# Patient Record
Sex: FEMALE | Race: WHITE | NOT HISPANIC OR LATINO | Employment: OTHER | URBAN - METROPOLITAN AREA
[De-identification: names, ages, dates, MRNs, and addresses within clinical notes are randomized per-mention and may not be internally consistent; named-entity substitution may affect disease eponyms.]

---

## 2021-04-21 ENCOUNTER — OFFICE VISIT CONVERTED (OUTPATIENT)
Dept: INTERNAL MEDICINE | Facility: CLINIC | Age: 71
End: 2021-04-21
Attending: PHYSICIAN ASSISTANT

## 2021-04-21 ENCOUNTER — HOSPITAL ENCOUNTER (OUTPATIENT)
Dept: OTHER | Facility: HOSPITAL | Age: 71
Discharge: HOME | End: 2021-04-21
Attending: PHYSICIAN ASSISTANT

## 2021-04-21 ENCOUNTER — CONVERSION ENCOUNTER (OUTPATIENT)
Dept: INTERNAL MEDICINE | Facility: CLINIC | Age: 71
End: 2021-04-21

## 2021-04-21 LAB
ALBUMIN SERPL-MCNC: 4.3 G/DL (ref 3.5–5)
ALBUMIN/GLOB SERPL: 1.4 {RATIO} (ref 1.4–2.6)
ALP SERPL-CCNC: 85 U/L (ref 43–160)
ALT SERPL-CCNC: 18 U/L (ref 10–40)
ANION GAP SERPL CALC-SCNC: 14 MMOL/L (ref 8–19)
AST SERPL-CCNC: 18 U/L (ref 15–50)
BASOPHILS # BLD AUTO: 0.04 10*3/UL (ref 0–0.2)
BASOPHILS NFR BLD AUTO: 0.5 % (ref 0–3)
BILIRUB SERPL-MCNC: 0.25 MG/DL (ref 0.2–1.3)
BUN SERPL-MCNC: 19 MG/DL (ref 5–25)
BUN/CREAT SERPL: 22 {RATIO} (ref 6–20)
CALCIUM SERPL-MCNC: 9.6 MG/DL (ref 8.7–10.4)
CHLORIDE SERPL-SCNC: 104 MMOL/L (ref 99–111)
CHOLEST SERPL-MCNC: 236 MG/DL (ref 107–200)
CHOLEST/HDLC SERPL: 4.5 {RATIO} (ref 3–6)
CONV ABS IMM GRAN: 0.03 10*3/UL (ref 0–0.2)
CONV CO2: 26 MMOL/L (ref 22–32)
CONV IMMATURE GRAN: 0.3 % (ref 0–1.8)
CONV TOTAL PROTEIN: 7.4 G/DL (ref 6.3–8.2)
CREAT UR-MCNC: 0.86 MG/DL (ref 0.5–0.9)
DEPRECATED RDW RBC AUTO: 46.5 FL (ref 36.4–46.3)
EOSINOPHIL # BLD AUTO: 0.16 10*3/UL (ref 0–0.7)
EOSINOPHIL # BLD AUTO: 1.9 % (ref 0–7)
ERYTHROCYTE [DISTWIDTH] IN BLOOD BY AUTOMATED COUNT: 13.8 % (ref 11.7–14.4)
GFR SERPLBLD BASED ON 1.73 SQ M-ARVRAT: >60 ML/MIN/{1.73_M2}
GLOBULIN UR ELPH-MCNC: 3.1 G/DL (ref 2–3.5)
GLUCOSE SERPL-MCNC: 95 MG/DL (ref 65–99)
HCT VFR BLD AUTO: 41.4 % (ref 37–47)
HDLC SERPL-MCNC: 52 MG/DL (ref 40–60)
HGB BLD-MCNC: 13.3 G/DL (ref 12–16)
LDLC SERPL CALC-MCNC: 151 MG/DL (ref 70–100)
LYMPHOCYTES # BLD AUTO: 1.75 10*3/UL (ref 1–5)
LYMPHOCYTES NFR BLD AUTO: 20.3 % (ref 20–45)
MAGNESIUM SERPL-MCNC: 1.85 MG/DL (ref 1.6–2.3)
MCH RBC QN AUTO: 29.7 PG (ref 27–31)
MCHC RBC AUTO-ENTMCNC: 32.1 G/DL (ref 33–37)
MCV RBC AUTO: 92.4 FL (ref 81–99)
MONOCYTES # BLD AUTO: 0.76 10*3/UL (ref 0.2–1.2)
MONOCYTES NFR BLD AUTO: 8.8 % (ref 3–10)
NEUTROPHILS # BLD AUTO: 5.88 10*3/UL (ref 2–8)
NEUTROPHILS NFR BLD AUTO: 68.2 % (ref 30–85)
NRBC CBCN: 0 % (ref 0–0.7)
OSMOLALITY SERPL CALC.SUM OF ELEC: 292 MOSM/KG (ref 273–304)
PLATELET # BLD AUTO: 314 10*3/UL (ref 130–400)
PMV BLD AUTO: 11.7 FL (ref 9.4–12.3)
POTASSIUM SERPL-SCNC: 4.1 MMOL/L (ref 3.5–5.3)
RBC # BLD AUTO: 4.48 10*6/UL (ref 4.2–5.4)
SODIUM SERPL-SCNC: 140 MMOL/L (ref 135–147)
TRIGL SERPL-MCNC: 163 MG/DL (ref 40–150)
TSH SERPL-ACNC: 2.37 M[IU]/L (ref 0.27–4.2)
VLDLC SERPL-MCNC: 33 MG/DL (ref 5–37)
WBC # BLD AUTO: 8.62 10*3/UL (ref 4.8–10.8)

## 2021-04-22 LAB
EST. AVERAGE GLUCOSE BLD GHB EST-MCNC: 131 MG/DL
HBA1C MFR BLD: 6.2 % (ref 3.5–5.7)

## 2021-04-29 ENCOUNTER — OFFICE VISIT CONVERTED (OUTPATIENT)
Dept: INTERNAL MEDICINE | Facility: CLINIC | Age: 71
End: 2021-04-29
Attending: INTERNAL MEDICINE

## 2021-05-05 ENCOUNTER — OFFICE VISIT CONVERTED (OUTPATIENT)
Dept: PSYCHIATRY | Facility: CLINIC | Age: 71
End: 2021-05-05
Attending: STUDENT IN AN ORGANIZED HEALTH CARE EDUCATION/TRAINING PROGRAM

## 2021-05-11 NOTE — H&P
History and Physical      Patient Name: Nivia Cagle   Patient ID: [id number]   Sex: Female   YOB: 1950        Visit Date: 2021    Provider: Catalina Madsen PA-C   Location: [facility]   Location Address: [address] [place] [address], [address]  [place], [place]  [id number]   Location Phone: [phone]          Chief Complaint  · est care       History Of Present Illness  Nivia Cagle is a 70 year old /White female who presents for evaluation and treatment of:      est care  previous pcp: Luci BAILON in [facility] with [facility]. Seen at [facility] in [facility] twice  last labs: 2020  specialists: Sees Dr. Giraldo for ankles at [facility] . Neurology at [facility].   Needs new neurologist-  muscle jerking and psychiatrist referrals.   Last pap: several years ago.   Last mammo:   Last cscope:  Tobacco: former smoker of 20 years    Broke both ankles 2021 after a fall.   fell in her driveway and broke both ankles. Had surgery.   She went to rehab at [facility] and was discharged 4/3/21. She has home health through [facility].   Needs Gabapentin and hydrocodone refills   Wearing bilateral boots.  She sees Dr. Giraldo   Has been on Lovanox since surgery    Dm2- BG today 215  States bg has been running higher.    HTN- denies cp, palpitations, dizziness    HLD- taking chol med daily    Anxiety/Depression- would like referral to [facility].   Mood is not doing well at all. She lost her mom in the fall and due to covid was not able to see her or say goodbye after death. She was unable to have a  and then was creamated.   She moved up to [place] To be near her son and daughter in law. She states they are not wanting her to go back to [place] and plan to sell all her things and home for her. She lived there 40 yr so this makes her sad  Denies si/hi, states she is Voodoo.  Recently changed from Fluoxetine to  Trintillix.    EARL- has not been able CPAP recently due to use due to alleriges     Allergies- needs something for allergies  Nasal congestion since the season changed.    Muscle twitching- has random twitching in muscles, worse in the face  STates lip twitches causing her to bite it at times.  She states she is taking Quinine PRN severe muscle cramping.   States she was given this my neurology in [place] for debilitating cramping in hands/arms       Past Medical History  Disease Name Date Onset Notes   Allergic rhinitis --  --    Anemia --  --    Anxiety --  --    Cataracts, bilateral --  --    Depression --  --    Diverticulitis --  --    DM2 (diabetes mellitus, type 2) --  --    Head injury --  --    High blood pressure --  --    Hyperlipidemia --  --    Migraines --  --    Phlebitis --  --    Reflux --  --          Past Surgical History  Procedure Name Date Notes   Ankle surgery 2021 --    breast reduction 2015 --    carpal tunnel --  --    Cholecystectomy 2001 --    Colonoscopy --  --    Ulnar nerve transposition, right --  --          Medication List  Name Date Started Instructions   atomoxetine 80 mg oral capsule  take 1 capsule (80 mg) by oral route once daily   Calcium 600 600 mg calcium (1,500 mg) oral tablet  take 1 tablet by oral route daily   clonazepam 1 mg oral tablet  take 1 tablet (1 mg) by oral route 2 times per day   cyclobenzaprine 10 mg oral tablet  take 1 tablet (10 mg) by oral route 3 times per day as needed.   Daily-Jelani oral tablet  take 1 tablet by oral route daily   ezetimibe 10 mg oral tablet  take 1 tablet (10 mg) by oral route once daily   insulin lispro 100 unit/mL subcutaneous solution  inject by subcutaneous route per prescriber's instructions. Insulin dosing requires individualization.   Januvia 100 mg oral tablet  take 1 tablet (100 mg) by oral route once daily   losartan 25 mg oral tablet  take 1 tablet (25 mg) by oral route once daily   Lovenox 40 mg/0.4 mL subcutaneous syringe   "inject 1.5 mg/kg by subcutaneous route once daily   metformin 500 mg oral tablet  take 1 tablet (500 mg) by oral route 2 times per day with morning and evening meals   Miralax 17 gram/dose oral powder  take 17 gram mixed with 8 oz. water, juice, soda, coffee or tea by oral route once daily as needed.   mirtazapine 45 mg oral tablet  take 1 tablet (45 mg) by oral route once daily before bedtime   montelukast 10 mg oral tablet  take 1 tablet (10 mg) by oral route once daily in the evening   pramipexole 0.125 mg oral tablet  take 1 tablet (0.125 mg) by oral route 2-3 hours before bedtime   quinine sulfate 324 mg oral capsule  take 1 capsule by oral route one a day at bedtime   senna 8.6 mg oral tablet  take 1 tablet by oral route twice daily   Trintellix 20 mg oral tablet 04/21/2021 take 1 tablet (20 mg) by oral route once daily at the same time each day   Vitamin D2 1,250 mcg (50,000 unit) oral capsule  take 1 capsule by oral route once a week.         Allergy List  Allergen Name Date Reaction Notes   NO KNOWN DRUG ALLERGIES --  --  --          Family Medical History  Disease Name Relative/Age Notes   Heart Disease Father/   --    Heart Attack (MI) Father/   --    Diabetes Father/   --          Social History  Finding Status Start/Stop Quantity Notes   Alcohol Light --/-- --  special occasion only   Tobacco Former --/-- --  smoked for 20 years. quit in 1988         Vitals  Date Time BP Position Site L\R Cuff Size HR RR TEMP (F) WT  HT  BMI kg/m2 BSA m2 O2 Sat FR L/min FiO2 HC       04/21/2021 11:50 /70 Sitting    113 - R 15 98.3 174lbs 0oz 5'  4\" 29.87 1.89 96 %            Physical Examination  · Constitutional  o Appearance  o : no acute distress, well-nourished  · Head and Face  o Head  o :   § Inspection  § : atraumatic, normocephalic  · Eyes  o Eyes  o : extraocular movements intact, no scleral icterus, no conjunctival injection  · Ears, Nose, Mouth and Throat  o Ears  o :   § External Ears  § : " normal  o Nose  o :   § Intranasal Exam  § : nares patent  o Oral Cavity  o :   § Oral Mucosa  § : moist mucous membranes  · Neck  o Thyroid  o : gland size normal, nontender, no nodules or masses present on palpation, symmetric  · Respiratory  o Respiratory Effort  o : breathing comfortably, symmetric chest rise  o Auscultation of Lungs  o : clear to asculatation bilaterally, no wheezes, rales, or rhonchii  · Cardiovascular  o Heart  o :   § Auscultation of Heart  § : regular rate and rhythm, no murmurs, rubs, or gallops  o Peripheral Vascular System  o :   § Extremities  § : no edema  · Gastrointestinal  o Abdominal Examination  o :   § Abdomen  § : bowel sounds present, non-distended, non-tender  · Skin and Subcutaneous Tissue  o General Inspection  o : no lesions present, no areas of discoloration, skin turgor normal  · Neurologic  o Mental Status Examination  o :   § Orientation  § : grossly oriented to person, place and time  o Gait and Station  o :   § Gait Screening  § : normal gait  · Psychiatric  o General  o : crying     MSK: well healing scars noted on bilateral ankles.   Pt in bilateral boots               Assessment  · Screening for depression     V79.0/Z13.89  positive  · Depression     311/F32.9  Mood not well controlled. Given number for local counselors. Will increase trintillix from 10mg to 20mg. RTC 1 wk. To er if si/hi  · Diabetes mellitus type 2, uncontrolled, with complications       Type 2 diabetes mellitus with unspecified complications     250.92/E11.8  Type 2 diabetes mellitus with hyperglycemia     250.92/E11.65  Discussed dm not well controlled. Will get labs today and adjust meds based on results.  · Essential hypertension     401.9/I10  Cont current meds. LAbs ordered  · Hyperlipidemia     272.4/E78.5  Labs ordered  · Anxiety     300.02/F41.1  · Ankle fracture     824.8/S82.899A  Requesting notes from Dr. Giraldo. Discussed that she needs to request pain med refills from their  office.  · Muscle twitching     781.0/R25.3  Will refer to neuro for further eval. Reqesting previous neuro notes from [facility].  · EARL (obstructive sleep apnea)     327.23/G47.33  Discussed need to restart cpap use.      Plan  · Orders  o Hgb A1c [facility] ([id number]) - 250.92/E11.65, 401.9/I10, 272.4/E78.5 - 04/21/2021  o Physical, Primary Care Panel (CBC, CMP, Lipid, TSH) [facility] ([id number], 82175, 26674, 72649) - 250.92/E11.65, 401.9/I10, 272.4/E78.5, 300.02/F41.1 - 04/21/2021  o ACO-18: Positive screen for clinical depression using a standardized tool and a follow-up plan documented () - - 04/21/2021  o ACO-13: Fall Risk Screening with 2 or more falls in past year or any fall with injury in the past year (1100F) - - 04/21/2021  o ACO-39: Current medications updated and reviewed (1159F, ) - - 04/21/2021  o NEUROLOGY CONSULTATION. (NEURO) - 781.0/R25.3 - 04/21/2021  o Psychiatric Consultation (PSYCH) - 300.02/F41.1, 311/F32.9 - 04/21/2021  o Magnesium, serum (65245) - 781.0/R25.3 - 04/21/2021  · Medications  o cetirizine 10 mg oral tablet   SIG: take 1 tablet (10 mg) by oral route once daily   DISP: (30) Tablet with 1 refills  Prescribed on 04/21/2021     o sertraline 100 mg oral tablet   SIG: take 1 tablet by oral route daily for mood   DISP: (30) Tablet with 1 refills  Prescribed on 04/22/2021     o fluoxetine 60 mg oral tablet   SIG: take 1 tablet (60 mg) by oral route once daily in the morning   DISP: (0) Tablet with 0 refills  Discontinued on 04/21/2021     o Trintellix 20 mg oral tablet   SIG: take 1 tablet (20 mg) by oral route once daily at the same time each day   DISP: (30) Tablet with 1 refills  Discontinued on 04/22/2021     o Medications have been Reconciled  o Transition of Care or Provider Policy  · Instructions  o Depression Screen completed and scanned into the EMR under the designated folder within the patient's documents.  o Today's PHQ-9 result is _21__  o A list of local qualified behavioral  health care centers, psychologists, and psychiatrists were given to the patient at the time of the visit today.  o Advised that cheeses and other sources of dairy fats, animal fats, fast food, and the extras (candy, pastries, pies, doughnuts and cookies) all contain LDL raising nutrients. Advised to increase fruits, vegetables, whole grains, and to monitor portion sizes.   o Handouts were given to patient: local counselors  o Patient was educated/instructed on their diagnosis, treatment and medications prior to discharge from the clinic today.  o Electronically Identified Patient Education Materials Provided Electronically  · Disposition  o Call or Return if symptoms worsen or persist.  o Follow up in 1 week  o Follow up with Dr. Fraser            Electronically Signed by: Catalina Madsen PA-C -Author on April 22, 2021 04:01:04 PM

## 2021-05-13 ENCOUNTER — OFFICE VISIT CONVERTED (OUTPATIENT)
Dept: INTERNAL MEDICINE | Facility: CLINIC | Age: 71
End: 2021-05-13
Attending: PHYSICIAN ASSISTANT

## 2021-05-14 VITALS
WEIGHT: 174 LBS | TEMPERATURE: 98.5 F | DIASTOLIC BLOOD PRESSURE: 78 MMHG | SYSTOLIC BLOOD PRESSURE: 134 MMHG | HEIGHT: 64 IN | HEART RATE: 113 BPM | OXYGEN SATURATION: 96 % | BODY MASS INDEX: 29.71 KG/M2

## 2021-05-14 VITALS
WEIGHT: 174 LBS | OXYGEN SATURATION: 96 % | HEIGHT: 64 IN | HEART RATE: 113 BPM | SYSTOLIC BLOOD PRESSURE: 124 MMHG | DIASTOLIC BLOOD PRESSURE: 70 MMHG | BODY MASS INDEX: 29.71 KG/M2 | TEMPERATURE: 98.3 F | RESPIRATION RATE: 15 BRPM

## 2021-05-22 ENCOUNTER — TRANSCRIBE ORDERS (OUTPATIENT)
Dept: ADMINISTRATIVE | Facility: HOSPITAL | Age: 71
End: 2021-05-22

## 2021-05-22 DIAGNOSIS — Z12.31 OTHER SCREENING MAMMOGRAM: Primary | ICD-10-CM

## 2021-05-27 ENCOUNTER — CONVERSION ENCOUNTER (OUTPATIENT)
Dept: INTERNAL MEDICINE | Facility: CLINIC | Age: 71
End: 2021-05-27

## 2021-05-27 ENCOUNTER — OFFICE VISIT CONVERTED (OUTPATIENT)
Dept: INTERNAL MEDICINE | Facility: CLINIC | Age: 71
End: 2021-05-27
Attending: PHYSICIAN ASSISTANT

## 2021-05-27 LAB
AMPHET UR QL CFM: NEGATIVE
BARBITURATES UR QL: NEGATIVE
BENZODIAZ UR QL SCN: POSITIVE
CONV AMP/METHAMP UR: NEGATIVE
CONV COCAINE, UR: NEGATIVE
MDMA UR QL SCN: NEGATIVE
METHADONE UR QL SCN: NEGATIVE
OPIATES UR QL SCN: NEGATIVE
OXYCODONE UR QL SCN: NEGATIVE
PCP UR QL: NEGATIVE
THC SERPLBLD CFM-MCNC: NEGATIVE NG/ML
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
## 2021-06-06 NOTE — PROGRESS NOTES
Progress Note      Patient Name: Nivia Cagle   Patient ID: [id number]   Sex: Female   YOB: 1950    Primary Care Provider: Catalina Madsen PA-C   Referring Provider: Catalina Madsen PA-C    Visit Date: May 13, 2021    Provider: Catalina Madsen PA-C   Location: [facility]   Location Address: [address] [place] [address], [address]  [id number]   Location Phone: [phone]          Chief Complaint  · f/u depression, DM2      History Of Present Illness  Nivia Cagle is a 70 year old /White female who presents for evaluation and treatment of:      2 week f/up    She has all of medications with her today. She does not know what she is supposed to be taking and what medications are used for.    Depression- pt was seen by Dr. Oliveros psychiatrist who started Abilifty. He discontinued Wellbutrin, Sertraline, and Straterra.  Denies si/hi  She states mood is ''slightly better''. She has follow up scheduled with him.   under a lot of stress at home.       DM2- Using 2-4 units of insulin prn  She has been out of Januvia. Denies low bg  Bg runs around 110.    HLD-takes zetia.  She has been watching her diet.    Muscle spasms- has been taking quinine, cyclobenzaprine, gabapentin for muscle spasm and nerve pain x yrs    Allergies- discussed zyrtec and singulair can be used prn to eliminate 2 of the daily medications         Past Medical History  Disease Name Date Onset Notes   Allergic rhinitis --  --    Anemia --  --    Anxiety --  --    Cataracts, bilateral --  --    Depression --  --    Depression 04/21/2021 Mood not well controlled. Given number for local counselors. Will increase trintillix from 10mg to 20mg. RTC 1 wk. To er if si/hi   Diverticulitis --  --    DM2 (diabetes mellitus, type 2) --  --    Head injury --  --    High blood pressure --  --    Hyperlipidemia --  --    Migraines --  --    EARL (obstructive sleep apnea) 04/21/2021 --    Phlebitis --  --     Reflux --  --          Past Surgical History  Procedure Name Date Notes   Ankle surgery 2021 --    breast reduction 2015 --    carpal tunnel --  --    Cholecystectomy 2001 --    Colonoscopy --  --    Ulnar nerve transposition, right --  --          Medication List  Name Date Started Instructions   aripiprazole 2 mg oral tablet 05/05/2021 take 1 tablet (2 mg) by oral route once daily for 60 days   Calcium 600 600 mg calcium (1,500 mg) oral tablet  take 1 tablet by oral route daily   cetirizine 10 mg oral tablet 04/21/2021 take 1 tablet (10 mg) by oral route once daily   clonazepam 1 mg oral tablet  take 1 tablet (1 mg) by oral route 2 times per day   cyclobenzaprine 10 mg oral tablet 05/13/2021 take 1 tablet by oral route daily as needed muscle spasm   D3-50 Cholecalciferol 1,250 mcg (50,000 unit) oral capsule  take 1 capsule by oral route every 7 days   ezetimibe 10 mg oral tablet  take 1 tablet (10 mg) by oral route once daily   fluoxetine 60 mg oral tablet  take 1 tablet (60 mg) by oral route once daily in the morning   gabapentin 300 mg oral capsule  take 1 capsule by oral route 2 times a day   ibuprofen 200 mg oral capsule  take 1 - 2 capsules (200 - 400 mg) by oral route every 6 hours as needed   insulin lispro 100 unit/mL subcutaneous solution 05/03/2021 inject by subcutaneous route per prescribers sliding scale instructions. Insulin dosing requires individualization.   Januvia 100 mg oral tablet 05/12/2021 take 1 tablet (100 mg) by oral route once daily   losartan 25 mg oral tablet 05/13/2021 take 1 tablet (25 mg) by oral route once daily for 30 days   Lovenox 40 mg/0.4 mL subcutaneous syringe  inject 1.5 mg/kg by subcutaneous route once daily   metformin 500 mg oral tablet  take 1 tablet (500 mg) by oral route 2 times per day with morning and evening meals   mirtazapine 45 mg oral tablet  take 1 tablet (45 mg) by oral route once daily before bedtime   montelukast 10 mg oral tablet  take 1 tablet (10 mg) by  "oral route once daily in the evening   pramipexole 0.125 mg oral tablet  take 1 tablet (0.125 mg) by oral route 2-3 hours before bedtime   quinine sulfate 324 mg oral capsule  take 1 capsule by oral route one a day at bedtime   Tylenol 325 mg oral tablet  take 1 - 2 tablets (325 - 650 mg) by oral route every 4-6 hours as needed   Vitamin D2 1,250 mcg (50,000 unit) oral capsule  take 1 capsule by oral route once a week.         Allergy List  Allergen Name Date Reaction Notes   NO KNOWN DRUG ALLERGIES --  --  --        Allergies Reconciled  Family Medical History  Disease Name Relative/Age Notes   Heart Disease Father/   --    Heart Attack (MI) Father/   --    Diabetes Father/   --          Social History  Finding Status Start/Stop Quantity Notes   Alcohol Light --/-- --  special occasion only   Tobacco Former --/-- --  smoked for 20 years. quit in 1988         Review of Systems  · Constitutional  o Denies  o : fever, fatigue, weight loss, weight gain  · Cardiovascular  o Denies  o : lower extremity edema, claudication, chest pressure, palpitations  · Respiratory  o Denies  o : shortness of breath, wheezing, frequent cough, hemoptysis, dyspnea on exertion  · Gastrointestinal  o Denies  o : nausea, vomiting, diarrhea, constipation, abdominal pain      Vitals  Date Time BP Position Site L\R Cuff Size HR RR TEMP (F) WT  HT  BMI kg/m2 BSA m2 O2 Sat FR L/min FiO2 HC       04/21/2021 11:50 /70 Sitting    113 - R 15 98.3 174lbs 0oz 5'  4\" 29.87 1.89 96 %      04/29/2021 11:40 /78 Sitting    113 - R  98.5 174lbs 0oz 5'  4\" 29.87 1.89 96 %      05/13/2021 11:23 /82 Sitting    103 - R 15 98.4 178lbs 0oz 5'  4\" 30.55 1.91 98 %            Physical Examination  · Constitutional  o Appearance  o : no acute distress, well-nourished  · Head and Face  o Head  o :   § Inspection  § : atraumatic, normocephalic  · Eyes  o Eyes  o : extraocular movements intact, no scleral icterus, no conjunctival injection  · Ears, " Nose, Mouth and Throat  o Ears  o :   § External Ears  § : normal  o Nose  o :   § Intranasal Exam  § : nares patent  o Oral Cavity  o :   § Oral Mucosa  § : moist mucous membranes  o Throat  o :   § Oropharynx  § : no inflammation or lesions present, tonsils within normal limits  · Neck  o Thyroid  o : gland size normal, nontender, no nodules or masses present on palpation, symmetric  · Respiratory  o Respiratory Effort  o : breathing comfortably, symmetric chest rise  o Auscultation of Lungs  o : clear to asculatation bilaterally, no wheezes, rales, or rhonchii  · Cardiovascular  o Heart  o :   § Auscultation of Heart  § : regular rate and rhythm, no murmurs, rubs, or gallops  o Peripheral Vascular System  o :   § Extremities  § : no edema  · Gastrointestinal  o Abdominal Examination  o :   § Abdomen  § : bowel sounds present, non-distended, non-tender  · Lymphatic  o Neck  o : no lymphadenopathy present  · Skin and Subcutaneous Tissue  o General Inspection  o : no lesions present, no areas of discoloration, skin turgor normal  · Neurologic  o Mental Status Examination  o :   § Orientation  § : grossly oriented to person, place and time  o Gait and Station  o :   § Gait Screening  § : normal gait  · Psychiatric  o General  o : normal mood and affect              Assessment  · Allergic rhinitis due to allergen     477.9/J30.9  use zyrtec and singulair prn  · Depression     311/F32.9  Reviewed psych note. Went through all medications and wrote on bottles which to d/c per Psych instructions and which to cont. Keep follow up with their office for med mgmt. To er if si/hi  · Hyperlipidemia     272.4/E78.5  reviewed elevated chol on labs, low fat/chol diet. Increase exercise. Will cont zetia   · EARL (obstructive sleep apnea)     327.23/G47.33  · DM2 (diabetes mellitus, type 2)     250.00/E11.9  Reviewed a1c 6.2. Discussed pt does not need insulin because sugar is well controlled unless bg spikes. Cont Januvia and  Metformin.   · High blood pressure     401.9/I10  BP controlled, cont Losartan  · Muscle spasm     728.85/M62.838  Given number to schedule apt with neurology for full eval      Plan  · Orders  o ACO-39: Current medications updated and reviewed (1159F, ) - - 05/13/2021  · Medications  o cyclobenzaprine 10 mg oral tablet   SIG: take 1 tablet by oral route daily as needed muscle spasm   DISP: (30) Tablet with 0 refills  Prescribed on 05/13/2021     o losartan 25 mg oral tablet   SIG: take 1 tablet (25 mg) by oral route once daily for 30 days   DISP: (30) Tablet with 5 refills  Prescribed on 05/13/2021     o Daily-Jelani oral tablet   SIG: take 1 tablet by oral route daily   DISP: (0) Tablet with 0 refills  Discontinued on 05/13/2021     o ketoconazole 2 % topical cream   SIG: apply to the affected area(s) by topical route 2 times per day   DISP: (1) Tube with 0 refills  Discontinued on 05/13/2021     o Miralax 17 gram/dose oral powder   SIG: take 17 gram mixed with 8 oz. water, juice, soda, coffee or tea by oral route once daily as needed.   DISP: (1) Bottle with 0 refills  Discontinued on 05/13/2021     o senna 8.6 mg oral tablet   SIG: take 1 tablet by oral route twice daily   DISP: (0) Tablet with 0 refills  Discontinued on 05/13/2021     o Medications have been Reconciled  o Transition of Care or Provider Policy  · Instructions  o Advised that cheeses and other sources of dairy fats, animal fats, fast food, and the extras (candy, pastries, pies, doughnuts and cookies) all contain LDL raising nutrients. Advised to increase fruits, vegetables, whole grains, and to monitor portion sizes.   o Handouts were given to patient: updated med list  o Patient was educated/instructed on their diagnosis, treatment and medications prior to discharge from the clinic today.  o Electronically Identified Patient Education Materials Provided Electronically  · Disposition  o Call or Return if symptoms worsen or persist.  o Follow  up in 2 weeks            Electronically Signed by: Catalina Madsen PA-C -Author on May 13, 2021 03:39:52 PM  Electronically Co-signed by: Laine Fraser MD -Reviewer on May 13, 2021 04:05:20 PM

## 2021-06-06 NOTE — PROGRESS NOTES
Progress Note      Patient Name: Nivia Cagle   Patient ID: [id number]   Sex: Female   YOB: 1950    Primary Care Provider: Catalina Madsen PA-C   Referring Provider: Catalina Madsen PA-C    Visit Date: May 27, 2021    Provider: Catalina Madsen PA-C   Location: [facility]   Location Address: [address] [place] [address], [address]  [id number]   Location Phone: [phone]          Chief Complaint  · f/u       History Of Present Illness  Nivia Cagle is a 70 year old /White female who presents for evaluation and treatment of:      follow up    Depression- seeing psych. Feels better with abilify  Denies si/hi  Takes Xanax prn rx from their office  She has not been able to get counselor who takes her insurance    Trouble with hearing- would like referral to audiology    Dm- bg running around 130. Denies low bg.  Has diabetic neuropathy.   needs gabapentin refill. Will do UDS, [place]onson, Narcotic consent today.    Allergies- having nasal congestion  Goes from one nostril to the other.  Denies fever, cough, wheezing, resp distress       Past Medical History  Disease Name Date Onset Notes   Allergic rhinitis --  --    Anemia --  --    Anxiety --  --    Cataracts, bilateral --  --    Depression --  --    Depression 04/21/2021 Mood not well controlled. Given number for local counselors. Will increase trintillix from 10mg to 20mg. RTC 1 wk. To er if si/hi   Diverticulitis --  --    DM2 (diabetes mellitus, type 2) --  --    Head injury --  --    High blood pressure --  --    Hyperlipidemia --  --    Migraines --  --    EARL (obstructive sleep apnea) 04/21/2021 --    Phlebitis --  --    Reflux --  --          Past Surgical History  Procedure Name Date Notes   Ankle surgery 2021 --    breast reduction 2015 --    carpal tunnel --  --    Cholecystectomy 2001 --    Colonoscopy --  --    Ulnar nerve transposition, right --  --          Medication List  Name Date Started  Instructions   aripiprazole 2 mg oral tablet 05/05/2021 take 1 tablet (2 mg) by oral route once daily for 60 days   Calcium 600 600 mg calcium (1,500 mg) oral tablet  take 1 tablet by oral route daily   cetirizine 10 mg oral tablet 04/21/2021 take 1 tablet (10 mg) by oral route once daily   clonazepam 1 mg oral tablet  take 1 tablet (1 mg) by oral route 2 times per day   cyclobenzaprine 10 mg oral tablet 05/13/2021 take 1 tablet by oral route daily as needed muscle spasm   D3-50 Cholecalciferol 1,250 mcg (50,000 unit) oral capsule  take 1 capsule by oral route every 7 days   ezetimibe 10 mg oral tablet  take 1 tablet (10 mg) by oral route once daily   fluoxetine 60 mg oral tablet  take 1 tablet (60 mg) by oral route once daily in the morning   gabapentin 300 mg oral capsule 05/27/2021 take 1 capsule by oral route 2 times a day for 90 days   ibuprofen 200 mg oral capsule  take 1 - 2 capsules (200 - 400 mg) by oral route every 6 hours as needed   insulin lispro 100 unit/mL subcutaneous solution 05/03/2021 inject by subcutaneous route per prescribers sliding scale instructions. Insulin dosing requires individualization.   Januvia 100 mg oral tablet 05/12/2021 take 1 tablet (100 mg) by oral route once daily   losartan 25 mg oral tablet 05/13/2021 take 1 tablet (25 mg) by oral route once daily for 30 days   Lovenox 40 mg/0.4 mL subcutaneous syringe  inject 1.5 mg/kg by subcutaneous route once daily   metformin 500 mg oral tablet  take 1 tablet (500 mg) by oral route 2 times per day with morning and evening meals   mirtazapine 45 mg oral tablet  take 1 tablet (45 mg) by oral route once daily before bedtime   montelukast 10 mg oral tablet  take 1 tablet (10 mg) by oral route once daily in the evening   pramipexole 0.125 mg oral tablet  take 1 tablet (0.125 mg) by oral route 2-3 hours before bedtime   quinine sulfate 324 mg oral capsule  take 1 capsule by oral route one a day at bedtime   Tylenol 325 mg oral tablet  take 1  "- 2 tablets (325 - 650 mg) by oral route every 4-6 hours as needed   Vitamin D2 1,250 mcg (50,000 unit) oral capsule  take 1 capsule by oral route once a week.         Allergy List  Allergen Name Date Reaction Notes   NO KNOWN DRUG ALLERGIES --  --  --        Allergies Reconciled  Family Medical History  Disease Name Relative/Age Notes   Heart Disease Father/   --    Heart Attack (MI) Father/   --    Diabetes Father/   --          Social History  Finding Status Start/Stop Quantity Notes   Alcohol Light --/-- --  special occasion only   Tobacco Former --/-- --  smoked for 20 years. quit in 1988         Vitals  Date Time BP Position Site L\R Cuff Size HR RR TEMP (F) WT  HT  BMI kg/m2 BSA m2 O2 Sat FR L/min FiO2 HC       04/29/2021 11:40 /78 Sitting    113 - R  98.5 174lbs 0oz 5'  4\" 29.87 1.89 96 %      05/13/2021 11:23 /82 Sitting    103 - R 15 98.4 178lbs 0oz 5'  4\" 30.55 1.91 98 %      05/27/2021 10:59 /72 Sitting    88 - R 15 97.8 178lbs 0oz 5'  4\" 30.55 1.91 97 %            Physical Examination  · Constitutional  o Appearance  o : no acute distress, well-nourished  · Head and Face  o Head  o :   § Inspection  § : atraumatic, normocephalic  · Eyes  o Eyes  o : extraocular movements intact, no scleral icterus, no conjunctival injection  · Ears, Nose, Mouth and Throat  o Ears  o :   § External Ears  § : normal  o Nose  o :   § Intranasal Exam  § : nares patent  o Oral Cavity  o :   § Oral Mucosa  § : moist mucous membranes  · Neck  o Thyroid  o : gland size normal, nontender, no nodules or masses present on palpation, symmetric  · Respiratory  o Respiratory Effort  o : breathing comfortably, symmetric chest rise  o Auscultation of Lungs  o : clear to asculatation bilaterally, no wheezes, rales, or rhonchii  · Cardiovascular  o Heart  o :   § Auscultation of Heart  § : regular rate and rhythm, no murmurs, rubs, or gallops  o Peripheral Vascular System  o :   § Extremities  § : no edema  · Skin and " Subcutaneous Tissue  o General Inspection  o : no lesions present, no areas of discoloration, skin turgor normal  · Neurologic  o Mental Status Examination  o :   § Orientation  § : grossly oriented to person, place and time  o Gait and Station  o :   § Gait Screening  § : normal gait  · Psychiatric  o General  o : normal mood and affect          Results  · In-Office Procedures  o Lab procedure  § IOP - Urine Drug Screen In-House [facility] ([id number])   § Amphetamines Ur Ql: Negative   § Barbiturates Ur Ql: Negative   § Buprenorphine+Nor Ur Ql Scn: Negative   § Benzodiaz Ur Ql: Positive   § Cocaine Ur Ql: Negative   § Methadone Ur Ql: Negative   § Methamphet Ur Ql: Negative   § MDMA Ur Ql Scn: Negative   § Opiates Ur Ql: Negative   § Oxycodone Ur Ql: Negative   § PCP Ur Ql: Negative   § THC Ur Ql: Negative   § Temp in Range?: Within/Acceptable   § Control Seen?: Yes       Assessment  · EARL (obstructive sleep apnea)     327.23/G47.33  · DM2 (diabetes mellitus, type 2)     250.00/E11.9  Cont current medications  · High blood pressure     401.9/I10  bp controlled on current meds  · Anxiety     300.02/F41.1  Keep follow up with psych  · Allergic rhinitis     477.9/J30.9  start Flonase to help with nasal congestion  · Hearing loss     389.9/H91.90  referral placed for hearing eval  · Diabetic neuropathy       Type 2 diabetes mellitus with diabetic neuropathy, unspecified     250.60/E11.40  Reviewed [facility]. UDS wnl today- positive for benzo (takes Xanax from psych). Pt signed control contract. Gabapentin rx sent to pharmacy.    Problems Reconciled  Plan  · Orders  o ACO-39: Current medications updated and reviewed (1159F, ) - - 05/27/2021  o Audiology Consultation (AUDIO) - 389.9/H91.90 - 05/27/2021  · Medications  o fluticasone propionate 50 mcg/actuation nasal spray,suspension   SIG: spray 1 - 2 sprays (50 - 100 mcg) in each nostril by intranasal route once daily   DISP: (1) Puff with 1 refills  Prescribed on 05/27/2021      o Medications have been Reconciled  o Transition of Care or Provider Policy  · Instructions  o Patient was educated/instructed on their diagnosis, treatment and medications prior to discharge from the clinic today.  · Disposition  o Call or Return if symptoms worsen or persist.  o Follow up in 1 month            Electronically Signed by: Catalina Madsen PA-C -Author on May 28, 2021 08:57:57 AM  Electronically Co-signed by: Laine Fraser MD -Reviewer on May 28, 2021 09:15:49 AM

## 2021-06-07 ENCOUNTER — TELEPHONE (OUTPATIENT)
Dept: INTERNAL MEDICINE | Facility: CLINIC | Age: 71
End: 2021-06-07

## 2021-06-07 DIAGNOSIS — M79.671 RIGHT FOOT PAIN: Primary | ICD-10-CM

## 2021-06-07 NOTE — TELEPHONE ENCOUNTER
Caller: Nivia Cagle    Relationship: Self    Best call back number:[phone]    What orders are you requesting (i.e. lab or imaging): PHYSICAL THERAPY    In what timeframe would the patient need to come in: ASAP    Where will you receive your lab/imaging services:     Additional notes:   PATIENT STATES THAT SHE CAN BARELY WALK ON HER RIGHT FOOT. PATIENT WILL NOT BE BACK UNTIL LABOR DAY.

## 2021-06-07 NOTE — TELEPHONE ENCOUNTER
Physical therapy referral placed, Patient aware. No other issues or concerns noted currently per patient.

## 2021-06-21 ENCOUNTER — OFFICE VISIT (OUTPATIENT)
Dept: NEUROLOGY | Facility: CLINIC | Age: 71
End: 2021-06-21

## 2021-06-21 ENCOUNTER — TELEMEDICINE (OUTPATIENT)
Dept: PSYCHIATRY | Facility: CLINIC | Age: 71
End: 2021-06-21

## 2021-06-21 ENCOUNTER — LAB (OUTPATIENT)
Dept: LAB | Facility: HOSPITAL | Age: 71
End: 2021-06-21

## 2021-06-21 VITALS
WEIGHT: 176 LBS | HEIGHT: 64 IN | SYSTOLIC BLOOD PRESSURE: 129 MMHG | BODY MASS INDEX: 30.05 KG/M2 | HEART RATE: 78 BPM | DIASTOLIC BLOOD PRESSURE: 79 MMHG

## 2021-06-21 DIAGNOSIS — F43.10 POST TRAUMATIC STRESS DISORDER (PTSD): ICD-10-CM

## 2021-06-21 DIAGNOSIS — G47.33 OBSTRUCTIVE SLEEP APNEA: ICD-10-CM

## 2021-06-21 DIAGNOSIS — R70.0 ELEVATED SED RATE: Primary | ICD-10-CM

## 2021-06-21 DIAGNOSIS — R25.3 MUSCLE TWITCHING: ICD-10-CM

## 2021-06-21 DIAGNOSIS — G25.3 MYOCLONIC JERKING: ICD-10-CM

## 2021-06-21 DIAGNOSIS — G25.81 RESTLESS LEGS SYNDROME (RLS): ICD-10-CM

## 2021-06-21 DIAGNOSIS — R25.3 MUSCLE TWITCHING: Primary | ICD-10-CM

## 2021-06-21 DIAGNOSIS — F60.9 CLUSTER B PERSONALITY DISORDER IN ADULT (HCC): ICD-10-CM

## 2021-06-21 DIAGNOSIS — F41.1 GENERALIZED ANXIETY DISORDER: ICD-10-CM

## 2021-06-21 DIAGNOSIS — F33.2 SEVERE EPISODE OF RECURRENT MAJOR DEPRESSIVE DISORDER, WITHOUT PSYCHOTIC FEATURES (HCC): Primary | ICD-10-CM

## 2021-06-21 PROBLEM — E11.9 DM2 (DIABETES MELLITUS, TYPE 2) (HCC): Status: ACTIVE | Noted: 2021-06-21

## 2021-06-21 PROBLEM — J30.9 ALLERGIC RHINITIS: Status: ACTIVE | Noted: 2021-06-21

## 2021-06-21 PROBLEM — K57.90 DIVERTICULAR DISEASE: Status: ACTIVE | Noted: 2021-06-21

## 2021-06-21 PROBLEM — K21.9 GASTROESOPHAGEAL REFLUX DISEASE: Status: ACTIVE | Noted: 2021-04-02

## 2021-06-21 PROBLEM — H26.493 BILATERAL POSTERIOR CAPSULAR OPACIFICATION: Status: ACTIVE | Noted: 2021-06-17

## 2021-06-21 PROBLEM — G43.909 MIGRAINES: Status: ACTIVE | Noted: 2021-06-21

## 2021-06-21 PROBLEM — I80.9 PHLEBITIS: Status: ACTIVE | Noted: 2021-06-21

## 2021-06-21 PROBLEM — N80.9 ENDOMETRIOSIS: Status: ACTIVE | Noted: 2021-06-17

## 2021-06-21 PROBLEM — E78.00 HYPERCHOLESTEROLEMIA: Status: ACTIVE | Noted: 2021-06-21

## 2021-06-21 PROBLEM — I10 HYPERTENSIVE DISORDER: Status: ACTIVE | Noted: 2021-04-02

## 2021-06-21 PROBLEM — K57.92 DIVERTICULITIS: Status: ACTIVE | Noted: 2021-06-21

## 2021-06-21 PROBLEM — M54.50 LOW BACK PAIN: Status: ACTIVE | Noted: 2021-06-17

## 2021-06-21 PROBLEM — D64.9 ANEMIA: Status: ACTIVE | Noted: 2021-06-21

## 2021-06-21 PROBLEM — S09.90XA HEAD INJURY: Status: ACTIVE | Noted: 2021-06-21

## 2021-06-21 PROBLEM — Z86.19 HISTORY OF HEPATITIS A: Status: ACTIVE | Noted: 2021-06-17

## 2021-06-21 PROBLEM — R01.1 CARDIAC MURMUR: Status: ACTIVE | Noted: 2021-04-02

## 2021-06-21 PROBLEM — M41.9 SCOLIOSIS DEFORMITY OF SPINE: Status: ACTIVE | Noted: 2021-04-02

## 2021-06-21 PROBLEM — F41.9 ANXIETY: Status: ACTIVE | Noted: 2021-06-21

## 2021-06-21 LAB
DEPRECATED RDW RBC AUTO: 45.8 FL (ref 37–54)
ERYTHROCYTE [DISTWIDTH] IN BLOOD BY AUTOMATED COUNT: 14.3 % (ref 12.3–15.4)
ERYTHROCYTE [SEDIMENTATION RATE] IN BLOOD: 41 MM/HR (ref 0–30)
HCT VFR BLD AUTO: 36.4 % (ref 34–46.6)
HGB BLD-MCNC: 12.2 G/DL (ref 12–15.9)
MCH RBC QN AUTO: 30 PG (ref 26.6–33)
MCHC RBC AUTO-ENTMCNC: 33.5 G/DL (ref 31.5–35.7)
MCV RBC AUTO: 89.4 FL (ref 79–97)
PLATELET # BLD AUTO: 274 10*3/MM3 (ref 140–450)
PMV BLD AUTO: 11.4 FL (ref 6–12)
RBC # BLD AUTO: 4.07 10*6/MM3 (ref 3.77–5.28)
WBC # BLD AUTO: 8.16 10*3/MM3 (ref 3.4–10.8)

## 2021-06-21 PROCEDURE — G2212 PROLONG OUTPT/OFFICE VIS: HCPCS | Performed by: NURSE PRACTITIONER

## 2021-06-21 PROCEDURE — 85652 RBC SED RATE AUTOMATED: CPT

## 2021-06-21 PROCEDURE — 85027 COMPLETE CBC AUTOMATED: CPT

## 2021-06-21 PROCEDURE — 82085 ASSAY OF ALDOLASE: CPT

## 2021-06-21 PROCEDURE — 82607 VITAMIN B-12: CPT

## 2021-06-21 PROCEDURE — 83921 ORGANIC ACID SINGLE QUANT: CPT

## 2021-06-21 PROCEDURE — 99215 OFFICE O/P EST HI 40 MIN: CPT | Performed by: NURSE PRACTITIONER

## 2021-06-21 PROCEDURE — 82746 ASSAY OF FOLIC ACID SERUM: CPT

## 2021-06-21 PROCEDURE — 83735 ASSAY OF MAGNESIUM: CPT

## 2021-06-21 PROCEDURE — 82550 ASSAY OF CK (CPK): CPT

## 2021-06-21 PROCEDURE — 84165 PROTEIN E-PHORESIS SERUM: CPT

## 2021-06-21 PROCEDURE — 36415 COLL VENOUS BLD VENIPUNCTURE: CPT

## 2021-06-21 PROCEDURE — 84155 ASSAY OF PROTEIN SERUM: CPT

## 2021-06-21 PROCEDURE — 86140 C-REACTIVE PROTEIN: CPT

## 2021-06-21 PROCEDURE — 99214 OFFICE O/P EST MOD 30 MIN: CPT | Performed by: STUDENT IN AN ORGANIZED HEALTH CARE EDUCATION/TRAINING PROGRAM

## 2021-06-21 PROCEDURE — 80053 COMPREHEN METABOLIC PANEL: CPT

## 2021-06-21 RX ORDER — BLOOD-GLUCOSE METER
1 KIT MISCELLANEOUS 4 TIMES DAILY
COMMUNITY
Start: 2021-04-08

## 2021-06-21 RX ORDER — BUPROPION HYDROCHLORIDE 450 MG/1
450 TABLET, FILM COATED, EXTENDED RELEASE ORAL EVERY MORNING
Qty: 60 TABLET | Refills: 2 | Status: SHIPPED | OUTPATIENT
Start: 2021-06-21 | End: 2021-08-19

## 2021-06-21 RX ORDER — MIRTAZAPINE 45 MG/1
45 TABLET, FILM COATED ORAL
Qty: 60 TABLET | Refills: 2 | Status: SHIPPED | OUTPATIENT
Start: 2021-06-21 | End: 2021-10-26

## 2021-06-21 RX ORDER — FLUTICASONE PROPIONATE 50 MCG
SPRAY, SUSPENSION (ML) NASAL
COMMUNITY
Start: 2021-05-28 | End: 2021-07-16

## 2021-06-21 RX ORDER — CYCLOBENZAPRINE HCL 10 MG
TABLET ORAL
COMMUNITY
Start: 2021-04-03 | End: 2021-07-14 | Stop reason: SDUPTHER

## 2021-06-21 RX ORDER — BUPROPION HYDROCHLORIDE 450 MG/1
450 TABLET, FILM COATED, EXTENDED RELEASE ORAL EVERY MORNING
COMMUNITY
Start: 2021-06-07 | End: 2021-06-21 | Stop reason: ALTCHOICE

## 2021-06-21 RX ORDER — LOSARTAN POTASSIUM 25 MG/1
25 TABLET ORAL DAILY
COMMUNITY
Start: 2021-04-03 | End: 2021-07-14 | Stop reason: SDUPTHER

## 2021-06-21 RX ORDER — LANCETS
EACH MISCELLANEOUS 4 TIMES DAILY
COMMUNITY
Start: 2021-06-04

## 2021-06-21 RX ORDER — PEN NEEDLE, DIABETIC 29 G X1/2"
NEEDLE, DISPOSABLE MISCELLANEOUS
COMMUNITY
Start: 2021-04-08

## 2021-06-21 RX ORDER — PRAMIPEXOLE DIHYDROCHLORIDE 0.5 MG/1
0.5 TABLET ORAL
Qty: 30 TABLET | Refills: 3 | Status: SHIPPED | OUTPATIENT
Start: 2021-06-21 | End: 2021-11-23 | Stop reason: SDUPTHER

## 2021-06-21 RX ORDER — GABAPENTIN 300 MG/1
300 CAPSULE ORAL 2 TIMES DAILY
COMMUNITY
Start: 2021-05-30 | End: 2021-09-22 | Stop reason: SDUPTHER

## 2021-06-21 RX ORDER — CETIRIZINE HYDROCHLORIDE 10 MG/1
TABLET ORAL
COMMUNITY
Start: 2021-06-04 | End: 2021-12-06 | Stop reason: SDUPTHER

## 2021-06-21 RX ORDER — PRAMIPEXOLE DIHYDROCHLORIDE 0.12 MG/1
TABLET ORAL
COMMUNITY
Start: 2021-04-03 | End: 2021-06-21 | Stop reason: SDUPTHER

## 2021-06-21 RX ORDER — ERGOCALCIFEROL 1.25 MG/1
50000 CAPSULE ORAL WEEKLY
COMMUNITY
Start: 2021-04-26 | End: 2022-07-06

## 2021-06-21 RX ORDER — ARIPIPRAZOLE 2 MG/1
4 TABLET ORAL DAILY
Qty: 120 TABLET | Refills: 2 | Status: SHIPPED | OUTPATIENT
Start: 2021-06-21 | End: 2022-01-05

## 2021-06-21 RX ORDER — FLUOXETINE HYDROCHLORIDE 60 MG/1
60 TABLET, FILM COATED ORAL; ORAL DAILY
COMMUNITY
Start: 2021-04-03 | End: 2021-06-21 | Stop reason: SDUPTHER

## 2021-06-21 RX ORDER — FLUOXETINE HYDROCHLORIDE 60 MG/1
60 TABLET, FILM COATED ORAL; ORAL DAILY
Qty: 60 TABLET | Refills: 2 | Status: SHIPPED | OUTPATIENT
Start: 2021-06-21 | End: 2021-09-30

## 2021-06-21 RX ORDER — MULTIVITAMIN WITH FOLIC ACID 400 MCG
1 TABLET ORAL
COMMUNITY
Start: 2021-04-03

## 2021-06-21 RX ORDER — BLOOD SUGAR DIAGNOSTIC
STRIP MISCELLANEOUS 4 TIMES DAILY
COMMUNITY
Start: 2021-06-06

## 2021-06-21 RX ORDER — MIRTAZAPINE 45 MG/1
45 TABLET, FILM COATED ORAL
COMMUNITY
Start: 2021-05-17 | End: 2021-06-21 | Stop reason: SDUPTHER

## 2021-06-21 RX ORDER — ARIPIPRAZOLE 2 MG/1
TABLET ORAL
COMMUNITY
Start: 2021-05-05 | End: 2021-06-21 | Stop reason: SDUPTHER

## 2021-06-21 RX ORDER — SITAGLIPTIN 100 MG/1
TABLET, FILM COATED ORAL
COMMUNITY
Start: 2021-06-04 | End: 2021-07-16 | Stop reason: SDUPTHER

## 2021-06-21 RX ORDER — IBUPROFEN 200 MG
CAPSULE ORAL
COMMUNITY
Start: 2021-04-03 | End: 2021-08-19

## 2021-06-21 RX ORDER — ACETAMINOPHEN 325 MG/1
650 TABLET ORAL EVERY 8 HOURS PRN
COMMUNITY
Start: 2021-04-03 | End: 2022-07-11

## 2021-06-21 NOTE — PATIENT INSTRUCTIONS
1.  Please return to clinic at your next scheduled visit.  Contact the clinic ([phone]) at least 24 hours prior in the event you need to cancel.  2.  Do no harm to yourself or others.    3.  Avoid alcohol and drugs.    4.  Take all medications as prescribed.  Please contact the clinic with any concerns. If you are in need of medication refills, please call the clinic at [phone].    5. Should you want to get in touch with your provider, Dr. Vicky Barth, please utilize [facility] or contact the office ([phone]), and staff will be able to page Dr. Barth directly.  6.  In the event you have personal crisis, contact the following crisis numbers: Suicide Prevention Hotline 1-204.720.3818; [facility] Helpline 0-785-209-KOVK; [facility] Emergency Room [phone]; text HELLO to 796761; or 615.     SPECIFIC RECOMMENDATIONS:     1.      Medications discussed at this encounter:                   - increase abilify to 4 mg daily (2 pills), continue other meds.     2.      Psychotherapy recommendations: Continue     3.     Return to clinic: 8 weeks

## 2021-06-21 NOTE — PROGRESS NOTES
"Subjective   Nivia Cagle is a 70 y.o. female who presents today for follow up    Referring Provider:  No referring provider defined for this encounter.    Chief Complaint:  \"I'm doing better.\"    History of Present Illness:     Nivia Cagle is a 70 year old /White female referred by Catalina Madsen PA-C.     Review : Seen  to establish care. History of diabetes type 2, hypertension, hyperlipidemia, anxiety and depression, EARL. Lost her mom due to COVID and was not able to say goodbye and was unable to have a . She moved to [place] to be near her son and daughter-in-law. She may have to sell her home and all her possessions in [place]. On atomoxetine 80 mg a day, clonazepam 1 mg twice a day, mirtazapine 45 mg at night, pramipexole 0.125 mg at night, Trintellix 20 mg a day. Labs this month: Elevated LDL, A1c is 6.2, LFTs, renal profile, CBC, electrolytes, TSH all normal. No outpatient EKG, head imaging.     \"Emphasis on Oriana.\" Virtual visit via Zoom audio and video due to the COVID-19 pandemic. Patient is accepting of and agreeable to appointment. The appointment consisted of the patient and I only.     Interview: \"The medication has been very helpful.\" Not nearly as tangential. \"That's the first time any medication has worked.\" Having spasms of her feet; had them before starting abilify, however. Mood improved. Energy and concentration improved. Still has insomnia.    Anxiety: Worrying is much better, less irritable as well, with better focus and energy.    PTSD symptoms can be triggered by TV (seeing stalkers, abuse). Otherwise under control.    Previous notes:  Patient extremely tangential and talkative at her first visit. Reports recently she broke both of her ankles in February. Her mom passed away from COVID in August of last year and she was not allowed to see her. She is about to sell her house in [place] and live in an apartment in [place]. Longstanding history of " depression since 1989.           PHQ-9 Depression Screening  PHQ-9 Total Score:      Little interest or pleasure in doing things?     Feeling down, depressed, or hopeless?     Trouble falling or staying asleep, or sleeping too much?     Feeling tired or having little energy?     Poor appetite or overeating?     Feeling bad about yourself - or that you are a failure or have let yourself or your family down?     Trouble concentrating on things, such as reading the newspaper or watching television?     Moving or speaking so slowly that other people could have noticed? Or the opposite - being so fidgety or restless that you have been moving around a lot more than usual?     Thoughts that you would be better off dead, or of hurting yourself in some way?     PHQ-9 Total Score       LADI-7       Past Surgical History:  Past Surgical History:   Procedure Laterality Date   • ANKLE SURGERY  2021   • BILATERAL BREAST REDUCTION  2015   • CARPAL TUNNEL RELEASE     • CHOLECYSTECTOMY  2001   • COLONOSCOPY     • HYSTERECTOMY     • ULNAR NERVE TRANSPOSITION Right        Problem List:  Patient Active Problem List   Diagnosis   • Muscle twitching   • Myoclonic jerking   • Restless legs syndrome (RLS)   • Obstructive sleep apnea   • Allergic rhinitis   • Anemia   • Anxiety   • Benign essential hypertension   • Bilateral posterior capsular opacification   • Cardiac murmur   • DM2 (diabetes mellitus, type 2) (CMS/HCC)   • Diverticular disease   • Diverticulitis   • Endometriosis   • Gastroesophageal reflux disease   • Head injury   • History of hepatitis A   • Hypercholesterolemia   • Hypertensive disorder   • Low back pain   • Migraines   • Phlebitis   • Scoliosis deformity of spine   • Severe depression (CMS/HCC)       Allergy:   Allergies   Allergen Reactions   • Diclofenac Hives   • Adhesive Tape Rash     Rash at area of bandaid   • Niacin Rash   • Tape Rash     Rash at area of bandaid        Discontinued Medications:  Medications  "Discontinued During This Encounter   Medication Reason   • enoxaparin (LOVENOX) 40 MG/0.4ML solution syringe    • ARIPiprazole (ABILIFY) 2 MG tablet Reorder   • FLUoxetine (PROzac) 60 MG tablet Reorder   • mirtazapine (REMERON) 45 MG tablet Reorder       Current Medications:   Current Outpatient Medications   Medication Sig Dispense Refill   • Accu-Chek Madeline Plus test strip by Other route 4 (Four) Times a Day. use to test blood sugar     • Accu-Chek Softclix Lancets lancets by Other route 4 (Four) Times a Day. use to test blood sugar     • ARIPiprazole (ABILIFY) 2 MG tablet Take 2 tablets by mouth Daily for 180 days. 120 tablet 2   • BD Insulin Syringe U/F 30G X 1/2\" 0.3 ML misc USE TO INJECT INSULIN FOUR TIMES DAILY AS NEEDED     • Blood Glucose Monitoring Suppl (FreeStyle Lite) device 1 each by Other route 4 (Four) Times a Day.     • Calcium 600 600 MG tablet TAKE 1 TABLET BY MOUTH EVERY DAY     • cetirizine (zyrTEC) 10 MG tablet TAKE 1 TABLET BY ORAL ROUTE ONCE DAILY     • cyclobenzaprine (FLEXERIL) 10 MG tablet      • Daily-Jelani Multivitamin tablet tablet Take 1 tablet by mouth every night at bedtime.     • FLUoxetine (PROzac) 60 MG tablet Take 1 tablet by mouth Daily for 180 days. 60 tablet 2   • fluticasone (FLONASE) 50 MCG/ACT nasal spray      • gabapentin (NEURONTIN) 300 MG capsule Take 300 mg by mouth 2 (Two) Times a Day.     • insulin lispro (humaLOG) 100 UNIT/ML injection INJECT 30 UNITS UNDER THE SKIN PER PRESCRIBERS SLIDING SCALE INSTRUCTIONS. INSULIN DOSING REQUIRES INDIVIDUALIZATION     • Januvia 100 MG tablet      • losartan (COZAAR) 25 MG tablet Take 25 mg by mouth Daily.     • metFORMIN (GLUCOPHAGE) 500 MG tablet Take 500 mg by mouth 2 (Two) Times a Day With Meals.     • mirtazapine (REMERON) 45 MG tablet Take 1 tablet by mouth every night at bedtime for 180 days. 60 tablet 2   • Non-Aspirin Pain Reliever 325 MG tablet Take 650 mg by mouth Every 8 (Eight) Hours As Needed. for pain     • " pramipexole (MIRAPEX) 0.5 MG tablet Take 1 tablet by mouth every night at bedtime. 30 tablet 3   • vitamin D (ERGOCALCIFEROL) 1.25 MG (17643 UT) capsule capsule      • buPROPion XL (FORFIVO XL) 450 MG 24 hr tablet Take 1 tablet by mouth Every Morning for 180 days. 60 tablet 2     No current facility-administered medications for this visit.       Past Medical History:  Past Medical History:   Diagnosis Date   • ADHD (attention deficit hyperactivity disorder)    • Allergic rhinitis    • Anemia    • Anxiety    • Cataracts, bilateral    • Depression 0421/2021    MOODNOT WELL CONTROLLED.  GIVEN NUMBER FOR LOCAL COUNSELOR.  WILL INCREASE TRINTELIX FROM 10MG TO 20MG RTC WEEK ER ID S/HI   • Diabetes mellitus, type 2 (CMS/HCC)    • Diverticulitis    • GERD (gastroesophageal reflux disease)    • Head injury    • High blood pressure    • Hyperlipemia    • Migraine    • EARL (obstructive sleep apnea) 04/21/2021   • Phlebitis    • PTSD (post-traumatic stress disorder)          Social History     Socioeconomic History   • Marital status: Single     Spouse name: Not on file   • Number of children: Not on file   • Years of education: Not on file   • Highest education level: Not on file   Tobacco Use   • Smoking status: Former Smoker   • Smokeless tobacco: Never Used   • Tobacco comment: QUIT 1988   Vaping Use   • Vaping Use: Never used   Substance and Sexual Activity   • Alcohol use: Yes     Comment: SPECIAL OCCASION ONLY   • Drug use: Never   • Sexual activity: Defer         Family History   Problem Relation Age of Onset   • Heart disease Father    • Heart attack Father    • Diabetes Father    • ADD / ADHD Father    • No Known Problems Mother    • No Known Problems Sister    • No Known Problems Brother    • No Known Problems Paternal Uncle    • No Known Problems Cousin    • Brain cancer Sister    • Lung cancer Sister    • No Known Problems Brother    • No Known Problems Sister        Mental Status Exam:   Hygiene:    good  Cooperation:  Cooperative  Eye Contact:  Good  Psychomotor Behavior:  Appropriate  Affect:  Restricted  Mood: better  Hopelessness: Denies  Speech:  Normal  Thought Process:  Goal directed  Thought Content:  Normal  Suicidal:  None  Homicidal:  None  Hallucinations:  None  Delusion:  None  Memory:  Intact  Orientation:  Person, Place, Time and Situation  Reliability:  fair  Insight:  Fair  Judgement:  Fair  Impulse Control:  Fair  Physical/Medical Issues:  Yes pain     Review of Systems:  Review of Systems   Eyes: Negative for visual disturbance.   Respiratory: Negative for cough and shortness of breath.    Cardiovascular: Positive for leg swelling. Negative for chest pain and palpitations.   Gastrointestinal: Negative for nausea and vomiting.   Endocrine: Negative for cold intolerance and heat intolerance.   Genitourinary: Negative for difficulty urinating.   Musculoskeletal: Positive for joint swelling.   Allergic/Immunologic: Negative for immunocompromised state.   Neurological: Positive for dizziness. Negative for seizures.         Physical Exam:  Physical Exam    Vital Signs:   There were no vitals taken for this visit.     Lab Results:   Conversion Encounter on 05/27/2021   Component Date Value Ref Range Status   • THC, Screen 05/27/2021 Negative   Final   • Phencyclidine (PCP), Urine 05/27/2021 Negative   Final   • Oxycodone Screen, Urine 05/27/2021 Negative   Final   • Opiates 05/27/2021 Negative   Final   • MDMA URINE 05/27/2021 Negative   Final   • Amphet/Methamphet, Screen, Urine 05/27/2021 Negative   Final   • Methadone Screen, Urine 05/27/2021 Negative   Final   • Cocaine Screen, Urine 05/27/2021 Negative   Final   • Benzodiazepine Screen, Urine 05/27/2021 Positive   Final   • Barbiturates Screen, Urine 05/27/2021 Negative   Final   • Amphetamine, Urine 05/27/2021 Negative   Final   Hospital Outpatient Visit on 04/21/2021   Component Date Value Ref Range Status   • Hemoglobin A1C 04/21/2021 6.2*  3.5 - 5.7 % Final    Comment: **Interpretation**  <7% in Controlled Diabetic Patients.  Assay Range 3.4-18.2%  ADA 2010 Standards of Medical Care in Diabetes suggest using  a cut point of >= 6.5% for diagnosis of diabetes and an A1C  range of 5.7%-6.4% as a category of increased risk for future  diabetes.     • Glucose 04/21/2021 95  65 - 99 mg/dL Final   • BUN 04/21/2021 19  5 - 25 mg/dL Final   • Creatinine 04/21/2021 0.86  0.50 - 0.90 mg/dL Final   • BUN/Creatinine Ratio 04/21/2021 22* 6 - 20 [ratio] Final   • GFR 04/21/2021 >60  >60 mL/min/[1.73_m2] Final    Comment: Interpretative Data:  ------------------------------------  STAGE                  GFR  Stage 1                90 mL/min or greater  Stage 2                60-89 mL/min  Stage 3                30-59 mL/min  Stage 4                15-29 mL/min  Value <60 mL/min for 3 or more months is defined as CKD.     • Sodium 04/21/2021 140  135 - 147 mmol/L Final   • Potassium 04/21/2021 4.1  3.5 - 5.3 mmol/L Final   • Chloride 04/21/2021 104  99 - 111 mmol/L Final   • CO2 04/21/2021 26  22 - 32 mmol/L Final   • Anion Gap 04/21/2021 14  8 - 19 mmol/L Final   • OSMOLALITY CALC 04/21/2021 292  273 - 304 Final   • Total Protein 04/21/2021 7.4  6.3 - 8.2 g/dL Final    Comment: If Patient is receiving dextran as a blood volume expander  result may show a potential interference.     • Albumin 04/21/2021 4.3  3.5 - 5.0 g/dL Final   • Globulin 04/21/2021 3.1  2.0 - 3.5 g/dL Final   • A/G Ratio 04/21/2021 1.4  1.4 - 2.6 [ratio] Final   • Calcium 04/21/2021 9.6  8.7 - 10.4 mg/dL Final   • Alkaline Phosphatase 04/21/2021 85  43 - 160 U/L Final   • ALT (SGPT) 04/21/2021 18  10 - 40 U/L Final   • AST (SGOT) 04/21/2021 18  15 - 50 U/L Final   • Total Bilirubin 04/21/2021 0.25  0.20 - 1.30 mg/dL Final   • WBC 04/21/2021 8.62  4.80 - 10.80 10*3/uL Final   • RBC 04/21/2021 4.48  4.20 - 5.40 10*6/uL Final   • Hemoglobin 04/21/2021 13.3  12.0 - 16.0 g/dL Final   • Hematocrit  04/21/2021 41.4  37.0 - 47.0 % Final   • MCV 04/21/2021 92.4  81.0 - 99.0 fL Final   • MCH 04/21/2021 29.7  27.0 - 31.0 pg Final   • MCHC 04/21/2021 32.1* 33.0 - 37.0 Final   • RDW 04/21/2021 13.8  11.7 - 14.4 % Final   • Platelets 04/21/2021 314  130 - 400 10*3/uL Final   • MPV 04/21/2021 11.7  9.4 - 12.3 fL Final   • Neutrophil Rel % 04/21/2021 68.2  30.0 - 85.0 % Final   • Lymphocyte Rel % 04/21/2021 20.3  20.0 - 45.0 % Final   • Monocyte Rel % 04/21/2021 8.8  3.0 - 10.0 % Final   • Eosinophil Rel % 04/21/2021 1.9  0.0 - 7.0 % Final   • Basophil Rel % 04/21/2021 0.5  0.0 - 3.0 % Final   • Neutrophils Absolute 04/21/2021 5.88  2.00 - 8.00 10*3/uL Final   • Lymphocytes Absolute 04/21/2021 1.75  1.00 - 5.00 10*3/uL Final   • Monocytes Absolute 04/21/2021 0.76  0.20 - 1.20 10*3/uL Final   • Eosinophils Absolute 04/21/2021 0.16  0.00 - 0.70 10*3/uL Final   • Basophils Absolute 04/21/2021 0.04  0.00 - 0.20 10*3/uL Final   • Immature Granulocyte Rel % 04/21/2021 0.3  0.0 - 1.8 % Final   • Abs Imm Gran 04/21/2021 0.03  0.00 - 0.20 10*3/uL Final   • RDW-SD 04/21/2021 46.5* 36.4 - 46.3 fL Final   • NRBC 04/21/2021 0.00  0.00 - 0.70 % Final   • TSH 04/21/2021 2.370  0.270 - 4.200 m[iU]/L Final   • Triglycerides 04/21/2021 163* 40 - 150 mg/dL Final    Comment: <150 mg/dL-Normal  150-199 mg/dL-Borderline High  200-499 mg/dL-High  >500 mg/dL- Very High     • Total Cholesterol 04/21/2021 236* 107 - 200 mg/dL Final    Comment: <200 mg/dL-Desirable  200-239 mg/dL-Borderline High  >240 mg/dL-High  >500 mg/dL-Very High     • HDL Cholesterol 04/21/2021 52  40 - 60 mg/dL Final    Comment: <40 mg/dL-Low  >60 mg/dL- Desirable     • Chol/HDL Ratio 04/21/2021 4.5  3.0 - 6.0 NA Final   • LDL Cholesterol  04/21/2021 151* 70 - 100 mg/dL Final    Comment: Recommended  LDL Levels  <100 mg/dL-Optimal  100-129 mg/dL-Near Optimal/above optimal  130-159 mg/dL-Borderline High  160-189 mg/dL-High  >190 mg/dL-Very High     • VLDL Cholesterol  04/21/2021 33  5 - 37 mg/dL Final   • Magnesium 04/21/2021 1.85  1.60 - 2.30 mg/dL Final   • Mean Bld Glu Estim. 04/21/2021 131  mg/dL Final       EKG Results:  No orders to display       Imaging Results:  No Images in the past 120 days found..      Assessment/Plan   Diagnoses and all orders for this visit:    1. Severe episode of recurrent major depressive disorder, without psychotic features (CMS/[facility]) (Primary)  -     Ambulatory Referral to Behavioral Health    2. Generalized anxiety disorder  -     Ambulatory Referral to Behavioral Health    3. Cluster B personality disorder in adult (CMS/[facility])  -     Ambulatory Referral to Behavioral Health    4. Post traumatic stress disorder (PTSD)  -     Ambulatory Referral to Behavioral Health    Other orders  -     ARIPiprazole (ABILIFY) 2 MG tablet; Take 2 tablets by mouth Daily for 180 days.  Dispense: 120 tablet; Refill: 2  -     buPROPion XL (FORFIVO XL) 450 MG 24 hr tablet; Take 1 tablet by mouth Every Morning for 180 days.  Dispense: 60 tablet; Refill: 2  -     FLUoxetine (PROzac) 60 MG tablet; Take 1 tablet by mouth Daily for 180 days.  Dispense: 60 tablet; Refill: 2  -     mirtazapine (REMERON) 45 MG tablet; Take 1 tablet by mouth every night at bedtime for 180 days.  Dispense: 60 tablet; Refill: 2        Presentation most consistent with major depressive disorder, recurrent, moderate to severe, with anxious distress.  PTSD.  Rule out personality disorder, cluster B specifically.  Rule out hypomania as patient was very difficult to interrupt today.    Seriously consider whether patient has bipolar 2, given her rapid improvement on the equivalent dose of 4 mg of abilify daily. Still tangential, but far less so.    Referral for TMS.  Patient has a history of ECT twice in her life.  The last time she underwent ECT was in 2003 and it did not help.    Improved on abilify; increase dose to target depression and anxiety. Continue mirtazapine, bupropion (she never stopped  this), prozac. Effective dose of abilify will be 8 mg (prozac inhibits 2D6, doubling abilify's concentration).     Consider brexpiprazole.    Very important to obtain records from previous psychiatrist.  Care should be taken when putting patient on sedating medications as she has a falls risk.  Also has a history of EARL which will lead to difficulties treating her insomnia.    Therapy referral made to [facility].      See back in 8 weeks.  Patient is not vaccinated.    Visit Diagnoses:    ICD-10-CM ICD-9-CM   1. Severe episode of recurrent major depressive disorder, without psychotic features (CMS/[facility])  F33.2 296.33   2. Generalized anxiety disorder  F41.1 300.02   3. Cluster B personality disorder in adult (CMS/[facility])  F60.9 301.89   4. Post traumatic stress disorder (PTSD)  F43.10 309.81       PLAN:  1. Risk Assessment: Risk of self-harm acutely is moderate. Risk factors include chronic depressive disorder, possible personality disorder, recent psychosocial stressors (pandemic, moving). Protective factors include no present SI, no history of suicide attempts or self-harm in the past, no access to weapons, minimal AODA, healthcare seeking, future orientation, willingness to engage in care. Risk of self-harm chronically is also moderate, but could be further elevated in the event of treatment noncompliance and/or AODA.  2. Safety: No acute safety concerns.  3. Medications:   a. CONTINUE bupropion xl 450 mg daily .   b. CONTINUE Prozac 60 mg a day  c. CONTINUE mirtazapine 45 mg at night.   d. INCREASE Abilify 2 to 4 mg p.o. daily to target depression, anxiety, decreased energy. Risks, benefits, alternatives discussed with patient including increased energy, exacerbation of irritability, akathisia, GI upset, orthostatic hypotension, increased appetite. After discussion of these risks and benefits, the patient voiced understanding and agreed to proceed.  4. Therapy: Referral to [facility]. Pt understands it will be 2 mos and  telepsych.  5. Labs/Studies: TMS referral.  6. Follow Up: 8 weeks.      TREATMENT PLAN/GOALS: Continue supportive psychotherapy efforts and medications as indicated. Treatment and medication options discussed during today's visit. Patient acknowledged and verbally consented to continue with current treatment plan and was educated on the importance of compliance with treatment and follow-up appointments.    MEDICATION ISSUES:  SAPNA reviewed as expected.  Discussed medication options and treatment plan of prescribed medication as well as the risks, benefits, and side effects including potential falls, possible impaired driving and metabolic adversities among others. Patient is agreeable to call the office with any worsening of symptoms or onset of side effects. Patient is agreeable to call 911 or go to the nearest ER should he/she begin having SI/HI. No medication side effects or related complaints today.     MEDS ORDERED DURING VISIT:  New Medications Ordered This Visit   Medications   • ARIPiprazole (ABILIFY) 2 MG tablet     Sig: Take 2 tablets by mouth Daily for 180 days.     Dispense:  120 tablet     Refill:  2   • buPROPion XL (FORFIVO XL) 450 MG 24 hr tablet     Sig: Take 1 tablet by mouth Every Morning for 180 days.     Dispense:  60 tablet     Refill:  2   • FLUoxetine (PROzac) 60 MG tablet     Sig: Take 1 tablet by mouth Daily for 180 days.     Dispense:  60 tablet     Refill:  2   • mirtazapine (REMERON) 45 MG tablet     Sig: Take 1 tablet by mouth every night at bedtime for 180 days.     Dispense:  60 tablet     Refill:  2       Return in about 2 months (around 8/21/2021).         This document has been electronically signed by Vicky Barth MD  June 21, 2021 15:04 EDT      Part of this note may be an electronic transcription/translation of spoken language to printed text using the Dragon Dictation System.

## 2021-06-22 ENCOUNTER — TREATMENT (OUTPATIENT)
Dept: PHYSICAL THERAPY | Facility: CLINIC | Age: 71
End: 2021-06-22

## 2021-06-22 DIAGNOSIS — M25.571 ACUTE RIGHT ANKLE PAIN: Primary | ICD-10-CM

## 2021-06-22 LAB
ALBUMIN SERPL ELPH-MCNC: 3.6 G/DL (ref 2.9–4.4)
ALBUMIN SERPL-MCNC: 4.4 G/DL (ref 3.5–5.2)
ALBUMIN/GLOB SERPL: 1.1 {RATIO} (ref 0.7–1.7)
ALBUMIN/GLOB SERPL: 1.7 G/DL
ALDOLASE SERPL-CCNC: 8.3 U/L (ref 3.3–10.3)
ALP SERPL-CCNC: 88 U/L (ref 39–117)
ALPHA1 GLOB SERPL ELPH-MCNC: 0.3 G/DL (ref 0–0.4)
ALPHA2 GLOB SERPL ELPH-MCNC: 1 G/DL (ref 0.4–1)
ALT SERPL W P-5'-P-CCNC: 19 U/L (ref 1–33)
ANION GAP SERPL CALCULATED.3IONS-SCNC: 13.1 MMOL/L (ref 5–15)
AST SERPL-CCNC: 19 U/L (ref 1–32)
B-GLOBULIN SERPL ELPH-MCNC: 1.1 G/DL (ref 0.7–1.3)
BILIRUB SERPL-MCNC: 0.3 MG/DL (ref 0–1.2)
BUN SERPL-MCNC: 21 MG/DL (ref 8–23)
BUN/CREAT SERPL: 22.1 (ref 7–25)
CALCIUM SPEC-SCNC: 9.4 MG/DL (ref 8.6–10.5)
CHLORIDE SERPL-SCNC: 104 MMOL/L (ref 98–107)
CK SERPL-CCNC: 132 U/L (ref 20–180)
CO2 SERPL-SCNC: 23.9 MMOL/L (ref 22–29)
CREAT SERPL-MCNC: 0.95 MG/DL (ref 0.57–1)
CRP SERPL-MCNC: 0.86 MG/DL (ref 0–0.5)
FOLATE SERPL-MCNC: >20 NG/ML (ref 4.78–24.2)
GAMMA GLOB SERPL ELPH-MCNC: 1 G/DL (ref 0.4–1.8)
GFR SERPL CREATININE-BSD FRML MDRD: 58 ML/MIN/1.73
GLOBULIN SER CALC-MCNC: 3.4 G/DL (ref 2.2–3.9)
GLOBULIN UR ELPH-MCNC: 2.6 GM/DL
GLUCOSE SERPL-MCNC: 88 MG/DL (ref 65–99)
LABORATORY COMMENT REPORT: NORMAL
M PROTEIN SERPL ELPH-MCNC: NORMAL G/DL
MAGNESIUM SERPL-MCNC: 2.1 MG/DL (ref 1.6–2.4)
POTASSIUM SERPL-SCNC: 4.1 MMOL/L (ref 3.5–5.2)
PROT PATTERN SERPL ELPH-IMP: NORMAL
PROT SERPL-MCNC: 7 G/DL (ref 6–8.5)
PROT SERPL-MCNC: 7 G/DL (ref 6–8.5)
SODIUM SERPL-SCNC: 141 MMOL/L (ref 136–145)
VIT B12 BLD-MCNC: 655 PG/ML (ref 211–946)

## 2021-06-22 PROCEDURE — 97163 PT EVAL HIGH COMPLEX 45 MIN: CPT | Performed by: PHYSICAL THERAPIST

## 2021-06-22 NOTE — PROGRESS NOTES
Physical Therapy Initial Evaluation and Plan of Care    Patient: Nivia Cagle   : 1950  Diagnosis/ICD-10 Code:  No primary diagnosis found.  Referring practitioner: Catalina Madsen PA-C  Date of Initial Visit: 2021  Today's Date: 2021  Patient seen for 1 sessions               Subjective Evaluation    History of Present Illness  Mechanism of injury: Pt is a 70 year old female who reports to physical therapy secondary to R ankle pain from a fracture. She reports that in February, she had an accident at home where she tripped, rolled her bilateral ankles, and fell causing her to fracture both ankles. Her ankles were immobilized (R ankle was placed in a fixator and her L ankle was placed in a boot) for about 6 weeks. Pt reports that her L ankle has been doing well, but she still has trouble with her R ankle. Due to the fracture, patient was not able to ambulate for 3 weeks. She was hospitalized for 1 week, then was discharged to rehabilitation for 1 month. She then had home health. Pt has made progress over time and is ambulating with a walker. She wants to be able to ambulate with a cane.     Pain  Current pain ratin  At best pain ratin  At worst pain ratin    Diagnostic Tests  MRI studies: abnormal    Treatments  Previous treatment: physical therapy  Patient Goals  Patient goals for therapy: decreased pain, improved balance, increased strength, decreased edema and return to sport/leisure activities        Objective          Active Range of Motion     Additional Active Range of Motion Details      Active Range       Left Right    Ankle    Dorsiflexion  5 0    Plantarflexion  44 25   Inversion  33 15    Eversion  20 2      Strength/Myotome Testing     Left Hip   Planes of Motion   Flexion: 4-  Abduction: 4-  Adduction: 4-    Right Hip   Planes of Motion   Flexion: 4-  Abduction: 4-  Adduction: 4-    Left Knee   Flexion: 4-  Extension: 4-    Right Knee   Flexion: 4  Extension:  4    Left Ankle/Foot   Dorsiflexion: 4+  Plantar flexion: 4  Inversion: 4  Eversion: 4    Right Ankle/Foot   Dorsiflexion: 2+  Plantar flexion: 2+  Inversion: 2+  Eversion: 2+          Assessment & Plan     Assessment  Impairments: abnormal gait, impaired balance, lacks appropriate home exercise program and pain with function  Assessment details: Pt presents with limitations, that impede her ability to ambulate and perform functional tasks. She has significant limitations in her R ankle ROM and weakness. Pt reports that she will only be able to attend physical therapy for 2 weeks than she will be in another state for 2 months. Pt was encouraged to continue physical therapy while she will be away for 2 months if she is able to due to her insurance and MD referral as she will need more than 2 weeks of physical therapy to improve her ankle ROM, strength, gait, and functional mobility.. The skills of a therapist will be required to safely and effectively implement the following treatment plan to restore maximal level of function.     Goals  Plan Goals: ANKLE PROBLEMS    1. The patient has limited ROM for the R ankle.   LTG 1: 12 weeks:  In order to allow the patient greater ease with forward, lateral, and diagonal mobility the patient will demonstrate improved ROM of the R ankle as follows:  10 degrees of dorsiflexion, 50 degrees of plantarflexion, 30 degrees of inversion, and 20 degrees of eversion.  STATUS:  New   STG 1a: 6 weeks:  The patient will demonstrate improved ROM of the 5 ankle as follows:  5 degrees of dorsiflexion, 40 degrees of plantarflexion, 20 degrees of inversion, and 20 degrees of eversion.    STATUS:  New   TREATMENT: Manual therapy, therapeutic exercise, home exercise instruction, and modalities as needed to include:  moist heat, electrical stimulation, ultrasound, and ice.    2. The patient has limited strength of the R ankle.   LTG 2: 12 weeks:  In order to provide greater joint stability of the  R ankle the patient will demonstrate improved strength of the R ankle as follows:  4/5 dorsiflexion, 4/5 inversion, 4/5 eversion, and 4/5 plantar flexion.    STATUS:  New   STG 2a: 6 weeks:  The patient will demonstrate improved strength of the R ankle as follows:  4-/5 dorsiflexion, 4-/5 inversion, 4-/5 eversion, and 4-/5 plantar flexion.    STATUS:  New   TREATMENT: Therapeutic exercise, home exercise instruction, aquatic therapy.    3. The patient has gait dysfunction.   LTG 3: 12 weeks:  The patient will ambulate without assistive device, independently, for community distances with minimal limp to the R lower extremity in order to improve mobility and allow patient to perform activities such as grocery shopping with greater ease.    STATUS:  New   STG 3a: 6 weeks: The patient will ambulate with a cane 200 feet independently.    STATUS:  New   TREATMENT: Gait training, aquatic therapy, therapeutic exercise, and home exercise instruction.    4. The patient complains of R ankle pain.  LTG 4: 12 weeks:  The patient will report a pain rating of 2/10 or better in order to improve  tolerance to activities of daily living and improve sleep quality.  STATUS:  New  STG 4a: 6 weeks:  The patient will report a pain rating of 4/10 or better.  STATUS:  New  TREATMENT:  Therapeutic exercises, manual therapy, aquatic therapy, home exercise   instruction, and modalities as needed for pain to include:  electrical stimulation, moist heat, ice,   ultrasound, and diathermy.      Plan  Therapy options: will be seen for skilled physical therapy services  Planned therapy interventions: therapeutic activities, manual therapy, home exercise program, strengthening, neuromuscular re-education, soft tissue mobilization, gait training and stretching  Duration in visits: 20  Plan details: Ankle range of motion, strengthening, and manual therapy.    Evaluation:    50     mins  61830;  Manual Therapy:    0     mins  54554;  Therapeutic  Exercise:    0     mins  41173;     Neuromuscular Evon:    0    mins  05357;    Therapeutic Activity:     0     mins  43000;     Gait Trainin     mins  38683;     Ultrasound:     0     mins  17163;    Electrical Stimulation:    0     mins  97940 ( );  Dry Needling     0     mins self-pay    Timed Treatment:  50   mins   Total Treatment:     50   mins    PT SIGNATURE: Elsie Griffith, PT   DATE TREATMENT INITIATED: 2021    Initial Certification  Certification Period: 2021  I certify that the therapy services are furnished while this patient is under my care.  The services outlined above are required by this patient, and will be reviewed every 90 days.     PHYSICIAN: Catalina Madsen PA-C      DATE:     Please sign and return via fax to [phone]. Thank you, [facility].

## 2021-06-23 ENCOUNTER — TREATMENT (OUTPATIENT)
Dept: PHYSICAL THERAPY | Facility: CLINIC | Age: 71
End: 2021-06-23

## 2021-06-23 DIAGNOSIS — Z98.890 S/P ORIF (OPEN REDUCTION INTERNAL FIXATION) FRACTURE: ICD-10-CM

## 2021-06-23 DIAGNOSIS — Z87.81 S/P ORIF (OPEN REDUCTION INTERNAL FIXATION) FRACTURE: ICD-10-CM

## 2021-06-23 DIAGNOSIS — R26.89 ANTALGIC GAIT: ICD-10-CM

## 2021-06-23 DIAGNOSIS — M25.571 ACUTE RIGHT ANKLE PAIN: Primary | ICD-10-CM

## 2021-06-23 DIAGNOSIS — M25.572 LEFT ANKLE PAIN, UNSPECIFIED CHRONICITY: ICD-10-CM

## 2021-06-23 LAB
Lab: NORMAL
METHYLMALONATE SERPL-SCNC: 150 NMOL/L (ref 0–378)

## 2021-06-23 PROCEDURE — 97110 THERAPEUTIC EXERCISES: CPT | Performed by: PHYSICAL THERAPIST

## 2021-06-23 PROCEDURE — 97140 MANUAL THERAPY 1/> REGIONS: CPT | Performed by: PHYSICAL THERAPIST

## 2021-06-23 NOTE — PROGRESS NOTES
Physical Therapy Daily Progress Note    Patient: Nivia Cagle   : 1950  Diagnosis/ICD-10 Code:  Acute right ankle pain [M25.571]  Referring practitioner: Curtis Han MD  Date of Initial Visit: Type: THERAPY  Noted: 2021  Today's Date: 2021  Patient seen for 2 sessions           Subjective   The patient reported that her right ankle pain was a 1.5/10 at the start of PT today. Her right ankle bothered her more during exercise than her left ankle.    Objective   See Exercise, Manual, and Modality Logs for complete treatment.     Assessment/Plan  The patient demonstrated good tolerance to all interventions today. She had some difficulty with all balance exercises today. She would benefit from continued PT to improve balance, gait, and ankle function.         Manual Therapy:    5     mins  92874;  Therapeutic Exercise:    48     mins  57294;     Neuromuscular Evon:    0    mins  89225;    Therapeutic Activity:     0     mins  00336;     Gait Trainin     mins  48926;     Ultrasound:     0     mins  26835;    Electrical Stimulation:    0     mins  05769 ( );  Dry Needling     0     mins self-pay    Timed Treatment:   53   mins   Total Treatment:     53   mins    Taz James PT  Physical Therapist

## 2021-06-24 ENCOUNTER — OFFICE VISIT (OUTPATIENT)
Dept: OTOLARYNGOLOGY | Facility: CLINIC | Age: 71
End: 2021-06-24

## 2021-06-24 ENCOUNTER — PROCEDURE VISIT (OUTPATIENT)
Dept: OTOLARYNGOLOGY | Facility: CLINIC | Age: 71
End: 2021-06-24

## 2021-06-24 VITALS — TEMPERATURE: 97.8 F | WEIGHT: 185.8 LBS | BODY MASS INDEX: 31.72 KG/M2 | HEIGHT: 64 IN

## 2021-06-24 DIAGNOSIS — R09.81 NASAL CONGESTION: ICD-10-CM

## 2021-06-24 DIAGNOSIS — H90.3 SENSORINEURAL HEARING LOSS (SNHL) OF BOTH EARS: Primary | ICD-10-CM

## 2021-06-24 DIAGNOSIS — H90.3 SENSORY HEARING LOSS, BILATERAL: ICD-10-CM

## 2021-06-24 PROCEDURE — 99214 OFFICE O/P EST MOD 30 MIN: CPT | Performed by: NURSE PRACTITIONER

## 2021-06-24 PROCEDURE — 92567 TYMPANOMETRY: CPT | Performed by: AUDIOLOGIST

## 2021-06-24 PROCEDURE — 92557 COMPREHENSIVE HEARING TEST: CPT | Performed by: AUDIOLOGIST

## 2021-06-24 RX ORDER — FLUTICASONE PROPIONATE 50 MCG
2 SPRAY, SUSPENSION (ML) NASAL DAILY
Qty: 16 G | Refills: 6 | Status: SHIPPED | OUTPATIENT
Start: 2021-06-24 | End: 2022-07-18 | Stop reason: SDUPTHER

## 2021-06-24 NOTE — ASSESSMENT & PLAN NOTE
Will increase pramipexole.  Discussed that we do not write benzos in this practice for RLS.  Discussed that mirtazapine and aripiprazole can worsen movement disorders.

## 2021-06-24 NOTE — ASSESSMENT & PLAN NOTE
Discussed that myoclonic jerking can be a side effect of gabapentin.  Will order labs to look for other causes of myoclonic jerking and muscle twitching.

## 2021-06-24 NOTE — PROGRESS NOTES
"Patient Name: Nivia Cagle   Visit Date: 06/24/2021   Patient ID: [id number]  Provider: JEANNIE Merrill    Sex: female  Location: [facility]   YOB: 1950  Location Address: [address] [place] [address], [address] [place] [address],?[place]?[id number]    Primary Care Provider Catalina Madsen PA-C  Location Phone: [phone]    Referring Provider: Catalina Madsen PA-C        Chief Complaint  Ear Problem    Subjective   Nivia Cagle is a 70 y.o. female who presents to [facility] today as a consult from Catalina Madsen PA-C for evaluation of aggression for hearing.  She states that she is having a difficult time understanding her family.  She denies any issues with ear infections, otalgia or otorrhea.  She has never had any ear surgeries.  She does report frequent nasal congestion and having hard time tolerating her CPAP machine at night because she feels like she cannot breathe well out of her nose.  She states she was prescribed Flonase but has not been using this daily as she was unsure if this was safe.      Current Outpatient Medications on File Prior to Visit   Medication Sig   • Accu-Chek Madeline Plus test strip by Other route 4 (Four) Times a Day. use to test blood sugar   • Accu-Chek Softclix Lancets lancets by Other route 4 (Four) Times a Day. use to test blood sugar   • ARIPiprazole (ABILIFY) 2 MG tablet Take 2 tablets by mouth Daily for 180 days.   • BD Insulin Syringe U/F 30G X 1/2\" 0.3 ML misc USE TO INJECT INSULIN FOUR TIMES DAILY AS NEEDED   • Blood Glucose Monitoring Suppl (FreeStyle Lite) device 1 each by Other route 4 (Four) Times a Day.   • buPROPion XL (FORFIVO XL) 450 MG 24 hr tablet Take 1 tablet by mouth Every Morning for 180 days.   • Calcium 600 600 MG tablet TAKE 1 TABLET BY MOUTH EVERY DAY   • cetirizine (zyrTEC) 10 MG tablet TAKE 1 TABLET BY ORAL ROUTE ONCE DAILY   • cyclobenzaprine (FLEXERIL) 10 MG tablet    • Daily-Jelani " "Multivitamin tablet tablet Take 1 tablet by mouth every night at bedtime.   • FLUoxetine (PROzac) 60 MG tablet Take 1 tablet by mouth Daily for 180 days.   • fluticasone (FLONASE) 50 MCG/ACT nasal spray    • gabapentin (NEURONTIN) 300 MG capsule Take 300 mg by mouth 2 (Two) Times a Day.   • insulin lispro (humaLOG) 100 UNIT/ML injection INJECT 30 UNITS UNDER THE SKIN PER PRESCRIBERS SLIDING SCALE INSTRUCTIONS. INSULIN DOSING REQUIRES INDIVIDUALIZATION   • Januvia 100 MG tablet    • losartan (COZAAR) 25 MG tablet Take 25 mg by mouth Daily.   • metFORMIN (GLUCOPHAGE) 500 MG tablet Take 500 mg by mouth 2 (Two) Times a Day With Meals.   • mirtazapine (REMERON) 45 MG tablet Take 1 tablet by mouth every night at bedtime for 180 days.   • Non-Aspirin Pain Reliever 325 MG tablet Take 650 mg by mouth Every 8 (Eight) Hours As Needed. for pain   • pramipexole (MIRAPEX) 0.5 MG tablet Take 1 tablet by mouth every night at bedtime.   • vitamin D (ERGOCALCIFEROL) 1.25 MG (88447 UT) capsule capsule      No current facility-administered medications on file prior to visit.        Social History     Tobacco Use   • Smoking status: Former Smoker   • Smokeless tobacco: Never Used   • Tobacco comment: QUIT 1988   Vaping Use   • Vaping Use: Never used   Substance Use Topics   • Alcohol use: Yes     Comment: SPECIAL OCCASION ONLY   • Drug use: Never       Objective     Vital Signs:   Temp 97.8 °F (36.6 °C) (Temporal)   Ht 162.6 cm (64\")   Wt 84.3 kg (185 lb 12.8 oz)   BMI 31.89 kg/m²       Physical Exam  Constitutional:       General: She is not in acute distress.     Appearance: Normal appearance. She is not ill-appearing.   HENT:      Head: Normocephalic and atraumatic.      Jaw: There is normal jaw occlusion. No tenderness or pain on movement.      Salivary Glands: Right salivary gland is not diffusely enlarged or tender. Left salivary gland is not diffusely enlarged or tender.      Right Ear: Tympanic membrane, ear canal and " external ear normal.      Left Ear: Tympanic membrane, ear canal and external ear normal.      Nose: Congestion and rhinorrhea present. No septal deviation.      Right Turbinates: Enlarged and swollen.      Left Turbinates: Enlarged and swollen.      Right Sinus: No maxillary sinus tenderness or frontal sinus tenderness.      Left Sinus: No maxillary sinus tenderness or frontal sinus tenderness.      Mouth/Throat:      Lips: Pink. No lesions.      Mouth: Mucous membranes are moist. No oral lesions.      Dentition: Normal dentition.      Tongue: No lesions.      Palate: No mass and lesions.      Pharynx: Oropharynx is clear. Uvula midline.      Tonsils: No tonsillar exudate.   Eyes:      Extraocular Movements: Extraocular movements intact.      Conjunctiva/sclera: Conjunctivae normal.      Pupils: Pupils are equal, round, and reactive to light.   Neck:      Thyroid: No thyroid mass, thyromegaly or thyroid tenderness.      Trachea: Trachea normal.   Pulmonary:      Effort: Pulmonary effort is normal. No respiratory distress.   Musculoskeletal:         General: Normal range of motion.      Cervical back: Normal range of motion and neck supple. No tenderness.   Lymphadenopathy:      Cervical: No cervical adenopathy.   Skin:     General: Skin is warm and dry.   Neurological:      General: No focal deficit present.      Mental Status: She is alert and oriented to person, place, and time.      Cranial Nerves: No cranial nerve deficit.      Motor: No weakness.      Gait: Gait normal.   Psychiatric:         Mood and Affect: Mood normal.         Behavior: Behavior normal.         Thought Content: Thought content normal.         Judgment: Judgment normal.               Result Review :              Assessment and Plan    Diagnoses and all orders for this visit:    1. Sensorineural hearing loss (SNHL) of both ears (Primary)  -     Audiometry With Tympanometry; Future    2. Nasal congestion  -     fluticasone (Flonase) 50 MCG/ACT  nasal spray; 2 sprays into the nostril(s) as directed by provider Daily. Administer 2 sprays in each nostril for each dose.  Dispense: 16 g; Refill: 6    Other orders  -     Cancel: Audiometry With Tympanometry; Future    Audiogram and tympanogram performed today in clinic.  Audiogram shows normal hearing with a mild sensorineural hearing loss from 4000 to 8000 Hz on the right and at 3000, 4008 1000 Hz on the left.  Speech reception thresholds at 20 dB on the right and 10 dB on the left.  Word discrimination scores were 96% on the right at 55 dB and 100% on the left at 50 dB.  Tympanograms were normal bilaterally.  I have gone over the results of the audiogram with the patient and discussed her mild sensorineural hearing loss.  I would like to see her back in 1 year to repeat her audiogram.  Going to have her start using her Flonase daily.       Follow Up   Return in about 1 year (around 6/24/2022), or with audio.  Patient was given instructions and counseling regarding her condition or for health maintenance advice. Please see specific information pulled into the AVS if appropriate.      JEANNIE Merrill

## 2021-06-24 NOTE — PROGRESS NOTES
"Chief Complaint  Neurologic Problem (Muscle twitching/muscle cramping )    Subjective          Nivia Cagle presents to [facility]  States she has been dealing with generalized stiffness, muscle twitching and RLS for several years.  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  States that she will have drawing and cramping of her hands with use.  Will experience pain and restlessness to legs at bedtime.  PCP has started on pramipexole that she feels has helped some.  She is requesting refills on clonazepam that she was receiving from neurology in [place].  Reports that her extremities will just jerk for no reason.  Denies loss of consciousness or seizure like events. States she deals with severe depression and anxiety.  Seeing Dr. Barth.  He has put her on Abilify which she feels is helping more than other treatments she's had in the past. Reports history of severe EARL.  States she's getting back on her CPAP machine.         Objective   Vital Signs:   /79 (BP Location: Right arm, Patient Position: Sitting, Cuff Size: Adult)   Pulse 78   Ht 162.6 cm (64\")   Wt 79.8 kg (176 lb)   BMI 30.21 kg/m²     Physical Exam  HENT:      Head: Normocephalic.   Pulmonary:      Effort: Pulmonary effort is normal.   Neurological:      Mental Status: She is alert and oriented to person, place, and time.      Cranial Nerves: Cranial nerves are intact.      Sensory: Sensation is intact.      Motor: Motor function is intact.      Coordination: Coordination is intact.      Deep Tendon Reflexes: Reflexes are normal and symmetric.        Neurologic Exam     Mental Status   Oriented to person, place, and time.     Motor Exam   Muscle bulk: normal  Overall muscle tone: normal    Strength   Right neck flexion: 4/5  Left neck flexion: 4/5  Right neck extension: 4/5  Left neck extension: 4/5  Right deltoid: 4/5  Left deltoid: 4/5  Right biceps: " 4/5  Left biceps: 4/5  Right triceps: 4/5  Left triceps: 4/5  Right wrist flexion: 4/5  Left wrist flexion: 4/5  Right wrist extension: 4/5  Left wrist extension: 4/5  Right interossei: 4/5  Left interossei: 4/5  Right abdominals: 4/5  Left abdominals: 4/5  Right iliopsoas: 4/5  Left iliopsoas: 4/5  Right quadriceps: 4/5  Left quadriceps: 4/5  Right hamstrin/5  Left hamstrin/5  Right glutei: 4/5  Left glutei: 4/5  Right anterior tibial: 4/5  Left anterior tibial: 4/5  Right posterior tibial: 4/5  Left posterior tibial: 4/5  Right peroneal: 4/5  Left peroneal: 4/5  Right gastroc: 4/5  Left gastroc: 4/5    Sensory Exam   Light touch normal.   Pinprick normal.     Gait, Coordination, and Reflexes     Gait  Gait: non-neurologic    Reflexes   Reflexes 2+ except as noted.        Result Review :     Data reviewed: Neurology notes [facility], PCP notes,           Assessment and Plan    Diagnoses and all orders for this visit:    1. Muscle twitching (Primary)  -     CBC (No Diff); Future  -     Comprehensive Metabolic Panel; Future  -     Magnesium; Future  -     Methylmalonic Acid, Serum; Future  -     Protein Elec + Interp, Serum; Future  -     Vitamin B12 & Folate; Future  -     CK; Future  -     Aldolase; Future  -     C-reactive Protein; Future  -     Sedimentation Rate; Future    2. Myoclonic jerking  Assessment & Plan:  Discussed that myoclonic jerking can be a side effect of gabapentin.  Will order labs to look for other causes of myoclonic jerking and muscle twitching.     Orders:  -     CBC (No Diff); Future  -     Comprehensive Metabolic Panel; Future  -     Magnesium; Future  -     Methylmalonic Acid, Serum; Future  -     Protein Elec + Interp, Serum; Future  -     Vitamin B12 & Folate; Future  -     CK; Future  -     Aldolase; Future  -     C-reactive Protein; Future  -     Sedimentation Rate; Future    3. Restless legs syndrome (RLS)  Assessment & Plan:  Will increase pramipexole.  Discussed that  we do not write benzos in this practice for RLS.  Discussed that mirtazapine and aripiprazole can worsen movement disorders.     Orders:  -     CBC (No Diff); Future  -     Comprehensive Metabolic Panel; Future  -     Magnesium; Future  -     Methylmalonic Acid, Serum; Future  -     Protein Elec + Interp, Serum; Future  -     Vitamin B12 & Folate; Future  -     CK; Future  -     Aldolase; Future  -     C-reactive Protein; Future  -     Sedimentation Rate; Future    4. Obstructive sleep apnea  Assessment & Plan:  Recommend compliance with CPAP    Orders:  -     CBC (No Diff); Future  -     Comprehensive Metabolic Panel; Future  -     Magnesium; Future  -     Methylmalonic Acid, Serum; Future  -     Protein Elec + Interp, Serum; Future  -     Vitamin B12 & Folate; Future  -     CK; Future  -     Aldolase; Future  -     C-reactive Protein; Future  -     Sedimentation Rate; Future    Other orders  -     pramipexole (MIRAPEX) 0.5 MG tablet; Take 1 tablet by mouth every night at bedtime.  Dispense: 30 tablet; Refill: 3    I spent 75 minutes caring for Nivia on this date of service. This time includes time spent by me in the following activities:preparing for the visit, reviewing tests, obtaining and/or reviewing a separately obtained history, performing a medically appropriate examination and/or evaluation , counseling and educating the patient/family/caregiver, ordering medications, tests, or procedures, documenting information in the medical record and independently interpreting results and communicating that information with the patient/family/caregiver  Follow Up   Return in about 2 months (around 8/21/2021) for muscle cramping/RLS.  Patient was given instructions and counseling regarding her condition or for health maintenance advice. Please see specific information pulled into the AVS if appropriate.

## 2021-06-25 ENCOUNTER — TELEPHONE (OUTPATIENT)
Dept: NEUROLOGY | Facility: CLINIC | Age: 71
End: 2021-06-25

## 2021-06-25 NOTE — TELEPHONE ENCOUNTER
Patient called and I let her know that we had sent her referral to rheumatology to schedule.  Pt states that she was unaware she was being referred and has not yet received the results of her lab work.  She said that she may have missed a call.  Patient can be reached at [phone].

## 2021-06-25 NOTE — TELEPHONE ENCOUNTER
I LVM with patient yesterday it was documented on the lab result. Her SED rate was elevated and that is the reason for the referral. I left her another voicemail.

## 2021-06-28 ENCOUNTER — OFFICE VISIT (OUTPATIENT)
Dept: CARDIOLOGY | Facility: CLINIC | Age: 71
End: 2021-06-28

## 2021-06-28 ENCOUNTER — TREATMENT (OUTPATIENT)
Dept: PHYSICAL THERAPY | Facility: CLINIC | Age: 71
End: 2021-06-28

## 2021-06-28 VITALS
SYSTOLIC BLOOD PRESSURE: 138 MMHG | WEIGHT: 178 LBS | DIASTOLIC BLOOD PRESSURE: 68 MMHG | HEART RATE: 75 BPM | BODY MASS INDEX: 30.39 KG/M2 | HEIGHT: 64 IN

## 2021-06-28 DIAGNOSIS — R26.89 ANTALGIC GAIT: ICD-10-CM

## 2021-06-28 DIAGNOSIS — M25.571 ACUTE RIGHT ANKLE PAIN: Primary | ICD-10-CM

## 2021-06-28 DIAGNOSIS — Z87.81 S/P ORIF (OPEN REDUCTION INTERNAL FIXATION) FRACTURE: ICD-10-CM

## 2021-06-28 DIAGNOSIS — M25.572 LEFT ANKLE PAIN, UNSPECIFIED CHRONICITY: ICD-10-CM

## 2021-06-28 DIAGNOSIS — I10 ESSENTIAL HYPERTENSION: ICD-10-CM

## 2021-06-28 DIAGNOSIS — E78.2 MIXED HYPERLIPIDEMIA: Primary | ICD-10-CM

## 2021-06-28 DIAGNOSIS — Z98.890 S/P ORIF (OPEN REDUCTION INTERNAL FIXATION) FRACTURE: ICD-10-CM

## 2021-06-28 DIAGNOSIS — Z82.49 FAMILY HISTORY OF EARLY CAD: ICD-10-CM

## 2021-06-28 PROCEDURE — 97110 THERAPEUTIC EXERCISES: CPT | Performed by: PHYSICAL THERAPIST

## 2021-06-28 PROCEDURE — 93000 ELECTROCARDIOGRAM COMPLETE: CPT | Performed by: INTERNAL MEDICINE

## 2021-06-28 PROCEDURE — 99203 OFFICE O/P NEW LOW 30 MIN: CPT | Performed by: INTERNAL MEDICINE

## 2021-06-28 NOTE — PROGRESS NOTES
Chief Complaint  Establish Care and Hypertension    Subjective            Nivia Cagle presents to [facility] CARDIOLOGY  History of Present Illness    Nina is a 70-year-old white female.  She has no significant cardiac history.  She has risk factors including hypertension, dyslipidemia and diabetes.  She has reported statin intolerance she has had previous intolerable side effects to Lipitor and Crestor.  She is establishing care having recently relocated here.  She has not been having cardiac symptoms.  She denies chest pain or excessive shortness of breath.  She has been having a lot of problems with her feet since having a bad fall in her garage and had bilateral ankle fractures.  She has had some atypical chest wall pain which is sharp and positional and noncardiac by history.    PMH  Past Medical History:   Diagnosis Date   • ADHD (attention deficit hyperactivity disorder)    • Allergic rhinitis    • Anemia    • Anxiety    • Cataracts, bilateral    • Depression 0421/2021    MOODNOT WELL CONTROLLED.  GIVEN NUMBER FOR LOCAL COUNSELOR.  WILL INCREASE TRINTELIX FROM 10MG TO 20MG RTC WEEK ER ID S/HI   • Diabetes mellitus, type 2 (CMS/HCC)    • Diverticulitis    • GERD (gastroesophageal reflux disease)    • Head injury    • High blood pressure    • Hyperlipemia    • Migraine    • EARL (obstructive sleep apnea) 04/21/2021   • Phlebitis    • PTSD (post-traumatic stress disorder)          SURGICALHX  Past Surgical History:   Procedure Laterality Date   • ANKLE SURGERY  2021   • BILATERAL BREAST REDUCTION  2015   • CARPAL TUNNEL RELEASE     • CHOLECYSTECTOMY  2001   • COLONOSCOPY     • HYSTERECTOMY     • ULNAR NERVE TRANSPOSITION Right    • WRIST SURGERY          SOC  Social History     Socioeconomic History   • Marital status: Single     Spouse name: Not on file   • Number of children: Not on file   • Years of education: Not on file   • Highest education level: Not on file   Tobacco Use   •  "Smoking status: Former Smoker   • Smokeless tobacco: Never Used   • Tobacco comment: QUIT 1988   Vaping Use   • Vaping Use: Never used   Substance and Sexual Activity   • Alcohol use: Yes     Comment: SPECIAL OCCASION ONLY   • Drug use: Never   • Sexual activity: Defer         FAMHX  Family History   Problem Relation Age of Onset   • Heart disease Father    • Heart attack Father    • Diabetes Father    • ADD / ADHD Father    • Hyperlipidemia Father    • Kidney cancer Mother    • Hyperlipidemia Mother    • Hyperlipidemia Sister    • Hyperlipidemia Brother    • Diabetes Brother    • No Known Problems Paternal Uncle    • No Known Problems Cousin    • Brain cancer Sister    • Lung cancer Sister    • Hyperlipidemia Sister    • Hyperlipidemia Brother           ALLERGY  Allergies   Allergen Reactions   • Diclofenac Hives   • Adhesive Tape Rash     Rash at area of bandaid   • Niacin Rash   • Tape Rash     Rash at area of bandaid        MEDSCURRENT    Current Outpatient Medications:   •  Accu-Chek Madeline Plus test strip, by Other route 4 (Four) Times a Day. use to test blood sugar, Disp: , Rfl:   •  Accu-Chek Softclix Lancets lancets, by Other route 4 (Four) Times a Day. use to test blood sugar, Disp: , Rfl:   •  ARIPiprazole (ABILIFY) 2 MG tablet, Take 2 tablets by mouth Daily for 180 days., Disp: 120 tablet, Rfl: 2  •  BD Insulin Syringe U/F 30G X 1/2\" 0.3 ML misc, USE TO INJECT INSULIN FOUR TIMES DAILY AS NEEDED, Disp: , Rfl:   •  Blood Glucose Monitoring Suppl (FreeStyle Lite) device, 1 each by Other route 4 (Four) Times a Day., Disp: , Rfl:   •  buPROPion XL (FORFIVO XL) 450 MG 24 hr tablet, Take 1 tablet by mouth Every Morning for 180 days., Disp: 60 tablet, Rfl: 2  •  Calcium 600 600 MG tablet, TAKE 1 TABLET BY MOUTH EVERY DAY, Disp: , Rfl:   •  cetirizine (zyrTEC) 10 MG tablet, TAKE 1 TABLET BY ORAL ROUTE ONCE DAILY, Disp: , Rfl:   •  cyclobenzaprine (FLEXERIL) 10 MG tablet, , Disp: , Rfl:   •  Daily-Jelani Multivitamin " "tablet tablet, Take 1 tablet by mouth every night at bedtime., Disp: , Rfl:   •  FLUoxetine (PROzac) 60 MG tablet, Take 1 tablet by mouth Daily for 180 days., Disp: 60 tablet, Rfl: 2  •  fluticasone (FLONASE) 50 MCG/ACT nasal spray, , Disp: , Rfl:   •  gabapentin (NEURONTIN) 300 MG capsule, Take 300 mg by mouth 2 (Two) Times a Day., Disp: , Rfl:   •  insulin lispro (humaLOG) 100 UNIT/ML injection, INJECT 30 UNITS UNDER THE SKIN PER PRESCRIBERS SLIDING SCALE INSTRUCTIONS. INSULIN DOSING REQUIRES INDIVIDUALIZATION, Disp: , Rfl:   •  Januvia 100 MG tablet, , Disp: , Rfl:   •  losartan (COZAAR) 25 MG tablet, Take 25 mg by mouth Daily., Disp: , Rfl:   •  metFORMIN (GLUCOPHAGE) 500 MG tablet, Take 500 mg by mouth 2 (Two) Times a Day With Meals., Disp: , Rfl:   •  mirtazapine (REMERON) 45 MG tablet, Take 1 tablet by mouth every night at bedtime for 180 days., Disp: 60 tablet, Rfl: 2  •  Non-Aspirin Pain Reliever 325 MG tablet, Take 650 mg by mouth Every 8 (Eight) Hours As Needed. for pain, Disp: , Rfl:   •  pramipexole (MIRAPEX) 0.5 MG tablet, Take 1 tablet by mouth every night at bedtime., Disp: 30 tablet, Rfl: 3  •  fluticasone (Flonase) 50 MCG/ACT nasal spray, 2 sprays into the nostril(s) as directed by provider Daily. Administer 2 sprays in each nostril for each dose., Disp: 16 g, Rfl: 6  •  vitamin D (ERGOCALCIFEROL) 1.25 MG (46832 UT) capsule capsule, 50,000 Units 1 (One) Time Per Week., Disp: , Rfl:       Review of Systems   Constitutional: Negative.   HENT: Negative.    Cardiovascular: Negative.    Respiratory: Negative.    Endocrine: Negative.    Skin: Negative.    Musculoskeletal: Positive for arthritis and joint pain.   Gastrointestinal: Negative.    Genitourinary: Negative.    Neurological: Negative.         Objective     /68   Pulse 75   Ht 162.6 cm (64\")   Wt 80.7 kg (178 lb)   BMI 30.55 kg/m²       General Appearance:   · well developed  · well nourished  HENT:   · oropharynx moist  · lips not " cyanotic  Neck:  · thyroid not enlarged  · supple  Respiratory:  · no respiratory distress  · normal breath sounds  · no rales  Cardiovascular:  · no jugular venous distention  · regular rhythm  · apical impulse normal  · S1 normal, S2 normal  · no S3, no S4   · no murmur  · no rub, no thrill  · carotid pulses normal; no bruit  · pedal pulses normal  · lower extremity edema: none    Musculoskeletal:  · no clubbing of fingers.   · normocephalic, head atraumatic  Skin:   · warm, dry  Psychiatric:  · judgement and insight appropriate  · normal mood and affect      Result Review :                    ECG 12 Lead    Date/Time: 6/28/2021 12:36 PM  Performed by: NAM Ruiz MD  Authorized by: NAM Ruiz MD   Previous ECG: no previous ECG available  Rhythm: sinus rhythm  Conduction: conduction normal  ST Segments: ST segments normal  T Waves: T waves normal  QRS axis: normal  Other: no other findings    Clinical impression: normal ECG                   Assessment and Plan        ASSESSMENT:  Encounter Diagnoses   Name Primary?   • Mixed hyperlipidemia Yes   • Essential hypertension    • Family history of early CAD          PLAN:    1.  Check lipid panel to reevaluate her numbers and assist in calculating 10-year cardiovascular risk.  Consider Repatha or Praluent therapy given her statin intolerance if she has high long-term risk.  Blood pressure currently controlled, continue ARB.  2.  No cardiac specific diagnostics are needed at this time but I would like for the patient to follow-up annually for risk factor management.  3.  She is agreeable with this plan.          Patient was given instructions and counseling regarding her condition or for health maintenance advice. Please see specific information pulled into the AVS if appropriate.             NAM Ruiz MD  6/28/2021    12:33 EDT

## 2021-06-28 NOTE — PROGRESS NOTES
"Physical Therapy Daily Treatment Note      Patient: Nivia Cagle   : 1950  Referring practitioner: Catalina Madsen PA-C  Date of Initial Visit: Type: THERAPY  Noted: 2021  Today's Date: 2021  Patient seen for 3 sessions           Subjective Evaluation    History of Present Illness  Mechanism of injury: Pt came today with a boot on her R foot because of pain while standing, as it gives her better support. Pt reports she will be having another corrective surgery on her R ankle/foot. \"This morning the swelling was down better than ever.\"         Objective          Ambulation   Weight-Bearing Status   Assistive device used: two-wheeled walker    Observational Gait   Gait: antalgic   Increased left stance time. Decreased walking speed and right stance time.     Additional Observational Gait Details  Pt is unbalanced more than usual today due to tiredness.       See Exercise, Manual, and Modality Logs for complete treatment.     Assessment & Plan     Assessment  Assessment details: Pt tolerated session well overall, even though she complained of tiredness since prior to session.     Plan  Plan details: Continue with POC.        Visit Diagnoses:    ICD-10-CM ICD-9-CM   1. Acute right ankle pain  M25.571 719.47     338.19   2. S/P ORIF (open reduction internal fixation) fracture  Z98.890 V45.89    Z87.81 V15.51   3. Antalgic gait  R26.89 781.2   4. Left ankle pain, unspecified chronicity  M25.572 719.47       Progress per Plan of Care           Timed:  Manual Therapy:    0     mins  46684;  Therapeutic Exercise:    45     mins  91040;     Neuromuscular Evon:    0    mins  33264;    Therapeutic Activity:     0     mins  88581;     Gait Trainin     mins  62878;     Ultrasound:     0     mins  71001;    Electrical Stimulation:    0     mins  40427 ( );    Untimed:  Electrical Stimulation:    0     mins  56466 ( );  Mechanical Traction:    0     mins  90077;     Timed Treatment:   " 45   mins   Total Treatment:     45   mins  Elsie Griffith, PT  Physical Therapist

## 2021-06-30 ENCOUNTER — TREATMENT (OUTPATIENT)
Dept: PHYSICAL THERAPY | Facility: CLINIC | Age: 71
End: 2021-06-30

## 2021-06-30 DIAGNOSIS — Z87.81 S/P ORIF (OPEN REDUCTION INTERNAL FIXATION) FRACTURE: ICD-10-CM

## 2021-06-30 DIAGNOSIS — Z98.890 S/P ORIF (OPEN REDUCTION INTERNAL FIXATION) FRACTURE: ICD-10-CM

## 2021-06-30 DIAGNOSIS — M25.572 LEFT ANKLE PAIN, UNSPECIFIED CHRONICITY: ICD-10-CM

## 2021-06-30 DIAGNOSIS — R26.89 ANTALGIC GAIT: ICD-10-CM

## 2021-06-30 DIAGNOSIS — M25.571 ACUTE RIGHT ANKLE PAIN: Primary | ICD-10-CM

## 2021-06-30 PROCEDURE — 97110 THERAPEUTIC EXERCISES: CPT | Performed by: PHYSICAL THERAPIST

## 2021-06-30 NOTE — TELEPHONE ENCOUNTER
Caller: Nivia Cagle    Relationship: Self    Best call back number: [phone] (PATIENT STATED A VOICEMAIL COULD BE LEFT)    What is the best time to reach you: ANY    Who are you requesting to speak with (clinical staff, provider,  specific staff member): PROVIDER    What was the call regarding: PATIENT CALLED TO CHECK AND SEE WHEN SHE MIGHT GET A CALL TO SCHEDULE HER REFERRAL APPOINTMENT. SHE STATED THAT SHE HAS SEVERAL PHYSICAL THERAPY APPOINTMENTS AND A SURGERY SCHEDULED AND WANTS TO MAKE SURE SHE CAN FIT THE APPOINTMENT INTO HER SCHEDULE. SHE IS REQUESTING TO BE PROVIDED WITH THE NUMBER FOR THE OFFICE THE REFERRAL REQUEST WAS SENT TO SO SHE MAY CONTACT THEM HERSELF    Do you require a callback: YES

## 2021-06-30 NOTE — PROGRESS NOTES
Physical Therapy Daily Progress Note    Patient: Nivia Cagle   : 1950  Diagnosis/ICD-10 Code:  Acute right ankle pain [M25.571]  Referring practitioner: Catalina Madsen PA-C  Date of Initial Visit: Type: THERAPY  Noted: 2021  Today's Date: 2021  Patient seen for 4 sessions           Subjective   The patient reported that her right ankle is still really hurting. She has surgery scheduled on 21 to remove a wire from her right ankle.     Objective   See Exercise, Manual, and Modality Logs for complete treatment.       Assessment/Plan  The patient demonstrated good tolerance to all interventions today, but with slight increased pain in her right ankle. She will require additional re-assessment and additional PT following her upcoming procedure.          Manual Therapy:    0     mins  58660;  Therapeutic Exercise:    53     mins  42613;     Neuromuscular Evon:    0    mins  97227;    Therapeutic Activity:     0     mins  85741;     Gait Trainin     mins  09853;     Ultrasound:     0     mins  35163;    Electrical Stimulation:    0     mins  90761 ( );  Dry Needling     0     mins self-pay    Timed Treatment:   53   mins   Total Treatment:     53   mins    Taz James PT  Physical Therapist

## 2021-07-02 ENCOUNTER — LAB (OUTPATIENT)
Dept: LAB | Facility: HOSPITAL | Age: 71
End: 2021-07-02

## 2021-07-02 DIAGNOSIS — E78.2 MIXED HYPERLIPIDEMIA: ICD-10-CM

## 2021-07-02 LAB
CHOLEST SERPL-MCNC: 205 MG/DL (ref 0–200)
HDLC SERPL-MCNC: 52 MG/DL (ref 40–60)
LDLC SERPL CALC-MCNC: 132 MG/DL (ref 0–100)
LDLC/HDLC SERPL: 2.48 {RATIO}
TRIGL SERPL-MCNC: 120 MG/DL (ref 0–150)
VLDLC SERPL-MCNC: 21 MG/DL (ref 5–40)

## 2021-07-02 PROCEDURE — 36415 COLL VENOUS BLD VENIPUNCTURE: CPT

## 2021-07-02 PROCEDURE — 80061 LIPID PANEL: CPT

## 2021-07-06 ENCOUNTER — TREATMENT (OUTPATIENT)
Dept: PHYSICAL THERAPY | Facility: CLINIC | Age: 71
End: 2021-07-06

## 2021-07-06 PROCEDURE — 97140 MANUAL THERAPY 1/> REGIONS: CPT | Performed by: PHYSICAL THERAPIST

## 2021-07-06 PROCEDURE — 97110 THERAPEUTIC EXERCISES: CPT | Performed by: PHYSICAL THERAPIST

## 2021-07-06 NOTE — PROGRESS NOTES
Physical Therapy Daily Progress Note      Patient: Nivia Cagle   : 1950  Diagnosis/ICD-10 Code:  No primary diagnosis found.  Referring practitioner: No ref. provider found  Date of Initial Visit: Type: THERAPY  Noted: 2021  Today's Date: 2021  Patient seen for 5 sessions           Subjective Evaluation    Quality of life: good       Patient reports she was doing pretty well but then last night, she saw a snake in her garage and jumped.  She landed hard on her right heel and had immediately swelling and pain again.  She also reports that tomorrow she is having surgery again on the right ankle to remove some hardware. Reports she uses the walker and bilateral splints for support, blance,  and to decrease pain while WB'g.  States she reallly wants to return to her prior level of function.     Objective          Active Range of Motion     Additional Active Range of Motion Details  Ankle    Dorsiflexion                 5          0                        Plantarflexion              45        20          Inversion                     35        15                      Eversion                      20        20      Joint Play     Additional Joint Play Details  Joint play  Of mortise L ankle has good spring with AP and PA.    R  Ankle is more hypomobile, however, still has spring.  More accessory motion is available, however, edema is a limiting factor.     Ambulation   Weight-Bearing Status     Additional Weight-Bearing Status Details  Unable to shift weight over fully with elongation on weight bearing side to the right.     Full WS to the left.       See Exercise, Manual, and Modality Logs for complete treatment.     Added more proprioceptive exercises B Le's.     Assessment/Plan  Patient is lacking in proprioception and is uneasy shifting weight to R LE fully.  Weight shifting and proprioception need improvement.  L ankle ROM is much improved, R ankle ROM is limited by pain and swelling.      PLAN: Progress per Plan of Care             Manual Therapy:    15     mins  08638;  Therapeutic Exercise:    40     mins  46409;     Neuromuscular Evon:        mins  62118;    Therapeutic Activity:          mins  19098;     Gait Training:           mins  19165;     Ultrasound:          mins  48495;    Electrical Stimulation:         mins  21242 ( );    Timed Treatment:   55   mins   Total Treatment:     55   mins    Kamla Clemente, PT  Physical Therapist

## 2021-07-09 ENCOUNTER — TELEPHONE (OUTPATIENT)
Dept: PHYSICAL THERAPY | Facility: CLINIC | Age: 71
End: 2021-07-09

## 2021-07-12 RX ORDER — EVOLOCUMAB 140 MG/ML
140 INJECTION, SOLUTION SUBCUTANEOUS
Qty: 45 EACH | Refills: 3 | Status: SHIPPED | OUTPATIENT
Start: 2021-07-12 | End: 2021-12-20

## 2021-07-14 RX ORDER — CYCLOBENZAPRINE HCL 10 MG
10 TABLET ORAL DAILY PRN
Qty: 30 TABLET | Refills: 2 | Status: SHIPPED | OUTPATIENT
Start: 2021-07-14 | End: 2023-03-30 | Stop reason: SDUPTHER

## 2021-07-14 RX ORDER — LOSARTAN POTASSIUM 25 MG/1
25 TABLET ORAL DAILY
Qty: 90 TABLET | Refills: 1 | Status: SHIPPED | OUTPATIENT
Start: 2021-07-14 | End: 2021-12-06 | Stop reason: SDUPTHER

## 2021-07-14 NOTE — TELEPHONE ENCOUNTER
Caller: Nivia Cagle    Relationship: Self    Best call back number: [phone]     Medication needed:   Requested Prescriptions     Pending Prescriptions Disp Refills   • metFORMIN (GLUCOPHAGE) 500 MG tablet       Sig: Take 1 tablet by mouth 2 (Two) Times a Day With Meals.   • losartan (COZAAR) 25 MG tablet       Sig: Take 1 tablet by mouth Daily.   • cyclobenzaprine (FLEXERIL) 10 MG tablet         When do you need the refill by: ASAP    What additional details did the patient provide when requesting the medication: PATIENT IS OUT OF THESE MEDICATIONS     Does the patient have less than a 3 day supply:  [x] Yes  [] No    What is the patient's preferred pharmacy: [id number]  [place] - [address] AT [facility] - [phone]  - [phone] FX

## 2021-07-15 ENCOUNTER — TELEPHONE (OUTPATIENT)
Dept: CARDIOLOGY | Facility: CLINIC | Age: 71
End: 2021-07-15

## 2021-07-15 VITALS
BODY MASS INDEX: 30.39 KG/M2 | SYSTOLIC BLOOD PRESSURE: 128 MMHG | DIASTOLIC BLOOD PRESSURE: 72 MMHG | HEART RATE: 88 BPM | RESPIRATION RATE: 15 BRPM | WEIGHT: 178 LBS | OXYGEN SATURATION: 97 % | HEIGHT: 64 IN | TEMPERATURE: 97.8 F

## 2021-07-15 VITALS
SYSTOLIC BLOOD PRESSURE: 132 MMHG | HEART RATE: 103 BPM | WEIGHT: 178 LBS | OXYGEN SATURATION: 98 % | BODY MASS INDEX: 30.39 KG/M2 | HEIGHT: 64 IN | DIASTOLIC BLOOD PRESSURE: 82 MMHG | TEMPERATURE: 98.4 F | RESPIRATION RATE: 15 BRPM

## 2021-07-15 NOTE — TELEPHONE ENCOUNTER
----- Message from NAM Ruiz MD sent at 7/12/2021  9:03 PM EDT -----  Cholesterol is too high  Her long term cardiovascular stimated risk is high  I am ordering Repatha (not a statin) to her pharmacy  May need a P.A. but not sure  Please let patient know

## 2021-07-16 ENCOUNTER — REFERRAL TRIAGE (OUTPATIENT)
Dept: CASE MANAGEMENT | Facility: OTHER | Age: 71
End: 2021-07-16

## 2021-07-16 ENCOUNTER — OFFICE VISIT (OUTPATIENT)
Dept: INTERNAL MEDICINE | Facility: CLINIC | Age: 71
End: 2021-07-16

## 2021-07-16 VITALS
SYSTOLIC BLOOD PRESSURE: 138 MMHG | DIASTOLIC BLOOD PRESSURE: 90 MMHG | HEART RATE: 78 BPM | RESPIRATION RATE: 15 BRPM | WEIGHT: 173 LBS | TEMPERATURE: 97.7 F | BODY MASS INDEX: 29.53 KG/M2 | HEIGHT: 64 IN | OXYGEN SATURATION: 98 %

## 2021-07-16 DIAGNOSIS — E11.69 TYPE 2 DIABETES MELLITUS WITH OTHER SPECIFIED COMPLICATION, WITH LONG-TERM CURRENT USE OF INSULIN (HCC): ICD-10-CM

## 2021-07-16 DIAGNOSIS — F41.1 GENERALIZED ANXIETY DISORDER: ICD-10-CM

## 2021-07-16 DIAGNOSIS — Z79.4 TYPE 2 DIABETES MELLITUS WITH OTHER SPECIFIED COMPLICATION, WITH LONG-TERM CURRENT USE OF INSULIN (HCC): ICD-10-CM

## 2021-07-16 DIAGNOSIS — I10 ESSENTIAL HYPERTENSION: Primary | ICD-10-CM

## 2021-07-16 DIAGNOSIS — E78.2 MIXED HYPERLIPIDEMIA: ICD-10-CM

## 2021-07-16 DIAGNOSIS — F33.1 MAJOR DEPRESSIVE DISORDER, RECURRENT EPISODE, MODERATE DEGREE (HCC): ICD-10-CM

## 2021-07-16 PROBLEM — I80.9 PHLEBITIS: Status: RESOLVED | Noted: 2021-06-21 | Resolved: 2021-07-16

## 2021-07-16 PROBLEM — S09.90XA HEAD INJURY: Status: RESOLVED | Noted: 2021-06-21 | Resolved: 2021-07-16

## 2021-07-16 PROBLEM — G25.3 MYOCLONIC JERKING: Status: RESOLVED | Noted: 2021-06-21 | Resolved: 2021-07-16

## 2021-07-16 PROBLEM — Z86.19 HISTORY OF HEPATITIS A: Status: RESOLVED | Noted: 2021-06-17 | Resolved: 2021-07-16

## 2021-07-16 PROBLEM — E78.00 HYPERCHOLESTEROLEMIA: Status: RESOLVED | Noted: 2021-06-21 | Resolved: 2021-07-16

## 2021-07-16 PROBLEM — K57.90 DIVERTICULAR DISEASE: Status: RESOLVED | Noted: 2021-06-21 | Resolved: 2021-07-16

## 2021-07-16 PROCEDURE — 99214 OFFICE O/P EST MOD 30 MIN: CPT | Performed by: PHYSICIAN ASSISTANT

## 2021-07-16 RX ORDER — SITAGLIPTIN 100 MG/1
100 TABLET, FILM COATED ORAL DAILY
Qty: 90 TABLET | Refills: 1 | Status: SHIPPED | OUTPATIENT
Start: 2021-07-16 | End: 2021-11-29

## 2021-07-16 RX ORDER — HYDROCODONE BITARTRATE AND ACETAMINOPHEN 5; 325 MG/1; MG/1
TABLET ORAL
COMMUNITY
Start: 2021-07-07 | End: 2021-08-19

## 2021-07-16 NOTE — PROGRESS NOTES
Chief Complaint  Follow-up    Subjective          Nivia Cagle presents to [facility]  Follow up on numerous issues    R ankle: had surgery with Dr. Han ([facility]) last wk  Follow up next wk with their office  Is able to walk on ankle now more  She has had some swelling    DM: BG running 135.   Denies low bg, dizziness, shakiness.  If sugar is in 80s then she eats a snack.  She needs Januvia refilled    Depression: seeing psychaitrist.   She has not been able to get therapist due to medicare.   Denies si/hi.   She still feels overwhelmed with numerous life changes- loss of mother, move from [place], broken ankles      Past Medical History:   Diagnosis Date   • ADHD (attention deficit hyperactivity disorder)    • Allergic rhinitis    • Anemia    • Anxiety    • Cataracts, bilateral    • Depression 0421/2021    MOODNOT WELL CONTROLLED.  GIVEN NUMBER FOR LOCAL COUNSELOR.  WILL INCREASE TRINTELIX FROM 10MG TO 20MG RTC WEEK ER ID S/HI   • Diabetes mellitus, type 2 (CMS/HCC)    • Diverticulitis    • GERD (gastroesophageal reflux disease)    • Head injury    • High blood pressure    • Hyperlipemia    • Migraine    • EARL (obstructive sleep apnea) 04/21/2021   • Phlebitis    • PTSD (post-traumatic stress disorder)         Past Surgical History:   Procedure Laterality Date   • ANKLE SURGERY  2021   • BILATERAL BREAST REDUCTION  2015   • CARPAL TUNNEL RELEASE     • CHOLECYSTECTOMY  2001   • COLONOSCOPY     • HYSTERECTOMY     • ULNAR NERVE TRANSPOSITION Right    • WRIST SURGERY          Current Outpatient Medications on File Prior to Visit   Medication Sig Dispense Refill   • HYDROcodone-acetaminophen (NORCO) 5-325 MG per tablet TAKE ONE TABLET BY MOUTH EVERY 4 HOURS IF NEEDED FOR SEVERE PAIN FOR UP TO 5 DAYS     • Accu-Chek Madeline Plus test strip by Other route 4 (Four) Times a Day. use to test blood sugar     • Accu-Chek Softclix Lancets lancets by Other route 4 (Four) Times a  "Day. use to test blood sugar     • ARIPiprazole (ABILIFY) 2 MG tablet Take 2 tablets by mouth Daily for 180 days. 120 tablet 2   • BD Insulin Syringe U/F 30G X 1/2\" 0.3 ML misc USE TO INJECT INSULIN FOUR TIMES DAILY AS NEEDED     • Blood Glucose Monitoring Suppl (FreeStyle Lite) device 1 each by Other route 4 (Four) Times a Day.     • buPROPion XL (FORFIVO XL) 450 MG 24 hr tablet Take 1 tablet by mouth Every Morning for 180 days. 60 tablet 2   • Calcium 600 600 MG tablet TAKE 1 TABLET BY MOUTH EVERY DAY     • cetirizine (zyrTEC) 10 MG tablet TAKE 1 TABLET BY ORAL ROUTE ONCE DAILY     • cyclobenzaprine (FLEXERIL) 10 MG tablet Take 1 tablet by mouth Daily As Needed for Muscle Spasms. 30 tablet 2   • Daily-Jelani Multivitamin tablet tablet Take 1 tablet by mouth every night at bedtime.     • Evolocumab (Repatha) solution prefilled syringe injection Inject 1 mL under the skin into the appropriate area as directed Every 14 (Fourteen) Days. 45 each 3   • FLUoxetine (PROzac) 60 MG tablet Take 1 tablet by mouth Daily for 180 days. 60 tablet 2   • fluticasone (Flonase) 50 MCG/ACT nasal spray 2 sprays into the nostril(s) as directed by provider Daily. Administer 2 sprays in each nostril for each dose. 16 g 6   • gabapentin (NEURONTIN) 300 MG capsule Take 300 mg by mouth 2 (Two) Times a Day.     • insulin lispro (humaLOG) 100 UNIT/ML injection INJECT 30 UNITS UNDER THE SKIN PER PRESCRIBERS SLIDING SCALE INSTRUCTIONS. INSULIN DOSING REQUIRES INDIVIDUALIZATION     • losartan (COZAAR) 25 MG tablet Take 1 tablet by mouth Daily. 90 tablet 1   • metFORMIN (GLUCOPHAGE) 500 MG tablet Take 1 tablet by mouth 2 (Two) Times a Day With Meals. 180 tablet 1   • mirtazapine (REMERON) 45 MG tablet Take 1 tablet by mouth every night at bedtime for 180 days. 60 tablet 2   • Non-Aspirin Pain Reliever 325 MG tablet Take 650 mg by mouth Every 8 (Eight) Hours As Needed. for pain     • pramipexole (MIRAPEX) 0.5 MG tablet Take 1 tablet by mouth every " "night at bedtime. 30 tablet 3   • vitamin D (ERGOCALCIFEROL) 1.25 MG (00449 UT) capsule capsule 50,000 Units 1 (One) Time Per Week.       No current facility-administered medications on file prior to visit.        Allergies   Allergen Reactions   • Diclofenac Hives   • Adhesive Tape Rash     Rash at area of bandaid   • Niacin Rash   • Tape Rash     Rash at area of bandaid       Social History     Tobacco Use   Smoking Status Former Smoker   • Packs/day: 0.25   • Years: 22.00   • Pack years: 5.50   Smokeless Tobacco Never Used   Tobacco Comment    QUIT 1988          Objective   Vital Signs:   /90   Pulse 78   Temp 97.7 °F (36.5 °C)   Resp 15   Ht 162.6 cm (64\")   Wt 78.5 kg (173 lb)   SpO2 98%   BMI 29.70 kg/m²     Physical Exam  Vitals reviewed.   Constitutional:       Appearance: Normal appearance.   HENT:      Head: Normocephalic and atraumatic.      Nose: Nose normal.      Mouth/Throat:      Mouth: Mucous membranes are moist.   Eyes:      Extraocular Movements: Extraocular movements intact.      Conjunctiva/sclera: Conjunctivae normal.      Pupils: Pupils are equal, round, and reactive to light.   Cardiovascular:      Rate and Rhythm: Normal rate and regular rhythm.   Pulmonary:      Effort: Pulmonary effort is normal.      Breath sounds: Normal breath sounds.   Abdominal:      General: Abdomen is flat. Bowel sounds are normal.      Palpations: Abdomen is soft.   Musculoskeletal:         General: Normal range of motion.   Skin:     Comments: Well healing sutures on R ankle   Neurological:      General: No focal deficit present.      Mental Status: She is alert and oriented to person, place, and time.      Gait: Gait abnormal.   Psychiatric:         Mood and Affect: Mood normal.        Result Review :                 Assessment and Plan    Diagnoses and all orders for this visit:    1. Essential hypertension (Primary)  Assessment & Plan:  Discussed blood pressure elevation at today's visit. Risks of " blood pressure elevation include death, heart attack, stroke, kidney disease, blindness. Low salt diet, increase exercise. Discussed importance of medication compliance and not missing doses. Continue current medications at this time. We will monitor at follow up and adjust medications if still elevated. To er if chest pain, palpitations, vision loss, unilateral weakness, altered mental status. Pt understands and agrees with plan.      Orders:  -     Hemoglobin A1c; Future  -     Comprehensive Metabolic Panel; Future  -     CBC & Differential; Future  -     TSH; Future  -     Lipid Panel; Future    2. Mixed hyperlipidemia  -     Hemoglobin A1c; Future  -     Comprehensive Metabolic Panel; Future  -     CBC & Differential; Future  -     TSH; Future  -     Lipid Panel; Future    3. Generalized anxiety disorder  -     Ambulatory Referral to Social Care Services (Amb Case Mgmt)  -     Hemoglobin A1c; Future  -     Comprehensive Metabolic Panel; Future  -     CBC & Differential; Future  -     TSH; Future  -     Lipid Panel; Future    4. Major depressive disorder, recurrent episode, moderate degree (CMS/[facility])  -     Ambulatory Referral to Social Care Services (Amb Case Mgmt)  -     Hemoglobin A1c; Future  -     Comprehensive Metabolic Panel; Future  -     CBC & Differential; Future  -     TSH; Future  -     Lipid Panel; Future    5. Type 2 diabetes mellitus with other specified complication, with long-term current use of insulin (CMS/[facility])  Assessment & Plan:  DM doing better on current meds per pt. Labs placed for future date to eval.    Orders:  -     Hemoglobin A1c; Future  -     Comprehensive Metabolic Panel; Future  -     CBC & Differential; Future  -     TSH; Future  -     Lipid Panel; Future    Other orders  -     Januvia 100 MG tablet; Take 1 tablet by mouth Daily.  Dispense: 90 tablet; Refill: 1      Follow Up   Return in about 6 weeks (around 8/27/2021).  Patient was given instructions and counseling regarding her  condition or for health maintenance advice. Please see specific information pulled into the AVS if appropriate.

## 2021-07-19 ENCOUNTER — PATIENT OUTREACH (OUTPATIENT)
Dept: CASE MANAGEMENT | Facility: OTHER | Age: 71
End: 2021-07-19

## 2021-07-19 NOTE — ASSESSMENT & PLAN NOTE
Discussed blood pressure elevation at today's visit. Risks of blood pressure elevation include death, heart attack, stroke, kidney disease, blindness. Low salt diet, increase exercise. Discussed importance of medication compliance and not missing doses. Continue current medications at this time. We will monitor at follow up and adjust medications if still elevated. To er if chest pain, palpitations, vision loss, unilateral weakness, altered mental status. Pt understands and agrees with plan.

## 2021-07-19 NOTE — OUTREACH NOTE
ASSESSMENT    Staff Spoke With: MSW spoke with patient on this day to discuss resources needed for mental health and housing.     Reason for the Referral: Housing Instability and Counseling    Patient's Reported Cognitive Status: Alert and Oriented    Does the patient have Advanced Care Planning documents?: Yes, POA    Patient's Reported Physical Status: Needs Assistance    Patient utilizes these assistive devices and/or in home care services: Gavin    Patient's  Status: Did not serve in the .    Patient's Current Living Arrangements/Home Environment: Patient is currently staying with son and daughter n law. Patient is going back to [place] to clean out her home and pack to permanently move back to [place] closer to family in about 2 months.    Patient's Spiritual Affiliation/Impact on Care: Patient is Hoahaoism.    Patient's Behavioral Health/Substance Abuse History: No substance abuse concerns noted at this time.    [facility] updated and reviewed with the patient during this program:     Financial Resource Strain: Low Risk    • Difficulty of Paying Living Expenses: Not very hard         Food Insecurity: No Food Insecurity   • Worried About Running Out of Food in the Last Year: Never true   • Ran Out of Food in the Last Year: Never true         Transportation Needs: No Transportation Needs   • Lack of Transportation (Medical): No   • Lack of Transportation (Non-Medical): No         Housing Stability: Low Risk    • Unable to Pay for Housing in the Last Year: No   • Number of Places Lived in the Last Year: 2   • Unstable Housing in the Last Year: No         Stress: Stress Concern Present   • Feeling of Stress : Rather much       Patient Outreach    MSW spoke with patient to discuss resources needed. Patient states that she is having a hard time coping with moving to [place] permanently and leaving her home in [place]. Patient spoke about history of mental health and prior counseling. Patient interested in starting  counseling again and prefers telehealth that will accept Medicare. Patient states that she is also looking into housing currently in the [place] and is interested in an apartment with a pool. MSW to assist with housing resources and also set up counseling for mental health needs.     Care Coordination    MSW spoke with [facility] as they are accepting Medicare patients currently. [facility] is mailing patient an intake application to fill out and submit.    DISCUSSION AND PLAN    MSW to follow up with patient to ensure she has established counseling services and also to provide housing resources.    Verbal consent obtained to contact/refer to the following individuals and/or agencies: THI Delgado   , Ambulatory Case Management    7/19/2021 09:50 EDT

## 2021-07-19 NOTE — ASSESSMENT & PLAN NOTE
Mood is stable, cont follow up with psych for med mgmt. Referred to , Kely Broderick, who saw the patient today in office and will help set up counseling services for patient.

## 2021-07-22 ENCOUNTER — TELEPHONE (OUTPATIENT)
Dept: CARDIOLOGY | Facility: CLINIC | Age: 71
End: 2021-07-22

## 2021-07-22 NOTE — TELEPHONE ENCOUNTER
My recent note documents the patient's prior intolerance to Crestor and Lipitor due to myalgias, therefore it meets their criteria for Repatha use    Certainly appeal    Also please state that I strongly request they send a letter to the patient why (in their judgment) she is not a candidate for Repatha therapy to get her cholesterol under control despite the recommendation of her cardiologist and her known intolerance to high potency statins, and her very high cardiovascular risk.    Santhosh Ruiz MD

## 2021-07-26 ENCOUNTER — PATIENT OUTREACH (OUTPATIENT)
Dept: CASE MANAGEMENT | Facility: OTHER | Age: 71
End: 2021-07-26

## 2021-07-26 NOTE — OUTREACH NOTE
Patient Outreach    MSW spoke with patient regarding mental health counseling. MSW had spoke with [facility] prior week to mail patient intake packet. Patient never received intake packet. MSW assisted with printing intake packet, filling out, and mailing to patient for signature for telehealth counseling services. Patient continued to need assistance with housing.      Care Coordination    MSW spoke with [facility] to obtain instructions for completing forms online for counseling.       THI Swartz   , Ambulatory Case Management    7/26/2021 11:17 EDT

## 2021-07-29 ENCOUNTER — TREATMENT (OUTPATIENT)
Dept: PHYSICAL THERAPY | Facility: CLINIC | Age: 71
End: 2021-07-29

## 2021-07-29 ENCOUNTER — TRANSCRIBE ORDERS (OUTPATIENT)
Dept: PHYSICAL THERAPY | Facility: CLINIC | Age: 71
End: 2021-07-29

## 2021-07-29 DIAGNOSIS — M25.571 ACUTE RIGHT ANKLE PAIN: Primary | ICD-10-CM

## 2021-07-29 DIAGNOSIS — Z98.890 S/P ORIF (OPEN REDUCTION INTERNAL FIXATION) FRACTURE: ICD-10-CM

## 2021-07-29 DIAGNOSIS — R26.89 ANTALGIC GAIT: ICD-10-CM

## 2021-07-29 DIAGNOSIS — M25.572 LEFT ANKLE PAIN, UNSPECIFIED CHRONICITY: ICD-10-CM

## 2021-07-29 DIAGNOSIS — Z87.81 S/P ORIF (OPEN REDUCTION INTERNAL FIXATION) FRACTURE: ICD-10-CM

## 2021-07-29 PROCEDURE — 97140 MANUAL THERAPY 1/> REGIONS: CPT | Performed by: PHYSICAL THERAPIST

## 2021-07-29 PROCEDURE — 97110 THERAPEUTIC EXERCISES: CPT | Performed by: PHYSICAL THERAPIST

## 2021-07-29 NOTE — PROGRESS NOTES
Re-Assessment / Re-Certification      Patient: Nivia Cagle   : 1950  Diagnosis/ICD-10 Code:  Acute right ankle pain [M25.571]  Referring practitioner: Curtis Han MD  Date of Initial Visit: Type: THERAPY  Noted: 2021  Today's Date: 2021  Patient seen for 6 sessions      Subjective:   Nivia Cagle reports: 5/10 right ankle pain  Subjective Questionnaire: LEFS:   Clinical Progress: improved  Home Program Compliance: Yes  Treatment has included: therapeutic exercise, neuromuscular re-education, manual therapy, therapeutic activity, gait training, electrical stimulation, moist heat and cryotherapy    Subjective Evaluation    History of Present Illness  Mechanism of injury: Had wire taken out 2 weeks ago.  Feels less painful when stepping down.  Now walking with a cane   AM : LOB with first few steps because WB'g painful  Feels off balance  Day: increased edema and pain  PM: Activity dependent.  More sore at evening with active day    Began driving last week. Using right foot to use pedal.  Ankle hurt and leg tired from not using LE.      Reports 4-5/10 R ankle pain and 2-3 left ankle pain.  Pt wants to be able to walk independently and be painfree and return to her prior level of function.     Subjective comment: Pt returns to PT to continue rehab for B ankles following fractures and surgery.  Pt just had a wire removed from R elena and is now ready for rehab again. Quality of life: good         Objective          Active Range of Motion     Additional Active Range of Motion Details   Ankle  DF L 8, R -12  PF L 45, R 33  INV  EV        Assessment & Plan     Assessment  Impairments: abnormal gait, abnormal muscle firing, abnormal muscle tone, abnormal or restricted ROM, activity intolerance, impaired balance, impaired physical strength, lacks appropriate home exercise program, pain with function and weight-bearing intolerance  Assessment details: Pt presents with limitations, noted above,  that impede ROM and ability to ambulate comfortably which limits ADLs. The skills of a therapist will be required to safely and effectively implement the following treatment plan to restore maximal level of function.  Functional Limitations: carrying objects, lifting, walking, uncomfortable because of pain and standing  Goals  Plan Goals: Plan Goals: ANKLE PROBLEMS    1. The patient has limited ROM for the R ankle.              LTG 1: 12 weeks:  In order to allow the patient greater ease with forward, lateral, and diagonal mobility the patient will demonstrate improved ROM of the R ankle as follows:  10 degrees of dorsiflexion, 50 degrees of plantarflexion, 30 degrees of inversion, and 20 degrees of eversion.  STATUS:  New              STG 1a: 6 weeks:  The patient will demonstrate improved ROM of the 5 ankle as follows:  5 degrees of dorsiflexion, 40 degrees of plantarflexion, 20 degrees of inversion, and 20 degrees of eversion.                          STATUS:  New              TREATMENT: Manual therapy, therapeutic exercise, home exercise instruction, and modalities as needed to include:  moist heat, electrical stimulation, ultrasound, and ice.    2. The patient has limited strength of the R ankle.              LTG 2: 12 weeks:  In order to provide greater joint stability of the R ankle the patient will demonstrate improved strength of the R ankle as follows:  4/5 dorsiflexion, 4/5 inversion, 4/5 eversion, and 4/5 plantar flexion.                          STATUS:  New              STG 2a: 6 weeks:  The patient will demonstrate improved strength of the R ankle as follows:  4-/5 dorsiflexion, 4-/5 inversion, 4-/5 eversion, and 4-/5 plantar flexion.                          STATUS:  New              TREATMENT: Therapeutic exercise, home exercise instruction, aquatic therapy.    3. The patient has gait dysfunction.              LTG 3: 12 weeks:  The patient will ambulate without assistive device, independently, for  community distances with minimal limp to the R lower extremity in order to improve mobility and allow patient to perform activities such as grocery shopping with greater ease.                          STATUS:  New              STG 3a: 6 weeks: The patient will ambulate with a cane 200 feet independently.                          STATUS:  New              TREATMENT: Gait training, aquatic therapy, therapeutic exercise, and home exercise instruction.    4. The patient complains of R ankle pain.  LTG 4: 12 weeks:  The patient will report a pain rating of 2/10 or better in order to improve  tolerance to activities of daily living and improve sleep quality.  STATUS:  New  STG 4a: 6 weeks:  The patient will report a pain rating of 4/10 or better.  STATUS:  New  TREATMENT:  Therapeutic exercises, manual therapy, aquatic therapy, home exercise   instruction, and modalities as needed for pain to include:  electrical stimulation, moist heat, ice,   ultrasound, and diathermy.     Plan  Therapy options: will be seen for skilled physical therapy services  Planned modality interventions: TENS  Planned therapy interventions: manual therapy, motor coordination training, neuromuscular re-education, postural training, soft tissue mobilization, spinal/joint mobilization, strengthening, stretching, therapeutic activities, ADL retraining, balance/weight-bearing training, body mechanics training, flexibility, functional ROM exercises, gait training, home exercise program, IADL retraining and joint mobilization  Frequency: 3x week  Duration in weeks: 12  Plan details: 3x/wk  x 12 wks      Progress toward previous goals: Not Met    New Goals  Short-term goals (STG):   Long-term goals (LTG):       Recommendations: Continue as planned  Timeframe: 3 months  Prognosis to achieve goals: good    PT Signature: Kamla Clemente, PT      Based upon review of the patient's progress and continued therapy plan, it is my medical opinion that Nivia  Tsering should continue physical therapy treatment at [facility]  [address] [place] [address] [address] [place] [address] [phone].    Signature: __________________________________  Curtis Han MD    Manual Therapy:  15       mins  02633;  Therapeutic Exercise:    15     mins  72898;     Neuromuscular Evon:        mins  08797;    Therapeutic Activity:          mins  78617;     Gait Training:           mins  95542;     Ultrasound:          mins  09473;    Electrical Stimulation:         mins  60515 ( );      Timed Treatment:   30   mins   Total Treatment:     45   mins

## 2021-08-05 ENCOUNTER — TELEPHONE (OUTPATIENT)
Dept: PHYSICAL THERAPY | Facility: CLINIC | Age: 71
End: 2021-08-05

## 2021-08-10 ENCOUNTER — TREATMENT (OUTPATIENT)
Dept: PHYSICAL THERAPY | Facility: CLINIC | Age: 71
End: 2021-08-10

## 2021-08-10 DIAGNOSIS — M25.571 ACUTE RIGHT ANKLE PAIN: Primary | ICD-10-CM

## 2021-08-10 DIAGNOSIS — Z98.890 S/P ORIF (OPEN REDUCTION INTERNAL FIXATION) FRACTURE: ICD-10-CM

## 2021-08-10 DIAGNOSIS — R26.89 ANTALGIC GAIT: ICD-10-CM

## 2021-08-10 DIAGNOSIS — Z87.81 S/P ORIF (OPEN REDUCTION INTERNAL FIXATION) FRACTURE: ICD-10-CM

## 2021-08-10 PROCEDURE — 97112 NEUROMUSCULAR REEDUCATION: CPT | Performed by: PHYSICAL THERAPIST

## 2021-08-10 PROCEDURE — 97116 GAIT TRAINING THERAPY: CPT | Performed by: PHYSICAL THERAPIST

## 2021-08-10 NOTE — PROGRESS NOTES
Physical Therapy Daily Progress Note      Patient: Nivia Cagle   : 1950  Referring practitioner: Curtis Han MD  Date of Initial Visit: Type: THERAPY  Noted: 2021  Today's Date: 8/10/2021  Patient seen for 7 sessions      RTD 9/3/21       Nivia Cagle reports: pain in R ankle, FEAR OF FALLING      Subjective Evaluation    History of Present Illness    Subjective comment: States having edema and pain in R ankle. States feels unstable when walking, has fear of falling.  Relates negociating curbs is scarey.  Pain  Current pain ratin           Objective           General Comments     Ankle/Foot Comments   Pt presents with edema 2+, R ankle. discussed support hose or nylons to assist with edema control.     AROM R ankle: pf aprox 25, df aprox 6    GAIT: step-to gait pattern, cane placed too narrowly and L leg has trouble stepping forward.  Balance fair+.     See Exercise, Manual, and Modality Logs for complete treatment.       Assessment & Plan     Assessment  Assessment details: Pt improved gait pattern, sequencing, and had improved balance with new gait pattern: use cane on L, wider placement of cane, smaller steps, step-through with L as indicated.     Time was spent discussing safety, fall prevention, use of assistive devices: walker, quad cane, straight cane, mobility problem solving.     Goals  Plan Goals: Plan Goals: ANKLE PROBLEMS    1. The patient has limited ROM for the R ankle.              LTG 1: 12 weeks:  In order to allow the patient greater ease with forward, lateral, and diagonal mobility the patient will demonstrate improved ROM of the R ankle as follows:  10 degrees of dorsiflexion, 50 degrees of plantarflexion, 30 degrees of inversion, and 20 degrees of eversion.  STATUS:  PROGRESSING              STG 1a: 6 weeks:  The patient will demonstrate improved ROM of the 5 ankle as follows:  5 degrees of dorsiflexion, 40 degrees of plantarflexion, 20 degrees of inversion, and 20  degrees of eversion.                          STATUS:  PROGRESSING              TREATMENT: Manual therapy, therapeutic exercise, home exercise instruction, and modalities as needed to include:  moist heat, electrical stimulation, ultrasound, and ice.    2. The patient has limited strength of the R ankle.              LTG 2: 12 weeks:  In order to provide greater joint stability of the R ankle the patient will demonstrate improved strength of the R ankle as follows:  4/5 dorsiflexion, 4/5 inversion, 4/5 eversion, and 4/5 plantar flexion.                          STATUS:  PROGRESSING              STG 2a: 6 weeks:  The patient will demonstrate improved strength of the R ankle as follows:  4-/5 dorsiflexion, 4-/5 inversion, 4-/5 eversion, and 4-/5 plantar flexion.                          STATUS: PROGRESSING              TREATMENT: Therapeutic exercise, home exercise instruction, aquatic therapy.    3. The patient has gait dysfunction.              LTG 3: 12 weeks:  The patient will ambulate without assistive device, independently, for community distances with minimal limp to the R lower extremity in order to improve mobility and allow patient to perform activities such as grocery shopping with greater ease.                          STATUS:  New              STG 3a: 6 weeks: The patient will ambulate with a cane 200 feet independently.                          STATUS: ABLE TO WALK 200 FT, BUT IS UNSTEADY, PROGRESSING              TREATMENT: Gait training, aquatic therapy, therapeutic exercise, and home exercise instruction.    4. The patient complains of R ankle pain.  LTG 4: 12 weeks:  The patient will report a pain rating of 2/10 or better in order to improve  tolerance to activities of daily living and improve sleep quality.  STATUS:  New  STG 4a: 6 weeks:  The patient will report a pain rating of 4/10 or better.  STATUS:  PROGRESSING  TREATMENT:  Therapeutic exercises, manual therapy, aquatic therapy, home exercise    instruction, and modalities as needed for pain to include:  electrical stimulation, moist heat, ice,   ultrasound, and diathermy.    Plan  Plan details: Continue therapy as planned.   Progress balance, gait, fall prevention training, continued instruction with assistive device. Progress HEP .        Visit Diagnoses:    ICD-10-CM ICD-9-CM   1. Acute right ankle pain  M25.571 719.47     338.19   2. S/P ORIF (open reduction internal fixation) fracture  Z98.890 V45.89    Z87.81 V15.51   3. Antalgic gait  R26.89 781.2       Progress per Plan of Care, Progress strengthening /stabilization /functional activity and Other: safety and fall prevention training.            Timed:  Manual Therapy:         mins  16424;  Therapeutic Exercise:         mins  93544;     Neuromuscular Evon:    15    mins  54826;    Therapeutic Activity:          mins  17443;     Gait Trainin    mins  11568;     Ultrasound:          mins  59916;    Paraffin:        mins  28075;  Electrical Stimulation:         mins  55683 ( );    Untimed:  Electrical Stimulation:         mins  64496 ( );  Mechanical Traction:         mins  26115;     Timed Treatment:   40   mins   Total Treatment:     55   mins    Nisha Lainez PT, [facility]  Physical Therapist        This document has been electronically signed by Nisha Lainez PT on August 10, 2021 17:40 EDT

## 2021-08-12 ENCOUNTER — TREATMENT (OUTPATIENT)
Dept: PHYSICAL THERAPY | Facility: CLINIC | Age: 71
End: 2021-08-12

## 2021-08-12 DIAGNOSIS — R26.89 ANTALGIC GAIT: ICD-10-CM

## 2021-08-12 DIAGNOSIS — Z87.81 S/P ORIF (OPEN REDUCTION INTERNAL FIXATION) FRACTURE: Primary | ICD-10-CM

## 2021-08-12 DIAGNOSIS — Z98.890 S/P ORIF (OPEN REDUCTION INTERNAL FIXATION) FRACTURE: Primary | ICD-10-CM

## 2021-08-12 DIAGNOSIS — M25.571 ACUTE RIGHT ANKLE PAIN: ICD-10-CM

## 2021-08-12 PROCEDURE — 97112 NEUROMUSCULAR REEDUCATION: CPT | Performed by: PHYSICAL THERAPIST

## 2021-08-12 PROCEDURE — 97116 GAIT TRAINING THERAPY: CPT | Performed by: PHYSICAL THERAPIST

## 2021-08-12 NOTE — PROGRESS NOTES
Physical Therapy Daily Progress Note      Patient: Nivia Cagle   : 1950  Referring practitioner: Curtis Han MD  Date of Initial Visit: Type: THERAPY  Noted: 2021  Today's Date: 2021  Patient seen for 8 sessions           Nivia Cagle reports: feels much better walking, more stable.         Subjective Evaluation    History of Present Illness    Subjective comment: States feels more confident walking, less instability, likes her wider base cane better.  Relates R ankle swollen today, was driving a lot yesterday.  No c/o after last session. States has incorporated new exercises into HEP. Pain  Current pain ratin  Location: R ankle           Objective          Ambulation     Comments   IMPROVED gait. Pt using cane on L, wider support, improved step length, step-through pattern, improved posture.      See Exercise, Manual, and Modality Logs for complete treatment.       Assessment & Plan     Assessment  Assessment details: Nice improvement, improved gait, safety, stability.     Improved use of cane, balance improved.     Pt able to asc/descend aerobic step with single UE support and cane with min cueing, safely.  Improved confidence.    Pt motivated, great effort.    VC for asc/descend step, sequencing, weight shifting, posture, safety, hand placement on rail, use of cane, planning.  Pt was able to incorporate cues into practice and was able to perform asc/desc safely without VC.         Goals  Plan Goals: Goals: Plan Goals: ANKLE PROBLEMS    1. The patient has limited ROM for the R ankle.              LTG 1: 12 weeks:  In order to allow the patient greater ease with forward, lateral, and diagonal mobility the patient will demonstrate improved ROM of the R ankle as follows:  10 degrees of dorsiflexion, 50 degrees of plantarflexion, 30 degrees of inversion, and 20 degrees of eversion.  STATUS:  PROGRESSING              STG 1a: 6 weeks:  The patient will demonstrate improved ROM of the 5  ankle as follows:  5 degrees of dorsiflexion, 40 degrees of plantarflexion, 20 degrees of inversion, and 20 degrees of eversion.                          STATUS:  PROGRESSING              TREATMENT: Manual therapy, therapeutic exercise, home exercise instruction, and modalities as needed to include:  moist heat, electrical stimulation, ultrasound, and ice.    2. The patient has limited strength of the R ankle.              LTG 2: 12 weeks:  In order to provide greater joint stability of the R ankle the patient will demonstrate improved strength of the R ankle as follows:  4/5 dorsiflexion, 4/5 inversion, 4/5 eversion, and 4/5 plantar flexion.                          STATUS:  PROGRESSING              STG 2a: 6 weeks:  The patient will demonstrate improved strength of the R ankle as follows:  4-/5 dorsiflexion, 4-/5 inversion, 4-/5 eversion, and 4-/5 plantar flexion.                          STATUS: PROGRESSING              TREATMENT: Therapeutic exercise, home exercise instruction, aquatic therapy.    3. The patient has gait dysfunction.              LTG 3: 12 weeks:  The patient will ambulate without assistive device, independently, for community distances with minimal limp to the R lower extremity in order to improve mobility and allow patient to perform activities such as grocery shopping with greater ease.                          STATUS:  New              STG 3a: 6 weeks: The patient will ambulate with a cane 200 feet independently.                          STATUS: ABLE TO WALK 200 FT, BUT IS UNSTEADY, PROGRESSING              TREATMENT: Gait training, aquatic therapy, therapeutic exercise, and home exercise instruction.    4. The patient complains of R ankle pain.  LTG 4: 12 weeks:  The patient will report a pain rating of 2/10 or better in order to improve  tolerance to activities of daily living and improve sleep quality.  STATUS:  New  STG 4a: 6 weeks:  The patient will report a pain rating of 4/10 or  better.  STATUS:  PROGRESSING  TREATMENT:  Therapeutic exercises, manual therapy, aquatic therapy, home exercise   instruction, and modalities as needed for pain to include:  electrical stimulation, moist heat, ice, ultrasound, and diathermy.    Plan  Plan details: Continue therapy to progress safe gait, balance, proprioception, B LE strengthening, LE stabilization, progress HEP.         Visit Diagnoses:    ICD-10-CM ICD-9-CM   1. S/P ORIF (open reduction internal fixation) fracture  Z98.890 V45.89    Z87.81 V15.51   2. Acute right ankle pain  M25.571 719.47     338.19   3. Antalgic gait  R26.89 781.2       Progress per Plan of Care and Progress strengthening /stabilization /functional activity           Timed:  Manual Therapy:         mins  38282;  Therapeutic Exercise:         mins  03756;     Neuromuscular Evon:  15      mins  38373;    Therapeutic Activity:          mins  68494;     Gait Trainin     mins  71443;     Ultrasound:          mins  85297;    Paraffin:        mins  94015;  Electrical Stimulation:         mins  15505 ( );    Untimed:  Electrical Stimulation:         mins  96305 ( );  Mechanical Traction:         mins  65130;     Timed Treatment:   40  mins   Total Treatment:     45   mins    Nisha Lainez, [facility]  Physical Therapist        This document has been electronically signed by Nisha Lainez PT on 2021 12:10 EDT

## 2021-08-16 ENCOUNTER — TELEPHONE (OUTPATIENT)
Dept: PHYSICAL THERAPY | Facility: CLINIC | Age: 71
End: 2021-08-16

## 2021-08-19 ENCOUNTER — TELEMEDICINE (OUTPATIENT)
Dept: PSYCHIATRY | Facility: CLINIC | Age: 71
End: 2021-08-19

## 2021-08-19 ENCOUNTER — TREATMENT (OUTPATIENT)
Dept: PHYSICAL THERAPY | Facility: CLINIC | Age: 71
End: 2021-08-19

## 2021-08-19 DIAGNOSIS — M25.571 ACUTE RIGHT ANKLE PAIN: ICD-10-CM

## 2021-08-19 DIAGNOSIS — R26.89 ANTALGIC GAIT: ICD-10-CM

## 2021-08-19 DIAGNOSIS — Z98.890 S/P ORIF (OPEN REDUCTION INTERNAL FIXATION) FRACTURE: Primary | ICD-10-CM

## 2021-08-19 DIAGNOSIS — F60.9 CLUSTER B PERSONALITY DISORDER IN ADULT (HCC): ICD-10-CM

## 2021-08-19 DIAGNOSIS — Z87.81 S/P ORIF (OPEN REDUCTION INTERNAL FIXATION) FRACTURE: Primary | ICD-10-CM

## 2021-08-19 DIAGNOSIS — M25.572 LEFT ANKLE PAIN, UNSPECIFIED CHRONICITY: ICD-10-CM

## 2021-08-19 DIAGNOSIS — F43.10 POST TRAUMATIC STRESS DISORDER (PTSD): ICD-10-CM

## 2021-08-19 DIAGNOSIS — F41.1 GENERALIZED ANXIETY DISORDER: ICD-10-CM

## 2021-08-19 DIAGNOSIS — F33.2 SEVERE EPISODE OF RECURRENT MAJOR DEPRESSIVE DISORDER, WITHOUT PSYCHOTIC FEATURES (HCC): Primary | ICD-10-CM

## 2021-08-19 PROBLEM — Z92.29 HISTORY OF VACCINATION: Status: ACTIVE | Noted: 2021-08-19

## 2021-08-19 PROBLEM — J40 BRONCHITIS: Status: ACTIVE | Noted: 2021-08-19

## 2021-08-19 PROCEDURE — 99214 OFFICE O/P EST MOD 30 MIN: CPT | Performed by: STUDENT IN AN ORGANIZED HEALTH CARE EDUCATION/TRAINING PROGRAM

## 2021-08-19 PROCEDURE — 97110 THERAPEUTIC EXERCISES: CPT | Performed by: PHYSICAL THERAPIST

## 2021-08-19 PROCEDURE — 97140 MANUAL THERAPY 1/> REGIONS: CPT | Performed by: PHYSICAL THERAPIST

## 2021-08-19 RX ORDER — KETOTIFEN FUMARATE 0.35 MG/ML
1 SOLUTION/ DROPS OPHTHALMIC
COMMUNITY
Start: 2021-07-01 | End: 2021-08-19

## 2021-08-19 RX ORDER — KETOCONAZOLE 20 MG/G
CREAM TOPICAL
COMMUNITY
Start: 2021-07-01 | End: 2021-12-07

## 2021-08-19 RX ORDER — EZETIMIBE 10 MG/1
10 TABLET ORAL
COMMUNITY
Start: 2021-07-01 | End: 2021-08-19

## 2021-08-19 RX ORDER — KETOTIFEN FUMARATE 0.35 MG/ML
1 SOLUTION/ DROPS OPHTHALMIC
COMMUNITY
Start: 2021-07-01 | End: 2022-04-13 | Stop reason: ALTCHOICE

## 2021-08-19 RX ORDER — EZETIMIBE 10 MG/1
10 TABLET ORAL
COMMUNITY
Start: 2021-08-12 | End: 2021-11-17 | Stop reason: SDUPTHER

## 2021-08-19 RX ORDER — IBUPROFEN 200 MG
200 TABLET ORAL
COMMUNITY
Start: 2021-07-01 | End: 2022-07-18

## 2021-08-19 RX ORDER — CLONAZEPAM 2 MG/1
2 TABLET ORAL
COMMUNITY
Start: 2021-07-01 | End: 2021-11-29 | Stop reason: ALTCHOICE

## 2021-08-19 RX ORDER — BUPROPION HYDROCHLORIDE 300 MG/1
300 TABLET ORAL EVERY MORNING
Qty: 60 TABLET | Refills: 1 | Status: SHIPPED | OUTPATIENT
Start: 2021-08-19 | End: 2022-01-05

## 2021-08-19 RX ORDER — ERGOCALCIFEROL 1.25 MG/1
50000 CAPSULE ORAL
COMMUNITY
Start: 2021-02-12 | End: 2021-08-19

## 2021-08-19 RX ORDER — BISACODYL 10 MG
SUPPOSITORY, RECTAL RECTAL
COMMUNITY
Start: 2021-07-01 | End: 2022-02-16

## 2021-08-19 NOTE — PROGRESS NOTES
"Subjective   Nivia Cagle is a 70 y.o. female who presents today for follow up    Referring Provider:  No referring provider defined for this encounter.    Chief Complaint:  \"I'm doing better.\"    History of Present Illness:     Nivia Cagle is a 70 year old /White female referred by Catalina Madsen PA-C.     Review : Seen  to establish care. History of diabetes type 2, hypertension, hyperlipidemia, anxiety and depression, EARL. Lost her mom due to COVID and was not able to say goodbye and was unable to have a . She moved to [place] to be near her son and daughter-in-law. She may have to sell her home and all her possessions in [place]. On atomoxetine 80 mg a day, clonazepam 1 mg twice a day, mirtazapine 45 mg at night, pramipexole 0.125 mg at night, Trintellix 20 mg a day. Labs this month: Elevated LDL, A1c is 6.2, LFTs, renal profile, CBC, electrolytes, TSH all normal. No outpatient EKG, head imaging.     \"Emphasis on Oriana.\"     : Virtual visit via Zoom audio and video due to the COVID-19 pandemic.  Patient is accepting of and agreeable to visit.  The visit consisted of the patient and I.  Interview:  1. Records review: Seen by primary care .  Still reports feeling overwhelmed with life changes.  Elevated lipids.  2. \"I've been alright.\" Some moments where she would tear up, but then reminds herself how grateful she is to be alive.  Stable, if not better.  3. Brief visit as the patient had trouble getting on Zoom.  4. Biggest complaint is restless legs syndrome.  5. No SI HI AVH.      : Virtual visit via Zoom audio and video due to the COVID-19 pandemic. Patient is accepting of and agreeable to appointment. The appointment consisted of the patient and I only.   Interview: \"The medication has been very helpful.\" Not nearly as tangential. \"That's the first time any medication has worked.\" Having spasms of her feet; had them before starting abilify, however. Mood " improved. Energy and concentration improved. Still has insomnia.  Anxiety: Worrying is much better, less irritable as well, with better focus and energy. PTSD symptoms can be triggered by TV (seeing stalkers, abuse). Otherwise under control.        Previous notes:  Patient extremely tangential and talkative at her first visit. Reports recently she broke both of her ankles in February. Her mom passed away from COVID in August of last year and she was not allowed to see her. She is about to sell her house in [place] and live in an apartment in [place]. Longstanding history of depression since 1989.           PHQ-9 Depression Screening  PHQ-9 Total Score:      Little interest or pleasure in doing things?     Feeling down, depressed, or hopeless?     Trouble falling or staying asleep, or sleeping too much?     Feeling tired or having little energy?     Poor appetite or overeating?     Feeling bad about yourself - or that you are a failure or have let yourself or your family down?     Trouble concentrating on things, such as reading the newspaper or watching television?     Moving or speaking so slowly that other people could have noticed? Or the opposite - being so fidgety or restless that you have been moving around a lot more than usual?     Thoughts that you would be better off dead, or of hurting yourself in some way?     PHQ-9 Total Score       LADI-7       Past Surgical History:  Past Surgical History:   Procedure Laterality Date   • ANKLE SURGERY  2021   • BILATERAL BREAST REDUCTION  2015   • CARPAL TUNNEL RELEASE     • CHOLECYSTECTOMY  2001   • COLONOSCOPY     • HYSTERECTOMY     • ULNAR NERVE TRANSPOSITION Right    • WRIST SURGERY         Problem List:  Patient Active Problem List   Diagnosis   • Muscle twitching   • Restless legs syndrome (RLS)   • Obstructive sleep apnea   • Allergic rhinitis   • Anemia   • Anxiety   • Bilateral posterior capsular opacification   • Cardiac murmur   • DM2 (diabetes mellitus,  "type 2) (CMS/HCC)   • Diverticulitis   • Endometriosis   • Gastroesophageal reflux disease   • Essential hypertension   • Low back pain   • Migraines   • Scoliosis deformity of spine   • Severe depression (CMS/HCC)   • Mixed hyperlipidemia   • Major depressive disorder, recurrent episode, moderate degree (CMS/HCC)   • Generalized anxiety disorder   • Bronchitis   • History of vaccination       Allergy:   Allergies   Allergen Reactions   • Diclofenac Hives   • Freederm Adhesive Remover [New Skin] Rash   • Adhesive Tape Rash and Other (See Comments)     Rash at area of bandaid  Rash at area of bandaid  Rash at area of bandaid  Rash at area of bandaid   • Niacin Rash   • Tape Rash     Rash at area of bandaid        Discontinued Medications:  Medications Discontinued During This Encounter   Medication Reason   • Calcium 600 600 MG tablet    • ezetimibe (ZETIA) 10 MG tablet    • HYDROcodone-acetaminophen (NORCO) 5-325 MG per tablet    • ketotifen (ZADITOR) 0.025 % ophthalmic solution    • BL NASAL SALINE MIST NA    • ergocalciferol (ERGOCALCIFEROL) 1.25 MG (05368 UT) capsule *Re-Entry   • buPROPion XL (FORFIVO XL) 450 MG 24 hr tablet        Current Medications:   Current Outpatient Medications   Medication Sig Dispense Refill   • Accu-Chek Madeline Plus test strip by Other route 4 (Four) Times a Day. use to test blood sugar     • Accu-Chek Softclix Lancets lancets by Other route 4 (Four) Times a Day. use to test blood sugar     • ARIPiprazole (ABILIFY) 2 MG tablet Take 2 tablets by mouth Daily for 180 days. 120 tablet 2   • BD Insulin Syringe U/F 30G X 1/2\" 0.3 ML misc USE TO INJECT INSULIN FOUR TIMES DAILY AS NEEDED     • bisacodyl (DULCOLAX) 10 MG suppository      • BL NASAL SALINE MIST NA 2 drops.     • Blood Glucose Monitoring Suppl (FreeStyle Lite) device 1 each by Other route 4 (Four) Times a Day.     • Calcium Carbonate 1500 (600 Ca) MG tablet Take 600 mg by mouth Daily.     • cetirizine (zyrTEC) 10 MG tablet TAKE 1 " TABLET BY ORAL ROUTE ONCE DAILY     • clonazePAM (KlonoPIN) 2 MG tablet 2 mg.     • cyclobenzaprine (FLEXERIL) 10 MG tablet Take 1 tablet by mouth Daily As Needed for Muscle Spasms. 30 tablet 2   • Daily-Jelani Multivitamin tablet tablet Take 1 tablet by mouth every night at bedtime.     • Evolocumab (Repatha) solution prefilled syringe injection Inject 1 mL under the skin into the appropriate area as directed Every 14 (Fourteen) Days. 45 each 3   • ezetimibe (ZETIA) 10 MG tablet Take 10 mg by mouth every night at bedtime.     • FLUoxetine (PROzac) 60 MG tablet Take 1 tablet by mouth Daily for 180 days. 60 tablet 2   • fluticasone (Flonase) 50 MCG/ACT nasal spray 2 sprays into the nostril(s) as directed by provider Daily. Administer 2 sprays in each nostril for each dose. 16 g 6   • gabapentin (NEURONTIN) 300 MG capsule Take 300 mg by mouth 2 (Two) Times a Day.     • ibuprofen (ADVIL,MOTRIN) 200 MG tablet 200 mg.     • insulin lispro (humaLOG) 100 UNIT/ML injection INJECT 30 UNITS UNDER THE SKIN PER PRESCRIBERS SLIDING SCALE INSTRUCTIONS. INSULIN DOSING REQUIRES INDIVIDUALIZATION     • Januvia 100 MG tablet Take 1 tablet by mouth Daily. 90 tablet 1   • ketoconazole (NIZORAL) 2 % cream      • ketotifen (Allergy Eye Drops) 0.025 % ophthalmic solution 1 drop.     • losartan (COZAAR) 25 MG tablet Take 1 tablet by mouth Daily. 90 tablet 1   • metFORMIN (GLUCOPHAGE) 500 MG tablet Take 1 tablet by mouth 2 (Two) Times a Day With Meals. 180 tablet 1   • mirtazapine (REMERON) 45 MG tablet Take 1 tablet by mouth every night at bedtime for 180 days. 60 tablet 2   • Non-Aspirin Pain Reliever 325 MG tablet Take 650 mg by mouth Every 8 (Eight) Hours As Needed. for pain     • pramipexole (MIRAPEX) 0.5 MG tablet Take 1 tablet by mouth every night at bedtime. 30 tablet 3   • vitamin D (ERGOCALCIFEROL) 1.25 MG (77282 UT) capsule capsule 50,000 Units 1 (One) Time Per Week.     • buPROPion XL (Wellbutrin XL) 300 MG 24 hr tablet Take 1  tablet by mouth Every Morning for 120 days. 60 tablet 1     No current facility-administered medications for this visit.       Past Medical History:  Past Medical History:   Diagnosis Date   • ADHD (attention deficit hyperactivity disorder)    • Allergic rhinitis    • Anemia    • Anxiety    • Cataracts, bilateral    • Depression 0421/2021    MOODNOT WELL CONTROLLED.  GIVEN NUMBER FOR LOCAL COUNSELOR.  WILL INCREASE TRINTELIX FROM 10MG TO 20MG RTC WEEK ER ID S/HI   • Diabetes mellitus, type 2 (CMS/HCC)    • Diverticulitis    • GERD (gastroesophageal reflux disease)    • Head injury    • High blood pressure    • Hyperlipemia    • Migraine    • EARL (obstructive sleep apnea) 04/21/2021   • Phlebitis    • PTSD (post-traumatic stress disorder)          Social History     Socioeconomic History   • Marital status: Single     Spouse name: Not on file   • Number of children: Not on file   • Years of education: Not on file   • Highest education level: Not on file   Tobacco Use   • Smoking status: Former Smoker     Packs/day: 0.25     Years: 22.00     Pack years: 5.50   • Smokeless tobacco: Never Used   • Tobacco comment: QUIT 1988   Vaping Use   • Vaping Use: Never used   Substance and Sexual Activity   • Alcohol use: Yes     Comment: SPECIAL OCCASION ONLY   • Drug use: Never   • Sexual activity: Defer         Family History   Problem Relation Age of Onset   • Heart disease Father    • Heart attack Father    • Diabetes Father    • ADD / ADHD Father    • Hyperlipidemia Father    • Kidney cancer Mother    • Hyperlipidemia Mother    • Hyperlipidemia Sister    • Hyperlipidemia Brother    • Diabetes Brother    • No Known Problems Paternal Uncle    • No Known Problems Cousin    • Brain cancer Sister    • Lung cancer Sister    • Hyperlipidemia Sister    • Hyperlipidemia Brother        Mental Status Exam:   Hygiene:   good  Cooperation:  Cooperative  Eye Contact:  Good  Psychomotor Behavior:  Appropriate  Affect:  Slightly restricted,  improved, mood congruent  Mood: better  Hopelessness: Denies  Speech:  Normal  Thought Process:  Goal directed  Thought Content:  Normal  Suicidal:  None  Homicidal:  None  Hallucinations:  None  Delusion:  None  Memory:  Intact  Orientation:  Person, Place, Time and Situation  Reliability:  fair  Insight:  Fair  Judgement:  Fair  Impulse Control:  Fair  Physical/Medical Issues:  Yes pain     Review of Systems:  Review of Systems   Constitutional: Positive for diaphoresis and fatigue.   HENT: Negative for drooling.    Eyes: Negative for visual disturbance.   Respiratory: Negative for cough and shortness of breath.    Cardiovascular: Positive for leg swelling. Negative for chest pain and palpitations.   Gastrointestinal: Negative for nausea and vomiting.   Endocrine: Positive for heat intolerance. Negative for cold intolerance.   Genitourinary: Positive for difficulty urinating.   Musculoskeletal: Negative for joint swelling.   Allergic/Immunologic: Negative for immunocompromised state.   Neurological: Positive for light-headedness. Negative for dizziness, seizures, speech difficulty and numbness.         Physical Exam:  Physical Exam    Vital Signs:   There were no vitals taken for this visit.     Lab Results:   Lab on 07/02/2021   Component Date Value Ref Range Status   • Total Cholesterol 07/02/2021 205* 0 - 200 mg/dL Final   • Triglycerides 07/02/2021 120  0 - 150 mg/dL Final   • HDL Cholesterol 07/02/2021 52  40 - 60 mg/dL Final   • LDL Cholesterol  07/02/2021 132* 0 - 100 mg/dL Final   • VLDL Cholesterol 07/02/2021 21  5 - 40 mg/dL Final   • LDL/HDL Ratio 07/02/2021 2.48   Final   Lab on 06/21/2021   Component Date Value Ref Range Status   • WBC 06/21/2021 8.16  3.40 - 10.80 10*3/mm3 Final   • RBC 06/21/2021 4.07  3.77 - 5.28 10*6/mm3 Final   • Hemoglobin 06/21/2021 12.2  12.0 - 15.9 g/dL Final   • Hematocrit 06/21/2021 36.4  34.0 - 46.6 % Final   • MCV 06/21/2021 89.4  79.0 - 97.0 fL Final   • MCH 06/21/2021  30.0  26.6 - 33.0 pg Final   • MCHC 06/21/2021 33.5  31.5 - 35.7 g/dL Final   • RDW 06/21/2021 14.3  12.3 - 15.4 % Final   • RDW-SD 06/21/2021 45.8  37.0 - 54.0 fl Final   • MPV 06/21/2021 11.4  6.0 - 12.0 fL Final   • Platelets 06/21/2021 274  140 - 450 10*3/mm3 Final   • Glucose 06/21/2021 88  65 - 99 mg/dL Final   • BUN 06/21/2021 21  8 - 23 mg/dL Final   • Creatinine 06/21/2021 0.95  0.57 - 1.00 mg/dL Final   • Sodium 06/21/2021 141  136 - 145 mmol/L Final   • Potassium 06/21/2021 4.1  3.5 - 5.2 mmol/L Final   • Chloride 06/21/2021 104  98 - 107 mmol/L Final   • CO2 06/21/2021 23.9  22.0 - 29.0 mmol/L Final   • Calcium 06/21/2021 9.4  8.6 - 10.5 mg/dL Final   • Total Protein 06/21/2021 7.0  6.0 - 8.5 g/dL Final   • Albumin 06/21/2021 4.40  3.50 - 5.20 g/dL Final   • ALT (SGPT) 06/21/2021 19  1 - 33 U/L Final   • AST (SGOT) 06/21/2021 19  1 - 32 U/L Final   • Alkaline Phosphatase 06/21/2021 88  39 - 117 U/L Final   • Total Bilirubin 06/21/2021 0.3  0.0 - 1.2 mg/dL Final   • eGFR Non African Amer 06/21/2021 58* >60 mL/min/1.73 Final   • Globulin 06/21/2021 2.6  gm/dL Final   • A/G Ratio 06/21/2021 1.7  g/dL Final   • BUN/Creatinine Ratio 06/21/2021 22.1  7.0 - 25.0 Final   • Anion Gap 06/21/2021 13.1  5.0 - 15.0 mmol/L Final   • Magnesium 06/21/2021 2.1  1.6 - 2.4 mg/dL Final   • Methylmalonic Acid 06/21/2021 150  0 - 378 nmol/L Final   • Disclaimer: 06/21/2021 Comment   Final    This test was developed and its performance characteristics  determined by [facility]. It has not been cleared or approved  by the Food and Drug Administration.   • Total Protein 06/21/2021 7.0  6.0 - 8.5 g/dL Final   • Albumin 06/21/2021 3.6  2.9 - 4.4 g/dL Final   • Alpha-1-Globulin 06/21/2021 0.3  0.0 - 0.4 g/dL Final   • Alpha-2-Globulin 06/21/2021 1.0  0.4 - 1.0 g/dL Final   • Beta Globulin 06/21/2021 1.1  0.7 - 1.3 g/dL Final   • Gamma Globulin 06/21/2021 1.0  0.4 - 1.8 g/dL Final   • M-[place] 06/21/2021 Not Observed  Not Observed g/dL  Final   • Globulin 06/21/2021 3.4  2.2 - 3.9 g/dL Final   • A/G Ratio 06/21/2021 1.1  0.7 - 1.7 Final   • Please note 06/21/2021 Comment   Final    Protein electrophoresis scan will follow via computer, mail, or   delivery.   • SPE Interpretation 06/21/2021 Comment   Final    The SPE pattern appears unremarkable. Evidence of monoclonal  protein is not apparent.   • Folate 06/21/2021 >20.00  4.78 - 24.20 ng/mL Final   • Vitamin B-12 06/21/2021 655  211 - 946 pg/mL Final   • Creatine Kinase 06/21/2021 132  20 - 180 U/L Final   • Aldolase 06/21/2021 8.3  3.3 - 10.3 U/L Final   • C-Reactive Protein 06/21/2021 0.86* 0.00 - 0.50 mg/dL Final   • Sed Rate 06/21/2021 41* 0 - 30 mm/hr Final   Conversion Encounter on 05/27/2021   Component Date Value Ref Range Status   • THC, Screen 05/27/2021 Negative   Final   • Phencyclidine (PCP), Urine 05/27/2021 Negative   Final   • Oxycodone Screen, Urine 05/27/2021 Negative   Final   • Opiates 05/27/2021 Negative   Final   • MDMA URINE 05/27/2021 Negative   Final   • Amphet/Methamphet, Screen, Urine 05/27/2021 Negative   Final   • Methadone Screen, Urine 05/27/2021 Negative   Final   • Cocaine Screen, Urine 05/27/2021 Negative   Final   • Benzodiazepine Screen, Urine 05/27/2021 Positive   Final   • Barbiturates Screen, Urine 05/27/2021 Negative   Final   • Amphetamine, Urine 05/27/2021 Negative   Final   Hospital Outpatient Visit on 04/21/2021   Component Date Value Ref Range Status   • Hemoglobin A1C 04/21/2021 6.2* 3.5 - 5.7 % Final    Comment: **Interpretation**  <7% in Controlled Diabetic Patients.  Assay Range 3.4-18.2%  ADA 2010 Standards of Medical Care in Diabetes suggest using  a cut point of >= 6.5% for diagnosis of diabetes and an A1C  range of 5.7%-6.4% as a category of increased risk for future  diabetes.     • Glucose 04/21/2021 95  65 - 99 mg/dL Final   • BUN 04/21/2021 19  5 - 25 mg/dL Final   • Creatinine 04/21/2021 0.86  0.50 - 0.90 mg/dL Final   •  BUN/Creatinine Ratio 04/21/2021 22* 6 - 20 [ratio] Final   • GFR 04/21/2021 >60  >60 mL/min/[1.73_m2] Final    Comment: Interpretative Data:  ------------------------------------  STAGE                  GFR  Stage 1                90 mL/min or greater  Stage 2                60-89 mL/min  Stage 3                30-59 mL/min  Stage 4                15-29 mL/min  Value <60 mL/min for 3 or more months is defined as CKD.     • Sodium 04/21/2021 140  135 - 147 mmol/L Final   • Potassium 04/21/2021 4.1  3.5 - 5.3 mmol/L Final   • Chloride 04/21/2021 104  99 - 111 mmol/L Final   • CO2 04/21/2021 26  22 - 32 mmol/L Final   • Anion Gap 04/21/2021 14  8 - 19 mmol/L Final   • OSMOLALITY CALC 04/21/2021 292  273 - 304 Final   • Total Protein 04/21/2021 7.4  6.3 - 8.2 g/dL Final    Comment: If Patient is receiving dextran as a blood volume expander  result may show a potential interference.     • Albumin 04/21/2021 4.3  3.5 - 5.0 g/dL Final   • Globulin 04/21/2021 3.1  2.0 - 3.5 g/dL Final   • A/G Ratio 04/21/2021 1.4  1.4 - 2.6 [ratio] Final   • Calcium 04/21/2021 9.6  8.7 - 10.4 mg/dL Final   • Alkaline Phosphatase 04/21/2021 85  43 - 160 U/L Final   • ALT (SGPT) 04/21/2021 18  10 - 40 U/L Final   • AST (SGOT) 04/21/2021 18  15 - 50 U/L Final   • Total Bilirubin 04/21/2021 0.25  0.20 - 1.30 mg/dL Final   • WBC 04/21/2021 8.62  4.80 - 10.80 10*3/uL Final   • RBC 04/21/2021 4.48  4.20 - 5.40 10*6/uL Final   • Hemoglobin 04/21/2021 13.3  12.0 - 16.0 g/dL Final   • Hematocrit 04/21/2021 41.4  37.0 - 47.0 % Final   • MCV 04/21/2021 92.4  81.0 - 99.0 fL Final   • MCH 04/21/2021 29.7  27.0 - 31.0 pg Final   • MCHC 04/21/2021 32.1* 33.0 - 37.0 Final   • RDW 04/21/2021 13.8  11.7 - 14.4 % Final   • Platelets 04/21/2021 314  130 - 400 10*3/uL Final   • MPV 04/21/2021 11.7  9.4 - 12.3 fL Final   • Neutrophil Rel % 04/21/2021 68.2  30.0 - 85.0 % Final   • Lymphocyte Rel % 04/21/2021 20.3  20.0 - 45.0 % Final   • Monocyte Rel % 04/21/2021  8.8  3.0 - 10.0 % Final   • Eosinophil Rel % 04/21/2021 1.9  0.0 - 7.0 % Final   • Basophil Rel % 04/21/2021 0.5  0.0 - 3.0 % Final   • Neutrophils Absolute 04/21/2021 5.88  2.00 - 8.00 10*3/uL Final   • Lymphocytes Absolute 04/21/2021 1.75  1.00 - 5.00 10*3/uL Final   • Monocytes Absolute 04/21/2021 0.76  0.20 - 1.20 10*3/uL Final   • Eosinophils Absolute 04/21/2021 0.16  0.00 - 0.70 10*3/uL Final   • Basophils Absolute 04/21/2021 0.04  0.00 - 0.20 10*3/uL Final   • Immature Granulocyte Rel % 04/21/2021 0.3  0.0 - 1.8 % Final   • Abs Imm Gran 04/21/2021 0.03  0.00 - 0.20 10*3/uL Final   • RDW-SD 04/21/2021 46.5* 36.4 - 46.3 fL Final   • NRBC 04/21/2021 0.00  0.0 - 0.7 % Final   • TSH 04/21/2021 2.370  0.270 - 4.200 m[iU]/L Final   • Triglycerides 04/21/2021 163* 40 - 150 mg/dL Final    Comment: <150 mg/dL-Normal  150-199 mg/dL-Borderline High  200-499 mg/dL-High  >500 mg/dL- Very High     • Total Cholesterol 04/21/2021 236* 107 - 200 mg/dL Final    Comment: <200 mg/dL-Desirable  200-239 mg/dL-Borderline High  >240 mg/dL-High  >500 mg/dL-Very High     • HDL Cholesterol 04/21/2021 52  40 - 60 mg/dL Final    Comment: <40 mg/dL-Low  >60 mg/dL- Desirable     • Chol/HDL Ratio 04/21/2021 4.5  3.0 - 6.0 NA Final   • LDL Cholesterol  04/21/2021 151* 70 - 100 mg/dL Final    Comment: Recommended  LDL Levels  <100 mg/dL-Optimal  100-129 mg/dL-Near Optimal/above optimal  130-159 mg/dL-Borderline High  160-189 mg/dL-High  >190 mg/dL-Very High     • VLDL Cholesterol 04/21/2021 33  5 - 37 mg/dL Final   • Magnesium 04/21/2021 1.85  1.60 - 2.30 mg/dL Final   • Mean Bld Glu Estim. 04/21/2021 131  mg/dL Final       EKG Results:  No orders to display       Imaging Results:  No Images in the past 120 days found..      Assessment/Plan   Diagnoses and all orders for this visit:    1. Severe episode of recurrent major depressive disorder, without psychotic features (CMS/HCC) (Primary)  -     buPROPion XL (Wellbutrin XL) 300 MG 24 hr  tablet; Take 1 tablet by mouth Every Morning for 120 days.  Dispense: 60 tablet; Refill: 1    2. Generalized anxiety disorder  -     buPROPion XL (Wellbutrin XL) 300 MG 24 hr tablet; Take 1 tablet by mouth Every Morning for 120 days.  Dispense: 60 tablet; Refill: 1    3. Cluster B personality disorder in adult (CMS/[facility])    4. Post traumatic stress disorder (PTSD)        Presentation most consistent with major depressive disorder, recurrent, moderate to severe, with anxious distress.  PTSD.  Rule out personality disorder, cluster B specifically.  Rule out hypomania as patient was very difficult to interrupt today.    8/19: Stable, well. Patient is on too high a dose of bupropion given she is also on prozac. Reduce to 300 mg daily. See back in 6 wks.    6/21: Seriously consider whether patient has bipolar 2, given her rapid improvement on the equivalent dose of 4 mg of abilify daily. Still tangential, but far less so. Referral for TMS.  Patient has a history of ECT twice in her life.  The last time she underwent ECT was in 2003 and it did not help. Improved on abilify; increase dose to target depression and anxiety. Continue mirtazapine, bupropion (she never stopped this), prozac. Effective dose of abilify will be 8 mg (prozac inhibits 2D6, doubling abilify's concentration).     IMPORTANT:  Consider brexpiprazole.    Very important to obtain records from previous psychiatrist.  Care should be taken when putting patient on sedating medications as she has a falls risk.  Also has a history of EARL which will lead to difficulties treating her insomnia.    Therapy referral made to [facility].      See back in 8 weeks.  Patient is not vaccinated.    Visit Diagnoses:    ICD-10-CM ICD-9-CM   1. Severe episode of recurrent major depressive disorder, without psychotic features (CMS/[facility])  F33.2 296.33   2. Generalized anxiety disorder  F41.1 300.02   3. Cluster B personality disorder in adult (CMS/[facility])  F60.9 301.89   4. Post  traumatic stress disorder (PTSD)  F43.10 309.81       PLAN:  6. Risk Assessment: Risk of self-harm acutely is moderate. Risk factors include chronic depressive disorder, possible personality disorder, recent psychosocial stressors (pandemic, moving). Protective factors include no present SI, no history of suicide attempts or self-harm in the past, no access to weapons, minimal AODA, healthcare seeking, future orientation, willingness to engage in care. Risk of self-harm chronically is also moderate, but could be further elevated in the event of treatment noncompliance and/or AODA.  7. Safety: No acute safety concerns.  8. Medications:   a. REDUCE bupropion xl 450 to 300 mg daily.   b. CONTINUE Prozac 60 mg a day  c. CONTINUE mirtazapine 45 mg at night.   d. CONTINUE Abilify 4 mg p.o. daily to target depression, anxiety, decreased energy. Risks, benefits, alternatives discussed with patient including increased energy, exacerbation of irritability, akathisia, GI upset, orthostatic hypotension, increased appetite. After discussion of these risks and benefits, the patient voiced understanding and agreed to proceed.  9. Therapy: Referral to [facility]. Pt understands it will be 2 mos and telepsych.  10. Labs/Studies: TMS referral.  11. Follow Up: 6 weeks.      TREATMENT PLAN/GOALS: Continue supportive psychotherapy efforts and medications as indicated. Treatment and medication options discussed during today's visit. Patient acknowledged and verbally consented to continue with current treatment plan and was educated on the importance of compliance with treatment and follow-up appointments.    MEDICATION ISSUES:  SAPNA reviewed as expected.  Discussed medication options and treatment plan of prescribed medication as well as the risks, benefits, and side effects including potential falls, possible impaired driving and metabolic adversities among others. Patient is agreeable to call the office with any worsening of symptoms or  onset of side effects. Patient is agreeable to call 911 or go to the nearest ER should he/she begin having SI/HI. No medication side effects or related complaints today.     MEDS ORDERED DURING VISIT:  New Medications Ordered This Visit   Medications   • buPROPion XL (Wellbutrin XL) 300 MG 24 hr tablet     Sig: Take 1 tablet by mouth Every Morning for 120 days.     Dispense:  60 tablet     Refill:  1       Return in about 6 weeks (around 9/30/2021).         This document has been electronically signed by Vicky Barth MD  August 19, 2021 15:31 EDT      Part of this note may be an electronic transcription/translation of spoken language to printed text using the Dragon Dictation System.

## 2021-08-19 NOTE — PROGRESS NOTES
Physical Therapy Daily Progress Note      Patient: Nivia Cagle   : 1950  Diagnosis/ICD-10 Code:  S/P ORIF (open reduction internal fixation) fracture [Z98.890, Z87.81]  Referring practitioner: Curtis Han MD  Date of Initial Visit: Type: THERAPY  Noted: 2021  Today's Date: 2021  Patient seen for 9 sessions           Subjective Reports feeling a lot better.  States she had to drive for several hours over the weekend and had increased swelling in her R ankle into the toes.    Reports she elevated and completed ankle pumps, iced and rested and swelling went down a little.     Objective   See Exercise, Manual, and Modality Logs for complete treatment.   Retrograde massage to decrease edema in R ankle and foot.        Assessment/Plan  Pt tolerating treatment very well.  Pt reported she is very happy with PT and sees it is helping.   PLAN: Progress per Plan of Care             Manual Therapy:    15     mins  25290;  Therapeutic Exercise:    15     mins  65540;     Neuromuscular Evon:        mins  09452;    Therapeutic Activity:          mins  84756;     Gait Training:           mins  18998;     Ultrasound:          mins  75612;    Electrical Stimulation:         mins  45995 ( );    Timed Treatment:   30   mins   Total Treatment:     30   mins    Kamla Clemente, PT  Physical Therapist

## 2021-08-19 NOTE — PATIENT INSTRUCTIONS
1.  Please return to clinic at your next scheduled visit.  Contact the clinic ([phone]) at least 24 hours prior in the event you need to cancel.  2.  Do no harm to yourself or others.    3.  Avoid alcohol and drugs.    4.  Take all medications as prescribed.  Please contact the clinic with any concerns. If you are in need of medication refills, please call the clinic at [phone].    5. Should you want to get in touch with your provider, Dr. Vicky Barth, please utilize [employer] or contact the office ([phone]), and staff will be able to page Dr. Barth directly.  6.  In the event you have personal crisis, contact the following crisis numbers: Suicide Prevention Hotline 1-415.193.1714; [facility] Helpline 2-168-149-HULS; [facility] Emergency Room [phone]; text HELLO to 026025; or 426.     SPECIFIC RECOMMENDATIONS:     1.      Medications discussed at this encounter:                   -Reduce bupropion.     2.      Psychotherapy recommendations: Continue     3.     Return to clinic: 6 weeks

## 2021-08-20 ENCOUNTER — TELEPHONE (OUTPATIENT)
Dept: INTERNAL MEDICINE | Facility: CLINIC | Age: 71
End: 2021-08-20

## 2021-08-20 ENCOUNTER — CLINICAL SUPPORT (OUTPATIENT)
Dept: FAMILY MEDICINE CLINIC | Facility: CLINIC | Age: 71
End: 2021-08-20

## 2021-08-20 ENCOUNTER — TELEMEDICINE (OUTPATIENT)
Dept: FAMILY MEDICINE CLINIC | Facility: TELEHEALTH | Age: 71
End: 2021-08-20

## 2021-08-20 DIAGNOSIS — Z20.822 EXPOSURE TO 2019 NOVEL CORONAVIRUS: Primary | ICD-10-CM

## 2021-08-20 DIAGNOSIS — R50.9 FEVER, UNSPECIFIED FEVER CAUSE: Primary | ICD-10-CM

## 2021-08-20 PROCEDURE — 99213 OFFICE O/P EST LOW 20 MIN: CPT | Performed by: NURSE PRACTITIONER

## 2021-08-20 PROCEDURE — U0005 INFEC AGEN DETEC AMPLI PROBE: HCPCS | Performed by: FAMILY MEDICINE

## 2021-08-20 PROCEDURE — U0003 INFECTIOUS AGENT DETECTION BY NUCLEIC ACID (DNA OR RNA); SEVERE ACUTE RESPIRATORY SYNDROME CORONAVIRUS 2 (SARS-COV-2) (CORONAVIRUS DISEASE [COVID-19]), AMPLIFIED PROBE TECHNIQUE, MAKING USE OF HIGH THROUGHPUT TECHNOLOGIES AS DESCRIBED BY CMS-2020-01-R: HCPCS | Performed by: FAMILY MEDICINE

## 2021-08-20 NOTE — TELEPHONE ENCOUNTER
Caller: Nivia Cagle    Relationship: Self    Best call back number: [phone]      What was the call regarding: PATIENT STATES RUNNING LOW GRADE FEVER. NO OTHER SYMPTOMS. NO APPOINTMENT AVAILABLE FOR TODAY. NOT SURE IF SHE NEEDS TO GET COVID TESTED. PLEASE ADVISE     Do you require a callback:YES

## 2021-08-20 NOTE — PATIENT INSTRUCTIONS
Symptoms may worsen over the next several days. Drink plenty of fluids and rest. Stay quarantined until test results are back. If results are positive, you will need to remain quarantined as instructed below.   I have included a COVID-19 test. Go to your nearest [facility] for testing. Call and tell them you have already been seen virtually and only need testing   We will notify you of your COVID-19 results by phone. You will receive a call from an unknown or blocked number. You can also get your results on your [employer] bud.  For any shortness of breath or chest pain to the emergency department. If your symptoms do not improve in 5-7 days or improve then worsen follow up  You will need to remain quarantined for 10 days from onset of symptoms and only to discontinue if symptoms have improved and no fever for 24 hours without the use of fever reducing medications such as Tylenol (acetaminophen), Advil (ibuprfen), or Aleve (naproxen).      3 Key Steps to Take While Waiting for Your COVID-19 Test Result  To help stop the spread of COVID-19, take these 3 key steps NOW while waiting for your test results:  1. Stay home and monitor your health.  Stay home and monitor your health to help protect your friends, family, and others from possibly getting COVID-19 from you.  Stay home and away from others:  · If possible, stay away from others, especially people who are at higher risk for getting very sick from COVID-19, such as older adults and people with other medical conditions.  · If you have been in contact with someone with COVID-19, stay home and away from others for 14 days after your last contact with that person.  · If you have a fever, cough or other symptoms of COVID-19, stay home and away from others (except to get medical care).  Monitor your health:  · Watch for fever, cough, shortness of breath, or other symptoms of COVID-19. Remember, symptoms may appear 2-14 days after exposure to COVID-19 and  can include:  ? Fever or chills  ? Cough  ? Shortness of breath or difficulty breathing  ? Tiredness  ? Muscle or body aches  ? Headache  ? New loss of taste or smell  ? Sore throat  ? Congestion or runny nose  ? Nausea or vomiting  ? Diarrhea  2. Think about the people you have recently been around.  If you are diagnosed with COVID-19, a public health worker may call you to check on your health, discuss who you have been around, and ask where you spent time while you may have been able to spread COVID-19 to others. While you wait for your COVID-19 test result, think about everyone you have been around recently. This will be important information to give health workers if your test is positive.   Complete the information on the back of this page to help you remember everyone you have been around.  3. Answer the phone call from the health department.  If a public health worker calls you, answer the call to help slow the spread of COVID-19 in your community.  · Discussions with health department staff are confidential. This means that your personal and medical information will be kept private and only shared with those who may need to know, like your health care provider.  · Your name will not be shared with those you came in contact with. The health department will only notify people you were in close contact with (within 6 feet for more than 15 minutes) that they might have been exposed to COVID-19.  Think about the people you have recently been around  If you test positive and are diagnosed with COVID-19, someone from the health department may call to check-in on your health, discuss who you have been around, and ask where you spent time while you may have been able to spread COVID-19 to others. This form can help you think about people you have recently been around so you will be ready if a public health worker calls you.  Things to think about. Have you:  · Gone to work or school?  · Gotten together with others  (eaten out at a restaurant, gone out for drinks, exercised with others or gone to a gym, had friends or family over to your house, volunteered, gone to a party, pool, or park)?  · Gone to a store in person (e.g., grocery store, mall)?  · Gone to in-person appointments (e.g., salon, vogel, doctor's or dentist's office)?  · Ridden in a car with others (e.g., Uber or Lyft) or took public transportation?  · Been inside a Yazidi, Anglican, Latter day or other places of Pentecostalism?  Who lives with you?  · ______________________________________________________________________  · ______________________________________________________________________  · ______________________________________________________________________  · ______________________________________________________________________  Who have you been around (within 6 feet for more than 15 minutes) in the last 10 days? (You may have more people to list than the space provided. If so, write on the front of this sheet or a separate piece of paper.)  Name ______________________________________________  · Phone number ____________________________________  · Date you last saw them _____________________________  · Where you last saw them ________________________________________________  Name ______________________________________________  · Phone number ____________________________________  · Date you last saw them _____________________________  · Where you last saw them ________________________________________________  Name ______________________________________________  · Phone number ____________________________________  · Date you last saw them _____________________________  · Where you last saw them ________________________________________________  Name ______________________________________________  · Phone number ____________________________________  · Date you last saw them _____________________________  · Where you last saw them  ________________________________________________  Name ______________________________________________  · Phone number ____________________________________  · Date you last saw them _____________________________  · Where you last saw them ________________________________________________  What have you done in the last 10 days with other people?  Activity _____________________________________________  · Location _________________________________________  · Date ____________________________________________  Activity _____________________________________________  · Location _________________________________________  · Date ____________________________________________  Activity _____________________________________________  · Location _________________________________________  · Date ____________________________________________  Activity _____________________________________________  · Location _________________________________________  · Date ____________________________________________  Activity _____________________________________________  · Location _________________________________________  · Date ____________________________________________  cdc.gov/coronavirus  07/27/2020  This information is not intended to replace advice given to you by your health care provider. Make sure you discuss any questions you have with your health care provider.  Document Revised: 01/19/2021 Document Reviewed: 12/03/2020  Elsevier Patient Education © 2021 [employer]  10 Things You Can Do to Manage Your COVID-19 Symptoms at Home  If you have possible or confirmed COVID-19:  1. Stay home from work and school. And stay away from other public places. If you must go out, avoid using any kind of public transportation, ridesharing, or taxis.  2. Monitor your symptoms carefully. If your symptoms get worse, call your healthcare provider immediately.  3. Get rest and stay hydrated.  4. If you have a medical appointment, call the healthcare  provider ahead of time and tell them that you have or may have COVID-19.  5. For medical emergencies, call 911 and notify the dispatch personnel that you have or may have COVID-19.  6. Cover your cough and sneezes with a tissue or use the inside of your elbow.  7. Wash your hands often with soap and water for at least 20 seconds or clean your hands with an alcohol-based hand  that contains at least 60% alcohol.  8. As much as possible, stay in a specific room and away from other people in your home. Also, you should use a separate bathroom, if available. If you need to be around other people in or outside of the home, wear a mask.  9. Avoid sharing personal items with other people in your household, like dishes, towels, and bedding.  10. Clean all surfaces that are touched often, like counters, tabletops, and doorknobs. Use household cleaning sprays or wipes according to the label instructions.  cdc.gov/coronavirus  07/01/2020  This information is not intended to replace advice given to you by your health care provider. Make sure you discuss any questions you have with your health care provider.  Document Revised: 04/15/2021 Document Reviewed: 04/15/2021  [employer] Patient Education © 2021 [employer]  How to Quarantine at Home  Information for Patients and Families    These instructions are for people with confirmed or suspected COVID-19 who do not need to be hospitalized and those with confirmed COVID-19 who were hospitalized and discharged to care for themselves at home.    If you were tested through the Health Department  The Health Department will monitor your wellbeing.  If it is determined that you do not need to be hospitalized and can be isolated at home, you will be monitored by staff from your local or state health department.     If you were tested through a Commercial Lab  You will need to monitor yourself and report changes in your symptoms to your doctor.  See the section below called  Monitor Your Symptoms.    Follow these steps until a healthcare provider or local or state health department says you can return to your normal activities.    Stay home except to get medical care  • Restrict activities outside your home, except for getting medical care.   • Do not go to work, school, or public areas.   • Avoid using public transportation, ride-sharing, or taxis.    Separate yourself from other people and animals in your home  People  As much as possible, you should stay in a specific room and away from other people in your home. Also, you should use a separate bathroom, if available.    Animals  You should restrict contact with pets and other animals while you are sick with COVID-19, just like you would around other people. When possible, have another member of your household care for your animals while you are sick. If you are sick with COVID-19, avoid contact with your pet, including petting, snuggling, being kissed or licked, and sharing food. If you must care for your pet or be around animals while you are sick, wash your hands before and after you interact with pets and wear a facemask. See COVID-19 and Animals for more information.    Call ahead before visiting your doctor  If you have a medical appointment, call the healthcare provider and tell them that you have or may have COVID-19. This information will help the healthcare provider’s office take steps to keep other people from getting infected or exposed.    Wear a facemask  You should wear a facemask when you are around other people (e.g., sharing a room or vehicle) or pets and before you enter a healthcare provider’s office.     If you are not able to wear a facemask (for example, because it causes trouble breathing), then people who live with you should not stay in the same room with you, or they should wear a facemask if they enter your room.    Cover your coughs and sneezes  • Cover your mouth and nose with a tissue when you cough or  sneeze.   • Throw used tissues in a lined trash can.   • Immediately wash your hands with soap and water for at least 20 seconds or, if soap and water are not available, clean your hands with an alcohol-based hand  that contains at least 60% alcohol.    Clean your hands often  • Wash your hands often with soap and water for at least 20 seconds, especially after blowing your nose, coughing, or sneezing; going to the bathroom; and before eating or preparing food.     • If soap and water are not readily available, use an alcohol-based hand  with at least 60% alcohol, covering all surfaces of your hands and rubbing them together until they feel dry.    • Soap and water are the best option if hands are visibly dirty. Avoid touching your eyes, nose, and mouth with unwashed hands.    Avoid sharing personal household items  • You should not share dishes, drinking glasses, cups, eating utensils, towels, or bedding with other people or pets in your home.   • After using these items, they should be washed thoroughly with soap and water.    Clean all “high-touch” surfaces everyday  • High touch surfaces include counters, tabletops, doorknobs, bathroom fixtures, toilets, phones, keyboards, tablets, and bedside tables.   • Also, clean any surfaces that may have blood, stool, or body fluids on them.   • Use a household cleaning spray or wipe, according to the label instructions. Labels contain instructions for safe and effective use of the cleaning product, including precautions you should take when applying the product, such as wearing gloves and making sure you have good ventilation during use of the product.    Monitor your symptoms  • Seek prompt medical attention if your illness is worsening (e.g., difficulty breathing).   • Before seeking care, call your healthcare provider and tell them that you have, or are being evaluated for, COVID-19.   • Put on a facemask before you enter the facility.     • These  steps will help the healthcare provider’s office to keep other people in the office or waiting room from getting infected or exposed.   • Persons who are placed under active monitoring or facilitated self-monitoring should follow instructions provided by their local health department or occupational health professionals, as appropriate.  • If you have a medical emergency and need to call 911, notify the dispatch personnel that you have, or are being evaluated for COVID-19. If possible, put on a facemask before emergency medical services arrive.    Discontinuing home isolation  Patients with confirmed COVID-19 should remain under home isolation precautions until the risk of secondary transmission to others is thought to be low. The decision to discontinue home isolation precautions should be made on a case-by-case basis, in consultation with healthcare providers and state and local health departments.    The below content are for household members, intimate partners, and caregivers of a patient with symptomatic laboratory-confirmed COVID-19 or a patient under investigation:    Household members, intimate partners, and caregivers may have close contact with a person with symptomatic, laboratory-confirmed COVID-19 or a person under investigation.     Close contacts should monitor their health; they should call their healthcare provider right away if they develop symptoms suggestive of COVID-19 (e.g., fever, cough, shortness of breath)     Close contacts should also follow these recommendations:  • Make sure that you understand and can help the patient follow their healthcare provider’s instructions for medication(s) and care. You should help the patient with basic needs in the home and provide support for getting groceries, prescriptions, and other personal needs.  • Monitor the patient’s symptoms. If the patient is getting sicker, call his or her healthcare provider and tell them that the patient has  laboratory-confirmed COVID-19. This will help the healthcare provider’s office take steps to keep other people in the office or waiting room from getting infected. Ask the healthcare provider to call the local or state health department for additional guidance. If the patient has a medical emergency and you need to call 911, notify the dispatch personnel that the patient has, or is being evaluated for COVID-19.  • Household members should stay in another room or be  from the patient as much as possible. Household members should use a separate bedroom and bathroom, if available.  • Prohibit visitors who do not have an essential need to be in the home.  • Household members should care for any pets in the home. Do not handle pets or other animals while sick.  For more information, see COVID-19 and Animals.  • Make sure that shared spaces in the home have good air flow, such as by an air conditioner or an opened window, weather permitting.  • Perform hand hygiene frequently. Wash your hands often with soap and water for at least 20 seconds or use an alcohol-based hand  that contains 60 to 95% alcohol, covering all surfaces of your hands and rubbing them together until they feel dry. Soap and water should be used preferentially if hands are visibly dirty.  • Avoid touching your eyes, nose, and mouth with unwashed hands.  • The patient should wear a facemask when you are around other people. If the patient is not able to wear a facemask (for example, because it causes trouble breathing), you, as the caregiver, should wear a mask when you are in the same room as the patient.  • Wear a disposable facemask and gloves when you touch or have contact with the patient’s blood, stool, or body fluids, such as saliva, sputum, nasal mucus, vomit, or urine.   o Throw out disposable facemasks and gloves after using them. Do not reuse.  o When removing personal protective equipment, first remove and dispose of gloves.  Then, immediately clean your hands with soap and water or alcohol-based hand . Next, remove and dispose of facemask, and immediately clean your hands again with soap and water or alcohol-based hand .  • Avoid sharing household items with the patient. You should not share dishes, drinking glasses, cups, eating utensils, towels, bedding, or other items. After the patient uses these items, you should wash them thoroughly (see below “Wash laundry thoroughly”).  • Clean all “high-touch” surfaces, such as counters, tabletops, doorknobs, bathroom fixtures, toilets, phones, keyboards, tablets, and bedside tables, every day. Also, clean any surfaces that may have blood, stool, or body fluids on them.   o Use a household cleaning spray or wipe, according to the label instructions. Labels contain instructions for safe and effective use of the cleaning product including precautions you should take when applying the product, such as wearing gloves and making sure you have good ventilation during use of the product.  • Wash laundry thoroughly.   o Immediately remove and wash clothes or bedding that have blood, stool, or body fluids on them.  o Wear disposable gloves while handling soiled items and keep soiled items away from your body. Clean your hands (with soap and water or an alcohol-based hand ) immediately after removing your gloves.  o Read and follow directions on labels of laundry or clothing items and detergent. In general, using a normal laundry detergent according to washing machine instructions and dry thoroughly using the warmest temperatures recommended on the clothing label.  • Place all used disposable gloves, facemasks, and other contaminated items in a lined container before disposing of them with other household waste. Clean your hands (with soap and water or an alcohol-based hand ) immediately after handling these items. Soap and water should be used preferentially if hands  are visibly dirty.  • Discuss any additional questions with your state or local health department or healthcare provider.    Adapted from information provided by the Centers for Disease Control and Prevention.  For more information, visit https://www.cdc.gov/coronavirus/2019-ncov/hcp/guidance-prevent-spread.html

## 2021-08-20 NOTE — PROGRESS NOTES
CHIEF COMPLAINT  Chief Complaint   Patient presents with   • Fever         HPI  Nivia Cagle is a 70 y.o. female  presents with complaint of fever of 100.5 and fatigue. She has been wanting to take naps.   She has had her COVID-19 vaccines.   She has been going to physical therapy and has to cross through the urgent care to get to this, so she feels like she may have been exposed.     Review of Systems   Constitutional: Positive for chills, fatigue and fever (100.5). Negative for diaphoresis.   HENT: Negative for congestion, postnasal drip, rhinorrhea, sinus pressure, sinus pain, sneezing and sore throat.    Gastrointestinal: Negative for diarrhea, nausea and vomiting.   Musculoskeletal: Negative for arthralgias and myalgias (no different aches or pains. ).   Neurological: Negative for headaches.   Hematological: Negative for adenopathy.       Past Medical History:   Diagnosis Date   • ADHD (attention deficit hyperactivity disorder)    • Allergic rhinitis    • Anemia    • Anxiety    • Cataracts, bilateral    • Depression 0421/2021    MOODNOT WELL CONTROLLED.  GIVEN NUMBER FOR LOCAL COUNSELOR.  WILL INCREASE TRINTELIX FROM 10MG TO 20MG RTC WEEK ER ID S/HI   • Diabetes mellitus, type 2 (CMS/HCC)    • Diverticulitis    • GERD (gastroesophageal reflux disease)    • Head injury    • High blood pressure    • Hyperlipemia    • Migraine    • EARL (obstructive sleep apnea) 04/21/2021   • Phlebitis    • PTSD (post-traumatic stress disorder)        Family History   Problem Relation Age of Onset   • Heart disease Father    • Heart attack Father    • Diabetes Father    • ADD / ADHD Father    • Hyperlipidemia Father    • Kidney cancer Mother    • Hyperlipidemia Mother    • Hyperlipidemia Sister    • Hyperlipidemia Brother    • Diabetes Brother    • No Known Problems Paternal Uncle    • No Known Problems Cousin    • Brain cancer Sister    • Lung cancer Sister    • Hyperlipidemia Sister    • Hyperlipidemia Brother        Social  History     Socioeconomic History   • Marital status: Single     Spouse name: Not on file   • Number of children: Not on file   • Years of education: Not on file   • Highest education level: Not on file   Tobacco Use   • Smoking status: Former Smoker     Packs/day: 0.25     Years: 22.00     Pack years: 5.50   • Smokeless tobacco: Never Used   • Tobacco comment: QUIT 1988   Vaping Use   • Vaping Use: Never used   Substance and Sexual Activity   • Alcohol use: Yes     Comment: SPECIAL OCCASION ONLY   • Drug use: Never   • Sexual activity: Defer         There were no vitals taken for this visit.    PHYSICAL EXAM  Physical Exam   Constitutional: She is oriented to person, place, and time. She appears well-developed and well-nourished. She does not have a sickly appearance. She does not appear ill. No distress.   HENT:   Head: Normocephalic and atraumatic.   Eyes: EOM are normal.   Neck: Neck normal appearance.  Pulmonary/Chest: Effort normal.  No respiratory distress.  Neurological: She is alert and oriented to person, place, and time.   Skin: Skin is dry.   Psychiatric: She has a normal mood and affect.         Diagnoses and all orders for this visit:    1. Fever, unspecified fever cause (Primary)  -     COVID-19,[facility] ROUTINE, NP/OP SWAB IN TRANSPORT MEDIA OR ESWAB 72 HR TAT - Swab, Nasopharynx; Future  -     QUESTIONNAIRE SERIES    COVID-19 (MODERNA) vaccine 5/11/2021 & 6/8/2021    FOLLOW-UP  As discussed during visit with PCP/[facility] if no improvement or Urgent Care/Emergency Department if worsening of symptoms    Patient verbalizes understanding of medication dosage, comfort measures, instructions for treatment and follow-up.    JEANNIE Huang  08/20/2021  15:05 EDT    This visit was performed via Telehealth.  This patient has been instructed to follow-up with their primary care provider if their symptoms worsen or the treatment provided does not resolve their illness.

## 2021-08-23 ENCOUNTER — TREATMENT (OUTPATIENT)
Dept: PHYSICAL THERAPY | Facility: CLINIC | Age: 71
End: 2021-08-23

## 2021-08-23 DIAGNOSIS — R26.89 ANTALGIC GAIT: ICD-10-CM

## 2021-08-23 DIAGNOSIS — M25.572 LEFT ANKLE PAIN, UNSPECIFIED CHRONICITY: ICD-10-CM

## 2021-08-23 DIAGNOSIS — Z87.81 S/P ORIF (OPEN REDUCTION INTERNAL FIXATION) FRACTURE: Primary | ICD-10-CM

## 2021-08-23 DIAGNOSIS — M25.571 ACUTE RIGHT ANKLE PAIN: ICD-10-CM

## 2021-08-23 DIAGNOSIS — Z98.890 S/P ORIF (OPEN REDUCTION INTERNAL FIXATION) FRACTURE: Primary | ICD-10-CM

## 2021-08-23 LAB — SARS-COV-2 RNA RESP QL NAA+PROBE: NOT DETECTED

## 2021-08-23 PROCEDURE — 97140 MANUAL THERAPY 1/> REGIONS: CPT | Performed by: PHYSICAL THERAPIST

## 2021-08-23 PROCEDURE — 97110 THERAPEUTIC EXERCISES: CPT | Performed by: PHYSICAL THERAPIST

## 2021-08-23 NOTE — PROGRESS NOTES
Physical Therapy Daily Progress Note        Patient: Nivia Cagle   : 1950  Diagnosis/ICD-10 Code:  S/P ORIF (open reduction internal fixation) fracture [Z98.890, Z87.81]  Referring practitioner: Curtis Han MD  Date of Initial Visit: Type: THERAPY  Noted: 2021  Today's Date: 2021  Patient seen for 10 sessions             Subjective   Nivia Cagle reports: her ankle continues to swell through the day but she is very please with her progress so far.  Less pain and increased mobility    Objective   R Ankle DF 3 degrees, PF 15 degrees  See Exercise, Manual, and Modality Logs for complete treatment.       Assessment/Plan  Patient tolerated treatment well today.  Improve Ankle ROM and reduced swelling following soft tissue mobilizations  Progress per Plan of Care and Progress strengthening /stabilization /functional activity           Timed:  Manual Therapy:    16     mins  91236;  Therapeutic Exercise:    14     mins  75790;     Neuromuscular Evon:        mins  39204;    Therapeutic Activity:          mins  59734;     Gait Training:           mins  99321;     Ultrasound:          mins  54314;    Electrical Stimulation:         mins  19676;  Iontophoresis          mins  11974    Untimed:  Electrical Stimulation:         mins  73085 ( );  Mechanical Traction:         mins  95143;   Fluidotherapy          mins  94537    Timed Treatment:   30   mins   Total Treatment:     30   mins        Carl Clemente PT  Physical Therapist

## 2021-08-24 ENCOUNTER — HOSPITAL ENCOUNTER (OUTPATIENT)
Dept: MAMMOGRAPHY | Facility: HOSPITAL | Age: 71
Discharge: HOME | End: 2021-08-24
Admitting: PHYSICIAN ASSISTANT

## 2021-08-24 PROCEDURE — 77063 BREAST TOMOSYNTHESIS BI: CPT

## 2021-08-24 PROCEDURE — 77067 SCR MAMMO BI INCL CAD: CPT

## 2021-08-25 ENCOUNTER — TELEPHONE (OUTPATIENT)
Dept: CARDIOLOGY | Facility: CLINIC | Age: 71
End: 2021-08-25

## 2021-08-26 ENCOUNTER — TREATMENT (OUTPATIENT)
Dept: PHYSICAL THERAPY | Facility: CLINIC | Age: 71
End: 2021-08-26

## 2021-08-26 DIAGNOSIS — Z98.890 S/P ORIF (OPEN REDUCTION INTERNAL FIXATION) FRACTURE: Primary | ICD-10-CM

## 2021-08-26 DIAGNOSIS — M25.571 ACUTE RIGHT ANKLE PAIN: ICD-10-CM

## 2021-08-26 DIAGNOSIS — Z87.81 S/P ORIF (OPEN REDUCTION INTERNAL FIXATION) FRACTURE: Primary | ICD-10-CM

## 2021-08-26 DIAGNOSIS — R26.89 ANTALGIC GAIT: ICD-10-CM

## 2021-08-26 DIAGNOSIS — M25.572 LEFT ANKLE PAIN, UNSPECIFIED CHRONICITY: ICD-10-CM

## 2021-08-26 PROCEDURE — 97110 THERAPEUTIC EXERCISES: CPT | Performed by: PHYSICAL THERAPIST

## 2021-08-26 PROCEDURE — 97530 THERAPEUTIC ACTIVITIES: CPT | Performed by: PHYSICAL THERAPIST

## 2021-08-26 PROCEDURE — 97140 MANUAL THERAPY 1/> REGIONS: CPT | Performed by: PHYSICAL THERAPIST

## 2021-08-26 NOTE — PROGRESS NOTES
Physical Therapy Daily Progress Note      Patient: Nivia Cagle   : 1950  Diagnosis/ICD-10 Code:  S/P ORIF (open reduction internal fixation) fracture [Z98.890, Z87.81]  Referring practitioner: Curtis Han MD  Date of Initial Visit: Type: THERAPY  Noted: 2021  Today's Date: 2021  Patient seen for 10 sessions           Subjective   Reports she has 2-3/10 pain in the right ankle with weight bearing.  Feels tightness with DF,. States she's very happy with PT and that it has helped her very much.   Objective   See Exercise, Manual, and Modality Logs for complete treatment.   53cm girth R ankle, 51 cm girth L  43 Hallux ext on R  DF 90 DF, 53 PF  glute medius strength 4-/5 R, 4/5 L  Added proprioceptive training, balance training and gait training.     Assessment/Plan  Pt making very good progress in ROM and gait.  Pt needs to increase overall LE strength B.  She is very challenged by quad strengthening ex's, and demonstrates weak gluteus medius during ambulation due to compensatory patterns.     PLAN: Progress per Plan of Care             Manual Therapy:    15     mins  57447;  Therapeutic Exercise:    15     mins  10421;     Neuromuscular Evon:        mins  01883;    Therapeutic Activity:     15     mins  83724;     Gait Training:           mins  94936;     Ultrasound:          mins  05923;    Electrical Stimulation:         mins  39368 ( );    Timed Treatment:   45   mins   Total Treatment:     45   mins    Kamla Clemente, PT  Physical Therapist

## 2021-08-27 ENCOUNTER — PATIENT OUTREACH (OUTPATIENT)
Dept: CASE MANAGEMENT | Facility: OTHER | Age: 71
End: 2021-08-27

## 2021-08-27 ENCOUNTER — TELEMEDICINE (OUTPATIENT)
Dept: INTERNAL MEDICINE | Facility: CLINIC | Age: 71
End: 2021-08-27

## 2021-08-27 DIAGNOSIS — E11.69 TYPE 2 DIABETES MELLITUS WITH OTHER SPECIFIED COMPLICATION, WITH LONG-TERM CURRENT USE OF INSULIN (HCC): Primary | ICD-10-CM

## 2021-08-27 DIAGNOSIS — Z79.4 TYPE 2 DIABETES MELLITUS WITH OTHER SPECIFIED COMPLICATION, WITH LONG-TERM CURRENT USE OF INSULIN (HCC): Primary | ICD-10-CM

## 2021-08-27 DIAGNOSIS — G47.33 OBSTRUCTIVE SLEEP APNEA: ICD-10-CM

## 2021-08-27 DIAGNOSIS — F32.2 SEVERE DEPRESSION (HCC): ICD-10-CM

## 2021-08-27 DIAGNOSIS — G25.0 ESSENTIAL TREMOR: ICD-10-CM

## 2021-08-27 DIAGNOSIS — I10 ESSENTIAL HYPERTENSION: ICD-10-CM

## 2021-08-27 DIAGNOSIS — G25.81 RESTLESS LEGS SYNDROME (RLS): ICD-10-CM

## 2021-08-27 PROCEDURE — 99213 OFFICE O/P EST LOW 20 MIN: CPT | Performed by: PHYSICIAN ASSISTANT

## 2021-08-27 NOTE — OUTREACH NOTE
Patient Outreach    MSW spoke with patient via phone to discuss update on housing and mental health needs. Patient states that she is still living with her son and daughter in law at this time. Patient states that she is flying to [place] on September 22nd to start packing up her home for the move back to [place]. Patient spoke about her overall health and physical therapy progress. Patient states that she also mailed her forms to [facility] to begin counseling telehealth. Patient given contact information and housing resources on this day for the below apartment complexes. Patient has no other needs at this time and will be discharged from [facility], but MSW available if needed in the future.     Care Coordination    MSW spoke with Marie at [facility] and placed patient on waitlist for lower level apartment. MSW also spoke with Lili at [facility] to gather information to housing. Lili states that a 1 bedroom is $705/month and 2 bedroom $840/month. Patient placed on waitlist for [facility] as well.     THI Swartz   , Ambulatory Case Management    8/27/2021 10:54 EDT

## 2021-08-27 NOTE — PROGRESS NOTES
Chief Complaint  Follow-up, Diabetes, Hyperlipidemia, and Restless Legs Syndrome    Subjective          Nivia Cagle presents to [facility]  Patient agrees to participate in a telemedicine evaluation performed by Catalina Madsen PA-C. Patient  authorizes the electronic transmission of medical information and/or videoconference session. Patient understands that he/she can still pursue face-to-face consultation if desired. Patient understands that as with any technology, telemedicine does have its limitations. There is no guarantee, therefore, that this telemedicine session will eliminate the need for patient to see a provider in person if the provider feels a physical exams or vital signs are neccessary to care for the patient. Patient understands and would like to proceed with telemedicine visit at this time.    Visit via [employer]        DM: bg running 120-140  Avg 149. Denies low bg, dizziness.    Depression: denies si/hi  Mood has been doing well on current meds. Seeing psychiatry.    Restless leg: medicine makes her sleepy but as soon as she falls asleep it feels like her legs are ''being tazed''  Pt is not wearing her cpap due to inability to breathe from nose.  Pt has noticed a tremor in hands recently  Denies tremor in neck/head.  Denies syncope, loc, cp, palpitations, dizziness, head injury    Pt seeing ortho next week for bilateral broken ankles  PT R ankle  Pain and swelling in bilateral ankles  Pt has improved with walking up stairs        Past Medical History:   Diagnosis Date   • ADHD (attention deficit hyperactivity disorder)    • Allergic rhinitis    • Anemia    • Anxiety    • Cataracts, bilateral    • Depression 0421/2021    MOODNOT WELL CONTROLLED.  GIVEN NUMBER FOR LOCAL COUNSELOR.  WILL INCREASE TRINTELIX FROM 10MG TO 20MG RTC WEEK ER ID S/HI   • Diabetes mellitus, type 2 (CMS/HCC)    • Diverticulitis    • GERD (gastroesophageal reflux disease)   "  • Head injury    • High blood pressure    • Hyperlipemia    • Migraine    • EARL (obstructive sleep apnea) 04/21/2021   • Phlebitis    • PTSD (post-traumatic stress disorder)         Past Surgical History:   Procedure Laterality Date   • ANKLE SURGERY  2021   • BILATERAL BREAST REDUCTION  2015   • CARPAL TUNNEL RELEASE     • CHOLECYSTECTOMY  2001   • COLONOSCOPY     • HYSTERECTOMY     • ULNAR NERVE TRANSPOSITION Right    • WRIST SURGERY          Current Outpatient Medications on File Prior to Visit   Medication Sig Dispense Refill   • Accu-Chek Madeline Plus test strip by Other route 4 (Four) Times a Day. use to test blood sugar     • Accu-Chek Softclix Lancets lancets by Other route 4 (Four) Times a Day. use to test blood sugar     • ARIPiprazole (ABILIFY) 2 MG tablet Take 2 tablets by mouth Daily for 180 days. 120 tablet 2   • BD Insulin Syringe U/F 30G X 1/2\" 0.3 ML misc USE TO INJECT INSULIN FOUR TIMES DAILY AS NEEDED     • bisacodyl (DULCOLAX) 10 MG suppository      • BL NASAL SALINE MIST NA 2 drops.     • Blood Glucose Monitoring Suppl (FreeStyle Lite) device 1 each by Other route 4 (Four) Times a Day.     • buPROPion XL (Wellbutrin XL) 300 MG 24 hr tablet Take 1 tablet by mouth Every Morning for 120 days. 60 tablet 1   • Calcium Carbonate 1500 (600 Ca) MG tablet Take 600 mg by mouth Daily.     • cetirizine (zyrTEC) 10 MG tablet TAKE 1 TABLET BY ORAL ROUTE ONCE DAILY     • clonazePAM (KlonoPIN) 2 MG tablet 2 mg.     • cyclobenzaprine (FLEXERIL) 10 MG tablet Take 1 tablet by mouth Daily As Needed for Muscle Spasms. 30 tablet 2   • Daily-Jelani Multivitamin tablet tablet Take 1 tablet by mouth every night at bedtime.     • Evolocumab (Repatha) solution prefilled syringe injection Inject 1 mL under the skin into the appropriate area as directed Every 14 (Fourteen) Days. 45 each 3   • ezetimibe (ZETIA) 10 MG tablet Take 10 mg by mouth every night at bedtime.     • FLUoxetine (PROzac) 60 MG tablet Take 1 tablet by " mouth Daily for 180 days. 60 tablet 2   • fluticasone (Flonase) 50 MCG/ACT nasal spray 2 sprays into the nostril(s) as directed by provider Daily. Administer 2 sprays in each nostril for each dose. 16 g 6   • gabapentin (NEURONTIN) 300 MG capsule Take 300 mg by mouth 2 (Two) Times a Day.     • ibuprofen (ADVIL,MOTRIN) 200 MG tablet 200 mg.     • insulin lispro (humaLOG) 100 UNIT/ML injection INJECT 30 UNITS UNDER THE SKIN PER PRESCRIBERS SLIDING SCALE INSTRUCTIONS. INSULIN DOSING REQUIRES INDIVIDUALIZATION     • Januvia 100 MG tablet Take 1 tablet by mouth Daily. 90 tablet 1   • ketoconazole (NIZORAL) 2 % cream      • ketotifen (Allergy Eye Drops) 0.025 % ophthalmic solution 1 drop.     • losartan (COZAAR) 25 MG tablet Take 1 tablet by mouth Daily. 90 tablet 1   • metFORMIN (GLUCOPHAGE) 500 MG tablet Take 1 tablet by mouth 2 (Two) Times a Day With Meals. 180 tablet 1   • mirtazapine (REMERON) 45 MG tablet Take 1 tablet by mouth every night at bedtime for 180 days. 60 tablet 2   • Non-Aspirin Pain Reliever 325 MG tablet Take 650 mg by mouth Every 8 (Eight) Hours As Needed. for pain     • pramipexole (MIRAPEX) 0.5 MG tablet Take 1 tablet by mouth every night at bedtime. 30 tablet 3   • vitamin D (ERGOCALCIFEROL) 1.25 MG (87599 UT) capsule capsule 50,000 Units 1 (One) Time Per Week.       No current facility-administered medications on file prior to visit.        Allergies   Allergen Reactions   • Diclofenac Hives   • Freederm Adhesive Remover [New Skin] Rash   • Niacin Rash       Social History     Tobacco Use   Smoking Status Former Smoker   • Packs/day: 0.25   • Years: 22.00   • Pack years: 5.50   Smokeless Tobacco Never Used   Tobacco Comment    QUIT 1988          Objective   Vital Signs:   There were no vitals taken for this visit.    Physical Exam  Constitutional:       Appearance: Normal appearance.   HENT:      Head: Normocephalic.   Pulmonary:      Effort: Pulmonary effort is normal.   Neurological:       Mental Status: She is alert and oriented to person, place, and time.   Psychiatric:         Mood and Affect: Mood normal.        Result Review :                 Assessment and Plan    Diagnoses and all orders for this visit:    1. Type 2 diabetes mellitus with other specified complication, with long-term current use of insulin (CMS/formerly [facility]) (Primary)  Assessment & Plan:  Pt due for labs, she will stop by office next week to have drawn, will adjust meds at that time if needed based on results.       2. Essential tremor  Comments:  unable to fully eval due to telehealth. Pt has upcoming apt with neuro, encouraged to discuss tremor at that visit or let me know if sx worsen    3. Obstructive sleep apnea  Comments:  discussed importance of wearing cpap every night    4. Essential hypertension  Assessment & Plan:  Hypertension unable to be evaluated due to telehealth      5. Severe depression (CMS/formerly [facility])  Assessment & Plan:  Mood improved per pt, keep follow up with psych for med mgmt and counseling      6. Restless legs syndrome (RLS)  Assessment & Plan:  Encouraged to keep upcoming follow up with neuro who is managing medications. Discussed untx EARL can affect RSL, encouraged to get fit for proper mask and start wearing nightly.        Follow Up   No follow-ups on file.  Patient was given instructions and counseling regarding her condition or for health maintenance advice. Please see specific information pulled into the AVS if appropriate.

## 2021-08-29 PROBLEM — Z92.29 HISTORY OF VACCINATION: Status: RESOLVED | Noted: 2021-08-19 | Resolved: 2021-08-29

## 2021-08-29 NOTE — ASSESSMENT & PLAN NOTE
Encouraged to keep upcoming follow up with neuro who is managing medications. Discussed untx EARL can affect RSL, encouraged to get fit for proper mask and start wearing nightly.

## 2021-08-29 NOTE — ASSESSMENT & PLAN NOTE
Pt due for labs, she will stop by office next week to have drawn, will adjust meds at that time if needed based on results.

## 2021-09-01 ENCOUNTER — TELEPHONE (OUTPATIENT)
Dept: PHYSICAL THERAPY | Facility: CLINIC | Age: 71
End: 2021-09-01

## 2021-09-03 ENCOUNTER — TREATMENT (OUTPATIENT)
Dept: PHYSICAL THERAPY | Facility: CLINIC | Age: 71
End: 2021-09-03

## 2021-09-03 DIAGNOSIS — Z87.81 S/P ORIF (OPEN REDUCTION INTERNAL FIXATION) FRACTURE: Primary | ICD-10-CM

## 2021-09-03 DIAGNOSIS — R26.89 ANTALGIC GAIT: ICD-10-CM

## 2021-09-03 DIAGNOSIS — M25.571 ACUTE RIGHT ANKLE PAIN: ICD-10-CM

## 2021-09-03 DIAGNOSIS — Z98.890 S/P ORIF (OPEN REDUCTION INTERNAL FIXATION) FRACTURE: Primary | ICD-10-CM

## 2021-09-03 DIAGNOSIS — M25.572 LEFT ANKLE PAIN, UNSPECIFIED CHRONICITY: ICD-10-CM

## 2021-09-03 PROCEDURE — 97110 THERAPEUTIC EXERCISES: CPT | Performed by: PHYSICAL THERAPIST

## 2021-09-03 PROCEDURE — 97140 MANUAL THERAPY 1/> REGIONS: CPT | Performed by: PHYSICAL THERAPIST

## 2021-09-03 NOTE — PROGRESS NOTES
Physical Therapy Daily Progress Note        Patient: Nivia Cagle   : 1950  Diagnosis/ICD-10 Code:  S/P ORIF (open reduction internal fixation) fracture [Z98.890, Z87.81]  Referring practitioner: No ref. provider found  Date of Initial Visit: Type: THERAPY  Noted: 2021  Today's Date: 9/3/2021  Patient seen for 12 sessions             Subjective   Nivia Cagle reports: her ankle is feeling better, having less pain. States she is having less pain with walking and notices less swelling over the ankles secondary to doing her elevated ankle pumps and compression stocking    Objective   Moderate adhesions noted over anterior and latera incisions of the right ankle  See Exercise, Manual, and Modality Logs for complete treatment.       Assessment/Plan  Patient is progressing slowly, Ankle ROM, strength, and balance is improving with in clinic rehab and HEP  Progress per Plan of Care and Progress strengthening /stabilization /functional activity           Timed:  Manual Therapy:    20     mins  59636;  Therapeutic Exercise:    10     mins  13090;     Neuromuscular Evon:        mins  70100;    Therapeutic Activity:          mins  87213;     Gait Training:           mins  65857;     Ultrasound:          mins  55851;    Electrical Stimulation:         mins  46918;  Iontophoresis          mins  59764    Untimed:  Electrical Stimulation:         mins  38715 ( );  Mechanical Traction:         mins  51442;   Fluidotherapy          mins  54965    Timed Treatment:   30   mins   Total Treatment:     30   mins        Carl Clemente PT  Physical Therapist

## 2021-09-09 ENCOUNTER — TREATMENT (OUTPATIENT)
Dept: PHYSICAL THERAPY | Facility: CLINIC | Age: 71
End: 2021-09-09

## 2021-09-09 ENCOUNTER — TELEPHONE (OUTPATIENT)
Dept: PHYSICAL THERAPY | Facility: CLINIC | Age: 71
End: 2021-09-09

## 2021-09-09 NOTE — PROGRESS NOTES
Physical Therapy Daily Progress Note      Patient: Nivia Cagle   : 1950  Diagnosis/ICD-10 Code:  No primary diagnosis found.  Referring practitioner: Curtis Han MD  Date of Initial Visit: Type: THERAPY  Noted: 2021  Today's Date: 2021  Patient seen for 13 sessions           Subjective Pt called and cxl'd due to not feeling well today.     Objective   See Exercise, Manual, and Modality Logs for complete treatment.       Assessment/Plan      PLAN: Progress per Plan of Care             Manual Therapy:         mins  67575;  Therapeutic Exercise:         mins  39642;     Neuromuscular Evon:        mins  63306;    Therapeutic Activity:          mins  34230;     Gait Training:           mins  80582;     Ultrasound:          mins  96999;    Electrical Stimulation:         mins  20829 ( );    Timed Treatment:      mins   Total Treatment:        mins    Kamla Clemente PT  Physical Therapist

## 2021-09-13 ENCOUNTER — TREATMENT (OUTPATIENT)
Dept: PHYSICAL THERAPY | Facility: CLINIC | Age: 71
End: 2021-09-13

## 2021-09-13 PROCEDURE — 97110 THERAPEUTIC EXERCISES: CPT | Performed by: PHYSICAL THERAPIST

## 2021-09-13 PROCEDURE — 97140 MANUAL THERAPY 1/> REGIONS: CPT | Performed by: PHYSICAL THERAPIST

## 2021-09-13 NOTE — PROGRESS NOTES
Re-Assessment / Re-Certification        Patient: Nivia Cagle   : 1950  Diagnosis/ICD-10 Code:  No primary diagnosis found.  Referring practitioner: Curtis Han MD  Date of Initial Visit: Type: THERAPY  Noted: 2021  Today's Date: 2021  Patient seen for 13 sessions      Subjective:   Nivia Cagle reports: her ankle mobility is slowly improving and is having less pain with walking and other ADLs.  Subjective Questionnaire: LEFS: 41/68, 40% limitation  Clinical Progress: improved  Home Program Compliance: Yes  Treatment has included: therapeutic exercise, neuromuscular re-education, manual therapy, therapeutic activity and gait training    Subjective Evaluation    Quality of life: good    Pain  Current pain ratin  At best pain ratin  At worst pain ratin  Location: right anterior and lateral ankle  Quality: dull ache, pressure and tight  Relieving factors: change in position and medications  Aggravating factors: prolonged positioning, stairs, standing and ambulation  Progression: improved         Objective          Active Range of Motion     Additional Active Range of Motion Details      Active Range       Left Right    Ankle    Dorsiflexion  7 3    Plantarflexion  44 35   Inversion  33 20    Eversion  20 5      Strength/Myotome Testing     Left Hip   Planes of Motion   Flexion: 4-  Abduction: 4-  Adduction: 4-    Right Hip   Planes of Motion   Flexion: 4-  Abduction: 4-  Adduction: 4-    Left Knee   Flexion: 4-  Extension: 4-    Right Knee   Flexion: 4  Extension: 4    Left Ankle/Foot   Dorsiflexion: 4+  Plantar flexion: 4+  Inversion: 4+  Eversion: 4+    Right Ankle/Foot   Dorsiflexion: 3+  Plantar flexion: 3+  Inversion: 3+  Eversion: 3+      Assessment & Plan     Assessment  Assessment details: 71 y/o Female with history of ankle fractures with ORIF.  Pt's gait, ankle ROM, and activity tolerance has slowly improved since her first visit.  Patient continues to have noted  stiffness and swelling in her right>left ankle with prolonged standing and walking but is progressing and able to improve swelling with self care.        Goals  Plan Goals: Plan Goals: ANKLE PROBLEMS    1. The patient has limited ROM for the R ankle.              LTG 1: 12 weeks:  In order to allow the patient greater ease with forward, lateral, and diagonal mobility the patient will demonstrate improved ROM of the R ankle as follows:  10 degrees of dorsiflexion, 50 degrees of plantarflexion, 30 degrees of inversion, and 20 degrees of eversion.  STATUS:  PROGRESSING              STG 1a: 6 weeks:  The patient will demonstrate improved ROM of the 5 ankle as follows:  5 degrees of dorsiflexion, 40 degrees of plantarflexion, 20 degrees of inversion, and 20 degrees of eversion.                          STATUS:  Not met, progressing slowly, extend goal x 4 weeks     2. The patient has limited strength of the R ankle.              LTG 2: 12 weeks:  In order to provide greater joint stability of the R ankle the patient will demonstrate improved strength of the R ankle as follows:  4/5 dorsiflexion, 4/5 inversion, 4/5 eversion, and 4/5 plantar flexion.                          STATUS:  progressing              STG 2a: 6 weeks:  The patient will demonstrate improved strength of the R ankle as follows:  4-/5 dorsiflexion, 4-/5 inversion, 4-/5 eversion, and 4-/5 plantar flexion.                          STATUS: met    3. The patient has gait dysfunction.              LTG 3: 12 weeks:  The patient will ambulate without assistive device, independently, for community distances with minimal limp to the R lower extremity in order to improve mobility and allow patient to perform activities such as grocery shopping with greater ease.                          STATUS:  Partially met, now is able to ambulate safely with use of a quad cane              STG 3a: 6 weeks: The patient will ambulate with a cane 200 feet independently.                           STATUS: met     4. The patient complains of R ankle pain.  LTG 4: 12 weeks:  The patient will report a pain rating of 2/10 or better in order to improve  tolerance to activities of daily living and improve sleep quality.  STATUS:  Progressing slowly  STG 4a: 6 weeks:  The patient will report a pain rating of 4/10 or better.  STATUS:  met      Plan  Plan details: Continue therapy as planned.   Progress balance, gait, fall prevention training, continued instruction with assistive device. Progress HEP .      Progress toward previous goals: Partially Met    New Goals  Short-term goals (STG): no new goals  Long-term goals (LTG): no new goals      Recommendations: Continue as planned  Timeframe: 6 weeks  Prognosis to achieve goals: good    PT SIGNATURE: Carl Clemente, PT   DATE TREATMENT INITIATED: 9/13/2021  Certification Period: 12/12/2021        Based upon review of the patient's progress and continued therapy plan, it is my medical opinion that Nivia Cagle should continue physical therapy treatment at [facility]  [address] [place] [address] [address] [place] [place] [address]  [phone].    Signature: __________________________________  Curtis Han MD    Timed:  Manual Therapy:    16     mins  68698;  Therapeutic Exercise:    14     mins  16151;     Neuromuscular Evon:        mins  95522;    Therapeutic Activity:          mins  39957;     Gait Training:           mins  25461;     Ultrasound:          mins  26809;    Electrical Stimulation:         mins  57840 ( );    Untimed:  Electrical Stimulation:         mins  85166 ( );  Mechanical Traction:         mins  46007;     Timed Treatment:   30   mins   Total Treatment:     30   mins

## 2021-09-16 ENCOUNTER — TELEPHONE (OUTPATIENT)
Dept: PHYSICAL THERAPY | Facility: CLINIC | Age: 71
End: 2021-09-16

## 2021-09-16 NOTE — TELEPHONE ENCOUNTER
PATIENT HAS SCHEDULE CONFLICT - NOT ABLE TO RESCHEDULE AT THIS TIME.  GOING TO [place] AND WON'T BE BACK UNTIL MID November.  SHE WILL GET A NEW ORDER IF NEEDED THEN.

## 2021-09-21 ENCOUNTER — CLINICAL SUPPORT (OUTPATIENT)
Dept: INTERNAL MEDICINE | Facility: CLINIC | Age: 71
End: 2021-09-21

## 2021-09-21 DIAGNOSIS — I10 ESSENTIAL HYPERTENSION: ICD-10-CM

## 2021-09-21 DIAGNOSIS — E78.2 MIXED HYPERLIPIDEMIA: ICD-10-CM

## 2021-09-21 DIAGNOSIS — Z79.4 TYPE 2 DIABETES MELLITUS WITH OTHER SPECIFIED COMPLICATION, WITH LONG-TERM CURRENT USE OF INSULIN (HCC): ICD-10-CM

## 2021-09-21 DIAGNOSIS — E11.40 TYPE 2 DIABETES MELLITUS WITH DIABETIC NEUROPATHY, WITH LONG-TERM CURRENT USE OF INSULIN (HCC): Primary | ICD-10-CM

## 2021-09-21 DIAGNOSIS — F33.1 MAJOR DEPRESSIVE DISORDER, RECURRENT EPISODE, MODERATE DEGREE (HCC): ICD-10-CM

## 2021-09-21 DIAGNOSIS — E11.69 TYPE 2 DIABETES MELLITUS WITH OTHER SPECIFIED COMPLICATION, WITH LONG-TERM CURRENT USE OF INSULIN (HCC): ICD-10-CM

## 2021-09-21 DIAGNOSIS — Z79.4 TYPE 2 DIABETES MELLITUS WITH DIABETIC NEUROPATHY, WITH LONG-TERM CURRENT USE OF INSULIN (HCC): Primary | ICD-10-CM

## 2021-09-21 DIAGNOSIS — F41.1 GENERALIZED ANXIETY DISORDER: ICD-10-CM

## 2021-09-21 LAB
ALBUMIN SERPL-MCNC: 4.4 G/DL (ref 3.5–5.2)
ALBUMIN/GLOB SERPL: 1.5 G/DL
ALP SERPL-CCNC: 88 U/L (ref 39–117)
ALT SERPL W P-5'-P-CCNC: 13 U/L (ref 1–33)
ANION GAP SERPL CALCULATED.3IONS-SCNC: 8.6 MMOL/L (ref 5–15)
AST SERPL-CCNC: 19 U/L (ref 1–32)
BASOPHILS # BLD AUTO: 0.03 10*3/MM3 (ref 0–0.2)
BASOPHILS NFR BLD AUTO: 0.4 % (ref 0–1.5)
BILIRUB SERPL-MCNC: 0.5 MG/DL (ref 0–1.2)
BUN SERPL-MCNC: 15 MG/DL (ref 8–23)
BUN/CREAT SERPL: 19.7 (ref 7–25)
CALCIUM SPEC-SCNC: 9.4 MG/DL (ref 8.6–10.5)
CHLORIDE SERPL-SCNC: 105 MMOL/L (ref 98–107)
CHOLEST SERPL-MCNC: 219 MG/DL (ref 0–200)
CO2 SERPL-SCNC: 28.4 MMOL/L (ref 22–29)
CREAT SERPL-MCNC: 0.76 MG/DL (ref 0.57–1)
DEPRECATED RDW RBC AUTO: 41.3 FL (ref 37–54)
EOSINOPHIL # BLD AUTO: 0.22 10*3/MM3 (ref 0–0.4)
EOSINOPHIL NFR BLD AUTO: 3.2 % (ref 0.3–6.2)
ERYTHROCYTE [DISTWIDTH] IN BLOOD BY AUTOMATED COUNT: 12.8 % (ref 12.3–15.4)
GFR SERPL CREATININE-BSD FRML MDRD: 75 ML/MIN/1.73
GLOBULIN UR ELPH-MCNC: 3 GM/DL
GLUCOSE SERPL-MCNC: 100 MG/DL (ref 65–99)
HBA1C MFR BLD: 6.5 % (ref 4.8–5.6)
HCT VFR BLD AUTO: 37.1 % (ref 34–46.6)
HDLC SERPL-MCNC: 49 MG/DL (ref 40–60)
HGB BLD-MCNC: 12.4 G/DL (ref 12–15.9)
IMM GRANULOCYTES # BLD AUTO: 0.01 10*3/MM3 (ref 0–0.05)
IMM GRANULOCYTES NFR BLD AUTO: 0.1 % (ref 0–0.5)
LDLC SERPL CALC-MCNC: 147 MG/DL (ref 0–100)
LDLC/HDLC SERPL: 2.95 {RATIO}
LYMPHOCYTES # BLD AUTO: 1.84 10*3/MM3 (ref 0.7–3.1)
LYMPHOCYTES NFR BLD AUTO: 26.9 % (ref 19.6–45.3)
MCH RBC QN AUTO: 29.7 PG (ref 26.6–33)
MCHC RBC AUTO-ENTMCNC: 33.4 G/DL (ref 31.5–35.7)
MCV RBC AUTO: 89 FL (ref 79–97)
MONOCYTES # BLD AUTO: 0.6 10*3/MM3 (ref 0.1–0.9)
MONOCYTES NFR BLD AUTO: 8.8 % (ref 5–12)
NEUTROPHILS NFR BLD AUTO: 4.15 10*3/MM3 (ref 1.7–7)
NEUTROPHILS NFR BLD AUTO: 60.6 % (ref 42.7–76)
NRBC BLD AUTO-RTO: 0 /100 WBC (ref 0–0.2)
PLATELET # BLD AUTO: 300 10*3/MM3 (ref 140–450)
PMV BLD AUTO: 11.1 FL (ref 6–12)
POTASSIUM SERPL-SCNC: 4 MMOL/L (ref 3.5–5.2)
PROT SERPL-MCNC: 7.4 G/DL (ref 6–8.5)
RBC # BLD AUTO: 4.17 10*6/MM3 (ref 3.77–5.28)
SODIUM SERPL-SCNC: 142 MMOL/L (ref 136–145)
TRIGL SERPL-MCNC: 128 MG/DL (ref 0–150)
TSH SERPL DL<=0.05 MIU/L-ACNC: 1.45 UIU/ML (ref 0.27–4.2)
VLDLC SERPL-MCNC: 23 MG/DL (ref 5–40)
WBC # BLD AUTO: 6.85 10*3/MM3 (ref 3.4–10.8)

## 2021-09-21 PROCEDURE — 85025 COMPLETE CBC W/AUTO DIFF WBC: CPT | Performed by: PHYSICIAN ASSISTANT

## 2021-09-21 PROCEDURE — 80061 LIPID PANEL: CPT | Performed by: PHYSICIAN ASSISTANT

## 2021-09-21 PROCEDURE — 83036 HEMOGLOBIN GLYCOSYLATED A1C: CPT | Performed by: PHYSICIAN ASSISTANT

## 2021-09-21 PROCEDURE — 84443 ASSAY THYROID STIM HORMONE: CPT | Performed by: PHYSICIAN ASSISTANT

## 2021-09-21 PROCEDURE — 36415 COLL VENOUS BLD VENIPUNCTURE: CPT | Performed by: PHYSICIAN ASSISTANT

## 2021-09-21 PROCEDURE — 80053 COMPREHEN METABOLIC PANEL: CPT | Performed by: PHYSICIAN ASSISTANT

## 2021-09-21 NOTE — TELEPHONE ENCOUNTER
Caller: Nivia Cagle    Relationship: Self      Medication requested (name and dosage):  gabapentin (NEURONTIN) 300 MG capsule    Pharmacy where request should be sent: [facility] [phone]    Additional details provided by patient: N/A    Best call back number: [phone]     Does the patient have less than a 3 day supply:  [x] Yes  [] No    David Uriostegui Rep   09/21/21 11:05 EDT

## 2021-09-22 NOTE — TELEPHONE ENCOUNTER
Refill request for  controlled substance.      Date of request: 9/22/2021   Medication requested: Gabapentin  Last OV: 8/27/21  Last UDS: 5/27/21  Contract signed: yes    Date:5/27/21  Next office visit: 11/29/21    Faviola Burgess

## 2021-09-23 RX ORDER — GABAPENTIN 300 MG/1
300 CAPSULE ORAL 2 TIMES DAILY
Qty: 180 CAPSULE | Refills: 0 | Status: SHIPPED | OUTPATIENT
Start: 2021-09-23 | End: 2022-03-03

## 2021-09-30 ENCOUNTER — TELEMEDICINE (OUTPATIENT)
Dept: PSYCHIATRY | Facility: CLINIC | Age: 71
End: 2021-09-30

## 2021-09-30 DIAGNOSIS — F41.1 GENERALIZED ANXIETY DISORDER: ICD-10-CM

## 2021-09-30 DIAGNOSIS — F43.10 POST TRAUMATIC STRESS DISORDER (PTSD): ICD-10-CM

## 2021-09-30 DIAGNOSIS — F33.2 SEVERE EPISODE OF RECURRENT MAJOR DEPRESSIVE DISORDER, WITHOUT PSYCHOTIC FEATURES (HCC): Primary | ICD-10-CM

## 2021-09-30 PROBLEM — I10 ESSENTIAL HYPERTENSION: Status: ACTIVE | Noted: 2021-04-02

## 2021-09-30 PROBLEM — G25.81 RESTLESS LEGS SYNDROME (RLS): Status: ACTIVE | Noted: 2021-06-21

## 2021-09-30 PROBLEM — G47.33 OBSTRUCTIVE SLEEP APNEA: Status: ACTIVE | Noted: 2021-04-02

## 2021-09-30 PROBLEM — J40 BRONCHITIS: Status: ACTIVE | Noted: 2021-09-10

## 2021-09-30 PROBLEM — F33.1 RECURRENT MAJOR DEPRESSIVE EPISODES, MODERATE: Status: ACTIVE | Noted: 2021-07-16

## 2021-09-30 PROBLEM — Z92.29 HISTORY OF VACCINATION: Status: ACTIVE | Noted: 2021-09-10

## 2021-09-30 PROCEDURE — 99214 OFFICE O/P EST MOD 30 MIN: CPT | Performed by: STUDENT IN AN ORGANIZED HEALTH CARE EDUCATION/TRAINING PROGRAM

## 2021-09-30 RX ORDER — FLUOXETINE HYDROCHLORIDE 20 MG/1
60 CAPSULE ORAL DAILY
COMMUNITY
Start: 2021-09-03 | End: 2021-09-30

## 2021-09-30 NOTE — PATIENT INSTRUCTIONS
1.  Please return to clinic at your next scheduled visit.  Contact the clinic ([phone]) at least 24 hours prior in the event you need to cancel.  2.  Do no harm to yourself or others.    3.  Avoid alcohol and drugs.    4.  Take all medications as prescribed.  Please contact the clinic with any concerns. If you are in need of medication refills, please call the clinic at [phone].    5. Should you want to get in touch with your provider, Dr. Vicky Barth, please utilize [employer] or contact the office ([phone]), and staff will be able to page Dr. Barth directly.  6.  In the event you have personal crisis, contact the following crisis numbers: Suicide Prevention Hotline 1-210.217.8406; [facility] Helpline 9-835-681-HFUN; [facility] Emergency Room [phone]; text HELLO to 175265; or 813.     SPECIFIC RECOMMENDATIONS:     1.      Medications discussed at this encounter:                   -Stop Prozac and start melatonin     2.      Psychotherapy recommendations:      3.     Return to clinic: 2 weeks

## 2021-09-30 NOTE — PROGRESS NOTES
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 10*3/uL Final   • Basophils Absolute 04/21/2021 0.04  0.00 - 0.20 10*3/uL Final   • Immature Granulocyte Rel % 04/21/2021 0.3  0.0 - 1.8 % Final   • Abs Imm Gran 04/21/2021 0.03  0.00 - 0.20 10*3/uL Final   • RDW-SD 04/21/2021 46.5* 36.4 - 46.3 fL Final   • NRBC 04/21/2021 0.00  0.0 - 0.7 % Final   • TSH 04/21/2021 2.370  0.270 - 4.200 m[iU]/L Final   • Triglycerides 04/21/2021 163* 40 - 150 mg/dL Final    Comment: <150 mg/dL-Normal  150-199 mg/dL-Borderline High  200-499 mg/dL-High  >500 mg/dL- Very High     • Total Cholesterol 04/21/2021 236* 107 - 200 mg/dL Final    Comment: <200 mg/dL-Desirable  200-239 mg/dL-Borderline High  >240 mg/dL-High  >500 mg/dL-Very High     • HDL Cholesterol 04/21/2021 52  40 - 60 mg/dL Final    Comment: <40 mg/dL-Low  >60 mg/dL- Desirable     • Chol/HDL Ratio 04/21/2021 4.5  3.0 - 6.0 NA Final   • LDL Cholesterol  04/21/2021 151* 70 - 100 mg/dL Final    Comment: Recommended  LDL Levels  <100 mg/dL-Optimal  100-129 mg/dL-Near Optimal/above optimal  130-159 mg/dL-Borderline High  160-189 mg/dL-High  >190 mg/dL-Very High     • VLDL Cholesterol 04/21/2021 33  5 - 37 mg/dL Final   • Magnesium 04/21/2021 1.85  1.60 - 2.30 mg/dL Final   • Mean Bld Glu Estim. 04/21/2021 131  mg/dL Final        EKG Results:  No orders to display       Imaging Results:  No Images in the past 120 days found..      Assessment/Plan   Diagnoses and all orders for this visit:    1. Severe episode of recurrent major depressive disorder, without psychotic features (HCC) (Primary)    2. Generalized anxiety disorder    3. Post traumatic stress disorder (PTSD)        Presentation most consistent with major depressive disorder, recurrent, moderate to severe, with anxious distress.  PTSD.  Rule out personality disorder, cluster B specifically.  Rule out hypomania as patient was very difficult to interrupt today.    9/30: Start melatonin for RLS. Stop Prozac due to multiple drug interactions.  See back in 2 weeks to start her on duloxetine 20 mg a day after full washout of fluoxetine.  2 weeks.    8/19: Stable, well. Patient is on too high a dose of bupropion given she is also on prozac. Reduce to 300 mg daily. See back in 6 wks.    6/21: Seriously consider whether patient has bipolar 2, given her rapid improvement on the equivalent dose of 4 mg of abilify daily. Still tangential, but far less so. Referral for TMS.  Patient has a history of ECT twice in her life.  The last time she underwent ECT was in 2003 and it did not help. Improved on abilify; increase dose to target depression and anxiety. Continue mirtazapine, bupropion (she never stopped this), prozac. Effective dose of abilify will be 8 mg (prozac inhibits 2D6, doubling abilify's concentration).     IMPORTANT:  Consider brexpiprazole.    Very important to obtain records from previous psychiatrist.  Care should be taken when putting patient on sedating medications as she has a falls risk.  Also has a history of EARL which will lead to difficulties treating her insomnia.    Therapy referral made to [facility].      See back in 8 weeks.  Patient is not vaccinated.    Visit Diagnoses:    ICD-10-CM ICD-9-CM   1. Severe episode of recurrent major depressive disorder, without  psychotic features (HCC)  F33.2 296.33   2. Generalized anxiety disorder  F41.1 300.02   3. Post traumatic stress disorder (PTSD)  F43.10 309.81       PLAN:  18. Risk Assessment: Risk of self-harm acutely is moderate. Risk factors include chronic depressive disorder, possible personality disorder, recent psychosocial stressors (pandemic, moving). Protective factors include no present SI, no history of suicide attempts or self-harm in the past, no access to weapons, minimal AODA, healthcare seeking, future orientation, willingness to engage in care. Risk of self-harm chronically is also moderate, but could be further elevated in the event of treatment noncompliance and/or AODA.  19. Safety: No acute safety concerns.  20. Medications:   a. START melatonin 3 mg p.o. nightly.  Risks, benefits, side effects discussed with patient including sedation, dizziness/falls risk, GI upset.  After discussion of these risks and benefits, the patient voiced understanding and agreed to proceed.  b. CONTINUE bupropion xl 300 mg daily. Risks, benefits, alternatives discussed with patient including nausea, GI upset, increased energy, exacerbation of irritability, insomnia, lowering of seizure threshold.  After discussion of these risks and benefits, the patient voiced understanding and agreed to proceed.  c. DISCONTINUE Prozac 60 mg a day. Risks, benefits, alternatives discussed with patient including GI upset, nausea vomiting diarrhea, theoretical decrease of seizure threshold predisposing the patient to a slightly higher seizure risk, headaches, sexual dysfunction, serotonin syndrome, bleeding risk, increased suicidality in patients 24 years and younger.  After discussion of these risks and benefits, the patient voiced understanding and agreed to proceed.  d. CONTINUE mirtazapine 45 mg at night. Risks, benefits, alternatives discussed with patient including GI upset, sedation, dizziness with falls risk, increased appetite.  After  discussion of these risks and benefits, the patient voiced understanding and agreed to proceed.  e. CONTINUE Abilify 4 mg p.o. daily to target depression, anxiety, decreased energy. Risks, benefits, alternatives discussed with patient including increased energy, exacerbation of irritability, akathisia, GI upset, orthostatic hypotension, increased appetite. After discussion of these risks and benefits, the patient voiced understanding and agreed to proceed.  21. Therapy: Referral to [facility]. Pt understands it will be 2 mos and telepsych.  22. Labs/Studies: TMS referral.  23. Follow Up: 2 weeks.      TREATMENT PLAN/GOALS: Continue supportive psychotherapy efforts and medications as indicated. Treatment and medication options discussed during today's visit. Patient acknowledged and verbally consented to continue with current treatment plan and was educated on the importance of compliance with treatment and follow-up appointments.    MEDICATION ISSUES:  SAPNA reviewed as expected.  Discussed medication options and treatment plan of prescribed medication as well as the risks, benefits, and side effects including potential falls, possible impaired driving and metabolic adversities among others. Patient is agreeable to call the office with any worsening of symptoms or onset of side effects. Patient is agreeable to call 911 or go to the nearest ER should he/she begin having SI/HI. No medication side effects or related complaints today.     MEDS ORDERED DURING VISIT:  No orders of the defined types were placed in this encounter.      Return in about 2 weeks (around 10/14/2021).         This document has been electronically signed by Vicky Barth MD  September 30, 2021 12:06 EDT      Part of this note may be an electronic transcription/translation of spoken language to printed text using the Dragon Dictation System.

## 2021-10-04 ENCOUNTER — APPOINTMENT (OUTPATIENT)
Dept: MAMMOGRAPHY | Facility: HOSPITAL | Age: 71
End: 2021-10-04

## 2021-10-22 ENCOUNTER — TELEPHONE (OUTPATIENT)
Dept: INTERNAL MEDICINE | Facility: CLINIC | Age: 71
End: 2021-10-22

## 2021-10-22 DIAGNOSIS — H53.9 VISION CHANGES: Primary | ICD-10-CM

## 2021-10-22 NOTE — TELEPHONE ENCOUNTER
Caller: [email or web address]    Relationship: Self    Best call back number: [phone]    What is the medical concern/diagnosis: INJURED EYE    What specialty or service is being requested: RETINA SPECIALIST    What is the provider, practice or medical service name:     What is the office location:     What is the office phone number:     Any additional details: PATIENT INJURED HER EYE AND WILL BE HAVING SURGERY ON IT, HER DOCTOR TOLD HER SHE WILL NEED TO SEE A RETINA SPECIALIST AND NEEDS A REFERRAL WITH MEDICARE COVERAGE.      PATIENT STATES THAT SHE CAN DO A TELE VISIT IF THE DOCTOR NEEDS TO SEE HER OR TALK TO HER.

## 2021-10-26 ENCOUNTER — TELEMEDICINE (OUTPATIENT)
Dept: PSYCHIATRY | Facility: CLINIC | Age: 71
End: 2021-10-26

## 2021-10-26 DIAGNOSIS — F33.2 SEVERE EPISODE OF RECURRENT MAJOR DEPRESSIVE DISORDER, WITHOUT PSYCHOTIC FEATURES (HCC): Primary | ICD-10-CM

## 2021-10-26 DIAGNOSIS — F60.9 CLUSTER B PERSONALITY DISORDER IN ADULT (HCC): ICD-10-CM

## 2021-10-26 DIAGNOSIS — F43.10 POST TRAUMATIC STRESS DISORDER (PTSD): ICD-10-CM

## 2021-10-26 DIAGNOSIS — F41.1 GENERALIZED ANXIETY DISORDER: ICD-10-CM

## 2021-10-26 DIAGNOSIS — F51.05 INSOMNIA DUE TO MENTAL CONDITION: ICD-10-CM

## 2021-10-26 PROCEDURE — 99214 OFFICE O/P EST MOD 30 MIN: CPT | Performed by: STUDENT IN AN ORGANIZED HEALTH CARE EDUCATION/TRAINING PROGRAM

## 2021-10-26 RX ORDER — TRAZODONE HYDROCHLORIDE 50 MG/1
50 TABLET ORAL NIGHTLY
Qty: 30 TABLET | Refills: 2 | Status: SHIPPED | OUTPATIENT
Start: 2021-10-26 | End: 2022-03-02

## 2021-10-26 RX ORDER — PREDNISOLONE ACETATE 10 MG/ML
SUSPENSION/ DROPS OPHTHALMIC
COMMUNITY
Start: 2021-10-09 | End: 2023-01-20

## 2021-10-26 RX ORDER — ATROPINE SULFATE 10 MG/ML
SOLUTION/ DROPS OPHTHALMIC
COMMUNITY
Start: 2021-10-09 | End: 2022-01-05 | Stop reason: ALTCHOICE

## 2021-10-26 RX ORDER — DORZOLAMIDE HYDROCHLORIDE AND TIMOLOL MALEATE 20; 5 MG/ML; MG/ML
SOLUTION/ DROPS OPHTHALMIC
COMMUNITY
Start: 2021-10-09 | End: 2022-01-05 | Stop reason: ALTCHOICE

## 2021-10-26 RX ORDER — QUININE SULFATE 324 MG/1
324 CAPSULE ORAL
COMMUNITY
Start: 2021-10-21 | End: 2022-11-21 | Stop reason: SDUPTHER

## 2021-10-26 RX ORDER — CYCLOPENTOLATE HYDROCHLORIDE 10 MG/ML
SOLUTION/ DROPS OPHTHALMIC
COMMUNITY
Start: 2021-10-11 | End: 2022-01-05 | Stop reason: ALTCHOICE

## 2021-10-26 NOTE — PROGRESS NOTES
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 psychotic features (HCC)  F33.2 296.33   2. Generalized anxiety disorder  F41.1 300.02   3. Post traumatic stress disorder (PTSD)  F43.10 309.81   4. Cluster B personality disorder in adult ([facility])  F60.9 301.89       PLAN:  30. Risk Assessment: Risk of self-harm acutely is moderate. Risk factors include chronic depressive disorder, possible personality disorder, recent psychosocial stressors (pandemic, moving). Protective factors include no present SI, no history of suicide attempts or self-harm in the past, no access to weapons, minimal AODA, healthcare seeking, future orientation, willingness to engage in care. Risk of self-harm chronically is also moderate, but could be further elevated in the event of treatment noncompliance and/or AODA.  31. Safety: No acute safety concerns.  32. Medications:   a. CONTINUE melatonin 3 mg p.o. nightly.  Risks, benefits, side effects discussed with patient including sedation, dizziness/falls risk, GI upset.  After discussion of these risks and benefits, the patient voiced understanding and agreed to proceed.  b. START trazodone 50 mg PO QHS. Risks, benefits, side effects discussed with patient including GI upset, sedation, dizziness/falls risk, grogginess the following day, prolongation of the QTc  interval.  After discussion of these risks and benefits, the patient voiced understanding and agreed to proceed.    c. CONTINUE bupropion xl 300 mg daily. Risks, benefits, alternatives discussed with patient including nausea, GI upset, increased energy, exacerbation of irritability, insomnia, lowering of seizure threshold.  After discussion of these risks and benefits, the patient voiced understanding and agreed to proceed.  d. CONTINUE Prozac 20 mg a day. Risks, benefits, alternatives discussed with patient including GI upset, nausea vomiting diarrhea, theoretical decrease of seizure threshold predisposing the patient to a slightly higher seizure risk, headaches, sexual dysfunction, serotonin syndrome, bleeding risk, increased suicidality in patients 24 years and younger.  After discussion of these risks and benefits, the patient voiced understanding and agreed to proceed.  e. CONTINUE Abilify 4 mg p.o. daily to target depression, anxiety, decreased energy. Risks, benefits, alternatives discussed with patient including increased energy, exacerbation of irritability, akathisia, GI upset, orthostatic hypotension, increased appetite. After discussion of these risks and benefits, the patient voiced understanding and agreed to proceed.  i. S/P:  1. Mirtazapine 45 mg daily RLS  33. Therapy: Referral to [facility]. Pt understands it will be 2 mos and telepsych.  34. Labs/Studies: TMS referral.  35. Follow Up: 6 weeks.      TREATMENT PLAN/GOALS: Continue supportive psychotherapy efforts and medications as indicated. Treatment and medication options discussed during today's visit. Patient acknowledged and verbally consented to continue with current treatment plan and was educated on the importance of compliance with treatment and follow-up appointments.    MEDICATION ISSUES:  SAPNA reviewed as expected.  Discussed medication options and treatment plan of prescribed medication as well as the risks, benefits, and side effects  including potential falls, possible impaired driving and metabolic adversities among others. Patient is agreeable to call the office with any worsening of symptoms or onset of side effects. Patient is agreeable to call 911 or go to the nearest ER should he/she begin having SI/HI. No medication side effects or related complaints today.     MEDS ORDERED DURING VISIT:  No orders of the defined types were placed in this encounter.      No follow-ups on file.         This document has been electronically signed by Vicky Barth MD  October 26, 2021 07:21 EDT      Part of this note may be an electronic transcription/translation of spoken language to printed text using the Dragon Dictation System.

## 2021-10-26 NOTE — PATIENT INSTRUCTIONS
1.  Please return to clinic at your next scheduled visit.  Contact the clinic ([phone]) at least 24 hours prior in the event you need to cancel.  2.  Do no harm to yourself or others.    3.  Avoid alcohol and drugs.    4.  Take all medications as prescribed.  Please contact the clinic with any concerns. If you are in need of medication refills, please call the clinic at [phone].    5. Should you want to get in touch with your provider, Dr. Vicky Barth, please utilize [employer] or contact the office ([phone]), and staff will be able to page Dr. Barth directly.  6.  In the event you have personal crisis, contact the following crisis numbers: Suicide Prevention Hotline 1-630.663.2190; [facility] Helpline 7-003-689-ZZKO; [facility] [phone]; text HELLO to [phone]; or 043.     SPECIFIC RECOMMENDATIONS:     1.      Medications discussed at this encounter:                   - start trazodone for insomnia     2.      Psychotherapy recommendations:      3.     Return to clinic: 6 weeks

## 2021-11-17 RX ORDER — MONTELUKAST SODIUM 10 MG/1
10 TABLET ORAL NIGHTLY
Qty: 90 TABLET | Refills: 1 | Status: SHIPPED | OUTPATIENT
Start: 2021-11-17 | End: 2022-05-04

## 2021-11-17 NOTE — TELEPHONE ENCOUNTER
Caller: StephanieevNivia    Relationship: Self    Best call back number:[phone]    Requested Prescriptions:   Requested Prescriptions     Pending Prescriptions Disp Refills   • ezetimibe (ZETIA) 10 MG tablet       Sig: Take 1 tablet by mouth every night at bedtime.        Pharmacy where request should be sent: [id number] [address] AT [facility] [phone] [facility] [phone] FX     Does the patient have less than a 3 day supply:  [] Yes  [x] No

## 2021-11-18 RX ORDER — EZETIMIBE 10 MG/1
10 TABLET ORAL
Qty: 90 TABLET | Refills: 1 | Status: SHIPPED | OUTPATIENT
Start: 2021-11-18 | End: 2022-05-04

## 2021-11-23 ENCOUNTER — OFFICE VISIT (OUTPATIENT)
Dept: NEUROLOGY | Facility: CLINIC | Age: 71
End: 2021-11-23

## 2021-11-23 VITALS
HEIGHT: 63 IN | WEIGHT: 172.7 LBS | BODY MASS INDEX: 30.6 KG/M2 | SYSTOLIC BLOOD PRESSURE: 126 MMHG | HEART RATE: 82 BPM | DIASTOLIC BLOOD PRESSURE: 58 MMHG

## 2021-11-23 DIAGNOSIS — G25.81 RESTLESS LEGS SYNDROME (RLS): ICD-10-CM

## 2021-11-23 DIAGNOSIS — R25.3 MUSCLE TWITCHING: Primary | ICD-10-CM

## 2021-11-23 DIAGNOSIS — R25.1 TREMOR: ICD-10-CM

## 2021-11-23 DIAGNOSIS — G47.33 OSA (OBSTRUCTIVE SLEEP APNEA): ICD-10-CM

## 2021-11-23 PROCEDURE — 99214 OFFICE O/P EST MOD 30 MIN: CPT | Performed by: NURSE PRACTITIONER

## 2021-11-23 RX ORDER — PRAMIPEXOLE DIHYDROCHLORIDE 0.5 MG/1
0.5 TABLET ORAL
Qty: 30 TABLET | Refills: 3 | Status: SHIPPED | OUTPATIENT
Start: 2021-11-23 | End: 2022-01-18

## 2021-11-23 RX ORDER — FLUOXETINE HYDROCHLORIDE 20 MG/1
20 CAPSULE ORAL DAILY
COMMUNITY
End: 2022-03-17 | Stop reason: SDUPTHER

## 2021-11-23 NOTE — PROGRESS NOTES
"Chief Complaint  Restless Legs Syndrome    Subjective          Nivia Cagle presents to [facility]  Feels as if muscle jerking is improved after Dr. Barth discontinued mirtazapine. Is doing well with Abilify.  States pramipexole greatly improved RLS. Denies side effects.  Reports a mild tremor to bilateral hands that worsens with intention.  States that her CPAP machine was recalled and she is in need of a prescription for a new machine.       Objective   Vital Signs:   /58   Pulse 82   Ht 160 cm (63\")   Wt 78.3 kg (172 lb 11.2 oz)   BMI 30.59 kg/m²     Physical Exam  HENT:      Head: Normocephalic.   Pulmonary:      Effort: Pulmonary effort is normal.   Neurological:      Mental Status: She is alert and oriented to person, place, and time.      Cranial Nerves: Cranial nerves are intact.      Sensory: Sensation is intact.      Motor: Motor function is intact.      Coordination: Coordination is intact.      Deep Tendon Reflexes: Reflexes are normal and symmetric.      Comments: Mild intention tremor bilaterally        Neurologic Exam     Mental Status   Oriented to person, place, and time.        Result Review :               Assessment and Plan    Diagnoses and all orders for this visit:    1. Muscle twitching (Primary)  Assessment & Plan:  Has improved after coming off of mirtazapine.       2. Restless legs syndrome (RLS)  Assessment & Plan:  Continue pramipexole.       3. Tremor  Assessment & Plan:  Likely medication induced vs ET.  No sign of parkinsonism.        4. EARL (obstructive sleep apnea)  -     Ambulatory Referral to Sleep Medicine    Other orders  -     pramipexole (MIRAPEX) 0.5 MG tablet; Take 1 tablet by mouth every night at bedtime.  Dispense: 30 tablet; Refill: 3      Follow Up   Return in about 4 months (around 3/23/2022) for RLS.  Patient was given instructions and counseling regarding her condition or for health maintenance advice. Please " see specific information pulled into the AVS if appropriate.

## 2021-11-29 ENCOUNTER — OFFICE VISIT (OUTPATIENT)
Dept: INTERNAL MEDICINE | Facility: CLINIC | Age: 71
End: 2021-11-29

## 2021-11-29 VITALS
SYSTOLIC BLOOD PRESSURE: 148 MMHG | OXYGEN SATURATION: 98 % | HEIGHT: 63 IN | TEMPERATURE: 97.6 F | RESPIRATION RATE: 15 BRPM | WEIGHT: 170 LBS | HEART RATE: 92 BPM | BODY MASS INDEX: 30.12 KG/M2 | DIASTOLIC BLOOD PRESSURE: 90 MMHG

## 2021-11-29 DIAGNOSIS — E11.65 TYPE 2 DIABETES MELLITUS WITH HYPERGLYCEMIA, WITHOUT LONG-TERM CURRENT USE OF INSULIN (HCC): Primary | ICD-10-CM

## 2021-11-29 DIAGNOSIS — E78.2 MIXED HYPERLIPIDEMIA: ICD-10-CM

## 2021-11-29 DIAGNOSIS — I10 ESSENTIAL HYPERTENSION: ICD-10-CM

## 2021-11-29 DIAGNOSIS — M79.644 THUMB PAIN, RIGHT: ICD-10-CM

## 2021-11-29 DIAGNOSIS — S05.91XA RIGHT EYE INJURY, INITIAL ENCOUNTER: ICD-10-CM

## 2021-11-29 DIAGNOSIS — F41.1 GENERALIZED ANXIETY DISORDER: ICD-10-CM

## 2021-11-29 DIAGNOSIS — F33.1 MAJOR DEPRESSIVE DISORDER, RECURRENT EPISODE, MODERATE DEGREE (HCC): ICD-10-CM

## 2021-11-29 PROBLEM — J40 BRONCHITIS: Status: RESOLVED | Noted: 2021-08-19 | Resolved: 2021-11-29

## 2021-11-29 PROBLEM — Z92.29 HISTORY OF VACCINATION: Status: RESOLVED | Noted: 2021-09-10 | Resolved: 2021-11-29

## 2021-11-29 PROBLEM — G47.33 OBSTRUCTIVE SLEEP APNEA: Status: RESOLVED | Noted: 2021-06-21 | Resolved: 2021-11-29

## 2021-11-29 PROBLEM — J40 BRONCHITIS: Status: RESOLVED | Noted: 2021-09-10 | Resolved: 2021-11-29

## 2021-11-29 PROBLEM — F41.9 ANXIETY: Status: RESOLVED | Noted: 2021-06-21 | Resolved: 2021-11-29

## 2021-11-29 PROBLEM — E11.9 DM2 (DIABETES MELLITUS, TYPE 2): Status: RESOLVED | Noted: 2021-06-21 | Resolved: 2021-11-29

## 2021-11-29 PROBLEM — G25.81 RESTLESS LEGS SYNDROME (RLS): Status: RESOLVED | Noted: 2021-06-21 | Resolved: 2021-11-29

## 2021-11-29 PROCEDURE — 99214 OFFICE O/P EST MOD 30 MIN: CPT | Performed by: PHYSICIAN ASSISTANT

## 2021-12-06 ENCOUNTER — TELEPHONE (OUTPATIENT)
Dept: INTERNAL MEDICINE | Facility: CLINIC | Age: 71
End: 2021-12-06

## 2021-12-06 RX ORDER — LOSARTAN POTASSIUM 25 MG/1
25 TABLET ORAL DAILY
Qty: 90 TABLET | Refills: 1 | Status: SHIPPED | OUTPATIENT
Start: 2021-12-06 | End: 2022-06-03

## 2021-12-06 RX ORDER — CETIRIZINE HYDROCHLORIDE 10 MG/1
10 TABLET ORAL DAILY
Qty: 90 TABLET | Refills: 1 | Status: SHIPPED | OUTPATIENT
Start: 2021-12-06 | End: 2022-09-26

## 2021-12-06 NOTE — TELEPHONE ENCOUNTER
----- Message from Nivia Cagle sent at 12/6/2021  1:57 PM EST -----  Regarding: Prescription refill   I need a refill of citrizine 10mg and losortan.

## 2021-12-07 ENCOUNTER — TELEMEDICINE (OUTPATIENT)
Dept: PSYCHIATRY | Facility: CLINIC | Age: 71
End: 2021-12-07

## 2021-12-07 DIAGNOSIS — F60.9 CLUSTER B PERSONALITY DISORDER IN ADULT (HCC): ICD-10-CM

## 2021-12-07 DIAGNOSIS — F43.10 POST TRAUMATIC STRESS DISORDER (PTSD): ICD-10-CM

## 2021-12-07 DIAGNOSIS — F51.05 INSOMNIA DUE TO MENTAL CONDITION: ICD-10-CM

## 2021-12-07 DIAGNOSIS — F33.2 SEVERE EPISODE OF RECURRENT MAJOR DEPRESSIVE DISORDER, WITHOUT PSYCHOTIC FEATURES (HCC): Primary | ICD-10-CM

## 2021-12-07 DIAGNOSIS — F41.1 GENERALIZED ANXIETY DISORDER: ICD-10-CM

## 2021-12-07 PROBLEM — Z98.890 OTHER SPECIFIED POSTPROCEDURAL STATES: Status: ACTIVE | Noted: 2021-12-03

## 2021-12-07 PROBLEM — H33.40: Status: ACTIVE | Noted: 2021-11-29

## 2021-12-07 PROBLEM — S05.91XA: Status: ACTIVE | Noted: 2021-11-16

## 2021-12-07 PROBLEM — H33.001 UNSPECIFIED RETINAL DETACHMENT WITH RETINAL BREAK, RIGHT EYE: Status: ACTIVE | Noted: 2021-11-16

## 2021-12-07 PROBLEM — T85.22XA DISLOCATED INTRAOCULAR LENS: Status: ACTIVE | Noted: 2021-11-16

## 2021-12-07 PROBLEM — Z96.1 BILATERAL PSEUDOPHAKIA: Status: ACTIVE | Noted: 2021-11-16

## 2021-12-07 PROCEDURE — 90833 PSYTX W PT W E/M 30 MIN: CPT | Performed by: STUDENT IN AN ORGANIZED HEALTH CARE EDUCATION/TRAINING PROGRAM

## 2021-12-07 PROCEDURE — 99214 OFFICE O/P EST MOD 30 MIN: CPT | Performed by: STUDENT IN AN ORGANIZED HEALTH CARE EDUCATION/TRAINING PROGRAM

## 2021-12-07 RX ORDER — PILOCARPINE HYDROCHLORIDE 10 MG/ML
1 SOLUTION/ DROPS OPHTHALMIC 3 TIMES DAILY
COMMUNITY
Start: 2021-12-03 | End: 2022-01-05

## 2021-12-07 RX ORDER — OFLOXACIN 3 MG/ML
1 SOLUTION/ DROPS OPHTHALMIC 4 TIMES DAILY
COMMUNITY
End: 2022-01-18

## 2021-12-07 RX ORDER — MOXIFLOXACIN 5 MG/ML
SOLUTION/ DROPS OPHTHALMIC
COMMUNITY
Start: 2021-10-29 | End: 2022-01-05 | Stop reason: HOSPADM

## 2021-12-07 RX ORDER — TIMOLOL 5.12 MG/ML
1 SOLUTION/ DROPS OPHTHALMIC
COMMUNITY
End: 2022-01-18

## 2021-12-07 NOTE — PROGRESS NOTES
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 out personality disorder, cluster B specifically.  Rule out hypomania as patient was very difficult to interrupt today.    12/7: Doing very well, though some insomnia. Start melatonin 10 mg daily. No other changes. Watch jaw movements. Wants to taper off meds at some point; now would not be a good time. 17 minutes of supportive psychotherapy with goal to strengthen defenses, promote problems solving, restore adaptive functioning and provide symptom relief. The therapeutic alliance was strengthened to encourage the patient to express their thoughts and feelings. Esteem building was enhanced through praise, reassurance, normalizing and encouragement. Coping skills were enhanced to build distress tolerance skills and emotional regulation. Patient given education on medication side effects, diagnosis/illness and relapse symptoms. Plan to continue supportive psychotherapy in next appointment to provide symptom relief.  6 wks    10/26: Pt stopped mirtazapine and restarted prozac 20 mg daily.     9/30: Start melatonin for RLS. Stop Prozac due to multiple drug interactions.  See back in 2 weeks to start her on duloxetine 20 mg a day after full washout of fluoxetine.  2 weeks.    8/19: Stable, well. Patient is on too high a dose of  bupropion given she is also on prozac. Reduce to 300 mg daily. See back in 6 wks.    6/21: Seriously consider whether patient has bipolar 2, given her rapid improvement on the equivalent dose of 4 mg of abilify daily. Still tangential, but far less so. Referral for TMS.  Patient has a history of ECT twice in her life.  The last time she underwent ECT was in 2003 and it did not help. Improved on abilify; increase dose to target depression and anxiety. Continue mirtazapine, bupropion (she never stopped this), prozac. Effective dose of abilify will be 8 mg (prozac inhibits 2D6, doubling abilify's concentration).     IMPORTANT:  Consider brexpiprazole.    Very important to obtain records from previous psychiatrist.  Care should be taken when putting patient on sedating medications as she has a falls risk.  Also has a history of EARL which will lead to difficulties treating her insomnia.    Therapy referral made to [facility].      See back in 8 weeks.  Patient is not vaccinated.    Visit Diagnoses:    ICD-10-CM ICD-9-CM   1. Severe episode of recurrent major depressive disorder, without psychotic features (HCC)  F33.2 296.33   2. Generalized anxiety disorder  F41.1 300.02   3. Post traumatic stress disorder (PTSD)  F43.10 309.81   4. Cluster B personality disorder in adult (HCC)  F60.9 301.89   5. Insomnia due to mental condition  F51.05 300.9     327.02       PLAN:  40. Risk Assessment: Risk of self-harm acutely is moderate. Risk factors include chronic depressive disorder, possible personality disorder, recent psychosocial stressors (pandemic, moving). Protective factors include no present SI, no history of suicide attempts or self-harm in the past, no access to weapons, minimal AODA, healthcare seeking, future orientation, willingness to engage in care. Risk of self-harm chronically is also moderate, but could be further elevated in the event of treatment noncompliance and/or AODA.  41. Safety: No acute safety  concerns.  42. Medications:   a. INCREASE melatonin 3 to 10 mg p.o. nightly.  Risks, benefits, side effects discussed with patient including sedation, dizziness/falls risk, GI upset.  After discussion of these risks and benefits, the patient voiced understanding and agreed to proceed.  b. CONTINUE trazodone 50 mg PO QHS. Risks, benefits, side effects discussed with patient including GI upset, sedation, dizziness/falls risk, grogginess the following day, prolongation of the QTc interval.  After discussion of these risks and benefits, the patient voiced understanding and agreed to proceed.    c. CONTINUE bupropion xl 300 mg daily. Risks, benefits, alternatives discussed with patient including nausea, GI upset, increased energy, exacerbation of irritability, insomnia, lowering of seizure threshold.  After discussion of these risks and benefits, the patient voiced understanding and agreed to proceed.  d. CONTINUE Prozac 20 mg a day. Risks, benefits, alternatives discussed with patient including GI upset, nausea vomiting diarrhea, theoretical decrease of seizure threshold predisposing the patient to a slightly higher seizure risk, headaches, sexual dysfunction, serotonin syndrome, bleeding risk, increased suicidality in patients 24 years and younger.  After discussion of these risks and benefits, the patient voiced understanding and agreed to proceed.  e. CONTINUE Abilify 4 mg p.o. daily to target depression, anxiety, decreased energy. Risks, benefits, alternatives discussed with patient including increased energy, exacerbation of irritability, akathisia, GI upset, orthostatic hypotension, increased appetite. After discussion of these risks and benefits, the patient voiced understanding and agreed to proceed.  i. S/P:  1. Mirtazapine 45 mg daily (RLS)  43. Therapy: referred to [facility] today 12/7.  44. Labs/Studies: s/p TMS referral.  45. Follow Up: 6 weeks.      TREATMENT PLAN/GOALS: Continue supportive psychotherapy  efforts and medications as indicated. Treatment and medication options discussed during today's visit. Patient acknowledged and verbally consented to continue with current treatment plan and was educated on the importance of compliance with treatment and follow-up appointments.    MEDICATION ISSUES:  [facility]AP[facility] reviewed as expected.  Discussed medication options and treatment plan of prescribed medication as well as the risks, benefits, and side effects including potential falls, possible impaired driving and metabolic adversities among others. Patient is agreeable to call the office with any worsening of symptoms or onset of side effects. Patient is agreeable to call 911 or go to the nearest ER should he/she begin having SI/HI. No medication side effects or related complaints today.     MEDS ORDERED DURING VISIT:  No orders of the defined types were placed in this encounter.      Return in about 6 weeks (around 1/18/2022).         This document has been electronically signed by Vicky Barth MD  December 7, 2021 11:20 EST      Part of this note may be an electronic transcription/translation of spoken language to printed text using the Dragon Dictation System.

## 2021-12-07 NOTE — PATIENT INSTRUCTIONS
1.  Please return to clinic at your next scheduled visit.  Contact the clinic ([phone]) at least 24 hours prior in the event you need to cancel.  2.  Do no harm to yourself or others.    3.  Avoid alcohol and drugs.    4.  Take all medications as prescribed.  Please contact the clinic with any concerns. If you are in need of medication refills, please call the clinic at [phone].    5. Should you want to get in touch with your provider, Dr. Vicky Barth, please utilize [employer] or contact the office ([phone]), and staff will be able to page Dr. Barth directly.  6.  In the event you have personal crisis, contact the following crisis numbers: Suicide Prevention Hotline 1-431.284.2573; [facility] Helpline 9-245-971-KFWI; [facility] [phone]; text HELLO to 665812; or 157.     SPECIFIC RECOMMENDATIONS:     1.      Medications discussed at this encounter:                   - start melatonin 10 mg nightly     2.      Psychotherapy recommendations:      3.     Return to clinic: 6 weeks

## 2021-12-13 ENCOUNTER — TELEPHONE (OUTPATIENT)
Dept: CARDIOLOGY | Facility: CLINIC | Age: 71
End: 2021-12-13

## 2021-12-13 NOTE — TELEPHONE ENCOUNTER
Pt called to let us know that her copay for Repatha is to expensive. She wants to know if there is something else that can be prescribed?      She is intolerant to statins, and wants to know is she can take a heavier dose of Zetia?

## 2021-12-13 NOTE — TELEPHONE ENCOUNTER
We will need to see if Praluent would be cheaper for her, otherwise no there is no other alternative in that class

## 2021-12-20 NOTE — TELEPHONE ENCOUNTER
Pt is willing to try praluent.    Joseph didn't specify what dose he wanted her to take so I didn't know which one to pend for you.

## 2021-12-22 ENCOUNTER — TELEMEDICINE (OUTPATIENT)
Dept: PSYCHIATRY | Facility: CLINIC | Age: 71
End: 2021-12-22

## 2021-12-22 DIAGNOSIS — F33.2 SEVERE EPISODE OF RECURRENT MAJOR DEPRESSIVE DISORDER, WITHOUT PSYCHOTIC FEATURES (HCC): Primary | ICD-10-CM

## 2021-12-22 PROCEDURE — 90791 PSYCH DIAGNOSTIC EVALUATION: CPT | Performed by: COUNSELOR
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
## 2022-01-04 ENCOUNTER — TELEMEDICINE (OUTPATIENT)
Dept: INTERNAL MEDICINE | Facility: CLINIC | Age: 72
End: 2022-01-04

## 2022-01-04 DIAGNOSIS — J06.9 ACUTE URI: ICD-10-CM

## 2022-01-04 DIAGNOSIS — Z20.822 CLOSE EXPOSURE TO COVID-19 VIRUS: Primary | ICD-10-CM

## 2022-01-04 PROCEDURE — 99213 OFFICE O/P EST LOW 20 MIN: CPT | Performed by: PHYSICIAN ASSISTANT

## 2022-01-04 RX ORDER — GUAIFENESIN 600 MG/1
1200 TABLET, EXTENDED RELEASE ORAL 2 TIMES DAILY
Qty: 40 TABLET | Refills: 0 | Status: SHIPPED | OUTPATIENT
Start: 2022-01-04 | End: 2022-01-14

## 2022-01-04 NOTE — PROGRESS NOTES
Chief Complaint  Nasal Congestion (Started Dec 30.) and Exposure To Known Illness (Grandson was tested pos for covid on 01/02/2022)     Patient agrees to participate in a telemedicine evaluation performed by Rae iYn PA-C. Patient authorizes the electronic transmission of medical information and/or videoconference session. Patient understands that he/she can still pursue face-to-face consultation if desired. Patient understands that as with any technology, telemedicine does have its limitations. There is no guarantee, therefore, that this telemedicine session will eliminate the need for patient to see a provider in person if the provider feels a physical exam or vital signs are necessary to care for the patient. Patient understands and would like to proceed with telemedicine visit via [employer] at this time.      Subjective          Nivia Cagle presents to [facility]  Nasal congestion, cough, initially had sore throat 5 days ago.  Symptoms worsened over the weekend.  Patient states that she is living with her son and his family currently and her grandson and son were diagnosed with covid. She is having ear fullness and sinus pressure.  She has been vaccinated and recently had her booster. She has been taking dayquil/nyquil and drinking fluids. No fever, trouble breathing, shortness of breath or chest pain.        Objective   Vital Signs:   There were no vitals taken for this visit.    Physical Exam     Gen: well-nourished, no acute distress    HENT: atraumatic, normocephalic    Eyes: extraocular movements intact, no scleral icterus    Lung: breathing comfortably, no cough    Skin: no visible rash, no lesions    Neuro: grossly oriented to person, place, and time. no facial droop     Psych: normal mood and affect      Result Review :          Procedures      Assessment and Plan    Diagnoses and all orders for this visit:    1. Close exposure to COVID-19 virus  (Primary)  Assessment & Plan:  Would assume that patient likely has COVID due to close exposures and current symptoms. Would expect this to continue to improve on its own within the next few days.  Continue conservative treatment with rest, fluid, Mucinex.  If sinus symptoms persist or worsen could consider sending an antibiotic for sinus infection.  If patient develops fever, difficulty breathing, or other worsening symptoms patient needs to come to clinic for further evaluation or go to the ER.        2. Acute URI    Other orders  -     guaiFENesin (Mucinex) 600 MG 12 hr tablet; Take 2 tablets by mouth 2 (Two) Times a Day for 10 days.  Dispense: 40 tablet; Refill: 0            Follow Up   Return if symptoms worsen or fail to improve.  Patient was given instructions and counseling regarding her condition or for health maintenance advice. Please see specific information pulled into the AVS if appropriate.

## 2022-01-04 NOTE — ASSESSMENT & PLAN NOTE
Would assume that patient likely has COVID due to close exposures and current symptoms. Would expect this to continue to improve on its own within the next few days.  Continue conservative treatment with rest, fluid, Mucinex.  If sinus symptoms persist or worsen could consider sending an antibiotic for sinus infection.  If patient develops fever, difficulty breathing, or other worsening symptoms patient needs to come to clinic for further evaluation or go to the ER.

## 2022-01-05 ENCOUNTER — TELEMEDICINE (OUTPATIENT)
Dept: FAMILY MEDICINE CLINIC | Facility: TELEHEALTH | Age: 72
End: 2022-01-05

## 2022-01-05 ENCOUNTER — TELEPHONE (OUTPATIENT)
Dept: INTERNAL MEDICINE | Facility: CLINIC | Age: 72
End: 2022-01-05

## 2022-01-05 VITALS — TEMPERATURE: 101 F

## 2022-01-05 DIAGNOSIS — F41.1 GENERALIZED ANXIETY DISORDER: ICD-10-CM

## 2022-01-05 DIAGNOSIS — F33.2 SEVERE EPISODE OF RECURRENT MAJOR DEPRESSIVE DISORDER, WITHOUT PSYCHOTIC FEATURES: ICD-10-CM

## 2022-01-05 DIAGNOSIS — J32.9 SINUSITIS, UNSPECIFIED CHRONICITY, UNSPECIFIED LOCATION: Primary | ICD-10-CM

## 2022-01-05 PROCEDURE — 99213 OFFICE O/P EST LOW 20 MIN: CPT | Performed by: NURSE PRACTITIONER

## 2022-01-05 RX ORDER — FLUOXETINE HYDROCHLORIDE 60 MG/1
60 TABLET, FILM COATED ORAL; ORAL
COMMUNITY
Start: 2021-07-01 | End: 2022-01-18

## 2022-01-05 RX ORDER — ARIPIPRAZOLE 2 MG/1
TABLET ORAL
Qty: 120 TABLET | Refills: 0 | Status: SHIPPED | OUTPATIENT
Start: 2022-01-05 | End: 2022-03-17 | Stop reason: SDUPTHER

## 2022-01-05 RX ORDER — BUPROPION HYDROCHLORIDE 300 MG/1
TABLET ORAL
Qty: 60 TABLET | Refills: 0 | Status: SHIPPED | OUTPATIENT
Start: 2022-01-05 | End: 2022-03-17 | Stop reason: SDUPTHER

## 2022-01-05 RX ORDER — METHYLPREDNISOLONE 4 MG/1
TABLET ORAL
Qty: 1 EACH | Refills: 0 | Status: SHIPPED | OUTPATIENT
Start: 2022-01-05 | End: 2022-01-18

## 2022-01-05 RX ORDER — BENZONATATE 200 MG/1
200 CAPSULE ORAL 3 TIMES DAILY PRN
Qty: 30 CAPSULE | Refills: 0 | Status: SHIPPED | OUTPATIENT
Start: 2022-01-05 | End: 2022-01-15

## 2022-01-05 RX ORDER — AMOXICILLIN AND CLAVULANATE POTASSIUM 875; 125 MG/1; MG/1
1 TABLET, FILM COATED ORAL 2 TIMES DAILY
Qty: 14 TABLET | Refills: 0 | Status: SHIPPED | OUTPATIENT
Start: 2022-01-05 | End: 2022-01-12

## 2022-01-05 RX ORDER — TIMOLOL MALEATE 5 MG/ML
SOLUTION/ DROPS OPHTHALMIC
COMMUNITY
Start: 2021-12-10 | End: 2022-01-18

## 2022-01-05 RX ORDER — ARIPIPRAZOLE 2 MG/1
TABLET ORAL
COMMUNITY
Start: 2021-12-30 | End: 2022-01-18

## 2022-01-05 NOTE — TELEPHONE ENCOUNTER
"Patient filled Abilify #120 with 2 refills on 12/30/2021.  Per pharmacy \"patient is requesting future refills to prevent missed doses.   Patient has F/U appt 01/18/2022  "

## 2022-01-05 NOTE — TELEPHONE ENCOUNTER
Called patient spoke with her and she stated it was her left eye that was infected. I informed her since she had surgery she needs to contact her eye doctor because it can be from where she had the surgery. Patient was a little upset and didn't understand why. I told her to either contact her eye doctor or make an appointment to be evaluated in office. She didn't give me an answer on what she was going to do she just said ok.

## 2022-01-05 NOTE — TELEPHONE ENCOUNTER
Red rule verified and correct.    Had a telehealth appt yesterday with Rae BAILON.    Feels worse today.  C/o achy muscles, pink eye to L eye, green nasal and cough production.  No wheezing. Does not feel like she is running a temp, but does not have a thermometer.    Pt believes she has pink eye:     Which eye is affected: L    Is the sclera red: yes    Does it look like blood in the eye: no    Is there drainage: yes, color  yellow    Did the pt wake up with the eye matted shut: yes     Is there fever: no    Is there a burning sensation of the affected eye: no    If no, is there a FB sensation: no    Is there visual disturbance: no (blurry from d/c.)        Her youngest grandson tested positive for covid 1/1/21.    Her older son has the same s/s and he was seen yesterday with his provider and was given steroids and an abx. He was covid neg.    She has not picked up mucinex yet.

## 2022-01-05 NOTE — TELEPHONE ENCOUNTER
See below- but addendum: due to recent eye injury/trauma, she needs to contact EYE DOCTOR regarding eye symptoms. We cannot rx medicine for eye here since she recently had surgery and is already using different medication drops.

## 2022-01-05 NOTE — TELEPHONE ENCOUNTER
If pt having breathing difficulty needs to go to ER. If she is concerned about breathing issues but not having sob, schedule apt to be eval in person. I can call in a drop for her eye, but it is likely viral. Warm compress, change all bedding/sheets/towels.  Really needs in person eval to know what is needed.  Catalina Madsen PA-C

## 2022-01-06 NOTE — PATIENT INSTRUCTIONS
Order has been placed for SARS-CoV-2 (Coronavirus) test to be done at your nearest [facility]. You don't need to call the Urgent Care, just let them know when you arrive that you have been assessed by a Virtual Care Provider and that you have an order for testing. We will call with results when they are available. The results will be released to your Pax8 bud. A Pax8 message will also be sent after the first attempted call to notify you that your test results are available. Continue to treat your symptoms just as you would for any viral illness. Take Tylenol and/or Ibuprofen for pain and/or fever, you may find it better to alternate these every 4 hours, so that you are only taking each every 8 hours. Stay hydrated, rest and you may treat cough with over-the-counter cough syrup such as Robitussin. You will need to Self Quarantine (instructions are being sent to Pax[employer]) until the following criteria has been met:  --it has been 10 days from onset of symptoms  --minimum of 24 hours fever free without fever reducing medication  --AND improvement of symptoms  Continue to wear a mask for 14 days or until symptoms resolve. If symptoms worsen, go to Urgent Care or Emergency Department for care.    Sinusitis, Adult  Sinusitis is soreness and swelling (inflammation) of your sinuses. Sinuses are hollow spaces in the bones around your face. They are located:  · Around your eyes.  · In the middle of your forehead.  · Behind your nose.  · In your cheekbones.  Your sinuses and nasal passages are lined with a fluid called mucus. Mucus drains out of your sinuses. Swelling can trap mucus in your sinuses. This lets germs (bacteria, virus, or fungus) grow, which leads to infection. Most of the time, this condition is caused by a virus.  What are the causes?  This condition is caused by:  · Allergies.  · Asthma.  · Germs.  · Things that block your nose or sinuses.  · Growths in the nose (nasal polyps).  · Chemicals or  irritants in the air.  · Fungus (rare).  What increases the risk?  You are more likely to develop this condition if:  · You have a weak body defense system (immune system).  · You do a lot of swimming or diving.  · You use nasal sprays too much.  · You smoke.  What are the signs or symptoms?  The main symptoms of this condition are pain and a feeling of pressure around the sinuses. Other symptoms include:  · Stuffy nose (congestion).  · Runny nose (drainage).  · Swelling and warmth in the sinuses.  · Headache.  · Toothache.  · A cough that may get worse at night.  · Mucus that collects in the throat or the back of the nose (postnasal drip).  · Being unable to smell and taste.  · Being very tired (fatigue).  · A fever.  · Sore throat.  · Bad breath.  How is this diagnosed?  This condition is diagnosed based on:  · Your symptoms.  · Your medical history.  · A physical exam.  · Tests to find out if your condition is short-term (acute) or long-term (chronic). Your doctor may:  ? Check your nose for growths (polyps).  ? Check your sinuses using a tool that has a light (endoscope).  ? Check for allergies or germs.  ? Do imaging tests, such as an MRI or CT scan.  How is this treated?  Treatment for this condition depends on the cause and whether it is short-term or long-term.  · If caused by a virus, your symptoms should go away on their own within 10 days. You may be given medicines to relieve symptoms. They include:  ? Medicines that shrink swollen tissue in the nose.  ? Medicines that treat allergies (antihistamines).  ? A spray that treats swelling of the nostrils.   ? Rinses that help get rid of thick mucus in your nose (nasal saline washes).  · If caused by bacteria, your doctor may wait to see if you will get better without treatment. You may be given antibiotic medicine if you have:  ? A very bad infection.  ? A weak body defense system.  · If caused by growths in the nose, you may need to have surgery.  Follow  these instructions at home:  Medicines  · Take, use, or apply over-the-counter and prescription medicines only as told by your doctor. These may include nasal sprays.  · If you were prescribed an antibiotic medicine, take it as told by your doctor. Do not stop taking the antibiotic even if you start to feel better.  Hydrate and humidify    · Drink enough water to keep your pee (urine) pale yellow.  · Use a cool mist humidifier to keep the humidity level in your home above 50%.  · Breathe in steam for 10-15 minutes, 3-4 times a day, or as told by your doctor. You can do this in the bathroom while a hot shower is running.  · Try not to spend time in cool or dry air.    Rest  · Rest as much as you can.  · Sleep with your head raised (elevated).  · Make sure you get enough sleep each night.  General instructions    · Put a warm, moist washcloth on your face 3-4 times a day, or as often as told by your doctor. This will help with discomfort.  · Wash your hands often with soap and water. If there is no soap and water, use hand .  · Do not smoke. Avoid being around people who are smoking (secondhand smoke).  · Keep all follow-up visits as told by your doctor. This is important.    Contact a doctor if:  · You have a fever.  · Your symptoms get worse.  · Your symptoms do not get better within 10 days.  Get help right away if:  · You have a very bad headache.  · You cannot stop throwing up (vomiting).  · You have very bad pain or swelling around your face or eyes.  · You have trouble seeing.  · You feel confused.  · Your neck is stiff.  · You have trouble breathing.  Summary  · Sinusitis is swelling of your sinuses. Sinuses are hollow spaces in the bones around your face.  · This condition is caused by tissues in your nose that become inflamed or swollen. This traps germs. These can lead to infection.  · If you were prescribed an antibiotic medicine, take it as told by your doctor. Do not stop taking it even if you  start to feel better.  · Keep all follow-up visits as told by your doctor. This is important.  This information is not intended to replace advice given to you by your health care provider. Make sure you discuss any questions you have with your health care provider.  Document Revised: 05/20/2019 Document Reviewed: 05/20/2019  [employer] Patient Education © 2021 [employer]

## 2022-01-06 NOTE — PROGRESS NOTES
Mode of Visit: Video  Location of patient: home  You have chosen to receive care through a telehealth visit.  The patient has signed the video visit consent form.  The visit included audio and video interaction. No technical issues occurred during this visit.     Chief Complaint  URI (grandson + for Covid on Sunday)    Subjective          Nivia Cagle presents to [facility]  Sore throat started about a week ago with congestion starting in the nose about 4-5 days ago but seems to be moving into her chest with increasing cough. She reports fever up to 101 today. She is coughing yellow to green sputum. Her grandson was found to have Covid a few days ago and she is concerned that she may be positive. Her son was tested today and was negative.    URI   The current episode started in the past 7 days. The problem has been gradually worsening. The maximum temperature recorded prior to her arrival was 101 - 101.9 F. Associated symptoms include congestion, coughing, ear pain, headaches, a plugged ear sensation, sinus pain and a sore throat. Pertinent negatives include no chest pain or wheezing.           Review of Systems   HENT: Positive for congestion, ear pain, sinus pain and sore throat.    Respiratory: Positive for cough. Negative for wheezing.    Cardiovascular: Negative for chest pain.   Neurological: Positive for headaches.               Objective   Vital Signs:   Temp (!) 101 °F (38.3 °C)     Physical Exam   Constitutional: She appears well-developed and well-nourished. She appears ill. No distress.   HENT:   Nose: Congestion present. Right sinus exhibits maxillary sinus tenderness. Right sinus exhibits no frontal sinus tenderness. Left sinus exhibits maxillary sinus tenderness. Left sinus exhibits no frontal sinus tenderness.   Mouth/Throat: Mucous membranes are erythematous. No oropharyngeal exudate. No tonsillar exudate. no white patches  Pulmonary/Chest: Effort normal.  No  respiratory distress (frequent cough).  Neurological: She is alert.   Psychiatric: She has a normal mood and affect.     Result Review :                 Assessment and Plan    Diagnoses and all orders for this visit:    1. Sinusitis, unspecified chronicity, unspecified location (Primary)  -     amoxicillin-clavulanate (AUGMENTIN) 875-125 MG per tablet; Take 1 tablet by mouth 2 (Two) Times a Day for 7 days.  Dispense: 14 tablet; Refill: 0  -     methylPREDNISolone (MEDROL) 4 MG dose pack; Take as directed on package instructions.  Dispense: 1 each; Refill: 0  -     benzonatate (TESSALON) 200 MG capsule; Take 1 capsule by mouth 3 (Three) Times a Day As Needed for Cough for up to 10 days.  Dispense: 30 capsule; Refill: 0  -     COVID-19 PCR, [facility], NP SWAB IN [facility] VIRAL TRANSPORT MEDIA/ORAL SWISH 24-30 HR TAT - Swab, Nasopharynx; Future              I spent 20 minutes caring for Nivia on this date of service. This time includes time spent by me in the following activities:preparing for the visit, obtaining and/or reviewing a separately obtained history, performing a medically appropriate examination and/or evaluation , counseling and educating the patient/family/caregiver, ordering medications, tests, or procedures, and documenting information in the medical record  Follow Up   Return if symptoms worsen or fail to improve.  Patient was given instructions and counseling regarding her condition or for health maintenance advice. Please see specific information pulled into the AVS if appropriate.

## 2022-01-10 PROCEDURE — U0005 INFEC AGEN DETEC AMPLI PROBE: HCPCS | Performed by: NURSE PRACTITIONER

## 2022-01-10 PROCEDURE — U0004 COV-19 TEST NON-CDC HGH THRU: HCPCS | Performed by: NURSE PRACTITIONER

## 2022-01-12 ENCOUNTER — OFFICE VISIT (OUTPATIENT)
Dept: SLEEP MEDICINE | Facility: HOSPITAL | Age: 72
End: 2022-01-12

## 2022-01-12 VITALS
TEMPERATURE: 98 F | WEIGHT: 170 LBS | OXYGEN SATURATION: 96 % | DIASTOLIC BLOOD PRESSURE: 105 MMHG | BODY MASS INDEX: 30.12 KG/M2 | SYSTOLIC BLOOD PRESSURE: 186 MMHG | HEART RATE: 77 BPM | HEIGHT: 63 IN

## 2022-01-12 DIAGNOSIS — F51.04 PSYCHOPHYSIOLOGICAL INSOMNIA: Primary | ICD-10-CM

## 2022-01-12 PROCEDURE — G0463 HOSPITAL OUTPT CLINIC VISIT: HCPCS | Performed by: PSYCHIATRY & NEUROLOGY

## 2022-01-18 ENCOUNTER — TELEMEDICINE (OUTPATIENT)
Dept: PSYCHIATRY | Facility: CLINIC | Age: 72
End: 2022-01-18

## 2022-01-18 DIAGNOSIS — F41.1 GENERALIZED ANXIETY DISORDER: ICD-10-CM

## 2022-01-18 DIAGNOSIS — F33.2 SEVERE EPISODE OF RECURRENT MAJOR DEPRESSIVE DISORDER, WITHOUT PSYCHOTIC FEATURES: Primary | ICD-10-CM

## 2022-01-18 DIAGNOSIS — F60.9 CLUSTER B PERSONALITY DISORDER IN ADULT: ICD-10-CM

## 2022-01-18 DIAGNOSIS — F43.10 POST TRAUMATIC STRESS DISORDER (PTSD): ICD-10-CM

## 2022-01-18 DIAGNOSIS — F51.05 INSOMNIA DUE TO MENTAL CONDITION: ICD-10-CM

## 2022-01-18 PROCEDURE — 99214 OFFICE O/P EST MOD 30 MIN: CPT | Performed by: STUDENT IN AN ORGANIZED HEALTH CARE EDUCATION/TRAINING PROGRAM

## 2022-01-18 PROCEDURE — 90833 PSYTX W PT W E/M 30 MIN: CPT | Performed by: STUDENT IN AN ORGANIZED HEALTH CARE EDUCATION/TRAINING PROGRAM

## 2022-01-18 NOTE — PROGRESS NOTES
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 abilify will be 8 mg (prozac inhibits 2D6, doubling abilify's concentration).     IMPORTANT:  Consider brexpiprazole.    Very important to obtain records from previous psychiatrist.  Care should be taken when putting patient on sedating medications as she has a falls risk.  Also has a history of EARL which will lead to difficulties treating her insomnia.    Therapy referral made to [facility].      See back in 8 weeks.  Patient is not vaccinated.    Visit Diagnoses:    ICD-10-CM ICD-9-CM   1. Severe episode of recurrent major depressive disorder, without psychotic features (HCC)  F33.2 296.33   2. Generalized anxiety disorder  F41.1 300.02   3. Post traumatic stress disorder (PTSD)  F43.10 309.81   4. Cluster B personality disorder in adult ([facility])  F60.9 301.89   5. Insomnia due to mental condition  F51.05 300.9     327.02       PLAN:  51. Risk Assessment: Risk of self-harm acutely is moderate. Risk factors include chronic depressive disorder, possible personality disorder, recent psychosocial stressors (pandemic, moving). Protective factors include no present SI, no history of suicide attempts or self-harm in the past, no access to weapons, minimal AODA, healthcare seeking, future orientation, willingness to engage in care. Risk of self-harm chronically is also  moderate, but could be further elevated in the event of treatment noncompliance and/or AODA.  52. Safety: No acute safety concerns.  53. Medications:   a. CONTINUE melatonin 10 mg p.o. nightly.  Risks, benefits, side effects discussed with patient including sedation, dizziness/falls risk, GI upset.  After discussion of these risks and benefits, the patient voiced understanding and agreed to proceed.  b. CONTINUE trazodone 50 mg PO QHS. Risks, benefits, side effects discussed with patient including GI upset, sedation, dizziness/falls risk, grogginess the following day, prolongation of the QTc interval.  After discussion of these risks and benefits, the patient voiced understanding and agreed to proceed.    c. CONTINUE bupropion xl 300 mg daily. Risks, benefits, alternatives discussed with patient including nausea, GI upset, increased energy, exacerbation of irritability, insomnia, lowering of seizure threshold.  After discussion of these risks and benefits, the patient voiced understanding and agreed to proceed.  d. CONTINUE Prozac 20 mg a day. Risks, benefits, alternatives discussed with patient including GI upset, nausea vomiting diarrhea, theoretical decrease of seizure threshold predisposing the patient to a slightly higher seizure risk, headaches, sexual dysfunction, serotonin syndrome, bleeding risk, increased suicidality in patients 24 years and younger.  After discussion of these risks and benefits, the patient voiced understanding and agreed to proceed.  e. CONTINUE Abilify 4 mg p.o. daily to target depression, anxiety, decreased energy. Risks, benefits, alternatives discussed with patient including increased energy, exacerbation of irritability, akathisia, GI upset, orthostatic hypotension, increased appetite. After discussion of these risks and benefits, the patient voiced understanding and agreed to proceed.  i. S/P:  1. Mirtazapine 45 mg daily (RLS)  54. Therapy: referred to [facility] today  12/7.  55. Labs/Studies: s/p TMS referral.  56. Follow Up: 4 weeks.      TREATMENT PLAN/GOALS: Continue supportive psychotherapy efforts and medications as indicated. Treatment and medication options discussed during today's visit. Patient acknowledged and verbally consented to continue with current treatment plan and was educated on the importance of compliance with treatment and follow-up appointments.    MEDICATION ISSUES:  SAPNA reviewed as expected.  Discussed medication options and treatment plan of prescribed medication as well as the risks, benefits, and side effects including potential falls, possible impaired driving and metabolic adversities among others. Patient is agreeable to call the office with any worsening of symptoms or onset of side effects. Patient is agreeable to call 911 or go to the nearest ER should he/she begin having SI/HI. No medication side effects or related complaints today.     MEDS ORDERED DURING VISIT:  No orders of the defined types were placed in this encounter.      Return in about 4 weeks (around 2/15/2022).         This document has been electronically signed by Vicky Barth MD  January 18, 2022 10:54 EST      Part of this note may be an electronic transcription/translation of spoken language to printed text using the Dragon Dictation System.

## 2022-02-07 DIAGNOSIS — G47.33 OSA (OBSTRUCTIVE SLEEP APNEA): Primary | ICD-10-CM

## 2022-02-07 NOTE — PROGRESS NOTES
"  [facility] sleep center    Nivia Cagle  1950  71 y.o.  female      PCP:Catalina Madsen PA-C    Type of service: Initial New Patient Office Visit  Date of service: 01/12/2022   The patient was referred because of obstructive sleep apnea.  She reports that her CPAP machine has been recalled.    History of present illness;  Nivia Cagle is a new patient for me.She reports that her CPAP machine has been recalled and she was sent by her neurologist regarding this .    Patient reports that she has been having difficulty going to sleep and staying asleep for \"a long time\"  going on for several years.  She says that she had 2 sleep studies done, for  restless and fragmented sleep.  The first  was done in [place].  The second  was done in [place].  She is not aware of the details of the results of her sleep studies  but was placed on CPAP and following CPAP treatment, she had better sleep continuity.  She has used her CPAP machine until  the time that she heard about the recall around September 2021. Review of her sleep studies done in [place] ([facility]) on 6/25/2019 showed the following findings: Sleep efficiency was 64%.  Latency to sleep onset was 35.5 minutes and REM onset 235.5 minutes.Sleep stages consisted of N1  11.5%, N2 73% N3 0%  and  stage REM  12%.  Respiratory events consisted 3 obstructive apneas and 30 hypo-apneas.  Apnea-hypopnea index 6/h total sleep time, 11/h in REM sleep.  Oximetry data showed average oxygen saturation of 96%.  Highest SPO2 98% lowest SPO2 90%.  Periodic limb movement index 21.6/h.  The patient weighed 180 pounds at the time of her sleep study.    The patient also reports that she has restless leg syndrome and has been placed on medications for this.  The pramipexole she was apparently prescribed first did not work. She has also been tried on Clonazepam and more recently Gabapentin.  She also reports that she has muscle jerking which " wakes her up at night.  She believes that her muscle jerking at night is probably medication related.The patient denies any history of anemia.    The patient has a history of difficulty initiating at night and for the past 3 months has been placed on trazodone 50 mg.      Patient gives the following sleep history.  Sleep schedule:  Bedtime: 9-10 PM   Wake time: 7 AM   Normally takes about 2-3 hoursminutes to fall asleep  Average hours of sleep Varies according to the patient  Number of naps per day 1  The patient reports that she is tired when she wakes up    Symptoms  In addition to snoring and nonrestorative sleep   Have you ever awakened gasping for breath, coughing, choking: No   Change in weight,  No   Morning headaches  Yes   Awaken with a sore throat or dry mouth  Yes   Leg jerking at night:  Yes   Crawly feeling/urge sensation to move in the legs: Yes   Teeth grinding:Yes   Have you ever awakened at night with a sour taste or burning sensation in your chest:  No   Do you have muscle weakness with laughing or anger or sleep paralysis:  No   Have you ever felt paralyzed while going to sleep or waking up:  Yes   Sleepwalking, nightmares, No   Nocturia (urination at night): 2-5 times per night  Memory Problem:No     Past medical history: (Relevant to sleep medicine)  1. ADHD  2. Anxiety and depression  3. Hypertension  4. Restless legs  5. Diabetes  6. Acid reflux  7. Arthritis    • Medications are reviewed by me and documented in the encounter  • Allergies reviewed and documented in encounter    Social history:  Do you drive a commercial vehicle:  No .  Shift work:  No   Tobacco use:  Yes , Previously smoked  Alcohol use:  Only on social occasions   Caffeinated drinks: 1-2 cups of coffee or tea  The patient is a retired Illustrator.    FAMILY HISTORY (Your mother, father, brothers and sisters)  1. Sleep apnea  2. Insomnia    REVIEW OF SYSTEMS.  Full review of systems available on the intake form which is  "scanned in the media tab.  The relevant positive are noted below  1. [place] Sleepiness Scale :Total score: 4   2. Snoring  3. Nasal congestion and post nasal drip  4. Mouth sores  5. Swollen ankles  6. Muscle cramps      Physical exam:  Vitals:    01/12/22 1100   BP: (!) 186/105   Pulse: 77   Temp: 98 °F (36.7 °C)   SpO2: 96%   Weight: 77.1 kg (170 lb)   Height: 160 cm (63\")    Body mass index is 30.11 kg/m².    Physical Exam  Vitals reviewed.   Constitutional:       Appearance: Normal appearance.   HENT:      Head: Normocephalic and atraumatic.      Nose: Nose normal.      Mouth/Throat:      Mouth: Mucous membranes are moist.      Comments: Mallampati class II.  Small airway.  Neck:      Vascular: No carotid bruit.   Cardiovascular:      Rate and Rhythm: Normal rate and regular rhythm.      Heart sounds: Normal heart sounds.   Pulmonary:      Effort: Pulmonary effort is normal.      Breath sounds: Normal breath sounds.   Musculoskeletal:      Cervical back: Neck supple. No tenderness.   Neurological:      Mental Status: She is alert and oriented to person, place, and time.   Psychiatric:         Mood and Affect: Mood normal.         Behavior: Behavior normal.         Thought Content: Thought content normal.          Office notes from care team reviewed. NeurologyOffice note dated  ,reviewed  Results of sleep studies reviewed from [facility] (see HPI)  Labs reviewed.  TSH Results:  TSH    TSH 4/21/21 9/21/21   TSH 2.370 1.450            Most Recent A1C    HGBA1C Most Recent 9/21/21   Hemoglobin A1C 6.50 (A)   (A) Abnormal value           Compliance information was  Reviewed from  7/18/2021 to 10/15/2021 showing 15.6% days  with device usage.  Average usage for  days used: 2 hours 26 minutes.  Percent of days with usage equal to or greater than 4 hours 4.4% average AHI is 9.7.  She is on 5 cm water pressure.    Assessment and plan:  Obstructive sleep apnea currently untreated  Patient is not " using her CPAP machine due to equipment recall  Restless leg syndrome by history  Chronic insomnia disorder    I have also discussed with the patient the following  •  Equipment recall discussed  • After we are able to review the results of her sleep studies, we will be able to order supplies from a local [employer] company.  • She will also probably benefit from a mask fitting as her current mask is not comfortable.  •   Patient's questions were answered.      Georgiana Mcdaniel MD  2/7/2022     Addendum: Date 4/26/2022    The patient has benefited from CPAP use in the past with better sleep continuity.  By history, she is apparently compliant.  Since the equipment recall however, she has discontinued usage as she is scared to use the machine.    The patient will benefit if she goes back on CPAP treatment .  She will need a new CPAP machine for this.    [place]iana Mcdaniel M.D.

## 2022-02-08 ENCOUNTER — TELEMEDICINE (OUTPATIENT)
Dept: PSYCHIATRY | Facility: CLINIC | Age: 72
End: 2022-02-08

## 2022-02-08 DIAGNOSIS — F33.2 SEVERE EPISODE OF RECURRENT MAJOR DEPRESSIVE DISORDER, WITHOUT PSYCHOTIC FEATURES: Primary | ICD-10-CM

## 2022-02-08 DIAGNOSIS — F41.1 GENERALIZED ANXIETY DISORDER: ICD-10-CM

## 2022-02-08 PROCEDURE — 90832 PSYTX W PT 30 MINUTES: CPT | Performed by: COUNSELOR

## 2022-02-08 NOTE — PROGRESS NOTES
Date: February 8, 2022  Time In: 1238  Time Out: 1321  This provider is located at the [facility]through [facility]), [address], [place] [address] using a secure [employer] Video Visit through [employer]. Patient is being seen remotely via telehealth at home address in [place] and stated they are in a secure environment for this session. The patient's condition being diagnosed/treated is appropriate for telemedicine. The provider identified herself as well as her credentials. The patient, and/or patients guardian, consent to be seen remotely, and when consent is given they understand that the consent allows for patient identifiable information to be sent to a third party as needed. They may refuse to be seen remotely at any time. The electronic data is encrypted and password protected, and the patient and/or guardian has been advised of the potential risks to privacy not withstanding such measures.     You have chosen to receive care through a telehealth visit.  Do you consent to use a video/audio connection for your medical care today? Yes    PROGRESS NOTE  Data:  Nivia Cagle is a 71 y.o. female who presents today for follow up    Chief Complaint: anxiety    History of Present Illness: Patient discussed multiple changes and transitions within her life. Patient reports that most if not all of these changes are outside of her control and discussed the need to practice radical acceptance. Patient discussed that it has been more difficult to focus on positive aspects of her life but believes if she does not address this perspective her mood will worsen. Patient reports discomfort and vision problems. Patient reports limited support in [place] as well as the loss of loved ones in [place] recently. Patient reports that she is trying to be proactive and counter thinking patterns but expressed this has been more of a barrier than what she is used to.       Clinical  Maneuvering/Intervention:    (Scales based on 0 - 10 with 10 being the worst)  Depression: 7 Anxiety: 7     LADI-7  Feeling nervous, anxious or on edge: (P) Several days  Not being able to stop or control worrying: (P) Several days  Worrying too much about different things: (P) Several days  Trouble Relaxing: (P) Several days  Being so restless that it is hard to sit still: (P) Several days  Feeling afraid as if something awful might happen: (P) Several days  Becoming easily annoyed or irritable: (P) Several days  LADI 7 Total Score: (P) 7  If you checked any problems, how difficult have these problems made it for you to do your work, take care of things at home, or get along with other people: (P) Somewhat difficult   PHQ-9 Total Score: (P) 10     Assisted patient in processing above session content; acknowledged and normalized patient’s thoughts, feelings, and concerns.  Rationalized patient thought process regarding how anxiety and mood can worsen when it comes to uncertainty and/or even dread. Encouraged Patient to identify any positives associated with upcoming changes in her life to be reviewed during next week's session.     Allowed patient to freely discuss issues without interruption or judgment. Provided safe, confidential environment to facilitate the development of positive therapeutic relationship and encourage open, honest communication. Assisted patient in identifying risk factors which would indicate the need for higher level of care including thoughts to harm self or others and/or self-harming behavior and encouraged patient to contact this office, call 911, or present to the nearest emergency room should any of these events occur. Discussed crisis intervention services and means to access. Patient adamantly and convincingly denies current suicidal or homicidal ideation or perceptual disturbance.    Assessment:   Assessment   Patient appears to maintain relative stability as compared to their  baseline.  However, patient continues to struggle with depression and anxiety which continues to cause impairment in important areas of functioning.  A result, they can be reasonably expected to continue to benefit from treatment and would likely be at increased risk for decompensation otherwise.    Mental Status Exam:   Hygiene:   good  Cooperation:  Cooperative  Eye Contact:  Good  Psychomotor Behavior:  Appropriate  Affect:  Appropriate  Mood: normal  Speech:  Normal  Thought Process:  Linear  Thought Content:  Mood congruent  Suicidal:  None  Homicidal:  None  Hallucinations:  None  Delusion:  None  Memory:  Intact  Orientation:  Person, Place, Time and Situation  Reliability:  fair  Insight:  Fair  Judgement:  Fair  Impulse Control:  Fair  Physical/Medical Issues:  No        Patient's Support Network Includes:  son    Functional Status: Mild impairment     Progress toward goal: Not at goal    Prognosis: Fair with Ongoing Treatment          Plan:    Patient will continue in individual outpatient therapy with focus on improved functioning and coping skills, maintaining stability, and avoiding decompensation and the need for higher level of care.    Patient will adhere to medication regimen as prescribed and report any side effects. Patient will contact this office, call 911 or present to the nearest emergency room should suicidal or homicidal ideations occur. Provide Cognitive Behavioral Therapy and Solution Focused Therapy to improve functioning, maintain stability, and avoid decompensation and the need for higher level of care.     Return in about 1 week, or earlier if symptoms worsen or fail to improve.           VISIT DIAGNOSIS:     ICD-10-CM ICD-9-CM   1. Severe episode of recurrent major depressive disorder, without psychotic features ([facility])  F33.2 296.33   2. Generalized anxiety disorder  F41.1 300.02          [facility] No Show Policy:  We understand unexpected circumstances arise;  however, anytime you miss your appointment we are unable to provide you appropriate care.  In addition, each appointment missed could have been used to provide care for others.  We ask that you call at least 24 hours in advance to cancel or reschedule an appointment.  We would like to take this opportunity to remind you of our policy stating patients who miss THREE or more appointments without cancelling or rescheduling 24 hours in advance of the appointment may be subject to cancellation of any further visits with our clinic and recommendation to seek in-person services/visits.    Please call [phone] to reschedule your appointment. If there are reasons that make it difficult for you to keep the appointments, please call and let us know how we can help.  Please understand that medication prescribing will not continue without seeing your provider.      [facility]'s No Show Policy reviewed with patient at today's visit. Patient verbalized understanding of this policy. Discussed with patient that in the event that there are three or more no show visits, it will be recommended that they pursue in-person services/visits as noncompliance with telehealth visits indicates that patient is not an appropriate candidate for telemedicine and would likely be more appropriate for in-person services/visits. Patient verbalizes understanding and is agreeable to this.        This document has been electronically signed by Annita Polo LCSW  February 8, 2022 14:06 EST      Part of this note may be an electronic transcription/translation of spoken language to printed text using the Dragon Dictation System.

## 2022-02-14 ENCOUNTER — TRANSCRIBE ORDERS (OUTPATIENT)
Dept: ADMINISTRATIVE | Facility: HOSPITAL | Age: 72
End: 2022-02-14

## 2022-02-14 DIAGNOSIS — Z78.0 POST-MENOPAUSAL: Primary | ICD-10-CM

## 2022-02-15 ENCOUNTER — LAB (OUTPATIENT)
Dept: LAB | Facility: HOSPITAL | Age: 72
End: 2022-02-15

## 2022-02-15 ENCOUNTER — TELEMEDICINE (OUTPATIENT)
Dept: PSYCHIATRY | Facility: CLINIC | Age: 72
End: 2022-02-15

## 2022-02-15 ENCOUNTER — TRANSCRIBE ORDERS (OUTPATIENT)
Dept: LAB | Facility: HOSPITAL | Age: 72
End: 2022-02-15

## 2022-02-15 ENCOUNTER — HOSPITAL ENCOUNTER (OUTPATIENT)
Dept: BONE DENSITY | Facility: HOSPITAL | Age: 72
Discharge: HOME | End: 2022-02-15

## 2022-02-15 DIAGNOSIS — F33.2 SEVERE EPISODE OF RECURRENT MAJOR DEPRESSIVE DISORDER, WITHOUT PSYCHOTIC FEATURES: Primary | ICD-10-CM

## 2022-02-15 DIAGNOSIS — R70.0 ELEVATED SEDIMENTATION RATE: ICD-10-CM

## 2022-02-15 DIAGNOSIS — Z86.39 HISTORY OF VITAMIN D DEFICIENCY: ICD-10-CM

## 2022-02-15 DIAGNOSIS — F41.1 GENERALIZED ANXIETY DISORDER: ICD-10-CM

## 2022-02-15 DIAGNOSIS — M15.9 GENERALIZED OSTEOARTHROSIS, INVOLVING MULTIPLE SITES: ICD-10-CM

## 2022-02-15 DIAGNOSIS — Z13.820 OSTEOPOROSIS SCREENING: Primary | ICD-10-CM

## 2022-02-15 DIAGNOSIS — I10 ESSENTIAL HYPERTENSION: ICD-10-CM

## 2022-02-15 DIAGNOSIS — Z78.0 POST-MENOPAUSAL: ICD-10-CM

## 2022-02-15 DIAGNOSIS — E78.2 MIXED HYPERLIPIDEMIA: ICD-10-CM

## 2022-02-15 DIAGNOSIS — E11.65 TYPE 2 DIABETES MELLITUS WITH HYPERGLYCEMIA, WITHOUT LONG-TERM CURRENT USE OF INSULIN: ICD-10-CM

## 2022-02-15 DIAGNOSIS — Z13.820 OSTEOPOROSIS SCREENING: ICD-10-CM

## 2022-02-15 LAB
ALBUMIN SERPL-MCNC: 4.5 G/DL (ref 3.5–5.2)
ALBUMIN/GLOB SERPL: 1.7 G/DL
ALP SERPL-CCNC: 78 U/L (ref 39–117)
ALT SERPL W P-5'-P-CCNC: 14 U/L (ref 1–33)
ANION GAP SERPL CALCULATED.3IONS-SCNC: 10.3 MMOL/L (ref 5–15)
AST SERPL-CCNC: 10 U/L (ref 1–32)
BASOPHILS # BLD AUTO: 0.04 10*3/MM3 (ref 0–0.2)
BASOPHILS NFR BLD AUTO: 0.4 % (ref 0–1.5)
BILIRUB SERPL-MCNC: 0.3 MG/DL (ref 0–1.2)
BUN SERPL-MCNC: 21 MG/DL (ref 8–23)
BUN/CREAT SERPL: 25.3 (ref 7–25)
CALCIUM SPEC-SCNC: 9.7 MG/DL (ref 8.6–10.5)
CHLORIDE SERPL-SCNC: 103 MMOL/L (ref 98–107)
CHOLEST SERPL-MCNC: 195 MG/DL (ref 0–200)
CO2 SERPL-SCNC: 26.7 MMOL/L (ref 22–29)
CREAT SERPL-MCNC: 0.83 MG/DL (ref 0.57–1)
DEPRECATED RDW RBC AUTO: 41.8 FL (ref 37–54)
EOSINOPHIL # BLD AUTO: 0.08 10*3/MM3 (ref 0–0.4)
EOSINOPHIL NFR BLD AUTO: 0.8 % (ref 0.3–6.2)
ERYTHROCYTE [DISTWIDTH] IN BLOOD BY AUTOMATED COUNT: 12.2 % (ref 12.3–15.4)
ERYTHROCYTE [SEDIMENTATION RATE] IN BLOOD: 40 MM/HR (ref 0–30)
GFR SERPL CREATININE-BSD FRML MDRD: 68 ML/MIN/1.73
GLOBULIN UR ELPH-MCNC: 2.6 GM/DL
GLUCOSE SERPL-MCNC: 79 MG/DL (ref 65–99)
HBA1C MFR BLD: 7.1 % (ref 4.8–5.6)
HCT VFR BLD AUTO: 38.5 % (ref 34–46.6)
HDLC SERPL-MCNC: 52 MG/DL (ref 40–60)
HGB BLD-MCNC: 12.7 G/DL (ref 12–15.9)
IMM GRANULOCYTES # BLD AUTO: 0.02 10*3/MM3 (ref 0–0.05)
IMM GRANULOCYTES NFR BLD AUTO: 0.2 % (ref 0–0.5)
LDLC SERPL CALC-MCNC: 124 MG/DL (ref 0–100)
LDLC/HDLC SERPL: 2.35 {RATIO}
LYMPHOCYTES # BLD AUTO: 1.68 10*3/MM3 (ref 0.7–3.1)
LYMPHOCYTES NFR BLD AUTO: 16.4 % (ref 19.6–45.3)
MCH RBC QN AUTO: 30.8 PG (ref 26.6–33)
MCHC RBC AUTO-ENTMCNC: 33 G/DL (ref 31.5–35.7)
MCV RBC AUTO: 93.2 FL (ref 79–97)
MONOCYTES # BLD AUTO: 0.99 10*3/MM3 (ref 0.1–0.9)
MONOCYTES NFR BLD AUTO: 9.6 % (ref 5–12)
NEUTROPHILS NFR BLD AUTO: 7.45 10*3/MM3 (ref 1.7–7)
NEUTROPHILS NFR BLD AUTO: 72.6 % (ref 42.7–76)
NRBC BLD AUTO-RTO: 0 /100 WBC (ref 0–0.2)
PLATELET # BLD AUTO: 257 10*3/MM3 (ref 140–450)
PMV BLD AUTO: 12.1 FL (ref 6–12)
POTASSIUM SERPL-SCNC: 4.4 MMOL/L (ref 3.5–5.2)
PROT SERPL-MCNC: 7.1 G/DL (ref 6–8.5)
RBC # BLD AUTO: 4.13 10*6/MM3 (ref 3.77–5.28)
SODIUM SERPL-SCNC: 140 MMOL/L (ref 136–145)
TRIGL SERPL-MCNC: 105 MG/DL (ref 0–150)
TSH SERPL DL<=0.05 MIU/L-ACNC: 2.57 UIU/ML (ref 0.27–4.2)
VLDLC SERPL-MCNC: 19 MG/DL (ref 5–40)
WBC NRBC COR # BLD: 10.26 10*3/MM3 (ref 3.4–10.8)

## 2022-02-15 PROCEDURE — 77080 DXA BONE DENSITY AXIAL: CPT

## 2022-02-15 PROCEDURE — 36415 COLL VENOUS BLD VENIPUNCTURE: CPT

## 2022-02-15 PROCEDURE — 83036 HEMOGLOBIN GLYCOSYLATED A1C: CPT

## 2022-02-15 PROCEDURE — 85652 RBC SED RATE AUTOMATED: CPT

## 2022-02-15 PROCEDURE — 82306 VITAMIN D 25 HYDROXY: CPT

## 2022-02-15 PROCEDURE — 85025 COMPLETE CBC W/AUTO DIFF WBC: CPT

## 2022-02-15 PROCEDURE — 80061 LIPID PANEL: CPT

## 2022-02-15 PROCEDURE — 90837 PSYTX W PT 60 MINUTES: CPT | Performed by: COUNSELOR

## 2022-02-15 PROCEDURE — 80053 COMPREHEN METABOLIC PANEL: CPT

## 2022-02-15 PROCEDURE — 84443 ASSAY THYROID STIM HORMONE: CPT
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
## 2022-02-16 ENCOUNTER — TELEPHONE (OUTPATIENT)
Dept: PSYCHIATRY | Facility: CLINIC | Age: 72
End: 2022-02-16

## 2022-02-16 ENCOUNTER — TELEMEDICINE (OUTPATIENT)
Dept: PSYCHIATRY | Facility: CLINIC | Age: 72
End: 2022-02-16

## 2022-02-16 DIAGNOSIS — F51.05 INSOMNIA DUE TO MENTAL CONDITION: ICD-10-CM

## 2022-02-16 DIAGNOSIS — F41.1 GENERALIZED ANXIETY DISORDER: ICD-10-CM

## 2022-02-16 DIAGNOSIS — F33.40 RECURRENT MAJOR DEPRESSIVE DISORDER IN REMISSION: Primary | ICD-10-CM

## 2022-02-16 DIAGNOSIS — F43.10 POST TRAUMATIC STRESS DISORDER (PTSD): ICD-10-CM

## 2022-02-16 PROBLEM — Z98.890 OTHER SPECIFIED POSTPROCEDURAL STATES: Status: ACTIVE | Noted: 2021-12-03

## 2022-02-16 PROBLEM — T85.22XA DISLOCATED INTRAOCULAR LENS: Status: ACTIVE | Noted: 2021-11-16

## 2022-02-16 PROBLEM — M19.90 OSTEOARTHRITIS: Status: ACTIVE | Noted: 2022-02-14

## 2022-02-16 LAB — 25(OH)D3 SERPL-MCNC: 35.1 NG/ML

## 2022-02-16 PROCEDURE — 90833 PSYTX W PT W E/M 30 MIN: CPT | Performed by: STUDENT IN AN ORGANIZED HEALTH CARE EDUCATION/TRAINING PROGRAM

## 2022-02-16 PROCEDURE — 99214 OFFICE O/P EST MOD 30 MIN: CPT | Performed by: STUDENT IN AN ORGANIZED HEALTH CARE EDUCATION/TRAINING PROGRAM

## 2022-02-16 RX ORDER — PRAMIPEXOLE DIHYDROCHLORIDE 0.5 MG/1
1 TABLET ORAL
COMMUNITY
Start: 2022-01-21 | End: 2022-03-23 | Stop reason: SDUPTHER

## 2022-02-16 RX ORDER — UREA 10 %
21 LOTION (ML) TOPICAL NIGHTLY
COMMUNITY
End: 2022-09-27

## 2022-02-16 RX ORDER — TIMOLOL MALEATE 5 MG/ML
SOLUTION/ DROPS OPHTHALMIC
COMMUNITY
Start: 2022-02-02 | End: 2022-11-21

## 2022-02-16 NOTE — PATIENT INSTRUCTIONS
1.  Please return to clinic at your next scheduled visit.  Contact the clinic ([phone]) at least 24 hours prior in the event you need to cancel.  2.  Do no harm to yourself or others.    3.  Avoid alcohol and drugs.    4.  Take all medications as prescribed.  Please contact the clinic with any concerns. If you are in need of medication refills, please call the clinic at [phone].    5. Should you want to get in touch with your provider, Dr. Vicky Barth, please utilize [employer] or contact the office ([phone]), and staff will be able to page Dr. Barth directly.  6.  In the event you have personal crisis, contact the following crisis numbers: Suicide Prevention Hotline 1-210.824.7677; [facility] Helpline 9-636-163-VAWP; [facility] Emergency Room [phone]; text HELLO to 776188; or 422.     SPECIFIC RECOMMENDATIONS:     1.      Medications discussed at this encounter:                   -      2.      Psychotherapy recommendations:      3.     Return to clinic: 4 weeks

## 2022-02-22 ENCOUNTER — TELEMEDICINE (OUTPATIENT)
Dept: PSYCHIATRY | Facility: CLINIC | Age: 72
End: 2022-02-22

## 2022-02-22 DIAGNOSIS — F33.40 RECURRENT MAJOR DEPRESSIVE DISORDER IN REMISSION: ICD-10-CM

## 2022-02-22 DIAGNOSIS — F41.1 GENERALIZED ANXIETY DISORDER: Primary | ICD-10-CM

## 2022-02-22 PROCEDURE — 90832 PSYTX W PT 30 MINUTES: CPT | Performed by: COUNSELOR

## 2022-02-22 NOTE — PROGRESS NOTES
Date: February 28, 2022  Time In: 1227  Time Out: 1300  This provider is located at the [facility]), [address], [place] [address] using a secure [employer] Video Visit through [employer]. Patient is being seen remotely via telehealth at home address in [place] and stated they are in a secure environment for this session. The patient's condition being diagnosed/treated is appropriate for telemedicine. The provider identified herself as well as her credentials. The patient, and/or patients guardian, consent to be seen remotely, and when consent is given they understand that the consent allows for patient identifiable information to be sent to a third party as needed. They may refuse to be seen remotely at any time. The electronic data is encrypted and password protected, and the patient and/or guardian has been advised of the potential risks to privacy not withstanding such measures.     You have chosen to receive care through a telehealth visit.  Do you consent to use a video/audio connection for your medical care today? Yes    PROGRESS NOTE  Data:  Nivia Cagle is a 71 y.o. female who presents today for follow up    Chief Complaint: depression    History of Present Illness: Patient reports updates since previous session. Patient reports relationship strain among her and her daughter in law; Patient expressed feeling awkward within the home and is looking forward to moving into her own place. Patient expressed plans driving with her son present from her duplex soon in efforts to become more independent. Patient reports slight improved vision and how she has made adaptation to current limitations. Patient expressed her dreams and mother's tendencies. Patient reports never having closure with her mother.      Clinical Maneuvering/Intervention:    (Scales based on 0 - 10 with 10 being the worst)  Depression: 4 Anxiety: 4     LADI-7  Feeling nervous,  anxious or on edge: (P) Not at all  Not being able to stop or control worrying: (P) Not at all  Worrying too much about different things: (P) Not at all  Trouble Relaxing: (P) Not at all  Being so restless that it is hard to sit still: (P) Not at all  Feeling afraid as if something awful might happen: (P) Several days  Becoming easily annoyed or irritable: (P) Not at all  LADI 7 Total Score: (P) 1  If you checked any problems, how difficult have these problems made it for you to do your work, take care of things at home, or get along with other people: (P) Not difficult at all   PHQ-9 Total Score: (P) 7     Assisted patient in processing above session content; acknowledged and normalized patient’s thoughts, feelings, and concerns.  Rationalized patient thought process regarding goal to become more independent. Praised Patient for identifying positive concepts of moving. Discussed closure with mother and asked if Patient would like to write her mother a goodbye letter to be discussed for next session.     Allowed patient to freely discuss issues without interruption or judgment. Provided safe, confidential environment to facilitate the development of positive therapeutic relationship and encourage open, honest communication. Assisted patient in identifying risk factors which would indicate the need for higher level of care including thoughts to harm self or others and/or self-harming behavior and encouraged patient to contact this office, call 911, or present to the nearest emergency room should any of these events occur. Discussed crisis intervention services and means to access. Patient adamantly and convincingly denies current suicidal or homicidal ideation or perceptual disturbance.    Assessment:   Assessment   Patient appears to maintain relative stability as compared to their baseline.  However, patient continues to struggle with depression which continues to cause impairment in important areas of functioning.   A result, they can be reasonably expected to continue to benefit from treatment and would likely be at increased risk for decompensation otherwise.    Mental Status Exam:   Hygiene:   good  Cooperation:  Cooperative  Eye Contact:  Good  Psychomotor Behavior:  Appropriate  Affect:  Appropriate  Mood: normal  Speech:  Normal  Thought Process:  Linear  Thought Content:  Mood congruent  Suicidal:  None  Homicidal:  None  Hallucinations:  None  Delusion:  None  Memory:  Intact  Orientation:  Person, Place, Time and Situation  Reliability:  fair  Insight:  Fair  Judgement:  Fair  Impulse Control:  Fair  Physical/Medical Issues:  No        Patient's Support Network Includes:  son    Functional Status: Mild impairment     Progress toward goal: Not at goal    Prognosis: Fair with Ongoing Treatment          Plan:    Patient will continue in individual outpatient therapy with focus on improved functioning and coping skills, maintaining stability, and avoiding decompensation and the need for higher level of care.    Patient will adhere to medication regimen as prescribed and report any side effects. Patient will contact this office, call 911 or present to the nearest emergency room should suicidal or homicidal ideations occur. Provide Cognitive Behavioral Therapy and Solution Focused Therapy to improve functioning, maintain stability, and avoid decompensation and the need for higher level of care.     Return in about 1 week, or earlier if symptoms worsen or fail to improve.           VISIT DIAGNOSIS:     ICD-10-CM ICD-9-CM   1. Generalized anxiety disorder  F41.1 300.02   2. Recurrent major depressive disorder in remission (HCC)  F33.40 296.35          [facility] No Show Policy:  We understand unexpected circumstances arise; however, anytime you miss your appointment we are unable to provide you appropriate care.  In addition, each appointment missed could have been used to provide care for others.  We  ask that you call at least 24 hours in advance to cancel or reschedule an appointment.  We would like to take this opportunity to remind you of our policy stating patients who miss THREE or more appointments without cancelling or rescheduling 24 hours in advance of the appointment may be subject to cancellation of any further visits with our clinic and recommendation to seek in-person services/visits.    Please call [phone] to reschedule your appointment. If there are reasons that make it difficult for you to keep the appointments, please call and let us know how we can help.  Please understand that medication prescribing will not continue without seeing your provider.      [facility]'s No Show Policy reviewed with patient at today's visit. Patient verbalized understanding of this policy. Discussed with patient that in the event that there are three or more no show visits, it will be recommended that they pursue in-person services/visits as noncompliance with telehealth visits indicates that patient is not an appropriate candidate for telemedicine and would likely be more appropriate for in-person services/visits. Patient verbalizes understanding and is agreeable to this.        This document has been electronically signed by Annita Polo LCSW  February 28, 2022 08:24 EST      Part of this note may be an electronic transcription/translation of spoken language to printed text using the Dragon Dictation System.

## 2022-03-01 ENCOUNTER — TELEMEDICINE (OUTPATIENT)
Dept: PSYCHIATRY | Facility: CLINIC | Age: 72
End: 2022-03-01

## 2022-03-01 DIAGNOSIS — F51.05 INSOMNIA DUE TO MENTAL CONDITION: ICD-10-CM

## 2022-03-01 DIAGNOSIS — Z79.4 TYPE 2 DIABETES MELLITUS WITH DIABETIC NEUROPATHY, WITH LONG-TERM CURRENT USE OF INSULIN: ICD-10-CM

## 2022-03-01 DIAGNOSIS — E11.40 TYPE 2 DIABETES MELLITUS WITH DIABETIC NEUROPATHY, WITH LONG-TERM CURRENT USE OF INSULIN: ICD-10-CM

## 2022-03-01 DIAGNOSIS — F33.2 SEVERE EPISODE OF RECURRENT MAJOR DEPRESSIVE DISORDER, WITHOUT PSYCHOTIC FEATURES: ICD-10-CM

## 2022-03-01 DIAGNOSIS — F41.1 GENERALIZED ANXIETY DISORDER: Primary | ICD-10-CM

## 2022-03-01 PROCEDURE — 90837 PSYTX W PT 60 MINUTES: CPT | Performed by: COUNSELOR

## 2022-03-01 NOTE — TELEPHONE ENCOUNTER
Patient called the pharmacy to request a refill. Pharmacy sent a refill request. LOV was 01/04/22 via telemedicine with Rae, LRF was 09/23/21, UDS completed on 05/27/21 and james none on file that I can see. Patient is due for a UDS.
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
## 2022-03-02 RX ORDER — TRAZODONE HYDROCHLORIDE 50 MG/1
50 TABLET ORAL NIGHTLY
Qty: 30 TABLET | Refills: 2 | Status: SHIPPED | OUTPATIENT
Start: 2022-03-02 | End: 2022-06-14 | Stop reason: SDUPTHER

## 2022-03-03 ENCOUNTER — CLINICAL SUPPORT (OUTPATIENT)
Dept: INTERNAL MEDICINE | Facility: CLINIC | Age: 72
End: 2022-03-03

## 2022-03-03 DIAGNOSIS — Z79.899 ENCOUNTER FOR MEDICATION MANAGEMENT: Primary | ICD-10-CM

## 2022-03-03 PROCEDURE — 80305 DRUG TEST PRSMV DIR OPT OBS: CPT | Performed by: PHYSICIAN ASSISTANT

## 2022-03-03 RX ORDER — GABAPENTIN 300 MG/1
CAPSULE ORAL
Qty: 180 CAPSULE | Refills: 0 | Status: SHIPPED | OUTPATIENT
Start: 2022-03-03 | End: 2022-04-13 | Stop reason: ALTCHOICE

## 2022-03-03 NOTE — TELEPHONE ENCOUNTER
HUB PLEASE ADVISE.....patient needs to stop by the office to leave a UDS before we can send her medication in.    Called patient. No answer; left VM for patient to come by the office to leave a UDS so we can refill her medication.

## 2022-03-17 ENCOUNTER — TELEMEDICINE (OUTPATIENT)
Dept: PSYCHIATRY | Facility: CLINIC | Age: 72
End: 2022-03-17

## 2022-03-17 DIAGNOSIS — F51.05 INSOMNIA DUE TO MENTAL CONDITION: ICD-10-CM

## 2022-03-17 DIAGNOSIS — F43.10 POST TRAUMATIC STRESS DISORDER (PTSD): ICD-10-CM

## 2022-03-17 DIAGNOSIS — F41.1 GENERALIZED ANXIETY DISORDER: ICD-10-CM

## 2022-03-17 DIAGNOSIS — F33.40 RECURRENT MAJOR DEPRESSIVE DISORDER IN REMISSION: Primary | ICD-10-CM

## 2022-03-17 DIAGNOSIS — F33.2 SEVERE EPISODE OF RECURRENT MAJOR DEPRESSIVE DISORDER, WITHOUT PSYCHOTIC FEATURES: ICD-10-CM

## 2022-03-17 PROCEDURE — 90833 PSYTX W PT W E/M 30 MIN: CPT | Performed by: STUDENT IN AN ORGANIZED HEALTH CARE EDUCATION/TRAINING PROGRAM

## 2022-03-17 PROCEDURE — 99214 OFFICE O/P EST MOD 30 MIN: CPT | Performed by: STUDENT IN AN ORGANIZED HEALTH CARE EDUCATION/TRAINING PROGRAM

## 2022-03-17 RX ORDER — FLUOXETINE HYDROCHLORIDE 20 MG/1
20 CAPSULE ORAL DAILY
Qty: 90 CAPSULE | Refills: 0 | Status: SHIPPED | OUTPATIENT
Start: 2022-03-17 | End: 2022-05-03 | Stop reason: SDUPTHER

## 2022-03-17 RX ORDER — BUPROPION HYDROCHLORIDE 300 MG/1
300 TABLET ORAL EVERY MORNING
Qty: 90 TABLET | Refills: 0 | Status: SHIPPED | OUTPATIENT
Start: 2022-03-17 | End: 2022-06-14 | Stop reason: SDUPTHER

## 2022-03-17 RX ORDER — ARIPIPRAZOLE 2 MG/1
4 TABLET ORAL DAILY
Qty: 180 TABLET | Refills: 0 | Status: SHIPPED | OUTPATIENT
Start: 2022-03-17 | End: 2022-09-08 | Stop reason: SDUPTHER

## 2022-03-17 RX ORDER — SITAGLIPTIN 100 MG/1
100 TABLET, FILM COATED ORAL DAILY
COMMUNITY
Start: 2022-03-03 | End: 2022-06-03

## 2022-03-17 NOTE — PATIENT INSTRUCTIONS
1.  Please return to clinic at your next scheduled visit.  Contact the clinic ([phone]) at least 24 hours prior in the event you need to cancel.  2.  Do no harm to yourself or others.    3.  Avoid alcohol and drugs.    4.  Take all medications as prescribed.  Please contact the clinic with any concerns. If you are in need of medication refills, please call the clinic at [phone].    5. Should you want to get in touch with your provider, Dr. Vicky Barth, please utilize [employer] or contact the office ([phone]), and staff will be able to page Dr. Barth directly.  6.  In the event you have personal crisis, contact the following crisis numbers: Suicide Prevention Hotline 1-296.377.1689; [facility] Helpline 3-057-796-RHBV; [facility] Emergency Room [phone]; text HELLO to 252406; or 269.     SPECIFIC RECOMMENDATIONS:     1.      Medications discussed at this encounter:                   -      2.      Psychotherapy recommendations:      3.     Return to clinic: 4 weeks

## 2022-03-17 NOTE — PROGRESS NOTES
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 29.0 mmol/L Final   • Calcium 09/21/2021 9.4  8.6 - 10.5 mg/dL Final   • Total Protein 09/21/2021 7.4  6.0 - 8.5 g/dL Final   • Albumin 09/21/2021 4.40  3.50 - 5.20 g/dL Final   • ALT (SGPT) 09/21/2021 13  1 - 33 U/L Final   • AST (SGOT) 09/21/2021 19  1 - 32 U/L Final   • Alkaline Phosphatase 09/21/2021 88  39 - 117 U/L Final   • Total Bilirubin 09/21/2021 0.5  0.0 - 1.2 mg/dL Final   • eGFR Non  Amer 09/21/2021 75  >60 mL/min/1.73 Final   • Globulin 09/21/2021 3.0  gm/dL Final   • A/G Ratio 09/21/2021 1.5  g/dL Final   • BUN/Creatinine Ratio 09/21/2021 19.7  7.0 - 25.0 Final   • Anion Gap 09/21/2021 8.6  5.0 - 15.0 mmol/L Final   • TSH 09/21/2021 1.450  0.270 - 4.200 uIU/mL Final   • Total Cholesterol 09/21/2021 219 (A) 0 - 200 mg/dL Final   • Triglycerides 09/21/2021 128  0 - 150 mg/dL Final   • HDL Cholesterol 09/21/2021 49  40 - 60 mg/dL Final   • LDL Cholesterol  09/21/2021 147 (A) 0 - 100 mg/dL Final   • VLDL Cholesterol 09/21/2021 23  5 - 40 mg/dL Final   • LDL/HDL Ratio 09/21/2021 2.95   Final   • WBC 09/21/2021 6.85  3.40 - 10.80 10*3/mm3 Final   • RBC 09/21/2021 4.17  3.77 - 5.28 10*6/mm3 Final   • Hemoglobin 09/21/2021 12.4  12.0 - 15.9 g/dL Final   • Hematocrit 09/21/2021 37.1  34.0 - 46.6 % Final   • MCV  09/21/2021 89.0  79.0 - 97.0 fL Final   • MCH 09/21/2021 29.7  26.6 - 33.0 pg Final   • MCHC 09/21/2021 33.4  31.5 - 35.7 g/dL Final   • RDW 09/21/2021 12.8  12.3 - 15.4 % Final   • RDW-SD 09/21/2021 41.3  37.0 - 54.0 fl Final   • MPV 09/21/2021 11.1  6.0 - 12.0 fL Final   • Platelets 09/21/2021 300  140 - 450 10*3/mm3 Final   • Neutrophil % 09/21/2021 60.6  42.7 - 76.0 % Final   • Lymphocyte % 09/21/2021 26.9  19.6 - 45.3 % Final   • Monocyte % 09/21/2021 8.8  5.0 - 12.0 % Final   • Eosinophil % 09/21/2021 3.2  0.3 - 6.2 % Final   • Basophil % 09/21/2021 0.4  0.0 - 1.5 % Final   • Immature Grans % 09/21/2021 0.1  0.0 - 0.5 % Final   • Neutrophils, Absolute 09/21/2021 4.15  1.70 - 7.00 10*3/mm3 Final   • Lymphocytes, Absolute 09/21/2021 1.84  0.70 - 3.10 10*3/mm3 Final   • Monocytes, Absolute 09/21/2021 0.60  0.10 - 0.90 10*3/mm3 Final   • Eosinophils, Absolute 09/21/2021 0.22  0.00 - 0.40 10*3/mm3 Final   • Basophils, Absolute 09/21/2021 0.03  0.00 - 0.20 10*3/mm3 Final   • Immature Grans, Absolute 09/21/2021 0.01  0.00 - 0.05 10*3/mm3 Final   • nRBC 09/21/2021 0.0  0.0 - 0.2 /100 WBC Final       EKG Results:  No orders to display       Imaging Results:  No Images in the past 120 days found..      Assessment/Plan   Diagnoses and all orders for this visit:    1. Recurrent major depressive disorder in remission (HCC) (Primary)    2. Generalized anxiety disorder    3. Post traumatic stress disorder (PTSD)    4. Insomnia due to mental condition        Presentation most consistent with major depressive disorder, recurrent, moderate to severe, with anxious distress.  PTSD.  Rule out personality disorder, cluster B specifically.  Rule out hypomania as patient was very difficult to interrupt today.    3/17: Stable, some problems with concentration related to getting unpacked. Increase trazodone to target insomnia. 18 minutes of supportive psychotherapy with goal to strengthen defenses, promote problems solving, restore  adaptive functioning and provide symptom relief. The therapeutic alliance was strengthened to encourage the patient to express their thoughts and feelings. Esteem building was enhanced through praise, reassurance, normalizing and encouragement. Coping skills were enhanced to build distress tolerance skills and emotional regulation. Patient given education on medication side effects, diagnosis/illness and relapse symptoms. Plan to continue supportive psychotherapy in next appointment to provide symptom relief.  6 wks    2/16: Stable. No SE. 17 minutes of supportive psychotherapy with goal to strengthen defenses, promote problems solving, restore adaptive functioning and provide symptom relief. The therapeutic alliance was strengthened to encourage the patient to express their thoughts and feelings. Esteem building was enhanced through praise, reassurance, normalizing and encouragement. Coping skills were enhanced to build distress tolerance skills and emotional regulation. Patient given education on medication side effects, diagnosis/illness and relapse symptoms. Plan to continue supportive psychotherapy in next appointment to provide symptom relief.  4 wks    1/18: Doing well. Tolerating meds without side effects. 25 minutes of supportive psychotherapy with goal to strengthen defenses, promote problems solving, restore adaptive functioning and provide symptom relief. The therapeutic alliance was strengthened to encourage the patient to express their thoughts and feelings. Esteem building was enhanced through praise, reassurance, normalizing and encouragement. Coping skills were enhanced to build distress tolerance skills and emotional regulation. Patient given education on medication side effects, diagnosis/illness and relapse symptoms. Plan to continue supportive psychotherapy in next appointment to provide symptom relief.  4 wks    12/7: Doing very well, though some insomnia. Start melatonin 10 mg daily. No other  changes. Watch jaw movements. Wants to taper off meds at some point; now would not be a good time. 17 minutes of supportive psychotherapy with goal to strengthen defenses, promote problems solving, restore adaptive functioning and provide symptom relief. The therapeutic alliance was strengthened to encourage the patient to express their thoughts and feelings. Esteem building was enhanced through praise, reassurance, normalizing and encouragement. Coping skills were enhanced to build distress tolerance skills and emotional regulation. Patient given education on medication side effects, diagnosis/illness and relapse symptoms. Plan to continue supportive psychotherapy in next appointment to provide symptom relief.  6 wks    10/26: Pt stopped mirtazapine and restarted prozac 20 mg daily.     IMPORTANT:  Consider brexpiprazole.    Very important to obtain records from previous psychiatrist.  Care should be taken when putting patient on sedating medications as she has a falls risk.  Also has a history of EARL which will lead to difficulties treating her insomnia.    Therapy referral made to [facility].      See back in 8 weeks.  Patient is not vaccinated.    Visit Diagnoses:    ICD-10-CM ICD-9-CM   1. Recurrent major depressive disorder in remission (HCC)  F33.40 296.35   2. Generalized anxiety disorder  F41.1 300.02   3. Post traumatic stress disorder (PTSD)  F43.10 309.81   4. Insomnia due to mental condition  F51.05 300.9     327.02       PLAN:  55. Risk Assessment: Risk of self-harm acutely is moderate. Risk factors include chronic depressive disorder, possible personality disorder, recent psychosocial stressors (pandemic, moving). Protective factors include no present SI, no history of suicide attempts or self-harm in the past, no access to weapons, minimal AODA, healthcare seeking, future orientation, willingness to engage in care. Risk of self-harm chronically is also moderate, but could be further elevated in the event  of treatment noncompliance and/or AODA.  56. Safety: No acute safety concerns.  57. Medications:   a. INCREASE melatonin 6 to 9 mg p.o. nightly.  Risks, benefits, side effects discussed with patient including sedation, dizziness/falls risk, GI upset.  After discussion of these risks and benefits, the patient voiced understanding and agreed to proceed.  b. CONTINUE trazodone 50 mg PO QHS. Risks, benefits, side effects discussed with patient including GI upset, sedation, dizziness/falls risk, grogginess the following day, prolongation of the QTc interval.  After discussion of these risks and benefits, the patient voiced understanding and agreed to proceed.    c. CONTINUE bupropion xl 300 mg daily. Risks, benefits, alternatives discussed with patient including nausea, GI upset, increased energy, exacerbation of irritability, insomnia, lowering of seizure threshold.  After discussion of these risks and benefits, the patient voiced understanding and agreed to proceed.  d. CONTINUE Prozac 20 mg a day. Risks, benefits, alternatives discussed with patient including GI upset, nausea vomiting diarrhea, theoretical decrease of seizure threshold predisposing the patient to a slightly higher seizure risk, headaches, sexual dysfunction, serotonin syndrome, bleeding risk, increased suicidality in patients 24 years and younger.  After discussion of these risks and benefits, the patient voiced understanding and agreed to proceed.  e. CONTINUE Abilify 4 mg p.o. daily to target depression, anxiety, decreased energy. Risks, benefits, alternatives discussed with patient including increased energy, exacerbation of irritability, akathisia, GI upset, orthostatic hypotension, increased appetite. After discussion of these risks and benefits, the patient voiced understanding and agreed to proceed.  i. S/P:  1. Mirtazapine 45 mg daily (RLS)  58. Therapy: referred to [facility] today 12/7.  59. Labs/Studies: s/p TMS referral.  60. Follow Up: 6  weeks. (prefers 4 wks)      TREATMENT PLAN/GOALS: Continue supportive psychotherapy efforts and medications as indicated. Treatment and medication options discussed during today's visit. Patient acknowledged and verbally consented to continue with current treatment plan and was educated on the importance of compliance with treatment and follow-up appointments.    MEDICATION ISSUES:  SAPNA reviewed as expected.  Discussed medication options and treatment plan of prescribed medication as well as the risks, benefits, and side effects including potential falls, possible impaired driving and metabolic adversities among others. Patient is agreeable to call the office with any worsening of symptoms or onset of side effects. Patient is agreeable to call 911 or go to the nearest ER should he/she begin having SI/HI. No medication side effects or related complaints today.     MEDS ORDERED DURING VISIT:  No orders of the defined types were placed in this encounter.      Return in about 6 weeks (around 4/28/2022).         This document has been electronically signed by Vicky Barth MD  March 17, 2022 11:32 EDT      Part of this note may be an electronic transcription/translation of spoken language to printed text using the Dragon Dictation System.

## 2022-03-23 ENCOUNTER — OFFICE VISIT (OUTPATIENT)
Dept: NEUROLOGY | Facility: CLINIC | Age: 72
End: 2022-03-23

## 2022-03-23 VITALS
BODY MASS INDEX: 29.2 KG/M2 | HEIGHT: 63 IN | WEIGHT: 164.8 LBS | HEART RATE: 78 BPM | DIASTOLIC BLOOD PRESSURE: 61 MMHG | SYSTOLIC BLOOD PRESSURE: 126 MMHG

## 2022-03-23 DIAGNOSIS — G25.81 RESTLESS LEGS SYNDROME (RLS): Primary | ICD-10-CM

## 2022-03-23 DIAGNOSIS — R25.1 TREMOR: ICD-10-CM

## 2022-03-23 DIAGNOSIS — R25.3 MUSCLE TWITCHING: ICD-10-CM

## 2022-03-23 PROCEDURE — 99214 OFFICE O/P EST MOD 30 MIN: CPT | Performed by: NURSE PRACTITIONER

## 2022-03-23 RX ORDER — PRAMIPEXOLE DIHYDROCHLORIDE 0.5 MG/1
0.5 TABLET ORAL
Qty: 30 TABLET | Refills: 3 | Status: SHIPPED | OUTPATIENT
Start: 2022-03-23 | End: 2022-07-06

## 2022-03-23 NOTE — PROGRESS NOTES
"Chief Complaint  Neurologic Problem (Muscle twitching/RLS/tremor)    Subjective          Nivia Cagle presents to [facility]  Continues to see Dr. Barth.  Having some increased anxiety and depersssion. Is doing well with Abilify and trazodone.  States pramipexole greatly improved RLS. Denies side effects.  Reports a mild tremor to bilateral hands that worsens with intention.  Is following with sleep medicine. Continuing to experience some muscle cramping but worsens when she is dehydrated.       Objective   Vital Signs:   /61   Pulse 78   Ht 160 cm (63\")   Wt 74.8 kg (164 lb 12.8 oz)   BMI 29.19 kg/m²     Physical Exam  HENT:      Head: Normocephalic.   Pulmonary:      Effort: Pulmonary effort is normal.   Neurological:      Mental Status: She is alert and oriented to person, place, and time.      Cranial Nerves: Cranial nerves are intact.      Sensory: Sensation is intact.      Motor: Motor function is intact.      Coordination: Coordination is intact.      Deep Tendon Reflexes: Reflexes are normal and symmetric.      Comments: Mild intention tremor bilaterally        Neurologic Exam     Mental Status   Oriented to person, place, and time.        Result Review :               Assessment and Plan    Diagnoses and all orders for this visit:    1. Restless legs syndrome (RLS) (Primary)  Assessment & Plan:  Continue pramipexole.       2. Muscle twitching    3. Tremor  Assessment & Plan:  Likely medication induced vs ET.  No sign of parkinsonism.        Other orders  -     pramipexole (MIRAPEX) 0.5 MG tablet; Take 1 tablet by mouth every night at bedtime.  Dispense: 30 tablet; Refill: 3      Follow Up   Return in about 4 months (around 7/23/2022) for RLS.  Patient was given instructions and counseling regarding her condition or for health maintenance advice. Please see specific information pulled into the AVS if appropriate.       "

## 2022-04-13 ENCOUNTER — OFFICE VISIT (OUTPATIENT)
Dept: INTERNAL MEDICINE | Facility: CLINIC | Age: 72
End: 2022-04-13

## 2022-04-13 VITALS
OXYGEN SATURATION: 97 % | SYSTOLIC BLOOD PRESSURE: 118 MMHG | DIASTOLIC BLOOD PRESSURE: 64 MMHG | RESPIRATION RATE: 15 BRPM | BODY MASS INDEX: 28.88 KG/M2 | WEIGHT: 163 LBS | HEART RATE: 79 BPM | TEMPERATURE: 97.4 F | HEIGHT: 63 IN

## 2022-04-13 DIAGNOSIS — Z23 NEED FOR PNEUMOCOCCAL VACCINE: ICD-10-CM

## 2022-04-13 DIAGNOSIS — I10 ESSENTIAL HYPERTENSION: ICD-10-CM

## 2022-04-13 DIAGNOSIS — F33.1 MAJOR DEPRESSIVE DISORDER, RECURRENT EPISODE, MODERATE DEGREE: ICD-10-CM

## 2022-04-13 DIAGNOSIS — Z12.11 SCREEN FOR COLON CANCER: ICD-10-CM

## 2022-04-13 DIAGNOSIS — E78.2 MIXED HYPERLIPIDEMIA: ICD-10-CM

## 2022-04-13 DIAGNOSIS — E11.65 TYPE 2 DIABETES MELLITUS WITH HYPERGLYCEMIA, WITHOUT LONG-TERM CURRENT USE OF INSULIN: Primary | ICD-10-CM

## 2022-04-13 DIAGNOSIS — S61.210A LACERATION OF RIGHT INDEX FINGER WITHOUT FOREIGN BODY WITHOUT DAMAGE TO NAIL, INITIAL ENCOUNTER: ICD-10-CM

## 2022-04-13 PROCEDURE — G0009 ADMIN PNEUMOCOCCAL VACCINE: HCPCS | Performed by: PHYSICIAN ASSISTANT

## 2022-04-13 PROCEDURE — 90732 PPSV23 VACC 2 YRS+ SUBQ/IM: CPT | Performed by: PHYSICIAN ASSISTANT

## 2022-04-13 PROCEDURE — 99214 OFFICE O/P EST MOD 30 MIN: CPT | Performed by: PHYSICIAN ASSISTANT

## 2022-04-13 RX ORDER — ALENDRONATE SODIUM 70 MG/1
1 TABLET ORAL
COMMUNITY
Start: 2022-03-30

## 2022-04-13 NOTE — PATIENT INSTRUCTIONS
"High Cholesterol    High cholesterol is a condition in which the blood has high levels of a white, waxy substance similar to fat (cholesterol). The liver makes all the cholesterol that the body needs. The human body needs small amounts of cholesterol to help build cells. A person gets extra or excess cholesterol from the food that he or she eats.  The blood carries cholesterol from the liver to the rest of the body. If you have high cholesterol, deposits (plaques) may build up on the walls of your arteries. Arteries are the blood vessels that carry blood away from your heart. These plaques make the arteries narrow and stiff.  Cholesterol plaques increase your risk for heart attack and stroke. Work with your health care provider to keep your cholesterol levels in a healthy range.  What increases the risk?  The following factors may make you more likely to develop this condition:  Eating foods that are high in animal fat (saturated fat) or cholesterol.  Being overweight.  Not getting enough exercise.  A family history of high cholesterol (familial hypercholesterolemia).  Use of tobacco products.  Having diabetes.  What are the signs or symptoms?  There are no symptoms of this condition.  How is this diagnosed?  This condition may be diagnosed based on the results of a blood test.  If you are older than 20 years of age, your health care provider may check your cholesterol levels every 4-6 years.  You may be checked more often if you have high cholesterol or other risk factors for heart disease.  The blood test for cholesterol measures:  \"Bad\" cholesterol, or LDL cholesterol. This is the main type of cholesterol that causes heart disease. The desired level is less than 100 mg/dL.  \"Good\" cholesterol, or HDL cholesterol. HDL helps protect against heart disease by cleaning the arteries and carrying the LDL to the liver for processing. The desired level for HDL is 60 mg/dL or higher.  Triglycerides. These are fats that " your body can store or burn for energy. The desired level is less than 150 mg/dL.  Total cholesterol. This measures the total amount of cholesterol in your blood and includes LDL, HDL, and triglycerides. The desired level is less than 200 mg/dL.  How is this treated?  This condition may be treated with:  Diet changes. You may be asked to eat foods that have more fiber and less saturated fats or added sugar.  Lifestyle changes. These may include regular exercise, maintaining a healthy weight, and quitting use of tobacco products.  Medicines. These are given when diet and lifestyle changes have not worked. You may be prescribed a statin medicine to help lower your cholesterol levels.  Follow these instructions at home:  Eating and drinking    Eat a healthy, balanced diet. This diet includes:  Daily servings of a variety of fresh, frozen, or canned fruits and vegetables.  Daily servings of whole grain foods that are rich in fiber.  Foods that are low in saturated fats and trans fats. These include poultry and fish without skin, lean cuts of meat, and low-fat dairy products.  A variety of fish, especially oily fish that contain omega-3 fatty acids. Aim to eat fish at least 2 times a week.  Avoid foods and drinks that have added sugar.  Use healthy cooking methods, such as roasting, grilling, broiling, baking, poaching, steaming, and stir-frying. Do not bob your food except for stir-frying.    Lifestyle    Get regular exercise. Aim to exercise for a total of 150 minutes a week. Increase your activity level by doing activities such as gardening, walking, and taking the stairs.  Do not use any products that contain nicotine or tobacco, such as cigarettes, e-cigarettes, and chewing tobacco. If you need help quitting, ask your health care provider.    General instructions  Take over-the-counter and prescription medicines only as told by your health care provider.  Keep all follow-up visits as told by your health care  "provider. This is important.  Where to find more information  American Heart Association: www.heart.org  National Heart, Lung, [employer] [place]: www.nhlbi.nih.gov  Contact a health care provider if:  You have trouble achieving or maintaining a healthy diet or weight.  You are starting an exercise program.  You are unable to stop smoking.  Get help right away if:  You have chest pain.  You have trouble breathing.  You have any symptoms of a stroke. \"BE FAST\" is an easy way to remember the main warning signs of a stroke:  B - Balance. Signs are dizziness, sudden trouble walking, or loss of balance.  E - Eyes. Signs are trouble seeing or a sudden change in vision.  F - Face. Signs are sudden weakness or numbness of the face, or the face or eyelid drooping on one side.  A - Arms. Signs are weakness or numbness in an arm. This happens suddenly and usually on one side of the body.  S - Speech. Signs are sudden trouble speaking, slurred speech, or trouble understanding what people say.  T - Time. Time to call emergency services. Write down what time symptoms started.  You have other signs of a stroke, such as:  A sudden, severe headache with no known cause.  Nausea or vomiting.  Seizure.  These symptoms may represent a serious problem that is an emergency. Do not wait to see if the symptoms will go away. Get medical help right away. Call your local emergency services (911 in the [place] Do not drive yourself to the hospital.  Summary  Cholesterol plaques increase your risk for heart attack and stroke. Work with your health care provider to keep your cholesterol levels in a healthy range.  Eat a healthy, balanced diet, get regular exercise, and maintain a healthy weight.  Do not use any products that contain nicotine or tobacco, such as cigarettes, e-cigarettes, and chewing tobacco.  Get help right away if you have any symptoms of a stroke.  This information is not intended to replace advice given to you by your health " care provider. Make sure you discuss any questions you have with your health care provider.  Document Revised: 11/16/2020 Document Reviewed: 11/16/2020  Elsevier Patient Education © 2021 [employer]

## 2022-04-13 NOTE — ASSESSMENT & PLAN NOTE
Discussed laceration and need to update tetanus. Out of vaccine in office so given order to get at pharmacy. Keep area clean and dry. Warm compress. No need for oral abx at this time. Pt will let us know if sx not resolved with conservative tx.

## 2022-04-13 NOTE — PROGRESS NOTES
Chief Complaint  Diabetes and Hypertension    Subjective          Nivia Cagle presents to [facility]  Neuropathy: Pt states she d/c gabapentin due to recommendation from rheumatologist due to balance issues.  She states the top half of feet neuropathy has flared since d/c but it is manageable.     DM: bg running <150 at home.  Denies low bg.     HLD: unable to tolerate statins  Unable to afford Repatha.  Takes zetia daily    Colonoscopy: unsure but knows she is due   Denies blood in stool , abd pain    Depression: mood has been doing well recently  Seeing stacey for med mgmt    Finger laceration:  Pt states she was cleaning with a brillo pad 1 wk ago and a piece got stuck in her finger.   Area was red and swollen at first. Pt has been cleaning at home  Now it is hard over the area which was open.   Last tetanus shot > 10 yr ago    Pt is unsure if she has ever had pneumonia shots but does not think she has had them      Past Medical History:   Diagnosis Date   • ADHD (attention deficit hyperactivity disorder)    • Allergic rhinitis    • Anemia    • Anxiety    • Cataracts, bilateral    • Chronic pain disorder    • Depression 0421/2021    MOODNOT WELL CONTROLLED.  GIVEN NUMBER FOR LOCAL COUNSELOR.  WILL INCREASE TRINTELIX FROM 10MG TO 20MG RTC WEEK ER ID S/HI   • Diabetes mellitus, type 2 (HCC)    • Diverticulitis    • GERD (gastroesophageal reflux disease)    • Head injury    • High blood pressure    • Hyperlipemia    • Migraine    • EARL (obstructive sleep apnea) 04/21/2021   • Panic disorder    • Phlebitis    • PTSD (post-traumatic stress disorder)         Past Surgical History:   Procedure Laterality Date   • ANKLE SURGERY  2021   • BILATERAL BREAST REDUCTION  2015   • CARPAL TUNNEL RELEASE     • CHOLECYSTECTOMY  2001   • COLONOSCOPY     • HYSTERECTOMY     • ULNAR NERVE TRANSPOSITION Right    • WRIST SURGERY          Current Outpatient Medications on File Prior  "to Visit   Medication Sig Dispense Refill   • Accu-Chek Madeline Plus test strip by Other route 4 (Four) Times a Day. use to test blood sugar     • Accu-Chek Softclix Lancets lancets by Other route 4 (Four) Times a Day. use to test blood sugar     • alendronate (FOSAMAX) 70 MG tablet Take 1 tablet by mouth Every 7 (Seven) Days.     • ARIPiprazole (ABILIFY) 2 MG tablet Take 2 tablets by mouth Daily. 180 tablet 0   • BD Insulin Syringe U/F 30G X 1/2\" 0.3 ML misc USE TO INJECT INSULIN FOUR TIMES DAILY AS NEEDED     • BL NASAL SALINE MIST NA 2 drops.     • Blood Glucose Monitoring Suppl (FreeStyle Lite) device 1 each by Other route 4 (Four) Times a Day.     • buPROPion XL (WELLBUTRIN XL) 300 MG 24 hr tablet Take 1 tablet by mouth Every Morning. 90 tablet 0   • Calcium Carbonate 1500 (600 Ca) MG tablet Take 600 mg by mouth Daily.     • cetirizine (zyrTEC) 10 MG tablet Take 1 tablet by mouth Daily. 90 tablet 1   • cyclobenzaprine (FLEXERIL) 10 MG tablet Take 1 tablet by mouth Daily As Needed for Muscle Spasms. 30 tablet 2   • Daily-Jelani Multivitamin tablet tablet Take 1 tablet by mouth every night at bedtime.     • ezetimibe (ZETIA) 10 MG tablet Take 1 tablet by mouth every night at bedtime. 90 tablet 1   • FLUoxetine (PROzac) 20 MG capsule Take 1 capsule by mouth Daily. 90 capsule 0   • fluticasone (Flonase) 50 MCG/ACT nasal spray 2 sprays into the nostril(s) as directed by provider Daily. Administer 2 sprays in each nostril for each dose. 16 g 6   • ibuprofen (ADVIL,MOTRIN) 200 MG tablet 200 mg.     • Januvia 100 MG tablet Take 100 mg by mouth Daily.     • losartan (COZAAR) 25 MG tablet Take 1 tablet by mouth Daily. 90 tablet 1   • melatonin 1 MG tablet Take 12 mg by mouth Every Night.     • montelukast (Singulair) 10 MG tablet Take 1 tablet by mouth Every Night. 90 tablet 1   • Non-Aspirin Pain Reliever 325 MG tablet Take 650 mg by mouth Every 8 (Eight) Hours As Needed. for pain     • pramipexole (MIRAPEX) 0.5 MG tablet " "Take 1 tablet by mouth every night at bedtime. 30 tablet 3   • prednisoLONE acetate (PRED FORTE) 1 % ophthalmic suspension SHAKE LIQUID AND INSTILL 1 DROP IN RIGHT EYE FOUR TIMES DAILY     • quiNINE (QUALAQUIN) 324 MG capsule Take 324 mg by mouth every night at bedtime.     • timolol (TIMOPTIC) 0.5 % ophthalmic solution INSTILL 1 DROP IN RIGHT EYE TWICE DAILY     • traZODone (DESYREL) 50 MG tablet TAKE 1 TABLET BY MOUTH EVERY NIGHT 30 tablet 2   • vitamin D (ERGOCALCIFEROL) 1.25 MG (09242 UT) capsule capsule 50,000 Units 1 (One) Time Per Week.     • [DISCONTINUED] gabapentin (NEURONTIN) 300 MG capsule TAKE 1 CAPSULE BY MOUTH TWICE DAILY 180 capsule 0   • [DISCONTINUED] ketotifen (ZADITOR) 0.025 % ophthalmic solution 1 drop.     • [DISCONTINUED] metFORMIN (GLUCOPHAGE) 500 MG tablet TAKE 2 TABLETS BY MOUTH TWICE DAILY WITH MEALS 360 tablet 0     No current facility-administered medications on file prior to visit.        Allergies   Allergen Reactions   • Benzethonium Chloride Rash   • Diclofenac Hives   • Freederm Adhesive Remover [New Skin] Rash   • Adhesive Tape Rash and Other (See Comments)       Rash at area of bandaid   • Niacin Rash       Social History     Tobacco Use   Smoking Status Former Smoker   • Packs/day: 0.25   • Years: 22.00   • Pack years: 5.50   Smokeless Tobacco Never Used   Tobacco Comment    QUIT 1988          Objective   Vital Signs:   /64   Pulse 79   Temp 97.4 °F (36.3 °C)   Resp 15   Ht 160 cm (63\")   Wt 73.9 kg (163 lb)   SpO2 97%   BMI 28.87 kg/m²     Physical Exam  Vitals reviewed.   Constitutional:       Appearance: Normal appearance.   HENT:      Head: Normocephalic and atraumatic.      Nose: Nose normal.      Mouth/Throat:      Mouth: Mucous membranes are moist.   Eyes:      Extraocular Movements: Extraocular movements intact.      Conjunctiva/sclera: Conjunctivae normal.      Pupils: Pupils are equal, round, and reactive to light.   Cardiovascular:      Rate and Rhythm: " Normal rate and regular rhythm.   Pulmonary:      Effort: Pulmonary effort is normal.      Breath sounds: Normal breath sounds.   Abdominal:      General: Abdomen is flat. Bowel sounds are normal.      Palpations: Abdomen is soft.   Musculoskeletal:         General: Normal range of motion.   Skin:     Findings: Abrasion (Medial R pointer finger. No underlying erythema. Calus noted over lesion) present.      Comments: Normal pulse, rom and cap refill of finger   Neurological:      General: No focal deficit present.      Mental Status: She is alert and oriented to person, place, and time.   Psychiatric:         Mood and Affect: Mood normal.        Result Review :   The following data was reviewed by: Catalina Madsen PA-C on 04/13/2022:  Common labs    Common Labsle 7/2/21 9/21/21 9/21/21 9/21/21 9/21/21 2/15/22 2/15/22 2/15/22 2/15/22     1301 1301 1302 1302 1528 1528 1528 1528   Glucose   100 (A)    79     BUN   15    21     Creatinine   0.76    0.83     eGFR Non  Am   75    68     Sodium   142    140     Potassium   4.0    4.4     Chloride   105    103     Calcium   9.4    9.7     Albumin   4.40    4.50     Total Bilirubin   0.5    0.3     Alkaline Phosphatase   88    78     AST (SGOT)   19    10     ALT (SGPT)   13    14     WBC    6.85  10.26      Hemoglobin    12.4  12.7      Hematocrit    37.1  38.5      Platelets    300  257      Total Cholesterol 205 (A) 219 (A)      195    Triglycerides 120 128      105    HDL Cholesterol 52 49      52    LDL Cholesterol  132 (A) 147 (A)      124 (A)    Hemoglobin A1C     6.50 (A)    7.10 (A)   (A) Abnormal value                      Assessment and Plan    Diagnoses and all orders for this visit:    1. Type 2 diabetes mellitus with hyperglycemia, without long-term current use of insulin (HCC) (Primary)  Assessment & Plan:  Diabetes is unchanged.   Continue current treatment regimen.  Diabetes will be reassessed in 3 months.    Orders:  -     Comprehensive Metabolic  Panel; Future  -     CBC & Differential; Future  -     TSH; Future  -     Lipid Panel; Future  -     Hemoglobin A1c; Future    2. Essential hypertension  Assessment & Plan:  Hypertension is improving with treatment.  Continue current treatment regimen.  Blood pressure will be reassessed at the next regular appointment.    Orders:  -     Comprehensive Metabolic Panel; Future  -     CBC & Differential; Future    3. Mixed hyperlipidemia  Comments:  Reviewed chol, discussed LDL goal <70 with dm but unable to afford repatha and unable to tolerate statins. will cont Zetia at this time.  Orders:  -     Lipid Panel; Future    4. Major depressive disorder, recurrent episode, moderate degree (HCC)  Assessment & Plan:  Patient's depression is recurrent and is moderate without psychosis. Their depression is currently active and the condition is unchanged. This will be reassessed at the next regular appointment. F/U as described:patient will continue current medication therapy.      5. Screen for colon cancer  -     Ambulatory Referral For Screening Colonoscopy    6. Need for pneumococcal vaccine  -     Pneumococcal polysaccharide vaccine 23-valent >= 1yo subcutaneous/IM (PPSV23)    7. Laceration of right index finger without foreign body without damage to nail, initial encounter  Assessment & Plan:  Discussed laceration and need to update tetanus. Out of vaccine in office so given order to get at pharmacy. Keep area clean and dry. Warm compress. No need for oral abx at this time. Pt will let us know if sx not resolved with conservative tx.    Orders:  -     Cancel: Td Vaccine Greater Than or Equal To 8yo Preservative Free IM  -     Td Vaccine Greater Than or Equal To 8yo Preservative Free IM    Other orders  -     metFORMIN (GLUCOPHAGE) 500 MG tablet; Take 2 tablets by mouth 2 (Two) Times a Day With Meals.  Dispense: 360 tablet; Refill: 1      Follow Up   Return in about 3 months (around 7/13/2022).  Patient was given  instructions and counseling regarding her condition or for health maintenance advice. Please see specific information pulled into the AVS if appropriate.

## 2022-04-14 ENCOUNTER — TELEPHONE (OUTPATIENT)
Dept: INTERNAL MEDICINE | Facility: CLINIC | Age: 72
End: 2022-04-14

## 2022-05-03 ENCOUNTER — TELEMEDICINE (OUTPATIENT)
Dept: PSYCHIATRY | Facility: CLINIC | Age: 72
End: 2022-05-03

## 2022-05-03 DIAGNOSIS — F41.1 GENERALIZED ANXIETY DISORDER: ICD-10-CM

## 2022-05-03 DIAGNOSIS — F43.10 POST TRAUMATIC STRESS DISORDER (PTSD): ICD-10-CM

## 2022-05-03 DIAGNOSIS — F51.05 INSOMNIA DUE TO MENTAL CONDITION: ICD-10-CM

## 2022-05-03 DIAGNOSIS — F33.1 MAJOR DEPRESSIVE DISORDER, RECURRENT EPISODE, MODERATE: Primary | ICD-10-CM

## 2022-05-03 DIAGNOSIS — F33.40 RECURRENT MAJOR DEPRESSIVE DISORDER IN REMISSION: ICD-10-CM

## 2022-05-03 PROBLEM — Z98.890 OTHER SPECIFIED POSTPROCEDURAL STATES: Status: ACTIVE | Noted: 2021-12-03

## 2022-05-03 PROBLEM — K57.92 DVTRCLI OF INTEST, PART UNSP, W/O PERF OR ABSCESS W/O BLEED: Status: ACTIVE | Noted: 2021-01-01

## 2022-05-03 PROBLEM — H35.351 CYSTOID MACULAR DEGENERATION OF RIGHT EYE: Status: ACTIVE | Noted: 2022-02-23

## 2022-05-03 PROBLEM — F32.9 MAJOR DEPRESSIVE DISORDER, SINGLE EPISODE, UNSPECIFIED: Status: ACTIVE | Noted: 2021-01-01

## 2022-05-03 PROBLEM — Z91.81 HISTORY OF FALLING: Status: ACTIVE | Noted: 2021-01-01

## 2022-05-03 PROBLEM — H35.371 EPIRETINAL MEMBRANE (ERM) OF RIGHT EYE: Status: ACTIVE | Noted: 2021-11-30

## 2022-05-03 PROBLEM — H33.40: Status: ACTIVE | Noted: 2021-11-29

## 2022-05-03 PROBLEM — E56.9 VITAMIN DEFICIENCY, UNSPECIFIED: Status: ACTIVE | Noted: 2021-01-01
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
## 2022-05-03 PROBLEM — I10 ESSENTIAL (PRIMARY) HYPERTENSION: Status: ACTIVE | Noted: 2021-01-01

## 2022-05-03 PROBLEM — M62.81 GENERALIZED MUSCLE WEAKNESS: Status: ACTIVE | Noted: 2021-01-01

## 2022-05-03 PROBLEM — Z79.4 LONG TERM CURRENT USE OF INSULIN: Status: ACTIVE | Noted: 2021-01-01

## 2022-05-03 PROBLEM — F90.9 ATTENTION-DEFICIT HYPERACTIVITY DISORDER, UNSPECIFIED TYPE: Status: ACTIVE | Noted: 2021-01-01

## 2022-05-03 PROBLEM — S82.842D: Status: ACTIVE | Noted: 2021-02-07

## 2022-05-03 PROBLEM — S82.851D: Status: ACTIVE | Noted: 2021-02-07

## 2022-05-03 PROCEDURE — 99214 OFFICE O/P EST MOD 30 MIN: CPT | Performed by: STUDENT IN AN ORGANIZED HEALTH CARE EDUCATION/TRAINING PROGRAM

## 2022-05-03 PROCEDURE — 90833 PSYTX W PT W E/M 30 MIN: CPT | Performed by: STUDENT IN AN ORGANIZED HEALTH CARE EDUCATION/TRAINING PROGRAM

## 2022-05-03 RX ORDER — FLUOXETINE HYDROCHLORIDE 20 MG/1
40 CAPSULE ORAL DAILY
Qty: 60 CAPSULE | Refills: 2 | Status: SHIPPED | OUTPATIENT
Start: 2022-05-03 | End: 2022-09-20

## 2022-05-03 NOTE — PROGRESS NOTES
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 (A) 12.3 - 15.4 % Final   • RDW-SD 02/15/2022 41.8  37.0 - 54.0 fl Final   • MPV 02/15/2022 12.1 (A) 6.0 - 12.0 fL Final   • Platelets 02/15/2022 257  140 - 450 10*3/mm3 Final   • Neutrophil % 02/15/2022 72.6  42.7 - 76.0 % Final   • Lymphocyte % 02/15/2022 16.4 (A) 19.6 - 45.3 % Final   • Monocyte % 02/15/2022 9.6  5.0 - 12.0 % Final   • Eosinophil % 02/15/2022 0.8  0.3 - 6.2 % Final   • Basophil % 02/15/2022 0.4  0.0 - 1.5 % Final   • Immature Grans % 02/15/2022 0.2  0.0 - 0.5 % Final   • Neutrophils, Absolute 02/15/2022 7.45 (A) 1.70 - 7.00 10*3/mm3 Final   • Lymphocytes, Absolute 02/15/2022 1.68  0.70 - 3.10 10*3/mm3 Final   • Monocytes, Absolute 02/15/2022 0.99 (A) 0.10 - 0.90 10*3/mm3 Final   • Eosinophils, Absolute 02/15/2022 0.08  0.00 - 0.40 10*3/mm3 Final   • Basophils, Absolute 02/15/2022 0.04  0.00 - 0.20 10*3/mm3 Final   • Immature Grans, Absolute 02/15/2022 0.02  0.00 - 0.05 10*3/mm3 Final   • nRBC 02/15/2022 0.0  0.0 - 0.2 /100 WBC Final   Admission on 01/10/2022, Discharged on 01/10/2022   Component Date Value Ref Range Status   • COVID19 01/10/2022 Not Detected  Not Detected - Ref. Range Final       EKG Results:  No orders to display       Imaging Results:  No  Images in the past 120 days found..      Assessment/Plan   Diagnoses and all orders for this visit:    1. Major depressive disorder, recurrent episode, moderate (HCC) (Primary)  -     FLUoxetine (PROzac) 20 MG capsule; Take 2 capsules by mouth Daily.  Dispense: 60 capsule; Refill: 2    2. Recurrent major depressive disorder in remission (HCC)    3. Generalized anxiety disorder  -     FLUoxetine (PROzac) 20 MG capsule; Take 2 capsules by mouth Daily.  Dispense: 60 capsule; Refill: 2    4. Post traumatic stress disorder (PTSD)  -     FLUoxetine (PROzac) 20 MG capsule; Take 2 capsules by mouth Daily.  Dispense: 60 capsule; Refill: 2    5. Insomnia due to mental condition  -     FLUoxetine (PROzac) 20 MG capsule; Take 2 capsules by mouth Daily.  Dispense: 60 capsule; Refill: 2        Presentation most consistent with major depressive disorder, recurrent, moderate to severe, with anxious distress.  PTSD.  Rule out personality disorder, cluster B specifically.  Rule out hypomania as patient was very difficult to interrupt today.    5/3: Increase prozac and melatonin. 17 minutes of supportive psychotherapy with goal to strengthen defenses, promote problems solving, restore adaptive functioning and provide symptom relief. The therapeutic alliance was strengthened to encourage the patient to express their thoughts and feelings. Esteem building was enhanced through praise, reassurance, normalizing and encouragement. Coping skills were enhanced to build distress tolerance skills and emotional regulation. Patient given education on medication side effects, diagnosis/illness and relapse symptoms. Plan to continue supportive psychotherapy in next appointment to provide symptom relief.  Diagnoses: as above  Symptoms: as above  Functional status: fair  Mental Status Exam: as above    Treatment plan: Medication management and supportive psychotherapy  Prognosis: good  Progress: backtracked recently, now depressed  6 wks    3/17:  Stable, some problems with concentration related to getting unpacked. Increase trazodone to target insomnia. 18 minutes of supportive psychotherapy with goal to strengthen defenses, promote problems solving, restore adaptive functioning and provide symptom relief. The therapeutic alliance was strengthened to encourage the patient to express their thoughts and feelings. Esteem building was enhanced through praise, reassurance, normalizing and encouragement. Coping skills were enhanced to build distress tolerance skills and emotional regulation. Patient given education on medication side effects, diagnosis/illness and relapse symptoms. Plan to continue supportive psychotherapy in next appointment to provide symptom relief.  6 wks    2/16: Stable. No SE. 17 minutes of supportive psychotherapy with goal to strengthen defenses, promote problems solving, restore adaptive functioning and provide symptom relief. The therapeutic alliance was strengthened to encourage the patient to express their thoughts and feelings. Esteem building was enhanced through praise, reassurance, normalizing and encouragement. Coping skills were enhanced to build distress tolerance skills and emotional regulation. Patient given education on medication side effects, diagnosis/illness and relapse symptoms. Plan to continue supportive psychotherapy in next appointment to provide symptom relief.  4 wks    1/18: Doing well. Tolerating meds without side effects. 25 minutes of supportive psychotherapy with goal to strengthen defenses, promote problems solving, restore adaptive functioning and provide symptom relief. The therapeutic alliance was strengthened to encourage the patient to express their thoughts and feelings. Esteem building was enhanced through praise, reassurance, normalizing and encouragement. Coping skills were enhanced to build distress tolerance skills and emotional regulation. Patient given education on medication side effects,  diagnosis/illness and relapse symptoms. Plan to continue supportive psychotherapy in next appointment to provide symptom relief.  4 wks    12/7: Doing very well, though some insomnia. Start melatonin 10 mg daily. No other changes. Watch jaw movements. Wants to taper off meds at some point; now would not be a good time. 17 minutes of supportive psychotherapy with goal to strengthen defenses, promote problems solving, restore adaptive functioning and provide symptom relief. The therapeutic alliance was strengthened to encourage the patient to express their thoughts and feelings. Esteem building was enhanced through praise, reassurance, normalizing and encouragement. Coping skills were enhanced to build distress tolerance skills and emotional regulation. Patient given education on medication side effects, diagnosis/illness and relapse symptoms. Plan to continue supportive psychotherapy in next appointment to provide symptom relief.  6 wks    10/26: Pt stopped mirtazapine and restarted prozac 20 mg daily.     IMPORTANT:  Consider brexpiprazole.    Very important to obtain records from previous psychiatrist.  Care should be taken when putting patient on sedating medications as she has a falls risk.  Also has a history of EARL which will lead to difficulties treating her insomnia.    Therapy referral made to [facility].      See back in 8 weeks.  Patient is not vaccinated.    Visit Diagnoses:    ICD-10-CM ICD-9-CM   1. Major depressive disorder, recurrent episode, moderate (HCC)  F33.1 296.32   2. Recurrent major depressive disorder in remission (HCC)  F33.40 296.35   3. Generalized anxiety disorder  F41.1 300.02   4. Post traumatic stress disorder (PTSD)  F43.10 309.81   5. Insomnia due to mental condition  F51.05 300.9     327.02       PLAN:  66. Risk Assessment: Risk of self-harm acutely is moderate. Risk factors include chronic depressive disorder, possible personality disorder, recent psychosocial stressors (pandemic,  moving). Protective factors include no present SI, no history of suicide attempts or self-harm in the past, no access to weapons, minimal AODA, healthcare seeking, future orientation, willingness to engage in care. Risk of self-harm chronically is also moderate, but could be further elevated in the event of treatment noncompliance and/or AODA.  67. Safety: No acute safety concerns.  68. Medications:   a. INCREASE melatonin 16 to 20-24 mg p.o. nightly.  Risks, benefits, side effects discussed with patient including sedation, dizziness/falls risk, GI upset.  After discussion of these risks and benefits, the patient voiced understanding and agreed to proceed.  b. CONTINUE trazodone 50 mg PO QHS. Risks, benefits, side effects discussed with patient including GI upset, sedation, dizziness/falls risk, grogginess the following day, prolongation of the QTc interval.  After discussion of these risks and benefits, the patient voiced understanding and agreed to proceed.    c. CONTINUE bupropion xl 300 mg daily. Risks, benefits, alternatives discussed with patient including nausea, GI upset, increased energy, exacerbation of irritability, insomnia, lowering of seizure threshold.  After discussion of these risks and benefits, the patient voiced understanding and agreed to proceed.  d. INCREASE Prozac 20 to 40 mg a day. Risks, benefits, alternatives discussed with patient including GI upset, nausea vomiting diarrhea, theoretical decrease of seizure threshold predisposing the patient to a slightly higher seizure risk, headaches, sexual dysfunction, serotonin syndrome, bleeding risk, increased suicidality in patients 24 years and younger.  After discussion of these risks and benefits, the patient voiced understanding and agreed to proceed.  e. CONTINUE Abilify 4 mg p.o. daily to target depression, anxiety, decreased energy. Risks, benefits, alternatives discussed with patient including increased energy, exacerbation of  irritability, akathisia, GI upset, orthostatic hypotension, increased appetite. After discussion of these risks and benefits, the patient voiced understanding and agreed to proceed.  i. S/P:  1. Mirtazapine 45 mg daily (RLS)  69. Therapy: [facility].  70. Labs/Studies: s/p TMS referral.  71. Follow Up: 6 weeks. (prefers 4 wks)      TREATMENT PLAN/GOALS: Continue supportive psychotherapy efforts and medications as indicated. Treatment and medication options discussed during today's visit. Patient acknowledged and verbally consented to continue with current treatment plan and was educated on the importance of compliance with treatment and follow-up appointments.    MEDICATION ISSUES:  SAPNA reviewed as expected.  Discussed medication options and treatment plan of prescribed medication as well as the risks, benefits, and side effects including potential falls, possible impaired driving and metabolic adversities among others. Patient is agreeable to call the office with any worsening of symptoms or onset of side effects. Patient is agreeable to call 911 or go to the nearest ER should he/she begin having SI/HI. No medication side effects or related complaints today.     MEDS ORDERED DURING VISIT:  New Medications Ordered This Visit   Medications   • FLUoxetine (PROzac) 20 MG capsule     Sig: Take 2 capsules by mouth Daily.     Dispense:  60 capsule     Refill:  2       Return in about 6 weeks (around 6/14/2022).         This document has been electronically signed by Vicky Barth MD  May 3, 2022 11:20 EDT      Part of this note may be an electronic transcription/translation of spoken language to printed text using the Dragon Dictation System.

## 2022-05-04 ENCOUNTER — TELEMEDICINE (OUTPATIENT)
Dept: PSYCHIATRY | Facility: CLINIC | Age: 72
End: 2022-05-04

## 2022-05-04 DIAGNOSIS — F33.2 SEVERE EPISODE OF RECURRENT MAJOR DEPRESSIVE DISORDER, WITHOUT PSYCHOTIC FEATURES: Primary | ICD-10-CM

## 2022-05-04 PROCEDURE — 90832 PSYTX W PT 30 MINUTES: CPT | Performed by: COUNSELOR

## 2022-05-04 RX ORDER — EZETIMIBE 10 MG/1
10 TABLET ORAL
Qty: 90 TABLET | Refills: 1 | Status: SHIPPED | OUTPATIENT
Start: 2022-05-04 | End: 2022-09-26

## 2022-05-04 RX ORDER — MONTELUKAST SODIUM 10 MG/1
10 TABLET ORAL NIGHTLY
Qty: 90 TABLET | Refills: 1 | Status: SHIPPED | OUTPATIENT
Start: 2022-05-04 | End: 2023-03-09 | Stop reason: SDUPTHER

## 2022-05-04 NOTE — PROGRESS NOTES
Date: May 4, 2022  Time In: 1345  Time Out: 1419  This provider is located at the [facility]), [address], [place] [address] using a secure itzathart Video Visit through [employer]. Patient is being seen remotely via telehealth at home address in [place] and stated they are in a secure environment for this session. The patient's condition being diagnosed/treated is appropriate for telemedicine. The provider identified herself as well as her credentials. The patient, and/or patients guardian, consent to be seen remotely, and when consent is given they understand that the consent allows for patient identifiable information to be sent to a third party as needed. They may refuse to be seen remotely at any time. The electronic data is encrypted and password protected, and the patient and/or guardian has been advised of the potential risks to privacy not withstanding such measures.     You have chosen to receive care through a telehealth visit.  Do you consent to use a video/audio connection for your medical care today? Yes    PROGRESS NOTE  Data:  Nivia Cagle is a 71 y.o. female who presents today for follow up    Chief Complaint: depression     History of Present Illness: Pt reports increased depression since previous session. Pt discussed crying spells, irregular eating habits, difficulty sleeping, difficulty completing ADLs, and declined energy with increased exhaustion. Pt discussed feelings of guilt and shame explaining that she is very blessed and has no reason to feel this depressed. Pt uncertain as to what she has done in efforts to feel this depressed. Pt worries that she is a lazy individual and is unsure if she is able to pull herself out of this cycle.       Clinical Maneuvering/Intervention:    (Scales based on 0 - 10 with 10 being the worst)  Depression: 10 Anxiety: 10     Assisted patient in processing above session content; acknowledged  and normalized patient’s thoughts, feelings, and concerns.  Rationalized patient thought process regarding voiced depression symptoms. Discussed triggers associated with patient's mood.  Also discussed coping skills for patient to implement such as reminding self of chemical imbalance and discussed importance of positive personal narrative. Son provided reassurance that Pt is not lazy. Discussed importance of small daily goals.     Allowed patient to freely discuss issues without interruption or judgment. Provided safe, confidential environment to facilitate the development of positive therapeutic relationship and encourage open, honest communication. Assisted patient in identifying risk factors which would indicate the need for higher level of care including thoughts to harm self or others and/or self-harming behavior and encouraged patient to contact this office, call 911, or present to the nearest emergency room should any of these events occur. Discussed crisis intervention services and means to access. Patient adamantly and convincingly denies current suicidal or homicidal ideation or perceptual disturbance.    Assessment:   Assessment   Patient appears to maintain relative stability as compared to their baseline.  However, patient continues to struggle with depression which continues to cause impairment in important areas of functioning.  A result, they can be reasonably expected to continue to benefit from treatment and would likely be at increased risk for decompensation otherwise.    Mental Status Exam:   Hygiene:   good  Cooperation:  Cooperative  Eye Contact:  Good  Psychomotor Behavior:  Appropriate  Affect:  Full range  Mood: sad, depressed and anxious  Speech:  Normal  Thought Process:  Circum  Thought Content:  Mood congruent  Suicidal:  None  Homicidal:  None  Hallucinations:  None  Delusion:  None  Memory:  Intact  Orientation:  Person, Place, Time and Situation  Reliability:  fair  Insight:   Fair  Judgement:  Fair  Impulse Control:  Fair  Physical/Medical Issues:  No        Patient's Support Network Includes:  son    Functional Status: Mild impairment     Progress toward goal: Not at goal    Prognosis: Fair with Ongoing Treatment          Plan:    Patient will continue in individual outpatient therapy with focus on improved functioning and coping skills, maintaining stability, and avoiding decompensation and the need for higher level of care.    Patient will adhere to medication regimen as prescribed and report any side effects. Patient will contact this office, call 911 or present to the nearest emergency room should suicidal or homicidal ideations occur. Provide Cognitive Behavioral Therapy and Solution Focused Therapy to improve functioning, maintain stability, and avoid decompensation and the need for higher level of care.     Return in about 2 weeks, or earlier if symptoms worsen or fail to improve.           VISIT DIAGNOSIS:     ICD-10-CM ICD-9-CM   1. Severe episode of recurrent major depressive disorder, without psychotic features ([facility])  F33.2 296.33          [facility] No Show Policy:  We understand unexpected circumstances arise; however, anytime you miss your appointment we are unable to provide you appropriate care.  In addition, each appointment missed could have been used to provide care for others.  We ask that you call at least 24 hours in advance to cancel or reschedule an appointment.  We would like to take this opportunity to remind you of our policy stating patients who miss THREE or more appointments without cancelling or rescheduling 24 hours in advance of the appointment may be subject to cancellation of any further visits with our clinic and recommendation to seek in-person services/visits.    Please call [phone] to reschedule your appointment. If there are reasons that make it difficult for you to keep the appointments, please call and let us know  how we can help.  Please understand that medication prescribing will not continue without seeing your provider.      [facility]'s No Show Policy reviewed with patient at today's visit. Patient verbalized understanding of this policy. Discussed with patient that in the event that there are three or more no show visits, it will be recommended that they pursue in-person services/visits as noncompliance with telehealth visits indicates that patient is not an appropriate candidate for telemedicine and would likely be more appropriate for in-person services/visits. Patient verbalizes understanding and is agreeable to this.        This document has been electronically signed by Annita Polo LCSW  May 4, 2022 14:23 EDT      Part of this note may be an electronic transcription/translation of spoken language to printed text using the Dragon Dictation System.

## 2022-05-18 ENCOUNTER — TELEMEDICINE (OUTPATIENT)
Dept: PSYCHIATRY | Facility: CLINIC | Age: 72
End: 2022-05-18

## 2022-05-18 DIAGNOSIS — F41.1 GENERALIZED ANXIETY DISORDER: ICD-10-CM

## 2022-05-18 DIAGNOSIS — F33.2 SEVERE EPISODE OF RECURRENT MAJOR DEPRESSIVE DISORDER, WITHOUT PSYCHOTIC FEATURES: Primary | ICD-10-CM

## 2022-05-18 PROCEDURE — 90832 PSYTX W PT 30 MINUTES: CPT | Performed by: COUNSELOR

## 2022-05-19 NOTE — PROGRESS NOTES
Date: May 19, 2022  Time In: 1310  Time Out: 1352  This provider is located at the [facility]), [address], [place] [address] using a secure Pure[employer]ge[employer] Video Visit through [employer]. Patient is being seen remotely via telehealth at home address in [place] and stated they are in a secure environment for this session. The patient's condition being diagnosed/treated is appropriate for telemedicine. The provider identified herself as well as her credentials. The patient, and/or patients guardian, consent to be seen remotely, and when consent is given they understand that the consent allows for patient identifiable information to be sent to a third party as needed. They may refuse to be seen remotely at any time. The electronic data is encrypted and password protected, and the patient and/or guardian has been advised of the potential risks to privacy not withstanding such measures.     You have chosen to receive care through a telehealth visit.  Do you consent to use a video/audio connection for your medical care today? Yes    PROGRESS NOTE  Data:  Nivia Cagle is a 71 y.o. female who presents today for follow up    Chief Complaint: Depression     History of Present Illness: Pt reports feeling better since previous session. Pt discussed the benefits of her some staying with her and helping with keeping her accountable to daily routines and structure. Pt reports current routine; making her bed each day, getting dressed and ready for the day, and setting goals. Pt showed therapist progress made with moving into new place. 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       Clinical Maneuvering/Intervention:    (Scales based on 0 - 10 with 10 being the worst)  Depression: 7 Anxiety: 7       Assisted patient in  processing above session content; acknowledged and normalized patient’s thoughts, feelings, and concerns.  Rationalized patient thought process regarding improved mood. Discussed triggers associated with patient's depression and anxiety.  Also discussed coping skills for patient to implement such as accountability and maintaining.     Allowed patient to freely discuss issues without interruption or judgment. Provided safe, confidential environment to facilitate the development of positive therapeutic relationship and encourage open, honest communication. Assisted patient in identifying risk factors which would indicate the need for higher level of care including thoughts to harm self or others and/or self-harming behavior and encouraged patient to contact this office, call 911, or present to the nearest emergency room should any of these events occur. Discussed crisis intervention services and means to access. Patient adamantly and convincingly denies current suicidal or homicidal ideation or perceptual disturbance.    Assessment:   Assessment   Patient appears to maintain relative stability as compared to their baseline.  However, patient continues to struggle with depression and anxiety  which continues to cause impairment in important areas of functioning.  A result, they can be reasonably expected to continue to benefit from treatment and would likely be at increased risk for decompensation otherwise.    Mental Status Exam:   Hygiene:   good  Cooperation:  Cooperative  Eye Contact:  Good  Psychomotor Behavior:  Appropriate  Affect:  Appropriate  Mood: normal  Speech:  Normal  Thought Process:  Linear  Thought Content:  Normal  Suicidal:  None  Homicidal:  None  Hallucinations:  None  Delusion:  None  Memory:  Intact  Orientation:  Person, Place, Time and Situation  Reliability:  fair  Insight:  Fair  Judgement:  Fair  Impulse Control:  Fair  Physical/Medical Issues:  No        Patient's Support Network Includes:   son    Functional Status: Mild impairment     Progress toward goal: Not at goal    Prognosis: Fair with Ongoing Treatment          Plan:    Patient will continue in individual outpatient therapy with focus on improved functioning and coping skills, maintaining stability, and avoiding decompensation and the need for higher level of care.    Patient will adhere to medication regimen as prescribed and report any side effects. Patient will contact this office, call 911 or present to the nearest emergency room should suicidal or homicidal ideations occur. Provide Cognitive Behavioral Therapy and Solution Focused Therapy to improve functioning, maintain stability, and avoid decompensation and the need for higher level of care.     Return in about 3 weeks, or earlier if symptoms worsen or fail to improve.           VISIT DIAGNOSIS:     ICD-10-CM ICD-9-CM   1. Severe episode of recurrent major depressive disorder, without psychotic features (HCC)  F33.2 296.33   2. Generalized anxiety disorder  F41.1 300.02          [facility] No Show Policy:  We understand unexpected circumstances arise; however, anytime you miss your appointment we are unable to provide you appropriate care.  In addition, each appointment missed could have been used to provide care for others.  We ask that you call at least 24 hours in advance to cancel or reschedule an appointment.  We would like to take this opportunity to remind you of our policy stating patients who miss THREE or more appointments without cancelling or rescheduling 24 hours in advance of the appointment may be subject to cancellation of any further visits with our clinic and recommendation to seek in-person services/visits.    Please call [phone] to reschedule your appointment. If there are reasons that make it difficult for you to keep the appointments, please call and let us know how we can help.  Please understand that medication prescribing will not  continue without seeing your provider.      [facility]'s No Show Policy reviewed with patient at today's visit. Patient verbalized understanding of this policy. Discussed with patient that in the event that there are three or more no show visits, it will be recommended that they pursue in-person services/visits as noncompliance with telehealth visits indicates that patient is not an appropriate candidate for telemedicine and would likely be more appropriate for in-person services/visits. Patient verbalizes understanding and is agreeable to this.        This document has been electronically signed by Annita Polo LCSW  May 19, 2022 09:23 EDT      Part of this note may be an electronic transcription/translation of spoken language to printed text using the Dragon Dictation System.

## 2022-05-23 ENCOUNTER — PREP FOR SURGERY (OUTPATIENT)
Dept: OTHER | Facility: HOSPITAL | Age: 72
End: 2022-05-23

## 2022-05-23 ENCOUNTER — CLINICAL SUPPORT (OUTPATIENT)
Dept: GASTROENTEROLOGY | Facility: CLINIC | Age: 72
End: 2022-05-23

## 2022-05-23 DIAGNOSIS — Z12.11 SCREENING FOR COLON CANCER: Primary | ICD-10-CM

## 2022-05-23 RX ORDER — SODIUM, POTASSIUM,MAG SULFATES 17.5-3.13G
1 SOLUTION, RECONSTITUTED, ORAL ORAL EVERY 12 HOURS
Qty: 354 ML | Refills: 0 | Status: ON HOLD | OUTPATIENT
Start: 2022-05-23 | End: 2022-09-28

## 2022-05-23 NOTE — PROGRESS NOTES
Nivia Cagle  REASON FOR CALL colon cancer screening  SENT IN PREP suprep  Past Medical History:   Diagnosis Date   • ADHD (attention deficit hyperactivity disorder)    • Allergic rhinitis    • Anemia    • Anxiety    • Cataracts, bilateral    • Chronic pain disorder    • Depression 0421/2021    MOODNOT WELL CONTROLLED.  GIVEN NUMBER FOR LOCAL COUNSELOR.  WILL INCREASE TRINTELIX FROM 10MG TO 20MG RTC WEEK ER ID S/HI   • Diabetes mellitus, type 2 (HCC)    • Diverticulitis    • GERD (gastroesophageal reflux disease)    • Head injury    • High blood pressure    • Hyperlipemia    • Migraine    • EARL (obstructive sleep apnea) 04/21/2021   • Panic disorder    • Phlebitis    • PTSD (post-traumatic stress disorder)      Allergies   Allergen Reactions   • Benzethonium Chloride Rash   • Diclofenac Hives   • Freederm Adhesive Remover [New Skin] Rash   • Adhesive Tape Rash and Other (See Comments)       Rash at area of bandaid   • Niacin Rash     Past Surgical History:   Procedure Laterality Date   • ANKLE SURGERY  2021   • BILATERAL BREAST REDUCTION  2015   • CARPAL TUNNEL RELEASE     • CHOLECYSTECTOMY  2001   • COLONOSCOPY      [facility]   • HYSTERECTOMY     • ULNAR NERVE TRANSPOSITION Right    • WRIST SURGERY       Social History     Socioeconomic History   • Marital status: Single   Tobacco Use   • Smoking status: Former Smoker     Packs/day: 0.25     Years: 22.00     Pack years: 5.50   • Smokeless tobacco: Never Used   • Tobacco comment: QUIT 1988   Vaping Use   • Vaping Use: Never used   Substance and Sexual Activity   • Alcohol use: Yes     Comment: SPECIAL OCCASION ONLY   • Drug use: Never   • Sexual activity: Not Currently     Family History   Problem Relation Age of Onset   • Heart disease Father    • Heart attack Father    • Diabetes Father    • ADD / ADHD Father    • Hyperlipidemia Father    • Kidney cancer Mother    • Hyperlipidemia Mother    • Hyperlipidemia Sister    • Hyperlipidemia Brother    •  "Diabetes Brother    • No Known Problems Paternal Uncle    • No Known Problems Cousin    • Brain cancer Sister    • Lung cancer Sister    • Hyperlipidemia Sister    • Hyperlipidemia Brother        Current Outpatient Medications:   •  Accu-Chek Madeline Plus test strip, by Other route 4 (Four) Times a Day. use to test blood sugar, Disp: , Rfl:   •  Accu-Chek Softclix Lancets lancets, by Other route 4 (Four) Times a Day. use to test blood sugar, Disp: , Rfl:   •  alendronate (FOSAMAX) 70 MG tablet, Take 1 tablet by mouth Every 7 (Seven) Days., Disp: , Rfl:   •  ARIPiprazole (ABILIFY) 2 MG tablet, Take 2 tablets by mouth Daily., Disp: 180 tablet, Rfl: 0  •  BD Insulin Syringe U/F 30G X 1/2\" 0.3 ML misc, USE TO INJECT INSULIN FOUR TIMES DAILY AS NEEDED, Disp: , Rfl:   •  BL NASAL SALINE MIST NA, 2 drops., Disp: , Rfl:   •  Blood Glucose Monitoring Suppl (FreeStyle Lite) device, 1 each by Other route 4 (Four) Times a Day., Disp: , Rfl:   •  buPROPion XL (WELLBUTRIN XL) 300 MG 24 hr tablet, Take 1 tablet by mouth Every Morning., Disp: 90 tablet, Rfl: 0  •  Calcium Carbonate 1500 (600 Ca) MG tablet, Take 600 mg by mouth Daily., Disp: , Rfl:   •  cetirizine (zyrTEC) 10 MG tablet, Take 1 tablet by mouth Daily., Disp: 90 tablet, Rfl: 1  •  cyclobenzaprine (FLEXERIL) 10 MG tablet, Take 1 tablet by mouth Daily As Needed for Muscle Spasms., Disp: 30 tablet, Rfl: 2  •  Daily-Jelani Multivitamin tablet tablet, Take 1 tablet by mouth every night at bedtime., Disp: , Rfl:   •  ezetimibe (ZETIA) 10 MG tablet, TAKE 1 TABLET BY MOUTH EVERY NIGHT AT BEDTIME, Disp: 90 tablet, Rfl: 1  •  FLUoxetine (PROzac) 20 MG capsule, Take 2 capsules by mouth Daily., Disp: 60 capsule, Rfl: 2  •  fluticasone (Flonase) 50 MCG/ACT nasal spray, 2 sprays into the nostril(s) as directed by provider Daily. Administer 2 sprays in each nostril for each dose., Disp: 16 g, Rfl: 6  •  ibuprofen (ADVIL,MOTRIN) 200 MG tablet, 200 mg., Disp: , Rfl:   •  Januvia 100 MG " tablet, Take 100 mg by mouth Daily., Disp: , Rfl:   •  losartan (COZAAR) 25 MG tablet, Take 1 tablet by mouth Daily., Disp: 90 tablet, Rfl: 1  •  melatonin 1 MG tablet, Take 12 mg by mouth Every Night., Disp: , Rfl:   •  metFORMIN (GLUCOPHAGE) 500 MG tablet, Take 2 tablets by mouth 2 (Two) Times a Day With Meals., Disp: 360 tablet, Rfl: 1  •  montelukast (SINGULAIR) 10 MG tablet, TAKE 1 TABLET BY MOUTH EVERY NIGHT, Disp: 90 tablet, Rfl: 1  •  Non-Aspirin Pain Reliever 325 MG tablet, Take 650 mg by mouth Every 8 (Eight) Hours As Needed. for pain, Disp: , Rfl:   •  pramipexole (MIRAPEX) 0.5 MG tablet, Take 1 tablet by mouth every night at bedtime., Disp: 30 tablet, Rfl: 3  •  prednisoLONE acetate (PRED FORTE) 1 % ophthalmic suspension, SHAKE LIQUID AND INSTILL 1 DROP IN RIGHT EYE FOUR TIMES DAILY, Disp: , Rfl:   •  quiNINE (QUALAQUIN) 324 MG capsule, Take 324 mg by mouth every night at bedtime., Disp: , Rfl:   •  timolol (TIMOPTIC) 0.5 % ophthalmic solution, INSTILL 1 DROP IN RIGHT EYE TWICE DAILY, Disp: , Rfl:   •  traZODone (DESYREL) 50 MG tablet, TAKE 1 TABLET BY MOUTH EVERY NIGHT, Disp: 30 tablet, Rfl: 2  •  vitamin D (ERGOCALCIFEROL) 1.25 MG (00984 UT) capsule capsule, 50,000 Units 1 (One) Time Per Week., Disp: , Rfl:

## 2022-05-24 PROBLEM — Z12.11 SCREENING FOR COLON CANCER: Status: ACTIVE | Noted: 2022-05-24

## 2022-05-26 ENCOUNTER — TELEPHONE (OUTPATIENT)
Dept: INTERNAL MEDICINE | Facility: CLINIC | Age: 72
End: 2022-05-26

## 2022-05-26 PROCEDURE — U0005 INFEC AGEN DETEC AMPLI PROBE: HCPCS | Performed by: FAMILY MEDICINE

## 2022-05-26 PROCEDURE — U0004 COV-19 TEST NON-CDC HGH THRU: HCPCS | Performed by: FAMILY MEDICINE

## 2022-05-26 NOTE — TELEPHONE ENCOUNTER
Red rule verified and correct.    Pt calling about parking placard.    Advised it was awaiting a signature, per ZULEIKA Adan.    Advised pt it would be ready after 3 pm today.    (normally 3-5 business days)

## 2022-06-01 ENCOUNTER — TELEMEDICINE (OUTPATIENT)
Dept: PSYCHIATRY | Facility: CLINIC | Age: 72
End: 2022-06-01

## 2022-06-01 DIAGNOSIS — F33.2 SEVERE EPISODE OF RECURRENT MAJOR DEPRESSIVE DISORDER, WITHOUT PSYCHOTIC FEATURES: Primary | ICD-10-CM

## 2022-06-01 PROCEDURE — 90832 PSYTX W PT 30 MINUTES: CPT | Performed by: COUNSELOR

## 2022-06-01 NOTE — PROGRESS NOTES
Date: June 1, 2022  Time In: 1417  Time Out: 1447  This provider is located at the [facility]), [address], [place] [address] using a secure [employer] Video Visit through [employer]. Patient is being seen remotely via telehealth at home address in [place] and stated they are in a secure environment for this session. The patient's condition being diagnosed/treated is appropriate for telemedicine. The provider identified herself as well as her credentials. The patient, and/or patients guardian, consent to be seen remotely, and when consent is given they understand that the consent allows for patient identifiable information to be sent to a third party as needed. They may refuse to be seen remotely at any time. The electronic data is encrypted and password protected, and the patient and/or guardian has been advised of the potential risks to privacy not withstanding such measures. Connection issues; completed via telephone after multiple attempts to connect.      You have chosen to receive care through a telehealth visit.  Do you consent to use a video/audio connection for your medical care today? Yes    PROGRESS NOTE  Data:  Nivia Cagle is a 71 y.o. female who presents today for follow up    Chief Complaint: Depression     History of Present Illness: Pt reports that she has been doing okay for the most part; son has returned home and is missed due to his insight and help with routine. Pt reports that son also informed her of a possible move; son and family may be moving to [place]; Pt reports that her daughter-in-law doesn't want Pt to know this information at this time; has a history of brushing pt off with the silent treatment. Pt reports that she has bene on routine and working on improving her mood daily; has even schedule with Islam involvement. Pt reports that she hopes to fulfill idol time but is uncertain as to what at this time.        Clinical Maneuvering/Intervention:    (Scales based on 0 - 10 with 10 being the worst)  Depression: 5 Anxiety: 5     Assisted patient in processing above session content; acknowledged and normalized patient’s thoughts, feelings, and concerns.  Rationalized patient thought process regarding current stressors and possibility of son moving.  Discussed triggers associated with patient's mood.  Also discussed coping skills for patient to implement such as fulfilling idol time with activity.    Allowed patient to freely discuss issues without interruption or judgment. Provided safe, confidential environment to facilitate the development of positive therapeutic relationship and encourage open, honest communication. Assisted patient in identifying risk factors which would indicate the need for higher level of care including thoughts to harm self or others and/or self-harming behavior and encouraged patient to contact this office, call 911, or present to the nearest emergency room should any of these events occur. Discussed crisis intervention services and means to access. Patient adamantly and convincingly denies current suicidal or homicidal ideation or perceptual disturbance.    Assessment:   Assessment   Patient appears to maintain relative stability as compared to their baseline.  However, patient continues to struggle with depression which continues to cause impairment in important areas of functioning.  A result, they can be reasonably expected to continue to benefit from treatment and would likely be at increased risk for decompensation otherwise.    Mental Status Exam:   Hygiene:   good  Cooperation:  Cooperative  Eye Contact:  Good  Psychomotor Behavior:  Appropriate  Affect:  Appropriate  Mood: normal  Speech:  Normal  Thought Process:  Linear  Thought Content:  Normal  Suicidal:  None  Homicidal:  None  Hallucinations:  None  Delusion:  None  Memory:  Intact  Orientation:  Person, Place, Time and  Situation  Reliability:  fair  Insight:  Fair  Judgement:  Fair  Impulse Control:  Fair  Physical/Medical Issues:  No        Patient's Support Network Includes:  son    Functional Status: Mild impairment     Progress toward goal: Not at goal    Prognosis: Fair with Ongoing Treatment          Plan:    Patient will continue in individual outpatient therapy with focus on improved functioning and coping skills, maintaining stability, and avoiding decompensation and the need for higher level of care.    Patient will adhere to medication regimen as prescribed and report any side effects. Patient will contact this office, call 911 or present to the nearest emergency room should suicidal or homicidal ideations occur. Provide Cognitive Behavioral Therapy and Solution Focused Therapy to improve functioning, maintain stability, and avoid decompensation and the need for higher level of care.     Return in about 3 weeks, or earlier if symptoms worsen or fail to improve.           VISIT DIAGNOSIS:     ICD-10-CM ICD-9-CM   1. Severe episode of recurrent major depressive disorder, without psychotic features ([facility])  F33.2 296.33          [facility] No Show Policy:  We understand unexpected circumstances arise; however, anytime you miss your appointment we are unable to provide you appropriate care.  In addition, each appointment missed could have been used to provide care for others.  We ask that you call at least 24 hours in advance to cancel or reschedule an appointment.  We would like to take this opportunity to remind you of our policy stating patients who miss THREE or more appointments without cancelling or rescheduling 24 hours in advance of the appointment may be subject to cancellation of any further visits with our clinic and recommendation to seek in-person services/visits.    Please call [phone] to reschedule your appointment. If there are reasons that make it difficult for you to keep the  appointments, please call and let us know how we can help.  Please understand that medication prescribing will not continue without seeing your provider.      [facility]'s No Show Policy reviewed with patient at today's visit. Patient verbalized understanding of this policy. Discussed with patient that in the event that there are three or more no show visits, it will be recommended that they pursue in-person services/visits as noncompliance with telehealth visits indicates that patient is not an appropriate candidate for telemedicine and would likely be more appropriate for in-person services/visits. Patient verbalizes understanding and is agreeable to this.        This document has been electronically signed by Annita Polo LCSW  June 1, 2022 14:56 EDT      Part of this note may be an electronic transcription/translation of spoken language to printed text using the Dragon Dictation System.

## 2022-06-03 RX ORDER — SITAGLIPTIN 100 MG/1
TABLET, FILM COATED ORAL
Qty: 90 TABLET | Refills: 1 | Status: SHIPPED | OUTPATIENT
Start: 2022-06-03 | End: 2022-12-27

## 2022-06-03 RX ORDER — LOSARTAN POTASSIUM 25 MG/1
25 TABLET ORAL DAILY
Qty: 90 TABLET | Refills: 1 | Status: SHIPPED | OUTPATIENT
Start: 2022-06-03 | End: 2022-09-01

## 2022-06-13 ENCOUNTER — OFFICE VISIT (OUTPATIENT)
Dept: INTERNAL MEDICINE | Facility: CLINIC | Age: 72
End: 2022-06-13

## 2022-06-13 VITALS
HEIGHT: 63 IN | TEMPERATURE: 97.4 F | SYSTOLIC BLOOD PRESSURE: 136 MMHG | OXYGEN SATURATION: 96 % | WEIGHT: 160 LBS | HEART RATE: 76 BPM | BODY MASS INDEX: 28.35 KG/M2 | RESPIRATION RATE: 14 BRPM | DIASTOLIC BLOOD PRESSURE: 80 MMHG

## 2022-06-13 DIAGNOSIS — N30.01 ACUTE CYSTITIS WITH HEMATURIA: ICD-10-CM

## 2022-06-13 DIAGNOSIS — R31.9 HEMATURIA, UNSPECIFIED TYPE: Primary | ICD-10-CM

## 2022-06-13 LAB
BILIRUB UR QL STRIP: ABNORMAL
CLARITY UR: CLEAR
COLOR UR: YELLOW
GLUCOSE UR STRIP-MCNC: NEGATIVE MG/DL
HGB UR QL STRIP.AUTO: ABNORMAL
KETONES UR QL STRIP: ABNORMAL
LEUKOCYTE ESTERASE UR QL STRIP.AUTO: ABNORMAL
NITRITE UR QL STRIP: NEGATIVE
PH UR STRIP.AUTO: 5.5 [PH] (ref 5–8)
PROT UR QL STRIP: ABNORMAL
SP GR UR STRIP: >=1.03 (ref 1–1.03)
UROBILINOGEN UR QL STRIP: ABNORMAL

## 2022-06-13 PROCEDURE — 87086 URINE CULTURE/COLONY COUNT: CPT | Performed by: PHYSICIAN ASSISTANT

## 2022-06-13 PROCEDURE — 87186 SC STD MICRODIL/AGAR DIL: CPT | Performed by: PHYSICIAN ASSISTANT

## 2022-06-13 PROCEDURE — 87077 CULTURE AEROBIC IDENTIFY: CPT | Performed by: PHYSICIAN ASSISTANT

## 2022-06-13 PROCEDURE — 99213 OFFICE O/P EST LOW 20 MIN: CPT | Performed by: PHYSICIAN ASSISTANT

## 2022-06-13 PROCEDURE — 81003 URINALYSIS AUTO W/O SCOPE: CPT | Performed by: PHYSICIAN ASSISTANT

## 2022-06-13 RX ORDER — NITROFURANTOIN 25; 75 MG/1; MG/1
100 CAPSULE ORAL 2 TIMES DAILY
Qty: 14 CAPSULE | Refills: 0 | Status: SHIPPED | OUTPATIENT
Start: 2022-06-13 | End: 2022-06-14

## 2022-06-13 NOTE — PROGRESS NOTES
Chief Complaint  Blood in urine    Subjective          Nivia Cagle presents to [facility]  Blood in urine  She noticed large brown/red blood clots in the urine this am. She states it looked like she was having a menses but she does not have uterus.  She started having burning with urinating the past 4 days  She is urinating more than usual and has urgency  Sx seem worse in the am  Denies fever, nausea, vomiting.   Last bm yesterday, she had slight constipation. Denies blood in stool.  She has hx of kidney stones. But she is not having back pain or kidney stone side/abd pain.   She is not having any pain.  She was recently on Amoxicillin from  for sinusitis.       Past Medical History:   Diagnosis Date   • ADHD (attention deficit hyperactivity disorder)    • Allergic rhinitis    • Anemia    • Anxiety    • Cataracts, bilateral    • Chronic pain disorder    • Depression 0421/2021    MOODNOT WELL CONTROLLED.  GIVEN NUMBER FOR LOCAL COUNSELOR.  WILL INCREASE TRINTELIX FROM 10MG TO 20MG RTC WEEK ER ID S/HI   • Diabetes mellitus, type 2 (HCC)    • Diverticulitis    • GERD (gastroesophageal reflux disease)    • Head injury    • High blood pressure    • Hyperlipemia    • Migraine    • EARL (obstructive sleep apnea) 04/21/2021   • Panic disorder    • Phlebitis    • PTSD (post-traumatic stress disorder)         Past Surgical History:   Procedure Laterality Date   • ANKLE SURGERY  2021   • BILATERAL BREAST REDUCTION  2015   • CARPAL TUNNEL RELEASE     • CHOLECYSTECTOMY  2001   • COLONOSCOPY      [facility]   • HYSTERECTOMY     • ULNAR NERVE TRANSPOSITION Right    • WRIST SURGERY          Current Outpatient Medications on File Prior to Visit   Medication Sig Dispense Refill   • Accu-Chek Madeline Plus test strip by Other route 4 (Four) Times a Day. use to test blood sugar     • Accu-Chek Softclix Lancets lancets by Other route 4 (Four) Times a Day. use to test blood sugar   "   • albuterol sulfate  (90 Base) MCG/ACT inhaler Inhale 2 puffs Every 4 (Four) Hours As Needed for Wheezing (Coughing). 8 g 0   • alendronate (FOSAMAX) 70 MG tablet Take 1 tablet by mouth Every 7 (Seven) Days.     • ARIPiprazole (ABILIFY) 2 MG tablet Take 2 tablets by mouth Daily. 180 tablet 0   • BD Insulin Syringe U/F 30G X 1/2\" 0.3 ML misc USE TO INJECT INSULIN FOUR TIMES DAILY AS NEEDED     • BL NASAL SALINE MIST NA 2 drops.     • Blood Glucose Monitoring Suppl (FreeStyle Lite) device 1 each by Other route 4 (Four) Times a Day.     • Calcium Carbonate 1500 (600 Ca) MG tablet Take 600 mg by mouth Daily.     • cetirizine (zyrTEC) 10 MG tablet Take 1 tablet by mouth Daily. 90 tablet 1   • cyclobenzaprine (FLEXERIL) 10 MG tablet Take 1 tablet by mouth Daily As Needed for Muscle Spasms. 30 tablet 2   • Daily-Jelani Multivitamin tablet tablet Take 1 tablet by mouth every night at bedtime.     • ezetimibe (ZETIA) 10 MG tablet TAKE 1 TABLET BY MOUTH EVERY NIGHT AT BEDTIME 90 tablet 1   • FLUoxetine (PROzac) 20 MG capsule Take 2 capsules by mouth Daily. 60 capsule 2   • fluticasone (Flonase) 50 MCG/ACT nasal spray 2 sprays into the nostril(s) as directed by provider Daily. Administer 2 sprays in each nostril for each dose. 16 g 6   • ibuprofen (ADVIL,MOTRIN) 200 MG tablet 200 mg.     • Januvia 100 MG tablet TAKE 1 TABLET BY MOUTH DAILY 90 tablet 1   • losartan (COZAAR) 25 MG tablet TAKE 1 TABLET BY MOUTH DAILY 90 tablet 1   • melatonin 1 MG tablet Take 18 mg by mouth Every Night.     • metFORMIN (GLUCOPHAGE) 500 MG tablet Take 2 tablets by mouth 2 (Two) Times a Day With Meals. 360 tablet 1   • montelukast (SINGULAIR) 10 MG tablet TAKE 1 TABLET BY MOUTH EVERY NIGHT 90 tablet 1   • Non-Aspirin Pain Reliever 325 MG tablet Take 650 mg by mouth Every 8 (Eight) Hours As Needed. for pain     • pramipexole (MIRAPEX) 0.5 MG tablet Take 1 tablet by mouth every night at bedtime. 30 tablet 3   • prednisoLONE acetate (PRED " "FORTE) 1 % ophthalmic suspension SHAKE LIQUID AND INSTILL 1 DROP IN RIGHT EYE FOUR TIMES DAILY     • quiNINE (QUALAQUIN) 324 MG capsule Take 324 mg by mouth every night at bedtime.     • sodium-potassium-magnesium sulfates (Suprep Bowel Prep Kit) 17.5-3.13-1.6 GM/177ML solution oral solution Take 1 bottle by mouth Every 12 (Twelve) Hours. 354 mL 0   • timolol (TIMOPTIC) 0.5 % ophthalmic solution INSTILL 1 DROP IN RIGHT EYE TWICE DAILY     • vitamin D (ERGOCALCIFEROL) 1.25 MG (18497 UT) capsule capsule 50,000 Units 1 (One) Time Per Week.       No current facility-administered medications on file prior to visit.        Allergies   Allergen Reactions   • Diclofenac Hives   • New Skin [Benzethonium Chloride] Rash   • Adhesive Tape Rash and Other (See Comments)       Rash at area of bandaid   • Niacin Rash       Social History     Tobacco Use   Smoking Status Former Smoker   Smokeless Tobacco Never Used   Tobacco Comment    QUIT 1988          Objective   Vital Signs:   /80   Pulse 76   Temp 97.4 °F (36.3 °C)   Resp 14   Ht 160 cm (63\")   Wt 72.6 kg (160 lb)   SpO2 96%   BMI 28.34 kg/m²     Physical Exam  Vitals reviewed.   Constitutional:       Appearance: Normal appearance.   HENT:      Head: Normocephalic and atraumatic.      Nose: Nose normal.      Mouth/Throat:      Mouth: Mucous membranes are moist.   Eyes:      Extraocular Movements: Extraocular movements intact.      Conjunctiva/sclera: Conjunctivae normal.      Pupils: Pupils are equal, round, and reactive to light.   Cardiovascular:      Rate and Rhythm: Normal rate and regular rhythm.   Pulmonary:      Effort: Pulmonary effort is normal.      Breath sounds: Normal breath sounds.   Abdominal:      General: Abdomen is flat. Bowel sounds are normal. There is no distension.      Palpations: Abdomen is soft. There is no mass.      Tenderness: There is no abdominal tenderness. There is no right CVA tenderness or left CVA tenderness.   Musculoskeletal:    "      General: Normal range of motion.   Neurological:      General: No focal deficit present.      Mental Status: She is alert and oriented to person, place, and time.   Psychiatric:         Mood and Affect: Mood normal.        Result Review :                 Assessment and Plan    Diagnoses and all orders for this visit:    1. Hematuria, unspecified type (Primary)  -     Urinalysis With Culture If Indicated - Urine, Clean Catch; Future  -     Urinalysis With Culture If Indicated - Urine, Clean Catch  -     Cancel: Urinalysis, Microscopic Only - Urine, Clean Catch  -     Urine Culture - Urine, Urine, Clean Catch    2. Acute cystitis with hematuria  Assessment & Plan:  Discussed urinary tract infection. Increase water intake. Antibiotic sent today as well as urine culture. We will adjust medications if needed based on culture results. Monitor for worsening symptoms, fever, profuse vomiting, abdominal or back pain. Patient understands and agrees with plan.         Other orders  -     Discontinue: nitrofurantoin, macrocrystal-monohydrate, (Macrobid) 100 MG capsule; Take 1 capsule by mouth 2 (Two) Times a Day for 7 days.  Dispense: 14 capsule; Refill: 0      Follow Up   No follow-ups on file.  Patient was given instructions and counseling regarding her condition or for health maintenance advice. Please see specific information pulled into the AVS if appropriate.

## 2022-06-14 ENCOUNTER — TELEMEDICINE (OUTPATIENT)
Dept: PSYCHIATRY | Facility: CLINIC | Age: 72
End: 2022-06-14

## 2022-06-14 ENCOUNTER — TELEPHONE (OUTPATIENT)
Dept: INTERNAL MEDICINE | Facility: CLINIC | Age: 72
End: 2022-06-14

## 2022-06-14 DIAGNOSIS — F51.05 INSOMNIA DUE TO MENTAL CONDITION: ICD-10-CM

## 2022-06-14 DIAGNOSIS — F33.1 MAJOR DEPRESSIVE DISORDER, RECURRENT EPISODE, MODERATE: Primary | ICD-10-CM

## 2022-06-14 DIAGNOSIS — F43.10 POST TRAUMATIC STRESS DISORDER (PTSD): ICD-10-CM

## 2022-06-14 DIAGNOSIS — F33.2 SEVERE EPISODE OF RECURRENT MAJOR DEPRESSIVE DISORDER, WITHOUT PSYCHOTIC FEATURES: ICD-10-CM

## 2022-06-14 DIAGNOSIS — F41.1 GENERALIZED ANXIETY DISORDER: ICD-10-CM

## 2022-06-14 PROCEDURE — 90833 PSYTX W PT W E/M 30 MIN: CPT | Performed by: STUDENT IN AN ORGANIZED HEALTH CARE EDUCATION/TRAINING PROGRAM

## 2022-06-14 PROCEDURE — 99214 OFFICE O/P EST MOD 30 MIN: CPT | Performed by: STUDENT IN AN ORGANIZED HEALTH CARE EDUCATION/TRAINING PROGRAM

## 2022-06-14 RX ORDER — TRAZODONE HYDROCHLORIDE 50 MG/1
50 TABLET ORAL NIGHTLY
Qty: 30 TABLET | Refills: 2 | Status: SHIPPED | OUTPATIENT
Start: 2022-06-14 | End: 2022-09-08 | Stop reason: SDUPTHER

## 2022-06-14 RX ORDER — BUPROPION HYDROCHLORIDE 300 MG/1
300 TABLET ORAL EVERY MORNING
Qty: 90 TABLET | Refills: 0 | Status: SHIPPED | OUTPATIENT
Start: 2022-06-14 | End: 2022-09-08 | Stop reason: SDUPTHER

## 2022-06-14 RX ORDER — MULTIPLE VITAMINS W/ MINERALS TAB 9MG-400MCG
1 TAB ORAL DAILY
COMMUNITY
End: 2022-07-27

## 2022-06-14 NOTE — TELEPHONE ENCOUNTER
Caller: Nivia Cagle    Relationship: Self    Best call back number: [phone]    What is the best time to reach you: ANY TIME    Who are you requesting to speak with (clinical staff, provider,  specific staff member): CLINICAL    What was the call regarding: PATIENT WAS SEEN YESTERDAY AND UTI WAS NEGATIVE SHE WAS PRESCRIBED AN ANTIBIOTIC AND NEEDS TO KNOW IF SHE NEEDS TO CONTINUE TAKING IT ALSO THERE IS NOT BLOOD TODAY BUT URINE IS STILL DARK, SHE IS CONCERNED ABOUT WHAT IS GOING ON PLEASE CONTACT AND ADVISE     Do you require a callback: YES

## 2022-06-14 NOTE — PROGRESS NOTES
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 03/03/2022 Passed  Passed Final   • Methamphetamine, Ur 03/03/2022 Negative  Negative Final   • METHAMPHETAMINE INTERNAL CONTROL 03/03/2022 Passed  Passed Final   • Methadone Screen, Urine 03/03/2022 Negative  Negative Final   • METHADONE INTERNAL CONTROL 03/03/2022 Passed  Passed Final   • Opiate Screen 03/03/2022 Negative  Negative Final   • OPIATES INTERNAL CONTROL 03/03/2022 Passed  Passed Final   • Oxycodone Screen, Urine 03/03/2022 Negative  Negative Final   • OXYCODONE INTERNAL CONTROL 03/03/2022 Passed  Passed Final   • Phencyclidine (PCP), Urine 03/03/2022 Negative  Negative Final   • PHENCYCLIDINE INTERNAL CONTROL 03/03/2022 Passed  Passed Final   • THC, Screen, Urine 03/03/2022 Negative  Negative Final   • THC INTERNAL CONTROL 03/03/2022 Passed  Passed Final   • Lot Number 03/03/2022 [id number]   Final   • Expiration Date 03/03/2022 03/31/23   Final   Lab on 02/15/2022   Component Date Value Ref Range Status   • 25 Hydroxy, Vitamin D 02/15/2022 35.1  ng/ml Final   • Sed Rate 02/15/2022 40 (A) 0 - 30 mm/hr Final   • Glucose 02/15/2022 79  65 - 99 mg/dL Final   • BUN 02/15/2022 21  8 - 23 mg/dL Final   • Creatinine 02/15/2022 0.83  0.57 - 1.00 mg/dL Final   • Sodium 02/15/2022 140  136 - 145 mmol/L Final   • Potassium 02/15/2022 4.4  3.5 -  5.2 mmol/L Final   • Chloride 02/15/2022 103  98 - 107 mmol/L Final   • CO2 02/15/2022 26.7  22.0 - 29.0 mmol/L Final   • Calcium 02/15/2022 9.7  8.6 - 10.5 mg/dL Final   • Total Protein 02/15/2022 7.1  6.0 - 8.5 g/dL Final   • Albumin 02/15/2022 4.50  3.50 - 5.20 g/dL Final   • ALT (SGPT) 02/15/2022 14  1 - 33 U/L Final   • AST (SGOT) 02/15/2022 10  1 - 32 U/L Final   • Alkaline Phosphatase 02/15/2022 78  39 - 117 U/L Final   • Total Bilirubin 02/15/2022 0.3  0.0 - 1.2 mg/dL Final   • eGFR Non  Amer 02/15/2022 68  >60 mL/min/1.73 Final   • Globulin 02/15/2022 2.6  gm/dL Final   • A/G Ratio 02/15/2022 1.7  g/dL Final   • BUN/Creatinine Ratio 02/15/2022 25.3 (A) 7.0 - 25.0 Final   • Anion Gap 02/15/2022 10.3  5.0 - 15.0 mmol/L Final   • TSH 02/15/2022 2.570  0.270 - 4.200 uIU/mL Final   • Total Cholesterol 02/15/2022 195  0 - 200 mg/dL Final   • Triglycerides 02/15/2022 105  0 - 150 mg/dL Final   • HDL Cholesterol 02/15/2022 52  40 - 60 mg/dL Final   • LDL Cholesterol  02/15/2022 124 (A) 0 - 100 mg/dL Final   • VLDL Cholesterol 02/15/2022 19  5 - 40 mg/dL Final   • LDL/HDL Ratio 02/15/2022 2.35   Final   • Hemoglobin A1C 02/15/2022 7.10 (A) 4.80 - 5.60 % Final   • WBC 02/15/2022 10.26  3.40 - 10.80 10*3/mm3 Final   • RBC 02/15/2022 4.13  3.77 - 5.28 10*6/mm3 Final   • Hemoglobin 02/15/2022 12.7  12.0 - 15.9 g/dL Final   • Hematocrit 02/15/2022 38.5  34.0 - 46.6 % Final   • MCV 02/15/2022 93.2  79.0 - 97.0 fL Final   • MCH 02/15/2022 30.8  26.6 - 33.0 pg Final   • MCHC 02/15/2022 33.0  31.5 - 35.7 g/dL Final   • RDW 02/15/2022 12.2 (A) 12.3 - 15.4 % Final   • RDW-SD 02/15/2022 41.8  37.0 - 54.0 fl Final   • MPV 02/15/2022 12.1 (A) 6.0 - 12.0 fL Final   • Platelets 02/15/2022 257  140 - 450 10*3/mm3 Final   • Neutrophil % 02/15/2022 72.6  42.7 - 76.0 % Final   • Lymphocyte % 02/15/2022 16.4 (A) 19.6 - 45.3 % Final   • Monocyte % 02/15/2022 9.6  5.0 - 12.0 % Final   • Eosinophil % 02/15/2022 0.8  0.3 - 6.2 %  Final   • Basophil % 02/15/2022 0.4  0.0 - 1.5 % Final   • Immature Grans % 02/15/2022 0.2  0.0 - 0.5 % Final   • Neutrophils, Absolute 02/15/2022 7.45 (A) 1.70 - 7.00 10*3/mm3 Final   • Lymphocytes, Absolute 02/15/2022 1.68  0.70 - 3.10 10*3/mm3 Final   • Monocytes, Absolute 02/15/2022 0.99 (A) 0.10 - 0.90 10*3/mm3 Final   • Eosinophils, Absolute 02/15/2022 0.08  0.00 - 0.40 10*3/mm3 Final   • Basophils, Absolute 02/15/2022 0.04  0.00 - 0.20 10*3/mm3 Final   • Immature Grans, Absolute 02/15/2022 0.02  0.00 - 0.05 10*3/mm3 Final   • nRBC 02/15/2022 0.0  0.0 - 0.2 /100 WBC Final   Admission on 01/10/2022, Discharged on 01/10/2022   Component Date Value Ref Range Status   • COVID19 01/10/2022 Not Detected  Not Detected - Ref. Range Final       EKG Results:  No orders to display       Imaging Results:  No Images in the past 120 days found..      Assessment & Plan   Diagnoses and all orders for this visit:    1. Major depressive disorder, recurrent episode, moderate (HCC) (Primary)    2. Generalized anxiety disorder  -     buPROPion XL (WELLBUTRIN XL) 300 MG 24 hr tablet; Take 1 tablet by mouth Every Morning.  Dispense: 90 tablet; Refill: 0    3. Post traumatic stress disorder (PTSD)    4. Insomnia due to mental condition  -     traZODone (DESYREL) 50 MG tablet; Take 1 tablet by mouth Every Night.  Dispense: 30 tablet; Refill: 2    5. Severe episode of recurrent major depressive disorder, without psychotic features (HCC)  -     buPROPion XL (WELLBUTRIN XL) 300 MG 24 hr tablet; Take 1 tablet by mouth Every Morning.  Dispense: 90 tablet; Refill: 0        INITIAL presentation most consistent with major depressive disorder, recurrent, moderate to severe, with anxious distress.  PTSD.  Rule out personality disorder, cluster B specifically.  Rule out hypomania as patient was very difficult to interrupt today.    6/14: Better, no changes. 17 minutes of supportive psychotherapy with goal to strengthen defenses, promote problems  solving, restore adaptive functioning and provide symptom relief. The therapeutic alliance was strengthened to encourage the patient to express their thoughts and feelings. Esteem building was enhanced through praise, reassurance, normalizing and encouragement. Coping skills were enhanced to build distress tolerance skills and emotional regulation. Allowed patient to freely discuss issues without interruption or judgement with unconditional positive regard, active listening skills, and empathy. Provided a safe, confidential environment to facilitate the development of a positive therapeutic relationship and encourage open, honest communication. Assisted patient in identifying risk factors which would indicate the need for higher level of care including thoughts to harm self or others and/or self-harming behavior and encouraged patient to contact this office, call 911, or present to the nearest emergency room should any of these events occur. Assisted patient in processing session content; acknowledged and normalized patient’s thoughts, feelings, and concerns by utilizing a person-centered approach in efforts to build appropriate rapport and a positive therapeutic relationship with open and honest communication. Patient given education on medication side effects, diagnosis/illness and relapse symptoms. Plan to continue supportive psychotherapy in next appointment to provide symptom relief.  Diagnoses: as above  Symptoms: as above  Functional status: good  Mental Status Exam: as above    Treatment plan: Medication management and supportive psychotherapy  Prognosis: good  Progress: less depressed  6 wks      5/3: Increase prozac and melatonin. 17 minutes of supportive psychotherapy with goal to strengthen defenses, promote problems solving, restore adaptive functioning and provide symptom relief. The therapeutic alliance was strengthened to encourage the patient to express their thoughts and feelings. Esteem building  was enhanced through praise, reassurance, normalizing and encouragement. Coping skills were enhanced to build distress tolerance skills and emotional regulation. Patient given education on medication side effects, diagnosis/illness and relapse symptoms. Plan to continue supportive psychotherapy in next appointment to provide symptom relief.  Diagnoses: as above  Symptoms: as above  Functional status: fair  Mental Status Exam: as above    Treatment plan: Medication management and supportive psychotherapy  Prognosis: good  Progress: backtracked recently, now depressed  6 wks    3/17: Stable, some problems with concentration related to getting unpacked. Increase trazodone to target insomnia. 18 minutes of supportive psychotherapy with goal to strengthen defenses, promote problems solving, restore adaptive functioning and provide symptom relief. The therapeutic alliance was strengthened to encourage the patient to express their thoughts and feelings. Esteem building was enhanced through praise, reassurance, normalizing and encouragement. Coping skills were enhanced to build distress tolerance skills and emotional regulation. Patient given education on medication side effects, diagnosis/illness and relapse symptoms. Plan to continue supportive psychotherapy in next appointment to provide symptom relief.  6 wks    2/16: Stable. No SE. 17 minutes of supportive psychotherapy with goal to strengthen defenses, promote problems solving, restore adaptive functioning and provide symptom relief. The therapeutic alliance was strengthened to encourage the patient to express their thoughts and feelings. Esteem building was enhanced through praise, reassurance, normalizing and encouragement. Coping skills were enhanced to build distress tolerance skills and emotional regulation. Patient given education on medication side effects, diagnosis/illness and relapse symptoms. Plan to continue supportive psychotherapy in next appointment to  provide symptom relief.  4 wks    1/18: Doing well. Tolerating meds without side effects. 25 minutes of supportive psychotherapy with goal to strengthen defenses, promote problems solving, restore adaptive functioning and provide symptom relief. The therapeutic alliance was strengthened to encourage the patient to express their thoughts and feelings. Esteem building was enhanced through praise, reassurance, normalizing and encouragement. Coping skills were enhanced to build distress tolerance skills and emotional regulation. Patient given education on medication side effects, diagnosis/illness and relapse symptoms. Plan to continue supportive psychotherapy in next appointment to provide symptom relief.  4 wks    12/7: Doing very well, though some insomnia. Start melatonin 10 mg daily. No other changes. Watch jaw movements. Wants to taper off meds at some point; now would not be a good time. 17 minutes of supportive psychotherapy with goal to strengthen defenses, promote problems solving, restore adaptive functioning and provide symptom relief. The therapeutic alliance was strengthened to encourage the patient to express their thoughts and feelings. Esteem building was enhanced through praise, reassurance, normalizing and encouragement. Coping skills were enhanced to build distress tolerance skills and emotional regulation. Patient given education on medication side effects, diagnosis/illness and relapse symptoms. Plan to continue supportive psychotherapy in next appointment to provide symptom relief.  6 wks    10/26: Pt stopped mirtazapine and restarted prozac 20 mg daily.     IMPORTANT:  Consider brexpiprazole.    Very important to obtain records from previous psychiatrist.  Care should be taken when putting patient on sedating medications as she has a falls risk.  Also has a history of EARL which will lead to difficulties treating her insomnia.    Therapy referral made to [facility].      See back in 8 weeks.  Patient  is not vaccinated.    Visit Diagnoses:    ICD-10-CM ICD-9-CM   1. Major depressive disorder, recurrent episode, moderate (HCC)  F33.1 296.32   2. Generalized anxiety disorder  F41.1 300.02   3. Post traumatic stress disorder (PTSD)  F43.10 309.81   4. Insomnia due to mental condition  F51.05 300.9     327.02   5. Severe episode of recurrent major depressive disorder, without psychotic features (HCC)  F33.2 296.33       PLAN:  81. Risk Assessment: Risk of self-harm acutely is moderate. Risk factors include chronic depressive disorder, possible personality disorder, recent psychosocial stressors (pandemic, moving). Protective factors include no present SI, no history of suicide attempts or self-harm in the past, no access to weapons, minimal AODA, healthcare seeking, future orientation, willingness to engage in care. Risk of self-harm chronically is also moderate, but could be further elevated in the event of treatment noncompliance and/or AODA.  82. Safety: No acute safety concerns.  83. Medications:   a. CONTINUE melatonin 18 mg p.o. nightly.  Risks, benefits, side effects discussed with patient including sedation, dizziness/falls risk, GI upset.  After discussion of these risks and benefits, the patient voiced understanding and agreed to proceed.  b. CONTINUE trazodone 50 mg PO QHS. Risks, benefits, side effects discussed with patient including GI upset, sedation, dizziness/falls risk, grogginess the following day, prolongation of the QTc interval.  After discussion of these risks and benefits, the patient voiced understanding and agreed to proceed.    c. CONTINUE bupropion xl 300 mg daily. Risks, benefits, alternatives discussed with patient including nausea, GI upset, increased energy, exacerbation of irritability, insomnia, lowering of seizure threshold.  After discussion of these risks and benefits, the patient voiced understanding and agreed to proceed.  d. CONTINUE Prozac 40 mg a day. Risks, benefits,  alternatives discussed with patient including GI upset, nausea vomiting diarrhea, theoretical decrease of seizure threshold predisposing the patient to a slightly higher seizure risk, headaches, sexual dysfunction, serotonin syndrome, bleeding risk, increased suicidality in patients 24 years and younger.  After discussion of these risks and benefits, the patient voiced understanding and agreed to proceed.  e. CONTINUE Abilify 4 mg p.o. daily to target depression, anxiety, decreased energy. Risks, benefits, alternatives discussed with patient including increased energy, exacerbation of irritability, akathisia, GI upset, orthostatic hypotension, increased appetite. After discussion of these risks and benefits, the patient voiced understanding and agreed to proceed.  i. S/P:  1. Mirtazapine 45 mg daily (RLS)  84. Therapy: [facility] to [facility].  85. Labs/Studies: s/p TMS referral.  86. Follow Up: 6 weeks. (prefers 4 wks)      TREATMENT PLAN/GOALS: Continue supportive psychotherapy efforts and medications as indicated. Treatment and medication options discussed during today's visit. Patient acknowledged and verbally consented to continue with current treatment plan and was educated on the importance of compliance with treatment and follow-up appointments.    MEDICATION ISSUES:  SAPNA reviewed as expected.  Discussed medication options and treatment plan of prescribed medication as well as the risks, benefits, and side effects including potential falls, possible impaired driving and metabolic adversities among others. Patient is agreeable to call the office with any worsening of symptoms or onset of side effects. Patient is agreeable to call 911 or go to the nearest ER should he/she begin having SI/HI. No medication side effects or related complaints today.     MEDS ORDERED DURING VISIT:  New Medications Ordered This Visit   Medications   • buPROPion XL (WELLBUTRIN XL) 300 MG 24 hr tablet     Sig: Take 1 tablet by  mouth Every Morning.     Dispense:  90 tablet     Refill:  0   • traZODone (DESYREL) 50 MG tablet     Sig: Take 1 tablet by mouth Every Night.     Dispense:  30 tablet     Refill:  2       Return in about 6 weeks (around 7/26/2022).         This document has been electronically signed by Vicky Barth MD  June 14, 2022 12:05 EDT      Part of this note may be an electronic transcription/translation of spoken language to printed text using the Dragon Dictation System.

## 2022-06-14 NOTE — PATIENT INSTRUCTIONS
1.  Please return to clinic at your next scheduled visit.  Contact the clinic ([phone]) at least 24 hours prior in the event you need to cancel.  2.  Do no harm to yourself or others.    3.  Avoid alcohol and drugs.    4.  Take all medications as prescribed.  Please contact the clinic with any concerns. If you are in need of medication refills, please call the clinic at [phone].    5. Should you want to get in touch with your provider, Dr. Vicky Barth, please utilize netTALK or contact the office ([phone]), and staff will be able to page Dr. Barth directly.  6.  In the event you have personal crisis, contact the following crisis numbers: Suicide Prevention Hotline 1-221.421.8050; [facility] Helpline 0-006-718-KZNN; [facility] Emergency Room [phone]; text HELLO to 874974; or 896.     SPECIFIC RECOMMENDATIONS:     1.      Medications discussed at this encounter:                   - no changes     2.      Psychotherapy recommendations:      3.     Return to clinic: 6 weeks

## 2022-06-15 PROBLEM — Z20.822 CLOSE EXPOSURE TO COVID-19 VIRUS: Status: RESOLVED | Noted: 2022-01-04 | Resolved: 2022-06-15
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
## 2022-06-15 PROBLEM — N30.01 ACUTE CYSTITIS WITH HEMATURIA: Status: ACTIVE | Noted: 2022-06-15

## 2022-06-15 PROBLEM — N30.01 ACUTE CYSTITIS WITH HEMATURIA: Status: RESOLVED | Noted: 2022-06-15 | Resolved: 2022-06-15

## 2022-06-15 PROBLEM — J06.9 ACUTE URI: Status: RESOLVED | Noted: 2022-01-04 | Resolved: 2022-06-15

## 2022-06-15 NOTE — ASSESSMENT & PLAN NOTE
Discussed urinary tract infection. Increase water intake. Antibiotic sent today as well as urine culture. We will adjust medications if needed based on culture results. Monitor for worsening symptoms, fever, profuse vomiting, abdominal or back pain. Patient understands and agrees with plan.

## 2022-06-16 LAB — BACTERIA SPEC AEROBE CULT: ABNORMAL

## 2022-06-21 ENCOUNTER — TELEPHONE (OUTPATIENT)
Dept: INTERNAL MEDICINE | Facility: CLINIC | Age: 72
End: 2022-06-21

## 2022-06-21 ENCOUNTER — CLINICAL SUPPORT (OUTPATIENT)
Dept: INTERNAL MEDICINE | Facility: CLINIC | Age: 72
End: 2022-06-21

## 2022-06-21 DIAGNOSIS — R31.9 HEMATURIA, UNSPECIFIED TYPE: ICD-10-CM

## 2022-06-21 DIAGNOSIS — R31.9 HEMATURIA, UNSPECIFIED TYPE: Primary | ICD-10-CM

## 2022-06-21 LAB
BILIRUB BLD-MCNC: ABNORMAL MG/DL
BILIRUB UR QL STRIP: ABNORMAL
CLARITY UR: CLEAR
CLARITY, POC: CLEAR
COLOR UR: YELLOW
COLOR UR: YELLOW
EXPIRATION DATE: ABNORMAL
GLUCOSE UR STRIP-MCNC: NEGATIVE MG/DL
GLUCOSE UR STRIP-MCNC: NEGATIVE MG/DL
HGB UR QL STRIP.AUTO: NEGATIVE
KETONES UR QL STRIP: ABNORMAL
KETONES UR QL: ABNORMAL
LEUKOCYTE EST, POC: ABNORMAL
LEUKOCYTE ESTERASE UR QL STRIP.AUTO: ABNORMAL
Lab: ABNORMAL
NITRITE UR QL STRIP: NEGATIVE
NITRITE UR-MCNC: NEGATIVE MG/ML
PH UR STRIP.AUTO: 5 [PH] (ref 5–8)
PH UR: 5 [PH] (ref 5–8)
PROT UR QL STRIP: ABNORMAL
PROT UR STRIP-MCNC: ABNORMAL MG/DL
RBC # UR STRIP: NEGATIVE /UL
SP GR UR STRIP: 1.03 (ref 1–1.03)
SP GR UR: 1.03 (ref 1–1.03)
UROBILINOGEN UR QL STRIP: ABNORMAL
UROBILINOGEN UR QL: ABNORMAL

## 2022-06-21 PROCEDURE — 87086 URINE CULTURE/COLONY COUNT: CPT | Performed by: PHYSICIAN ASSISTANT

## 2022-06-21 PROCEDURE — 81003 URINALYSIS AUTO W/O SCOPE: CPT | Performed by: PHYSICIAN ASSISTANT

## 2022-06-21 NOTE — TELEPHONE ENCOUNTER
Called patient And informed of the walk in UA orders.   Patient verbalized Understanding,    Patient did state her concern with her family history of bladder cancer and kidney cancer, patient mothers only symptom was blood in urine.     Routing as LORENZO

## 2022-06-21 NOTE — TELEPHONE ENCOUNTER
Caller: Nivia Cagle    Relationship to patient: Self    Best call back number: [phone]    Patient is needing: PATIENT CALLED STATING SHE HAS FINISHED HER ANTIBIOTICS FOR HER UTI BUT SHE WENT TO THE BATHROOM THIS MORNING SHE NOTICED THERE IS BLOOD IN HER URINE. THE PATIENT STATED SHE IS ALSO SORE ON HER RIGHT KIDNEY AREA. THE PATIENT WOULD LIKE TO KNOW IF HER PCP CAN CALL HER IN A URINE SAMPLE SO SHE CAN COME IN THE LAB AND GET THAT DONE. THE PATIENT WOULD LIKE A CALL BACK PLEASE ADVISE THANK YOU.

## 2022-06-22 LAB — BACTERIA SPEC AEROBE CULT: NO GROWTH

## 2022-06-29 ENCOUNTER — TELEMEDICINE (OUTPATIENT)
Dept: PSYCHIATRY | Facility: CLINIC | Age: 72
End: 2022-06-29

## 2022-06-29 DIAGNOSIS — F33.1 MAJOR DEPRESSIVE DISORDER, RECURRENT EPISODE, MODERATE: Primary | ICD-10-CM

## 2022-06-29 PROCEDURE — 90834 PSYTX W PT 45 MINUTES: CPT | Performed by: COUNSELOR

## 2022-07-01 ENCOUNTER — LAB (OUTPATIENT)
Dept: LAB | Facility: HOSPITAL | Age: 72
End: 2022-07-01

## 2022-07-01 ENCOUNTER — TRANSCRIBE ORDERS (OUTPATIENT)
Dept: LAB | Facility: HOSPITAL | Age: 72
End: 2022-07-01

## 2022-07-01 DIAGNOSIS — M85.89 OSTEOPENIA OF MULTIPLE SITES: ICD-10-CM

## 2022-07-01 DIAGNOSIS — E11.65 TYPE 2 DIABETES MELLITUS WITH HYPERGLYCEMIA, WITHOUT LONG-TERM CURRENT USE OF INSULIN: ICD-10-CM

## 2022-07-01 DIAGNOSIS — E78.2 MIXED HYPERLIPIDEMIA: ICD-10-CM

## 2022-07-01 DIAGNOSIS — E55.9 VITAMIN D DEFICIENCY: Primary | ICD-10-CM

## 2022-07-01 DIAGNOSIS — Z79.899 ENCOUNTER FOR LONG-TERM (CURRENT) USE OF OTHER MEDICATIONS: ICD-10-CM

## 2022-07-01 DIAGNOSIS — I10 ESSENTIAL HYPERTENSION: ICD-10-CM

## 2022-07-01 DIAGNOSIS — E55.9 VITAMIN D DEFICIENCY: ICD-10-CM

## 2022-07-01 LAB
25(OH)D3 SERPL-MCNC: 46.2 NG/ML (ref 30–100)
ALBUMIN SERPL-MCNC: 4.3 G/DL (ref 3.5–5.2)
ALBUMIN/GLOB SERPL: 1.4 G/DL
ALP SERPL-CCNC: 68 U/L (ref 39–117)
ALT SERPL W P-5'-P-CCNC: 14 U/L (ref 1–33)
ANION GAP SERPL CALCULATED.3IONS-SCNC: 11 MMOL/L (ref 5–15)
AST SERPL-CCNC: 15 U/L (ref 1–32)
BASOPHILS # BLD AUTO: 0.04 10*3/MM3 (ref 0–0.2)
BASOPHILS NFR BLD AUTO: 0.5 % (ref 0–1.5)
BILIRUB SERPL-MCNC: 0.6 MG/DL (ref 0–1.2)
BUN SERPL-MCNC: 15 MG/DL (ref 8–23)
BUN/CREAT SERPL: 17 (ref 7–25)
CALCIUM SPEC-SCNC: 10.2 MG/DL (ref 8.6–10.5)
CHLORIDE SERPL-SCNC: 99 MMOL/L (ref 98–107)
CHOLEST SERPL-MCNC: 197 MG/DL (ref 0–200)
CO2 SERPL-SCNC: 27 MMOL/L (ref 22–29)
CREAT SERPL-MCNC: 0.88 MG/DL (ref 0.57–1)
DEPRECATED RDW RBC AUTO: 40.9 FL (ref 37–54)
EGFRCR SERPLBLD CKD-EPI 2021: 71.2 ML/MIN/1.73
EOSINOPHIL # BLD AUTO: 0.13 10*3/MM3 (ref 0–0.4)
EOSINOPHIL NFR BLD AUTO: 1.8 % (ref 0.3–6.2)
ERYTHROCYTE [DISTWIDTH] IN BLOOD BY AUTOMATED COUNT: 12.2 % (ref 12.3–15.4)
GLOBULIN UR ELPH-MCNC: 3 GM/DL
GLUCOSE SERPL-MCNC: 96 MG/DL (ref 65–99)
HBA1C MFR BLD: 5.8 % (ref 4.8–5.6)
HCT VFR BLD AUTO: 38.9 % (ref 34–46.6)
HDLC SERPL-MCNC: 53 MG/DL (ref 40–60)
HGB BLD-MCNC: 12.9 G/DL (ref 12–15.9)
IMM GRANULOCYTES # BLD AUTO: 0.02 10*3/MM3 (ref 0–0.05)
IMM GRANULOCYTES NFR BLD AUTO: 0.3 % (ref 0–0.5)
LDLC SERPL CALC-MCNC: 125 MG/DL (ref 0–100)
LDLC/HDLC SERPL: 2.33 {RATIO}
LYMPHOCYTES # BLD AUTO: 1.48 10*3/MM3 (ref 0.7–3.1)
LYMPHOCYTES NFR BLD AUTO: 20.3 % (ref 19.6–45.3)
MCH RBC QN AUTO: 30.7 PG (ref 26.6–33)
MCHC RBC AUTO-ENTMCNC: 33.2 G/DL (ref 31.5–35.7)
MCV RBC AUTO: 92.6 FL (ref 79–97)
MONOCYTES # BLD AUTO: 0.72 10*3/MM3 (ref 0.1–0.9)
MONOCYTES NFR BLD AUTO: 9.9 % (ref 5–12)
NEUTROPHILS NFR BLD AUTO: 4.91 10*3/MM3 (ref 1.7–7)
NEUTROPHILS NFR BLD AUTO: 67.2 % (ref 42.7–76)
NRBC BLD AUTO-RTO: 0 /100 WBC (ref 0–0.2)
PLATELET # BLD AUTO: 231 10*3/MM3 (ref 140–450)
PMV BLD AUTO: 11.8 FL (ref 6–12)
POTASSIUM SERPL-SCNC: 4.5 MMOL/L (ref 3.5–5.2)
PROT SERPL-MCNC: 7.3 G/DL (ref 6–8.5)
RBC # BLD AUTO: 4.2 10*6/MM3 (ref 3.77–5.28)
SODIUM SERPL-SCNC: 137 MMOL/L (ref 136–145)
TRIGL SERPL-MCNC: 103 MG/DL (ref 0–150)
TSH SERPL DL<=0.05 MIU/L-ACNC: 2.53 UIU/ML (ref 0.27–4.2)
VLDLC SERPL-MCNC: 19 MG/DL (ref 5–40)
WBC NRBC COR # BLD: 7.3 10*3/MM3 (ref 3.4–10.8)

## 2022-07-01 PROCEDURE — 80061 LIPID PANEL: CPT

## 2022-07-01 PROCEDURE — 82306 VITAMIN D 25 HYDROXY: CPT

## 2022-07-01 PROCEDURE — 80053 COMPREHEN METABOLIC PANEL: CPT

## 2022-07-01 PROCEDURE — 84443 ASSAY THYROID STIM HORMONE: CPT

## 2022-07-01 PROCEDURE — 85025 COMPLETE CBC W/AUTO DIFF WBC: CPT

## 2022-07-01 PROCEDURE — 36415 COLL VENOUS BLD VENIPUNCTURE: CPT

## 2022-07-01 PROCEDURE — 83036 HEMOGLOBIN GLYCOSYLATED A1C: CPT

## 2022-07-06 RX ORDER — ERGOCALCIFEROL 1.25 MG/1
CAPSULE ORAL
Qty: 12 CAPSULE | OUTPATIENT
Start: 2022-07-06

## 2022-07-06 RX ORDER — PRAMIPEXOLE DIHYDROCHLORIDE 0.5 MG/1
0.5 TABLET ORAL
Qty: 30 TABLET | Refills: 0 | Status: SHIPPED | OUTPATIENT
Start: 2022-07-06 | End: 2022-07-25 | Stop reason: SDUPTHER

## 2022-07-06 RX ORDER — PRAMIPEXOLE DIHYDROCHLORIDE 0.5 MG/1
0.5 TABLET ORAL
Qty: 90 TABLET | OUTPATIENT
Start: 2022-07-06

## 2022-07-06 RX ORDER — ERGOCALCIFEROL 1.25 MG/1
CAPSULE ORAL
Qty: 12 CAPSULE | Refills: 0 | Status: SHIPPED | OUTPATIENT
Start: 2022-07-06 | End: 2022-07-27

## 2022-07-06 NOTE — TELEPHONE ENCOUNTER
90 day supply denied. We will need to see her first to assess effectiveness of medication and evaluate if it needs to be adjusted at her f/u on 07/25/2022. Hub can notify pt of this if she calls.

## 2022-07-11 ENCOUNTER — OFFICE VISIT (OUTPATIENT)
Dept: CARDIOLOGY | Facility: CLINIC | Age: 72
End: 2022-07-11

## 2022-07-11 VITALS
HEART RATE: 67 BPM | SYSTOLIC BLOOD PRESSURE: 123 MMHG | WEIGHT: 164.2 LBS | BODY MASS INDEX: 29.09 KG/M2 | DIASTOLIC BLOOD PRESSURE: 56 MMHG

## 2022-07-11 DIAGNOSIS — I10 ESSENTIAL HYPERTENSION: ICD-10-CM

## 2022-07-11 DIAGNOSIS — E78.2 MIXED HYPERLIPIDEMIA: Primary | ICD-10-CM

## 2022-07-11 DIAGNOSIS — R42 DIZZINESS: ICD-10-CM

## 2022-07-11 DIAGNOSIS — Z82.49 FAMILY HISTORY OF EARLY CAD: ICD-10-CM

## 2022-07-11 PROCEDURE — 99214 OFFICE O/P EST MOD 30 MIN: CPT | Performed by: INTERNAL MEDICINE

## 2022-07-11 RX ORDER — ASPIRIN 81 MG/1
81 TABLET ORAL DAILY
Qty: 90 TABLET | Refills: 99 | Status: SHIPPED | OUTPATIENT
Start: 2022-07-11

## 2022-07-11 RX ORDER — ALIROCUMAB 75 MG/ML
75 INJECTION, SOLUTION SUBCUTANEOUS
Qty: 6 PEN | Refills: 3 | Status: SHIPPED | OUTPATIENT
Start: 2022-07-11

## 2022-07-11 NOTE — PROGRESS NOTES
Chief Complaint  Hyperlipidemia and Hypertension    Subjective            Nivia Cagle presents to [facility] CARDIOLOGY  History of Present Illness    Nivia is here for follow-up evaluation management of hypertension, mixed hyperlipidemia and family history of CAD.  Since last visit she is doing relatively well.  She is trying to watch her diet to control her diabetes.  I ordered Repatha last year because she has had intolerable side effects to high potency statin agents.  We did get it approved but her co-pay was too much to afford.  We then ordered Praluent as a second option and it was approved but the patient never got it from the [employer] and she moved around that time so there may have been confusion there.  She has had some intermittent episodes of dizziness.  PMH  Past Medical History:   Diagnosis Date   • ADHD (attention deficit hyperactivity disorder)    • Allergic rhinitis    • Anemia    • Anxiety    • Cataracts, bilateral    • Chronic pain disorder    • Depression 0421/2021    MOODNOT WELL CONTROLLED.  GIVEN NUMBER FOR LOCAL COUNSELOR.  WILL INCREASE TRINTELIX FROM 10MG TO 20MG RTC WEEK ER ID S/HI   • Diabetes mellitus, type 2 (HCC)    • Diverticulitis    • GERD (gastroesophageal reflux disease)    • Head injury    • High blood pressure    • Hyperlipemia    • Migraine    • EARL (obstructive sleep apnea) 04/21/2021   • Panic disorder    • Phlebitis    • PTSD (post-traumatic stress disorder)          SURGICALHX  Past Surgical History:   Procedure Laterality Date   • ANKLE SURGERY  2021   • BILATERAL BREAST REDUCTION  2015   • CARPAL TUNNEL RELEASE     • CHOLECYSTECTOMY  2001   • COLONOSCOPY      [facility]   • HYSTERECTOMY     • ULNAR NERVE TRANSPOSITION Right    • WRIST SURGERY          SOC  Social History     Socioeconomic History   • Marital status: Single   Tobacco Use   • Smoking status: Former Smoker   • Smokeless tobacco: Never Used   • Tobacco comment: QUIT 1988    Vaping Use   • Vaping Use: Never used   Substance and Sexual Activity   • Alcohol use: Yes     Comment: rarely   • Drug use: Never   • Sexual activity: Defer         FAMHX  Family History   Problem Relation Age of Onset   • Heart disease Father    • Heart attack Father    • Diabetes Father    • ADD / ADHD Father    • Hyperlipidemia Father    • Kidney cancer Mother    • Hyperlipidemia Mother    • Hyperlipidemia Sister    • Hyperlipidemia Brother    • Diabetes Brother    • No Known Problems Paternal Uncle    • No Known Problems Cousin    • Brain cancer Sister    • Lung cancer Sister    • Hyperlipidemia Sister    • Hyperlipidemia Brother           ALLERGY  Allergies   Allergen Reactions   • Diclofenac Hives   • New Skin [Benzethonium Chloride] Rash   • Adhesive Tape Rash and Other (See Comments)       Rash at area of bandaid   • Niacin Rash        MEDSCURRENT    Current Outpatient Medications:   •  albuterol sulfate  (90 Base) MCG/ACT inhaler, Inhale 2 puffs Every 4 (Four) Hours As Needed for Wheezing (Coughing)., Disp: 8 g, Rfl: 0  •  alendronate (FOSAMAX) 70 MG tablet, Take 1 tablet by mouth Every 7 (Seven) Days., Disp: , Rfl:   •  ARIPiprazole (ABILIFY) 2 MG tablet, Take 2 tablets by mouth Daily., Disp: 180 tablet, Rfl: 0  •  buPROPion XL (WELLBUTRIN XL) 300 MG 24 hr tablet, Take 1 tablet by mouth Every Morning., Disp: 90 tablet, Rfl: 0  •  Calcium Carbonate 1500 (600 Ca) MG tablet, Take 600 mg by mouth Daily., Disp: , Rfl:   •  cetirizine (zyrTEC) 10 MG tablet, Take 1 tablet by mouth Daily., Disp: 90 tablet, Rfl: 1  •  cyclobenzaprine (FLEXERIL) 10 MG tablet, Take 1 tablet by mouth Daily As Needed for Muscle Spasms., Disp: 30 tablet, Rfl: 2  •  Daily-Jelani Multivitamin tablet tablet, Take 1 tablet by mouth every night at bedtime., Disp: , Rfl:   •  ezetimibe (ZETIA) 10 MG tablet, TAKE 1 TABLET BY MOUTH EVERY NIGHT AT BEDTIME, Disp: 90 tablet, Rfl: 1  •  FLUoxetine (PROzac) 20 MG capsule, Take 2 capsules by  mouth Daily., Disp: 60 capsule, Rfl: 2  •  fluticasone (Flonase) 50 MCG/ACT nasal spray, 2 sprays into the nostril(s) as directed by provider Daily. Administer 2 sprays in each nostril for each dose., Disp: 16 g, Rfl: 6  •  ibuprofen (ADVIL,MOTRIN) 200 MG tablet, 200 mg., Disp: , Rfl:   •  Januvia 100 MG tablet, TAKE 1 TABLET BY MOUTH DAILY, Disp: 90 tablet, Rfl: 1  •  losartan (COZAAR) 25 MG tablet, TAKE 1 TABLET BY MOUTH DAILY, Disp: 90 tablet, Rfl: 1  •  melatonin 1 MG tablet, Take 18 mg by mouth Every Night., Disp: , Rfl:   •  metFORMIN (GLUCOPHAGE) 500 MG tablet, Take 2 tablets by mouth 2 (Two) Times a Day With Meals. (Patient taking differently: Take 1,000 mg by mouth 2 (Two) Times a Day With Meals. 1 tablet in the am and 2 tablets pm), Disp: 360 tablet, Rfl: 1  •  montelukast (SINGULAIR) 10 MG tablet, TAKE 1 TABLET BY MOUTH EVERY NIGHT, Disp: 90 tablet, Rfl: 1  •  multivitamin with minerals tablet tablet, Take 1 tablet by mouth Daily., Disp: , Rfl:   •  pramipexole (MIRAPEX) 0.5 MG tablet, TAKE 1 TABLET BY MOUTH EVERY NIGHT AT BEDTIME, Disp: 30 tablet, Rfl: 0  •  prednisoLONE acetate (PRED FORTE) 1 % ophthalmic suspension, SHAKE LIQUID AND INSTILL 1 DROP IN RIGHT EYE FOUR TIMES DAILY, Disp: , Rfl:   •  quiNINE (QUALAQUIN) 324 MG capsule, Take 324 mg by mouth every night at bedtime., Disp: , Rfl:   •  timolol (TIMOPTIC) 0.5 % ophthalmic solution, INSTILL 1 DROP IN RIGHT EYE TWICE DAILY, Disp: , Rfl:   •  traZODone (DESYREL) 50 MG tablet, Take 1 tablet by mouth Every Night., Disp: 30 tablet, Rfl: 2  •  vitamin D (ERGOCALCIFEROL) 1.25 MG (73528 UT) capsule capsule, TAKE 1 CAPSULE BY MOUTH WEEKLY, TAKE WITH CALCIUM, Disp: 12 capsule, Rfl: 0  •  Accu-Chek Madeline Plus test strip, by Other route 4 (Four) Times a Day. use to test blood sugar, Disp: , Rfl:   •  Accu-Chek Softclix Lancets lancets, by Other route 4 (Four) Times a Day. use to test blood sugar, Disp: , Rfl:   •  Alirocumab (Praluent) 75 MG/ML solution  "auto-injector, Inject 1 mL under the skin into the appropriate area as directed Every 14 (Fourteen) Days., Disp: 6 pen, Rfl: 3  •  aspirin (aspirin) 81 MG EC tablet, Take 1 tablet by mouth Daily., Disp: 90 tablet, Rfl: 99  •  BD Insulin Syringe U/F 30G X 1/2\" 0.3 ML misc, USE TO INJECT INSULIN FOUR TIMES DAILY AS NEEDED, Disp: , Rfl:   •  BL NASAL SALINE MIST NA, 2 drops., Disp: , Rfl:   •  Blood Glucose Monitoring Suppl (FreeStyle Lite) device, 1 each by Other route 4 (Four) Times a Day., Disp: , Rfl:   •  sodium-potassium-magnesium sulfates (Suprep Bowel Prep Kit) 17.5-3.13-1.6 GM/177ML solution oral solution, Take 1 bottle by mouth Every 12 (Twelve) Hours., Disp: 354 mL, Rfl: 0      Review of Systems   Cardiovascular: Negative for chest pain and dyspnea on exertion.   Respiratory: Negative for shortness of breath.         Objective     /56   Pulse 67   Wt 74.5 kg (164 lb 3.2 oz)   BMI 29.09 kg/m²       General Appearance:   · well developed  · well nourished  HENT:   · oropharynx moist  · lips not cyanotic  Neck:  · thyroid not enlarged  · supple  Respiratory:  · no respiratory distress  · normal breath sounds  · no rales  Cardiovascular:  · no jugular venous distention  · regular rhythm  · apical impulse normal  · S1 normal, S2 normal  · no S3, no S4   · no murmur  · no rub, no thrill  · carotid pulses normal; no bruit  · pedal pulses normal  · lower extremity edema: none    Musculoskeletal:  · no clubbing of fingers.   · normocephalic, head atraumatic  Skin:   · warm, dry  Psychiatric:  · judgement and insight appropriate  · normal mood and affect      Result Review :     The following data was reviewed by: Santhosh Ruiz MD on 07/11/2022:    CMP    CMP 9/21/21 2/15/22 7/1/22   Glucose 100 (A) 79 96   BUN 15 21 15   Creatinine 0.76 0.83 0.88   eGFR Non African Am 75 68    Sodium 142 140 137   Potassium 4.0 4.4 4.5   Chloride 105 103 99   Calcium 9.4 9.7 10.2   Albumin 4.40 4.50 4.30 "   Total Bilirubin 0.5 0.3 0.6   Alkaline Phosphatase 88 78 68   AST (SGOT) 19 10 15   ALT (SGPT) 13 14 14   (A) Abnormal value            CBC    CBC 9/21/21 2/15/22 7/1/22   WBC 6.85 10.26 7.30   RBC 4.17 4.13 4.20   Hemoglobin 12.4 12.7 12.9   Hematocrit 37.1 38.5 38.9   MCV 89.0 93.2 92.6   MCH 29.7 30.8 30.7   MCHC 33.4 33.0 33.2   RDW 12.8 12.2 (A) 12.2 (A)   Platelets 300 257 231   (A) Abnormal value            Lipid Panel    Lipid Panel 9/21/21 2/15/22 7/1/22   Total Cholesterol 219 (A) 195 197   Triglycerides 128 105 103   HDL Cholesterol 49 52 53   VLDL Cholesterol 23 19 19   LDL Cholesterol  147 (A) 124 (A) 125 (A)   LDL/HDL Ratio 2.95 2.35 2.33   (A) Abnormal value            TSH    TSH 9/21/21 2/15/22 7/1/22   TSH 1.450 2.570 2.530                  Procedures               Assessment and Plan        ASSESSMENT:  Encounter Diagnoses   Name Primary?   • Mixed hyperlipidemia Yes   • Dizziness    • Family history of early CAD    • Essential hypertension          PLAN:    1.  Mixed hyperlipidemia, with high cardiovascular risk.  I reordered Praluent to the pharmacy.  If she is unable to afford the co-pay we can petition the drug companies for patient assistance.  2.  Dizziness, occasional, check carotid ultrasound  3.  Family history of early CAD, preventative measures being taken  4.  Hypertension, continue current medical therapy, controlled          Patient was given instructions and counseling regarding her condition or for health maintenance advice. Please see specific information pulled into the AVS if appropriate.             NAM Ruiz MD  7/11/2022    11:46 EDT

## 2022-07-18 ENCOUNTER — OFFICE VISIT (OUTPATIENT)
Dept: OTOLARYNGOLOGY | Facility: CLINIC | Age: 72
End: 2022-07-18

## 2022-07-18 ENCOUNTER — PROCEDURE VISIT (OUTPATIENT)
Dept: OTOLARYNGOLOGY | Facility: CLINIC | Age: 72
End: 2022-07-18

## 2022-07-18 VITALS — HEIGHT: 63 IN | WEIGHT: 159.6 LBS | TEMPERATURE: 97 F | BODY MASS INDEX: 28.28 KG/M2

## 2022-07-18 DIAGNOSIS — H90.3 SENSORINEURAL HEARING LOSS (SNHL) OF BOTH EARS: Primary | ICD-10-CM

## 2022-07-18 DIAGNOSIS — H90.3 SENSORY HEARING LOSS, BILATERAL: ICD-10-CM

## 2022-07-18 DIAGNOSIS — R09.82 POST-NASAL DRAINAGE: ICD-10-CM

## 2022-07-18 DIAGNOSIS — R42 DIZZINESS AND GIDDINESS: ICD-10-CM

## 2022-07-18 DIAGNOSIS — R09.81 NASAL CONGESTION: ICD-10-CM

## 2022-07-18 PROCEDURE — 92557 COMPREHENSIVE HEARING TEST: CPT | Performed by: AUDIOLOGIST

## 2022-07-18 PROCEDURE — 99214 OFFICE O/P EST MOD 30 MIN: CPT | Performed by: NURSE PRACTITIONER

## 2022-07-18 PROCEDURE — 92567 TYMPANOMETRY: CPT | Performed by: AUDIOLOGIST

## 2022-07-18 RX ORDER — AZELASTINE 1 MG/ML
2 SPRAY, METERED NASAL 2 TIMES DAILY
Qty: 90 ML | Refills: 6 | Status: SHIPPED | OUTPATIENT
Start: 2022-07-18

## 2022-07-18 RX ORDER — FLUTICASONE PROPIONATE 50 MCG
2 SPRAY, SUSPENSION (ML) NASAL DAILY
Qty: 16 G | Refills: 6 | Status: SHIPPED | OUTPATIENT
Start: 2022-07-18

## 2022-07-18 NOTE — PROGRESS NOTES
"Patient Name: Nivia Cagle   Visit Date: 07/18/2022   Patient ID: [id number]  Provider: JEANNIE Merrill    Sex: female  [facility] [facility]   YOB: 1950  Location Address: [address] [place] [address], [address] [place] [address],?[phone]    Primary Care Provider Catalina Madsen PA-C  Location Phone: [phone]    Referring Provider: No ref. provider found        Chief Complaint  1 YEAR AUDIO FOLLOW UP    Subjective          Nivia Cagle is a 71 y.o. female who presents to [facility] for a follow-up visit of bilateral sensorineural hearing loss and nasal congestion.  She states that she feels like her hearing is essentially the same as it was 1 year ago.  She reports that she is been doing her daily regimen of Flonase, Zyrtec and Mucinex.  She still states that she feels like she has a hard time breathing often in the middle of the night.  She states she is wanting to get a different CPAP delivery device.  She feels like she has a lot of postnasal drainage.      Current Outpatient Medications on File Prior to Visit   Medication Sig   • Accu-Chek Madeline Plus test strip by Other route 4 (Four) Times a Day. use to test blood sugar   • Accu-Chek Softclix Lancets lancets by Other route 4 (Four) Times a Day. use to test blood sugar   • albuterol sulfate  (90 Base) MCG/ACT inhaler Inhale 2 puffs Every 4 (Four) Hours As Needed for Wheezing (Coughing).   • alendronate (FOSAMAX) 70 MG tablet Take 1 tablet by mouth Every 7 (Seven) Days.   • ARIPiprazole (ABILIFY) 2 MG tablet Take 2 tablets by mouth Daily.   • aspirin (aspirin) 81 MG EC tablet Take 1 tablet by mouth Daily.   • BD Insulin Syringe U/F 30G X 1/2\" 0.3 ML misc USE TO INJECT INSULIN FOUR TIMES DAILY AS NEEDED   • BL NASAL SALINE MIST NA 2 drops.   • Blood Glucose Monitoring Suppl (FreeStyle Lite) device 1 each by Other route 4 (Four) Times a Day.   • buPROPion XL (WELLBUTRIN XL) 300 MG 24 " hr tablet Take 1 tablet by mouth Every Morning.   • Calcium Carbonate 1500 (600 Ca) MG tablet Take 600 mg by mouth Daily.   • cetirizine (zyrTEC) 10 MG tablet Take 1 tablet by mouth Daily.   • cyclobenzaprine (FLEXERIL) 10 MG tablet Take 1 tablet by mouth Daily As Needed for Muscle Spasms.   • Daily-Jelani Multivitamin tablet tablet Take 1 tablet by mouth every night at bedtime.   • ezetimibe (ZETIA) 10 MG tablet TAKE 1 TABLET BY MOUTH EVERY NIGHT AT BEDTIME   • FLUoxetine (PROzac) 20 MG capsule Take 2 capsules by mouth Daily.   • Januvia 100 MG tablet TAKE 1 TABLET BY MOUTH DAILY   • losartan (COZAAR) 25 MG tablet TAKE 1 TABLET BY MOUTH DAILY   • melatonin 1 MG tablet Take 21 mg by mouth Every Night.   • metFORMIN (GLUCOPHAGE) 500 MG tablet Take 2 tablets by mouth 2 (Two) Times a Day With Meals. (Patient taking differently: Take 1,000 mg by mouth 2 (Two) Times a Day With Meals. 1 tablet in the am and 2 tablets pm)   • montelukast (SINGULAIR) 10 MG tablet TAKE 1 TABLET BY MOUTH EVERY NIGHT   • multivitamin with minerals tablet tablet Take 1 tablet by mouth Daily.   • pramipexole (MIRAPEX) 0.5 MG tablet TAKE 1 TABLET BY MOUTH EVERY NIGHT AT BEDTIME   • prednisoLONE acetate (PRED FORTE) 1 % ophthalmic suspension SHAKE LIQUID AND INSTILL 1 DROP IN RIGHT EYE FOUR TIMES DAILY   • quiNINE (QUALAQUIN) 324 MG capsule Take 324 mg by mouth every night at bedtime.   • sodium-potassium-magnesium sulfates (Suprep Bowel Prep Kit) 17.5-3.13-1.6 GM/177ML solution oral solution Take 1 bottle by mouth Every 12 (Twelve) Hours.   • timolol (TIMOPTIC) 0.5 % ophthalmic solution INSTILL 1 DROP IN RIGHT EYE TWICE DAILY   • traZODone (DESYREL) 50 MG tablet Take 1 tablet by mouth Every Night.   • vitamin D (ERGOCALCIFEROL) 1.25 MG (80836 UT) capsule capsule TAKE 1 CAPSULE BY MOUTH WEEKLY, TAKE WITH CALCIUM   • [DISCONTINUED] fluticasone (Flonase) 50 MCG/ACT nasal spray 2 sprays into the nostril(s) as directed by provider Daily. Administer 2  "sprays in each nostril for each dose.   • Alirocumab (Praluent) 75 MG/ML solution auto-injector Inject 1 mL under the skin into the appropriate area as directed Every 14 (Fourteen) Days.   • ibuprofen (ADVIL,MOTRIN) 200 MG tablet 200 mg.     No current facility-administered medications on file prior to visit.        Social History     Tobacco Use   • Smoking status: Former Smoker     Quit date:      Years since quittin.5   • Smokeless tobacco: Never Used   • Tobacco comment: QUIT    Vaping Use   • Vaping Use: Never used   Substance Use Topics   • Alcohol use: Yes     Comment: rarely   • Drug use: Never        Objective     Vital Signs:   Temp 97 °F (36.1 °C) (Temporal)   Ht 160 cm (63\")   Wt 72.4 kg (159 lb 9.6 oz)   BMI 28.27 kg/m²       Physical Exam  Constitutional:       General: She is not in acute distress.     Appearance: Normal appearance. She is not ill-appearing.   HENT:      Head: Normocephalic and atraumatic.      Jaw: There is normal jaw occlusion. No tenderness or pain on movement.      Salivary Glands: Right salivary gland is not diffusely enlarged or tender. Left salivary gland is not diffusely enlarged or tender.      Right Ear: Tympanic membrane, ear canal and external ear normal.      Left Ear: Tympanic membrane, ear canal and external ear normal.      Nose: Nose normal. No septal deviation.      Right Sinus: No maxillary sinus tenderness or frontal sinus tenderness.      Left Sinus: No maxillary sinus tenderness or frontal sinus tenderness.      Mouth/Throat:      Lips: Pink. No lesions.      Mouth: Mucous membranes are moist. No oral lesions.      Dentition: Normal dentition.      Tongue: No lesions.      Palate: No mass and lesions.      Pharynx: Oropharynx is clear. Uvula midline.      Tonsils: No tonsillar exudate.   Eyes:      Extraocular Movements: Extraocular movements intact.      Conjunctiva/sclera: Conjunctivae normal.      Pupils: Pupils are equal, round, and reactive " to light.   Neck:      Thyroid: No thyroid mass, thyromegaly or thyroid tenderness.      Trachea: Trachea normal.   Pulmonary:      Effort: Pulmonary effort is normal. No respiratory distress.   Musculoskeletal:         General: Normal range of motion.      Cervical back: Normal range of motion and neck supple. No tenderness.   Lymphadenopathy:      Cervical: No cervical adenopathy.   Skin:     General: Skin is warm and dry.   Neurological:      General: No focal deficit present.      Mental Status: She is alert and oriented to person, place, and time.      Cranial Nerves: No cranial nerve deficit.      Motor: No weakness.      Gait: Gait normal.   Psychiatric:         Mood and Affect: Mood normal.         Behavior: Behavior normal.         Thought Content: Thought content normal.         Judgment: Judgment normal.                Result Review :{Labs  Result Review  Imaging  Med Tab  Media :23}               Assessment and Plan    Diagnoses and all orders for this visit:    1. Sensorineural hearing loss (SNHL) of both ears (Primary)    2. Nasal congestion  -     fluticasone (Flonase) 50 MCG/ACT nasal spray; 2 sprays into the nostril(s) as directed by provider Daily. Administer 2 sprays in each nostril for each dose.  Dispense: 16 g; Refill: 6    Other orders  -     azelastine (ASTELIN) 0.1 % nasal spray; 2 sprays into the nostril(s) as directed by provider 2 (Two) Times a Day. Use in each nostril as directed  Dispense: 90 mL; Refill: 6    Audiogram and tympanogram testing was performed in clinic today and shows stable hearing with the right ear with normal hearing with a mild sensorineural hearing loss from 4000 Hz through 8000 Hz.  Left ear has normal hearing with a mild sensorineural hearing loss at 3000 Hz, 4000 Hz and 8000 Hz.  Speech reception thresholds of 15 dB bilaterally.  Word discrimination scores 100% bilaterally at 60 dB and tympanograms were normal bilaterally.  I am going to have her start on  azelastine nasal spray and have encouraged her to follow-up with her sleep medicine doctor to get a new delivery device for her CPAP machine.  I will plan to see her back on an as-needed basis.       Follow Up   No follow-ups on file.  Patient was given instructions and counseling regarding her condition or for health maintenance advice. Please see specific information pulled into the AVS if appropriate.     JEANNIE Merrill

## 2022-07-19 NOTE — PROGRESS NOTES
"Date: June 29, 2022  Time In: 1400  Time Out: 1449  This provider is located at the [facility] (through [facility]), [address], [place] [address] using a secure [employer] of [employer] through [employer]. Patient is being seen remotely via telehealth at home address in [place] and stated they are in a secure environment for this session. The patient's condition being diagnosed/treated is appropriate for telemedicine. The provider identified herself as well as her credentials. The patient, and/or patients guardian, consent to be seen remotely, and when consent is given they understand that the consent allows for patient identifiable information to be sent to a third party as needed. They may refuse to be seen remotely at any time. The electronic data is encrypted and password protected, and the patient and/or guardian has been advised of the potential risks to privacy not withstanding such measures.     You have chosen to receive care through a telehealth visit.  Do you consent to use a video/audio connection for your medical care today? Yes    PROGRESS NOTE  Data:  Nivia Cagle is a 71 y.o. female who presents today for follow up    Chief Complaint: Depression     History of Present Illness: Pt reports a history of kidney stones and recently passing one. Pt reports an odd dream that she can understand why or what caused it. Pt reports that she has been meeting people at [employer] and is on some [employer] organizations. Pt reports that she has been keeping up with the house chores and plans to finish an sewing project. Patient reports that she is trying to take the high road regarding her daughter-in-law's behaviors explaining the belief that she thinks Pt is a \"phoney\".       Clinical Maneuvering/Intervention:    (Scales based on 0 - 10 with 10 being the worst)  Depression: 5 Anxiety: 5     Assisted patient in processing above session content; acknowledged and normalized " patient’s thoughts, feelings, and concerns.  Rationalized patient thought process regarding taking high road regarding daughter in law.  Discussed triggers associated with patient's mood.  Also discussed coping skills for patient to implement such as maintaining chores, relationships, and projects.    Allowed patient to freely discuss issues without interruption or judgment. Provided safe, confidential environment to facilitate the development of positive therapeutic relationship and encourage open, honest communication. Assisted patient in identifying risk factors which would indicate the need for higher level of care including thoughts to harm self or others and/or self-harming behavior and encouraged patient to contact this office, call 911, or present to the nearest emergency room should any of these events occur. Discussed crisis intervention services and means to access. Patient adamantly and convincingly denies current suicidal or homicidal ideation or perceptual disturbance.    Assessment:   Assessment   Patient appears to maintain relative stability as compared to their baseline.  However, patient continues to struggle with depression which continues to cause impairment in important areas of functioning.  A result, they can be reasonably expected to continue to benefit from treatment and would likely be at increased risk for decompensation otherwise.    Mental Status Exam:   Hygiene:   good  Cooperation:  Cooperative  Eye Contact:  Good  Psychomotor Behavior:  Appropriate  Affect:  Full range  Mood: normal  Speech:  Normal  Thought Process:  Linear  Thought Content:  Normal  Suicidal:  None  Homicidal:  None  Hallucinations:  None  Delusion:  None  Memory:  Intact  Orientation:  Person, Place, Time and Situation  Reliability:  fair  Insight:  Fair  Judgement:  Fair  Impulse Control:  Fair  Physical/Medical Issues:  No        Patient's Support Network Includes:  son    Functional Status: Mild impairment      Progress toward goal: Not at goal    Prognosis: Fair with Ongoing Treatment          Plan:    Patient will continue in individual outpatient therapy with focus on improved functioning and coping skills, maintaining stability, and avoiding decompensation and the need for higher level of care.    Patient will adhere to medication regimen as prescribed and report any side effects. Patient will contact this office, call 911 or present to the nearest emergency room should suicidal or homicidal ideations occur. Provide Cognitive Behavioral Therapy and Solution Focused Therapy to improve functioning, maintain stability, and avoid decompensation and the need for higher level of care.     Return in about 2 weeks, or earlier if symptoms worsen or fail to improve.           VISIT DIAGNOSIS:     ICD-10-CM ICD-9-CM   1. Major depressive disorder, recurrent episode, moderate ([facility], [facility])  F33.1 296.32          [facility] No Show Policy:  We understand unexpected circumstances arise; however, anytime you miss your appointment we are unable to provide you appropriate care.  In addition, each appointment missed could have been used to provide care for others.  We ask that you call at least 24 hours in advance to cancel or reschedule an appointment.  We would like to take this opportunity to remind you of our policy stating patients who miss THREE or more appointments without cancelling or rescheduling 24 hours in advance of the appointment may be subject to cancellation of any further visits with our clinic and recommendation to seek in-person services/visits.    Please call [phone] to reschedule your appointment. If there are reasons that make it difficult for you to keep the appointments, please call and let us know how we can help.  Please understand that medication prescribing will not continue without seeing your provider.      [facility]'s No Show Policy reviewed with patient  at today's visit. Patient verbalized understanding of this policy. Discussed with patient that in the event that there are three or more no show visits, it will be recommended that they pursue in-person services/visits as noncompliance with telehealth visits indicates that patient is not an appropriate candidate for telemedicine and would likely be more appropriate for in-person services/visits. Patient verbalizes understanding and is agreeable to this.        This document has been electronically signed by Annita Polo LCSW  July 19, 2022 12:05 EDT      Part of this note may be an electronic transcription/translation of spoken language to printed text using the Dragon Dictation System.

## 2022-07-20 ENCOUNTER — OFFICE VISIT (OUTPATIENT)
Dept: INTERNAL MEDICINE | Facility: CLINIC | Age: 72
End: 2022-07-20

## 2022-07-20 VITALS
TEMPERATURE: 97.9 F | DIASTOLIC BLOOD PRESSURE: 70 MMHG | HEIGHT: 63 IN | OXYGEN SATURATION: 97 % | WEIGHT: 158 LBS | HEART RATE: 91 BPM | RESPIRATION RATE: 15 BRPM | BODY MASS INDEX: 28 KG/M2 | SYSTOLIC BLOOD PRESSURE: 114 MMHG

## 2022-07-20 DIAGNOSIS — E78.2 MIXED HYPERLIPIDEMIA: ICD-10-CM

## 2022-07-20 DIAGNOSIS — I10 ESSENTIAL HYPERTENSION: ICD-10-CM

## 2022-07-20 DIAGNOSIS — E11.65 TYPE 2 DIABETES MELLITUS WITH HYPERGLYCEMIA, WITHOUT LONG-TERM CURRENT USE OF INSULIN: Primary | ICD-10-CM

## 2022-07-20 DIAGNOSIS — F33.1 MAJOR DEPRESSIVE DISORDER, RECURRENT EPISODE, MODERATE DEGREE: ICD-10-CM

## 2022-07-20 DIAGNOSIS — B35.1 ONYCHOMYCOSIS: ICD-10-CM

## 2022-07-20 PROBLEM — Z98.890 OTHER SPECIFIED POSTPROCEDURAL STATES: Status: RESOLVED | Noted: 2021-12-03 | Resolved: 2022-07-20

## 2022-07-20 PROBLEM — S82.842D: Status: RESOLVED | Noted: 2021-02-07 | Resolved: 2022-07-20

## 2022-07-20 PROBLEM — S82.851D: Status: RESOLVED | Noted: 2021-02-07 | Resolved: 2022-07-20

## 2022-07-20 PROBLEM — T85.22XA DISLOCATED INTRAOCULAR LENS: Status: RESOLVED | Noted: 2021-11-16 | Resolved: 2022-07-20

## 2022-07-20 PROBLEM — H33.40: Status: RESOLVED | Noted: 2021-11-29 | Resolved: 2022-07-20

## 2022-07-20 PROBLEM — F32.9 MAJOR DEPRESSIVE DISORDER, SINGLE EPISODE, UNSPECIFIED: Status: RESOLVED | Noted: 2021-01-01 | Resolved: 2022-07-20

## 2022-07-20 PROCEDURE — 99214 OFFICE O/P EST MOD 30 MIN: CPT | Performed by: PHYSICIAN ASSISTANT

## 2022-07-20 NOTE — ASSESSMENT & PLAN NOTE
Lipid abnormalities are elevated  Pharmacotherapy as ordered. Keep follow up with cardiology to get injectable.  Lipids will be reassessed at follow up .

## 2022-07-20 NOTE — PROGRESS NOTES
Chief Complaint  Diabetes, Hypertension, and Hyperlipidemia    Subjective          Nivia Cagle presents to [facility]  DM: Bg running 110 in am  BG low- 87 if she skips meals  She is watching her diet  Denies chest pain, palptiations, ha, dizziness    HLD: waiting for insurance approval of cholesterol injection   Limits chicken and red meat. Eats fish    Depression and anxiety: mood is controlled on current medicines  Feels down once every 1.5 yrs.     R and L great toe pain:   R nail thick        Past Medical History:   Diagnosis Date   • ADHD (attention deficit hyperactivity disorder)    • Allergic rhinitis    • Anemia    • Anxiety    • Cataracts, bilateral    • Chronic pain disorder    • Depression 0421/2021    MOODNOT WELL CONTROLLED.  GIVEN NUMBER FOR LOCAL COUNSELOR.  WILL INCREASE TRINTELIX FROM 10MG TO 20MG RTC WEEK ER ID S/HI   • Diabetes mellitus, type 2 (HCC)    • Diverticulitis    • GERD (gastroesophageal reflux disease)    • Head injury    • High blood pressure    • Hyperlipemia    • Migraine    • EARL (obstructive sleep apnea) 04/21/2021   • Panic disorder    • Phlebitis    • PTSD (post-traumatic stress disorder)         Past Surgical History:   Procedure Laterality Date   • ANKLE SURGERY  2021   • BILATERAL BREAST REDUCTION  2015   • CARPAL TUNNEL RELEASE     • CHOLECYSTECTOMY  2001   • COLONOSCOPY      [facility]   • HYSTERECTOMY     • ULNAR NERVE TRANSPOSITION Right    • WRIST SURGERY          Current Outpatient Medications on File Prior to Visit   Medication Sig Dispense Refill   • Accu-Chek Madeline Plus test strip by Other route 4 (Four) Times a Day. use to test blood sugar     • Accu-Chek Softclix Lancets lancets by Other route 4 (Four) Times a Day. use to test blood sugar     • albuterol sulfate  (90 Base) MCG/ACT inhaler Inhale 2 puffs Every 4 (Four) Hours As Needed for Wheezing (Coughing). 8 g 0   • alendronate (FOSAMAX) 70 MG  "tablet Take 1 tablet by mouth Every 7 (Seven) Days.     • ARIPiprazole (ABILIFY) 2 MG tablet Take 2 tablets by mouth Daily. 180 tablet 0   • aspirin (aspirin) 81 MG EC tablet Take 1 tablet by mouth Daily. 90 tablet 99   • azelastine (ASTELIN) 0.1 % nasal spray 2 sprays into the nostril(s) as directed by provider 2 (Two) Times a Day. Use in each nostril as directed 90 mL 6   • BD Insulin Syringe U/F 30G X 1/2\" 0.3 ML misc USE TO INJECT INSULIN FOUR TIMES DAILY AS NEEDED     • BL NASAL SALINE MIST NA 2 drops.     • Blood Glucose Monitoring Suppl (FreeStyle Lite) device 1 each by Other route 4 (Four) Times a Day.     • buPROPion XL (WELLBUTRIN XL) 300 MG 24 hr tablet Take 1 tablet by mouth Every Morning. 90 tablet 0   • Calcium Carbonate 1500 (600 Ca) MG tablet Take 600 mg by mouth Daily.     • cetirizine (zyrTEC) 10 MG tablet Take 1 tablet by mouth Daily. 90 tablet 1   • cyclobenzaprine (FLEXERIL) 10 MG tablet Take 1 tablet by mouth Daily As Needed for Muscle Spasms. 30 tablet 2   • Daily-Jelani Multivitamin tablet tablet Take 1 tablet by mouth every night at bedtime.     • ezetimibe (ZETIA) 10 MG tablet TAKE 1 TABLET BY MOUTH EVERY NIGHT AT BEDTIME 90 tablet 1   • FLUoxetine (PROzac) 20 MG capsule Take 2 capsules by mouth Daily. 60 capsule 2   • fluticasone (Flonase) 50 MCG/ACT nasal spray 2 sprays into the nostril(s) as directed by provider Daily. Administer 2 sprays in each nostril for each dose. 16 g 6   • Januvia 100 MG tablet TAKE 1 TABLET BY MOUTH DAILY 90 tablet 1   • losartan (COZAAR) 25 MG tablet TAKE 1 TABLET BY MOUTH DAILY 90 tablet 1   • melatonin 1 MG tablet Take 21 mg by mouth Every Night.     • metFORMIN (GLUCOPHAGE) 500 MG tablet Take 2 tablets by mouth 2 (Two) Times a Day With Meals. (Patient taking differently: Take 1,000 mg by mouth 2 (Two) Times a Day With Meals. 1 tablet in the am and 2 tablets pm) 360 tablet 1   • montelukast (SINGULAIR) 10 MG tablet TAKE 1 TABLET BY MOUTH EVERY NIGHT 90 tablet 1 " "  • multivitamin with minerals tablet tablet Take 1 tablet by mouth Daily.     • pramipexole (MIRAPEX) 0.5 MG tablet TAKE 1 TABLET BY MOUTH EVERY NIGHT AT BEDTIME 30 tablet 0   • prednisoLONE acetate (PRED FORTE) 1 % ophthalmic suspension SHAKE LIQUID AND INSTILL 1 DROP IN RIGHT EYE FOUR TIMES DAILY     • quiNINE (QUALAQUIN) 324 MG capsule Take 324 mg by mouth every night at bedtime.     • sodium-potassium-magnesium sulfates (Suprep Bowel Prep Kit) 17.5-3.13-1.6 GM/177ML solution oral solution Take 1 bottle by mouth Every 12 (Twelve) Hours. 354 mL 0   • timolol (TIMOPTIC) 0.5 % ophthalmic solution INSTILL 1 DROP IN RIGHT EYE TWICE DAILY     • traZODone (DESYREL) 50 MG tablet Take 1 tablet by mouth Every Night. 30 tablet 2   • vitamin D (ERGOCALCIFEROL) 1.25 MG (35799 UT) capsule capsule TAKE 1 CAPSULE BY MOUTH WEEKLY, TAKE WITH CALCIUM 12 capsule 0   • Alirocumab (Praluent) 75 MG/ML solution auto-injector Inject 1 mL under the skin into the appropriate area as directed Every 14 (Fourteen) Days. 6 pen 3     No current facility-administered medications on file prior to visit.        Allergies   Allergen Reactions   • Diclofenac Hives   • New Skin [Benzethonium Chloride] Rash   • Adhesive Tape Rash and Other (See Comments)       Rash at area of bandaid   • Niacin Rash       Social History     Tobacco Use   Smoking Status Former Smoker   • Quit date:    • Years since quittin.5   Smokeless Tobacco Never Used   Tobacco Comment    QUIT           Objective   Vital Signs:   /70   Pulse 91   Temp 97.9 °F (36.6 °C)   Resp 15   Ht 160 cm (63\")   Wt 71.7 kg (158 lb)   SpO2 97%   BMI 27.99 kg/m²     Physical Exam  Vitals reviewed.   Constitutional:       Appearance: Normal appearance.   HENT:      Head: Normocephalic and atraumatic.      Nose: Nose normal.      Mouth/Throat:      Mouth: Mucous membranes are moist.   Eyes:      Extraocular Movements: Extraocular movements intact.      Conjunctiva/sclera: " Conjunctivae normal.      Pupils: Pupils are equal, round, and reactive to light.   Cardiovascular:      Rate and Rhythm: Normal rate and regular rhythm.   Pulmonary:      Effort: Pulmonary effort is normal.      Breath sounds: Normal breath sounds.   Abdominal:      General: Abdomen is flat. Bowel sounds are normal.      Palpations: Abdomen is soft.   Musculoskeletal:         General: Normal range of motion.   Feet:      Right foot:      Skin integrity: Skin integrity normal.      Toenail Condition: Right toenails are abnormally thick. Fungal disease present.     Left foot:      Skin integrity: Skin integrity normal.   Neurological:      General: No focal deficit present.      Mental Status: She is alert and oriented to person, place, and time.   Psychiatric:         Mood and Affect: Mood normal.        Result Review :   The following data was reviewed by: Catalina Madsen PA-C on 07/20/2022:  Common labs    Common Labsle 9/21/21 9/21/21 9/21/21 9/21/21 2/15/22 2/15/22 2/15/22 2/15/22 7/1/22 7/1/22 7/1/22 7/1/22    1301 1301 1302 1302 1528 1528 1528 1528 1121 1121 1121 1121   Glucose  100 (A)    79     96    BUN  15    21     15    Creatinine  0.76    0.83     0.88    eGFR Non African Am  75    68         Sodium  142    140     137    Potassium  4.0    4.4     4.5    Chloride  105    103     99    Calcium  9.4    9.7     10.2    Albumin  4.40    4.50     4.30    Total Bilirubin  0.5    0.3     0.6    Alkaline Phosphatase  88    78     68    AST (SGOT)  19    10     15    ALT (SGPT)  13    14     14    WBC   6.85  10.26    7.30      Hemoglobin   12.4  12.7    12.9      Hematocrit   37.1  38.5    38.9      Platelets   300  257    231      Total Cholesterol 219 (A)      195     197   Triglycerides 128      105     103   HDL Cholesterol 49      52     53   LDL Cholesterol  147 (A)      124 (A)     125 (A)   Hemoglobin A1C    6.50 (A)    7.10 (A)  5.80 (A)     (A) Abnormal value            CMP    CMP 9/21/21 2/15/22  7/1/22   Glucose 100 (A) 79 96   BUN 15 21 15   Creatinine 0.76 0.83 0.88   eGFR Non African Am 75 68    Sodium 142 140 137   Potassium 4.0 4.4 4.5   Chloride 105 103 99   Calcium 9.4 9.7 10.2   Albumin 4.40 4.50 4.30   Total Bilirubin 0.5 0.3 0.6   Alkaline Phosphatase 88 78 68   AST (SGOT) 19 10 15   ALT (SGPT) 13 14 14   (A) Abnormal value                      Assessment and Plan {CC Problem List  Visit Diagnosis   ROS  Review (Popup)  Health Maintenance  Quality  BestPractice  Medications  SmartSets  SnapShot Encounters  Media :23}   Diagnoses and all orders for this visit:    1. Type 2 diabetes mellitus with hyperglycemia, without long-term current use of insulin (HCC) (Primary)  -     Ambulatory Referral to Podiatry  -     Comprehensive Metabolic Panel; Future  -     CBC & Differential; Future  -     TSH; Future  -     Lipid Panel; Future  -     Hemoglobin A1c; Future    2. Essential hypertension  Assessment & Plan:  Hypertension is improving with treatment.  Continue current treatment regimen.  Blood pressure will be reassessed at the next regular appointment.    Orders:  -     Comprehensive Metabolic Panel; Future  -     CBC & Differential; Future  -     TSH; Future  -     Lipid Panel; Future  -     Hemoglobin A1c; Future    3. Mixed hyperlipidemia  Assessment & Plan:  Lipid abnormalities are elevated  Pharmacotherapy as ordered. Keep follow up with cardiology to get injectable.  Lipids will be reassessed at follow up .    Orders:  -     Lipid Panel; Future    4. Onychomycosis  Comments:  Will refer to podiatry due to dm and fungal infection     5. Major depressive disorder, recurrent episode, moderate degree (HCC)  Assessment & Plan:  Mood stable, cont follow up with psych and current meds        Follow Up   Return in about 4 months (around 11/20/2022).  Patient was given instructions and counseling regarding her condition or for health maintenance advice. Please see specific information pulled  into the AVS if appropriate.

## 2022-07-25 ENCOUNTER — OFFICE VISIT (OUTPATIENT)
Dept: NEUROLOGY | Facility: CLINIC | Age: 72
End: 2022-07-25

## 2022-07-25 ENCOUNTER — TELEPHONE (OUTPATIENT)
Dept: CARDIOLOGY | Facility: CLINIC | Age: 72
End: 2022-07-25

## 2022-07-25 VITALS
WEIGHT: 161 LBS | HEIGHT: 63 IN | DIASTOLIC BLOOD PRESSURE: 61 MMHG | HEART RATE: 73 BPM | SYSTOLIC BLOOD PRESSURE: 123 MMHG | BODY MASS INDEX: 28.53 KG/M2

## 2022-07-25 DIAGNOSIS — G25.81 RESTLESS LEGS SYNDROME (RLS): Primary | ICD-10-CM

## 2022-07-25 DIAGNOSIS — G47.33 OBSTRUCTIVE SLEEP APNEA: ICD-10-CM

## 2022-07-25 PROCEDURE — 99214 OFFICE O/P EST MOD 30 MIN: CPT | Performed by: NURSE PRACTITIONER

## 2022-07-25 RX ORDER — PRAMIPEXOLE DIHYDROCHLORIDE 0.5 MG/1
0.5 TABLET ORAL
Qty: 90 TABLET | Refills: 1 | Status: SHIPPED | OUTPATIENT
Start: 2022-07-25 | End: 2023-01-20

## 2022-07-25 NOTE — PROGRESS NOTES
"Chief Complaint  Neurologic Problem    Subjective          Nivia Cagle presents to [facility]  States she has arthritis in her ankles from fractures 2 years ago, endorses stiffness to the ankles.  This caused her to fall recently on a sidewalk.  States that RLS is very well controlled on pramipexole. States muscle twitching has improved. States that mood is very good with current mediation regimen from psychiatry.        Objective   Vital Signs:   /61   Pulse 73   Ht 160 cm (63\")   Wt 73 kg (161 lb)   BMI 28.52 kg/m²     Physical Exam  HENT:      Head: Normocephalic.   Pulmonary:      Effort: Pulmonary effort is normal.   Neurological:      Mental Status: She is alert and oriented to person, place, and time.      Cranial Nerves: Cranial nerves are intact.      Sensory: Sensation is intact.      Motor: Motor function is intact.      Coordination: Coordination is intact.      Deep Tendon Reflexes: Reflexes are normal and symmetric.      Comments: Mild intention tremor bilaterally        Neurologic Exam     Mental Status   Oriented to person, place, and time.        Result Review :               Assessment and Plan    Diagnoses and all orders for this visit:    1. Restless legs syndrome (RLS) (Primary)  Assessment & Plan:  Continue pramipexole.      2. Obstructive sleep apnea  Assessment & Plan:  Discussed importance of compliance with CPAP.  Discussed implications of unmanaged EARL including increased risk of stroke, heart attack, and dementia.      Other orders  -     pramipexole (MIRAPEX) 0.5 MG tablet; Take 1 tablet by mouth every night at bedtime for 90 days.  Dispense: 90 tablet; Refill: 1      Follow Up   Return in about 6 months (around 1/25/2023) for RLS.  Patient was given instructions and counseling regarding her condition or for health maintenance advice. Please see specific information pulled into the AVS if appropriate.       "

## 2022-07-27 ENCOUNTER — TELEMEDICINE (OUTPATIENT)
Dept: PSYCHIATRY | Facility: CLINIC | Age: 72
End: 2022-07-27

## 2022-07-27 DIAGNOSIS — F41.1 GENERALIZED ANXIETY DISORDER: ICD-10-CM

## 2022-07-27 DIAGNOSIS — F33.1 MAJOR DEPRESSIVE DISORDER, RECURRENT EPISODE, MODERATE: Primary | ICD-10-CM

## 2022-07-27 DIAGNOSIS — F43.10 POST TRAUMATIC STRESS DISORDER (PTSD): ICD-10-CM

## 2022-07-27 DIAGNOSIS — F51.05 INSOMNIA DUE TO MENTAL CONDITION: ICD-10-CM

## 2022-07-27 PROCEDURE — 90833 PSYTX W PT W E/M 30 MIN: CPT | Performed by: STUDENT IN AN ORGANIZED HEALTH CARE EDUCATION/TRAINING PROGRAM

## 2022-07-27 PROCEDURE — 99214 OFFICE O/P EST MOD 30 MIN: CPT | Performed by: STUDENT IN AN ORGANIZED HEALTH CARE EDUCATION/TRAINING PROGRAM

## 2022-07-27 NOTE — PATIENT INSTRUCTIONS
1.  Please return to clinic at your next scheduled visit.  Contact the clinic ([phone]) at least 24 hours prior in the event you need to cancel.  2.  Do no harm to yourself or others.    3.  Avoid alcohol and drugs.    4.  Take all medications as prescribed.  Please contact the clinic with any concerns. If you are in need of medication refills, please call the clinic at [phone].    5. Should you want to get in touch with your provider, Dr. Vicky Barth, please utilize [employer] or contact the office ([phone]), and staff will be able to page Dr. Barth directly.  6.  In the event you have personal crisis, contact the following crisis numbers: Suicide Prevention Hotline 1-317.386.7675; [facility] Helpline 7-059-218-VJUJ; [facility] Emergency Room [phone]; text HELLO to 355592; or 367.     SPECIFIC RECOMMENDATIONS:     1.      Medications discussed at this encounter:                   - no changes     2.      Psychotherapy recommendations:      3.     Return to clinic: 6 weeks

## 2022-07-27 NOTE — TREATMENT PLAN
Multi-Disciplinary Problems (from Behavioral Health Treatment Plan)    Active Problems     Problem: Anxiety  Start Date: 07/27/22    Problem Details: The patient self-scales this problem as a 3 with 10 being the worst.        Goal Priority Start Date Expected End Date End Date    Patient will develop and implement behavioral and cognitive strategies to reduce anxiety and irrational fears. -- 07/27/22 -- --    Goal Details: Progress toward goal:  Not appropriate to rate progress toward goal since this is the initial treatment plan.        Goal Intervention Frequency Start Date End Date    Help patient explore past emotional issues in relation to present anxiety. Q Month 07/27/22 --    Intervention Details: Duration of treatment until until remission of symptoms.        Goal Intervention Frequency Start Date End Date    Help patient develop an awareness of their cognitive and physical responses to anxiety. Q Month 07/27/22 --    Intervention Details: Duration of treatment until until remission of symptoms.              Problem: Depression  Start Date: 07/27/22    Problem Details: The patient self-scales this problem as a 1 with 10 being the worst.        Goal Priority Start Date Expected End Date End Date    Patient will demonstrate the ability to initiate new constructive life skills outside of sessions on a consistent basis. -- 07/27/22 -- --    Goal Details: Progress toward goal:  Not appropriate to rate progress toward goal since this is the initial treatment plan.        Goal Intervention Frequency Start Date End Date    Assist patient in setting attainable activities of daily living goals. PRN 07/27/22 --    Goal Intervention Frequency Start Date End Date    Provide education about depression Q Month 07/27/22 --    Intervention Details: Duration of treatment until until remission of symptoms.        Goal Intervention Frequency Start Date End Date    Assist patient in developing healthy coping strategies. Q Month  07/27/22 --    Intervention Details: Duration of treatment until until remission of symptoms.                    Reviewed By     Vicky Barth MD 07/27/22 4780                 I have discussed and reviewed this treatment plan with the patient.

## 2022-07-27 NOTE — PROGRESS NOTES
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 Final   • A/G Ratio 07/01/2022 1.4  g/dL Final   • BUN/Creatinine Ratio 07/01/2022 17.0  7.0 - 25.0 Final   • Anion Gap 07/01/2022 11.0  5.0 - 15.0 mmol/L Final   • eGFR 07/01/2022 71.2  >60.0 mL/min/1.73 Final    National Kidney Foundation and American Society of Nephrology (ASN) Task Force recommended calculation based on the Chronic Kidney Disease Epidemiology Collaboration (CKD-EPI) equation refit without adjustment for race.   • TSH 07/01/2022 2.530  0.270 - 4.200 uIU/mL Final   • Total Cholesterol 07/01/2022 197  0 - 200 mg/dL Final   • Triglycerides 07/01/2022 103  0 - 150 mg/dL Final   • HDL Cholesterol 07/01/2022 53  40 - 60 mg/dL Final   • LDL Cholesterol  07/01/2022 125 (A) 0 - 100 mg/dL Final   • VLDL Cholesterol 07/01/2022 19  5 - 40 mg/dL Final   • LDL/HDL Ratio 07/01/2022 2.33   Final   • Hemoglobin A1C 07/01/2022 5.80 (A) 4.80 - 5.60 % Final   • WBC 07/01/2022 7.30  3.40 - 10.80 10*3/mm3 Final   • RBC 07/01/2022 4.20  3.77 - 5.28 10*6/mm3 Final   • Hemoglobin 07/01/2022 12.9  12.0 - 15.9 g/dL Final   • Hematocrit 07/01/2022 38.9  34.0 - 46.6 % Final   • MCV 07/01/2022 92.6  79.0 - 97.0 fL Final   • MCH 07/01/2022 30.7  26.6 - 33.0 pg Final   • MCHC 07/01/2022 33.2  31.5 - 35.7 g/dL Final   • RDW 07/01/2022  12.2 (A) 12.3 - 15.4 % Final   • RDW-SD 07/01/2022 40.9  37.0 - 54.0 fl Final   • MPV 07/01/2022 11.8  6.0 - 12.0 fL Final   • Platelets 07/01/2022 231  140 - 450 10*3/mm3 Final   • Neutrophil % 07/01/2022 67.2  42.7 - 76.0 % Final   • Lymphocyte % 07/01/2022 20.3  19.6 - 45.3 % Final   • Monocyte % 07/01/2022 9.9  5.0 - 12.0 % Final   • Eosinophil % 07/01/2022 1.8  0.3 - 6.2 % Final   • Basophil % 07/01/2022 0.5  0.0 - 1.5 % Final   • Immature Grans % 07/01/2022 0.3  0.0 - 0.5 % Final   • Neutrophils, Absolute 07/01/2022 4.91  1.70 - 7.00 10*3/mm3 Final   • Lymphocytes, Absolute 07/01/2022 1.48  0.70 - 3.10 10*3/mm3 Final   • Monocytes, Absolute 07/01/2022 0.72  0.10 - 0.90 10*3/mm3 Final   • Eosinophils, Absolute 07/01/2022 0.13  0.00 - 0.40 10*3/mm3 Final   • Basophils, Absolute 07/01/2022 0.04  0.00 - 0.20 10*3/mm3 Final   • Immature Grans, Absolute 07/01/2022 0.02  0.00 - 0.05 10*3/mm3 Final   • nRBC 07/01/2022 0.0  0.0 - 0.2 /100 WBC Final   Clinical Support on 06/21/2022   Component Date Value Ref Range Status   • Color, UA 06/21/2022 Yellow  Yellow, Straw Final   • Appearance, UA 06/21/2022 Clear  Clear Final   • pH, UA 06/21/2022 5.0  5.0 - 8.0 Final   • Specific Gravity, UA 06/21/2022 1.030  1.005 - 1.030 Final   • Glucose, UA 06/21/2022 Negative  Negative Final   • Ketones, UA 06/21/2022 Trace (A) Negative Final   • Bilirubin, UA 06/21/2022 Small (1+) (A) Negative Final   • Blood, UA 06/21/2022 Negative  Negative Final   • Protein, UA 06/21/2022 Trace (A) Negative Final   • Leuk Esterase, UA 06/21/2022 Trace (A) Negative Final   • Nitrite, UA 06/21/2022 Negative  Negative Final   • Urobilinogen, UA 06/21/2022 1.0 E.U./dL  0.2 - 1.0 E.U./dL Final   • Color 06/21/2022 Yellow  Yellow, Straw, Dark Yellow, Annita Final   • Clarity, UA 06/21/2022 Clear  Clear Final   • Specific Gravity  06/21/2022 1.030  1.005 - 1.030 Final   • pH, Urine 06/21/2022 5.0  5.0 - 8.0 Final   • Leukocytes 06/21/2022 Trace (A)  Negative Final   • Nitrite, UA 06/21/2022 Negative  Negative Final   • Protein, POC 06/21/2022 Trace (A) Negative mg/dL Final   • Glucose, UA 06/21/2022 Negative  Negative, 1000 mg/dL (3+) mg/dL Final   • Ketones, UA 06/21/2022 15 mg/dL (A) Negative Final   • Urobilinogen, UA 06/21/2022 1 E.U./dL  (A) Normal Final   • Bilirubin 06/21/2022 Small (1+) (A) Negative Final   • Blood, UA 06/21/2022 Negative  Negative Final   • Lot Number 06/21/2022 [id number]   Final   • Expiration Date 06/21/2022 6/30/23   Final   • Urine Culture 06/21/2022 No growth   Final   Office Visit on 06/13/2022   Component Date Value Ref Range Status   • Color, UA 06/13/2022 Yellow  Yellow, Straw Final   • Appearance, UA 06/13/2022 Clear  Clear Final   • pH, UA 06/13/2022 5.5  5.0 - 8.0 Final   • Specific Gravity, UA 06/13/2022 >=1.030  1.005 - 1.030 Final   • Glucose, UA 06/13/2022 Negative  Negative Final   • Ketones, UA 06/13/2022 Trace (A) Negative Final   • Bilirubin, UA 06/13/2022 Small (1+) (A) Negative Final   • Blood, UA 06/13/2022 Moderate (2+) (A) Negative Final   • Protein, UA 06/13/2022 100 mg/dL (2+) (A) Negative Final   • Leuk Esterase, UA 06/13/2022 Trace (A) Negative Final   • Nitrite, UA 06/13/2022 Negative  Negative Final   • Urobilinogen, UA 06/13/2022 0.2 E.U./dL  0.2 - 1.0 E.U./dL Final   • Urine Culture 06/13/2022 <25,000 CFU/mL Escherichia coli ESBL (A)  Corrected    Consider infectious disease consult.  Susceptibility results may not correlate to clinical outcomes.   Admission on 05/26/2022, Discharged on 05/26/2022   Component Date Value Ref Range Status   • COVID19 05/26/2022 Not Detected  Not Detected - Ref. Range Final   Clinical Support on 03/03/2022   Component Date Value Ref Range Status   • Amphetamine Screen, Urine 03/03/2022 Negative  Negative Final   • AMP INTERNAL CONTROL 03/03/2022 Passed  Passed Final   • Barbiturates Screen, Urine 03/03/2022 Negative  Negative Final   • BARBITURATE INTERNAL CONTROL 03/03/2022  Passed  Passed Final   • Buprenorphine, Screen, Urine 03/03/2022 Negative  Negative Final   • BUPRENORPHINE INTERNAL CONTROL 03/03/2022 Passed  Passed Final   • Benzodiazepine Screen, Urine 03/03/2022 Negative  Negative Final   • BENZODIAZEPINE INTERNAL CONTROL 03/03/2022 Passed  Passed Final   • Cocaine Screen, Urine 03/03/2022 Negative  Negative Final   • COCAINE INTERNAL CONTROL 03/03/2022 Passed  Passed Final   • MDMA (ECSTASY) 03/03/2022 Negative  Negative Final   • MDMA (ECSTASY) INTERNAL CONTROL 03/03/2022 Passed  Passed Final   • Methamphetamine, Ur 03/03/2022 Negative  Negative Final   • METHAMPHETAMINE INTERNAL CONTROL 03/03/2022 Passed  Passed Final   • Methadone Screen, Urine 03/03/2022 Negative  Negative Final   • METHADONE INTERNAL CONTROL 03/03/2022 Passed  Passed Final   • Opiate Screen 03/03/2022 Negative  Negative Final   • OPIATES INTERNAL CONTROL 03/03/2022 Passed  Passed Final   • Oxycodone Screen, Urine 03/03/2022 Negative  Negative Final   • OXYCODONE INTERNAL CONTROL 03/03/2022 Passed  Passed Final   • Phencyclidine (PCP), Urine 03/03/2022 Negative  Negative Final   • PHENCYCLIDINE INTERNAL CONTROL 03/03/2022 Passed  Passed Final   • THC, Screen, Urine 03/03/2022 Negative  Negative Final   • THC INTERNAL CONTROL 03/03/2022 Passed  Passed Final   • Lot Number 03/03/2022 [id number]   Final   • Expiration Date 03/03/2022 03/31/23   Final   Lab on 02/15/2022   Component Date Value Ref Range Status   • 25 Hydroxy, Vitamin D 02/15/2022 35.1  ng/ml Final   • Sed Rate 02/15/2022 40 (A) 0 - 30 mm/hr Final   • Glucose 02/15/2022 79  65 - 99 mg/dL Final   • BUN 02/15/2022 21  8 - 23 mg/dL Final   • Creatinine 02/15/2022 0.83  0.57 - 1.00 mg/dL Final   • Sodium 02/15/2022 140  136 - 145 mmol/L Final   • Potassium 02/15/2022 4.4  3.5 - 5.2 mmol/L Final   • Chloride 02/15/2022 103  98 - 107 mmol/L Final   • CO2 02/15/2022 26.7  22.0 - 29.0 mmol/L Final   • Calcium 02/15/2022 9.7  8.6 - 10.5 mg/dL Final   •  Total Protein 02/15/2022 7.1  6.0 - 8.5 g/dL Final   • Albumin 02/15/2022 4.50  3.50 - 5.20 g/dL Final   • ALT (SGPT) 02/15/2022 14  1 - 33 U/L Final   • AST (SGOT) 02/15/2022 10  1 - 32 U/L Final   • Alkaline Phosphatase 02/15/2022 78  39 - 117 U/L Final   • Total Bilirubin 02/15/2022 0.3  0.0 - 1.2 mg/dL Final   • eGFR Non  Amer 02/15/2022 68  >60 mL/min/1.73 Final   • Globulin 02/15/2022 2.6  gm/dL Final   • A/G Ratio 02/15/2022 1.7  g/dL Final   • BUN/Creatinine Ratio 02/15/2022 25.3 (A) 7.0 - 25.0 Final   • Anion Gap 02/15/2022 10.3  5.0 - 15.0 mmol/L Final   • TSH 02/15/2022 2.570  0.270 - 4.200 uIU/mL Final   • Total Cholesterol 02/15/2022 195  0 - 200 mg/dL Final   • Triglycerides 02/15/2022 105  0 - 150 mg/dL Final   • HDL Cholesterol 02/15/2022 52  40 - 60 mg/dL Final   • LDL Cholesterol  02/15/2022 124 (A) 0 - 100 mg/dL Final   • VLDL Cholesterol 02/15/2022 19  5 - 40 mg/dL Final   • LDL/HDL Ratio 02/15/2022 2.35   Final   • Hemoglobin A1C 02/15/2022 7.10 (A) 4.80 - 5.60 % Final   • WBC 02/15/2022 10.26  3.40 - 10.80 10*3/mm3 Final   • RBC 02/15/2022 4.13  3.77 - 5.28 10*6/mm3 Final   • Hemoglobin 02/15/2022 12.7  12.0 - 15.9 g/dL Final   • Hematocrit 02/15/2022 38.5  34.0 - 46.6 % Final   • MCV 02/15/2022 93.2  79.0 - 97.0 fL Final   • MCH 02/15/2022 30.8  26.6 - 33.0 pg Final   • MCHC 02/15/2022 33.0  31.5 - 35.7 g/dL Final   • RDW 02/15/2022 12.2 (A) 12.3 - 15.4 % Final   • RDW-SD 02/15/2022 41.8  37.0 - 54.0 fl Final   • MPV 02/15/2022 12.1 (A) 6.0 - 12.0 fL Final   • Platelets 02/15/2022 257  140 - 450 10*3/mm3 Final   • Neutrophil % 02/15/2022 72.6  42.7 - 76.0 % Final   • Lymphocyte % 02/15/2022 16.4 (A) 19.6 - 45.3 % Final   • Monocyte % 02/15/2022 9.6  5.0 - 12.0 % Final   • Eosinophil % 02/15/2022 0.8  0.3 - 6.2 % Final   • Basophil % 02/15/2022 0.4  0.0 - 1.5 % Final   • Immature Grans % 02/15/2022 0.2  0.0 - 0.5 % Final   • Neutrophils, Absolute 02/15/2022 7.45 (A) 1.70 - 7.00 10*3/mm3  Final   • Lymphocytes, Absolute 02/15/2022 1.68  0.70 - 3.10 10*3/mm3 Final   • Monocytes, Absolute 02/15/2022 0.99 (A) 0.10 - 0.90 10*3/mm3 Final   • Eosinophils, Absolute 02/15/2022 0.08  0.00 - 0.40 10*3/mm3 Final   • Basophils, Absolute 02/15/2022 0.04  0.00 - 0.20 10*3/mm3 Final   • Immature Grans, Absolute 02/15/2022 0.02  0.00 - 0.05 10*3/mm3 Final   • nRBC 02/15/2022 0.0  0.0 - 0.2 /100 WBC Final       EKG Results:  No orders to display       Imaging Results:  No Images in the past 120 days found..      Assessment & Plan   Diagnoses and all orders for this visit:    1. Major depressive disorder, recurrent episode, moderate (HCC) (Primary)    2. Generalized anxiety disorder    3. Post traumatic stress disorder (PTSD)    4. Insomnia due to mental condition        INITIAL presentation most consistent with major depressive disorder, recurrent, moderate to severe, with anxious distress.  PTSD.  Rule out personality disorder, cluster B specifically.  Rule out hypomania as patient was very difficult to interrupt today.    7/27: Well, stable. 17 minutes of supportive psychotherapy with goal to strengthen defenses, promote problems solving, restore adaptive functioning and provide symptom relief. The therapeutic alliance was strengthened to encourage the patient to express their thoughts and feelings. Esteem building was enhanced through praise, reassurance, normalizing and encouragement. Coping skills were enhanced to build distress tolerance skills and emotional regulation. Allowed patient to freely discuss issues without interruption or judgement with unconditional positive regard, active listening skills, and empathy. Provided a safe, confidential environment to facilitate the development of a positive therapeutic relationship and encourage open, honest communication. Assisted patient in identifying risk factors which would indicate the need for higher level of care including thoughts to harm self or others  and/or self-harming behavior and encouraged patient to contact this office, call 911, or present to the nearest emergency room should any of these events occur. Assisted patient in processing session content; acknowledged and normalized patient’s thoughts, feelings, and concerns by utilizing a person-centered approach in efforts to build appropriate rapport and a positive therapeutic relationship with open and honest communication. Patient given education on medication side effects, diagnosis/illness and relapse symptoms. Plan to continue supportive psychotherapy in next appointment to provide symptom relief.  Diagnoses: as above  Symptoms: as above  Functional status: good  Mental Status Exam: as above    Treatment plan: Medication management and supportive psychotherapy  Prognosis: good  Progress: stable, well  6 wks      6/14: Better, no changes. 17 minutes of supportive psychotherapy with goal to strengthen defenses, promote problems solving, restore adaptive functioning and provide symptom relief. The therapeutic alliance was strengthened to encourage the patient to express their thoughts and feelings. Esteem building was enhanced through praise, reassurance, normalizing and encouragement. Coping skills were enhanced to build distress tolerance skills and emotional regulation. Allowed patient to freely discuss issues without interruption or judgement with unconditional positive regard, active listening skills, and empathy. Provided a safe, confidential environment to facilitate the development of a positive therapeutic relationship and encourage open, honest communication. Assisted patient in identifying risk factors which would indicate the need for higher level of care including thoughts to harm self or others and/or self-harming behavior and encouraged patient to contact this office, call 911, or present to the nearest emergency room should any of these events occur. Assisted patient in processing session  content; acknowledged and normalized patient’s thoughts, feelings, and concerns by utilizing a person-centered approach in efforts to build appropriate rapport and a positive therapeutic relationship with open and honest communication. Patient given education on medication side effects, diagnosis/illness and relapse symptoms. Plan to continue supportive psychotherapy in next appointment to provide symptom relief.  Diagnoses: as above  Symptoms: as above  Functional status: good  Mental Status Exam: as above    Treatment plan: Medication management and supportive psychotherapy  Prognosis: good  Progress: less depressed  6 wks      5/3: Increase prozac and melatonin. 17 minutes of supportive psychotherapy with goal to strengthen defenses, promote problems solving, restore adaptive functioning and provide symptom relief. The therapeutic alliance was strengthened to encourage the patient to express their thoughts and feelings. Esteem building was enhanced through praise, reassurance, normalizing and encouragement. Coping skills were enhanced to build distress tolerance skills and emotional regulation. Patient given education on medication side effects, diagnosis/illness and relapse symptoms. Plan to continue supportive psychotherapy in next appointment to provide symptom relief.  Diagnoses: as above  Symptoms: as above  Functional status: fair  Mental Status Exam: as above    Treatment plan: Medication management and supportive psychotherapy  Prognosis: good  Progress: backtracked recently, now depressed  6 wks    3/17: Stable, some problems with concentration related to getting unpacked. Increase trazodone to target insomnia. 18 minutes of supportive psychotherapy with goal to strengthen defenses, promote problems solving, restore adaptive functioning and provide symptom relief. The therapeutic alliance was strengthened to encourage the patient to express their thoughts and feelings. Esteem building was enhanced  through praise, reassurance, normalizing and encouragement. Coping skills were enhanced to build distress tolerance skills and emotional regulation. Patient given education on medication side effects, diagnosis/illness and relapse symptoms. Plan to continue supportive psychotherapy in next appointment to provide symptom relief.  6 wks    2/16: Stable. No SE. 17 minutes of supportive psychotherapy with goal to strengthen defenses, promote problems solving, restore adaptive functioning and provide symptom relief. The therapeutic alliance was strengthened to encourage the patient to express their thoughts and feelings. Esteem building was enhanced through praise, reassurance, normalizing and encouragement. Coping skills were enhanced to build distress tolerance skills and emotional regulation. Patient given education on medication side effects, diagnosis/illness and relapse symptoms. Plan to continue supportive psychotherapy in next appointment to provide symptom relief.  4 wks    1/18: Doing well. Tolerating meds without side effects. 25 minutes of supportive psychotherapy with goal to strengthen defenses, promote problems solving, restore adaptive functioning and provide symptom relief. The therapeutic alliance was strengthened to encourage the patient to express their thoughts and feelings. Esteem building was enhanced through praise, reassurance, normalizing and encouragement. Coping skills were enhanced to build distress tolerance skills and emotional regulation. Patient given education on medication side effects, diagnosis/illness and relapse symptoms. Plan to continue supportive psychotherapy in next appointment to provide symptom relief.  4 wks    12/7: Doing very well, though some insomnia. Start melatonin 10 mg daily. No other changes. Watch jaw movements. Wants to taper off meds at some point; now would not be a good time. 17 minutes of supportive psychotherapy with goal to strengthen defenses, promote  problems solving, restore adaptive functioning and provide symptom relief. The therapeutic alliance was strengthened to encourage the patient to express their thoughts and feelings. Esteem building was enhanced through praise, reassurance, normalizing and encouragement. Coping skills were enhanced to build distress tolerance skills and emotional regulation. Patient given education on medication side effects, diagnosis/illness and relapse symptoms. Plan to continue supportive psychotherapy in next appointment to provide symptom relief.  6 wks    10/26: Pt stopped mirtazapine and restarted prozac 20 mg daily.     IMPORTANT:  Consider brexpiprazole.    Very important to obtain records from previous psychiatrist.  Care should be taken when putting patient on sedating medications as she has a falls risk.  Also has a history of EARL which will lead to difficulties treating her insomnia.    Therapy referral made to [facility].      See back in 8 weeks.  Patient is not vaccinated.    Visit Diagnoses:    ICD-10-CM ICD-9-CM   1. Major depressive disorder, recurrent episode, moderate (HCC)  F33.1 296.32   2. Generalized anxiety disorder  F41.1 300.02   3. Post traumatic stress disorder (PTSD)  F43.10 309.81   4. Insomnia due to mental condition  F51.05 300.9     327.02       PLAN:  93. Risk Assessment: Risk of self-harm acutely is moderate. Risk factors include chronic depressive disorder, possible personality disorder, recent psychosocial stressors (pandemic, moving). Protective factors include no present SI, no history of suicide attempts or self-harm in the past, no access to weapons, minimal AODA, healthcare seeking, future orientation, willingness to engage in care. Risk of self-harm chronically is also moderate, but could be further elevated in the event of treatment noncompliance and/or AODA.  94. Safety: No acute safety concerns.  95. Medications:   a. CONTINUE melatonin 18 mg p.o. nightly.  Risks, benefits, side effects  discussed with patient including sedation, dizziness/falls risk, GI upset.  After discussion of these risks and benefits, the patient voiced understanding and agreed to proceed.  b. CONTINUE trazodone 50 mg PO QHS. Risks, benefits, side effects discussed with patient including GI upset, sedation, dizziness/falls risk, grogginess the following day, prolongation of the QTc interval.  After discussion of these risks and benefits, the patient voiced understanding and agreed to proceed.    c. CONTINUE bupropion xl 300 mg daily. Risks, benefits, alternatives discussed with patient including nausea, GI upset, increased energy, exacerbation of irritability, insomnia, lowering of seizure threshold.  After discussion of these risks and benefits, the patient voiced understanding and agreed to proceed.  d. CONTINUE Prozac 40 mg a day. Risks, benefits, alternatives discussed with patient including GI upset, nausea vomiting diarrhea, theoretical decrease of seizure threshold predisposing the patient to a slightly higher seizure risk, headaches, sexual dysfunction, serotonin syndrome, bleeding risk, increased suicidality in patients 24 years and younger.  After discussion of these risks and benefits, the patient voiced understanding and agreed to proceed.  e. CONTINUE Abilify 4 mg p.o. daily to target depression, anxiety, decreased energy. Risks, benefits, alternatives discussed with patient including increased energy, exacerbation of irritability, akathisia, GI upset, orthostatic hypotension, increased appetite. After discussion of these risks and benefits, the patient voiced understanding and agreed to proceed.  i. S/P:  1. Mirtazapine 45 mg daily (RLS)  96. Therapy: referred to [facility].  97. Labs/Studies: s/p TMS referral.  98. Follow Up: 6 weeks. (prefers 4 wks)      TREATMENT PLAN/GOALS: Continue supportive psychotherapy efforts and medications as indicated. Treatment and medication options discussed during today's  visit. Patient acknowledged and verbally consented to continue with current treatment plan and was educated on the importance of compliance with treatment and follow-up appointments.    MEDICATION ISSUES:  SAPNA reviewed as expected.  Discussed medication options and treatment plan of prescribed medication as well as the risks, benefits, and side effects including potential falls, possible impaired driving and metabolic adversities among others. Patient is agreeable to call the office with any worsening of symptoms or onset of side effects. Patient is agreeable to call 911 or go to the nearest ER should he/she begin having SI/HI. No medication side effects or related complaints today.     MEDS ORDERED DURING VISIT:  No orders of the defined types were placed in this encounter.      Return in about 6 weeks (around 9/7/2022).         This document has been electronically signed by Vicky Barth MD  July 27, 2022 11:20 EDT      Part of this note may be an electronic transcription/translation of spoken language to printed text using the Dragon Dictation System.

## 2022-07-28 ENCOUNTER — HOSPITAL ENCOUNTER (OUTPATIENT)
Dept: CARDIOLOGY | Facility: HOSPITAL | Age: 72
Discharge: HOME | End: 2022-07-28
Admitting: INTERNAL MEDICINE

## 2022-07-28 DIAGNOSIS — R42 DIZZINESS: ICD-10-CM

## 2022-07-28 LAB
BH CV XLRA MEAS LEFT CAROTID BULB EDV: 12 CM/SEC
BH CV XLRA MEAS LEFT CAROTID BULB PSV: 72 CM/SEC
BH CV XLRA MEAS LEFT DIST CCA EDV: 20 CM/SEC
BH CV XLRA MEAS LEFT DIST CCA PSV: 70 CM/SEC
BH CV XLRA MEAS LEFT DIST ICA EDV: 21 CM/SEC
BH CV XLRA MEAS LEFT DIST ICA PSV: 62 CM/SEC
BH CV XLRA MEAS LEFT MID ICA EDV: 19 CM/SEC
BH CV XLRA MEAS LEFT MID ICA PSV: 64 CM/SEC
BH CV XLRA MEAS LEFT PROX CCA EDV: 22 CM/SEC
BH CV XLRA MEAS LEFT PROX CCA PSV: 80 CM/SEC
BH CV XLRA MEAS LEFT PROX ECA EDV: 1 CM/SEC
BH CV XLRA MEAS LEFT PROX ECA PSV: 81 CM/SEC
BH CV XLRA MEAS LEFT PROX ICA EDV: 18 CM/SEC
BH CV XLRA MEAS LEFT PROX ICA PSV: 63 CM/SEC
BH CV XLRA MEAS LEFT VERTEBRAL A EDV: 6 CM/SEC
BH CV XLRA MEAS LEFT VERTEBRAL A PSV: 28 CM/SEC
BH CV XLRA MEAS RIGHT CAROTID BULB EDV: 11 CM/SEC
BH CV XLRA MEAS RIGHT CAROTID BULB PSV: 45 CM/SEC
BH CV XLRA MEAS RIGHT DIST CCA EDV: 11 CM/SEC
BH CV XLRA MEAS RIGHT DIST CCA PSV: 59 CM/SEC
BH CV XLRA MEAS RIGHT DIST ICA EDV: 31 CM/SEC
BH CV XLRA MEAS RIGHT DIST ICA PSV: 70 CM/SEC
BH CV XLRA MEAS RIGHT MID ICA EDV: 18 CM/SEC
BH CV XLRA MEAS RIGHT MID ICA PSV: 52 CM/SEC
BH CV XLRA MEAS RIGHT PROX CCA EDV: 23 CM/SEC
BH CV XLRA MEAS RIGHT PROX CCA PSV: 110 CM/SEC
BH CV XLRA MEAS RIGHT PROX ECA EDV: 0 CM/SEC
BH CV XLRA MEAS RIGHT PROX ECA PSV: 89 CM/SEC
BH CV XLRA MEAS RIGHT PROX ICA EDV: 11 CM/SEC
BH CV XLRA MEAS RIGHT PROX ICA PSV: 53 CM/SEC
BH CV XLRA MEAS RIGHT VERTEBRAL A EDV: 15 CM/SEC
BH CV XLRA MEAS RIGHT VERTEBRAL A PSV: 52 CM/SEC
LEFT ARM BP: NORMAL MMHG
MAXIMAL PREDICTED HEART RATE: 151 BPM
RIGHT ARM BP: NORMAL MMHG
STRESS TARGET HR: 128 BPM

## 2022-07-28 PROCEDURE — 93880 EXTRACRANIAL BILAT STUDY: CPT | Performed by: SURGERY

## 2022-07-28 PROCEDURE — 93880 EXTRACRANIAL BILAT STUDY: CPT

## 2022-07-28 NOTE — ASSESSMENT & PLAN NOTE
Discussed importance of compliance with CPAP.  Discussed implications of unmanaged EARL including increased risk of stroke, heart attack, and dementia.

## 2022-08-03 ENCOUNTER — TELEMEDICINE (OUTPATIENT)
Dept: PSYCHIATRY | Facility: CLINIC | Age: 72
End: 2022-08-03

## 2022-08-03 DIAGNOSIS — F41.1 GENERALIZED ANXIETY DISORDER: ICD-10-CM

## 2022-08-03 DIAGNOSIS — F33.1 MAJOR DEPRESSIVE DISORDER, RECURRENT EPISODE, MODERATE: Primary | ICD-10-CM

## 2022-08-03 PROCEDURE — 90834 PSYTX W PT 45 MINUTES: CPT | Performed by: COUNSELOR

## 2022-08-03 NOTE — PROGRESS NOTES
Date: August 3, 2022  Time In: 1317  Time Out: 1408  This provider is located at the [facility]), [address], [place] [address] using a secure [employer] Video Visit through [employer]. Patient is being seen remotely via telehealth at home address in [place] and stated they are in a secure environment for this session. The patient's condition being diagnosed/treated is appropriate for telemedicine. The provider identified herself as well as her credentials. The patient, and/or patients guardian, consent to be seen remotely, and when consent is given they understand that the consent allows for patient identifiable information to be sent to a third party as needed. They may refuse to be seen remotely at any time. The electronic data is encrypted and password protected, and the patient and/or guardian has been advised of the potential risks to privacy not withstanding such measures.     You have chosen to receive care through a telehealth visit.  Do you consent to use a video/audio connection for your medical care today? Yes    PROGRESS NOTE  Data:  Nivia Cagle is a 71 y.o. female who presents today for follow up    Chief Complaint: Depression     History of Present Illness: Pt reports plans to complete household chores today such as sweeping and mopping the kitchen. Pt reports that she has stepped outside of her comfort zone and is planning to attend a art show, a dinner theater, and join an order within her [employer]. PT reports relationships with son and daughter in law and how at one time was provided insight as to resentments they had that was formed due to son's father's dishonesty. Pt reports goal of addressing sleep and improving sleep. Pt reports worrying of declining and falling into a depressive cycle.     Clinical Maneuvering/Intervention:    (Scales based on 0 - 10 with 10 being the worst)  Depression: 5 Anxiety: 5     Assisted patient in  processing above session content; acknowledged and normalized patient’s thoughts, feelings, and concerns.  Rationalized patient thought process regarding fear of returning to a depressive state.  Discussed triggers associated with patient's mood.  Also discussed coping skills for patient to implement such as acknowledging accomplishments and action steps. Praised Pt for making sustainable goals for self.     Allowed patient to freely discuss issues without interruption or judgment. Provided safe, confidential environment to facilitate the development of positive therapeutic relationship and encourage open, honest communication. Assisted patient in identifying risk factors which would indicate the need for higher level of care including thoughts to harm self or others and/or self-harming behavior and encouraged patient to contact this office, call 911, or present to the nearest emergency room should any of these events occur. Discussed crisis intervention services and means to access. Patient adamantly and convincingly denies current suicidal or homicidal ideation or perceptual disturbance.    Assessment:   Assessment   Patient appears to maintain relative stability as compared to their baseline.  However, patient continues to struggle with depression which continues to cause impairment in important areas of functioning.  A result, they can be reasonably expected to continue to benefit from treatment and would likely be at increased risk for decompensation otherwise.    Mental Status Exam:   Hygiene:   good  Cooperation:  Cooperative  Eye Contact:  Good  Psychomotor Behavior:  Appropriate  Affect:  Appropriate  Mood: normal  Speech:  Normal  Thought Process:  Linear  Thought Content:  Normal  Suicidal:  None  Homicidal:  None  Hallucinations:  None  Delusion:  None  Memory:  Intact  Orientation:  Person, Place, Time and Situation  Reliability:  fair  Insight:  Fair  Judgement:  Fair  Impulse Control:   Fair  Physical/Medical Issues:  No        Patient's Support Network Includes:  son    Functional Status: Mild impairment     Progress toward goal: Not at goal    Prognosis: Fair with Ongoing Treatment          Plan:    Patient will continue in individual outpatient therapy with focus on improved functioning and coping skills, maintaining stability, and avoiding decompensation and the need for higher level of care.    Patient will adhere to medication regimen as prescribed and report any side effects. Patient will contact this office, call 911 or present to the nearest emergency room should suicidal or homicidal ideations occur. Provide Cognitive Behavioral Therapy and Solution Focused Therapy to improve functioning, maintain stability, and avoid decompensation and the need for higher level of care.     Return in about 3 weeks, or earlier if symptoms worsen or fail to improve.           VISIT DIAGNOSIS:     ICD-10-CM ICD-9-CM   1. Major depressive disorder, recurrent episode, moderate (HCC)  F33.1 296.32   2. Generalized anxiety disorder  F41.1 300.02          [facility] No Show Policy:  We understand unexpected circumstances arise; however, anytime you miss your appointment we are unable to provide you appropriate care.  In addition, each appointment missed could have been used to provide care for others.  We ask that you call at least 24 hours in advance to cancel or reschedule an appointment.  We would like to take this opportunity to remind you of our policy stating patients who miss THREE or more appointments without cancelling or rescheduling 24 hours in advance of the appointment may be subject to cancellation of any further visits with our clinic and recommendation to seek in-person services/visits.    Please call [phone] to reschedule your appointment. If there are reasons that make it difficult for you to keep the appointments, please call and let us know how we can  help.  Please understand that medication prescribing will not continue without seeing your provider.      [facility]'s No Show Policy reviewed with patient at today's visit. Patient verbalized understanding of this policy. Discussed with patient that in the event that there are three or more no show visits, it will be recommended that they pursue in-person services/visits as noncompliance with telehealth visits indicates that patient is not an appropriate candidate for telemedicine and would likely be more appropriate for in-person services/visits. Patient verbalizes understanding and is agreeable to this.        This document has been electronically signed by Annita Polo LCSW  August 3, 2022 14:40 EDT      Part of this note may be an electronic transcription/translation of spoken language to printed text using the Dragon Dictation System.

## 2022-08-12 ENCOUNTER — TRANSCRIBE ORDERS (OUTPATIENT)
Dept: ADMINISTRATIVE | Facility: HOSPITAL | Age: 72
End: 2022-08-12

## 2022-08-12 DIAGNOSIS — Z12.31 VISIT FOR SCREENING MAMMOGRAM: Primary | ICD-10-CM

## 2022-08-31 ENCOUNTER — TELEMEDICINE (OUTPATIENT)
Dept: PSYCHIATRY | Facility: CLINIC | Age: 72
End: 2022-08-31

## 2022-08-31 DIAGNOSIS — F33.1 MAJOR DEPRESSIVE DISORDER, RECURRENT EPISODE, MODERATE: Primary | ICD-10-CM

## 2022-08-31 PROCEDURE — 90832 PSYTX W PT 30 MINUTES: CPT | Performed by: COUNSELOR

## 2022-09-01 RX ORDER — LOSARTAN POTASSIUM 25 MG/1
25 TABLET ORAL DAILY
Qty: 90 TABLET | Refills: 1 | Status: SHIPPED | OUTPATIENT
Start: 2022-09-01

## 2022-09-08 ENCOUNTER — TELEMEDICINE (OUTPATIENT)
Dept: PSYCHIATRY | Facility: CLINIC | Age: 72
End: 2022-09-08

## 2022-09-08 DIAGNOSIS — F33.2 SEVERE EPISODE OF RECURRENT MAJOR DEPRESSIVE DISORDER, WITHOUT PSYCHOTIC FEATURES: ICD-10-CM

## 2022-09-08 DIAGNOSIS — F41.1 GENERALIZED ANXIETY DISORDER: Primary | ICD-10-CM

## 2022-09-08 DIAGNOSIS — F43.10 POST TRAUMATIC STRESS DISORDER (PTSD): ICD-10-CM

## 2022-09-08 DIAGNOSIS — F33.40 RECURRENT MAJOR DEPRESSIVE DISORDER IN REMISSION: ICD-10-CM

## 2022-09-08 DIAGNOSIS — F51.05 INSOMNIA DUE TO MENTAL CONDITION: ICD-10-CM

## 2022-09-08 PROCEDURE — 90833 PSYTX W PT W E/M 30 MIN: CPT | Performed by: STUDENT IN AN ORGANIZED HEALTH CARE EDUCATION/TRAINING PROGRAM

## 2022-09-08 PROCEDURE — 99214 OFFICE O/P EST MOD 30 MIN: CPT | Performed by: STUDENT IN AN ORGANIZED HEALTH CARE EDUCATION/TRAINING PROGRAM

## 2022-09-08 RX ORDER — BUPROPION HYDROCHLORIDE 300 MG/1
300 TABLET ORAL EVERY MORNING
Qty: 90 TABLET | Refills: 1 | Status: SHIPPED | OUTPATIENT
Start: 2022-09-08

## 2022-09-08 RX ORDER — ARIPIPRAZOLE 2 MG/1
4 TABLET ORAL DAILY
Qty: 180 TABLET | Refills: 1 | Status: SHIPPED | OUTPATIENT
Start: 2022-09-08 | End: 2023-03-02 | Stop reason: SDUPTHER

## 2022-09-08 RX ORDER — TRAZODONE HYDROCHLORIDE 50 MG/1
50 TABLET ORAL NIGHTLY
Qty: 90 TABLET | Refills: 1 | Status: SHIPPED | OUTPATIENT
Start: 2022-09-08 | End: 2022-10-25 | Stop reason: SDUPTHER

## 2022-09-08 NOTE — PATIENT INSTRUCTIONS
1.  Please return to clinic at your next scheduled visit.  Contact the clinic ([phone]) at least 24 hours prior in the event you need to cancel.  2.  Do no harm to yourself or others.    3.  Avoid alcohol and drugs.    4.  Take all medications as prescribed.  Please contact the clinic with any concerns. If you are in need of medication refills, please call the clinic at [phone].    5. Should you want to get in touch with your provider, Dr. Vicky Barth, please utilize [employer] or contact the office ([phone]), and staff will be able to page Dr. Barth directly.  6.  In the event you have personal crisis, contact the following crisis numbers: Suicide Prevention Hotline 1-354.165.6691; [facility] Helpline 1-001-351-DEKR; [facility] Emergency Room [phone]; text HELLO to 534048; or 203.     SPECIFIC RECOMMENDATIONS:     1.      Medications discussed at this encounter:                   - Light box: Obtain a light box, 10,000 lux, put the box about 1 foot away from you, facing you at an angle (not directly), use it daily, right after waking up, for 1 hour daily. Don't stare directly at it. Read, eat, watch tv, etc. Call your insurance company to see if they can reimburse you for the cost. Available at [employer].         2.      Psychotherapy recommendations:      3.     Return to clinic: 6 weeks

## 2022-09-08 NOTE — PROGRESS NOTES
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 adenopathy.         Physical Exam:  Physical Exam    Vital Signs:   There were no vitals taken for this visit.     Lab Results:   Hospital Outpatient Visit on 07/28/2022   Component Date Value Ref Range Status   • Target HR (85%) 07/28/2022 128  bpm Final   • Max. Pred. HR (100%) 07/28/2022 151  bpm Final   • XLRA ROSEMARY LEFT CAROTID BULB PSV 07/28/2022 72.00  cm/sec Final   • Prox CCA PSV 07/28/2022 110  cm/sec Final   • Prox CCA EDV 07/28/2022 23  cm/sec Final   • Dist CCA PSV 07/28/2022 59  cm/sec Final   • Dist CCA EDV 07/28/2022 11  cm/sec Final   • Prox ECA PSV 07/28/2022 89  cm/sec Final   • Prox ECA EDV 07/28/2022 0  cm/sec Final   • Prox ICA PSV 07/28/2022 53  cm/sec Final   • Prox ICA EDV 07/28/2022 11  cm/sec Final   • Mid ICA PSV 07/28/2022 52  cm/sec Final   • Mid ICA EDV 07/28/2022 18  cm/sec Final   • Dist ICA PSV 07/28/2022 70  cm/sec Final   • Dist ICA EDV 07/28/2022 31  cm/sec Final   • Vertebral A PSV 07/28/2022 52  cm/sec Final   • Vertebral A EDV 07/28/2022 15  cm/sec Final   • Prox CCA PSV 07/28/2022 80  cm/sec Final   • Prox CCA EDV 07/28/2022 22  cm/sec Final   • Dist CCA PSV 07/28/2022 70  cm/sec Final   • Dist CCA EDV 07/28/2022 20  cm/sec Final   • Prox ECA PSV 07/28/2022 81  cm/sec Final   • Prox ECA  EDV 07/28/2022 1  cm/sec Final   • Prox ICA PSV 07/28/2022 63  cm/sec Final   • Prox ICA EDV 07/28/2022 18  cm/sec Final   • Mid ICA PSV 07/28/2022 64  cm/sec Final   • Mid ICA EDV 07/28/2022 19  cm/sec Final   • Dist ICA PSV 07/28/2022 62  cm/sec Final   • Dist ICA EDV 07/28/2022 21  cm/sec Final   • Vertebral A PSV 07/28/2022 28  cm/sec Final   • Vertebral A EDV 07/28/2022 6  cm/sec Final   • Left arm BP 07/28/2022 125/59  mmHg Final   • Right arm BP 07/28/2022 117/68  mmHg Final   • XLRA ROSEMARY LEFT CAROTID BULB EDV 07/28/2022 12.00  cm/sec Final   • XLRA ROSEMARY RIGHT CAROTID BULB PSV 07/28/2022 45.00  cm/sec Final   • XLRA ROSEMARY RIGHT CAROTID BULB EDV 07/28/2022 11.00  cm/sec Final   Lab on 07/01/2022   Component Date Value Ref Range Status   • 25 Hydroxy, Vitamin D 07/01/2022 46.2  30.0 - 100.0 ng/ml Final   • Glucose 07/01/2022 96  65 - 99 mg/dL Final   • BUN 07/01/2022 15  8 - 23 mg/dL Final   • Creatinine 07/01/2022 0.88  0.57 - 1.00 mg/dL Final   • Sodium 07/01/2022 137  136 - 145 mmol/L Final   • Potassium 07/01/2022 4.5  3.5 - 5.2 mmol/L Final   • Chloride 07/01/2022 99  98 - 107 mmol/L Final   • CO2 07/01/2022 27.0  22.0 - 29.0 mmol/L Final   • Calcium 07/01/2022 10.2  8.6 - 10.5 mg/dL Final   • Total Protein 07/01/2022 7.3  6.0 - 8.5 g/dL Final   • Albumin 07/01/2022 4.30  3.50 - 5.20 g/dL Final   • ALT (SGPT) 07/01/2022 14  1 - 33 U/L Final   • AST (SGOT) 07/01/2022 15  1 - 32 U/L Final   • Alkaline Phosphatase 07/01/2022 68  39 - 117 U/L Final   • Total Bilirubin 07/01/2022 0.6  0.0 - 1.2 mg/dL Final   • Globulin 07/01/2022 3.0  gm/dL Final   • A/G Ratio 07/01/2022 1.4  g/dL Final   • BUN/Creatinine Ratio 07/01/2022 17.0  7.0 - 25.0 Final   • Anion Gap 07/01/2022 11.0  5.0 - 15.0 mmol/L Final   • eGFR 07/01/2022 71.2  >60.0 mL/min/1.73 Final    National Kidney Foundation and American Society of Nephrology (ASN) Task Force recommended calculation based on the Chronic Kidney Disease Epidemiology  Collaboration (CKD-EPI) equation refit without adjustment for race.   • TSH 07/01/2022 2.530  0.270 - 4.200 uIU/mL Final   • Total Cholesterol 07/01/2022 197  0 - 200 mg/dL Final   • Triglycerides 07/01/2022 103  0 - 150 mg/dL Final   • HDL Cholesterol 07/01/2022 53  40 - 60 mg/dL Final   • LDL Cholesterol  07/01/2022 125 (A) 0 - 100 mg/dL Final   • VLDL Cholesterol 07/01/2022 19  5 - 40 mg/dL Final   • LDL/HDL Ratio 07/01/2022 2.33   Final   • Hemoglobin A1C 07/01/2022 5.80 (A) 4.80 - 5.60 % Final   • WBC 07/01/2022 7.30  3.40 - 10.80 10*3/mm3 Final   • RBC 07/01/2022 4.20  3.77 - 5.28 10*6/mm3 Final   • Hemoglobin 07/01/2022 12.9  12.0 - 15.9 g/dL Final   • Hematocrit 07/01/2022 38.9  34.0 - 46.6 % Final   • MCV 07/01/2022 92.6  79.0 - 97.0 fL Final   • MCH 07/01/2022 30.7  26.6 - 33.0 pg Final   • MCHC 07/01/2022 33.2  31.5 - 35.7 g/dL Final   • RDW 07/01/2022 12.2 (A) 12.3 - 15.4 % Final   • RDW-SD 07/01/2022 40.9  37.0 - 54.0 fl Final   • MPV 07/01/2022 11.8  6.0 - 12.0 fL Final   • Platelets 07/01/2022 231  140 - 450 10*3/mm3 Final   • Neutrophil % 07/01/2022 67.2  42.7 - 76.0 % Final   • Lymphocyte % 07/01/2022 20.3  19.6 - 45.3 % Final   • Monocyte % 07/01/2022 9.9  5.0 - 12.0 % Final   • Eosinophil % 07/01/2022 1.8  0.3 - 6.2 % Final   • Basophil % 07/01/2022 0.5  0.0 - 1.5 % Final   • Immature Grans % 07/01/2022 0.3  0.0 - 0.5 % Final   • Neutrophils, Absolute 07/01/2022 4.91  1.70 - 7.00 10*3/mm3 Final   • Lymphocytes, Absolute 07/01/2022 1.48  0.70 - 3.10 10*3/mm3 Final   • Monocytes, Absolute 07/01/2022 0.72  0.10 - 0.90 10*3/mm3 Final   • Eosinophils, Absolute 07/01/2022 0.13  0.00 - 0.40 10*3/mm3 Final   • Basophils, Absolute 07/01/2022 0.04  0.00 - 0.20 10*3/mm3 Final   • Immature Grans, Absolute 07/01/2022 0.02  0.00 - 0.05 10*3/mm3 Final   • nRBC 07/01/2022 0.0  0.0 - 0.2 /100 WBC Final   Clinical Support on 06/21/2022   Component Date Value Ref Range Status   • Color, UA 06/21/2022 Yellow  Yellow,  Straw Final   • Appearance, UA 06/21/2022 Clear  Clear Final   • pH, UA 06/21/2022 5.0  5.0 - 8.0 Final   • Specific Gravity, UA 06/21/2022 1.030  1.005 - 1.030 Final   • Glucose, UA 06/21/2022 Negative  Negative Final   • Ketones, UA 06/21/2022 Trace (A) Negative Final   • Bilirubin, UA 06/21/2022 Small (1+) (A) Negative Final   • Blood, UA 06/21/2022 Negative  Negative Final   • Protein, UA 06/21/2022 Trace (A) Negative Final   • Leuk Esterase, UA 06/21/2022 Trace (A) Negative Final   • Nitrite, UA 06/21/2022 Negative  Negative Final   • Urobilinogen, UA 06/21/2022 1.0 E.U./dL  0.2 - 1.0 E.U./dL Final   • Color 06/21/2022 Yellow  Yellow, Straw, Dark Yellow, Annita Final   • Clarity, UA 06/21/2022 Clear  Clear Final   • Specific Gravity  06/21/2022 1.030  1.005 - 1.030 Final   • pH, Urine 06/21/2022 5.0  5.0 - 8.0 Final   • Leukocytes 06/21/2022 Trace (A) Negative Final   • Nitrite, UA 06/21/2022 Negative  Negative Final   • Protein, POC 06/21/2022 Trace (A) Negative mg/dL Final   • Glucose, UA 06/21/2022 Negative  Negative, 1000 mg/dL (3+) mg/dL Final   • Ketones, UA 06/21/2022 15 mg/dL (A) Negative Final   • Urobilinogen, UA 06/21/2022 1 E.U./dL  (A) Normal Final   • Bilirubin 06/21/2022 Small (1+) (A) Negative Final   • Blood, UA 06/21/2022 Negative  Negative Final   • Lot Number 06/21/2022 [id number]   Final   • Expiration Date 06/21/2022 6/30/23   Final   • Urine Culture 06/21/2022 No growth   Final   Office Visit on 06/13/2022   Component Date Value Ref Range Status   • Color, UA 06/13/2022 Yellow  Yellow, Straw Final   • Appearance, UA 06/13/2022 Clear  Clear Final   • pH, UA 06/13/2022 5.5  5.0 - 8.0 Final   • Specific Gravity, UA 06/13/2022 >=1.030  1.005 - 1.030 Final   • Glucose, UA 06/13/2022 Negative  Negative Final   • Ketones, UA 06/13/2022 Trace (A) Negative Final   • Bilirubin, UA 06/13/2022 Small (1+) (A) Negative Final   • Blood, UA 06/13/2022 Moderate (2+) (A) Negative Final   • Protein, UA  06/13/2022 100 mg/dL (2+) (A) Negative Final   • Leuk Esterase, UA 06/13/2022 Trace (A) Negative Final   • Nitrite, UA 06/13/2022 Negative  Negative Final   • Urobilinogen, UA 06/13/2022 0.2 E.U./dL  0.2 - 1.0 E.U./dL Final   • Urine Culture 06/13/2022 <25,000 CFU/mL Escherichia coli ESBL (A)  Corrected    Consider infectious disease consult.  Susceptibility results may not correlate to clinical outcomes.   Admission on 05/26/2022, Discharged on 05/26/2022   Component Date Value Ref Range Status   • COVID19 05/26/2022 Not Detected  Not Detected - Ref. Range Final       EKG Results:  No orders to display       Imaging Results:  No Images in the past 120 days found..      Assessment & Plan   Diagnoses and all orders for this visit:    1. Generalized anxiety disorder (Primary)  -     ARIPiprazole (ABILIFY) 2 MG tablet; Take 2 tablets by mouth Daily.  Dispense: 180 tablet; Refill: 1  -     buPROPion XL (WELLBUTRIN XL) 300 MG 24 hr tablet; Take 1 tablet by mouth Every Morning.  Dispense: 90 tablet; Refill: 1    2. Post traumatic stress disorder (PTSD)  -     ARIPiprazole (ABILIFY) 2 MG tablet; Take 2 tablets by mouth Daily.  Dispense: 180 tablet; Refill: 1    3. Insomnia due to mental condition  -     traZODone (DESYREL) 50 MG tablet; Take 1 tablet by mouth Every Night.  Dispense: 90 tablet; Refill: 1    4. Recurrent major depressive disorder in remission (HCC)  -     ARIPiprazole (ABILIFY) 2 MG tablet; Take 2 tablets by mouth Daily.  Dispense: 180 tablet; Refill: 1    5. Severe episode of recurrent major depressive disorder, without psychotic features (HCC)  -     buPROPion XL (WELLBUTRIN XL) 300 MG 24 hr tablet; Take 1 tablet by mouth Every Morning.  Dispense: 90 tablet; Refill: 1        INITIAL presentation most consistent with major depressive disorder, recurrent, moderate to severe, with anxious distress.  PTSD.  Rule out personality disorder, cluster B specifically.  Rule out hypomania as patient was very difficult  to interrupt today.    9/8: Start light box. Close follow up in case this is worsening depression. 16 minutes of supportive psychotherapy with goal to strengthen defenses, promote problems solving, restore adaptive functioning and provide symptom relief. The therapeutic alliance was strengthened to encourage the patient to express their thoughts and feelings. Esteem building was enhanced through praise, reassurance, normalizing and encouragement. Coping skills were enhanced to build distress tolerance skills and emotional regulation. Allowed patient to freely discuss issues without interruption or judgement with unconditional positive regard, active listening skills, and empathy. Provided a safe, confidential environment to facilitate the development of a positive therapeutic relationship and encourage open, honest communication. Assisted patient in identifying risk factors which would indicate the need for higher level of care including thoughts to harm self or others and/or self-harming behavior and encouraged patient to contact this office, call 911, or present to the nearest emergency room should any of these events occur. Assisted patient in processing session content; acknowledged and normalized patient’s thoughts, feelings, and concerns by utilizing a person-centered approach in efforts to build appropriate rapport and a positive therapeutic relationship with open and honest communication. Patient given education on medication side effects, diagnosis/illness and relapse symptoms. Plan to continue supportive psychotherapy in next appointment to provide symptom relief.  Diagnoses: as above  Symptoms: as above  Functional status: good  Mental Status Exam: as above    Treatment plan: Medication management and supportive psychotherapy  Prognosis: good  Progress: stable  6 wks      7/27: Well, stable. 17 minutes of supportive psychotherapyTreatment plan: Medication management and supportive  psychotherapy  Prognosis: good  Progress: stable, well  6 wks      6/14: Better, no changes. 17 minutes of supportive psychotherapy Treatment plan: Medication management and supportive psychotherapy  Prognosis: good  Progress: less depressed  6 wks      5/3: Increase prozac and melatonin. 17 minutes of supportive psychotherapyTreatment plan: Medication management and supportive psychotherapy  Prognosis: good  Progress: backtracked recently, now depressed  6 wks    3/17: Stable, some problems with concentration related to getting unpacked. Increase trazodone to target insomnia. 18 minutes of supportive psychotherapy   6 wks    2/16: Stable. No SE. 17 minutes of supportive psychotherapy4 wks    1/18: Doing well. Tolerating meds without side effects. 25 minutes of supportive psychotherapy   4 wks    12/7: Doing very well, though some insomnia. Start melatonin 10 mg daily. No other changes. Watch jaw movements. Wants to taper off meds at some point; now would not be a good time. 17 minutes of supportive psychotherapy   6 wks    10/26: Pt stopped mirtazapine and restarted prozac 20 mg daily.   Very important to obtain records from previous psychiatrist.  Care should be taken when putting patient on sedating medications as she has a falls risk.  Also has a history of EARL which will lead to difficulties treating her insomnia. Therapy referral made to [facility].    See back in 8 weeks.  Patient is not vaccinated.    Visit Diagnoses:    ICD-10-CM ICD-9-CM   1. Generalized anxiety disorder  F41.1 300.02   2. Post traumatic stress disorder (PTSD)  F43.10 309.81   3. Insomnia due to mental condition  F51.05 300.9     327.02   4. Recurrent major depressive disorder in remission (HCC)  F33.40 296.35   5. Severe episode of recurrent major depressive disorder, without psychotic features (HCC)  F33.2 296.33       PLAN:  105. Risk Assessment: Risk of self-harm acutely is moderate. Risk factors include chronic depressive disorder,  possible personality disorder, recent psychosocial stressors (pandemic, moving). Protective factors include no present SI, no history of suicide attempts or self-harm in the past, no access to weapons, minimal AODA, healthcare seeking, future orientation, willingness to engage in care. Risk of self-harm chronically is also moderate, but could be further elevated in the event of treatment noncompliance and/or AODA.  106. Safety: No acute safety concerns.  107. Medications:   a. CONTINUE melatonin 18 mg p.o. nightly.  Risks, benefits, side effects discussed with patient including sedation, dizziness/falls risk, GI upset.  After discussion of these risks and benefits, the patient voiced understanding and agreed to proceed.  b. CONTINUE trazodone 50 mg PO QHS. Risks, benefits, side effects discussed with patient including GI upset, sedation, dizziness/falls risk, grogginess the following day, prolongation of the QTc interval.  After discussion of these risks and benefits, the patient voiced understanding and agreed to proceed.    c. CONTINUE bupropion xl 300 mg daily. Risks, benefits, alternatives discussed with patient including nausea, GI upset, increased energy, exacerbation of irritability, insomnia, lowering of seizure threshold.  After discussion of these risks and benefits, the patient voiced understanding and agreed to proceed.  d. CONTINUE Prozac 40 mg a day. Risks, benefits, alternatives discussed with patient including GI upset, nausea vomiting diarrhea, theoretical decrease of seizure threshold predisposing the patient to a slightly higher seizure risk, headaches, sexual dysfunction, serotonin syndrome, bleeding risk, increased suicidality in patients 24 years and younger.  After discussion of these risks and benefits, the patient voiced understanding and agreed to proceed.  e. CONTINUE Abilify 4 mg p.o. daily to target depression, anxiety, decreased energy. Risks, benefits, alternatives discussed with  patient including increased energy, exacerbation of irritability, akathisia, GI upset, orthostatic hypotension, increased appetite. After discussion of these risks and benefits, the patient voiced understanding and agreed to proceed.  i. S/P:  1. Mirtazapine 45 mg daily (RLS)  108. Therapy: referred to [facility].  109. Labs/Studies: s/p TMS referral.  110. Follow Up: 6 weeks. (prefers 4 wks)      TREATMENT PLAN/GOALS: Continue supportive psychotherapy efforts and medications as indicated. Treatment and medication options discussed during today's visit. Patient acknowledged and verbally consented to continue with current treatment plan and was educated on the importance of compliance with treatment and follow-up appointments.    MEDICATION ISSUES:  SAPNA reviewed as expected.  Discussed medication options and treatment plan of prescribed medication as well as the risks, benefits, and side effects including potential falls, possible impaired driving and metabolic adversities among others. Patient is agreeable to call the office with any worsening of symptoms or onset of side effects. Patient is agreeable to call 911 or go to the nearest ER should he/she begin having SI/HI. No medication side effects or related complaints today.     MEDS ORDERED DURING VISIT:  New Medications Ordered This Visit   Medications   • ARIPiprazole (ABILIFY) 2 MG tablet     Sig: Take 2 tablets by mouth Daily.     Dispense:  180 tablet     Refill:  1   • buPROPion XL (WELLBUTRIN XL) 300 MG 24 hr tablet     Sig: Take 1 tablet by mouth Every Morning.     Dispense:  90 tablet     Refill:  1   • traZODone (DESYREL) 50 MG tablet     Sig: Take 1 tablet by mouth Every Night.     Dispense:  90 tablet     Refill:  1       Return in about 6 weeks (around 10/20/2022).         This document has been electronically signed by Vicky Barth MD  September 8, 2022 11:45 EDT      Part of this note may be an electronic transcription/translation of spoken  language to printed text using the Dragon Dictation System.

## 2022-09-14 ENCOUNTER — TELEPHONE (OUTPATIENT)
Dept: GASTROENTEROLOGY | Facility: CLINIC | Age: 72
End: 2022-09-14

## 2022-09-19 ENCOUNTER — OFFICE VISIT (OUTPATIENT)
Dept: PODIATRY | Facility: CLINIC | Age: 72
End: 2022-09-19

## 2022-09-19 VITALS
HEIGHT: 63 IN | TEMPERATURE: 97.3 F | WEIGHT: 161 LBS | HEART RATE: 87 BPM | SYSTOLIC BLOOD PRESSURE: 137 MMHG | DIASTOLIC BLOOD PRESSURE: 60 MMHG | BODY MASS INDEX: 28.53 KG/M2 | OXYGEN SATURATION: 96 %

## 2022-09-19 DIAGNOSIS — E11.42 TYPE 2 DIABETES MELLITUS WITH DIABETIC POLYNEUROPATHY, WITH LONG-TERM CURRENT USE OF INSULIN: ICD-10-CM

## 2022-09-19 DIAGNOSIS — B35.1 ONYCHOMYCOSIS: ICD-10-CM

## 2022-09-19 DIAGNOSIS — Z79.4 TYPE 2 DIABETES MELLITUS WITH DIABETIC POLYNEUROPATHY, WITH LONG-TERM CURRENT USE OF INSULIN: ICD-10-CM

## 2022-09-19 DIAGNOSIS — M79.671 FOOT PAIN, RIGHT: ICD-10-CM

## 2022-09-19 DIAGNOSIS — G62.9 NEUROPATHY: Primary | ICD-10-CM

## 2022-09-19 PROCEDURE — 99203 OFFICE O/P NEW LOW 30 MIN: CPT | Performed by: PODIATRIST

## 2022-09-19 PROCEDURE — G8404 LOW EXTEMITY NEUR EXAM DOCUM: HCPCS | Performed by: PODIATRIST

## 2022-09-19 RX ORDER — TERBINAFINE HYDROCHLORIDE 250 MG/1
250 TABLET ORAL DAILY
Qty: 90 TABLET | Refills: 0 | Status: SHIPPED | OUTPATIENT
Start: 2022-09-19 | End: 2022-12-18
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
## 2022-09-20 DIAGNOSIS — F43.10 POST TRAUMATIC STRESS DISORDER (PTSD): ICD-10-CM

## 2022-09-20 DIAGNOSIS — F41.1 GENERALIZED ANXIETY DISORDER: ICD-10-CM

## 2022-09-20 DIAGNOSIS — F51.05 INSOMNIA DUE TO MENTAL CONDITION: ICD-10-CM

## 2022-09-20 DIAGNOSIS — F33.1 MAJOR DEPRESSIVE DISORDER, RECURRENT EPISODE, MODERATE: ICD-10-CM

## 2022-09-20 RX ORDER — FLUOXETINE HYDROCHLORIDE 20 MG/1
CAPSULE ORAL
Qty: 90 CAPSULE | Refills: 1 | Status: SHIPPED | OUTPATIENT
Start: 2022-09-20 | End: 2022-10-25 | Stop reason: SDUPTHER

## 2022-09-20 NOTE — TELEPHONE ENCOUNTER
[facility] Protocol Passed 09/20/2022 08:06 AM   Protocol Details  No active pregnancy on record    No positive pregnancy test in past 12 months    Blood pressure on record in past 15 months    PHQ2 or PHQ9 on record in past 12 months    Recent or future visit with authorizing provider     NEXT APPT WITH PROVIDER  Appointment with Vicky Barth MD (10/25/2022)    PROVIDER PLEASE ADVISE

## 2022-09-22 ENCOUNTER — HOSPITAL ENCOUNTER (OUTPATIENT)
Dept: MAMMOGRAPHY | Facility: HOSPITAL | Age: 72
Discharge: HOME | End: 2022-09-22
Admitting: PHYSICIAN ASSISTANT

## 2022-09-22 DIAGNOSIS — Z12.31 VISIT FOR SCREENING MAMMOGRAM: ICD-10-CM

## 2022-09-22 PROCEDURE — 77063 BREAST TOMOSYNTHESIS BI: CPT

## 2022-09-22 PROCEDURE — 77067 SCR MAMMO BI INCL CAD: CPT

## 2022-09-23 ENCOUNTER — TELEPHONE (OUTPATIENT)
Dept: GASTROENTEROLOGY | Facility: CLINIC | Age: 72
End: 2022-09-23

## 2022-09-23 NOTE — TELEPHONE ENCOUNTER
Left voicemail for patient to return call about refill requests.      HUB PLEASE ADVISE....    Did patient request refills for Vitamin D and Zetia?

## 2022-09-23 NOTE — TELEPHONE ENCOUNTER
Patient left a voice message yesterday at 12:27 wanting to ask some questions about her procedure on 9/28. She would like a call back as soon as possible.

## 2022-09-26 RX ORDER — LORATADINE 10 MG/1
10 TABLET ORAL DAILY
Qty: 90 TABLET | Refills: 1 | Status: SHIPPED | OUTPATIENT
Start: 2022-09-26 | End: 2023-03-09

## 2022-09-26 RX ORDER — ERGOCALCIFEROL 1.25 MG/1
CAPSULE ORAL
Qty: 12 CAPSULE | Refills: 0 | Status: SHIPPED | OUTPATIENT
Start: 2022-09-26

## 2022-09-26 RX ORDER — EZETIMIBE 10 MG/1
10 TABLET ORAL
Qty: 90 TABLET | Refills: 1 | Status: SHIPPED | OUTPATIENT
Start: 2022-09-26

## 2022-09-26 NOTE — TELEPHONE ENCOUNTER
Ok to approve VitD? Patient states she is still taking [id number] weekly.      Also, patient request allergy medicine other than Zyrtec please, to be sent to pharmacy.

## 2022-09-27 ENCOUNTER — TELEPHONE (OUTPATIENT)
Dept: GASTROENTEROLOGY | Facility: CLINIC | Age: 72
End: 2022-09-27

## 2022-09-27 RX ORDER — PHENOL 1.4 %
20 AEROSOL, SPRAY (ML) MUCOUS MEMBRANE NIGHTLY
COMMUNITY

## 2022-09-27 NOTE — TELEPHONE ENCOUNTER
Patient has called and left a vm requesting a return call. Attempted to return patients call. Left a vm requesting a return call.

## 2022-09-28 ENCOUNTER — ANESTHESIA EVENT (OUTPATIENT)
Dept: GASTROENTEROLOGY | Facility: HOSPITAL | Age: 72
End: 2022-09-28

## 2022-09-28 ENCOUNTER — ANESTHESIA (OUTPATIENT)
Dept: GASTROENTEROLOGY | Facility: HOSPITAL | Age: 72
End: 2022-09-28

## 2022-09-28 ENCOUNTER — HOSPITAL ENCOUNTER (OUTPATIENT)
Facility: HOSPITAL | Age: 72
Setting detail: HOSPITAL OUTPATIENT SURGERY
Discharge: HOME | End: 2022-09-28
Attending: INTERNAL MEDICINE | Admitting: INTERNAL MEDICINE

## 2022-09-28 VITALS
WEIGHT: 159.17 LBS | BODY MASS INDEX: 28.2 KG/M2 | OXYGEN SATURATION: 99 % | TEMPERATURE: 97.5 F | SYSTOLIC BLOOD PRESSURE: 111 MMHG | DIASTOLIC BLOOD PRESSURE: 65 MMHG | RESPIRATION RATE: 12 BRPM | HEART RATE: 80 BPM

## 2022-09-28 DIAGNOSIS — Z12.11 SCREENING FOR COLON CANCER: ICD-10-CM

## 2022-09-28 LAB — GLUCOSE BLDC GLUCOMTR-MCNC: 117 MG/DL (ref 70–99)

## 2022-09-28 PROCEDURE — 88305 TISSUE EXAM BY PATHOLOGIST: CPT | Performed by: INTERNAL MEDICINE

## 2022-09-28 PROCEDURE — 45380 COLONOSCOPY AND BIOPSY: CPT | Performed by: INTERNAL MEDICINE

## 2022-09-28 PROCEDURE — 25010000002 PROPOFOL 10 MG/ML EMULSION: Performed by: NURSE ANESTHETIST, CERTIFIED REGISTERED

## 2022-09-28 PROCEDURE — 82962 GLUCOSE BLOOD TEST: CPT

## 2022-09-28 RX ORDER — PROPOFOL 10 MG/ML
VIAL (ML) INTRAVENOUS AS NEEDED
Status: DISCONTINUED | OUTPATIENT
Start: 2022-09-28 | End: 2022-09-28 | Stop reason: SURG

## 2022-09-28 RX ORDER — LIDOCAINE HYDROCHLORIDE 20 MG/ML
INJECTION, SOLUTION EPIDURAL; INFILTRATION; INTRACAUDAL; PERINEURAL AS NEEDED
Status: DISCONTINUED | OUTPATIENT
Start: 2022-09-28 | End: 2022-09-28 | Stop reason: SURG

## 2022-09-28 RX ORDER — SODIUM CHLORIDE, SODIUM LACTATE, POTASSIUM CHLORIDE, CALCIUM CHLORIDE 600; 310; 30; 20 MG/100ML; MG/100ML; MG/100ML; MG/100ML
30 INJECTION, SOLUTION INTRAVENOUS CONTINUOUS
Status: DISCONTINUED | OUTPATIENT
Start: 2022-09-28 | End: 2022-09-28 | Stop reason: HOSPADM

## 2022-09-28 RX ADMIN — SODIUM CHLORIDE, POTASSIUM CHLORIDE, SODIUM LACTATE AND CALCIUM CHLORIDE: 600; 310; 30; 20 INJECTION, SOLUTION INTRAVENOUS at 11:28

## 2022-09-28 RX ADMIN — PROPOFOL 150 MCG/KG/MIN: 10 INJECTION, EMULSION INTRAVENOUS at 11:33

## 2022-09-28 RX ADMIN — PROPOFOL 80 MG: 10 INJECTION, EMULSION INTRAVENOUS at 11:33

## 2022-09-28 RX ADMIN — LIDOCAINE HYDROCHLORIDE 100 MG: 20 INJECTION, SOLUTION EPIDURAL; INFILTRATION; INTRACAUDAL; PERINEURAL at 11:33

## 2022-09-28 NOTE — ANESTHESIA PREPROCEDURE EVALUATION
Anesthesia Evaluation     Patient summary reviewed and Nursing notes reviewed   no history of anesthetic complications:  NPO Solid Status: > 8 hours  NPO Liquid Status: > 2 hours           Airway   Mallampati: II  TM distance: >3 FB  Neck ROM: full  No difficulty expected  Dental      Pulmonary - normal exam    breath sounds clear to auscultation  (+) sleep apnea,   Cardiovascular - normal exam  Exercise tolerance: good (4-7 METS)    Rhythm: regular  Rate: normal    (+) hypertension well controlled, valvular problems/murmurs murmur, hyperlipidemia,       Neuro/Psych  (+) headaches, tremors,    GI/Hepatic/Renal/Endo    (+)  GERD well controlled,  diabetes mellitus type 2,     Musculoskeletal     Abdominal    Substance History - negative use     OB/GYN negative ob/gyn ROS         Other   arthritis,      ROS/Med Hx Other: PAT Nursing Notes unavailable.                   Anesthesia Plan    ASA 3     general     (Total IV Anesthesia    Patient understands anesthesia not responsible for dental damage.  )  intravenous induction     Anesthetic plan, risks, benefits, and alternatives have been provided, discussed and informed consent has been obtained with: patient.    Plan discussed with CRNA.        CODE STATUS:

## 2022-09-28 NOTE — ANESTHESIA POSTPROCEDURE EVALUATION
Patient: Nivia Cagle    Procedure Summary     Date: 09/28/22 Room / Location: [facility] ENDOSCOPY 1 / [facility] ENDOSCOPY    Anesthesia Start: 1128 Anesthesia Stop: 1153    Procedure: COLONOSCOPY with biopsy (N/A ) Diagnosis:       Screening for colon cancer      (Screening for colon cancer [Z12.11])    Surgeons: Ghislaine Kilgore MD Provider: Eduardo Kruse MD    Anesthesia Type: general ASA Status: 3          Anesthesia Type: general    Vitals  Vitals Value Taken Time   /65 09/28/22 1208   Temp 36.4 °C (97.5 °F) 09/28/22 1208   Pulse 73 09/28/22 1211   Resp 12 09/28/22 1208   SpO2 100 % 09/28/22 1211   Vitals shown include unvalidated device data.        Post Anesthesia Care and Evaluation    Patient location during evaluation: bedside  Patient participation: complete - patient participated  Level of consciousness: awake and alert  Pain management: adequate    Airway patency: patent  Anesthetic complications: No anesthetic complications  PONV Status: none  Cardiovascular status: acceptable  Respiratory status: acceptable  Hydration status: acceptable    Comments: An Anesthesiologist personally participated in the most demanding procedures (including induction and emergence if applicable) in the anesthesia plan, monitored the course of anesthesia administration at frequent intervals and remained physically present and available for immediate diagnosis and treatment of emergencies.

## 2022-09-29 LAB
CYTO UR: NORMAL
LAB AP CASE REPORT: NORMAL
LAB AP CLINICAL INFORMATION: NORMAL
PATH REPORT.FINAL DX SPEC: NORMAL
PATH REPORT.GROSS SPEC: NORMAL

## 2022-10-24 ENCOUNTER — TELEMEDICINE (OUTPATIENT)
Dept: PSYCHIATRY | Facility: CLINIC | Age: 72
End: 2022-10-24

## 2022-10-24 DIAGNOSIS — F33.1 MAJOR DEPRESSIVE DISORDER, RECURRENT EPISODE, MODERATE: Primary | ICD-10-CM

## 2022-10-24 PROCEDURE — 90834 PSYTX W PT 45 MINUTES: CPT | Performed by: COUNSELOR

## 2022-10-24 NOTE — PROGRESS NOTES
Date: October 24, 2022  Time In: 1020  Time Out: 1104  This provider is located at the [facility]), [address], [place] [address] using a secure [employer] Video Visit through [employer]. Patient is being seen remotely via telehealth at home address in [place] and stated they are in a secure environment for this session. The patient's condition being diagnosed/treated is appropriate for telemedicine. The provider identified herself as well as her credentials. The patient, and/or patients guardian, consent to be seen remotely, and when consent is given they understand that the consent allows for patient identifiable information to be sent to a third party as needed. They may refuse to be seen remotely at any time. The electronic data is encrypted and password protected, and the patient and/or guardian has been advised of the potential risks to privacy not withstanding such measures.     You have chosen to receive care through a telehealth visit.  Do you consent to use a video/audio connection for your medical care today? Yes    PROGRESS NOTE  Data:  Nivia Cagle is a 72 y.o. female who presents today for follow up    Chief Complaint: depression    History of Present Illness: Pt reports that she has became more involved with her [employer] attending social gatherings hosted by the [employer] as well as meetings, and sponsorships. Pt reports that she has been actively identifying poor thinking patterns and redirecting these thoughts when identified. Pt reports that court is ongoing which continues to cause strain among her siblings. Pt reports that she is also having to readdress her list regarding their mother's estate. Pt includes that her sister refused cancer treatment and has became very isolated and distant to the family. Pt reports that she has been cleaning and has noticed certain areas in the home that need more attention in efforts to prevent herself  from becoming depressed and overwhelmed.       Clinical Maneuvering/Intervention:    (Scales based on 0 - 10 with 10 being the worst)  Depression: 4 Anxiety: 4     Assisted patient in processing above session content; acknowledged and normalized patient’s thoughts, feelings, and concerns.  Rationalized patient thought process regarding correcting thought patterns and family stressors.  Discussed triggers associated with patient's depression.  Also discussed coping skills for patient to implement such as setting a weekly routine in efforts to shift attention to specific areas in her home.    Allowed patient to freely discuss issues without interruption or judgment. Provided safe, confidential environment to facilitate the development of positive therapeutic relationship and encourage open, honest communication. Assisted patient in identifying risk factors which would indicate the need for higher level of care including thoughts to harm self or others and/or self-harming behavior and encouraged patient to contact this office, call 911, or present to the nearest emergency room should any of these events occur. Discussed crisis intervention services and means to access. Patient adamantly and convincingly denies current suicidal or homicidal ideation or perceptual disturbance.    Assessment:   Assessment   Patient appears to maintain relative stability as compared to their baseline.  However, patient continues to struggle with depression which continues to cause impairment in important areas of functioning.  A result, they can be reasonably expected to continue to benefit from treatment and would likely be at increased risk for decompensation otherwise.    Mental Status Exam:   Hygiene:   good  Cooperation:  Cooperative  Eye Contact:  Good  Psychomotor Behavior:  Appropriate  Affect:  Appropriate  Mood: normal  Speech:  Normal  Thought Process:  Linear  Thought Content:  Normal  Suicidal:  None  Homicidal:   None  Hallucinations:  None  Delusion:  None  Memory:  Intact  Orientation:  Person, Place, Time and Situation  Reliability:  fair  Insight:  Fair  Judgement:  Fair  Impulse Control:  Fair  Physical/Medical Issues:  No        Patient's Support Network Includes:  son    Functional Status: Mild impairment     Progress toward goal: Not at goal    Prognosis: Fair with Ongoing Treatment          Plan:    Patient will continue in individual outpatient therapy with focus on improved functioning and coping skills, maintaining stability, and avoiding decompensation and the need for higher level of care.    Patient will adhere to medication regimen as prescribed and report any side effects. Patient will contact this office, call 911 or present to the nearest emergency room should suicidal or homicidal ideations occur. Provide Cognitive Behavioral Therapy and Solution Focused Therapy to improve functioning, maintain stability, and avoid decompensation and the need for higher level of care.     Return in about 6 weeks, or earlier if symptoms worsen or fail to improve.           VISIT DIAGNOSIS:     ICD-10-CM ICD-9-CM   1. Major depressive disorder, recurrent episode, moderate (HCC)  F33.1 296.32        Diagnoses and all orders for this visit:    1. Major depressive disorder, recurrent episode, moderate (HCC) (Primary)           [facility] No Show Policy:  We understand unexpected circumstances arise; however, anytime you miss your appointment we are unable to provide you appropriate care.  In addition, each appointment missed could have been used to provide care for others.  We ask that you call at least 24 hours in advance to cancel or reschedule an appointment.  We would like to take this opportunity to remind you of our policy stating patients who miss THREE or more appointments without cancelling or rescheduling 24 hours in advance of the appointment may be subject to cancellation of any further  visits with our clinic and recommendation to seek in-person services/visits.    Please call [phone] to reschedule your appointment. If there are reasons that make it difficult for you to keep the appointments, please call and let us know how we can help.  Please understand that medication prescribing will not continue without seeing your provider.      [facility]'s No Show Policy reviewed with patient at today's visit. Patient verbalized understanding of this policy. Discussed with patient that in the event that there are three or more no show visits, it will be recommended that they pursue in-person services/visits as noncompliance with telehealth visits indicates that patient is not an appropriate candidate for telemedicine and would likely be more appropriate for in-person services/visits. Patient verbalizes understanding and is agreeable to this.        This document has been electronically signed by Annita Polo LCSW  October 24, 2022 11:11 EDT      Part of this note may be an electronic transcription/translation of spoken language to printed text using the Dragon Dictation System.

## 2022-10-25 ENCOUNTER — TELEMEDICINE (OUTPATIENT)
Dept: PSYCHIATRY | Facility: CLINIC | Age: 72
End: 2022-10-25

## 2022-10-25 DIAGNOSIS — F41.1 GENERALIZED ANXIETY DISORDER: ICD-10-CM

## 2022-10-25 DIAGNOSIS — F51.05 INSOMNIA DUE TO MENTAL CONDITION: ICD-10-CM

## 2022-10-25 DIAGNOSIS — F33.1 MAJOR DEPRESSIVE DISORDER, RECURRENT EPISODE, MODERATE: Primary | ICD-10-CM

## 2022-10-25 DIAGNOSIS — F43.10 POST TRAUMATIC STRESS DISORDER (PTSD): ICD-10-CM

## 2022-10-25 PROCEDURE — 90833 PSYTX W PT W E/M 30 MIN: CPT | Performed by: STUDENT IN AN ORGANIZED HEALTH CARE EDUCATION/TRAINING PROGRAM

## 2022-10-25 PROCEDURE — 99214 OFFICE O/P EST MOD 30 MIN: CPT | Performed by: STUDENT IN AN ORGANIZED HEALTH CARE EDUCATION/TRAINING PROGRAM

## 2022-10-25 RX ORDER — FLUOXETINE HYDROCHLORIDE 40 MG/1
40 CAPSULE ORAL DAILY
Qty: 90 CAPSULE | Refills: 1 | Status: SHIPPED | OUTPATIENT
Start: 2022-10-25 | End: 2023-01-20

## 2022-10-25 RX ORDER — TRAZODONE HYDROCHLORIDE 50 MG/1
100 TABLET ORAL NIGHTLY
Qty: 60 TABLET | Refills: 2 | Status: SHIPPED | OUTPATIENT
Start: 2022-10-25 | End: 2022-12-09 | Stop reason: SDUPTHER

## 2022-10-25 NOTE — PATIENT INSTRUCTIONS
1.  Please return to clinic at your next scheduled visit.  Contact the clinic ([phone]) at least 24 hours prior in the event you need to cancel.  2.  Do no harm to yourself or others.    3.  Avoid alcohol and drugs.    4.  Take all medications as prescribed.  Please contact the clinic with any concerns. If you are in need of medication refills, please call the clinic at [phone].    5. Should you want to get in touch with your provider, Dr. Vicky Barth, please utilize [facility] or contact the office ([phone]), and staff will be able to page Dr. Barth directly.  6.  In the event you have personal crisis, contact the following crisis numbers: Suicide Prevention Hotline 1-686.840.5619; [facility] Helpline 3-494-108-IIRU; [facility] Emergency Room [phone]; text HELLO to 076608; or 916.     SPECIFIC RECOMMENDATIONS:     1.      Medications discussed at this encounter:                   - increase prozac and trazodone     2.      Psychotherapy recommendations:      3.     Return to clinic: 6 weeks

## 2022-10-25 NOTE — PROGRESS NOTES
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  >60.0 mL/min/1.73 Final    [employer] and American Society of Nephrology (ASN) Task Force recommended calculation based on the Chronic Kidney Disease Epidemiology Collaboration (CKD-EPI) equation refit without adjustment for race.   • TSH 07/01/2022 2.530  0.270 - 4.200 uIU/mL Final   • Total Cholesterol 07/01/2022 197  0 - 200 mg/dL Final   • Triglycerides 07/01/2022 103  0 - 150 mg/dL Final   • HDL Cholesterol 07/01/2022 53  40 - 60 mg/dL Final   • LDL Cholesterol  07/01/2022 125 (H)  0 - 100 mg/dL Final   • VLDL Cholesterol 07/01/2022 19  5 - 40 mg/dL Final   • LDL/HDL Ratio 07/01/2022 2.33   Final   • Hemoglobin A1C 07/01/2022 5.80 (H)  4.80 - 5.60 % Final   • WBC 07/01/2022 7.30  3.40 - 10.80 10*3/mm3 Final   • RBC 07/01/2022 4.20  3.77 - 5.28 10*6/mm3 Final   • Hemoglobin 07/01/2022 12.9  12.0 - 15.9 g/dL Final   • Hematocrit 07/01/2022 38.9  34.0 - 46.6 % Final   • MCV 07/01/2022 92.6  79.0 - 97.0 fL Final   • MCH 07/01/2022 30.7  26.6 - 33.0 pg Final   • MCHC 07/01/2022 33.2  31.5 - 35.7 g/dL Final   • RDW 07/01/2022 12.2 (L)  12.3 - 15.4 % Final   • RDW-SD 07/01/2022 40.9  37.0 - 54.0 fl Final   • MPV 07/01/2022 11.8  6.0 - 12.0 fL Final   • Platelets 07/01/2022 231  140 - 450 10*3/mm3 Final   • Neutrophil % 07/01/2022 67.2   42.7 - 76.0 % Final   • Lymphocyte % 07/01/2022 20.3  19.6 - 45.3 % Final   • Monocyte % 07/01/2022 9.9  5.0 - 12.0 % Final   • Eosinophil % 07/01/2022 1.8  0.3 - 6.2 % Final   • Basophil % 07/01/2022 0.5  0.0 - 1.5 % Final   • Immature Grans % 07/01/2022 0.3  0.0 - 0.5 % Final   • Neutrophils, Absolute 07/01/2022 4.91  1.70 - 7.00 10*3/mm3 Final   • Lymphocytes, Absolute 07/01/2022 1.48  0.70 - 3.10 10*3/mm3 Final   • Monocytes, Absolute 07/01/2022 0.72  0.10 - 0.90 10*3/mm3 Final   • Eosinophils, Absolute 07/01/2022 0.13  0.00 - 0.40 10*3/mm3 Final   • Basophils, Absolute 07/01/2022 0.04  0.00 - 0.20 10*3/mm3 Final   • Immature Grans, Absolute 07/01/2022 0.02  0.00 - 0.05 10*3/mm3 Final   • nRBC 07/01/2022 0.0  0.0 - 0.2 /100 WBC Final   Clinical Support on 06/21/2022   Component Date Value Ref Range Status   • Color, UA 06/21/2022 Yellow  Yellow, Straw Final   • Appearance, UA 06/21/2022 Clear  Clear Final   • pH, UA 06/21/2022 5.0  5.0 - 8.0 Final   • Specific Gravity, UA 06/21/2022 1.030  1.005 - 1.030 Final   • Glucose, UA 06/21/2022 Negative  Negative Final   • Ketones, UA 06/21/2022 Trace (A)  Negative Final   • Bilirubin, UA 06/21/2022 Small (1+) (A)  Negative Final   • Blood, UA 06/21/2022 Negative  Negative Final   • Protein, UA 06/21/2022 Trace (A)  Negative Final   • Leuk Esterase, UA 06/21/2022 Trace (A)  Negative Final   • Nitrite, UA 06/21/2022 Negative  Negative Final   • Urobilinogen, UA 06/21/2022 1.0 E.U./dL  0.2 - 1.0 E.U./dL Final   • Color 06/21/2022 Yellow  Yellow, Straw, Dark Yellow, Annita Final   • Clarity, UA 06/21/2022 Clear  Clear Final   • Specific Gravity  06/21/2022 1.030  1.005 - 1.030 Final   • pH, Urine 06/21/2022 5.0  5.0 - 8.0 Final   • Leukocytes 06/21/2022 Trace (A)  Negative Final   • Nitrite, UA 06/21/2022 Negative  Negative Final   • Protein, POC 06/21/2022 Trace (A)  Negative mg/dL Final   • Glucose, UA 06/21/2022 Negative  Negative, 1000 mg/dL (3+) mg/dL Final   • Ketones,  UA 06/21/2022 15 mg/dL (A)  Negative Final   • Urobilinogen, UA 06/21/2022 1 E.U./dL (A)  Normal Final   • Bilirubin 06/21/2022 Small (1+) (A)  Negative Final   • Blood, UA 06/21/2022 Negative  Negative Final   • Lot Number 06/21/2022 [id number]   Final   • Expiration Date 06/21/2022 6/30/23   Final   • Urine Culture 06/21/2022 No growth   Final   Office Visit on 06/13/2022   Component Date Value Ref Range Status   • Color, UA 06/13/2022 Yellow  Yellow, Straw Final   • Appearance, UA 06/13/2022 Clear  Clear Final   • pH, UA 06/13/2022 5.5  5.0 - 8.0 Final   • Specific Gravity, UA 06/13/2022 >=1.030  1.005 - 1.030 Final   • Glucose, UA 06/13/2022 Negative  Negative Final   • Ketones, UA 06/13/2022 Trace (A)  Negative Final   • Bilirubin, UA 06/13/2022 Small (1+) (A)  Negative Final   • Blood, UA 06/13/2022 Moderate (2+) (A)  Negative Final   • Protein, UA 06/13/2022 100 mg/dL (2+) (A)  Negative Final   • Leuk Esterase, UA 06/13/2022 Trace (A)  Negative Final   • Nitrite, UA 06/13/2022 Negative  Negative Final   • Urobilinogen, UA 06/13/2022 0.2 E.U./dL  0.2 - 1.0 E.U./dL Final   • Urine Culture 06/13/2022 <25,000 CFU/mL Escherichia coli ESBL (A)   Corrected    Consider infectious disease consult.  Susceptibility results may not correlate to clinical outcomes.   Admission on 05/26/2022, Discharged on 05/26/2022   Component Date Value Ref Range Status   • COVID19 05/26/2022 Not Detected  Not Detected - Ref. Range Final       EKG Results:  No orders to display       Imaging Results:  No Images in the past 120 days found..      Assessment & Plan   Diagnoses and all orders for this visit:    1. Major depressive disorder, recurrent episode, moderate (HCC) (Primary)  -     FLUoxetine (PROzac) 40 MG capsule; Take 1 capsule by mouth Daily.  Dispense: 90 capsule; Refill: 1    2. Generalized anxiety disorder  -     FLUoxetine (PROzac) 40 MG capsule; Take 1 capsule by mouth Daily.  Dispense: 90 capsule; Refill: 1    3. Post  traumatic stress disorder (PTSD)  -     FLUoxetine (PROzac) 40 MG capsule; Take 1 capsule by mouth Daily.  Dispense: 90 capsule; Refill: 1    4. Insomnia due to mental condition  -     FLUoxetine (PROzac) 40 MG capsule; Take 1 capsule by mouth Daily.  Dispense: 90 capsule; Refill: 1  -     traZODone (DESYREL) 50 MG tablet; Take 2 tablets by mouth Every Night.  Dispense: 60 tablet; Refill: 2        INITIAL presentation most consistent with major depressive disorder, recurrent, moderate to severe, with anxious distress.  PTSD.  Rule out personality disorder, cluster B specifically.  Rule out hypomania as patient was very difficult to interrupt today.    10/25: Doing well. Increase prozac back to baseline dose (inadvertently reduced, she has been stable on a higher dose, so we should go back to that). Some initial insomnia, increase trazodone to 75 mg nightly. She is enjoying the Fall. 21 minutes of supportive psychotherapy with goal to strengthen defenses, promote problems solving, restore adaptive functioning and provide symptom relief. The therapeutic alliance was strengthened to encourage the patient to express their thoughts and feelings. Esteem building was enhanced through praise, reassurance, normalizing and encouragement. Coping skills were enhanced to build distress tolerance skills and emotional regulation. Allowed patient to freely discuss issues without interruption or judgement with unconditional positive regard, active listening skills, and empathy. Provided a safe, confidential environment to facilitate the development of a positive therapeutic relationship and encourage open, honest communication. Assisted patient in identifying risk factors which would indicate the need for higher level of care including thoughts to harm self or others and/or self-harming behavior and encouraged patient to contact this office, call 911, or present to the nearest emergency room should any of these events occur.  Assisted patient in processing session content; acknowledged and normalized patient’s thoughts, feelings, and concerns by utilizing a person-centered approach in efforts to build appropriate rapport and a positive therapeutic relationship with open and honest communication. Patient given education on medication side effects, diagnosis/illness and relapse symptoms. Plan to continue supportive psychotherapy in next appointment to provide symptom relief.  Diagnoses: as above  Symptoms: as above  Functional status: good  Mental Status Exam: as above    Treatment plan: Medication management and supportive psychotherapy  Prognosis: good  Progress: stable, but having initial insomnia  6 wks      9/8: Start light box. Close follow up in case this is worsening depression. 16 minutes of supportive psychotherapy  Treatment plan: Medication management and supportive psychotherapy  Prognosis: good  Progress: stable  6 wks      7/27: Well, stable. 17 minutes of supportive psychotherapyTreatment plan: Medication management and supportive psychotherapy  Prognosis: good  Progress: stable, well  6 wks      6/14: Better, no changes. 17 minutes of supportive psychotherapy Treatment plan: Medication management and supportive psychotherapy  Prognosis: good  Progress: less depressed  6 wks      5/3: Increase prozac and melatonin. 17 minutes of supportive psychotherapyTreatment plan: Medication management and supportive psychotherapy  Prognosis: good  Progress: backtracked recently, now depressed  6 wks      Visit Diagnoses:    ICD-10-CM ICD-9-CM   1. Major depressive disorder, recurrent episode, moderate (HCC)  F33.1 296.32   2. Generalized anxiety disorder  F41.1 300.02   3. Post traumatic stress disorder (PTSD)  F43.10 309.81   4. Insomnia due to mental condition  F51.05 300.9     327.02       PLAN:  66. Risk Assessment: Risk of self-harm acutely is moderate. Risk factors include chronic depressive disorder, possible personality  disorder, recent psychosocial stressors (pandemic, moving). Protective factors include no present SI, no history of suicide attempts or self-harm in the past, no access to weapons, minimal AODA, healthcare seeking, future orientation, willingness to engage in care. Risk of self-harm chronically is also moderate, but could be further elevated in the event of treatment noncompliance and/or AODA.  67. Safety: No acute safety concerns.  68. Medications:   a. CONTINUE melatonin 18 mg p.o. nightly.  Risks, benefits, side effects discussed with patient including sedation, dizziness/falls risk, GI upset.  After discussion of these risks and benefits, the patient voiced understanding and agreed to proceed.  b. INCREASE trazodone 50 to 75 mg PO QHS. Risks, benefits, side effects discussed with patient including GI upset, sedation, dizziness/falls risk, grogginess the following day, prolongation of the QTc interval.  After discussion of these risks and benefits, the patient voiced understanding and agreed to proceed.    c. CONTINUE bupropion xl 300 mg daily. Risks, benefits, alternatives discussed with patient including nausea, GI upset, increased energy, exacerbation of irritability, insomnia, lowering of seizure threshold.  After discussion of these risks and benefits, the patient voiced understanding and agreed to proceed.  d. CONTINUE Prozac 40 mg a day (HIGHEST dose given that she is also on bupropion). Risks, benefits, alternatives discussed with patient including GI upset, nausea vomiting diarrhea, theoretical decrease of seizure threshold predisposing the patient to a slightly higher seizure risk, headaches, sexual dysfunction, serotonin syndrome, bleeding risk, increased suicidality in patients 24 years and younger.  After discussion of these risks and benefits, the patient voiced understanding and agreed to proceed.  e. CONTINUE Abilify 4 mg p.o. daily to target depression, anxiety, decreased energy. Risks, benefits,  alternatives discussed with patient including increased energy, exacerbation of irritability, akathisia, GI upset, orthostatic hypotension, increased appetite. After discussion of these risks and benefits, the patient voiced understanding and agreed to proceed.  i. S/P:  1. Mirtazapine 45 mg daily (RLS)  69. Therapy: referred to [facility].  70. Labs/Studies: s/p TMS referral.  71. Follow Up: 6 weeks. (prefers 4 wks)      TREATMENT PLAN/GOALS: Continue supportive psychotherapy efforts and medications as indicated. Treatment and medication options discussed during today's visit. Patient acknowledged and verbally consented to continue with current treatment plan and was educated on the importance of compliance with treatment and follow-up appointments.    MEDICATION ISSUES:  SAPNA reviewed as expected.  Discussed medication options and treatment plan of prescribed medication as well as the risks, benefits, and side effects including potential falls, possible impaired driving and metabolic adversities among others. Patient is agreeable to call the office with any worsening of symptoms or onset of side effects. Patient is agreeable to call 911 or go to the nearest ER should he/she begin having SI/HI. No medication side effects or related complaints today.     MEDS ORDERED DURING VISIT:  New Medications Ordered This Visit   Medications   • FLUoxetine (PROzac) 40 MG capsule     Sig: Take 1 capsule by mouth Daily.     Dispense:  90 capsule     Refill:  1   • traZODone (DESYREL) 50 MG tablet     Sig: Take 2 tablets by mouth Every Night.     Dispense:  60 tablet     Refill:  2       Return in about 6 weeks (around 12/6/2022).         This document has been electronically signed by Vicky Barth MD  October 25, 2022 12:00 EDT      Part of this note may be an electronic transcription/translation of spoken language to printed text using the Dragon Dictation System.

## 2022-11-01 ENCOUNTER — LAB (OUTPATIENT)
Dept: LAB | Facility: HOSPITAL | Age: 72
End: 2022-11-01

## 2022-11-01 ENCOUNTER — TRANSCRIBE ORDERS (OUTPATIENT)
Dept: LAB | Facility: HOSPITAL | Age: 72
End: 2022-11-01

## 2022-11-01 DIAGNOSIS — M85.89 DISAPPEARING BONE DISEASE: Primary | ICD-10-CM

## 2022-11-01 DIAGNOSIS — Z79.899 ENCOUNTER FOR LONG-TERM (CURRENT) USE OF OTHER MEDICATIONS: ICD-10-CM

## 2022-11-01 DIAGNOSIS — I10 ESSENTIAL HYPERTENSION: ICD-10-CM

## 2022-11-01 DIAGNOSIS — E55.9 VITAMIN D DEFICIENCY: ICD-10-CM

## 2022-11-01 DIAGNOSIS — M85.89 DISAPPEARING BONE DISEASE: ICD-10-CM

## 2022-11-01 DIAGNOSIS — E11.65 TYPE 2 DIABETES MELLITUS WITH HYPERGLYCEMIA, WITHOUT LONG-TERM CURRENT USE OF INSULIN: ICD-10-CM

## 2022-11-01 DIAGNOSIS — E78.2 MIXED HYPERLIPIDEMIA: ICD-10-CM

## 2022-11-01 PROCEDURE — 80053 COMPREHEN METABOLIC PANEL: CPT

## 2022-11-01 PROCEDURE — 80061 LIPID PANEL: CPT

## 2022-11-01 PROCEDURE — 36415 COLL VENOUS BLD VENIPUNCTURE: CPT

## 2022-11-01 PROCEDURE — 82306 VITAMIN D 25 HYDROXY: CPT

## 2022-11-01 PROCEDURE — 83036 HEMOGLOBIN GLYCOSYLATED A1C: CPT

## 2022-11-01 PROCEDURE — 84443 ASSAY THYROID STIM HORMONE: CPT

## 2022-11-01 PROCEDURE — 85025 COMPLETE CBC W/AUTO DIFF WBC: CPT

## 2022-11-02 LAB
25(OH)D3 SERPL-MCNC: 57.1 NG/ML (ref 30–100)
ALBUMIN SERPL-MCNC: 4.6 G/DL (ref 3.5–5.2)
ALBUMIN/GLOB SERPL: 1.9 G/DL
ALP SERPL-CCNC: 56 U/L (ref 39–117)
ALT SERPL W P-5'-P-CCNC: 11 U/L (ref 1–33)
ANION GAP SERPL CALCULATED.3IONS-SCNC: 10.6 MMOL/L (ref 5–15)
AST SERPL-CCNC: 18 U/L (ref 1–32)
BASOPHILS # BLD AUTO: 0.03 10*3/MM3 (ref 0–0.2)
BASOPHILS NFR BLD AUTO: 0.5 % (ref 0–1.5)
BILIRUB SERPL-MCNC: 0.3 MG/DL (ref 0–1.2)
BUN SERPL-MCNC: 17 MG/DL (ref 8–23)
BUN/CREAT SERPL: 19.1 (ref 7–25)
CALCIUM SPEC-SCNC: 9.8 MG/DL (ref 8.6–10.5)
CHLORIDE SERPL-SCNC: 101 MMOL/L (ref 98–107)
CHOLEST SERPL-MCNC: 212 MG/DL (ref 0–200)
CO2 SERPL-SCNC: 28.4 MMOL/L (ref 22–29)
CREAT SERPL-MCNC: 0.89 MG/DL (ref 0.57–1)
DEPRECATED RDW RBC AUTO: 42 FL (ref 37–54)
EGFRCR SERPLBLD CKD-EPI 2021: 69.8 ML/MIN/1.73
EOSINOPHIL # BLD AUTO: 0.13 10*3/MM3 (ref 0–0.4)
EOSINOPHIL NFR BLD AUTO: 2 % (ref 0.3–6.2)
ERYTHROCYTE [DISTWIDTH] IN BLOOD BY AUTOMATED COUNT: 12.2 % (ref 12.3–15.4)
GLOBULIN UR ELPH-MCNC: 2.4 GM/DL
GLUCOSE SERPL-MCNC: 88 MG/DL (ref 65–99)
HBA1C MFR BLD: 5.8 % (ref 4.8–5.6)
HCT VFR BLD AUTO: 37.5 % (ref 34–46.6)
HDLC SERPL-MCNC: 63 MG/DL (ref 40–60)
HGB BLD-MCNC: 12.4 G/DL (ref 12–15.9)
IMM GRANULOCYTES # BLD AUTO: 0.02 10*3/MM3 (ref 0–0.05)
IMM GRANULOCYTES NFR BLD AUTO: 0.3 % (ref 0–0.5)
LDLC SERPL CALC-MCNC: 135 MG/DL (ref 0–100)
LDLC/HDLC SERPL: 2.11 {RATIO}
LYMPHOCYTES # BLD AUTO: 1.44 10*3/MM3 (ref 0.7–3.1)
LYMPHOCYTES NFR BLD AUTO: 22.3 % (ref 19.6–45.3)
MCH RBC QN AUTO: 31 PG (ref 26.6–33)
MCHC RBC AUTO-ENTMCNC: 33.1 G/DL (ref 31.5–35.7)
MCV RBC AUTO: 93.8 FL (ref 79–97)
MONOCYTES # BLD AUTO: 0.77 10*3/MM3 (ref 0.1–0.9)
MONOCYTES NFR BLD AUTO: 11.9 % (ref 5–12)
NEUTROPHILS NFR BLD AUTO: 4.08 10*3/MM3 (ref 1.7–7)
NEUTROPHILS NFR BLD AUTO: 63 % (ref 42.7–76)
NRBC BLD AUTO-RTO: 0 /100 WBC (ref 0–0.2)
PLATELET # BLD AUTO: 224 10*3/MM3 (ref 140–450)
PMV BLD AUTO: 11.7 FL (ref 6–12)
POTASSIUM SERPL-SCNC: 4.4 MMOL/L (ref 3.5–5.2)
PROT SERPL-MCNC: 7 G/DL (ref 6–8.5)
RBC # BLD AUTO: 4 10*6/MM3 (ref 3.77–5.28)
SODIUM SERPL-SCNC: 140 MMOL/L (ref 136–145)
TRIGL SERPL-MCNC: 80 MG/DL (ref 0–150)
TSH SERPL DL<=0.05 MIU/L-ACNC: 3.21 UIU/ML (ref 0.27–4.2)
VLDLC SERPL-MCNC: 14 MG/DL (ref 5–40)
WBC NRBC COR # BLD: 6.47 10*3/MM3 (ref 3.4–10.8)

## 2022-11-02 RX ORDER — ATORVASTATIN CALCIUM 10 MG/1
10 TABLET, FILM COATED ORAL DAILY
Qty: 90 TABLET | Refills: 1 | Status: SHIPPED | OUTPATIENT
Start: 2022-11-02 | End: 2022-11-21

## 2022-11-21 ENCOUNTER — OFFICE VISIT (OUTPATIENT)
Dept: INTERNAL MEDICINE | Facility: CLINIC | Age: 72
End: 2022-11-21

## 2022-11-21 VITALS
HEIGHT: 63 IN | SYSTOLIC BLOOD PRESSURE: 132 MMHG | TEMPERATURE: 97.9 F | HEART RATE: 92 BPM | OXYGEN SATURATION: 97 % | DIASTOLIC BLOOD PRESSURE: 80 MMHG | BODY MASS INDEX: 29.13 KG/M2 | WEIGHT: 164.4 LBS

## 2022-11-21 DIAGNOSIS — E11.65 TYPE 2 DIABETES MELLITUS WITH HYPERGLYCEMIA, WITHOUT LONG-TERM CURRENT USE OF INSULIN: ICD-10-CM

## 2022-11-21 DIAGNOSIS — I10 ESSENTIAL HYPERTENSION: ICD-10-CM

## 2022-11-21 DIAGNOSIS — H91.93 BILATERAL HEARING LOSS, UNSPECIFIED HEARING LOSS TYPE: ICD-10-CM

## 2022-11-21 DIAGNOSIS — F33.1 MAJOR DEPRESSIVE DISORDER, RECURRENT EPISODE, MODERATE DEGREE: ICD-10-CM

## 2022-11-21 DIAGNOSIS — E78.2 MIXED HYPERLIPIDEMIA: ICD-10-CM

## 2022-11-21 DIAGNOSIS — Z00.00 MEDICARE ANNUAL WELLNESS VISIT, INITIAL: Primary | ICD-10-CM

## 2022-11-21 DIAGNOSIS — K12.0 CANKER SORE: ICD-10-CM

## 2022-11-21 DIAGNOSIS — R25.2 MUSCLE CRAMP: ICD-10-CM

## 2022-11-21 PROCEDURE — 1159F MED LIST DOCD IN RCRD: CPT | Performed by: PHYSICIAN ASSISTANT

## 2022-11-21 PROCEDURE — 1170F FXNL STATUS ASSESSED: CPT | Performed by: PHYSICIAN ASSISTANT

## 2022-11-21 PROCEDURE — G0439 PPPS, SUBSEQ VISIT: HCPCS | Performed by: PHYSICIAN ASSISTANT

## 2022-11-21 PROCEDURE — 99214 OFFICE O/P EST MOD 30 MIN: CPT | Performed by: PHYSICIAN ASSISTANT

## 2022-11-21 RX ORDER — QUININE SULFATE 324 MG/1
324 CAPSULE ORAL
Qty: 15 CAPSULE | Refills: 0 | Status: SHIPPED | OUTPATIENT
Start: 2022-11-21 | End: 2023-03-30

## 2022-11-21 NOTE — ASSESSMENT & PLAN NOTE
Lipid abnormalities are unchanged.  Pt unable to tolerate statin, will d/c today. Keep follow up with cardiology to discuss injectable  Lipids will be reassessed at follow up.

## 2022-11-21 NOTE — PROGRESS NOTES
The ABCs of the Annual Wellness Visit  Subsequent Medicare Wellness Visit    Chief Complaint   Patient presents with   • Annual Exam   • Hearing Problem     Suddenly gotten worse.    • Muscle Pain     Cramping a lot      Subjective    History of Present Illness:  Nivia Cagle is a 72 y.o. female who presents for a Subsequent Medicare Wellness Visit.    The following portions of the patient's history were reviewed and   updated as appropriate: allergies, past family history, past medical history, past social history, past surgical history and problem list.    Compared to one year ago, the patient feels her physical   health is worse.    Compared to one year ago, the patient feels her mental   health is better.      Pt is also here with numerous complaints    DM: avg 125  Denies low bg.     She has been having cramping in hands and legs since starting atorvastatin   She states she has been using Quinine PRN severe cramping x years. She was given initially from Neurology in [place].   Waiting for approval of cholesterol inj from cardiology     Depression and anxiety: mood is doing well   Seeing Dr. Barth     Hearing loss: has noticed decrease in hearing recently the past few weeks  Denies head injury   She was seen by audiology in July 2022    EARL: when using CPAP she is getting canker sores on lips        Recent Hospitalizations:  She was not admitted to the hospital during the last year.       Current Medical Providers:  Patient Care Team:  Catalina Madsen PA-C as PCP - General (Physician Assistant)  Kassandra Garay APRN as Nurse Practitioner (Otolaryngology)    Outpatient Medications Prior to Visit   Medication Sig Dispense Refill   • Accu-Chek Madeline Plus test strip by Other route 4 (Four) Times a Day. use to test blood sugar     • Accu-Chek Softclix Lancets lancets by Other route 4 (Four) Times a Day. use to test blood sugar     • albuterol sulfate  (90 Base) MCG/ACT inhaler Inhale 2 puffs Every 4 (Four)  "Hours As Needed for Wheezing (Coughing). 8 g 0   • alendronate (FOSAMAX) 70 MG tablet Take 1 tablet by mouth Every 7 (Seven) Days.     • ARIPiprazole (ABILIFY) 2 MG tablet Take 2 tablets by mouth Daily. 180 tablet 1   • Ascorbic Acid (VITAMIN C GUMMIE PO) Take  by mouth Daily.     • aspirin (aspirin) 81 MG EC tablet Take 1 tablet by mouth Daily. 90 tablet 99   • azelastine (ASTELIN) 0.1 % nasal spray 2 sprays into the nostril(s) as directed by provider 2 (Two) Times a Day. Use in each nostril as directed 90 mL 6   • BD Insulin Syringe U/F 30G X 1/2\" 0.3 ML misc USE TO INJECT INSULIN FOUR TIMES DAILY AS NEEDED     • BL NASAL SALINE MIST NA 2 drops.     • Blood Glucose Monitoring Suppl (FreeStyle Lite) device 1 each by Other route 4 (Four) Times a Day.     • buPROPion XL (WELLBUTRIN XL) 300 MG 24 hr tablet Take 1 tablet by mouth Every Morning. 90 tablet 1   • Calcium Carbonate 1500 (600 Ca) MG tablet Take 600 mg by mouth Daily.     • cyclobenzaprine (FLEXERIL) 10 MG tablet Take 1 tablet by mouth Daily As Needed for Muscle Spasms. 30 tablet 2   • Daily-Jelani Multivitamin tablet tablet Take 1 tablet by mouth every night at bedtime.     • ezetimibe (ZETIA) 10 MG tablet TAKE 1 TABLET BY MOUTH EVERY NIGHT AT BEDTIME 90 tablet 1   • FLUoxetine (PROzac) 40 MG capsule Take 1 capsule by mouth Daily. 90 capsule 1   • fluticasone (Flonase) 50 MCG/ACT nasal spray 2 sprays into the nostril(s) as directed by provider Daily. Administer 2 sprays in each nostril for each dose. 16 g 6   • Januvia 100 MG tablet TAKE 1 TABLET BY MOUTH DAILY 90 tablet 1   • loratadine (Claritin) 10 MG tablet Take 1 tablet by mouth Daily. 90 tablet 1   • losartan (COZAAR) 25 MG tablet TAKE 1 TABLET BY MOUTH DAILY 90 tablet 1   • Melatonin 10 MG tablet Take 20 mg by mouth Every Night. 2 tabs     • metFORMIN (GLUCOPHAGE) 500 MG tablet TAKE 2 TABLETS BY MOUTH TWICE DAILY WITH MEALS 360 tablet 1   • montelukast (SINGULAIR) 10 MG tablet TAKE 1 TABLET BY MOUTH " EVERY NIGHT 90 tablet 1   • pramipexole (MIRAPEX) 0.5 MG tablet Take 1 tablet by mouth every night at bedtime for 90 days. 90 tablet 1   • prednisoLONE acetate (PRED FORTE) 1 % ophthalmic suspension SHAKE LIQUID AND INSTILL 1 DROP IN RIGHT EYE FOUR TIMES DAILY     • terbinafine (LamISIL) 250 MG tablet Take 1 tablet by mouth Daily for 90 days. 90 tablet 0   • traZODone (DESYREL) 50 MG tablet Take 2 tablets by mouth Every Night. 60 tablet 2   • vitamin D (ERGOCALCIFEROL) 1.25 MG (93582 UT) capsule capsule TAKE 1 CAPSULE BY MOUTH WEEKLY, TAKE WITH CALCIUM 12 capsule 0   • atorvastatin (LIPITOR) 10 MG tablet Take 1 tablet by mouth Daily. 90 tablet 1   • quiNINE (QUALAQUIN) 324 MG capsule Take 324 mg by mouth every night at bedtime.     • Alirocumab (Praluent) 75 MG/ML solution auto-injector Inject 1 mL under the skin into the appropriate area as directed Every 14 (Fourteen) Days. 6 pen 3   • timolol (TIMOPTIC) 0.5 % ophthalmic solution INSTILL 1 DROP IN RIGHT EYE TWICE DAILY       No facility-administered medications prior to visit.       No opioid medication identified on active medication list. I have reviewed chart for other potential  high risk medication/s and harmful drug interactions in the elderly.          Aspirin is on active medication list.     Patient Active Problem List   Diagnosis   • Muscle twitching   • Restless legs syndrome (RLS)   • Allergic rhinitis   • Anemia   • Bilateral posterior capsular opacification   • Cardiac murmur   • Diverticulitis   • Endometriosis   • Gastroesophageal reflux disease   • Essential hypertension   • Low back pain   • Migraines   • Scoliosis deformity of spine   • Major depressive disorder, recurrent episode, moderate degree (HCC)   • Generalized anxiety disorder   • Type 2 diabetes mellitus (HCC)   • Mixed hyperlipidemia   • Obstructive sleep apnea   • Tremor   • Bilateral pseudophakia   • Dislocated intraocular lens   • Traumatic injury of globe of right eye   •  "Unspecified retinal detachment with retinal break, right eye   • Osteoarthritis   • Attention-deficit hyperactivity disorder, unspecified type   • Epiretinal membrane (ERM) of right eye   • Traction retinal detachment involving macula   • Cystoid macular degeneration of right eye   • Vitamin deficiency, unspecified   • Dvtrcli of intest, part unsp, w/o perf or abscess w/o bleed   • History of falling   • Long term current use of insulin (HCC)   • Generalized muscle weakness     Advance Care Planning  Advance Directive is not on file.  ACP discussion was held with the patient during this visit. Patient does not have an advance directive, information provided.          Objective    Vitals:    11/21/22 1257   BP: 132/80   BP Location: Left arm   Patient Position: Sitting   Cuff Size: Adult   Pulse: 92   Temp: 97.9 °F (36.6 °C)   SpO2: 97%   Weight: 74.6 kg (164 lb 6.4 oz)   Height: 160 cm (63\")     Estimated body mass index is 29.12 kg/m² as calculated from the following:    Height as of this encounter: 160 cm (63\").    Weight as of this encounter: 74.6 kg (164 lb 6.4 oz).    BMI is >= 25 and <30. (Overweight) The following options were offered after discussion;: exercise counseling/recommendations      Does the patient have evidence of cognitive impairment? No    Physical Exam  Vitals reviewed.   Constitutional:       Appearance: Normal appearance.   HENT:      Head: Normocephalic and atraumatic.      Nose: Nose normal.      Mouth/Throat:      Mouth: Mucous membranes are moist.   Eyes:      Extraocular Movements: Extraocular movements intact.      Conjunctiva/sclera: Conjunctivae normal.      Pupils: Pupils are equal, round, and reactive to light.   Cardiovascular:      Rate and Rhythm: Normal rate and regular rhythm.   Pulmonary:      Effort: Pulmonary effort is normal.      Breath sounds: Normal breath sounds.   Abdominal:      General: Abdomen is flat. Bowel sounds are normal.      Palpations: Abdomen is soft. "   Musculoskeletal:         General: Normal range of motion.   Neurological:      General: No focal deficit present.      Mental Status: She is alert and oriented to person, place, and time.   Psychiatric:         Mood and Affect: Mood normal.       Lab Results   Component Value Date    TRIG 80 2022    HDL 63 (H) 2022     (H) 2022    VLDL 14 2022    HGBA1C 5.80 (H) 2022            HEALTH RISK ASSESSMENT    Smoking Status:  Social History     Tobacco Use   Smoking Status Former   • Types: Cigarettes   • Quit date:    • Years since quittin.9   Smokeless Tobacco Never   Tobacco Comments    QUIT      Alcohol Consumption:  Social History     Substance and Sexual Activity   Alcohol Use Yes    Comment: rarely     Fall Risk Screen:    STEPHANYADI Fall Risk Assessment was completed, and patient is at MODERATE risk for falls. Assessment completed on:2022    Depression Screening:  PHQ-2/PHQ-9 Depression Screening 2022   Retired PHQ-9 Total Score -   Retired Total Score -   Little Interest or Pleasure in Doing Things 0-->not at all   Feeling Down, Depressed or Hopeless 0-->not at all   PHQ-9: Brief Depression Severity Measure Score 0   Little interest or pleasure in doing things -   Feeling down, depressed, or hopeless -   Trouble falling or staying asleep, or sleeping too much -   Feeling tired or having little energy -   Poor appetite or overeating -   Feeling bad about yourself - or that you are a failure or have let yourself or your family down -   Trouble concentrating on things, such as reading the newspaper or watching television -   Moving or speaking so slowly that other people could have noticed. Or the opposite - being so fidgety or restless that you have been moving around a lot more than usual -   Thoughts that you would be better off dead, or of hurting yourself in some way -   How difficult have these problems made it for you to do your work, take care of  things at home, or get along with other people? -       Health Habits and Functional and Cognitive Screening:  Functional & Cognitive Status 11/21/2022   Do you have difficulty preparing food and eating? No   Do you have difficulty bathing yourself, getting dressed or grooming yourself? No   Do you have difficulty using the toilet? No   Do you have difficulty moving around from place to place? No   Do you have trouble with steps or getting out of a bed or a chair? No   Current Diet Well Balanced Diet   Dental Exam Not up to date   Eye Exam Up to date   Exercise (times per week) 7 times per week   Current Exercises Include House Cleaning   Do you need help using the phone?  No   Are you deaf or do you have serious difficulty hearing?  Yes   Do you need help with transportation? Yes   Do you need help shopping? No   Do you need help preparing meals?  No   Do you need help with housework?  No   Do you need help with laundry? No   Do you need help taking your medications? No   Do you need help managing money? No   Do you ever drive or ride in a car without wearing a seat belt? No   Have you felt unusual stress, anger or loneliness in the last month? No   Who do you live with? Alone   If you need help, do you have trouble finding someone available to you? No   Have you been bothered in the last four weeks by sexual problems? No   Do you have difficulty concentrating, remembering or making decisions? No       Age-appropriate Screening Schedule:  Refer to the list below for future screening recommendations based on patient's age, sex and/or medical conditions. Orders for these recommended tests are listed in the plan section. The patient has been provided with a written plan.    Health Maintenance   Topic Date Due   • URINE MICROALBUMIN  Never done   • ZOSTER VACCINE (2 of 2) 01/03/2023   • HEMOGLOBIN A1C  05/01/2023   • DIABETIC EYE EXAM  06/15/2023   • DIABETIC FOOT EXAM  09/19/2023   • LIPID PANEL  11/01/2023   • DXA  SCAN  02/15/2024   • MAMMOGRAM  09/22/2024   • TDAP/TD VACCINES (2 - Td or Tdap) 04/16/2032   • INFLUENZA VACCINE  Completed              Assessment & Plan   CMS Preventative Services Quick Reference  Risk Factors Identified During Encounter  Cardiovascular Disease  Depression/Dysphoria  Hearing Problem  The above risks/problems have been discussed with the patient.  Follow up actions/plans if indicated are seen below in the Assessment/Plan Section.  Pertinent information has been shared with the patient in the After Visit Summary.    Diagnoses and all orders for this visit:    1. Medicare annual wellness visit, initial (Primary)  Assessment & Plan:  Reviewed preventative medication recommendations that are age appropriate for the patient. Education provided for health and wellness. Encouraged healthy diet, regular exercise, and routine wellness checkups.        2. Bilateral hearing loss, unspecified hearing loss type  Comments:  Pt scheduled for hearing eval today at 2:30pm. Ear PE WNL  Orders:  -     Ambulatory Referral to Audiology    3. Muscle cramp  Comments:  Will d/c statin due to increase severe muscle cramps. Will rx Quinine for short term use but discussed pt must get further refill from neuro    4. Canker sore  Comments:  Encouraged to contact medical supply company to make sure mask fits appropraitely.    5. Essential hypertension  Assessment & Plan:  Hypertension is improving with treatment.  Continue current medications.  Blood pressure will be reassessed at the next regular appointment.      6. Mixed hyperlipidemia  Assessment & Plan:  Lipid abnormalities are unchanged.  Pt unable to tolerate statin, will d/c today. Keep follow up with cardiology to discuss injectable  Lipids will be reassessed at follow up.      7. Type 2 diabetes mellitus with hyperglycemia, without long-term current use of insulin (HCC)  Assessment & Plan:  Diabetes is improving with treatment.   Continue current treatment  regimen.  Diabetes will be reassessed in 3 months.      8. Major depressive disorder, recurrent episode, moderate degree (HCC)  Assessment & Plan:  Patient's depression is recurrent and is moderate without psychosis. Their depression is currently active and the condition is improving with treatment. This will be reassessed at the next regular appointment. F/U as described:patient will continue current medication therapy.      Other orders  -     quiNINE (QUALAQUIN) 324 MG capsule; Take 1 capsule by mouth every night at bedtime.  Dispense: 15 capsule; Refill: 0      Follow Up:   Return in about 3 months (around 2/21/2023).     An After Visit Summary and PPPS were made available to the patient.

## 2022-12-05 ENCOUNTER — TELEMEDICINE (OUTPATIENT)
Dept: PSYCHIATRY | Facility: CLINIC | Age: 72
End: 2022-12-05

## 2022-12-05 DIAGNOSIS — F41.1 GENERALIZED ANXIETY DISORDER: Primary | ICD-10-CM

## 2022-12-05 PROCEDURE — 90837 PSYTX W PT 60 MINUTES: CPT | Performed by: COUNSELOR

## 2022-12-06 ENCOUNTER — TRANSCRIBE ORDERS (OUTPATIENT)
Dept: ADMINISTRATIVE | Facility: HOSPITAL | Age: 72
End: 2022-12-06

## 2022-12-06 DIAGNOSIS — H91.8X9 ASYMMETRICAL HEARING LOSS: ICD-10-CM

## 2022-12-06 DIAGNOSIS — H91.23 SUDDEN HEARING LOSS OF BOTH EARS: Primary | ICD-10-CM

## 2022-12-07 NOTE — PROGRESS NOTES
Date: December 7, 2022  Time In: 1430  Time Out: 1533  This provider is located at the [facility]through [facility]), [address], [place] [address] using a secure [employer] Video Visit through [facility]. Patient is being seen remotely via telehealth at home address in [place] and stated they are in a secure environment for this session. The patient's condition being diagnosed/treated is appropriate for telemedicine. The provider identified herself as well as her credentials. The patient, and/or patients guardian, consent to be seen remotely, and when consent is given they understand that the consent allows for patient identifiable information to be sent to a third party as needed. They may refuse to be seen remotely at any time. The electronic data is encrypted and password protected, and the patient and/or guardian has been advised of the potential risks to privacy not withstanding such measures.     You have chosen to receive care through a telehealth visit.  Do you consent to use a video/audio connection for your medical care today? Yes    PROGRESS NOTE  Data:  Nivia Cagle is a 72 y.o. female who presents today for follow up    Chief Complaint: depression    History of Present Illness: Pt reports having her home decorated for [place] and her efforts to ensure that [place] remains special. Pt reports having her grandsons over and allowed them to play and snack. Pt reports gratitude of her grandsons despite having to clean up after them discussed how she enjoys seeing them and spending time with them. Pt reports that she has been sleepy and overslept twice now for [place]. Pt reports that she enjoys attending [place] and hopes to attend next time. Pt reports that her son's marriage is still on the rocks per say and that she has not been included in their family's [place] plans so is wondering if it would be acceptable to invite them over to her home for food and  have the grandsons open gifts there. Pt reports that this would be an easier task if her daughter-in-law was more accepting of Pt. Pt reports that this does increase her anxiety due to not knowing how to best approach without causing any discomfort.       Clinical Maneuvering/Intervention:    (Scales based on 0 - 10 with 10 being the worst)  Depression: 3 Anxiety: 4     LADI-7      PHQ-9 Total Score:       Assisted patient in processing above session content; acknowledged and normalized patient’s thoughts, feelings, and concerns.  Rationalized patient thought process regarding upcoming plans and relationships.  Discussed triggers associated with patient's anxiety.  Also discussed coping skills for patient to implement such as understanding that Pt has no control over the feelings or opinions of others. Encouraged Pt to invite her son and his family over for a snack and gift exchanges and that this did not have to be on Christmas day but maybe during that week.    Allowed patient to freely discuss issues without interruption or judgment. Provided safe, confidential environment to facilitate the development of positive therapeutic relationship and encourage open, honest communication. Assisted patient in identifying risk factors which would indicate the need for higher level of care including thoughts to harm self or others and/or self-harming behavior and encouraged patient to contact this office, call 911, or present to the nearest emergency room should any of these events occur. Discussed crisis intervention services and means to access. Patient adamantly and convincingly denies current suicidal or homicidal ideation or perceptual disturbance.    Assessment:   Assessment   Patient appears to maintain relative stability as compared to their baseline.  However, patient continues to struggle with anxiety which continues to cause impairment in important areas of functioning.  A result, they can be reasonably expected to  continue to benefit from treatment and would likely be at increased risk for decompensation otherwise.    Mental Status Exam:   Hygiene:   good  Cooperation:  Cooperative  Eye Contact:  Good  Psychomotor Behavior:  Appropriate  Affect:  Appropriate  Mood: normal  Speech:  Normal  Thought Process:  Linear  Thought Content:  Normal  Suicidal:  None  Homicidal:  None  Hallucinations:  None  Delusion:  None  Memory:  Intact  Orientation:  Person, Place, Time and Situation  Reliability:  fair  Insight:  Fair  Judgement:  Fair  Impulse Control:  Fair  Physical/Medical Issues:  No        Patient's Support Network Includes:  son    Functional Status: Mild impairment     Progress toward goal: Not at goal    Prognosis: Fair with Ongoing Treatment          Plan:    Patient will continue in individual outpatient therapy with focus on improved functioning and coping skills, maintaining stability, and avoiding decompensation and the need for higher level of care.    Patient will adhere to medication regimen as prescribed and report any side effects. Patient will contact this office, call 911 or present to the nearest emergency room should suicidal or homicidal ideations occur. Provide Cognitive Behavioral Therapy and Solution Focused Therapy to improve functioning, maintain stability, and avoid decompensation and the need for higher level of care.     Return in about 4 weeks, or earlier if symptoms worsen or fail to improve.           VISIT DIAGNOSIS:     ICD-10-CM ICD-9-CM   1. Generalized anxiety disorder  F41.1 300.02        Diagnoses and all orders for this visit:    1. Generalized anxiety disorder (Primary)           [facility] No Show Policy:  We understand unexpected circumstances arise; however, anytime you miss your appointment we are unable to provide you appropriate care.  In addition, each appointment missed could have been used to provide care for others.  We ask that you call at least 24  hours in advance to cancel or reschedule an appointment.  We would like to take this opportunity to remind you of our policy stating patients who miss THREE or more appointments without cancelling or rescheduling 24 hours in advance of the appointment may be subject to cancellation of any further visits with our clinic and recommendation to seek in-person services/visits.    Please call [phone] to reschedule your appointment. If there are reasons that make it difficult for you to keep the appointments, please call and let us know how we can help.  Please understand that medication prescribing will not continue without seeing your provider.      [facility]'s No Show Policy reviewed with patient at today's visit. Patient verbalized understanding of this policy. Discussed with patient that in the event that there are three or more no show visits, it will be recommended that they pursue in-person services/visits as noncompliance with telehealth visits indicates that patient is not an appropriate candidate for telemedicine and would likely be more appropriate for in-person services/visits. Patient verbalizes understanding and is agreeable to this.        This document has been electronically signed by Annita Polo LCSW  December 7, 2022 09:46 EST      Part of this note may be an electronic transcription/translation of spoken language to printed text using the Dragon Dictation System.

## 2022-12-09 ENCOUNTER — TELEMEDICINE (OUTPATIENT)
Dept: PSYCHIATRY | Facility: CLINIC | Age: 72
End: 2022-12-09

## 2022-12-09 DIAGNOSIS — F51.05 INSOMNIA DUE TO MENTAL CONDITION: ICD-10-CM

## 2022-12-09 DIAGNOSIS — F41.1 GENERALIZED ANXIETY DISORDER: Primary | ICD-10-CM

## 2022-12-09 DIAGNOSIS — F33.1 MAJOR DEPRESSIVE DISORDER, RECURRENT EPISODE, MODERATE: ICD-10-CM

## 2022-12-09 DIAGNOSIS — F43.10 POST TRAUMATIC STRESS DISORDER (PTSD): ICD-10-CM

## 2022-12-09 PROBLEM — Z98.890 OTHER SPECIFIED POSTPROCEDURAL STATES: Status: ACTIVE | Noted: 2021-12-03

## 2022-12-09 PROCEDURE — 99214 OFFICE O/P EST MOD 30 MIN: CPT | Performed by: STUDENT IN AN ORGANIZED HEALTH CARE EDUCATION/TRAINING PROGRAM

## 2022-12-09 PROCEDURE — 90833 PSYTX W PT W E/M 30 MIN: CPT | Performed by: STUDENT IN AN ORGANIZED HEALTH CARE EDUCATION/TRAINING PROGRAM

## 2022-12-09 RX ORDER — TRAZODONE HYDROCHLORIDE 50 MG/1
100 TABLET ORAL NIGHTLY
Qty: 60 TABLET | Refills: 2 | Status: SHIPPED | OUTPATIENT
Start: 2022-12-09

## 2022-12-09 RX ORDER — B-COMPLEX WITH VITAMIN C
100 TABLET ORAL DAILY
COMMUNITY

## 2022-12-09 NOTE — PATIENT INSTRUCTIONS
1.  Please return to clinic at your next scheduled visit.  Contact the clinic ([phone]) at least 24 hours prior in the event you need to cancel.  2.  Do no harm to yourself or others.    3.  Avoid alcohol and drugs.    4.  Take all medications as prescribed.  Please contact the clinic with any concerns. If you are in need of medication refills, please call the clinic at [phone].    5. Should you want to get in touch with your provider, Dr. Vicky Barth, please utilize [employer] or contact the office ([phone]), and staff will be able to page Dr. Barth directly.  6.  In the event you have personal crisis, contact the following crisis numbers: Suicide Prevention Hotline 1-936.296.2317; [facility] Helpline 8-049-879-MNSF; [facility] Emergency Room [phone]; text HELLO to 735713; or 202.     SPECIFIC RECOMMENDATIONS:     1.      Medications discussed at this encounter:                   - no changes     2.      Psychotherapy recommendations:      3.     Return to clinic: 6 weeks

## 2022-12-09 NOTE — PROGRESS NOTES
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 Large Intestine, Transverse Colon, transverse colon polyp biopsy                               2) - Large Intestine, Sigmoid Colon, sigmoid colon polyp biopsy                           • Clinical Information 09/28/2022    Final                    Value:This result contains rich text formatting which cannot be displayed here.   • Final Diagnosis 09/28/2022    Final                    Value:This result contains rich text formatting which cannot be displayed here.   • Gross Description 09/28/2022    Final                    Value:This result contains rich text formatting which cannot be displayed here.   • Microscopic Description 09/28/2022    Final    This result contains rich text formatting which cannot be displayed here.   Hospital Outpatient Visit on 07/28/2022   Component Date Value Ref Range Status   • Target HR (85%) 07/28/2022 128  bpm Final   • Max. Pred. HR (100%) 07/28/2022 151  bpm Final   • XLRA ROSEMARY LEFT CAROTID BULB PSV 07/28/2022 72.00  cm/sec Final   • Prox CCA PSV 07/28/2022 110  cm/sec Final   • Prox CCA EDV 07/28/2022 23  cm/sec Final   • Dist CCA PSV 07/28/2022 59  cm/sec Final   • Dist CCA EDV 07/28/2022 11  cm/sec Final   • Prox ECA PSV 07/28/2022 89   cm/sec Final   • Prox ECA EDV 07/28/2022 0  cm/sec Final   • Prox ICA PSV 07/28/2022 53  cm/sec Final   • Prox ICA EDV 07/28/2022 11  cm/sec Final   • Mid ICA PSV 07/28/2022 52  cm/sec Final   • Mid ICA EDV 07/28/2022 18  cm/sec Final   • Dist ICA PSV 07/28/2022 70  cm/sec Final   • Dist ICA EDV 07/28/2022 31  cm/sec Final   • Vertebral A PSV 07/28/2022 52  cm/sec Final   • Vertebral A EDV 07/28/2022 15  cm/sec Final   • Prox CCA PSV 07/28/2022 80  cm/sec Final   • Prox CCA EDV 07/28/2022 22  cm/sec Final   • Dist CCA PSV 07/28/2022 70  cm/sec Final   • Dist CCA EDV 07/28/2022 20  cm/sec Final   • Prox ECA PSV 07/28/2022 81  cm/sec Final   • Prox ECA EDV 07/28/2022 1  cm/sec Final   • Prox ICA PSV 07/28/2022 63  cm/sec Final   • Prox ICA EDV 07/28/2022 18  cm/sec Final   • Mid ICA PSV 07/28/2022 64  cm/sec Final   • Mid ICA EDV 07/28/2022 19  cm/sec Final   • Dist ICA PSV 07/28/2022 62  cm/sec Final   • Dist ICA EDV 07/28/2022 21  cm/sec Final   • Vertebral A PSV 07/28/2022 28  cm/sec Final   • Vertebral A EDV 07/28/2022 6  cm/sec Final   • Left arm BP 07/28/2022 125/59  mmHg Final   • Right arm BP 07/28/2022 117/68  mmHg Final   • XLRA ROSEMARY LEFT CAROTID BULB EDV 07/28/2022 12.00  cm/sec Final   • XLRA ROSEMARY RIGHT CAROTID BULB PSV 07/28/2022 45.00  cm/sec Final   • XLRA ROSEMARY RIGHT CAROTID BULB EDV 07/28/2022 11.00  cm/sec Final   Lab on 07/01/2022   Component Date Value Ref Range Status   • 25 Hydroxy, Vitamin D 07/01/2022 46.2  30.0 - 100.0 ng/ml Final   • Glucose 07/01/2022 96  65 - 99 mg/dL Final   • BUN 07/01/2022 15  8 - 23 mg/dL Final   • Creatinine 07/01/2022 0.88  0.57 - 1.00 mg/dL Final   • Sodium 07/01/2022 137  136 - 145 mmol/L Final   • Potassium 07/01/2022 4.5  3.5 - 5.2 mmol/L Final   • Chloride 07/01/2022 99  98 - 107 mmol/L Final   • CO2 07/01/2022 27.0  22.0 - 29.0 mmol/L Final   • Calcium 07/01/2022 10.2  8.6 - 10.5 mg/dL Final   • Total Protein 07/01/2022 7.3  6.0 - 8.5 g/dL Final   • Albumin  07/01/2022 4.30  3.50 - 5.20 g/dL Final   • ALT (SGPT) 07/01/2022 14  1 - 33 U/L Final   • AST (SGOT) 07/01/2022 15  1 - 32 U/L Final   • Alkaline Phosphatase 07/01/2022 68  39 - 117 U/L Final   • Total Bilirubin 07/01/2022 0.6  0.0 - 1.2 mg/dL Final   • Globulin 07/01/2022 3.0  gm/dL Final   • A/G Ratio 07/01/2022 1.4  g/dL Final   • BUN/Creatinine Ratio 07/01/2022 17.0  7.0 - 25.0 Final   • Anion Gap 07/01/2022 11.0  5.0 - 15.0 mmol/L Final   • eGFR 07/01/2022 71.2  >60.0 mL/min/1.73 Final    National Kidney Foundation and American Society of Nephrology (ASN) Task Force recommended calculation based on the Chronic Kidney Disease Epidemiology Collaboration (CKD-EPI) equation refit without adjustment for race.   • TSH 07/01/2022 2.530  0.270 - 4.200 uIU/mL Final   • Total Cholesterol 07/01/2022 197  0 - 200 mg/dL Final   • Triglycerides 07/01/2022 103  0 - 150 mg/dL Final   • HDL Cholesterol 07/01/2022 53  40 - 60 mg/dL Final   • LDL Cholesterol  07/01/2022 125 (H)  0 - 100 mg/dL Final   • VLDL Cholesterol 07/01/2022 19  5 - 40 mg/dL Final   • LDL/HDL Ratio 07/01/2022 2.33   Final   • Hemoglobin A1C 07/01/2022 5.80 (H)  4.80 - 5.60 % Final   • WBC 07/01/2022 7.30  3.40 - 10.80 10*3/mm3 Final   • RBC 07/01/2022 4.20  3.77 - 5.28 10*6/mm3 Final   • Hemoglobin 07/01/2022 12.9  12.0 - 15.9 g/dL Final   • Hematocrit 07/01/2022 38.9  34.0 - 46.6 % Final   • MCV 07/01/2022 92.6  79.0 - 97.0 fL Final   • MCH 07/01/2022 30.7  26.6 - 33.0 pg Final   • MCHC 07/01/2022 33.2  31.5 - 35.7 g/dL Final   • RDW 07/01/2022 12.2 (L)  12.3 - 15.4 % Final   • RDW-SD 07/01/2022 40.9  37.0 - 54.0 fl Final   • MPV 07/01/2022 11.8  6.0 - 12.0 fL Final   • Platelets 07/01/2022 231  140 - 450 10*3/mm3 Final   • Neutrophil % 07/01/2022 67.2  42.7 - 76.0 % Final   • Lymphocyte % 07/01/2022 20.3  19.6 - 45.3 % Final   • Monocyte % 07/01/2022 9.9  5.0 - 12.0 % Final   • Eosinophil % 07/01/2022 1.8  0.3 - 6.2 % Final   • Basophil % 07/01/2022 0.5   0.0 - 1.5 % Final   • Immature Grans % 07/01/2022 0.3  0.0 - 0.5 % Final   • Neutrophils, Absolute 07/01/2022 4.91  1.70 - 7.00 10*3/mm3 Final   • Lymphocytes, Absolute 07/01/2022 1.48  0.70 - 3.10 10*3/mm3 Final   • Monocytes, Absolute 07/01/2022 0.72  0.10 - 0.90 10*3/mm3 Final   • Eosinophils, Absolute 07/01/2022 0.13  0.00 - 0.40 10*3/mm3 Final   • Basophils, Absolute 07/01/2022 0.04  0.00 - 0.20 10*3/mm3 Final   • Immature Grans, Absolute 07/01/2022 0.02  0.00 - 0.05 10*3/mm3 Final   • nRBC 07/01/2022 0.0  0.0 - 0.2 /100 WBC Final   Clinical Support on 06/21/2022   Component Date Value Ref Range Status   • Color, UA 06/21/2022 Yellow  Yellow, Straw Final   • Appearance, UA 06/21/2022 Clear  Clear Final   • pH, UA 06/21/2022 5.0  5.0 - 8.0 Final   • Specific Gravity, UA 06/21/2022 1.030  1.005 - 1.030 Final   • Glucose, UA 06/21/2022 Negative  Negative Final   • Ketones, UA 06/21/2022 Trace (A)  Negative Final   • Bilirubin, UA 06/21/2022 Small (1+) (A)  Negative Final   • Blood, UA 06/21/2022 Negative  Negative Final   • Protein, UA 06/21/2022 Trace (A)  Negative Final   • Leuk Esterase, UA 06/21/2022 Trace (A)  Negative Final   • Nitrite, UA 06/21/2022 Negative  Negative Final   • Urobilinogen, UA 06/21/2022 1.0 E.U./dL  0.2 - 1.0 E.U./dL Final   • Color 06/21/2022 Yellow  Yellow, Straw, Dark Yellow, Annita Final   • Clarity, UA 06/21/2022 Clear  Clear Final   • Specific Gravity  06/21/2022 1.030  1.005 - 1.030 Final   • pH, Urine 06/21/2022 5.0  5.0 - 8.0 Final   • Leukocytes 06/21/2022 Trace (A)  Negative Final   • Nitrite, UA 06/21/2022 Negative  Negative Final   • Protein, POC 06/21/2022 Trace (A)  Negative mg/dL Final   • Glucose, UA 06/21/2022 Negative  Negative, 1000 mg/dL (3+) mg/dL Final   • Ketones, UA 06/21/2022 15 mg/dL (A)  Negative Final   • Urobilinogen, UA 06/21/2022 1 E.U./dL (A)  Normal Final   • Bilirubin 06/21/2022 Small (1+) (A)  Negative Final   • Blood, UA 06/21/2022 Negative  Negative  Final   • Lot Number 06/21/2022 [id number]   Final   • Expiration Date 06/21/2022 6/30/23   Final   • Urine Culture 06/21/2022 No growth   Final   Office Visit on 06/13/2022   Component Date Value Ref Range Status   • Color, UA 06/13/2022 Yellow  Yellow, Straw Final   • Appearance, UA 06/13/2022 Clear  Clear Final   • pH, UA 06/13/2022 5.5  5.0 - 8.0 Final   • Specific Gravity, UA 06/13/2022 >=1.030  1.005 - 1.030 Final   • Glucose, UA 06/13/2022 Negative  Negative Final   • Ketones, UA 06/13/2022 Trace (A)  Negative Final   • Bilirubin, UA 06/13/2022 Small (1+) (A)  Negative Final   • Blood, UA 06/13/2022 Moderate (2+) (A)  Negative Final   • Protein, UA 06/13/2022 100 mg/dL (2+) (A)  Negative Final   • Leuk Esterase, UA 06/13/2022 Trace (A)  Negative Final   • Nitrite, UA 06/13/2022 Negative  Negative Final   • Urobilinogen, UA 06/13/2022 0.2 E.U./dL  0.2 - 1.0 E.U./dL Final   • Urine Culture 06/13/2022 <25,000 CFU/mL Escherichia coli ESBL (A)   Corrected    Consider infectious disease consult.  Susceptibility results may not correlate to clinical outcomes.       EKG Results:  No orders to display       Imaging Results:  No Images in the past 120 days found..      Assessment & Plan   Diagnoses and all orders for this visit:    1. Generalized anxiety disorder (Primary)    2. Major depressive disorder, recurrent episode, moderate (HCC)    3. Post traumatic stress disorder (PTSD)    4. Insomnia due to mental condition  -     traZODone (DESYREL) 50 MG tablet; Take 2 tablets by mouth Every Night.  Dispense: 60 tablet; Refill: 2        INITIAL presentation most consistent with major depressive disorder, recurrent, moderate to severe, with anxious distress.  PTSD.  Rule out personality disorder, cluster B specifically.  Rule out hypomania as patient was very difficult to interrupt today.    12/9: Some insomnia. Increase melatonin. 17 minutes of supportive psychotherapy with goal to strengthen defenses, promote problems  solving, restore adaptive functioning and provide symptom relief. The therapeutic alliance was strengthened to encourage the patient to express their thoughts and feelings. Esteem building was enhanced through praise, reassurance, normalizing and encouragement. Coping skills were enhanced to build distress tolerance skills and emotional regulation. Allowed patient to freely discuss issues without interruption or judgement with unconditional positive regard, active listening skills, and empathy. Provided a safe, confidential environment to facilitate the development of a positive therapeutic relationship and encourage open, honest communication. Assisted patient in identifying risk factors which would indicate the need for higher level of care including thoughts to harm self or others and/or self-harming behavior and encouraged patient to contact this office, call 911, or present to the nearest emergency room should any of these events occur. Assisted patient in processing session content; acknowledged and normalized patient’s thoughts, feelings, and concerns by utilizing a person-centered approach in efforts to build appropriate rapport and a positive therapeutic relationship with open and honest communication. Patient given education on medication side effects, diagnosis/illness and relapse symptoms. Plan to continue supportive psychotherapy in next appointment to provide symptom relief.  Diagnoses: as above  Symptoms: as above  Functional status: good  Mental Status Exam: as above    Treatment plan: Medication management and supportive psychotherapy  Prognosis: good  Progress: stable, well  6 wks      10/25: Doing well. Increase prozac back to baseline dose (inadvertently reduced, she has been stable on a higher dose, so we should go back to that). Some initial insomnia, increase trazodone to 75 mg nightly. She is enjoying the Fall. 21 minutes of supportive psychotherapy  Treatment plan: Medication management and  supportive psychotherapy  Prognosis: good  Progress: stable, but having initial insomnia  6 wks      9/8: Start light box. Close follow up in case this is worsening depression. 16 minutes of supportive psychotherapy  Treatment plan: Medication management and supportive psychotherapy  Prognosis: good  Progress: stable  6 wks      7/27: Well, stable. 17 minutes of supportive psychotherapyTreatment plan: Medication management and supportive psychotherapy  Prognosis: good  Progress: stable, well  6 wks      6/14: Better, no changes. 17 minutes of supportive psychotherapy Treatment plan: Medication management and supportive psychotherapy  Prognosis: good  Progress: less depressed  6 wks      5/3: Increase prozac and melatonin. 17 minutes of supportive psychotherapyTreatment plan: Medication management and supportive psychotherapy  Prognosis: good  Progress: backtracked recently, now depressed  6 wks      Visit Diagnoses:    ICD-10-CM ICD-9-CM   1. Generalized anxiety disorder  F41.1 300.02   2. Major depressive disorder, recurrent episode, moderate (HCC)  F33.1 296.32   3. Post traumatic stress disorder (PTSD)  F43.10 309.81   4. Insomnia due to mental condition  F51.05 300.9     327.02       PLAN:  81. Risk Assessment: Risk of self-harm acutely is moderate. Risk factors include chronic depressive disorder, possible personality disorder, recent psychosocial stressors (pandemic, moving). Protective factors include no present SI, no history of suicide attempts or self-harm in the past, no access to weapons, minimal AODA, healthcare seeking, future orientation, willingness to engage in care. Risk of self-harm chronically is also moderate, but could be further elevated in the event of treatment noncompliance and/or AODA.  82. Safety: No acute safety concerns.  83. Medications:   a. CONTINUE melatonin 18 mg p.o. nightly.  Risks, benefits, side effects discussed with patient including sedation, dizziness/falls risk, GI upset.   After discussion of these risks and benefits, the patient voiced understanding and agreed to proceed.  b. CONTINUE trazodone 100 mg PO QHS. Risks, benefits, side effects discussed with patient including GI upset, sedation, dizziness/falls risk, grogginess the following day, prolongation of the QTc interval.  After discussion of these risks and benefits, the patient voiced understanding and agreed to proceed.    c. CONTINUE bupropion xl 300 mg daily. Risks, benefits, alternatives discussed with patient including nausea, GI upset, increased energy, exacerbation of irritability, insomnia, lowering of seizure threshold.  After discussion of these risks and benefits, the patient voiced understanding and agreed to proceed.  d. CONTINUE Prozac 40 mg a day (HIGHEST dose given that she is also on bupropion). Risks, benefits, alternatives discussed with patient including GI upset, nausea vomiting diarrhea, theoretical decrease of seizure threshold predisposing the patient to a slightly higher seizure risk, headaches, sexual dysfunction, serotonin syndrome, bleeding risk, increased suicidality in patients 24 years and younger.  After discussion of these risks and benefits, the patient voiced understanding and agreed to proceed.  e. CONTINUE Abilify 4 mg p.o. daily to target depression, anxiety, decreased energy. Risks, benefits, alternatives discussed with patient including increased energy, exacerbation of irritability, akathisia, GI upset, orthostatic hypotension, increased appetite. After discussion of these risks and benefits, the patient voiced understanding and agreed to proceed.  f. INCREASE melatonin 20 to 30 mg daily. Risks, benefits, side effects discussed with patient including sedation, dizziness/falls risk, GI upset.  After discussion of these risks and benefits, the patient voiced understanding and agreed to proceed.   i. S/P:  1. Mirtazapine 45 mg daily (RLS)  84. Therapy: referred to [facility]  12/7.  85. Labs/Studies: s/p TMS referral.  86. Follow Up: 6 weeks. (prefers 4 wks)      TREATMENT PLAN/GOALS: Continue supportive psychotherapy efforts and medications as indicated. Treatment and medication options discussed during today's visit. Patient acknowledged and verbally consented to continue with current treatment plan and was educated on the importance of compliance with treatment and follow-up appointments.    MEDICATION ISSUES:  SAPNA reviewed as expected.  Discussed medication options and treatment plan of prescribed medication as well as the risks, benefits, and side effects including potential falls, possible impaired driving and metabolic adversities among others. Patient is agreeable to call the office with any worsening of symptoms or onset of side effects. Patient is agreeable to call 911 or go to the nearest ER should he/she begin having SI/HI. No medication side effects or related complaints today.     MEDS ORDERED DURING VISIT:  New Medications Ordered This Visit   Medications   • traZODone (DESYREL) 50 MG tablet     Sig: Take 2 tablets by mouth Every Night.     Dispense:  60 tablet     Refill:  2       Return in about 6 weeks (around 1/20/2023).         This document has been electronically signed by Vicky Barth MD  December 9, 2022 11:25 EST      Part of this note may be an electronic transcription/translation of spoken language to printed text using the Dragon Dictation System.

## 2022-12-14 ENCOUNTER — OFFICE VISIT (OUTPATIENT)
Dept: INTERNAL MEDICINE | Facility: CLINIC | Age: 72
End: 2022-12-14

## 2022-12-14 VITALS
DIASTOLIC BLOOD PRESSURE: 80 MMHG | SYSTOLIC BLOOD PRESSURE: 124 MMHG | OXYGEN SATURATION: 97 % | BODY MASS INDEX: 27.99 KG/M2 | WEIGHT: 158 LBS | TEMPERATURE: 97.7 F | HEART RATE: 79 BPM

## 2022-12-14 DIAGNOSIS — N30.01 ACUTE CYSTITIS WITH HEMATURIA: Primary | ICD-10-CM

## 2022-12-14 DIAGNOSIS — R30.0 DYSURIA: ICD-10-CM

## 2022-12-14 LAB
BACTERIA UR QL AUTO: ABNORMAL /HPF
BILIRUB UR QL STRIP: ABNORMAL
CLARITY UR: CLEAR
COLOR UR: YELLOW
GLUCOSE UR STRIP-MCNC: ABNORMAL MG/DL
HGB UR QL STRIP.AUTO: ABNORMAL
HYALINE CASTS UR QL AUTO: ABNORMAL /LPF
KETONES UR QL STRIP: ABNORMAL
LEUKOCYTE ESTERASE UR QL STRIP.AUTO: ABNORMAL
NITRITE UR QL STRIP: POSITIVE
PH UR STRIP.AUTO: <=5 [PH] (ref 5–8)
PROT UR QL STRIP: ABNORMAL
RBC # UR STRIP: ABNORMAL /HPF
REF LAB TEST METHOD: ABNORMAL
SP GR UR STRIP: >=1.03 (ref 1–1.03)
SQUAMOUS #/AREA URNS HPF: ABNORMAL /HPF
UROBILINOGEN UR QL STRIP: ABNORMAL
WBC # UR STRIP: ABNORMAL /HPF

## 2022-12-14 PROCEDURE — 87186 SC STD MICRODIL/AGAR DIL: CPT | Performed by: INTERNAL MEDICINE

## 2022-12-14 PROCEDURE — 87088 URINE BACTERIA CULTURE: CPT | Performed by: INTERNAL MEDICINE

## 2022-12-14 PROCEDURE — 99213 OFFICE O/P EST LOW 20 MIN: CPT | Performed by: INTERNAL MEDICINE

## 2022-12-14 PROCEDURE — 81001 URINALYSIS AUTO W/SCOPE: CPT | Performed by: INTERNAL MEDICINE

## 2022-12-14 PROCEDURE — 87086 URINE CULTURE/COLONY COUNT: CPT | Performed by: INTERNAL MEDICINE

## 2022-12-14 RX ORDER — SULFAMETHOXAZOLE AND TRIMETHOPRIM 800; 160 MG/1; MG/1
1 TABLET ORAL 2 TIMES DAILY
Qty: 10 TABLET | Refills: 0 | Status: SHIPPED | OUTPATIENT
Start: 2022-12-14 | End: 2022-12-19

## 2022-12-14 NOTE — PROGRESS NOTES
Chief Complaint  Urinary Tract Infection (Bleeding so much on 12/13/22 looked like she was on a period and burned )    Subjective       Nivia Cagle presents to [facility]    HPI   Patient presenting with dysuria, hematuria starting yesterday.  She states that symptoms were very severe yesterday evening so much that she could not sleep.  Symptoms have improved slightly today.  She can no longer see blood in her urine and her dysuria has improved somewhat.    She states that she has a history of kidney stones but did not experience hematuria with these.  She denies flank pain or colicky nature to her pain.    Objective     Vitals:    12/14/22 1236   BP: 124/80   BP Location: Left arm   Pulse: 79   Temp: 97.7 °F (36.5 °C)   TempSrc: Temporal   SpO2: 97%   Weight: 71.7 kg (158 lb)      Wt Readings from Last 3 Encounters:   12/14/22 71.7 kg (158 lb)   11/21/22 74.6 kg (164 lb 6.4 oz)   09/28/22 72.2 kg (159 lb 2.8 oz)      BP Readings from Last 3 Encounters:   12/14/22 124/80   11/21/22 132/80   09/28/22 111/65        Body mass index is 27.99 kg/m².           Physical Exam  Vitals reviewed.   Constitutional:       Appearance: Normal appearance. She is well-developed.   HENT:      Head: Normocephalic and atraumatic.      Mouth/Throat:      Pharynx: No oropharyngeal exudate.   Eyes:      Conjunctiva/sclera: Conjunctivae normal.      Pupils: Pupils are equal, round, and reactive to light.   Cardiovascular:      Rate and Rhythm: Normal rate and regular rhythm.      Heart sounds: No murmur heard.    No friction rub. No gallop.   Pulmonary:      Effort: Pulmonary effort is normal.      Breath sounds: Normal breath sounds. No wheezing or rhonchi.   Abdominal:      Tenderness: There is no right CVA tenderness or left CVA tenderness.   Skin:     General: Skin is warm and dry.   Neurological:      Mental Status: She is alert and oriented to person, place, and time.    Psychiatric:         Mood and Affect: Affect normal.          Result Review :   The following data was reviewed by: Laine Fraser MD on 12/14/2022:  Office Visit on 12/14/2022   Component Date Value Ref Range Status   • Color, UA 12/14/2022 Yellow  Yellow, Straw Final   • Appearance, UA 12/14/2022 Clear  Clear Final   • pH, UA 12/14/2022 <=5.0  5.0 - 8.0 Final   • Specific Gravity, UA 12/14/2022 >=1.030  1.005 - 1.030 Final   • Glucose, UA 12/14/2022 100 mg/dL (Trace) (A)  Negative Final   • Ketones, UA 12/14/2022 Trace (A)  Negative Final   • Bilirubin, UA 12/14/2022 Moderate (2+) (A)  Negative Final   • Blood, UA 12/14/2022 Moderate (2+) (A)  Negative Final   • Protein, UA 12/14/2022 >=300 mg/dL (3+) (A)  Negative Final   • Leuk Esterase, UA 12/14/2022 Trace (A)  Negative Final   • Nitrite, UA 12/14/2022 Positive (A)  Negative Final   • Urobilinogen, UA 12/14/2022 0.2 E.U./dL  0.2 - 1.0 E.U./dL Final             Procedures    Assessment and Plan   Diagnoses and all orders for this visit:    1. Acute cystitis with hematuria (Primary)  Assessment & Plan:  Patient with history of kidney stones, however she denies flank pain or renal colic type pain.   UA with +Nit and Moderate blood  Will treat for UTI with Bactrim  Will send urine for culture and adjust abx if necessary based on sensitivities  Patient to call clinic to seek care in UC/ER if pain, bleeding worsening or failing to improve      2. Dysuria  -     Urinalysis With Culture If Indicated -; Future  -     Urinalysis With Culture If Indicated - Urine, Clean Catch  -     Urinalysis, Microscopic Only - Urine, Clean Catch  -     Urine Culture - Urine, Urine, Clean Catch    Other orders  -     sulfamethoxazole-trimethoprim (Bactrim DS) 800-160 MG per tablet; Take 1 tablet by mouth 2 (Two) Times a Day for 5 days.  Dispense: 10 tablet; Refill: 0        Follow Up   Return if symptoms worsen or fail to improve.  Patient was given instructions and  counseling regarding her condition or for health maintenance advice. Please see specific information pulled into the AVS if appropriate.

## 2022-12-14 NOTE — ASSESSMENT & PLAN NOTE
Patient with history of kidney stones, however she denies flank pain or renal colic type pain.   UA with +Nit and Moderate blood  Will treat for UTI with Bactrim  Will send urine for culture and adjust abx if necessary based on sensitivities  Patient to call clinic to seek care in UC/ER if pain, bleeding worsening or failing to improve

## 2022-12-16 ENCOUNTER — TELEPHONE (OUTPATIENT)
Dept: INTERNAL MEDICINE | Facility: CLINIC | Age: 72
End: 2022-12-16

## 2022-12-16 LAB — BACTERIA SPEC AEROBE CULT: ABNORMAL

## 2022-12-16 PROCEDURE — 87086 URINE CULTURE/COLONY COUNT: CPT | Performed by: EMERGENCY MEDICINE

## 2022-12-16 RX ORDER — NITROFURANTOIN 25; 75 MG/1; MG/1
100 CAPSULE ORAL 2 TIMES DAILY
Qty: 10 CAPSULE | Refills: 0 | Status: SHIPPED | OUTPATIENT
Start: 2022-12-16 | End: 2022-12-21

## 2022-12-16 NOTE — TELEPHONE ENCOUNTER
Pt has called in letting me know she is having bad abdominal pain and bleeding out her rectum last week I advised her to go to the ER and now she is calling back stating she never went to the ER and she is seeing blood when she pees. I again advised her to go to the ER and she said she would go.

## 2022-12-16 NOTE — TELEPHONE ENCOUNTER
Left detailed voicemail with patient. Informed her if she had any questions to feel free to call back.

## 2022-12-16 NOTE — TELEPHONE ENCOUNTER
----- Message from Laine Fraser MD sent at 12/16/2022 10:43 AM EST -----  Urine culture showing E.coli UTI. Bacteria is resistent to the prescribed Bactrim. Will switch antibiotics. I will send in a prescription for Macrobid to the pharmacy. Please stop the Bactrim and start the new antibiotic as soon as possible.

## 2022-12-27 RX ORDER — SITAGLIPTIN 100 MG/1
TABLET, FILM COATED ORAL
Qty: 90 TABLET | Refills: 1 | Status: SHIPPED | OUTPATIENT
Start: 2022-12-27

## 2022-12-28 ENCOUNTER — HOSPITAL ENCOUNTER (OUTPATIENT)
Dept: MRI IMAGING | Facility: HOSPITAL | Age: 72
Discharge: HOME | End: 2022-12-28
Admitting: PHYSICIAN ASSISTANT

## 2022-12-28 DIAGNOSIS — H91.8X9 ASYMMETRICAL HEARING LOSS: ICD-10-CM

## 2022-12-28 DIAGNOSIS — H91.23 SUDDEN HEARING LOSS OF BOTH EARS: ICD-10-CM

## 2022-12-28 LAB
CREAT BLDA-MCNC: 1 MG/DL
EGFRCR SERPLBLD CKD-EPI 2021: 60.7 ML/MIN/1.73

## 2022-12-28 PROCEDURE — A9577 INJ MULTIHANCE: HCPCS | Performed by: PHYSICIAN ASSISTANT

## 2022-12-28 PROCEDURE — 0 GADOBENATE DIMEGLUMINE 529 MG/ML SOLUTION: Performed by: PHYSICIAN ASSISTANT

## 2022-12-28 PROCEDURE — 70553 MRI BRAIN STEM W/O & W/DYE: CPT

## 2022-12-28 PROCEDURE — 82565 ASSAY OF CREATININE: CPT

## 2022-12-28 RX ADMIN — GADOBENATE DIMEGLUMINE 15 ML: 529 INJECTION, SOLUTION INTRAVENOUS at 14:29

## 2022-12-30 RX ORDER — QUININE SULFATE 324 MG/1
324 CAPSULE ORAL
Qty: 15 CAPSULE | Refills: 0 | OUTPATIENT
Start: 2022-12-30

## 2022-12-30 NOTE — TELEPHONE ENCOUNTER
Reviewed Catalina's note that stated further refills of this medication should be received from neurology.

## 2023-01-10 DIAGNOSIS — B35.1 ONYCHOMYCOSIS: ICD-10-CM

## 2023-01-10 RX ORDER — TERBINAFINE HYDROCHLORIDE 250 MG/1
TABLET ORAL
Qty: 90 TABLET | Refills: 0 | OUTPATIENT
Start: 2023-01-10

## 2023-01-20 ENCOUNTER — TELEMEDICINE (OUTPATIENT)
Dept: PSYCHIATRY | Facility: CLINIC | Age: 73
End: 2023-01-20
Payer: MEDICARE

## 2023-01-20 DIAGNOSIS — F41.1 GENERALIZED ANXIETY DISORDER: Primary | ICD-10-CM

## 2023-01-20 DIAGNOSIS — F43.10 POST TRAUMATIC STRESS DISORDER (PTSD): ICD-10-CM

## 2023-01-20 DIAGNOSIS — F33.40 RECURRENT MAJOR DEPRESSIVE DISORDER IN REMISSION: ICD-10-CM

## 2023-01-20 DIAGNOSIS — F51.05 INSOMNIA DUE TO MENTAL CONDITION: ICD-10-CM

## 2023-01-20 PROCEDURE — 99214 OFFICE O/P EST MOD 30 MIN: CPT | Performed by: STUDENT IN AN ORGANIZED HEALTH CARE EDUCATION/TRAINING PROGRAM

## 2023-01-20 PROCEDURE — 90833 PSYTX W PT W E/M 30 MIN: CPT | Performed by: STUDENT IN AN ORGANIZED HEALTH CARE EDUCATION/TRAINING PROGRAM

## 2023-01-20 RX ORDER — FLUOXETINE 10 MG/1
10 CAPSULE ORAL DAILY
Qty: 90 CAPSULE | Refills: 1 | Status: SHIPPED | OUTPATIENT
Start: 2023-01-20 | End: 2023-03-09

## 2023-01-20 RX ORDER — FLUOXETINE HYDROCHLORIDE 20 MG/1
20 CAPSULE ORAL DAILY
COMMUNITY
Start: 2022-12-16 | End: 2023-01-20 | Stop reason: SDUPTHER

## 2023-01-20 RX ORDER — ACETAMINOPHEN AND CODEINE PHOSPHATE 300; 30 MG/1; MG/1
TABLET ORAL SEE ADMIN INSTRUCTIONS
COMMUNITY
Start: 2023-01-17 | End: 2023-03-30

## 2023-01-20 RX ORDER — PREDNISOLONE ACETATE 1.2 MG/ML
SUSPENSION/ DROPS OPHTHALMIC
COMMUNITY
Start: 2023-01-19 | End: 2023-01-20

## 2023-01-20 RX ORDER — FLUCONAZOLE 150 MG/1
TABLET ORAL
COMMUNITY
Start: 2022-12-16 | End: 2023-01-20

## 2023-01-20 RX ORDER — FLUOXETINE HYDROCHLORIDE 20 MG/1
20 CAPSULE ORAL DAILY
Qty: 90 CAPSULE | Refills: 3 | Status: SHIPPED | OUTPATIENT
Start: 2023-01-20

## 2023-01-20 NOTE — PATIENT INSTRUCTIONS
1.  Please return to clinic at your next scheduled visit.  Contact the clinic ([phone]) at least 24 hours prior in the event you need to cancel.  2.  Do no harm to yourself or others.    3.  Avoid alcohol and drugs.    4.  Take all medications as prescribed.  Please contact the clinic with any concerns. If you are in need of medication refills, please call the clinic at [phone].    5. Should you want to get in touch with your provider, Dr. Vicky Barth, please utilize [employer] or contact the office ([phone]), and staff will be able to page Dr. Barth directly.  6.  In the event you have personal crisis, contact the following crisis numbers: Suicide Prevention Hotline 1-257.212.4361; [facility] Helpline 4-218-448-YOXO; [facility] Emergency Room [phone]; text HELLO to 112459; or 206.     SPECIFIC RECOMMENDATIONS:     1.      Medications discussed at this encounter:                   - no changes    - Light box: Obtain a light box, 10,000 lux, put the box about 1 foot away from you, facing you at an angle (not directly), use it daily, right after waking up, for 1 hour daily. Don't stare directly at it. Read, eat, watch tv, etc. Call your insurance company to see if they can reimburse you for the cost. Available at big box retailers.       2.      Psychotherapy recommendations:      3.     Return to clinic: 6 weeks

## 2023-01-20 NOTE — PROGRESS NOTES
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 23 mg/dL Final   • Creatinine 11/01/2022 0.89  0.57 - 1.00 mg/dL Final   • Sodium 11/01/2022 140  136 - 145 mmol/L Final   • Potassium 11/01/2022 4.4  3.5 - 5.2 mmol/L Final   • Chloride 11/01/2022 101  98 - 107 mmol/L Final   • CO2 11/01/2022 28.4  22.0 - 29.0 mmol/L Final   • Calcium 11/01/2022 9.8  8.6 - 10.5 mg/dL Final   • Total Protein 11/01/2022 7.0  6.0 - 8.5 g/dL Final   • Albumin 11/01/2022 4.60  3.50 - 5.20 g/dL Final   • ALT (SGPT) 11/01/2022 11  1 - 33 U/L Final   • AST (SGOT) 11/01/2022 18  1 - 32 U/L Final   • Alkaline Phosphatase 11/01/2022 56  39 - 117 U/L Final   • Total Bilirubin 11/01/2022 0.3  0.0 - 1.2 mg/dL Final   • Globulin 11/01/2022 2.4  gm/dL Final   • A/G Ratio 11/01/2022 1.9  g/dL Final   • BUN/Creatinine Ratio 11/01/2022 19.1  7.0 - 25.0 Final   • Anion Gap 11/01/2022 10.6  5.0 - 15.0 mmol/L Final   • eGFR 11/01/2022 69.8  >60.0 mL/min/1.73 Final    National Kidney Foundation and American Society of Nephrology (ASN) Task Force recommended calculation based on the Chronic Kidney Disease Epidemiology Collaboration (CKD-EPI) equation refit without adjustment for race.   • TSH 11/01/2022 3.210  0.270 - 4.200 uIU/mL Final   • Total Cholesterol 11/01/2022 212 (H)  0 - 200 mg/dL Final   • Triglycerides 11/01/2022 80  0  - 150 mg/dL Final   • HDL Cholesterol 11/01/2022 63 (H)  40 - 60 mg/dL Final   • LDL Cholesterol  11/01/2022 135 (H)  0 - 100 mg/dL Final   • VLDL Cholesterol 11/01/2022 14  5 - 40 mg/dL Final   • LDL/HDL Ratio 11/01/2022 2.11   Final   • Hemoglobin A1C 11/01/2022 5.80 (H)  4.80 - 5.60 % Final   • WBC 11/01/2022 6.47  3.40 - 10.80 10*3/mm3 Final   • RBC 11/01/2022 4.00  3.77 - 5.28 10*6/mm3 Final   • Hemoglobin 11/01/2022 12.4  12.0 - 15.9 g/dL Final   • Hematocrit 11/01/2022 37.5  34.0 - 46.6 % Final   • MCV 11/01/2022 93.8  79.0 - 97.0 fL Final   • MCH 11/01/2022 31.0  26.6 - 33.0 pg Final   • MCHC 11/01/2022 33.1  31.5 - 35.7 g/dL Final   • RDW 11/01/2022 12.2 (L)  12.3 - 15.4 % Final   • RDW-SD 11/01/2022 42.0  37.0 - 54.0 fl Final   • MPV 11/01/2022 11.7  6.0 - 12.0 fL Final   • Platelets 11/01/2022 224  140 - 450 10*3/mm3 Final   • Neutrophil % 11/01/2022 63.0  42.7 - 76.0 % Final   • Lymphocyte % 11/01/2022 22.3  19.6 - 45.3 % Final   • Monocyte % 11/01/2022 11.9  5.0 - 12.0 % Final   • Eosinophil % 11/01/2022 2.0  0.3 - 6.2 % Final   • Basophil % 11/01/2022 0.5  0.0 - 1.5 % Final   • Immature Grans % 11/01/2022 0.3  0.0 - 0.5 % Final   • Neutrophils, Absolute 11/01/2022 4.08  1.70 - 7.00 10*3/mm3 Final   • Lymphocytes, Absolute 11/01/2022 1.44  0.70 - 3.10 10*3/mm3 Final   • Monocytes, Absolute 11/01/2022 0.77  0.10 - 0.90 10*3/mm3 Final   • Eosinophils, Absolute 11/01/2022 0.13  0.00 - 0.40 10*3/mm3 Final   • Basophils, Absolute 11/01/2022 0.03  0.00 - 0.20 10*3/mm3 Final   • Immature Grans, Absolute 11/01/2022 0.02  0.00 - 0.05 10*3/mm3 Final   • nRBC 11/01/2022 0.0  0.0 - 0.2 /100 WBC Final   Admission on 09/28/2022, Discharged on 09/28/2022   Component Date Value Ref Range Status   • Glucose 09/28/2022 117 (H)  70 - 99 mg/dL Final    Serial Number: [id number]:  [id number]   • Case Report 09/28/2022    Final                    Value:Surgical Pathology Report                         Case: [id number]                                   Authorizing Provider:  Ghislaine Kilgore MD Collected:           09/28/2022 11:39 AM          Ordering Location:     [facility] Received:            09/28/2022 12:15 PM                                 SUITES                                                                       Pathologist:           Eduardo Dick MD                                                            Specimens:   1) - Large Intestine, Transverse Colon, transverse colon polyp biopsy                               2) - Large Intestine, Sigmoid Colon, sigmoid colon polyp biopsy                           • Clinical Information 09/28/2022    Final                    Value:This result contains rich text formatting which cannot be displayed here.   • Final Diagnosis 09/28/2022    Final                    Value:This result contains rich text formatting which cannot be displayed here.   • Gross Description 09/28/2022    Final                    Value:This result contains rich text formatting which cannot be displayed here.   • Microscopic Description 09/28/2022    Final    This result contains rich text formatting which cannot be displayed here.   Hospital Outpatient Visit on 07/28/2022   Component Date Value Ref Range Status   • Target HR (85%) 07/28/2022 128  bpm Final   • Max. Pred. HR (100%) 07/28/2022 151  bpm Final   • XLRA ROSEMARY LEFT CAROTID BULB PSV 07/28/2022 72.00  cm/sec Final   • Prox CCA PSV 07/28/2022 110  cm/sec Final   • Prox CCA EDV 07/28/2022 23  cm/sec Final   • Dist CCA PSV 07/28/2022 59  cm/sec Final   • Dist CCA EDV 07/28/2022 11  cm/sec Final   • Prox ECA PSV 07/28/2022 89  cm/sec Final   • Prox ECA EDV 07/28/2022 0  cm/sec Final   • Prox ICA PSV 07/28/2022 53  cm/sec Final   • Prox ICA EDV 07/28/2022 11  cm/sec Final   • Mid ICA PSV 07/28/2022 52  cm/sec Final   • Mid ICA EDV 07/28/2022 18  cm/sec Final   • Dist ICA PSV 07/28/2022 70  cm/sec Final   • Dist ICA EDV 07/28/2022  31  cm/sec Final   • Vertebral A PSV 07/28/2022 52  cm/sec Final   • Vertebral A EDV 07/28/2022 15  cm/sec Final   • Prox CCA PSV 07/28/2022 80  cm/sec Final   • Prox CCA EDV 07/28/2022 22  cm/sec Final   • Dist CCA PSV 07/28/2022 70  cm/sec Final   • Dist CCA EDV 07/28/2022 20  cm/sec Final   • Prox ECA PSV 07/28/2022 81  cm/sec Final   • Prox ECA EDV 07/28/2022 1  cm/sec Final   • Prox ICA PSV 07/28/2022 63  cm/sec Final   • Prox ICA EDV 07/28/2022 18  cm/sec Final   • Mid ICA PSV 07/28/2022 64  cm/sec Final   • Mid ICA EDV 07/28/2022 19  cm/sec Final   • Dist ICA PSV 07/28/2022 62  cm/sec Final   • Dist ICA EDV 07/28/2022 21  cm/sec Final   • Vertebral A PSV 07/28/2022 28  cm/sec Final   • Vertebral A EDV 07/28/2022 6  cm/sec Final   • Left arm BP 07/28/2022 125/59  mmHg Final   • Right arm BP 07/28/2022 117/68  mmHg Final   • XLRA ROSEMARY LEFT CAROTID BULB EDV 07/28/2022 12.00  cm/sec Final   • XLRA ROSEMARY RIGHT CAROTID BULB PSV 07/28/2022 45.00  cm/sec Final   • XLRA ROSEMARY RIGHT CAROTID BULB EDV 07/28/2022 11.00  cm/sec Final       EKG Results:  No orders to display       Imaging Results:  No Images in the past 120 days found..      Assessment & Plan   Diagnoses and all orders for this visit:    1. Generalized anxiety disorder (Primary)  -     FLUoxetine (PROzac) 20 MG capsule; Take 1 capsule by mouth Daily.  Dispense: 90 capsule; Refill: 3    2. Recurrent major depressive disorder in remission (HCC)  -     FLUoxetine (PROzac) 20 MG capsule; Take 1 capsule by mouth Daily.  Dispense: 90 capsule; Refill: 3    3. Post traumatic stress disorder (PTSD)  -     FLUoxetine (PROzac) 20 MG capsule; Take 1 capsule by mouth Daily.  Dispense: 90 capsule; Refill: 3    4. Insomnia due to mental condition    Other orders  -     FLUoxetine (PROzac) 10 MG capsule; Take 1 capsule by mouth Daily.  Dispense: 90 capsule; Refill: 1        INITIAL presentation most consistent with major depressive disorder, recurrent, moderate to severe,  with anxious distress.  PTSD.  Rule out personality disorder, cluster B specifically.  Rule out hypomania as patient was very difficult to interrupt today.    1/20: Start light box, increase prozac for dep. 17 minutes of supportive psychotherapy with goal to strengthen defenses, promote problems solving, restore adaptive functioning and provide symptom relief. The therapeutic alliance was strengthened to encourage the patient to express their thoughts and feelings. Esteem building was enhanced through praise, reassurance, normalizing and encouragement. Coping skills were enhanced to build distress tolerance skills and emotional regulation. Allowed patient to freely discuss issues without interruption or judgement with unconditional positive regard, active listening skills, and empathy. Provided a safe, confidential environment to facilitate the development of a positive therapeutic relationship and encourage open, honest communication. Assisted patient in identifying risk factors which would indicate the need for higher level of care including thoughts to harm self or others and/or self-harming behavior and encouraged patient to contact this office, call 911, or present to the nearest emergency room should any of these events occur. Assisted patient in processing session content; acknowledged and normalized patient’s thoughts, feelings, and concerns by utilizing a person-centered approach in efforts to build appropriate rapport and a positive therapeutic relationship with open and honest communication. Patient given education on medication side effects, diagnosis/illness and relapse symptoms. Plan to continue supportive psychotherapy in next appointment to provide symptom relief.  Diagnoses: as above  Symptoms: as above  Functional status: good  Mental Status Exam: as above    Treatment plan: Medication management and supportive psychotherapy  Prognosis: good  Progress: stable, well  6 wks      12/9: Some insomnia.  Increase melatonin. 17 minutes of supportive psychotherapy  Prognosis: good  Progress: stable, well  6 wks      10/25: Doing well. Increase prozac back to baseline dose (inadvertently reduced, she has been stable on a higher dose, so we should go back to that). Some initial insomnia, increase trazodone to 75 mg nightly. She is enjoying the Fall. 21 minutes of supportive psychotherapy  Treatment plan: Medication management and supportive psychotherapy  Prognosis: good  Progress: stable, but having initial insomnia  6 wks      9/8: Start light box. Close follow up in case this is worsening depression. 16 minutes of supportive psychotherapy  Treatment plan: Medication management and supportive psychotherapy  Prognosis: good  Progress: stable  6 wks      7/27: Well, stable. 17 minutes of supportive psychotherapyTreatment plan: Medication management and supportive psychotherapy  Prognosis: good  Progress: stable, well  6 wks      6/14: Better, no changes. 17 minutes of supportive psychotherapy Treatment plan: Medication management and supportive psychotherapy  Prognosis: good  Progress: less depressed  6 wks      5/3: Increase prozac and melatonin. 17 minutes of supportive psychotherapyTreatment plan: Medication management and supportive psychotherapy  Prognosis: good  Progress: backtracked recently, now depressed  6 wks      Visit Diagnoses:    ICD-10-CM ICD-9-CM   1. Generalized anxiety disorder  F41.1 300.02   2. Recurrent major depressive disorder in remission (HCC)  F33.40 296.35   3. Post traumatic stress disorder (PTSD)  F43.10 309.81   4. Insomnia due to mental condition  F51.05 300.9     327.02       PLAN:  96. Risk Assessment: Risk of self-harm acutely is moderate. Risk factors include chronic depressive disorder, possible personality disorder, recent psychosocial stressors (pandemic, moving). Protective factors include no present SI, no history of suicide attempts or self-harm in the past, no access to  weapons, minimal AODA, healthcare seeking, future orientation, willingness to engage in care. Risk of self-harm chronically is also moderate, but could be further elevated in the event of treatment noncompliance and/or AODA.  97. Safety: No acute safety concerns.  98. Medications:   a. CONTINUE melatonin 18 mg p.o. nightly.  Risks, benefits, side effects discussed with patient including sedation, dizziness/falls risk, GI upset.  After discussion of these risks and benefits, the patient voiced understanding and agreed to proceed.  b. CONTINUE trazodone 100 mg PO QHS. Risks, benefits, side effects discussed with patient including GI upset, sedation, dizziness/falls risk, grogginess the following day, prolongation of the QTc interval.  After discussion of these risks and benefits, the patient voiced understanding and agreed to proceed.    c. CONTINUE bupropion xl 300 mg daily. Risks, benefits, alternatives discussed with patient including nausea, GI upset, increased energy, exacerbation of irritability, insomnia, lowering of seizure threshold.  After discussion of these risks and benefits, the patient voiced understanding and agreed to proceed.  d. INCREASE Prozac 20 to 30 mg a day (HIGHEST dose is 40 given that she is also on bupropion). Risks, benefits, alternatives discussed with patient including GI upset, nausea vomiting diarrhea, theoretical decrease of seizure threshold predisposing the patient to a slightly higher seizure risk, headaches, sexual dysfunction, serotonin syndrome, bleeding risk, increased suicidality in patients 24 years and younger.  After discussion of these risks and benefits, the patient voiced understanding and agreed to proceed.  e. CONTINUE Abilify 4 mg p.o. daily to target depression, anxiety, decreased energy. Risks, benefits, alternatives discussed with patient including increased energy, exacerbation of irritability, akathisia, GI upset, orthostatic hypotension, increased appetite.  After discussion of these risks and benefits, the patient voiced understanding and agreed to proceed.  f. CONTINUE melatonin 30 mg daily. Risks, benefits, side effects discussed with patient including sedation, dizziness/falls risk, GI upset.  After discussion of these risks and benefits, the patient voiced understanding and agreed to proceed.   i. S/P:  1. Mirtazapine 45 mg daily (RLS)  99. Therapy: referred to [facility].  100. Labs/Studies: s/p TMS referral.  101. Follow Up: 6 weeks. (prefers 4 wks)      TREATMENT PLAN/GOALS: Continue supportive psychotherapy efforts and medications as indicated. Treatment and medication options discussed during today's visit. Patient acknowledged and verbally consented to continue with current treatment plan and was educated on the importance of compliance with treatment and follow-up appointments.    MEDICATION ISSUES:  SAPNA reviewed as expected.  Discussed medication options and treatment plan of prescribed medication as well as the risks, benefits, and side effects including potential falls, possible impaired driving and metabolic adversities among others. Patient is agreeable to call the office with any worsening of symptoms or onset of side effects. Patient is agreeable to call 911 or go to the nearest ER should he/she begin having SI/HI. No medication side effects or related complaints today.     MEDS ORDERED DURING VISIT:  New Medications Ordered This Visit   Medications   • FLUoxetine (PROzac) 20 MG capsule     Sig: Take 1 capsule by mouth Daily.     Dispense:  90 capsule     Refill:  3   • FLUoxetine (PROzac) 10 MG capsule     Sig: Take 1 capsule by mouth Daily.     Dispense:  90 capsule     Refill:  1     Total dose is 20+10 = 30 mg daily. Thx 4 all u do.       Return in about 6 weeks (around 3/3/2023).         This document has been electronically signed by Vicky Barth MD  January 20, 2023 11:06 EST      Part of this note may be an electronic  transcription/translation of spoken language to printed text using the Dragon Dictation System.

## 2023-01-23 ENCOUNTER — TELEMEDICINE (OUTPATIENT)
Dept: PSYCHIATRY | Facility: CLINIC | Age: 73
End: 2023-01-23
Payer: MEDICARE

## 2023-01-23 DIAGNOSIS — F33.40 RECURRENT MAJOR DEPRESSIVE DISORDER IN REMISSION: Primary | ICD-10-CM

## 2023-01-23 PROCEDURE — 90834 PSYTX W PT 45 MINUTES: CPT | Performed by: COUNSELOR

## 2023-01-23 NOTE — PROGRESS NOTES
Date: January 23, 2023  Time In: 1530  Time Out: 1614  This provider is located at home address for [facility] (through ), [address], [place] [address] using a secure [employer] Video Visit through [employer]. Patient is being seen remotely via telehealth at home address in [place] and stated they are in a secure environment for this session. The patient's condition being diagnosed/treated is appropriate for telemedicine. The provider identified herself as well as her credentials. The patient, and/or patients guardian, consent to be seen remotely, and when consent is given they understand that the consent allows for patient identifiable information to be sent to a third party as needed. They may refuse to be seen remotely at any time. The electronic data is encrypted and password protected, and the patient and/or guardian has been advised of the potential risks to privacy not withstanding such measures.     You have chosen to receive care through a telehealth visit.  Do you consent to use a video/audio connection for your medical care today? Yes    PROGRESS NOTE  Data:  Nivia Cagle is a 72 y.o. female who presents today for follow up    Chief Complaint: depression    History of Present Illness: Pt reports recent dx of seasonal depression; Pt reports that it seems no matter her effort she struggles with gaining happiness. Pt reports another stressor as her oral health explaining previous procedures and upcoming. Pt reports that she has  $8,000 dental insurance coverage and has already used $2,0000 thus far. Pt reports relationship with son, daughter-in-law, and sister. Pt expressed that she struggles with getting out of bed and finds it hard to complete daily routines. Pt expressed increased isolation and lack of motivation.     Clinical Maneuvering/Intervention:    (Scales based on 0 - 10 with 10 being the worst)  Depression: 8 Anxiety: 8        Assisted patient in processing above session content; acknowledged and normalized patient’s thoughts, feelings, and concerns.  Rationalized patient thought process regarding recent stressors and life events. Discussed triggers associated with patient's emotions. Also discussed coping skills for patient to implement such as focusing on gratitude practices as well as creating a small to do list each night for the next day and encouraged Pt once task is completed to santos this task off the list in efforts to increase feeling of accomplishment.     Allowed patient to freely discuss issues without interruption or judgment. Provided safe, confidential environment to facilitate the development of positive therapeutic relationship and encourage open, honest communication. Assisted patient in identifying risk factors which would indicate the need for higher level of care including thoughts to harm self or others and/or self-harming behavior and encouraged patient to contact this office, call 911, or present to the nearest emergency room should any of these events occur. Discussed crisis intervention services and means to access. Patient adamantly and convincingly denies current suicidal or homicidal ideation or perceptual disturbance.    Assessment:   Assessment   Patient appears to maintain relative stability as compared to their baseline.  However, patient continues to struggle with depression which continues to cause impairment in important areas of functioning.  A result, they can be reasonably expected to continue to benefit from treatment and would likely be at increased risk for decompensation otherwise.    Mental Status Exam:   Hygiene:   good  Cooperation:  Cooperative  Eye Contact:  Good  Psychomotor Behavior:  Appropriate  Affect:  Appropriate  Mood: normal  Speech:  Normal  Thought Process:  Linear  Thought Content:  Mood congruent  Suicidal:  None  Homicidal:  None  Hallucinations:  None  Delusion:   None  Memory:  Intact  Orientation:  Person, Place, Time and Situation  Reliability:  fair  Insight:  Fair  Judgement:  Fair  Impulse Control:  Fair  Physical/Medical Issues:  No        Patient's Support Network Includes:  son    Functional Status: Mild impairment     Progress toward goal: Not at goal    Prognosis: Fair with Ongoing Treatment          Plan:    Patient will continue in individual outpatient therapy with focus on improved functioning and coping skills, maintaining stability, and avoiding decompensation and the need for higher level of care.    Patient will adhere to medication regimen as prescribed and report any side effects. Patient will contact this office, call 911 or present to the nearest emergency room should suicidal or homicidal ideations occur. Provide Cognitive Behavioral Therapy and Solution Focused Therapy to improve functioning, maintain stability, and avoid decompensation and the need for higher level of care.     Return in about 6 weeks, or earlier if symptoms worsen or fail to improve.           VISIT DIAGNOSIS:     ICD-10-CM ICD-9-CM   1. Recurrent major depressive disorder in remission (HCC)  F33.40 296.35        Diagnoses and all orders for this visit:    1. Recurrent major depressive disorder in remission (HCC) (Primary)           [facility] No Show Policy:  We understand unexpected circumstances arise; however, anytime you miss your appointment we are unable to provide you appropriate care.  In addition, each appointment missed could have been used to provide care for others.  We ask that you call at least 24 hours in advance to cancel or reschedule an appointment.  We would like to take this opportunity to remind you of our policy stating patients who miss THREE or more appointments without cancelling or rescheduling 24 hours in advance of the appointment may be subject to cancellation of any further visits with our clinic and recommendation to seek  in-person services/visits.    Please call [phone] to reschedule your appointment. If there are reasons that make it difficult for you to keep the appointments, please call and let us know how we can help.  Please understand that medication prescribing will not continue without seeing your provider.      [facility]'s No Show Policy reviewed with patient at today's visit. Patient verbalized understanding of this policy. Discussed with patient that in the event that there are three or more no show visits, it will be recommended that they pursue in-person services/visits as noncompliance with telehealth visits indicates that patient is not an appropriate candidate for telemedicine and would likely be more appropriate for in-person services/visits. Patient verbalizes understanding and is agreeable to this.        This document has been electronically signed by Annita Polo LCSW  January 23, 2023 20:36 EST      Part of this note may be an electronic transcription/translation of spoken language to printed text using the Dragon Dictation System.

## 2023-02-12 PROCEDURE — 87086 URINE CULTURE/COLONY COUNT: CPT | Performed by: FAMILY MEDICINE

## 2023-02-28 ENCOUNTER — HOSPITAL ENCOUNTER (OUTPATIENT)
Dept: CARDIOLOGY | Facility: HOSPITAL | Age: 73
Discharge: HOME | End: 2023-02-28
Payer: MEDICARE

## 2023-02-28 ENCOUNTER — TRANSCRIBE ORDERS (OUTPATIENT)
Dept: ADMINISTRATIVE | Facility: HOSPITAL | Age: 73
End: 2023-02-28
Payer: MEDICARE

## 2023-02-28 ENCOUNTER — TELEPHONE (OUTPATIENT)
Dept: ORTHOPEDIC SURGERY | Facility: CLINIC | Age: 73
End: 2023-02-28
Payer: MEDICARE

## 2023-02-28 ENCOUNTER — LAB (OUTPATIENT)
Dept: LAB | Facility: HOSPITAL | Age: 73
End: 2023-02-28
Payer: MEDICARE

## 2023-02-28 DIAGNOSIS — M79.662 PAIN AND SWELLING OF LEFT LOWER LEG: Primary | ICD-10-CM

## 2023-02-28 DIAGNOSIS — M85.89 OSTEOPENIA OF MULTIPLE SITES: Primary | ICD-10-CM

## 2023-02-28 DIAGNOSIS — M85.89 OSTEOPENIA OF MULTIPLE SITES: ICD-10-CM

## 2023-02-28 DIAGNOSIS — M79.89 PAIN AND SWELLING OF LEFT LOWER LEG: ICD-10-CM

## 2023-02-28 DIAGNOSIS — M79.89 PAIN AND SWELLING OF LEFT LOWER LEG: Primary | ICD-10-CM

## 2023-02-28 DIAGNOSIS — Z79.899 ENCOUNTER FOR LONG-TERM (CURRENT) USE OF OTHER MEDICATIONS: ICD-10-CM

## 2023-02-28 DIAGNOSIS — M79.662 PAIN AND SWELLING OF LEFT LOWER LEG: ICD-10-CM

## 2023-02-28 LAB
25(OH)D3 SERPL-MCNC: 68.4 NG/ML (ref 30–100)
BH CV LOWER VASCULAR LEFT COMMON FEMORAL AUGMENT: NORMAL
BH CV LOWER VASCULAR LEFT COMMON FEMORAL COMPETENT: NORMAL
BH CV LOWER VASCULAR LEFT COMMON FEMORAL COMPRESS: NORMAL
BH CV LOWER VASCULAR LEFT COMMON FEMORAL PHASIC: NORMAL
BH CV LOWER VASCULAR LEFT COMMON FEMORAL SPONT: NORMAL
BH CV LOWER VASCULAR LEFT DISTAL FEMORAL COMPRESS: NORMAL
BH CV LOWER VASCULAR LEFT GASTRONEMIUS COMPRESS: NORMAL
BH CV LOWER VASCULAR LEFT GREATER SAPH AK COMPRESS: NORMAL
BH CV LOWER VASCULAR LEFT GREATER SAPH BK COMPRESS: NORMAL
BH CV LOWER VASCULAR LEFT LESSER SAPH COMPRESS: NORMAL
BH CV LOWER VASCULAR LEFT MID FEMORAL AUGMENT: NORMAL
BH CV LOWER VASCULAR LEFT MID FEMORAL COMPETENT: NORMAL
BH CV LOWER VASCULAR LEFT MID FEMORAL COMPRESS: NORMAL
BH CV LOWER VASCULAR LEFT MID FEMORAL PHASIC: NORMAL
BH CV LOWER VASCULAR LEFT MID FEMORAL SPONT: NORMAL
BH CV LOWER VASCULAR LEFT PERONEAL COMPRESS: NORMAL
BH CV LOWER VASCULAR LEFT POPLITEAL AUGMENT: NORMAL
BH CV LOWER VASCULAR LEFT POPLITEAL COMPETENT: NORMAL
BH CV LOWER VASCULAR LEFT POPLITEAL COMPRESS: NORMAL
BH CV LOWER VASCULAR LEFT POPLITEAL PHASIC: NORMAL
BH CV LOWER VASCULAR LEFT POPLITEAL SPONT: NORMAL
BH CV LOWER VASCULAR LEFT POSTERIOR TIBIAL COMPRESS: NORMAL
BH CV LOWER VASCULAR LEFT PROXIMAL FEMORAL COMPRESS: NORMAL
BH CV LOWER VASCULAR LEFT SAPHENOFEMORAL JUNCTION COMPRESS: NORMAL
BH CV LOWER VASCULAR RIGHT COMMON FEMORAL AUGMENT: NORMAL
BH CV LOWER VASCULAR RIGHT COMMON FEMORAL COMPETENT: NORMAL
BH CV LOWER VASCULAR RIGHT COMMON FEMORAL COMPRESS: NORMAL
BH CV LOWER VASCULAR RIGHT COMMON FEMORAL PHASIC: NORMAL
BH CV LOWER VASCULAR RIGHT COMMON FEMORAL SPONT: NORMAL
MAXIMAL PREDICTED HEART RATE: 150 BPM
STRESS TARGET HR: 128 BPM

## 2023-02-28 PROCEDURE — 82565 ASSAY OF CREATININE: CPT

## 2023-02-28 PROCEDURE — 93971 EXTREMITY STUDY: CPT

## 2023-02-28 PROCEDURE — 36415 COLL VENOUS BLD VENIPUNCTURE: CPT

## 2023-02-28 PROCEDURE — 93971 EXTREMITY STUDY: CPT | Performed by: SURGERY

## 2023-02-28 PROCEDURE — 82306 VITAMIN D 25 HYDROXY: CPT

## 2023-02-28 PROCEDURE — 82310 ASSAY OF CALCIUM: CPT

## 2023-02-28 NOTE — TELEPHONE ENCOUNTER
Barb from Dr Renee office called states pt  wanting to be seen for trigger thumb of right hand     Pt states she has had CTS on right hand in [facility]   Unable to get records     OK to schedule?

## 2023-03-01 ENCOUNTER — TELEPHONE (OUTPATIENT)
Dept: CARDIOLOGY | Facility: CLINIC | Age: 73
End: 2023-03-01
Payer: MEDICARE

## 2023-03-01 LAB
CALCIUM SPEC-SCNC: 10.1 MG/DL (ref 8.6–10.5)
CREAT SERPL-MCNC: 0.79 MG/DL (ref 0.57–1)
EGFRCR SERPLBLD CKD-EPI 2021: 80.6 ML/MIN/1.73

## 2023-03-01 NOTE — TELEPHONE ENCOUNTER
Pt called in said CONCHIS johansen at [facility] request she speak to Dr. Ruiz in ref to left leg swelling .    Ultrasound was done and PT states CONCHIS acosta Said it was normal . Would you like for Pt to schedule an appt?

## 2023-03-02 ENCOUNTER — TELEMEDICINE (OUTPATIENT)
Dept: PSYCHIATRY | Facility: CLINIC | Age: 73
End: 2023-03-02
Payer: MEDICARE

## 2023-03-02 ENCOUNTER — TELEPHONE (OUTPATIENT)
Dept: PSYCHIATRY | Facility: CLINIC | Age: 73
End: 2023-03-02

## 2023-03-02 ENCOUNTER — TELEPHONE (OUTPATIENT)
Dept: CARDIOLOGY | Facility: CLINIC | Age: 73
End: 2023-03-02
Payer: MEDICARE

## 2023-03-02 DIAGNOSIS — F43.10 POST TRAUMATIC STRESS DISORDER (PTSD): ICD-10-CM

## 2023-03-02 DIAGNOSIS — F51.05 INSOMNIA DUE TO MENTAL CONDITION: ICD-10-CM

## 2023-03-02 DIAGNOSIS — F41.1 GENERALIZED ANXIETY DISORDER: ICD-10-CM

## 2023-03-02 DIAGNOSIS — F33.40 RECURRENT MAJOR DEPRESSIVE DISORDER IN REMISSION: Primary | ICD-10-CM

## 2023-03-02 PROCEDURE — 90833 PSYTX W PT W E/M 30 MIN: CPT | Performed by: STUDENT IN AN ORGANIZED HEALTH CARE EDUCATION/TRAINING PROGRAM

## 2023-03-02 PROCEDURE — 99214 OFFICE O/P EST MOD 30 MIN: CPT | Performed by: STUDENT IN AN ORGANIZED HEALTH CARE EDUCATION/TRAINING PROGRAM

## 2023-03-02 RX ORDER — PREDNISOLONE ACETATE 1.2 MG/ML
SUSPENSION/ DROPS OPHTHALMIC
COMMUNITY
Start: 2023-02-03

## 2023-03-02 RX ORDER — ARIPIPRAZOLE 2 MG/1
4 TABLET ORAL DAILY
Qty: 180 TABLET | Refills: 1 | Status: SHIPPED | OUTPATIENT
Start: 2023-03-02

## 2023-03-02 NOTE — PROGRESS NOTES
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   • Left Greater Saph AK Compress 02/28/2023 C   Final   • Left Greater Saph BK Compress 02/28/2023 C   Final   • Left Lesser Saph Compress 02/28/2023 C   Final   Lab on 02/28/2023   Component Date Value Ref Range Status   • 25 Hydroxy, Vitamin D 02/28/2023 68.4  30.0 - 100.0 ng/ml Final   • Creatinine 02/28/2023 0.79  0.57 - 1.00 mg/dL Final   • eGFR 02/28/2023 80.6  >60.0 mL/min/1.73 Final   • Calcium 02/28/2023 10.1  8.6 - 10.5 mg/dL Final   Admission on 02/12/2023, Discharged on 02/12/2023   Component Date Value Ref Range Status   • Color 02/12/2023 Yellow   Final   • Clarity, UA 02/12/2023 Clear   Final   • Glucose, UA 02/12/2023 Negative  mg/dL Final   • Bilirubin 02/12/2023 Negative   Final   • Ketones, UA 02/12/2023 Negative   Final   • Specific Gravity  02/12/2023 1.020  1.005 - 1.030 Final   • Blood, UA 02/12/2023 Negative   Final   • pH, Urine 02/12/2023 6.0  5.0 - 8.0 Final   • Protein, POC 02/12/2023 Negative  mg/dL Final   • Urobilinogen, UA 02/12/2023 0.2 E.U./dL   Final   • Nitrite, UA 02/12/2023 Negative   Final   • Leukocytes 02/12/2023 Negative   Final   • Rapid Influenza A Ag 02/12/2023 Negative   Final   • Rapid Influenza B Ag 02/12/2023 Negative   Final   • Internal Control 02/12/2023 Passed   Final   • Lot Number 02/12/2023 [id number]    Final   • Expiration Date 02/12/2023 03/26/2024   Final   • SARS Antigen 02/12/2023 Not Detected   Final   • Internal Control 02/12/2023 Passed   Final   • Lot Number 02/12/2023 [id number]   Final   • Expiration Date 02/12/2023 09/27/2023   Final   • Urine Culture 02/12/2023 No growth   Final   Hospital Outpatient Visit on 12/28/2022   Component Date Value Ref Range Status   • Creatinine 12/28/2022 1.00  mg/dL Final    Serial Number: [id number]  [id number]   • eGFR 12/28/2022 60.7  >60.0 mL/min/1.73 Final   Admission on 12/16/2022, Discharged on 12/16/2022   Component Date Value Ref Range Status   • Color 12/16/2022 Orange (A)   Final   • Clarity, UA 12/16/2022 Cloudy (A)   Final   • Glucose, UA 12/16/2022 100 mg/dL (A)  mg/dL Final   • Bilirubin 12/16/2022 Large (3+) (A)   Final   • Ketones, UA 12/16/2022 15 mg/dL (A)   Final   • Specific Gravity  12/16/2022 1.030  1.005 - 1.030 Final   • Blood, UA 12/16/2022 Large (A)   Final   • pH, Urine 12/16/2022 5.0  5.0 - 8.0 Final   • Protein, POC 12/16/2022 300 mg/dL (A)  mg/dL Final   • Urobilinogen, UA 12/16/2022 Normal   Final   • Nitrite, UA 12/16/2022 Positive (A)   Final   • Leukocytes 12/16/2022 Large (3+) (A)   Final   • Urine Culture 12/16/2022 No growth   Final   Office Visit on 12/14/2022   Component Date Value Ref Range Status   • Color, UA 12/14/2022 Yellow  Yellow, Straw Final   • Appearance, UA 12/14/2022 Clear  Clear Final   • pH, UA 12/14/2022 <=5.0  5.0 - 8.0 Final   • Specific Gravity, UA 12/14/2022 >=1.030  1.005 - 1.030 Final   • Glucose, UA 12/14/2022 100 mg/dL (Trace) (A)  Negative Final   • Ketones, UA 12/14/2022 Trace (A)  Negative Final   • Bilirubin, UA 12/14/2022 Moderate (2+) (A)  Negative Final   • Blood, UA 12/14/2022 Moderate (2+) (A)  Negative Final   • Protein, UA 12/14/2022 >=300 mg/dL (3+) (A)  Negative Final   • Leuk Esterase, UA 12/14/2022 Trace (A)  Negative Final   • Nitrite, UA 12/14/2022 Positive (A)  Negative Final   • Urobilinogen,  UA 12/14/2022 0.2 E.U./dL  0.2 - 1.0 E.U./dL Final   • RBC, UA 12/14/2022 None Seen  None Seen /HPF Final   • WBC, UA 12/14/2022 13-20 (A)  None Seen /HPF Final   • Bacteria, UA 12/14/2022 Trace (A)  None Seen /HPF Final   • Squamous Epithelial Cells, UA 12/14/2022 0-2  None Seen, 0-2 /HPF Final   • Hyaline Casts, UA 12/14/2022 None Seen  None Seen /LPF Final   • Methodology 12/14/2022 Automated Microscopy   Final   • Urine Culture 12/14/2022 >100,000 CFU/mL Escherichia coli ESBL (A)   Final      Consider infectious disease consult.  Susceptibility results may not correlate to clinical outcomes.   Lab on 11/01/2022   Component Date Value Ref Range Status   • 25 Hydroxy, Vitamin D 11/01/2022 57.1  30.0 - 100.0 ng/ml Final   • Glucose 11/01/2022 88  65 - 99 mg/dL Final   • BUN 11/01/2022 17  8 - 23 mg/dL Final   • Creatinine 11/01/2022 0.89  0.57 - 1.00 mg/dL Final   • Sodium 11/01/2022 140  136 - 145 mmol/L Final   • Potassium 11/01/2022 4.4  3.5 - 5.2 mmol/L Final   • Chloride 11/01/2022 101  98 - 107 mmol/L Final   • CO2 11/01/2022 28.4  22.0 - 29.0 mmol/L Final   • Calcium 11/01/2022 9.8  8.6 - 10.5 mg/dL Final   • Total Protein 11/01/2022 7.0  6.0 - 8.5 g/dL Final   • Albumin 11/01/2022 4.60  3.50 - 5.20 g/dL Final   • ALT (SGPT) 11/01/2022 11  1 - 33 U/L Final   • AST (SGOT) 11/01/2022 18  1 - 32 U/L Final   • Alkaline Phosphatase 11/01/2022 56  39 - 117 U/L Final   • Total Bilirubin 11/01/2022 0.3  0.0 - 1.2 mg/dL Final   • Globulin 11/01/2022 2.4  gm/dL Final   • A/G Ratio 11/01/2022 1.9  g/dL Final   • BUN/Creatinine Ratio 11/01/2022 19.1  7.0 - 25.0 Final   • Anion Gap 11/01/2022 10.6  5.0 - 15.0 mmol/L Final   • eGFR 11/01/2022 69.8  >60.0 mL/min/1.73 Final    National Kidney Foundation and American Society of Nephrology (ASN) Task Force recommended calculation based on the Chronic Kidney Disease Epidemiology Collaboration (CKD-EPI) equation refit without adjustment for race.   • TSH 11/01/2022 3.210  0.270  - 4.200 uIU/mL Final   • Total Cholesterol 11/01/2022 212 (H)  0 - 200 mg/dL Final   • Triglycerides 11/01/2022 80  0 - 150 mg/dL Final   • HDL Cholesterol 11/01/2022 63 (H)  40 - 60 mg/dL Final   • LDL Cholesterol  11/01/2022 135 (H)  0 - 100 mg/dL Final   • VLDL Cholesterol 11/01/2022 14  5 - 40 mg/dL Final   • LDL/HDL Ratio 11/01/2022 2.11   Final   • Hemoglobin A1C 11/01/2022 5.80 (H)  4.80 - 5.60 % Final   • WBC 11/01/2022 6.47  3.40 - 10.80 10*3/mm3 Final   • RBC 11/01/2022 4.00  3.77 - 5.28 10*6/mm3 Final   • Hemoglobin 11/01/2022 12.4  12.0 - 15.9 g/dL Final   • Hematocrit 11/01/2022 37.5  34.0 - 46.6 % Final   • MCV 11/01/2022 93.8  79.0 - 97.0 fL Final   • MCH 11/01/2022 31.0  26.6 - 33.0 pg Final   • MCHC 11/01/2022 33.1  31.5 - 35.7 g/dL Final   • RDW 11/01/2022 12.2 (L)  12.3 - 15.4 % Final   • RDW-SD 11/01/2022 42.0  37.0 - 54.0 fl Final   • MPV 11/01/2022 11.7  6.0 - 12.0 fL Final   • Platelets 11/01/2022 224  140 - 450 10*3/mm3 Final   • Neutrophil % 11/01/2022 63.0  42.7 - 76.0 % Final   • Lymphocyte % 11/01/2022 22.3  19.6 - 45.3 % Final   • Monocyte % 11/01/2022 11.9  5.0 - 12.0 % Final   • Eosinophil % 11/01/2022 2.0  0.3 - 6.2 % Final   • Basophil % 11/01/2022 0.5  0.0 - 1.5 % Final   • Immature Grans % 11/01/2022 0.3  0.0 - 0.5 % Final   • Neutrophils, Absolute 11/01/2022 4.08  1.70 - 7.00 10*3/mm3 Final   • Lymphocytes, Absolute 11/01/2022 1.44  0.70 - 3.10 10*3/mm3 Final   • Monocytes, Absolute 11/01/2022 0.77  0.10 - 0.90 10*3/mm3 Final   • Eosinophils, Absolute 11/01/2022 0.13  0.00 - 0.40 10*3/mm3 Final   • Basophils, Absolute 11/01/2022 0.03  0.00 - 0.20 10*3/mm3 Final   • Immature Grans, Absolute 11/01/2022 0.02  0.00 - 0.05 10*3/mm3 Final   • nRBC 11/01/2022 0.0  0.0 - 0.2 /100 WBC Final   Admission on 09/28/2022, Discharged on 09/28/2022   Component Date Value Ref Range Status   • Glucose 09/28/2022 117 (H)  70 - 99 mg/dL Final    Serial Number: [id number]:  [id number]   • Case  Report 09/28/2022    Final                    Value:Surgical Pathology Report                         Case: [id number]                                  Authorizing Provider:  Ghislaine Kilgore MD Collected:           09/28/2022 11:39 AM          Ordering Location:     [facility] Received:            09/28/2022 12:15 PM                                 SUITES                                                                       Pathologist:           Eduardo Dick MD                                                            Specimens:   1) - Large Intestine, Transverse Colon, transverse colon polyp biopsy                               2) - Large Intestine, Sigmoid Colon, sigmoid colon polyp biopsy                           • Clinical Information 09/28/2022    Final                    Value:This result contains rich text formatting which cannot be displayed here.   • Final Diagnosis 09/28/2022    Final                    Value:This result contains rich text formatting which cannot be displayed here.   • Gross Description 09/28/2022    Final                    Value:This result contains rich text formatting which cannot be displayed here.   • Microscopic Description 09/28/2022    Final    This result contains rich text formatting which cannot be displayed here.       EKG Results:  No orders to display       Imaging Results:  No Images in the past 120 days found..      Assessment & Plan   Diagnoses and all orders for this visit:    1. Recurrent major depressive disorder in remission (HCC) (Primary)  -     ARIPiprazole (ABILIFY) 2 MG tablet; Take 2 tablets by mouth Daily.  Dispense: 180 tablet; Refill: 1    2. Generalized anxiety disorder  -     ARIPiprazole (ABILIFY) 2 MG tablet; Take 2 tablets by mouth Daily.  Dispense: 180 tablet; Refill: 1    3. Post traumatic stress disorder (PTSD)  -     ARIPiprazole (ABILIFY) 2 MG tablet; Take 2 tablets by mouth Daily.  Dispense: 180 tablet; Refill: 1    4.  Insomnia due to mental condition        INITIAL presentation most consistent with major depressive disorder, recurrent, moderate to severe, with anxious distress.  PTSD.  Rule out personality disorder, cluster B specifically.  Rule out hypomania as patient was very difficult to interrupt today.    3/2: Prozac and BLT helped. Situational stressor in son, no changes. Better. Watch insomnia. 17 minutes of supportive psychotherapy with goal to strengthen defenses, promote problems solving, restore adaptive functioning and provide symptom relief. The therapeutic alliance was strengthened to encourage the patient to express their thoughts and feelings. Esteem building was enhanced through praise, reassurance, normalizing and encouragement. Coping skills were enhanced to build distress tolerance skills and emotional regulation. Allowed patient to freely discuss issues without interruption or judgement with unconditional positive regard, active listening skills, and empathy. Provided a safe, confidential environment to facilitate the development of a positive therapeutic relationship and encourage open, honest communication. Assisted patient in identifying risk factors which would indicate the need for higher level of care including thoughts to harm self or others and/or self-harming behavior and encouraged patient to contact this office, call 911, or present to the nearest emergency room should any of these events occur. Assisted patient in processing session content; acknowledged and normalized patient’s thoughts, feelings, and concerns by utilizing a person-centered approach in efforts to build appropriate rapport and a positive therapeutic relationship with open and honest communication. Patient given education on medication side effects, diagnosis/illness and relapse symptoms. Plan to continue supportive psychotherapy in next appointment to provide symptom relief.  Diagnoses: as above  Symptoms: as above  Functional  status: good  Mental Status Exam: as above    Treatment plan: Medication management and supportive psychotherapy  Prognosis: good  Progress: improved  6 wks      1/20: Start light box, increase prozac for dep. 17   Prognosis: good  Progress: stable, well  6 wks      12/9: Some insomnia. Increase melatonin. 17 minutes of supportive psychotherapy  Prognosis: good  Progress: stable, well  6 wks      10/25: Doing well. Increase prozac back to baseline dose (inadvertently reduced, she has been stable on a higher dose, so we should go back to that). Some initial insomnia, increase trazodone to 75 mg nightly. She is enjoying the Fall. 21 minutes of supportive psychotherapy  Treatment plan: Medication management and supportive psychotherapy  Prognosis: good  Progress: stable, but having initial insomnia  6 wks      9/8: Start light box. Close follow up in case this is worsening depression. 16 minutes of supportive psychotherapy  Treatment plan: Medication management and supportive psychotherapy  Prognosis: good  Progress: stable  6 wks      7/27: Well, stable. 17 minutes of supportive psychotherapyTreatment plan: Medication management and supportive psychotherapy  Prognosis: good  Progress: stable, well  6 wks      6/14: Better, no changes. 17 minutes of supportive psychotherapy Treatment plan: Medication management and supportive psychotherapy  Prognosis: good  Progress: less depressed  6 wks      5/3: Increase prozac and melatonin. 17 minutes of supportive psychotherapyTreatment plan: Medication management and supportive psychotherapy  Prognosis: good  Progress: backtracked recently, now depressed  6 wks      Visit Diagnoses:    ICD-10-CM ICD-9-CM   1. Recurrent major depressive disorder in remission (HCC)  F33.40 296.35   2. Generalized anxiety disorder  F41.1 300.02   3. Post traumatic stress disorder (PTSD)  F43.10 309.81   4. Insomnia due to mental condition  F51.05 300.9     327.02       PLAN:  111. Risk Assessment:  Risk of self-harm acutely is moderate. Risk factors include chronic depressive disorder, possible personality disorder, recent psychosocial stressors (pandemic, moving). Protective factors include no present SI, no history of suicide attempts or self-harm in the past, no access to weapons, minimal AODA, healthcare seeking, future orientation, willingness to engage in care. Risk of self-harm chronically is also moderate, but could be further elevated in the event of treatment noncompliance and/or AODA.  112. Safety: No acute safety concerns.  113. Medications:   a. CONTINUE melatonin 18 mg p.o. nightly.  Risks, benefits, side effects discussed with patient including sedation, dizziness/falls risk, GI upset.  After discussion of these risks and benefits, the patient voiced understanding and agreed to proceed.  b. CONTINUE trazodone 100 mg PO QHS. Risks, benefits, side effects discussed with patient including GI upset, sedation, dizziness/falls risk, grogginess the following day, prolongation of the QTc interval.  After discussion of these risks and benefits, the patient voiced understanding and agreed to proceed.    c. CONTINUE bupropion xl 300 mg daily. Risks, benefits, alternatives discussed with patient including nausea, GI upset, increased energy, exacerbation of irritability, insomnia, lowering of seizure threshold.  After discussion of these risks and benefits, the patient voiced understanding and agreed to proceed.  d. INCREASE Prozac 20 to 30 mg a day (HIGHEST dose is 40 given that she is also on bupropion). Risks, benefits, alternatives discussed with patient including GI upset, nausea vomiting diarrhea, theoretical decrease of seizure threshold predisposing the patient to a slightly higher seizure risk, headaches, sexual dysfunction, serotonin syndrome, bleeding risk, increased suicidality in patients 24 years and younger.  After discussion of these risks and benefits, the patient voiced understanding and  agreed to proceed.  e. CONTINUE Abilify 4 mg p.o. daily to target depression, anxiety, decreased energy. Risks, benefits, alternatives discussed with patient including increased energy, exacerbation of irritability, akathisia, GI upset, orthostatic hypotension, increased appetite. After discussion of these risks and benefits, the patient voiced understanding and agreed to proceed.  f. CONTINUE melatonin 30 mg daily. Risks, benefits, side effects discussed with patient including sedation, dizziness/falls risk, GI upset.  After discussion of these risks and benefits, the patient voiced understanding and agreed to proceed.   i. S/P:  1. Mirtazapine 45 mg daily (RLS)  114. Therapy: referred to [facility].  115. Labs/Studies: s/p TMS referral.  116. Follow Up: 6 weeks. (prefers 4 wks)      TREATMENT PLAN/GOALS: Continue supportive psychotherapy efforts and medications as indicated. Treatment and medication options discussed during today's visit. Patient acknowledged and verbally consented to continue with current treatment plan and was educated on the importance of compliance with treatment and follow-up appointments.    MEDICATION ISSUES:  SAPNA reviewed as expected.  Discussed medication options and treatment plan of prescribed medication as well as the risks, benefits, and side effects including potential falls, possible impaired driving and metabolic adversities among others. Patient is agreeable to call the office with any worsening of symptoms or onset of side effects. Patient is agreeable to call 911 or go to the nearest ER should he/she begin having SI/HI. No medication side effects or related complaints today.     MEDS ORDERED DURING VISIT:  New Medications Ordered This Visit   Medications   • ARIPiprazole (ABILIFY) 2 MG tablet     Sig: Take 2 tablets by mouth Daily.     Dispense:  180 tablet     Refill:  1       Return in about 6 weeks (around 4/13/2023).         This document has been electronically  signed by Vicky Barth MD  March 2, 2023 11:28 EST      Part of this note may be an electronic transcription/translation of spoken language to printed text using the Dragon Dictation System.

## 2023-03-02 NOTE — TELEPHONE ENCOUNTER
SW PT and advised Dr. Ruiz said for her to set up appt with ayden pizano in ref to swelling in left leg pt verb understanding

## 2023-03-02 NOTE — PATIENT INSTRUCTIONS
1.  Please return to clinic at your next scheduled visit.  Contact the clinic ([phone]) at least 24 hours prior in the event you need to cancel.  2.  Do no harm to yourself or others.    3.  Avoid alcohol and drugs.    4.  Take all medications as prescribed.  Please contact the clinic with any concerns. If you are in need of medication refills, please call the clinic at [phone].    5. Should you want to get in touch with your provider, Dr. Vicky Barth, please utilize [email or web address] or contact the office ([phone]), and staff will be able to page Dr. Barth directly.  6.  In the event you have personal crisis, contact the following crisis numbers: Suicide Prevention Hotline 1-100.265.2917; [facility] Helpline 5-076-731-FCUF; [facility] Emergency Room [phone]; text HELLO to [phone]; or 823.     SPECIFIC RECOMMENDATIONS:     1.      Medications discussed at this encounter:                   - no changes     2.      Psychotherapy recommendations:

## 2023-03-02 NOTE — TELEPHONE ENCOUNTER
Sure, an earlier follow up can be scheduled with JEANNIE Kauffman to eval leg swelling if she has availability

## 2023-03-06 ENCOUNTER — OFFICE VISIT (OUTPATIENT)
Dept: ORTHOPEDIC SURGERY | Facility: CLINIC | Age: 73
End: 2023-03-06
Payer: MEDICARE

## 2023-03-06 VITALS — BODY MASS INDEX: 29.06 KG/M2 | WEIGHT: 164 LBS | HEIGHT: 63 IN

## 2023-03-06 DIAGNOSIS — M79.671 RIGHT FOOT PAIN: Primary | ICD-10-CM

## 2023-03-06 DIAGNOSIS — M79.641 RIGHT HAND PAIN: ICD-10-CM

## 2023-03-06 DIAGNOSIS — M65.311 TRIGGER THUMB OF RIGHT HAND: ICD-10-CM

## 2023-03-06 PROCEDURE — 20550 NJX 1 TENDON SHEATH/LIGAMENT: CPT | Performed by: ORTHOPAEDIC SURGERY

## 2023-03-06 PROCEDURE — 99203 OFFICE O/P NEW LOW 30 MIN: CPT | Performed by: ORTHOPAEDIC SURGERY

## 2023-03-06 RX ADMIN — LIDOCAINE HYDROCHLORIDE 1 ML: 10 INJECTION, SOLUTION EPIDURAL; INFILTRATION; INTRACAUDAL; PERINEURAL at 15:46

## 2023-03-06 RX ADMIN — METHYLPREDNISOLONE ACETATE 80 MG: 80 INJECTION, SUSPENSION INTRA-ARTICULAR; INTRALESIONAL; INTRAMUSCULAR; SOFT TISSUE at 15:46

## 2023-03-06 NOTE — PROGRESS NOTES
"Chief Complaint  Initial Evaluation of the Right Hand and Initial Evaluation of the Right Foot     Subjective      Nivia Cagle presents to [facility] for initial evaluation of the right hand and the right foot. She has a right trigger thumb.  The thumb has had problems for about 4 weeks. She notes her thumb pops She had a fall this morning and hurt her foot and ankle.  Her foot hurts in the arch of the foot.  She slipped and fell in the garage. She has a limping gait.      Allergies   Allergen Reactions   • Diclofenac Hives   • New Skin [Benzethonium Chloride] Rash   • Atorvastatin Myalgia   • Adhesive Tape Rash and Other (See Comments)       Rash at area of bandaid   • Niacin Rash        Social History     Socioeconomic History   • Marital status: Single   Tobacco Use   • Smoking status: Former     Types: Cigarettes     Quit date:      Years since quittin.2   • Smokeless tobacco: Never   • Tobacco comments:     QUIT    Vaping Use   • Vaping Use: Never used   Substance and Sexual Activity   • Alcohol use: Yes     Comment: rarely   • Drug use: Never   • Sexual activity: Defer        Review of Systems     Objective   Vital Signs:   Ht 160 cm (63\")   Wt 74.4 kg (164 lb)   BMI 29.05 kg/m²       Physical Exam  Constitutional:       Appearance: Normal appearance. Patient is well-developed and normal weight.   HENT:      Head: Normocephalic.      Right Ear: Hearing and external ear normal.      Left Ear: Hearing and external ear normal.      Nose: Nose normal.   Eyes:      Conjunctiva/sclera: Conjunctivae normal.   Cardiovascular:      Rate and Rhythm: Normal rate.   Pulmonary:      Effort: Pulmonary effort is normal.      Breath sounds: No wheezing or rales.   Abdominal:      Palpations: Abdomen is soft.      Tenderness: There is no abdominal tenderness.   Musculoskeletal:      Cervical back: Normal range of motion.   Skin:     Findings: No rash.   Neurological:      " Mental Status: Patient  is alert and oriented to person, place, and time.   Psychiatric:         Mood and Affect: Mood and affect normal.         Judgment: Judgment normal.       Ortho Exam      RIGHT HAND Negative Compression testing/ Negative Tinels. NegativeFinkelsteins. Negative Ramirez's testing. Negative CMC grind testing. Negative Phalens. Full ROM of the hand, fingers, elbow and wrist. Positive Triggering of the digit. Sensation grossly intact to light touch, median, radial and ulnar nerve. Positive AIN, PIN and ulnar nerve motor function intact. Axillary nerve intact. Positive pulses.     RIGHT FOOT Positive EHL, FHL, GS and TA. Sensation intact to all 5 nerves of the foot. Positive pulses. Neurovascularly intact. Calf soft, Non-tender. Plantar flexion 30, dorsiflexion 15. Stable to stress. Tender to medial aspect of the ankle. . Intact flexion and extension of toes. .       Small Joint Arthrocentesis  Consent given by: patient  Site marked: site marked  Timeout: Immediately prior to procedure a time out was called to verify the correct patient, procedure, equipment, support staff and site/side marked as required   Procedure Details  Location: thumb -   Preparation: Patient was prepped and draped in the usual sterile fashion  Needle gauge: 23g.  Medications administered: 1 mL lidocaine PF 1% 1 %; 80 mg methylPREDNISolone acetate 80 MG/ML  Patient tolerance: patient tolerated the procedure well with no immediate complications              Imaging Results (Most Recent)     Procedure Component Value Units Date/Time    XR Foot 3+ View Right [[id number]] Resulted: 03/06/23 1504     Updated: 03/06/23 1504    XR Ankle 2 View Right [[id number]] Resulted: 03/06/23 1504     Updated: 03/06/23 1504    XR Hand 2 View Right [[id number]] Resulted: 03/06/23 1504     Updated: 03/06/23 1504           Result Review :   X-Ray Report:  Right hand  X-Ray  Indication: Evaluation of the right hand  AP/Lateral view(s)  Findings: No  fractures or dislocations.   Prior studies available for comparison: No    X-Ray Report:  Right ankle(s) X-Ray  Indication: Evaluation of the right ankle.   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   Prior studies available for comparison: No    X-Ray Report:  Right foot X-Ray  Indication: Evaluation of the right foot.   AP/Lateral view(s)  Findings: No fracture or dislocation.   Prior studies available for comparison: No          Assessment and Plan     Diagnoses and all orders for this visit:    1. Right foot pain (Primary)  -     XR Foot 3+ View Right  -     XR Ankle 2 View Right    2. Right hand pain  -     XR Hand 2 View Right        Discussed the treatment plan with the patient. I reviewed the X-rays that were obtained today with the patient. Discussed the risks and benefits of conservative measures. The patient expressed understanding and wished to proceed with a right thumb injection. She tolerated the injection well.  She was placed in a right walking boot.    Call or return if worsening symptoms.    Follow Up     PRN    Patient was given instructions and counseling regarding her condition or for health maintenance advice. Please see specific information pulled into the AVS if appropriate.     Scribed for Zach Sánchez MD by Andreina Driscoll MA.  03/06/23   15:05 EST    I have personally performed the services described in this document as scribed by the above individual and it is both accurate and complete. Zach Sánchez MD 03/08/23

## 2023-03-07 ENCOUNTER — OFFICE VISIT (OUTPATIENT)
Dept: INTERNAL MEDICINE | Facility: CLINIC | Age: 73
End: 2023-03-07
Payer: MEDICARE

## 2023-03-07 VITALS
SYSTOLIC BLOOD PRESSURE: 116 MMHG | HEIGHT: 63 IN | HEART RATE: 80 BPM | BODY MASS INDEX: 29.77 KG/M2 | OXYGEN SATURATION: 97 % | DIASTOLIC BLOOD PRESSURE: 78 MMHG | WEIGHT: 168 LBS | TEMPERATURE: 97.3 F

## 2023-03-07 DIAGNOSIS — N20.0 KIDNEY STONE: Primary | ICD-10-CM

## 2023-03-07 LAB
BILIRUB UR QL STRIP: NEGATIVE
CLARITY UR: ABNORMAL
COLOR UR: YELLOW
GLUCOSE UR STRIP-MCNC: NEGATIVE MG/DL
HGB UR QL STRIP.AUTO: NEGATIVE
KETONES UR QL STRIP: NEGATIVE
LEUKOCYTE ESTERASE UR QL STRIP.AUTO: NEGATIVE
NITRITE UR QL STRIP: NEGATIVE
PH UR STRIP.AUTO: 5.5 [PH] (ref 5–8)
PROT UR QL STRIP: NEGATIVE
SP GR UR STRIP: >=1.03 (ref 1–1.03)
UROBILINOGEN UR QL STRIP: ABNORMAL

## 2023-03-07 PROCEDURE — 99213 OFFICE O/P EST LOW 20 MIN: CPT

## 2023-03-07 PROCEDURE — 81003 URINALYSIS AUTO W/O SCOPE: CPT

## 2023-03-07 NOTE — PROGRESS NOTES
"Chief Complaint  kidney stones    Subjective      Nivia Cagle presents to [facility]  History of Present Illness    Nivia is here for kidney stones.  She reports she was following up with her rheumatologist this past week and was mentioning to her rheumatologist that she has had 4 kidney stones passed since November.  Rheumatologist states that she needs to come off of her calcium supplementation.  She states her rheumatologist told her to follow-up in office with us because she has stopped taking her calcium.  She denies any current symptoms of a UTI.  She reports having UTIs off and on for many years.  She is taking calcium supplement and Fosamax to help prevent further osteopenia/osteoporosis.  She denies any concerns or issues at this time.      Current Outpatient Medications   Medication Instructions   • Accu-Chek Madeline Plus test strip Other, 4 Times Daily, use to test blood sugar   • Accu-Chek Softclix Lancets lancets Other, 4 Times Daily, use to test blood sugar   • acetaminophen-codeine (TYLENOL #3) 300-30 MG per tablet Oral, See Admin Instructions, Take 1 tablet by mouth every 4-6 hours as needed for pain   • alendronate (FOSAMAX) 70 MG tablet 1 tablet, Oral, Every 7 Days   • ARIPiprazole (ABILIFY) 4 mg, Oral, Daily   • Ascorbic Acid (VITAMIN C GUMMIE PO) Oral, Daily   • aspirin 81 mg, Oral, Daily   • azelastine (ASTELIN) 0.1 % nasal spray 2 sprays, Nasal, 2 Times Daily, Use in each nostril as directed   • BD Insulin Syringe U/F 30G X 1/2\" 0.3 ML misc USE TO INJECT INSULIN FOUR TIMES DAILY AS NEEDED   • BL NASAL SALINE MIST NA 2 drops   • Blood Glucose Monitoring Suppl (FreeStyle Lite) device 1 each, Other, 4 Times Daily   • buPROPion XL (WELLBUTRIN XL) 300 mg, Oral, Every Morning   • cyclobenzaprine (FLEXERIL) 10 mg, Oral, Daily PRN   • Daily-Jelani Multivitamin tablet tablet 1 tablet, Oral, Every Night at Bedtime   • ezetimibe (ZETIA) 10 mg, Oral, " "Every Night at Bedtime   • FLUoxetine (PROZAC) 20 mg, Oral, Daily   • FLUoxetine (PROZAC) 10 mg, Oral, Daily   • fluticasone (Flonase) 50 MCG/ACT nasal spray 2 sprays, Nasal, Daily, Administer 2 sprays in each nostril for each dose.   • Januvia 100 MG tablet TAKE 1 TABLET BY MOUTH DAILY   • loratadine (CLARITIN) 10 mg, Oral, Daily   • losartan (COZAAR) 25 mg, Oral, Daily   • Melatonin 20 mg, Oral, Nightly, 2 tabs   • metFORMIN (GLUCOPHAGE) 500 MG tablet TAKE 2 TABLETS BY MOUTH TWICE DAILY WITH MEALS   • montelukast (SINGULAIR) 10 mg, Oral, Nightly   • Praluent 75 mg, Subcutaneous, Every 14 Days   • prednisoLONE acetate (Pred Mild) 0.12 % ophthalmic suspension SHAKE LIQUID AND INSTILL 1 DROP IN RIGHT EYE TWICE DAILY   • quiNINE (QUALAQUIN) 324 mg, Oral, Every Night at Bedtime   • traZODone (DESYREL) 100 mg, Oral, Nightly   • vitamin D (ERGOCALCIFEROL) 1.25 MG (08387 UT) capsule capsule TAKE 1 CAPSULE BY MOUTH WEEKLY, TAKE WITH CALCIUM   • Zinc 100 mg, Oral, Daily       The following portions of the patient's history were reviewed and updated as appropriate: allergies, current medications, past family history, past medical history, past social history, past surgical history, and problem list.    Objective   Vital Signs:   /78   Pulse 80   Temp 97.3 °F (36.3 °C)   Ht 160 cm (63\")   Wt 76.2 kg (168 lb)   SpO2 97%   BMI 29.76 kg/m²     Wt Readings from Last 3 Encounters:   03/07/23 76.2 kg (168 lb)   03/06/23 74.4 kg (164 lb)   02/12/23 74.4 kg (164 lb)     BP Readings from Last 3 Encounters:   03/07/23 116/78   02/12/23 146/62   12/16/22 135/74     Physical Exam  Vitals reviewed.   Constitutional:       Appearance: Normal appearance. She is well-developed.   HENT:      Head: Normocephalic and atraumatic.      Mouth/Throat:      Pharynx: No oropharyngeal exudate.   Eyes:      Conjunctiva/sclera: Conjunctivae normal.      Pupils: Pupils are equal, round, and reactive to light.   Cardiovascular:      Rate and " Rhythm: Normal rate and regular rhythm.      Heart sounds: No murmur heard.    No friction rub. No gallop.   Pulmonary:      Effort: Pulmonary effort is normal.      Breath sounds: Normal breath sounds. No wheezing or rhonchi.   Abdominal:      General: Bowel sounds are normal.      Palpations: Abdomen is soft.      Tenderness: There is no abdominal tenderness. There is no right CVA tenderness or left CVA tenderness.   Skin:     General: Skin is warm and dry.   Neurological:      Mental Status: She is alert and oriented to person, place, and time.   Psychiatric:         Mood and Affect: Affect normal.          Result Review :  The following data was reviewed by: JEANNIE Camacho on 03/07/2023:      Common labs    Common Labs 11/1/22 11/1/22 11/1/22 11/1/22 12/28/22 2/28/23 2/28/23    1538 1538 1538 1538  1326 1326   Glucose 88         BUN 17         Creatinine 0.89    1.00 0.79    Sodium 140         Potassium 4.4         Chloride 101         Calcium 9.8      10.1   Albumin 4.60         Total Bilirubin 0.3         Alkaline Phosphatase 56         AST (SGOT) 18         ALT (SGPT) 11         WBC   6.47       Hemoglobin   12.4       Hematocrit   37.5       Platelets   224       Total Cholesterol  212 (A)        Triglycerides  80        HDL Cholesterol  63 (A)        LDL Cholesterol   135 (A)        Hemoglobin A1C    5.80 (A)      (A) Abnormal value       Comments are available for some flowsheets but are not being displayed.             Lab Results (last 72 hours)     Procedure Component Value Units Date/Time    Urinalysis With Culture If Indicated - Urine, Clean Catch [[id number]] Collected: 03/07/23 1125    Specimen: Urine, Clean Catch Updated: 03/07/23 1125           MRI Brain With & Without Contrast    Result Date: 12/28/2022    1. Minimal small vessel ischemic changes in the white matter 2. No abnormality of the middle or inner ear structures on either side.      Alexei Mora M.D.       Electronically Signed and  Approved By: Alexei Mora M.D. on 12/28/2022 at 15:26               Lab Results   Component Value Date    SARSANTIGEN Not Detected 02/12/2023    COVID19 Not Detected 05/26/2022    RAPFLUA Negative 02/12/2023    RAPFLUB Negative 02/12/2023    BILIRUBINUR Negative 02/12/2023    POCGLU 117 (H) 09/28/2022       Procedures        Assessment and Plan   Diagnoses and all orders for this visit:    1. Kidney stone (Primary)  Comments:  Patient is to discontinue calcium supplementation at this time.  Encourage patient to follow-up during regular scheduled time for further discussion/evaluation.  Orders:  -     Urinalysis With Culture If Indicated -; Future  -     Urinalysis With Culture If Indicated - Urine, Clean Catch    Patient is not due for DEXA scan at this time.  Patient denies any active symptoms requiring work-up.  Patient is here just to inform that she has stopped her calcium supplementation and has been seeing her specialist like she should.      There are no discontinued medications.       Follow Up   Return in about 3 months (around 6/7/2023).  Patient was given instructions and counseling regarding her condition or for health maintenance advice. Please see specific information pulled into the AVS if appropriate.       Sima Parr, JEANNIE  03/07/23  13:21 EST

## 2023-03-07 NOTE — PROGRESS NOTES
"Chief Complaint  kidney stones    Subjective      Nivia Cagle presents to [facility]  History of Present Illness    Nivia is here for kidney stones.   She has had 4 kidney stones since November - has stopped her calcium.   Doesn't feel like she has any right now, having a little blood.   As soon as she passes them she gets to feeling better.   Has had surgery for them before, usually calcium related   Usually     Current Outpatient Medications   Medication Instructions   • Accu-Chek Madeline Plus test strip Other, 4 Times Daily, use to test blood sugar   • Accu-Chek Softclix Lancets lancets Other, 4 Times Daily, use to test blood sugar   • acetaminophen-codeine (TYLENOL #3) 300-30 MG per tablet Oral, See Admin Instructions, Take 1 tablet by mouth every 4-6 hours as needed for pain   • alendronate (FOSAMAX) 70 MG tablet 1 tablet, Oral, Every 7 Days   • ARIPiprazole (ABILIFY) 4 mg, Oral, Daily   • Ascorbic Acid (VITAMIN C GUMMIE PO) Oral, Daily   • aspirin 81 mg, Oral, Daily   • azelastine (ASTELIN) 0.1 % nasal spray 2 sprays, Nasal, 2 Times Daily, Use in each nostril as directed   • BD Insulin Syringe U/F 30G X 1/2\" 0.3 ML misc USE TO INJECT INSULIN FOUR TIMES DAILY AS NEEDED   • BL NASAL SALINE MIST NA 2 drops   • Blood Glucose Monitoring Suppl (FreeStyle Lite) device 1 each, Other, 4 Times Daily   • buPROPion XL (WELLBUTRIN XL) 300 mg, Oral, Every Morning   • cyclobenzaprine (FLEXERIL) 10 mg, Oral, Daily PRN   • Daily-Jelani Multivitamin tablet tablet 1 tablet, Oral, Every Night at Bedtime   • ezetimibe (ZETIA) 10 mg, Oral, Every Night at Bedtime   • FLUoxetine (PROZAC) 20 mg, Oral, Daily   • FLUoxetine (PROZAC) 10 mg, Oral, Daily   • fluticasone (Flonase) 50 MCG/ACT nasal spray 2 sprays, Nasal, Daily, Administer 2 sprays in each nostril for each dose.   • Januvia 100 MG tablet TAKE 1 TABLET BY MOUTH DAILY   • loratadine (CLARITIN) 10 mg, Oral, Daily   • " "losartan (COZAAR) 25 mg, Oral, Daily   • Melatonin 20 mg, Oral, Nightly, 2 tabs   • metFORMIN (GLUCOPHAGE) 500 MG tablet TAKE 2 TABLETS BY MOUTH TWICE DAILY WITH MEALS   • montelukast (SINGULAIR) 10 mg, Oral, Nightly   • Praluent 75 mg, Subcutaneous, Every 14 Days   • prednisoLONE acetate (Pred Mild) 0.12 % ophthalmic suspension SHAKE LIQUID AND INSTILL 1 DROP IN RIGHT EYE TWICE DAILY   • quiNINE (QUALAQUIN) 324 mg, Oral, Every Night at Bedtime   • traZODone (DESYREL) 100 mg, Oral, Nightly   • vitamin D (ERGOCALCIFEROL) 1.25 MG (32911 UT) capsule capsule TAKE 1 CAPSULE BY MOUTH WEEKLY, TAKE WITH CALCIUM   • Zinc 100 mg, Oral, Daily       The following portions of the patient's history were reviewed and updated as appropriate: allergies, current medications, past family history, past medical history, past social history, past surgical history, and problem list.    Objective   Vital Signs:   /78   Pulse 80   Temp 97.3 °F (36.3 °C)   Ht 160 cm (63\")   Wt 76.2 kg (168 lb)   SpO2 97%   BMI 29.76 kg/m²     Wt Readings from Last 3 Encounters:   03/07/23 76.2 kg (168 lb)   03/06/23 74.4 kg (164 lb)   02/12/23 74.4 kg (164 lb)     BP Readings from Last 3 Encounters:   03/07/23 116/78   02/12/23 146/62   12/16/22 135/74     Physical Exam     Result Review :  The following data was reviewed by: JEANNIE Camacho on 03/07/2023:      Common labs    Common Labs 11/1/22 11/1/22 11/1/22 11/1/22 12/28/22 2/28/23 2/28/23    1538 1538 1538 1538  1326 1326   Glucose 88         BUN 17         Creatinine 0.89    1.00 0.79    Sodium 140         Potassium 4.4         Chloride 101         Calcium 9.8      10.1   Albumin 4.60         Total Bilirubin 0.3         Alkaline Phosphatase 56         AST (SGOT) 18         ALT (SGPT) 11         WBC   6.47       Hemoglobin   12.4       Hematocrit   37.5       Platelets   224       Total Cholesterol  212 (A)        Triglycerides  80        HDL Cholesterol  63 (A)        LDL Cholesterol   " 135 (A)        Hemoglobin A1C    5.80 (A)      (A) Abnormal value       Comments are available for some flowsheets but are not being displayed.             Lab Results (last 72 hours)     ** No results found for the last 72 hours. **           MRI Brain With & Without Contrast    Result Date: 12/28/2022    1. Minimal small vessel ischemic changes in the white matter 2. No abnormality of the middle or inner ear structures on either side.      Alexei Mora M.D.       Electronically Signed and Approved By: Alexei Mora M.D. on 12/28/2022 at 15:26               Lab Results   Component Value Date    SARSANTIGEN Not Detected 02/12/2023    COVID19 Not Detected 05/26/2022    RAPFLUA Negative 02/12/2023    RAPFLUB Negative 02/12/2023    BILIRUBINUR Negative 02/12/2023    POCGLU 117 (H) 09/28/2022       Procedures        Assessment and Plan   There are no diagnoses linked to this encounter.      There are no discontinued medications.       Follow Up   No follow-ups on file.  Patient was given instructions and counseling regarding her condition or for health maintenance advice. Please see specific information pulled into the AVS if appropriate.       Sima Parr, JEANNIE  03/07/23  11:20 EST

## 2023-03-08 RX ORDER — LIDOCAINE HYDROCHLORIDE 10 MG/ML
1 INJECTION, SOLUTION EPIDURAL; INFILTRATION; INTRACAUDAL; PERINEURAL
Status: COMPLETED | OUTPATIENT
Start: 2023-03-06 | End: 2023-03-06

## 2023-03-08 RX ORDER — METHYLPREDNISOLONE ACETATE 80 MG/ML
80 INJECTION, SUSPENSION INTRA-ARTICULAR; INTRALESIONAL; INTRAMUSCULAR; SOFT TISSUE
Status: COMPLETED | OUTPATIENT
Start: 2023-03-06 | End: 2023-03-06

## 2023-03-09 ENCOUNTER — OFFICE VISIT (OUTPATIENT)
Dept: CARDIOLOGY | Facility: CLINIC | Age: 73
End: 2023-03-09
Payer: MEDICARE

## 2023-03-09 VITALS
HEIGHT: 63 IN | WEIGHT: 162 LBS | SYSTOLIC BLOOD PRESSURE: 146 MMHG | HEART RATE: 75 BPM | DIASTOLIC BLOOD PRESSURE: 82 MMHG | BODY MASS INDEX: 28.7 KG/M2

## 2023-03-09 DIAGNOSIS — M79.89 PAIN AND SWELLING OF LEFT LOWER LEG: Primary | ICD-10-CM

## 2023-03-09 DIAGNOSIS — M79.662 PAIN AND SWELLING OF LEFT LOWER LEG: Primary | ICD-10-CM

## 2023-03-09 DIAGNOSIS — E78.2 MIXED HYPERLIPIDEMIA: ICD-10-CM

## 2023-03-09 DIAGNOSIS — I10 ESSENTIAL HYPERTENSION: ICD-10-CM

## 2023-03-09 DIAGNOSIS — Z78.9 STATIN INTOLERANCE: ICD-10-CM

## 2023-03-09 PROCEDURE — 99214 OFFICE O/P EST MOD 30 MIN: CPT

## 2023-03-09 RX ORDER — MONTELUKAST SODIUM 10 MG/1
10 TABLET ORAL NIGHTLY
Qty: 90 TABLET | Refills: 1 | Status: SHIPPED | OUTPATIENT
Start: 2023-03-09

## 2023-03-09 NOTE — PROGRESS NOTES
Chief Complaint  Leg Swelling (Left leg. ), Hyperlipidemia, Hypertension, and Follow-up    Subjective        History of Present Illness  Nivia Cagle presents to [facility] for follow up. Past medical history is outlined below, remarkable for hypertension, hyperlipidemia, diabetes. Presents with complaint of left lower extremity swelling.  She was told by her rheumatologist that she needs to follow-up with cardiology regarding this.  She had a left lower extremity Doppler that was negative for DVT.  She denies any other complaints.  She denies any chest pain or discomfort, dyspnea, orthopnea, edema, syncope or presyncope.    PMH  Past Medical History:   Diagnosis Date   • ADHD (attention deficit hyperactivity disorder)    • Allergic rhinitis    • Anemia    • Anxiety    • Cataracts, bilateral    • Chronic pain disorder    • Depression 0421/2021    MOODNOT WELL CONTROLLED.  GIVEN NUMBER FOR LOCAL COUNSELOR.  WILL INCREASE TRINTELIX FROM 10MG TO 20MG RTC WEEK ER ID S/HI   • Diabetes mellitus, type 2 (HCC)    • Diverticulitis    • GERD (gastroesophageal reflux disease)    • Head injury    • High blood pressure    • Hyperlipemia    • Migraine    • EARL (obstructive sleep apnea) 04/21/2021   • Panic disorder    • Phlebitis    • PTSD (post-traumatic stress disorder)        ALLERGY  Allergies   Allergen Reactions   • Diclofenac Hives   • New Skin [Benzethonium Chloride] Rash   • Atorvastatin Myalgia   • Adhesive Tape Rash and Other (See Comments)       Rash at area of bandaid   • Niacin Rash        SURGICALHX  Past Surgical History:   Procedure Laterality Date   • ANKLE SURGERY  2021   • BILATERAL BREAST REDUCTION  2015   • BREAST BIOPSY     • CARPAL TUNNEL RELEASE     • CHOLECYSTECTOMY  2001   • COLONOSCOPY      [facility]   • COLONOSCOPY N/A 9/28/2022    Procedure: COLONOSCOPY with biopsy;  Surgeon: Ghislaine Kilgore MD;  Location: [facility] ENDOSCOPY;  Service: Gastroenterology;   Laterality: N/A;  colon polyp, diverticlosis, hemorrhoids   • HYSTERECTOMY     • ULNAR NERVE TRANSPOSITION Right    • WRIST SURGERY          SOC  Social History     Socioeconomic History   • Marital status: Single   Tobacco Use   • Smoking status: Former     Types: Cigarettes     Quit date:      Years since quittin.2   • Smokeless tobacco: Never   • Tobacco comments:     QUIT    Vaping Use   • Vaping Use: Never used   Substance and Sexual Activity   • Alcohol use: Yes     Comment: rarely   • Drug use: Never   • Sexual activity: Defer       FAMHX  Family History   Problem Relation Age of Onset   • Kidney cancer Mother    • Hyperlipidemia Mother    • Heart disease Father    • Heart attack Father    • Diabetes Father    • ADD / ADHD Father    • Hyperlipidemia Father    • Hyperlipidemia Sister    • Cancer Sister    • Brain cancer Sister    • Lung cancer Sister    • Hyperlipidemia Sister    • Hyperlipidemia Brother    • Diabetes Brother    • Heart attack Brother    • Hyperlipidemia Brother    • No Known Problems Paternal Uncle    • No Known Problems Cousin    • Malig Hyperthermia Neg Hx         MEDSIGONLY  Current Outpatient Medications on File Prior to Visit   Medication Sig   • Accu-Chek Madeline Plus test strip by Other route 4 (Four) Times a Day. use to test blood sugar   • Accu-Chek Softclix Lancets lancets by Other route 4 (Four) Times a Day. use to test blood sugar   • acetaminophen-codeine (TYLENOL #3) 300-30 MG per tablet Take  by mouth See Admin Instructions. Take 1 tablet by mouth every 4-6 hours as needed for pain   • alendronate (FOSAMAX) 70 MG tablet Take 1 tablet by mouth Every 7 (Seven) Days.   • Alirocumab (Praluent) 75 MG/ML solution auto-injector Inject 1 mL under the skin into the appropriate area as directed Every 14 (Fourteen) Days.   • ARIPiprazole (ABILIFY) 2 MG tablet Take 2 tablets by mouth Daily.   • Ascorbic Acid (VITAMIN C GUMMIE PO) Take  by mouth Daily.   • aspirin (aspirin) 81 MG  "EC tablet Take 1 tablet by mouth Daily.   • azelastine (ASTELIN) 0.1 % nasal spray 2 sprays into the nostril(s) as directed by provider 2 (Two) Times a Day. Use in each nostril as directed   • BD Insulin Syringe U/F 30G X 1/2\" 0.3 ML misc USE TO INJECT INSULIN FOUR TIMES DAILY AS NEEDED   • BL NASAL SALINE MIST NA 2 drops.   • Blood Glucose Monitoring Suppl (FreeStyle Lite) device 1 each by Other route 4 (Four) Times a Day.   • buPROPion XL (WELLBUTRIN XL) 300 MG 24 hr tablet Take 1 tablet by mouth Every Morning.   • cyclobenzaprine (FLEXERIL) 10 MG tablet Take 1 tablet by mouth Daily As Needed for Muscle Spasms.   • Daily-Jelani Multivitamin tablet tablet Take 1 tablet by mouth every night at bedtime.   • ezetimibe (ZETIA) 10 MG tablet TAKE 1 TABLET BY MOUTH EVERY NIGHT AT BEDTIME   • FLUoxetine (PROzac) 20 MG capsule Take 1 capsule by mouth Daily. (Patient taking differently: Take 30 mg by mouth Daily.)   • fluticasone (Flonase) 50 MCG/ACT nasal spray 2 sprays into the nostril(s) as directed by provider Daily. Administer 2 sprays in each nostril for each dose.   • Januvia 100 MG tablet TAKE 1 TABLET BY MOUTH DAILY   • losartan (COZAAR) 25 MG tablet TAKE 1 TABLET BY MOUTH DAILY   • Melatonin 10 MG tablet Take 2 tablets by mouth Every Night. 2 tabs   • metFORMIN (GLUCOPHAGE) 500 MG tablet TAKE 2 TABLETS BY MOUTH TWICE DAILY WITH MEALS   • prednisoLONE acetate (Pred Mild) 0.12 % ophthalmic suspension SHAKE LIQUID AND INSTILL 1 DROP IN RIGHT EYE TWICE DAILY   • quiNINE (QUALAQUIN) 324 MG capsule Take 1 capsule by mouth every night at bedtime. (Patient taking differently: Take 1 capsule by mouth As Needed.)   • traZODone (DESYREL) 50 MG tablet Take 2 tablets by mouth Every Night.   • vitamin D (ERGOCALCIFEROL) 1.25 MG (76811 UT) capsule capsule TAKE 1 CAPSULE BY MOUTH WEEKLY, TAKE WITH CALCIUM   • Zinc 100 MG tablet Take 100 mg by mouth Daily.   • [DISCONTINUED] montelukast (SINGULAIR) 10 MG tablet TAKE 1 TABLET BY MOUTH " "EVERY NIGHT   • [DISCONTINUED] FLUoxetine (PROzac) 10 MG capsule Take 1 capsule by mouth Daily.   • [DISCONTINUED] loratadine (Claritin) 10 MG tablet Take 1 tablet by mouth Daily.     No current facility-administered medications on file prior to visit.       Objective   Vitals:    03/09/23 0812   BP: 146/82   Pulse: 75   Weight: 73.5 kg (162 lb)   Height: 160 cm (63\")         Physical Exam  Constitutional:       General: She is awake. She is not in acute distress.     Appearance: Normal appearance.   HENT:      Head: Normocephalic.      Nose: Nose normal. No congestion.   Eyes:      Extraocular Movements: Extraocular movements intact.      Conjunctiva/sclera: Conjunctivae normal.      Pupils: Pupils are equal, round, and reactive to light.   Neck:      Thyroid: No thyromegaly.      Vascular: No JVD.   Cardiovascular:      Rate and Rhythm: Normal rate and regular rhythm.      Chest Wall: PMI is not displaced.      Pulses: Normal pulses.      Heart sounds: Normal heart sounds, S1 normal and S2 normal. No murmur heard.    No friction rub. No gallop. No S3 or S4 sounds.   Pulmonary:      Effort: Pulmonary effort is normal.      Breath sounds: Normal breath sounds. No wheezing, rhonchi or rales.   Abdominal:      General: Bowel sounds are normal.      Palpations: Abdomen is soft.      Tenderness: There is no abdominal tenderness.   Musculoskeletal:      Cervical back: No tenderness.      Right lower leg: No edema.      Left lower leg: No edema.   Lymphadenopathy:      Cervical: No cervical adenopathy.   Skin:     General: Skin is warm and dry.      Capillary Refill: Capillary refill takes less than 2 seconds.      Coloration: Skin is not cyanotic.      Findings: No petechiae or rash.      Nails: There is no clubbing.   Neurological:      Mental Status: She is alert.   Psychiatric:         Mood and Affect: Mood normal.         Behavior: Behavior is cooperative.           Result Review     The following data was reviewed " by JEANNIE Woodward on 03/09/23.    No results found for: PROBNP  CMP    CMP 11/1/22 12/28/22 2/28/23 2/28/23      1326 1326   Glucose 88      BUN 17      Creatinine 0.89 1.00 0.79    eGFR 69.8 60.7 80.6    Sodium 140      Potassium 4.4      Chloride 101      Calcium 9.8   10.1   Total Protein 7.0      Albumin 4.60      Globulin 2.4      Total Bilirubin 0.3      Alkaline Phosphatase 56      AST (SGOT) 18      ALT (SGPT) 11      Albumin/Globulin Ratio 1.9      BUN/Creatinine Ratio 19.1      Anion Gap 10.6         Comments are available for some flowsheets but are not being displayed.           CBC w/diff    CBC w/Diff 7/1/22 11/1/22   WBC 7.30 6.47   RBC 4.20 4.00   Hemoglobin 12.9 12.4   Hematocrit 38.9 37.5   MCV 92.6 93.8   MCH 30.7 31.0   MCHC 33.2 33.1   RDW 12.2 (A) 12.2 (A)   Platelets 231 224   Neutrophil Rel % 67.2 63.0   Immature Granulocyte Rel % 0.3 0.3   Lymphocyte Rel % 20.3 22.3   Monocyte Rel % 9.9 11.9   Eosinophil Rel % 1.8 2.0   Basophil Rel % 0.5 0.5   (A) Abnormal value             Lipid Panel    Lipid Panel 7/1/22 11/1/22   Total Cholesterol 197 212 (A)   Triglycerides 103 80   HDL Cholesterol 53 63 (A)   VLDL Cholesterol 19 14   LDL Cholesterol  125 (A) 135 (A)   LDL/HDL Ratio 2.33 2.11   (A) Abnormal value                   Assessment and Plan   Diagnoses and all orders for this visit:    1. Pain and swelling of left lower leg (Primary)    2. Mixed hyperlipidemia    3. Essential hypertension    4. Statin intolerance    Other orders  -     montelukast (SINGULAIR) 10 MG tablet; Take 1 tablet by mouth Every Night.  Dispense: 90 tablet; Refill: 1  -     Inclisiran Sodium solution prefilled syringe 284 mg  -     Inclisiran Sodium solution prefilled syringe 284 mg  -     Inclisiran Sodium solution prefilled syringe 284 mg      1.  She presents with left lower extremity swelling however there is no edema present on exam.  She reports that it is worse overnight.  Recent left lower extremity  Doppler was negative for DVT.  Her right lower extremity has a medical boot on due to a recent fall.  2.  Her lipids are uncontrolled.  She has not been on Repatha for a year due to insurance issues.  We will try Leqvio.  She was educated and agreeable.  3.  Blood pressure is mildly elevated today but she is in a little bit of pain due to her right lower extremity sprain.  She reports normal blood pressure readings at home.  4.  She was previously intolerant to statins due to myalgias.  We will try her on Leqvio.    Follow Up   No follow-ups on file.    Patient was given instructions and counseling regarding her condition or for health maintenance advice. Please see specific information pulled into the AVS if appropriate.     Alexandrea Valverde, APRN  03/09/23  20:11 EST    Dictated Utilizing Dragon Dictation

## 2023-03-15 ENCOUNTER — OFFICE VISIT (OUTPATIENT)
Dept: PODIATRY | Facility: CLINIC | Age: 73
End: 2023-03-15
Payer: MEDICARE

## 2023-03-15 VITALS
BODY MASS INDEX: 28.88 KG/M2 | DIASTOLIC BLOOD PRESSURE: 68 MMHG | HEIGHT: 63 IN | HEART RATE: 76 BPM | SYSTOLIC BLOOD PRESSURE: 124 MMHG | OXYGEN SATURATION: 98 % | WEIGHT: 163 LBS | TEMPERATURE: 96.8 F

## 2023-03-15 DIAGNOSIS — G62.9 NEUROPATHY: ICD-10-CM

## 2023-03-15 DIAGNOSIS — M79.671 FOOT PAIN, RIGHT: Primary | ICD-10-CM

## 2023-03-15 DIAGNOSIS — L03.031 PARONYCHIA OF TOE OF RIGHT FOOT: ICD-10-CM

## 2023-03-15 DIAGNOSIS — E11.42 TYPE 2 DIABETES MELLITUS WITH DIABETIC POLYNEUROPATHY, WITH LONG-TERM CURRENT USE OF INSULIN: ICD-10-CM

## 2023-03-15 DIAGNOSIS — B35.1 ONYCHOMYCOSIS: ICD-10-CM

## 2023-03-15 DIAGNOSIS — Z79.4 TYPE 2 DIABETES MELLITUS WITH DIABETIC POLYNEUROPATHY, WITH LONG-TERM CURRENT USE OF INSULIN: ICD-10-CM

## 2023-03-15 DIAGNOSIS — L60.0 ONYCHOCRYPTOSIS: ICD-10-CM

## 2023-03-15 PROCEDURE — G8404 LOW EXTEMITY NEUR EXAM DOCUM: HCPCS | Performed by: PODIATRIST

## 2023-03-15 PROCEDURE — 11730 AVULSION NAIL PLATE SIMPLE 1: CPT | Performed by: PODIATRIST

## 2023-03-15 PROCEDURE — 3074F SYST BP LT 130 MM HG: CPT | Performed by: PODIATRIST

## 2023-03-15 PROCEDURE — 1159F MED LIST DOCD IN RCRD: CPT | Performed by: PODIATRIST

## 2023-03-15 PROCEDURE — 99213 OFFICE O/P EST LOW 20 MIN: CPT | Performed by: PODIATRIST

## 2023-03-15 PROCEDURE — 1160F RVW MEDS BY RX/DR IN RCRD: CPT | Performed by: PODIATRIST

## 2023-03-15 PROCEDURE — 3078F DIAST BP <80 MM HG: CPT | Performed by: PODIATRIST
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
## 2023-03-29 ENCOUNTER — OFFICE VISIT (OUTPATIENT)
Dept: PODIATRY | Facility: CLINIC | Age: 73
End: 2023-03-29
Payer: MEDICARE

## 2023-03-29 VITALS
OXYGEN SATURATION: 98 % | DIASTOLIC BLOOD PRESSURE: 73 MMHG | BODY MASS INDEX: 29.05 KG/M2 | TEMPERATURE: 98.3 F | HEART RATE: 60 BPM | WEIGHT: 164 LBS | SYSTOLIC BLOOD PRESSURE: 112 MMHG

## 2023-03-29 DIAGNOSIS — Z79.4 TYPE 2 DIABETES MELLITUS WITH DIABETIC POLYNEUROPATHY, WITH LONG-TERM CURRENT USE OF INSULIN: ICD-10-CM

## 2023-03-29 DIAGNOSIS — G62.9 NEUROPATHY: ICD-10-CM

## 2023-03-29 DIAGNOSIS — M79.671 FOOT PAIN, RIGHT: Primary | ICD-10-CM

## 2023-03-29 DIAGNOSIS — E11.42 TYPE 2 DIABETES MELLITUS WITH DIABETIC POLYNEUROPATHY, WITH LONG-TERM CURRENT USE OF INSULIN: ICD-10-CM

## 2023-03-29 DIAGNOSIS — B35.1 ONYCHOMYCOSIS: ICD-10-CM

## 2023-03-29 DIAGNOSIS — L03.031 PARONYCHIA OF TOE OF RIGHT FOOT: ICD-10-CM

## 2023-03-29 PROCEDURE — 3074F SYST BP LT 130 MM HG: CPT | Performed by: PODIATRIST

## 2023-03-29 PROCEDURE — 1159F MED LIST DOCD IN RCRD: CPT | Performed by: PODIATRIST

## 2023-03-29 PROCEDURE — 99212 OFFICE O/P EST SF 10 MIN: CPT | Performed by: PODIATRIST

## 2023-03-29 PROCEDURE — 3078F DIAST BP <80 MM HG: CPT | Performed by: PODIATRIST

## 2023-03-29 PROCEDURE — 1160F RVW MEDS BY RX/DR IN RCRD: CPT | Performed by: PODIATRIST
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
## 2023-03-30 ENCOUNTER — OFFICE VISIT (OUTPATIENT)
Dept: NEUROLOGY | Facility: CLINIC | Age: 73
End: 2023-03-30
Payer: MEDICARE

## 2023-03-30 VITALS
SYSTOLIC BLOOD PRESSURE: 140 MMHG | HEIGHT: 63 IN | DIASTOLIC BLOOD PRESSURE: 57 MMHG | BODY MASS INDEX: 29.15 KG/M2 | WEIGHT: 164.5 LBS | HEART RATE: 82 BPM

## 2023-03-30 DIAGNOSIS — R25.3 MUSCLE TWITCHING: ICD-10-CM

## 2023-03-30 DIAGNOSIS — G25.81 RESTLESS LEGS SYNDROME (RLS): Primary | ICD-10-CM

## 2023-03-30 RX ORDER — CYCLOBENZAPRINE HCL 10 MG
10 TABLET ORAL DAILY PRN
Qty: 30 TABLET | Refills: 5 | Status: SHIPPED | OUTPATIENT
Start: 2023-03-30

## 2023-03-30 RX ORDER — PRAMIPEXOLE DIHYDROCHLORIDE 0.5 MG/1
0.5 TABLET ORAL NIGHTLY
Qty: 90 TABLET | Refills: 1 | Status: SHIPPED | OUTPATIENT
Start: 2023-03-30 | End: 2024-03-29

## 2023-03-31 NOTE — ASSESSMENT & PLAN NOTE
Will start flexeril for muscle spasms.  Discussed that quinine is not indicated for muscle cramping.  Risk outweighs benefit.

## 2023-04-05 NOTE — TELEPHONE ENCOUNTER
Patient was seen at urgent care yesterday. Neg covid   St. Cloud Torin Underwood DO  Hospitalist Service  Kittson 

## 2023-04-16 DIAGNOSIS — F33.2 SEVERE EPISODE OF RECURRENT MAJOR DEPRESSIVE DISORDER, WITHOUT PSYCHOTIC FEATURES: ICD-10-CM

## 2023-04-16 DIAGNOSIS — F41.1 GENERALIZED ANXIETY DISORDER: ICD-10-CM

## 2023-04-17 RX ORDER — BUPROPION HYDROCHLORIDE 300 MG/1
300 TABLET ORAL EVERY MORNING
Qty: 90 TABLET | Refills: 1 | Status: SHIPPED | OUTPATIENT
Start: 2023-04-17

## 2023-04-17 NOTE — TELEPHONE ENCOUNTER
Atypical Antidepressants Protocol Passed 04/16/2023 02:19 PM    No active pregnancy on record    No positive pregnancy test in past 12 months    PHQ-2 or PHQ9 on record within past 12 months    Visit with authorizing provider in past 12 months or upcoming 30 days     NEXT APPT WITH PROVIDER  Appointment with Vicky Barth MD (04/27/2023)    LAST MED REFILL  buPROPion XL (WELLBUTRIN XL) 300 MG 24 hr tablet (09/08/2022)    PROVIDER PLEASE ADVISE

## 2023-04-27 ENCOUNTER — TELEPHONE (OUTPATIENT)
Dept: PSYCHIATRY | Facility: CLINIC | Age: 73
End: 2023-04-27

## 2023-04-27 ENCOUNTER — TELEMEDICINE (OUTPATIENT)
Dept: PSYCHIATRY | Facility: CLINIC | Age: 73
End: 2023-04-27
Payer: MEDICARE

## 2023-04-27 DIAGNOSIS — F43.10 POST TRAUMATIC STRESS DISORDER (PTSD): ICD-10-CM

## 2023-04-27 DIAGNOSIS — F41.1 GENERALIZED ANXIETY DISORDER: Primary | ICD-10-CM

## 2023-04-27 DIAGNOSIS — F51.05 INSOMNIA DUE TO MENTAL CONDITION: ICD-10-CM

## 2023-04-27 DIAGNOSIS — F33.40 RECURRENT MAJOR DEPRESSIVE DISORDER IN REMISSION: ICD-10-CM

## 2023-04-27 NOTE — PROGRESS NOTES
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 Glucose, UA 03/07/2023 Negative  Negative Final   • Ketones, UA 03/07/2023 Negative  Negative Final   • Bilirubin, UA 03/07/2023 Negative  Negative Final   • Blood, UA 03/07/2023 Negative  Negative Final   • Protein, UA 03/07/2023 Negative  Negative Final   • Leuk Esterase, UA 03/07/2023 Negative  Negative Final   • Nitrite, UA 03/07/2023 Negative  Negative Final   • Urobilinogen, UA 03/07/2023 0.2 E.U./dL  0.2 - 1.0 E.U./dL Final   Hospital Outpatient Visit on 02/28/2023   Component Date Value Ref Range Status   • Target HR (85%) 02/28/2023 128  bpm Final   • Max. Pred. HR (100%) 02/28/2023 150  bpm Final   • Right Common Femoral Spont 02/28/2023 Y   Final   • Right Common Femoral Competent 02/28/2023 Y   Final   • Right Common Femoral Phasic 02/28/2023 Y   Final   • Right Common Femoral Compress 02/28/2023 C   Final   • Right Common Femoral Augment 02/28/2023 Y   Final   • Left Common Femoral Spont 02/28/2023 Y   Final   • Left Common Femoral Competent 02/28/2023 Y   Final   • Left Common Femoral Phasic 02/28/2023 Y   Final   • Left Common Femoral Compress 02/28/2023 C   Final   • Left Common Femoral Augment 02/28/2023 Y   Final   • Left Saphenofemoral Junction Compr* 02/28/2023 C   Final   • Left Proximal Femoral Compress 02/28/2023 C   Final   •  Left Mid Femoral Spont 02/28/2023 Y   Final   • Left Mid Femoral Competent 02/28/2023 Y   Final   • Left Mid Femoral Phasic 02/28/2023 Y   Final   • Left Mid Femoral Compress 02/28/2023 C   Final   • Left Mid Femoral Augment 02/28/2023 Y   Final   • Left Distal Femoral Compress 02/28/2023 C   Final   • Left Popliteal Spont 02/28/2023 Y   Final   • Left Popliteal Competent 02/28/2023 Y   Final   • Left Popliteal Phasic 02/28/2023 Y   Final   • Left Popliteal Compress 02/28/2023 C   Final   • Left Popliteal Augment 02/28/2023 Y   Final   • Left Posterior Tibial Compress 02/28/2023 C   Final   • Left Peroneal Compress 02/28/2023 C   Final   • Left Gastronemius Compress 02/28/2023 C   Final   • Left Greater Saph AK Compress 02/28/2023 C   Final   • Left Greater Saph BK Compress 02/28/2023 C   Final   • Left Lesser Saph Compress 02/28/2023 C   Final   Lab on 02/28/2023   Component Date Value Ref Range Status   • 25 Hydroxy, Vitamin D 02/28/2023 68.4  30.0 - 100.0 ng/ml Final   • Creatinine 02/28/2023 0.79  0.57 - 1.00 mg/dL Final   • eGFR 02/28/2023 80.6  >60.0 mL/min/1.73 Final   • Calcium 02/28/2023 10.1  8.6 - 10.5 mg/dL Final   Admission on 02/12/2023, Discharged on 02/12/2023   Component Date Value Ref Range Status   • Color 02/12/2023 Yellow   Final   • Clarity, UA 02/12/2023 Clear   Final   • Glucose, UA 02/12/2023 Negative  mg/dL Final   • Bilirubin 02/12/2023 Negative   Final   • Ketones, UA 02/12/2023 Negative   Final   • Specific Gravity  02/12/2023 1.020  1.005 - 1.030 Final   • Blood, UA 02/12/2023 Negative   Final   • pH, Urine 02/12/2023 6.0  5.0 - 8.0 Final   • Protein, POC 02/12/2023 Negative  mg/dL Final   • Urobilinogen, UA 02/12/2023 0.2 E.U./dL   Final   • Nitrite, UA 02/12/2023 Negative   Final   • Leukocytes 02/12/2023 Negative   Final   • Rapid Influenza A Ag 02/12/2023 Negative   Final   • Rapid Influenza B Ag 02/12/2023 Negative   Final   • Internal Control 02/12/2023 Passed   Final   • Lot  Number 02/12/2023 [id number]   Final   • Expiration Date 02/12/2023 03/26/2024   Final   • SARS Antigen 02/12/2023 Not Detected   Final   • Internal Control 02/12/2023 Passed   Final   • Lot Number 02/12/2023 [id number]   Final   • Expiration Date 02/12/2023 09/27/2023   Final   • Urine Culture 02/12/2023 No growth   Final   Hospital Outpatient Visit on 12/28/2022   Component Date Value Ref Range Status   • Creatinine 12/28/2022 1.00  mg/dL Final    Serial Number: [id number]:  [id number]   • eGFR 12/28/2022 60.7  >60.0 mL/min/1.73 Final   Admission on 12/16/2022, Discharged on 12/16/2022   Component Date Value Ref Range Status   • Color 12/16/2022 Orange (A)   Final   • Clarity, UA 12/16/2022 Cloudy (A)   Final   • Glucose, UA 12/16/2022 100 mg/dL (A)  mg/dL Final   • Bilirubin 12/16/2022 Large (3+) (A)   Final   • Ketones, UA 12/16/2022 15 mg/dL (A)   Final   • Specific Gravity  12/16/2022 1.030  1.005 - 1.030 Final   • Blood, UA 12/16/2022 Large (A)   Final   • pH, Urine 12/16/2022 5.0  5.0 - 8.0 Final   • Protein, POC 12/16/2022 300 mg/dL (A)  mg/dL Final   • Urobilinogen, UA 12/16/2022 Normal   Final   • Nitrite, UA 12/16/2022 Positive (A)   Final   • Leukocytes 12/16/2022 Large (3+) (A)   Final   • Urine Culture 12/16/2022 No growth   Final   Office Visit on 12/14/2022   Component Date Value Ref Range Status   • Color, UA 12/14/2022 Yellow  Yellow, Straw Final   • Appearance, UA 12/14/2022 Clear  Clear Final   • pH, UA 12/14/2022 <=5.0  5.0 - 8.0 Final   • Specific Gravity, UA 12/14/2022 >=1.030  1.005 - 1.030 Final   • Glucose, UA 12/14/2022 100 mg/dL (Trace) (A)  Negative Final   • Ketones, UA 12/14/2022 Trace (A)  Negative Final   • Bilirubin, UA 12/14/2022 Moderate (2+) (A)  Negative Final   • Blood, UA 12/14/2022 Moderate (2+) (A)  Negative Final   • Protein, UA 12/14/2022 >=300 mg/dL (3+) (A)  Negative Final   • Leuk Esterase, UA 12/14/2022 Trace (A)  Negative Final   • Nitrite, UA 12/14/2022 Positive (A)   Negative Final   • Urobilinogen, UA 12/14/2022 0.2 E.U./dL  0.2 - 1.0 E.U./dL Final   • RBC, UA 12/14/2022 None Seen  None Seen /HPF Final   • WBC, UA 12/14/2022 13-20 (A)  None Seen /HPF Final   • Bacteria, UA 12/14/2022 Trace (A)  None Seen /HPF Final   • Squamous Epithelial Cells, UA 12/14/2022 0-2  None Seen, 0-2 /HPF Final   • Hyaline Casts, UA 12/14/2022 None Seen  None Seen /LPF Final   • Methodology 12/14/2022 Automated Microscopy   Final   • Urine Culture 12/14/2022 >100,000 CFU/mL Escherichia coli ESBL (A)   Final      Consider infectious disease consult.  Susceptibility results may not correlate to clinical outcomes.   Lab on 11/01/2022   Component Date Value Ref Range Status   • 25 Hydroxy, Vitamin D 11/01/2022 57.1  30.0 - 100.0 ng/ml Final   • Glucose 11/01/2022 88  65 - 99 mg/dL Final   • BUN 11/01/2022 17  8 - 23 mg/dL Final   • Creatinine 11/01/2022 0.89  0.57 - 1.00 mg/dL Final   • Sodium 11/01/2022 140  136 - 145 mmol/L Final   • Potassium 11/01/2022 4.4  3.5 - 5.2 mmol/L Final   • Chloride 11/01/2022 101  98 - 107 mmol/L Final   • CO2 11/01/2022 28.4  22.0 - 29.0 mmol/L Final   • Calcium 11/01/2022 9.8  8.6 - 10.5 mg/dL Final   • Total Protein 11/01/2022 7.0  6.0 - 8.5 g/dL Final   • Albumin 11/01/2022 4.60  3.50 - 5.20 g/dL Final   • ALT (SGPT) 11/01/2022 11  1 - 33 U/L Final   • AST (SGOT) 11/01/2022 18  1 - 32 U/L Final   • Alkaline Phosphatase 11/01/2022 56  39 - 117 U/L Final   • Total Bilirubin 11/01/2022 0.3  0.0 - 1.2 mg/dL Final   • Globulin 11/01/2022 2.4  gm/dL Final   • A/G Ratio 11/01/2022 1.9  g/dL Final   • BUN/Creatinine Ratio 11/01/2022 19.1  7.0 - 25.0 Final   • Anion Gap 11/01/2022 10.6  5.0 - 15.0 mmol/L Final   • eGFR 11/01/2022 69.8  >60.0 mL/min/1.73 Final    National Kidney Foundation and American Society of Nephrology (ASN) Task Force recommended calculation based on the Chronic Kidney Disease Epidemiology Collaboration (CKD-EPI) equation refit without adjustment for  race.   • TSH 11/01/2022 3.210  0.270 - 4.200 uIU/mL Final   • Total Cholesterol 11/01/2022 212 (H)  0 - 200 mg/dL Final   • Triglycerides 11/01/2022 80  0 - 150 mg/dL Final   • HDL Cholesterol 11/01/2022 63 (H)  40 - 60 mg/dL Final   • LDL Cholesterol  11/01/2022 135 (H)  0 - 100 mg/dL Final   • VLDL Cholesterol 11/01/2022 14  5 - 40 mg/dL Final   • LDL/HDL Ratio 11/01/2022 2.11   Final   • Hemoglobin A1C 11/01/2022 5.80 (H)  4.80 - 5.60 % Final   • WBC 11/01/2022 6.47  3.40 - 10.80 10*3/mm3 Final   • RBC 11/01/2022 4.00  3.77 - 5.28 10*6/mm3 Final   • Hemoglobin 11/01/2022 12.4  12.0 - 15.9 g/dL Final   • Hematocrit 11/01/2022 37.5  34.0 - 46.6 % Final   • MCV 11/01/2022 93.8  79.0 - 97.0 fL Final   • MCH 11/01/2022 31.0  26.6 - 33.0 pg Final   • MCHC 11/01/2022 33.1  31.5 - 35.7 g/dL Final   • RDW 11/01/2022 12.2 (L)  12.3 - 15.4 % Final   • RDW-SD 11/01/2022 42.0  37.0 - 54.0 fl Final   • MPV 11/01/2022 11.7  6.0 - 12.0 fL Final   • Platelets 11/01/2022 224  140 - 450 10*3/mm3 Final   • Neutrophil % 11/01/2022 63.0  42.7 - 76.0 % Final   • Lymphocyte % 11/01/2022 22.3  19.6 - 45.3 % Final   • Monocyte % 11/01/2022 11.9  5.0 - 12.0 % Final   • Eosinophil % 11/01/2022 2.0  0.3 - 6.2 % Final   • Basophil % 11/01/2022 0.5  0.0 - 1.5 % Final   • Immature Grans % 11/01/2022 0.3  0.0 - 0.5 % Final   • Neutrophils, Absolute 11/01/2022 4.08  1.70 - 7.00 10*3/mm3 Final   • Lymphocytes, Absolute 11/01/2022 1.44  0.70 - 3.10 10*3/mm3 Final   • Monocytes, Absolute 11/01/2022 0.77  0.10 - 0.90 10*3/mm3 Final   • Eosinophils, Absolute 11/01/2022 0.13  0.00 - 0.40 10*3/mm3 Final   • Basophils, Absolute 11/01/2022 0.03  0.00 - 0.20 10*3/mm3 Final   • Immature Grans, Absolute 11/01/2022 0.02  0.00 - 0.05 10*3/mm3 Final   • nRBC 11/01/2022 0.0  0.0 - 0.2 /100 WBC Final       EKG Results:  No orders to display       Imaging Results:  No Images in the past 120 days found..      Assessment & Plan   Diagnoses and all orders for this  visit:    1. Generalized anxiety disorder (Primary)    2. Recurrent major depressive disorder in remission    3. Post traumatic stress disorder (PTSD)    4. Insomnia due to mental condition        INITIAL presentation most consistent with major depressive disorder, recurrent, moderate to severe, with anxious distress.  PTSD.  Rule out personality disorder, cluster B specifically.  Rule out hypomania as patient was very difficult to interrupt today.      4/27: Stable, well. Restart melatonin for insomnia. 17 minutes of supportive psychotherapy with goal to strengthen defenses, promote problems solving, restore adaptive functioning and provide symptom relief. The therapeutic alliance was strengthened to encourage the patient to express their thoughts and feelings. Esteem building was enhanced through praise, reassurance, normalizing and encouragement. Coping skills were enhanced to build distress tolerance skills and emotional regulation. Allowed patient to freely discuss issues without interruption or judgement with unconditional positive regard, active listening skills, and empathy. Provided a safe, confidential environment to facilitate the development of a positive therapeutic relationship and encourage open, honest communication. Assisted patient in identifying risk factors which would indicate the need for higher level of care including thoughts to harm self or others and/or self-harming behavior and encouraged patient to contact this office, call 911, or present to the nearest emergency room should any of these events occur. Assisted patient in processing session content; acknowledged and normalized patient’s thoughts, feelings, and concerns by utilizing a person-centered approach in efforts to build appropriate rapport and a positive therapeutic relationship with open and honest communication. Patient given education on medication side effects, diagnosis/illness and relapse symptoms. Plan to continue  supportive psychotherapy in next appointment to provide symptom relief.  Diagnoses: as above  Symptoms: as above  Functional status: good  Mental Status Exam: as above    Treatment plan: Medication management and supportive psychotherapy  Prognosis: good  Progress: stable, well  6 wks        3/2: Prozac and BLT helped. Situational stressor in son, no changes. Better. Watch insomnia. 17   Progress: improved  6 wks      1/20: Start light box, increase prozac for dep. 17   Prognosis: good  Progress: stable, well  6 wks      12/9: Some insomnia. Increase melatonin. 17 minutes of supportive psychotherapy  Prognosis: good  Progress: stable, well  6 wks      10/25: Doing well. Increase prozac back to baseline dose (inadvertently reduced, she has been stable on a higher dose, so we should go back to that). Some initial insomnia, increase trazodone to 75 mg nightly. She is enjoying the Fall. 21 minutes of supportive psychotherapy  Treatment plan: Medication management and supportive psychotherapy  Prognosis: good  Progress: stable, but having initial insomnia  6 wks      9/8: Start light box. Close follow up in case this is worsening depression. 16 minutes of supportive psychotherapy  Treatment plan: Medication management and supportive psychotherapy  Prognosis: good  Progress: stable  6 wks      7/27: Well, stable. 17 minutes of supportive psychotherapyTreatment plan: Medication management and supportive psychotherapy  Prognosis: good  Progress: stable, well  6 wks      6/14: Better, no changes. 17 minutes of supportive psychotherapy Treatment plan: Medication management and supportive psychotherapy  Prognosis: good  Progress: less depressed  6 wks      5/3: Increase prozac and melatonin. 17 minutes of supportive psychotherapyTreatment plan: Medication management and supportive psychotherapy  Prognosis: good  Progress: backtracked recently, now depressed  6 wks      Visit Diagnoses:    ICD-10-CM ICD-9-CM   1. Generalized  anxiety disorder  F41.1 300.02   2. Recurrent major depressive disorder in remission  F33.40 296.35   3. Post traumatic stress disorder (PTSD)  F43.10 309.81   4. Insomnia due to mental condition  F51.05 300.9     327.02       PLAN:  138. Risk Assessment: Risk of self-harm acutely is moderate. Risk factors include chronic depressive disorder, possible personality disorder, recent psychosocial stressors (pandemic, moving). Protective factors include no present SI, no history of suicide attempts or self-harm in the past, no access to weapons, minimal AODA, healthcare seeking, future orientation, willingness to engage in care. Risk of self-harm chronically is also moderate, but could be further elevated in the event of treatment noncompliance and/or AODA.  139. Safety: No acute safety concerns.  140. Medications:   a. RESTART melatonin 30 mg p.o. nightly.  Risks, benefits, side effects discussed with patient including sedation, dizziness/falls risk, GI upset.  After discussion of these risks and benefits, the patient voiced understanding and agreed to proceed.  b. CONTINUE trazodone 100 mg PO QHS. Risks, benefits, side effects discussed with patient including GI upset, sedation, dizziness/falls risk, grogginess the following day, prolongation of the QTc interval.  After discussion of these risks and benefits, the patient voiced understanding and agreed to proceed.    c. CONTINUE bupropion xl 300 mg daily. Risks, benefits, alternatives discussed with patient including nausea, GI upset, increased energy, exacerbation of irritability, insomnia, lowering of seizure threshold.  After discussion of these risks and benefits, the patient voiced understanding and agreed to proceed.  d. CONTINUE Prozac 30 mg a day (HIGHEST dose is 40 given that she is also on bupropion). Risks, benefits, alternatives discussed with patient including GI upset, nausea vomiting diarrhea, theoretical decrease of seizure threshold predisposing the  patient to a slightly higher seizure risk, headaches, sexual dysfunction, serotonin syndrome, bleeding risk, increased suicidality in patients 24 years and younger.  After discussion of these risks and benefits, the patient voiced understanding and agreed to proceed.  e. CONTINUE Abilify 4 mg p.o. daily to target depression, anxiety, decreased energy. Risks, benefits, alternatives discussed with patient including increased energy, exacerbation of irritability, akathisia, GI upset, orthostatic hypotension, increased appetite. After discussion of these risks and benefits, the patient voiced understanding and agreed to proceed.  f. S/P:  1. Mirtazapine 45 mg daily (RLS)  141. Therapy: referred to [facility].  142. Labs/Studies: s/p TMS referral.  143. Follow Up: 6 weeks. (prefers 4 wks)      TREATMENT PLAN/GOALS: Continue supportive psychotherapy efforts and medications as indicated. Treatment and medication options discussed during today's visit. Patient acknowledged and verbally consented to continue with current treatment plan and was educated on the importance of compliance with treatment and follow-up appointments.    MEDICATION ISSUES:  SAPNA reviewed as expected.  Discussed medication options and treatment plan of prescribed medication as well as the risks, benefits, and side effects including potential falls, possible impaired driving and metabolic adversities among others. Patient is agreeable to call the office with any worsening of symptoms or onset of side effects. Patient is agreeable to call 911 or go to the nearest ER should he/she begin having SI/HI. No medication side effects or related complaints today.     MEDS ORDERED DURING VISIT:  No orders of the defined types were placed in this encounter.      Return in about 6 weeks (around 6/8/2023).         This document has been electronically signed by Vicky Barth MD  April 27, 2023 09:20 EDT      Part of this note may be an electronic  transcription/translation of spoken language to printed text using the Dragon Dictation System.

## 2023-04-27 NOTE — PROGRESS NOTES
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 Final   • AST (SGOT) 09/21/2021 19  1 - 32 U/L Final   • Alkaline Phosphatase 09/21/2021 88  39 - 117 U/L Final   • Total Bilirubin 09/21/2021 0.5  0.0 - 1.2 mg/dL Final   • eGFR Non  Amer 09/21/2021 75  >60 mL/min/1.73 Final   • Globulin 09/21/2021 3.0  gm/dL Final   • A/G Ratio 09/21/2021 1.5  g/dL Final   • BUN/Creatinine Ratio 09/21/2021 19.7  7.0 - 25.0 Final   • Anion  Gap 09/21/2021 8.6  5.0 - 15.0 mmol/L Final   • TSH 09/21/2021 1.450  0.270 - 4.200 uIU/mL Final   • Total Cholesterol 09/21/2021 219* 0 - 200 mg/dL Final   • Triglycerides 09/21/2021 128  0 - 150 mg/dL Final   • HDL Cholesterol 09/21/2021 49  40 - 60 mg/dL Final   • LDL Cholesterol  09/21/2021 147* 0 - 100 mg/dL Final   • VLDL Cholesterol 09/21/2021 23  5 - 40 mg/dL Final   • LDL/HDL Ratio 09/21/2021 2.95   Final   • WBC 09/21/2021 6.85  3.40 - 10.80 10*3/mm3 Final   • RBC 09/21/2021 4.17  3.77 - 5.28 10*6/mm3 Final   • Hemoglobin 09/21/2021 12.4  12.0 - 15.9 g/dL Final   • Hematocrit 09/21/2021 37.1  34.0 - 46.6 % Final   • MCV 09/21/2021 89.0  79.0 - 97.0 fL Final   • MCH 09/21/2021 29.7  26.6 - 33.0 pg Final   • MCHC 09/21/2021 33.4  31.5 - 35.7 g/dL Final   • RDW 09/21/2021 12.8  12.3 - 15.4 % Final   • RDW-SD 09/21/2021 41.3  37.0 - 54.0 fl Final   • MPV 09/21/2021 11.1  6.0 - 12.0 fL Final   • Platelets 09/21/2021 300  140 - 450 10*3/mm3 Final   • Neutrophil % 09/21/2021 60.6  42.7 - 76.0 % Final   • Lymphocyte % 09/21/2021 26.9  19.6 - 45.3 % Final   • Monocyte % 09/21/2021 8.8  5.0 - 12.0 % Final   • Eosinophil % 09/21/2021 3.2  0.3 - 6.2 % Final   • Basophil % 09/21/2021 0.4  0.0 - 1.5 % Final   • Immature Grans % 09/21/2021 0.1  0.0 - 0.5 % Final   • Neutrophils, Absolute 09/21/2021 4.15  1.70 - 7.00 10*3/mm3 Final   • Lymphocytes, Absolute 09/21/2021 1.84  0.70 - 3.10 10*3/mm3 Final   • Monocytes, Absolute 09/21/2021 0.60  0.10 - 0.90 10*3/mm3 Final   • Eosinophils, Absolute 09/21/2021 0.22  0.00 - 0.40 10*3/mm3 Final   • Basophils, Absolute 09/21/2021 0.03  0.00 - 0.20 10*3/mm3 Final   • Immature Grans, Absolute 09/21/2021 0.01  0.00 - 0.05 10*3/mm3 Final   • nRBC 09/21/2021 0.0  0.0 - 0.2 /100 WBC Final   Clinical Support on 08/20/2021   Component Date Value Ref Range Status   • COVID19 08/20/2021 Not Detected  Not Detected - Ref. Range Final       EKG Results:  No orders to display       Imaging  Results:  No Images in the past 120 days found..      Assessment/Plan   Diagnoses and all orders for this visit:    1. Recurrent major depressive disorder in remission (HCC) (Primary)    2. Generalized anxiety disorder    3. Post traumatic stress disorder (PTSD)    4. Insomnia due to mental condition        Presentation most consistent with major depressive disorder, recurrent, moderate to severe, with anxious distress.  PTSD.  Rule out personality disorder, cluster B specifically.  Rule out hypomania as patient was very difficult to interrupt today.    2/16: Stable. No SE. 17 minutes of supportive psychotherapy with goal to strengthen defenses, promote problems solving, restore adaptive functioning and provide symptom relief. The therapeutic alliance was strengthened to encourage the patient to express their thoughts and feelings. Esteem building was enhanced through praise, reassurance, normalizing and encouragement. Coping skills were enhanced to build distress tolerance skills and emotional regulation. Patient given education on medication side effects, diagnosis/illness and relapse symptoms. Plan to continue supportive psychotherapy in next appointment to provide symptom relief.  4 wks    1/18: Doing well. Tolerating meds without side effects. 25 minutes of supportive psychotherapy with goal to strengthen defenses, promote problems solving, restore adaptive functioning and provide symptom relief. The therapeutic alliance was strengthened to encourage the patient to express their thoughts and feelings. Esteem building was enhanced through praise, reassurance, normalizing and encouragement. Coping skills were enhanced to build distress tolerance skills and emotional regulation. Patient given education on medication side effects, diagnosis/illness and relapse symptoms. Plan to continue supportive psychotherapy in next appointment to provide symptom relief.  4 wks    12/7: Doing very well, though some insomnia.  Start melatonin 10 mg daily. No other changes. Watch jaw movements. Wants to taper off meds at some point; now would not be a good time. 17 minutes of supportive psychotherapy with goal to strengthen defenses, promote problems solving, restore adaptive functioning and provide symptom relief. The therapeutic alliance was strengthened to encourage the patient to express their thoughts and feelings. Esteem building was enhanced through praise, reassurance, normalizing and encouragement. Coping skills were enhanced to build distress tolerance skills and emotional regulation. Patient given education on medication side effects, diagnosis/illness and relapse symptoms. Plan to continue supportive psychotherapy in next appointment to provide symptom relief.  6 wks    10/26: Pt stopped mirtazapine and restarted prozac 20 mg daily.     9/30: Start melatonin for RLS. Stop Prozac due to multiple drug interactions.  See back in 2 weeks to start her on duloxetine 20 mg a day after full washout of fluoxetine.  2 weeks.    8/19: Stable, well. Patient is on too high a dose of bupropion given she is also on prozac. Reduce to 300 mg daily. See back in 6 wks.    6/21: Seriously consider whether patient has bipolar 2, given her rapid improvement on the equivalent dose of 4 mg of abilify daily. Still tangential, but far less so. Referral for TMS.  Patient has a history of ECT twice in her life.  The last time she underwent ECT was in 2003 and it did not help. Improved on abilify; increase dose to target depression and anxiety. Continue mirtazapine, bupropion (she never stopped this), prozac. Effective dose of abilify will be 8 mg (prozac inhibits 2D6, doubling abilify's concentration).     IMPORTANT:  Consider brexpiprazole.    Very important to obtain records from previous psychiatrist.  Care should be taken when putting patient on sedating medications as she has a falls risk.  Also has a history of EARL which will lead to difficulties  treating her insomnia.    Therapy referral made to [facility].      See back in 8 weeks.  Patient is not vaccinated.    Visit Diagnoses:    ICD-10-CM ICD-9-CM   1. Recurrent major depressive disorder in remission (HCC)  F33.40 296.35   2. Generalized anxiety disorder  F41.1 300.02   3. Post traumatic stress disorder (PTSD)  F43.10 309.81   4. Insomnia due to mental condition  F51.05 300.9     327.02       PLAN:  55. Risk Assessment: Risk of self-harm acutely is moderate. Risk factors include chronic depressive disorder, possible personality disorder, recent psychosocial stressors (pandemic, moving). Protective factors include no present SI, no history of suicide attempts or self-harm in the past, no access to weapons, minimal AODA, healthcare seeking, future orientation, willingness to engage in care. Risk of self-harm chronically is also moderate, but could be further elevated in the event of treatment noncompliance and/or AODA.  56. Safety: No acute safety concerns.  57. Medications:   a. INCREASE melatonin 6 to 9 mg p.o. nightly.  Risks, benefits, side effects discussed with patient including sedation, dizziness/falls risk, GI upset.  After discussion of these risks and benefits, the patient voiced understanding and agreed to proceed.  b. CONTINUE trazodone 50 mg PO QHS. Risks, benefits, side effects discussed with patient including GI upset, sedation, dizziness/falls risk, grogginess the following day, prolongation of the QTc interval.  After discussion of these risks and benefits, the patient voiced understanding and agreed to proceed.    c. CONTINUE bupropion xl 300 mg daily. Risks, benefits, alternatives discussed with patient including nausea, GI upset, increased energy, exacerbation of irritability, insomnia, lowering of seizure threshold.  After discussion of these risks and benefits, the patient voiced understanding and agreed to proceed.  d. CONTINUE Prozac 20 mg a day. Risks, benefits, alternatives  discussed with patient including GI upset, nausea vomiting diarrhea, theoretical decrease of seizure threshold predisposing the patient to a slightly higher seizure risk, headaches, sexual dysfunction, serotonin syndrome, bleeding risk, increased suicidality in patients 24 years and younger.  After discussion of these risks and benefits, the patient voiced understanding and agreed to proceed.  e. CONTINUE Abilify 4 mg p.o. daily to target depression, anxiety, decreased energy. Risks, benefits, alternatives discussed with patient including increased energy, exacerbation of irritability, akathisia, GI upset, orthostatic hypotension, increased appetite. After discussion of these risks and benefits, the patient voiced understanding and agreed to proceed.  i. S/P:  1. Mirtazapine 45 mg daily (RLS)  58. Therapy: referred to [facility] today 12/7.  59. Labs/Studies: s/p TMS referral.  60. Follow Up: 4 weeks.      TREATMENT PLAN/GOALS: Continue supportive psychotherapy efforts and medications as indicated. Treatment and medication options discussed during today's visit. Patient acknowledged and verbally consented to continue with current treatment plan and was educated on the importance of compliance with treatment and follow-up appointments.    MEDICATION ISSUES:  SAPNA reviewed as expected.  Discussed medication options and treatment plan of prescribed medication as well as the risks, benefits, and side effects including potential falls, possible impaired driving and metabolic adversities among others. Patient is agreeable to call the office with any worsening of symptoms or onset of side effects. Patient is agreeable to call 911 or go to the nearest ER should he/she begin having SI/HI. No medication side effects or related complaints today.     MEDS ORDERED DURING VISIT:  No orders of the defined types were placed in this encounter.      Return in about 4 weeks (around 3/16/2022).         This document has been  electronically signed by Vicky Barth MD  February 16, 2022 12:01 EST      Part of this note may be an electronic transcription/translation of spoken language to printed text using the Dragon Dictation System.

## 2023-04-27 NOTE — PATIENT INSTRUCTIONS
1.  Please return to clinic at your next scheduled visit.  Contact the clinic ([phone]) at least 24 hours prior in the event you need to cancel.  2.  Do no harm to yourself or others.    3.  Avoid alcohol and drugs.    4.  Take all medications as prescribed.  Please contact the clinic with any concerns. If you are in need of medication refills, please call the clinic at [phone].    5. Should you want to get in touch with your provider, Dr. Vicky Barth, please utilize [facility] or contact the office ([phone]), and staff will be able to page Dr. Barth directly.  6.  In the event you have personal crisis, contact the following crisis numbers: Suicide Prevention Hotline 1-146.761.9069; [facility] Helpline 3-177-359-YBGS; [facility] Emergency Room [phone]; text HELLO to [phone]; or 436.     SPECIFIC RECOMMENDATIONS:     1.      Medications discussed at this encounter:                   - no changes     2.      Psychotherapy recommendations:

## 2023-05-15 NOTE — TELEPHONE ENCOUNTER
LVM FOR PT TO CALL AND AMNALE FOLLOW UP WITH BREANNA CRUZ FOR 6 WEEKS     3RD AND FINAL ATTEMPT TO CONTACT PATIENT    PATIENT IS TO NOW CONTACT OFFICE TO SCHEDULE FOLLOW UP

## 2023-05-19 ENCOUNTER — LAB (OUTPATIENT)
Dept: LAB | Facility: HOSPITAL | Age: 73
End: 2023-05-19
Payer: MEDICARE

## 2023-05-19 ENCOUNTER — TRANSCRIBE ORDERS (OUTPATIENT)
Dept: LAB | Facility: HOSPITAL | Age: 73
End: 2023-05-19
Payer: MEDICARE

## 2023-05-19 DIAGNOSIS — Z79.899 ENCOUNTER FOR LONG-TERM (CURRENT) USE OF OTHER MEDICATIONS: Primary | ICD-10-CM

## 2023-05-19 DIAGNOSIS — M85.89 OSTEOPENIA OF MULTIPLE SITES: ICD-10-CM

## 2023-05-19 DIAGNOSIS — Z79.899 ENCOUNTER FOR LONG-TERM (CURRENT) USE OF OTHER MEDICATIONS: ICD-10-CM

## 2023-05-19 LAB
25(OH)D3 SERPL-MCNC: 45.9 NG/ML (ref 30–100)
CALCIUM SPEC-SCNC: 9.7 MG/DL (ref 8.6–10.5)
CREAT SERPL-MCNC: 0.92 MG/DL (ref 0.57–1)
EGFRCR SERPLBLD CKD-EPI 2021: 67.1 ML/MIN/1.73

## 2023-05-19 PROCEDURE — 82306 VITAMIN D 25 HYDROXY: CPT

## 2023-05-19 PROCEDURE — 82310 ASSAY OF CALCIUM: CPT

## 2023-05-19 PROCEDURE — 36415 COLL VENOUS BLD VENIPUNCTURE: CPT

## 2023-05-19 PROCEDURE — 82565 ASSAY OF CREATININE: CPT

## 2023-05-31 ENCOUNTER — TELEPHONE (OUTPATIENT)
Dept: INTERNAL MEDICINE | Facility: CLINIC | Age: 73
End: 2023-05-31

## 2023-05-31 NOTE — TELEPHONE ENCOUNTER
Caller: Nivia Cagle    Relationship: Self    Best call back number: [phone] (Mobile)    What orders are you requesting (i.e. lab or imaging): BLOOD WORK    In what timeframe would the patient need to come in: BEFORE APPOINTMENT ON 06.07.2023

## 2023-06-01 DIAGNOSIS — E11.65 TYPE 2 DIABETES MELLITUS WITH HYPERGLYCEMIA, WITHOUT LONG-TERM CURRENT USE OF INSULIN: ICD-10-CM

## 2023-06-01 DIAGNOSIS — I10 ESSENTIAL HYPERTENSION: ICD-10-CM

## 2023-06-01 DIAGNOSIS — F33.1 MAJOR DEPRESSIVE DISORDER, RECURRENT EPISODE, MODERATE DEGREE: Primary | ICD-10-CM

## 2023-06-01 DIAGNOSIS — E78.2 MIXED HYPERLIPIDEMIA: ICD-10-CM

## 2023-06-01 NOTE — TELEPHONE ENCOUNTER
Spoke to Nivia and made her aware lab orders are in and she needs to be fasting 8 hours prior to lab draw. She verbally stated her understanding

## 2023-06-07 ENCOUNTER — OFFICE VISIT (OUTPATIENT)
Dept: INTERNAL MEDICINE | Facility: CLINIC | Age: 73
End: 2023-06-07
Payer: MEDICARE

## 2023-06-07 VITALS
RESPIRATION RATE: 15 BRPM | TEMPERATURE: 97.5 F | OXYGEN SATURATION: 97 % | BODY MASS INDEX: 29.95 KG/M2 | SYSTOLIC BLOOD PRESSURE: 138 MMHG | WEIGHT: 169 LBS | DIASTOLIC BLOOD PRESSURE: 64 MMHG | HEART RATE: 90 BPM | HEIGHT: 63 IN

## 2023-06-07 DIAGNOSIS — R22.41 LOCALIZED SWELLING OF RIGHT FOOT: ICD-10-CM

## 2023-06-07 DIAGNOSIS — E78.2 MIXED HYPERLIPIDEMIA: ICD-10-CM

## 2023-06-07 DIAGNOSIS — E11.65 TYPE 2 DIABETES MELLITUS WITH HYPERGLYCEMIA, WITHOUT LONG-TERM CURRENT USE OF INSULIN: ICD-10-CM

## 2023-06-07 DIAGNOSIS — R09.81 NASAL CONGESTION: ICD-10-CM

## 2023-06-07 DIAGNOSIS — F33.1 MAJOR DEPRESSIVE DISORDER, RECURRENT EPISODE, MODERATE DEGREE: ICD-10-CM

## 2023-06-07 DIAGNOSIS — I10 ESSENTIAL HYPERTENSION: Primary | ICD-10-CM

## 2023-06-07 DIAGNOSIS — Z11.59 SCREENING FOR VIRAL DISEASE: ICD-10-CM

## 2023-06-07 LAB
ALBUMIN SERPL-MCNC: 4.1 G/DL (ref 3.5–5.2)
ALBUMIN UR-MCNC: <1.2 MG/DL
ALBUMIN/GLOB SERPL: 1.7 G/DL
ALP SERPL-CCNC: 53 U/L (ref 39–117)
ALT SERPL W P-5'-P-CCNC: 10 U/L (ref 1–33)
ANION GAP SERPL CALCULATED.3IONS-SCNC: 12.3 MMOL/L (ref 5–15)
AST SERPL-CCNC: 13 U/L (ref 1–32)
BASOPHILS # BLD AUTO: 0.05 10*3/MM3 (ref 0–0.2)
BASOPHILS NFR BLD AUTO: 0.8 % (ref 0–1.5)
BILIRUB SERPL-MCNC: 0.2 MG/DL (ref 0–1.2)
BUN SERPL-MCNC: 13 MG/DL (ref 8–23)
BUN/CREAT SERPL: 14.6 (ref 7–25)
CALCIUM SPEC-SCNC: 9.3 MG/DL (ref 8.6–10.5)
CHLORIDE SERPL-SCNC: 105 MMOL/L (ref 98–107)
CHOLEST SERPL-MCNC: 204 MG/DL (ref 0–200)
CO2 SERPL-SCNC: 23.7 MMOL/L (ref 22–29)
CREAT SERPL-MCNC: 0.89 MG/DL (ref 0.57–1)
DEPRECATED RDW RBC AUTO: 40.1 FL (ref 37–54)
EGFRCR SERPLBLD CKD-EPI 2021: 69.8 ML/MIN/1.73
EOSINOPHIL # BLD AUTO: 0.18 10*3/MM3 (ref 0–0.4)
EOSINOPHIL NFR BLD AUTO: 3 % (ref 0.3–6.2)
ERYTHROCYTE [DISTWIDTH] IN BLOOD BY AUTOMATED COUNT: 12.2 % (ref 12.3–15.4)
GLOBULIN UR ELPH-MCNC: 2.4 GM/DL
GLUCOSE SERPL-MCNC: 102 MG/DL (ref 65–99)
HBA1C MFR BLD: 6.2 % (ref 4.8–5.6)
HCT VFR BLD AUTO: 35.4 % (ref 34–46.6)
HCV AB SER DONR QL: NORMAL
HDLC SERPL-MCNC: 58 MG/DL (ref 40–60)
HGB BLD-MCNC: 11.8 G/DL (ref 12–15.9)
IMM GRANULOCYTES # BLD AUTO: 0.02 10*3/MM3 (ref 0–0.05)
IMM GRANULOCYTES NFR BLD AUTO: 0.3 % (ref 0–0.5)
LDLC SERPL CALC-MCNC: 136 MG/DL (ref 0–100)
LDLC/HDLC SERPL: 2.33 {RATIO}
LYMPHOCYTES # BLD AUTO: 1.33 10*3/MM3 (ref 0.7–3.1)
LYMPHOCYTES NFR BLD AUTO: 21.8 % (ref 19.6–45.3)
MCH RBC QN AUTO: 30.3 PG (ref 26.6–33)
MCHC RBC AUTO-ENTMCNC: 33.3 G/DL (ref 31.5–35.7)
MCV RBC AUTO: 90.8 FL (ref 79–97)
MONOCYTES # BLD AUTO: 0.61 10*3/MM3 (ref 0.1–0.9)
MONOCYTES NFR BLD AUTO: 10 % (ref 5–12)
NEUTROPHILS NFR BLD AUTO: 3.9 10*3/MM3 (ref 1.7–7)
NEUTROPHILS NFR BLD AUTO: 64.1 % (ref 42.7–76)
NRBC BLD AUTO-RTO: 0 /100 WBC (ref 0–0.2)
PLATELET # BLD AUTO: 214 10*3/MM3 (ref 140–450)
PMV BLD AUTO: 11.4 FL (ref 6–12)
POTASSIUM SERPL-SCNC: 4.2 MMOL/L (ref 3.5–5.2)
PROT SERPL-MCNC: 6.5 G/DL (ref 6–8.5)
RBC # BLD AUTO: 3.9 10*6/MM3 (ref 3.77–5.28)
SODIUM SERPL-SCNC: 141 MMOL/L (ref 136–145)
TRIGL SERPL-MCNC: 55 MG/DL (ref 0–150)
TSH SERPL DL<=0.05 MIU/L-ACNC: 1.95 UIU/ML (ref 0.27–4.2)
VLDLC SERPL-MCNC: 10 MG/DL (ref 5–40)
WBC NRBC COR # BLD: 6.09 10*3/MM3 (ref 3.4–10.8)

## 2023-06-07 PROCEDURE — 84443 ASSAY THYROID STIM HORMONE: CPT | Performed by: PHYSICIAN ASSISTANT

## 2023-06-07 PROCEDURE — 3078F DIAST BP <80 MM HG: CPT | Performed by: PHYSICIAN ASSISTANT

## 2023-06-07 PROCEDURE — 3075F SYST BP GE 130 - 139MM HG: CPT | Performed by: PHYSICIAN ASSISTANT

## 2023-06-07 PROCEDURE — 82043 UR ALBUMIN QUANTITATIVE: CPT | Performed by: PHYSICIAN ASSISTANT

## 2023-06-07 PROCEDURE — 80053 COMPREHEN METABOLIC PANEL: CPT | Performed by: PHYSICIAN ASSISTANT

## 2023-06-07 PROCEDURE — 86803 HEPATITIS C AB TEST: CPT | Performed by: PHYSICIAN ASSISTANT

## 2023-06-07 PROCEDURE — 85025 COMPLETE CBC W/AUTO DIFF WBC: CPT | Performed by: PHYSICIAN ASSISTANT

## 2023-06-07 PROCEDURE — 99214 OFFICE O/P EST MOD 30 MIN: CPT | Performed by: PHYSICIAN ASSISTANT

## 2023-06-07 PROCEDURE — 80061 LIPID PANEL: CPT | Performed by: PHYSICIAN ASSISTANT

## 2023-06-07 PROCEDURE — 1159F MED LIST DOCD IN RCRD: CPT | Performed by: PHYSICIAN ASSISTANT

## 2023-06-07 PROCEDURE — 1160F RVW MEDS BY RX/DR IN RCRD: CPT | Performed by: PHYSICIAN ASSISTANT

## 2023-06-07 PROCEDURE — 36415 COLL VENOUS BLD VENIPUNCTURE: CPT | Performed by: PHYSICIAN ASSISTANT

## 2023-06-07 PROCEDURE — 83036 HEMOGLOBIN GLYCOSYLATED A1C: CPT | Performed by: PHYSICIAN ASSISTANT

## 2023-06-07 RX ORDER — FLUTICASONE PROPIONATE 50 MCG
2 SPRAY, SUSPENSION (ML) NASAL DAILY
Qty: 16 G | Refills: 6 | Status: SHIPPED | OUTPATIENT
Start: 2023-06-07

## 2023-06-07 RX ORDER — EZETIMIBE 10 MG/1
10 TABLET ORAL
Qty: 90 TABLET | Refills: 1 | Status: SHIPPED | OUTPATIENT
Start: 2023-06-07

## 2023-06-07 RX ORDER — LOSARTAN POTASSIUM 25 MG/1
25 TABLET ORAL DAILY
Qty: 90 TABLET | Refills: 1 | Status: SHIPPED | OUTPATIENT
Start: 2023-06-07

## 2023-06-07 RX ORDER — MONTELUKAST SODIUM 10 MG/1
10 TABLET ORAL NIGHTLY
Qty: 90 TABLET | Refills: 1 | Status: SHIPPED | OUTPATIENT
Start: 2023-06-07

## 2023-06-07 RX ORDER — GLUCOSAMINE/D3/BOSWELLIA SERRA 1500MG-400
TABLET ORAL
COMMUNITY

## 2023-06-07 RX ORDER — CETIRIZINE HYDROCHLORIDE 10 MG/1
10 TABLET ORAL DAILY
Qty: 90 TABLET | Refills: 1 | Status: SHIPPED | OUTPATIENT
Start: 2023-06-07

## 2023-06-07 RX ORDER — SITAGLIPTIN 100 MG/1
100 TABLET, FILM COATED ORAL DAILY
Qty: 90 TABLET | Refills: 1 | Status: SHIPPED | OUTPATIENT
Start: 2023-06-07

## 2023-06-07 RX ORDER — UBIDECARENONE 50 MG
CAPSULE ORAL DAILY
COMMUNITY

## 2023-06-07 RX ORDER — CYCLOBENZAPRINE HCL 10 MG
10 TABLET ORAL DAILY PRN
Qty: 90 TABLET | Refills: 1 | Status: SHIPPED | OUTPATIENT
Start: 2023-06-07

## 2023-06-07 NOTE — PROGRESS NOTES
Chief Complaint  Hypertension, Hyperlipidemia, Diabetes, Depression, and Edema    Subjective          Nivia Cagle presents to [facility]  History of Present Illness  DM: am bg running 120   After eating casserole it will be in 160s  Denies chest pain, palpitations, ha, dizziness  Denies hypoglycemia    Muscle cramps: cramping in hands and legs  Takes muscle relaxer which helps     R foot: pain and swelling  Worse after walking  Pt has been working on a painting, standing more than usually which makes sx worse  Walking makes pain worse. She is unable to walk for exercise due to pain   She takes ibu when it flares     Depression: seeing psych and they are managing medicine  Mood has been doing well recently     Past Medical History:   Diagnosis Date    ADHD (attention deficit hyperactivity disorder)     Allergic rhinitis     Anemia     Anxiety     Cataracts, bilateral     Chronic pain disorder     Depression 0421/2021    MOODNOT WELL CONTROLLED.  GIVEN NUMBER FOR LOCAL COUNSELOR.  WILL INCREASE TRINTELIX FROM 10MG TO 20MG RTC WEEK ER ID S/HI    Diabetes mellitus, type 2     Diverticulitis     GERD (gastroesophageal reflux disease)     Head injury     High blood pressure     Hyperlipemia     Migraine     EARL (obstructive sleep apnea) 04/21/2021    Panic disorder     Phlebitis     PTSD (post-traumatic stress disorder)         Past Surgical History:   Procedure Laterality Date    ANKLE SURGERY  2021    BILATERAL BREAST REDUCTION  2015    BREAST BIOPSY      CARPAL TUNNEL RELEASE      CHOLECYSTECTOMY  2001    COLONOSCOPY      [facility]    COLONOSCOPY N/A 9/28/2022    Procedure: COLONOSCOPY with biopsy;  Surgeon: Ghislaine Kilgore MD;  Location: [facility] ENDOSCOPY;  Service: Gastroenterology;  Laterality: N/A;  colon polyp, diverticlosis, hemorrhoids    HYSTERECTOMY      ULNAR NERVE TRANSPOSITION Right     WRIST SURGERY          Current Outpatient Medications  "on File Prior to Visit   Medication Sig Dispense Refill    Accu-Chek Madeline Plus test strip by Other route 4 (Four) Times a Day. use to test blood sugar      Accu-Chek Softclix Lancets lancets by Other route 4 (Four) Times a Day. use to test blood sugar      alendronate (FOSAMAX) 70 MG tablet Take 1 tablet by mouth Every 7 (Seven) Days.      ARIPiprazole (ABILIFY) 2 MG tablet Take 2 tablets by mouth Daily. 180 tablet 1    Ascorbic Acid (VITAMIN C GUMMIE PO) Take  by mouth Daily.      aspirin (aspirin) 81 MG EC tablet Take 1 tablet by mouth Daily. 90 tablet 99    azelastine (ASTELIN) 0.1 % nasal spray 2 sprays into the nostril(s) as directed by provider 2 (Two) Times a Day. Use in each nostril as directed 90 mL 6    BD Insulin Syringe U/F 30G X 1/2\" 0.3 ML misc USE TO INJECT INSULIN FOUR TIMES DAILY AS NEEDED      Biotin 68405 MCG tablet Take  by mouth.      BL NASAL SALINE MIST NA 2 drops.      Blood Glucose Monitoring Suppl (FreeStyle Lite) device 1 each by Other route 4 (Four) Times a Day.      buPROPion XL (WELLBUTRIN XL) 300 MG 24 hr tablet TAKE 1 TABLET BY MOUTH EVERY MORNING 90 tablet 1    coenzyme Q10 50 MG capsule capsule Take  by mouth Daily.      Daily-Jelani Multivitamin tablet tablet Take 1 tablet by mouth every night at bedtime.      FLUoxetine (PROzac) 20 MG capsule Take 1 capsule by mouth Daily. (Patient taking differently: Take 30 mg by mouth Daily.) 90 capsule 3    Melatonin 10 MG tablet Take 2 tablets by mouth Every Night. 2 tabs      pramipexole (Mirapex) 0.5 MG tablet Take 1 tablet by mouth Every Night. 90 tablet 1    prednisoLONE acetate (Pred Mild) 0.12 % ophthalmic suspension SHAKE LIQUID AND INSTILL 1 DROP IN RIGHT EYE TWICE DAILY      traZODone (DESYREL) 50 MG tablet Take 2 tablets by mouth Every Night. 60 tablet 2    vitamin D (ERGOCALCIFEROL) 1.25 MG (61725 UT) capsule capsule TAKE 1 CAPSULE BY MOUTH WEEKLY, TAKE WITH CALCIUM 12 capsule 0    Zinc 100 MG tablet Take 100 mg by mouth Daily.      " "[DISCONTINUED] cyclobenzaprine (FLEXERIL) 10 MG tablet Take 1 tablet by mouth Daily As Needed for Muscle Spasms. 30 tablet 5    [DISCONTINUED] ezetimibe (ZETIA) 10 MG tablet TAKE 1 TABLET BY MOUTH EVERY NIGHT AT BEDTIME 90 tablet 1    [DISCONTINUED] fluticasone (Flonase) 50 MCG/ACT nasal spray 2 sprays into the nostril(s) as directed by provider Daily. Administer 2 sprays in each nostril for each dose. 16 g 6    [DISCONTINUED] Januvia 100 MG tablet TAKE 1 TABLET BY MOUTH DAILY 90 tablet 1    [DISCONTINUED] losartan (COZAAR) 25 MG tablet TAKE 1 TABLET BY MOUTH DAILY 90 tablet 1    [DISCONTINUED] metFORMIN (GLUCOPHAGE) 500 MG tablet TAKE 2 TABLETS BY MOUTH TWICE DAILY WITH MEALS (Patient taking differently: 1 tablet. 1 in am and 2 in pm) 360 tablet 1    [DISCONTINUED] montelukast (SINGULAIR) 10 MG tablet Take 1 tablet by mouth Every Night. 90 tablet 1    Alirocumab (Praluent) 75 MG/ML solution auto-injector Inject 1 mL under the skin into the appropriate area as directed Every 14 (Fourteen) Days. (Patient not taking: Reported on 2023) 6 pen 3     No current facility-administered medications on file prior to visit.        Allergies   Allergen Reactions    Diclofenac Hives    New Skin [Benzethonium Chloride] Rash    Atorvastatin Myalgia    Adhesive Tape Rash and Other (See Comments)       Rash at area of bandaid    Niacin Rash       Social History     Tobacco Use   Smoking Status Former    Types: Cigarettes    Quit date:     Years since quittin.4   Smokeless Tobacco Never   Tobacco Comments    QUIT           Objective   Vital Signs:   /64 (BP Location: Left arm, Patient Position: Sitting, Cuff Size: Adult)   Pulse 90   Temp 97.5 °F (36.4 °C) (Temporal)   Resp 15   Ht 160 cm (63\")   Wt 76.7 kg (169 lb)   SpO2 97%   BMI 29.94 kg/m²     Physical Exam  Vitals reviewed.   Constitutional:       Appearance: Normal appearance.   HENT:      Head: Normocephalic and atraumatic.      Nose: Nose normal. "      Mouth/Throat:      Mouth: Mucous membranes are moist.   Eyes:      Extraocular Movements: Extraocular movements intact.      Conjunctiva/sclera: Conjunctivae normal.      Pupils: Pupils are equal, round, and reactive to light.   Cardiovascular:      Rate and Rhythm: Normal rate and regular rhythm.   Pulmonary:      Effort: Pulmonary effort is normal.      Breath sounds: Normal breath sounds.   Abdominal:      General: Abdomen is flat. Bowel sounds are normal.      Palpations: Abdomen is soft.   Musculoskeletal:         General: Normal range of motion.      Right ankle: Swelling present.   Neurological:      General: No focal deficit present.      Mental Status: She is alert and oriented to person, place, and time.   Psychiatric:         Mood and Affect: Mood normal.      Result Review :                 Assessment and Plan    Diagnoses and all orders for this visit:    1. Essential hypertension (Primary)  Assessment & Plan:  Hypertension is improving with treatment.  Continue current treatment regimen.  Blood pressure will be reassessed at the next regular appointment.    Orders:  -     MicroAlbumin, Urine, Random - Urine, Random Void    2. Mixed hyperlipidemia  Assessment & Plan:  Labs today, will adjust medication based on results.        3. Type 2 diabetes mellitus with hyperglycemia, without long-term current use of insulin  Assessment & Plan:  Diabetes is improving with treatment.   Continue current treatment regimen.  Diabetes will be reassessed in 3 months.    Orders:  -     MicroAlbumin, Urine, Random - Urine, Random Void    4. Major depressive disorder, recurrent episode, moderate degree  Comments:  mood stable, continue follow up with psych as directed    5. Screening for viral disease  -     Hepatitis C antibody    6. Nasal congestion  -     fluticasone (Flonase) 50 MCG/ACT nasal spray; 2 sprays into the nostril(s) as directed by provider Daily. Administer 2 sprays in each nostril for each dose.   Dispense: 16 g; Refill: 6    7. Localized swelling of right foot  Comments:  reviewed XR done by ortho. Encouraged to f/u with them if symptoms persist. Discussed low impact physical activity like stationary bike or swimming to help    Other orders  -     cyclobenzaprine (FLEXERIL) 10 MG tablet; Take 1 tablet by mouth Daily As Needed for Muscle Spasms.  Dispense: 90 tablet; Refill: 1  -     ezetimibe (ZETIA) 10 MG tablet; Take 1 tablet by mouth every night at bedtime.  Dispense: 90 tablet; Refill: 1  -     Januvia 100 MG tablet; Take 1 tablet by mouth Daily.  Dispense: 90 tablet; Refill: 1  -     losartan (COZAAR) 25 MG tablet; Take 1 tablet by mouth Daily.  Dispense: 90 tablet; Refill: 1  -     montelukast (SINGULAIR) 10 MG tablet; Take 1 tablet by mouth Every Night.  Dispense: 90 tablet; Refill: 1  -     cetirizine (zyrTEC) 10 MG tablet; Take 1 tablet by mouth Daily.  Dispense: 90 tablet; Refill: 1  -     metFORMIN (Glucophage) 500 MG tablet; Take 1 tab in am and 2 tabs in pm with meals  Dispense: 360 tablet; Refill: 1        Follow Up   Return in about 3 months (around 9/7/2023).  Patient was given instructions and counseling regarding her condition or for health maintenance advice. Please see specific information pulled into the AVS if appropriate.

## 2023-06-18 DIAGNOSIS — F51.05 INSOMNIA DUE TO MENTAL CONDITION: ICD-10-CM

## 2023-06-19 RX ORDER — TRAZODONE HYDROCHLORIDE 100 MG/1
100 TABLET ORAL NIGHTLY
Qty: 90 TABLET | Refills: 1 | Status: SHIPPED | OUTPATIENT
Start: 2023-06-19

## 2023-06-19 RX ORDER — TRAZODONE HYDROCHLORIDE 50 MG/1
100 TABLET ORAL NIGHTLY
Qty: 60 TABLET | Refills: 2 | OUTPATIENT
Start: 2023-06-19

## 2023-06-19 NOTE — TELEPHONE ENCOUNTER
Serotonin Modulator Rx Protocol [id number]/18/2023 01:19 PM   Protocol Details No active pregnancy on record    No positive pregnancy test in past 12 months    Recent or future visit with precriber (12MO/90D)     NEXT APPT WITH PROVIDER  Appointment with Vicky Barth MD (07/10/2023)     LAST MED REFILL  traZODone (DESYREL) 50 MG tablet (12/09/2022)     Dose: 100 mg Route: Oral Frequency: Nightly   Dispense Quantity: 60 tablet Refills: 2         Sig: Take 2 tablets by mouth Every Night.        Start Date: 12/09/22 End Date: --   Written Date: 12/09/22 Expiration Date: 12/09/23      Diagnosis Association: Insomnia due to mental condition (F51.05)     PT(PATIENT) WAS DUE FOR REFILL 3/9/2022/REFILL MAY NOT BE APPROPRIATE     PROVIDER PLEASE ADVISE

## 2023-07-12 PROBLEM — Z78.9 STATIN INTOLERANCE: Status: ACTIVE | Noted: 2023-07-12

## 2023-07-24 ENCOUNTER — HOSPITAL ENCOUNTER (OUTPATIENT)
Dept: INFUSION THERAPY | Facility: HOSPITAL | Age: 73
Discharge: HOME | End: 2023-07-24
Payer: MEDICARE

## 2023-07-24 VITALS
TEMPERATURE: 98.5 F | BODY MASS INDEX: 29.02 KG/M2 | RESPIRATION RATE: 18 BRPM | DIASTOLIC BLOOD PRESSURE: 67 MMHG | OXYGEN SATURATION: 97 % | HEIGHT: 63 IN | WEIGHT: 163.8 LBS | SYSTOLIC BLOOD PRESSURE: 138 MMHG | HEART RATE: 80 BPM

## 2023-07-24 DIAGNOSIS — E78.5 HYPERLIPIDEMIA LDL GOAL <70: Primary | ICD-10-CM

## 2023-07-24 DIAGNOSIS — Z78.9 STATIN INTOLERANCE: ICD-10-CM

## 2023-07-24 PROCEDURE — 25010000002 INCLISIRAN SODIUM 284 MG/1.5ML SOLUTION PREFILLED SYRINGE

## 2023-07-24 PROCEDURE — 96372 THER/PROPH/DIAG INJ SC/IM: CPT

## 2023-07-24 RX ADMIN — INCLISIRAN 284 MG: 284 INJECTION, SOLUTION SUBCUTANEOUS at 17:07

## 2023-08-04 ENCOUNTER — TELEMEDICINE (OUTPATIENT)
Dept: PSYCHIATRY | Facility: CLINIC | Age: 73
End: 2023-08-04
Payer: MEDICARE

## 2023-08-04 DIAGNOSIS — F33.1 MAJOR DEPRESSIVE DISORDER, RECURRENT EPISODE, MODERATE: Primary | ICD-10-CM

## 2023-08-04 DIAGNOSIS — F51.05 INSOMNIA DUE TO MENTAL CONDITION: ICD-10-CM

## 2023-08-04 DIAGNOSIS — F41.1 GENERALIZED ANXIETY DISORDER: Primary | ICD-10-CM

## 2023-08-04 DIAGNOSIS — F33.40 RECURRENT MAJOR DEPRESSIVE DISORDER IN REMISSION: ICD-10-CM

## 2023-08-04 DIAGNOSIS — F43.10 POST TRAUMATIC STRESS DISORDER (PTSD): ICD-10-CM

## 2023-08-04 DIAGNOSIS — F33.1 MAJOR DEPRESSIVE DISORDER, RECURRENT EPISODE, MODERATE: ICD-10-CM

## 2023-08-04 PROCEDURE — 90832 PSYTX W PT 30 MINUTES: CPT | Performed by: COUNSELOR

## 2023-08-04 RX ORDER — FLUOXETINE 10 MG/1
10 CAPSULE ORAL DAILY
Qty: 90 CAPSULE | Refills: 1 | Status: SHIPPED | OUTPATIENT
Start: 2023-08-04

## 2023-08-07 RX ORDER — PRAMIPEXOLE DIHYDROCHLORIDE 0.5 MG/1
0.5 TABLET ORAL NIGHTLY
Qty: 90 TABLET | Refills: 1 | OUTPATIENT
Start: 2023-08-07 | End: 2024-08-06

## 2023-08-11 ENCOUNTER — TELEMEDICINE (OUTPATIENT)
Dept: PSYCHIATRY | Facility: CLINIC | Age: 73
End: 2023-08-11
Payer: MEDICARE

## 2023-08-11 DIAGNOSIS — F33.40 RECURRENT MAJOR DEPRESSIVE DISORDER IN REMISSION: Primary | ICD-10-CM

## 2023-08-11 DIAGNOSIS — F41.1 GENERALIZED ANXIETY DISORDER: ICD-10-CM

## 2023-08-11 DIAGNOSIS — F51.05 INSOMNIA DUE TO MENTAL CONDITION: ICD-10-CM

## 2023-08-11 DIAGNOSIS — F43.10 POST TRAUMATIC STRESS DISORDER (PTSD): ICD-10-CM

## 2023-08-11 RX ORDER — ARIPIPRAZOLE 2 MG/1
4 TABLET ORAL DAILY
Qty: 180 TABLET | Refills: 3 | Status: SHIPPED | OUTPATIENT
Start: 2023-08-11

## 2023-08-11 NOTE — PROGRESS NOTES
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 visual disturbance.   Respiratory:  Negative for cough and shortness of breath.    Cardiovascular:  Positive for leg swelling. Negative for chest pain and palpitations.   Gastrointestinal:  Negative for constipation, diarrhea, nausea and vomiting.   Endocrine: Negative for cold intolerance and heat intolerance.   Genitourinary:  Positive for decreased urine volume. Negative for difficulty urinating.   Musculoskeletal:  Negative for joint swelling.   Allergic/Immunologic: Negative for immunocompromised state.   Neurological:  Positive for numbness. Negative for dizziness, seizures, syncope, speech difficulty, light-headedness and headaches.   Hematological:  Positive for adenopathy.       Physical Exam:  Physical Exam    Vital Signs:   There were no vitals taken for this visit.     Lab Results:   Office Visit on 06/07/2023   Component Date Value Ref Range Status    Microalbumin, Urine 06/07/2023 <1.2  mg/dL Final    Hepatitis C Ab 06/07/2023 Non-Reactive  Non-Reactive Final   Orders Only on 06/01/2023   Component Date Value Ref Range Status    Glucose 06/07/2023 102 (H)  65 - 99 mg/dL Final    BUN 06/07/2023 13  8 - 23 mg/dL Final    Creatinine 06/07/2023 0.89  " 0.57 - 1.00 mg/dL Final    Sodium 06/07/2023 141  136 - 145 mmol/L Final    Potassium 06/07/2023 4.2  3.5 - 5.2 mmol/L Final    Chloride 06/07/2023 105  98 - 107 mmol/L Final    CO2 06/07/2023 23.7  22.0 - 29.0 mmol/L Final    Calcium 06/07/2023 9.3  8.6 - 10.5 mg/dL Final    Total Protein 06/07/2023 6.5  6.0 - 8.5 g/dL Final    Albumin 06/07/2023 4.1  3.5 - 5.2 g/dL Final    ALT (SGPT) 06/07/2023 10  1 - 33 U/L Final    AST (SGOT) 06/07/2023 13  1 - 32 U/L Final    Alkaline Phosphatase 06/07/2023 53  39 - 117 U/L Final    Total Bilirubin 06/07/2023 0.2  0.0 - 1.2 mg/dL Final    Globulin 06/07/2023 2.4  gm/dL Final    A/G Ratio 06/07/2023 1.7  g/dL Final    BUN/Creatinine Ratio 06/07/2023 14.6  7.0 - 25.0 Final    Anion Gap 06/07/2023 12.3  5.0 - 15.0 mmol/L Final    eGFR 06/07/2023 69.8  >60.0 mL/min/1.73 Final    TSH 06/07/2023 1.950  0.270 - 4.200 uIU/mL Final    Total Cholesterol 06/07/2023 204 (H)  0 - 200 mg/dL Final    Triglycerides 06/07/2023 55  0 - 150 mg/dL Final    HDL Cholesterol 06/07/2023 58  40 - 60 mg/dL Final    LDL Cholesterol  06/07/2023 136 (H)  0 - 100 mg/dL Final    VLDL Cholesterol 06/07/2023 10  5 - 40 mg/dL Final    LDL/HDL Ratio 06/07/2023 2.33   Final    Hemoglobin A1C 06/07/2023 6.20 (H)  4.80 - 5.60 % Final    WBC 06/07/2023 6.09  3.40 - 10.80 10*3/mm3 Final    RBC 06/07/2023 3.90  3.77 - 5.28 10*6/mm3 Final    Hemoglobin 06/07/2023 11.8 (L)  12.0 - 15.9 g/dL Final    Hematocrit 06/07/2023 35.4  34.0 - 46.6 % Final    MCV 06/07/2023 90.8  79.0 - 97.0 fL Final    MCH 06/07/2023 30.3  26.6 - 33.0 pg Final    MCHC 06/07/2023 33.3  31.5 - 35.7 g/dL Final    RDW 06/07/2023 12.2 (L)  12.3 - 15.4 % Final    RDW-SD 06/07/2023 40.1  37.0 - 54.0 fl Final    MPV 06/07/2023 11.4  6.0 - 12.0 fL Final    Platelets 06/07/2023 214  140 - 450 10*3/mm3 Final    Neutrophil % 06/07/2023 64.1  42.7 - 76.0 % Final    Lymphocyte % 06/07/2023 21.8  19.6 - 45.3 % Final    Monocyte % 06/07/2023 10.0  5.0 - 12.0  % Final    Eosinophil % 06/07/2023 3.0  0.3 - 6.2 % Final    Basophil % 06/07/2023 0.8  0.0 - 1.5 % Final    Immature Grans % 06/07/2023 0.3  0.0 - 0.5 % Final    Neutrophils, Absolute 06/07/2023 3.90  1.70 - 7.00 10*3/mm3 Final    Lymphocytes, Absolute 06/07/2023 1.33  0.70 - 3.10 10*3/mm3 Final    Monocytes, Absolute 06/07/2023 0.61  0.10 - 0.90 10*3/mm3 Final    Eosinophils, Absolute 06/07/2023 0.18  0.00 - 0.40 10*3/mm3 Final    Basophils, Absolute 06/07/2023 0.05  0.00 - 0.20 10*3/mm3 Final    Immature Grans, Absolute 06/07/2023 0.02  0.00 - 0.05 10*3/mm3 Final    nRBC 06/07/2023 0.0  0.0 - 0.2 /100 WBC Final   Lab on 05/19/2023   Component Date Value Ref Range Status    Calcium 05/19/2023 9.7  8.6 - 10.5 mg/dL Final    Creatinine 05/19/2023 0.92  0.57 - 1.00 mg/dL Final    eGFR 05/19/2023 67.1  >60.0 mL/min/1.73 Final    25 Hydroxy, Vitamin D 05/19/2023 45.9  30.0 - 100.0 ng/ml Final   Office Visit on 03/07/2023   Component Date Value Ref Range Status    Color, UA 03/07/2023 Yellow  Yellow, Straw Final    Appearance, UA 03/07/2023 Slightly Cloudy (A)  Clear Final    pH, UA 03/07/2023 5.5  5.0 - 8.0 Final    Specific Gravity, UA 03/07/2023 >=1.030  1.005 - 1.030 Final    Glucose, UA 03/07/2023 Negative  Negative Final    Ketones, UA 03/07/2023 Negative  Negative Final    Bilirubin, UA 03/07/2023 Negative  Negative Final    Blood, UA 03/07/2023 Negative  Negative Final    Protein, UA 03/07/2023 Negative  Negative Final    Leuk Esterase, UA 03/07/2023 Negative  Negative Final    Nitrite, UA 03/07/2023 Negative  Negative Final    Urobilinogen, UA 03/07/2023 0.2 E.U./dL  0.2 - 1.0 E.U./dL Final   Hospital Outpatient Visit on 02/28/2023   Component Date Value Ref Range Status    Target HR (85%) 02/28/2023 128  bpm Final    Max. Pred. HR (100%) 02/28/2023 150  bpm Final    Right Common Femoral Spont 02/28/2023 Y   Final    Right Common Femoral Competent 02/28/2023 Y   Final    Right Common Femoral Phasic 02/28/2023 Y    Final    Right Common Femoral Compress 02/28/2023 C   Final    Right Common Femoral Augment 02/28/2023 Y   Final    Left Common Femoral Spont 02/28/2023 Y   Final    Left Common Femoral Competent 02/28/2023 Y   Final    Left Common Femoral Phasic 02/28/2023 Y   Final    Left Common Femoral Compress 02/28/2023 C   Final    Left Common Femoral Augment 02/28/2023 Y   Final    Left Saphenofemoral Junction Compr* 02/28/2023 C   Final    Left Proximal Femoral Compress 02/28/2023 C   Final    Left Mid Femoral Spont 02/28/2023 Y   Final    Left Mid Femoral Competent 02/28/2023 Y   Final    Left Mid Femoral Phasic 02/28/2023 Y   Final    Left Mid Femoral Compress 02/28/2023 C   Final    Left Mid Femoral Augment 02/28/2023 Y   Final    Left Distal Femoral Compress 02/28/2023 C   Final    Left Popliteal Spont 02/28/2023 Y   Final    Left Popliteal Competent 02/28/2023 Y   Final    Left Popliteal Phasic 02/28/2023 Y   Final    Left Popliteal Compress 02/28/2023 C   Final    Left Popliteal Augment 02/28/2023 Y   Final    Left Posterior Tibial Compress 02/28/2023 C   Final    Left Peroneal Compress 02/28/2023 C   Final    Left Gastronemius Compress 02/28/2023 C   Final    Left Greater Saph AK Compress 02/28/2023 C   Final    Left Greater Saph BK Compress 02/28/2023 C   Final    Left Lesser Saph Compress 02/28/2023 C   Final   Lab on 02/28/2023   Component Date Value Ref Range Status    25 Hydroxy, Vitamin D 02/28/2023 68.4  30.0 - 100.0 ng/ml Final    Creatinine 02/28/2023 0.79  0.57 - 1.00 mg/dL Final    eGFR 02/28/2023 80.6  >60.0 mL/min/1.73 Final    Calcium 02/28/2023 10.1  8.6 - 10.5 mg/dL Final   Admission on 02/12/2023, Discharged on 02/12/2023   Component Date Value Ref Range Status    Color 02/12/2023 Yellow   Final    Clarity, UA 02/12/2023 Clear   Final    Glucose, UA 02/12/2023 Negative  mg/dL Final    Bilirubin 02/12/2023 Negative   Final    Ketones, UA 02/12/2023 Negative   Final    Specific Gravity  02/12/2023  1.020  1.005 - 1.030 Final    Blood, UA 02/12/2023 Negative   Final    pH, Urine 02/12/2023 6.0  5.0 - 8.0 Final    Protein, POC 02/12/2023 Negative  mg/dL Final    Urobilinogen, UA 02/12/2023 0.2 E.U./dL   Final    Nitrite, UA 02/12/2023 Negative   Final    Leukocytes 02/12/2023 Negative   Final    Rapid Influenza A Ag 02/12/2023 Negative   Final    Rapid Influenza B Ag 02/12/2023 Negative   Final    Internal Control 02/12/2023 Passed   Final    Lot Number 02/12/2023 [id number]   Final    Expiration Date 02/12/2023 03/26/2024   Final    SARS Antigen 02/12/2023 Not Detected   Final    Internal Control 02/12/2023 Passed   Final    Lot Number 02/12/2023 [id number]   Final    Expiration Date 02/12/2023 09/27/2023   Final    Urine Culture 02/12/2023 No growth   Final       EKG Results:  No orders to display       Imaging Results:  No Images in the past 120 days found..      Assessment & Plan   Diagnoses and all orders for this visit:    1. Recurrent major depressive disorder in remission (Primary)  -     ARIPiprazole (ABILIFY) 2 MG tablet; Take 2 tablets by mouth Daily.  Dispense: 180 tablet; Refill: 3    2. Generalized anxiety disorder  -     ARIPiprazole (ABILIFY) 2 MG tablet; Take 2 tablets by mouth Daily.  Dispense: 180 tablet; Refill: 3    3. Insomnia due to mental condition    4. Post traumatic stress disorder (PTSD)  -     ARIPiprazole (ABILIFY) 2 MG tablet; Take 2 tablets by mouth Daily.  Dispense: 180 tablet; Refill: 3        INITIAL presentation most consistent with major depressive disorder, recurrent, moderate to severe, with anxious distress.  PTSD.  Rule out personality disorder, cluster B specifically.  Rule out hypomania as patient was very difficult to interrupt today.      8/11: Stable, well, no changes. Painting, having people over. Counseled to start light box in September, reiterated instructions. Will be inducted into the [employer] [facility] tomorrow. She's been working  on it for a year.    Allowed patient to freely discuss and process issues, such as:  Saw her grandsons  Painting as a coping strategy  Procrastinating as a measure of how well she is doing  The importance of [employer]ism  ... using Rogerian psychotherapeutic techniques including unconditional positive regard, reflective listening, and demonstrating clear empathy.     Time (minutes) spent providing supportive psychotherapy: 17    Patient given education on medication side effects, diagnosis/illness and relapse symptoms. Plan to continue supportive psychotherapy in next appointment to provide symptom relief.  Diagnoses: as above  Symptoms: as above  Functional status: mild impairment  Mental Status Exam: as above    Treatment plan: Medication management and supportive psychotherapy  Prognosis: good  Progress: stable, well  4w        7/11: Short visit as patient sleepy. Unusual sleep pattern recently, but MH is fine. Pt will call her son tonight to ensure someone is around when she goes back to sleep. She will call PCP about this tomorrow. Close follow up with me in 4 wks.      4/27: Stable, well. Restart melatonin for insomnia. 17   Progress: stable, well  6 wks        3/2: Prozac and BLT helped. Situational stressor in son, no changes. Better. Watch insomnia. 17   Progress: improved  6 wks      1/20: Start light box, increase prozac for dep. 17   Prognosis: good  Progress: stable, well  6 wks      12/9: Some insomnia. Increase melatonin. 17 minutes of supportive psychotherapy  Prognosis: good  Progress: stable, well  6 wks      10/25: Doing well. Increase prozac back to baseline dose (inadvertently reduced, she has been stable on a higher dose, so we should go back to that). Some initial insomnia, increase trazodone to 75 mg nightly. She is enjoying the Fall. 21 minutes of supportive psychotherapy  Treatment plan: Medication management and supportive psychotherapy  Prognosis: good  Progress: stable, but having initial  insomnia  6 wks      9/8: Start light box. Close follow up in case this is worsening depression. 16 minutes of supportive psychotherapy  Treatment plan: Medication management and supportive psychotherapy  Prognosis: good  Progress: stable  6 wks      7/27: Well, stable. 17 minutes of supportive psychotherapyTreatment plan: Medication management and supportive psychotherapy  Prognosis: good  Progress: stable, well  6 wks      6/14: Better, no changes. 17 minutes of supportive psychotherapy Treatment plan: Medication management and supportive psychotherapy  Prognosis: good  Progress: less depressed  6 wks      5/3: Increase prozac and melatonin. 17 minutes of supportive psychotherapyTreatment plan: Medication management and supportive psychotherapy  Prognosis: good  Progress: backtracked recently, now depressed  6 wks      Visit Diagnoses:    ICD-10-CM ICD-9-CM   1. Recurrent major depressive disorder in remission  F33.40 296.35   2. Generalized anxiety disorder  F41.1 300.02   3. Insomnia due to mental condition  F51.05 300.9     327.02   4. Post traumatic stress disorder (PTSD)  F43.10 309.81       PLAN:  Risk Assessment: Risk of self-harm acutely is moderate. Risk factors include chronic depressive disorder, possible personality disorder, recent psychosocial stressors (pandemic, moving). Protective factors include no present SI, no history of suicide attempts or self-harm in the past, no access to weapons, minimal AODA, healthcare seeking, future orientation, willingness to engage in care. Risk of self-harm chronically is also moderate, but could be further elevated in the event of treatment noncompliance and/or AODA.  Safety: No acute safety concerns.  Medications:   CONTINUE light box 9/1 - 3/31.  CONTINUE melatonin 30 mg p.o. nightly.  Risks, benefits, side effects discussed with patient including sedation, dizziness/falls risk, GI upset.  After discussion of these risks and benefits, the patient voiced  understanding and agreed to proceed.  CONTINUE trazodone  mg PO QHS. Risks, benefits, side effects discussed with patient including GI upset, sedation, dizziness/falls risk, grogginess the following day, prolongation of the QTc interval.  After discussion of these risks and benefits, the patient voiced understanding and agreed to proceed.    CONTINUE bupropion xl 300 mg daily. Risks, benefits, alternatives discussed with patient including nausea, GI upset, increased energy, exacerbation of irritability, insomnia, lowering of seizure threshold.  After discussion of these risks and benefits, the patient voiced understanding and agreed to proceed.  CONTINUE Prozac 30 mg a day (HIGHEST dose is 40 given that she is also on bupropion). Risks, benefits, alternatives discussed with patient including GI upset, nausea vomiting diarrhea, theoretical decrease of seizure threshold predisposing the patient to a slightly higher seizure risk, headaches, sexual dysfunction, serotonin syndrome, bleeding risk, increased suicidality in patients 24 years and younger.  After discussion of these risks and benefits, the patient voiced understanding and agreed to proceed.  CONTINUE Abilify 4 mg p.o. daily to target depression, anxiety, decreased energy. Risks, benefits, alternatives discussed with patient including increased energy, exacerbation of irritability, akathisia, GI upset, orthostatic hypotension, increased appetite. After discussion of these risks and benefits, the patient voiced understanding and agreed to proceed.  S/P:  Mirtazapine 45 mg daily (RLS)  Therapy: referred to [facility].  Labs/Studies: s/p TMS referral.  Follow Up: 6 weeks. (prefers 4 wks)      TREATMENT PLAN/GOALS: Continue supportive psychotherapy efforts and medications as indicated. Treatment and medication options discussed during today's visit. Patient acknowledged and verbally consented to continue with current treatment plan and was educated on  the importance of compliance with treatment and follow-up appointments.    MEDICATION ISSUES:  SAPNA reviewed as expected.  Discussed medication options and treatment plan of prescribed medication as well as the risks, benefits, and side effects including potential falls, possible impaired driving and metabolic adversities among others. Patient is agreeable to call the office with any worsening of symptoms or onset of side effects. Patient is agreeable to call 911 or go to the nearest ER should he/she begin having SI/HI. No medication side effects or related complaints today.     MEDS ORDERED DURING VISIT:  New Medications Ordered This Visit   Medications    ARIPiprazole (ABILIFY) 2 MG tablet     Sig: Take 2 tablets by mouth Daily.     Dispense:  180 tablet     Refill:  3       Return in about 4 weeks (around 9/8/2023) for Video visit.         This document has been electronically signed by Vicky Barth MD  August 11, 2023 17:41 EDT      Part of this note may be an electronic transcription/translation of spoken language to printed text using the Dragon Dictation System.

## 2023-08-11 NOTE — PATIENT INSTRUCTIONS
1.  Please return to clinic at your next scheduled visit.  Contact the clinic ([phone]) at least 24 hours prior in the event you need to cancel.  2.  Do no harm to yourself or others.    3.  Avoid alcohol and drugs.    4.  Take all medications as prescribed.  Please contact the clinic with any concerns. If you are in need of medication refills, please call the clinic at [phone].    5. Should you want to get in touch with your provider, Dr. Vicky Barth, please utilize [employer] or contact the office ([phone]), and staff will be able to page Dr. Barth directly.  6.  In the event you have personal crisis, contact the following crisis numbers: Suicide Prevention Hotline 1-419.634.5509; [facility] Helpline 4-592-741-[place]; [facility] Emergency Room [phone]; text HELL[facility] to [phone]; or 097.

## 2023-08-14 ENCOUNTER — TELEPHONE (OUTPATIENT)
Dept: PSYCHIATRY | Facility: CLINIC | Age: 73
End: 2023-08-14
Payer: MEDICARE

## 2023-09-05 ENCOUNTER — TRANSCRIBE ORDERS (OUTPATIENT)
Dept: ADMINISTRATIVE | Facility: HOSPITAL | Age: 73
End: 2023-09-05
Payer: MEDICARE

## 2023-09-05 DIAGNOSIS — Z12.31 VISIT FOR SCREENING MAMMOGRAM: Primary | ICD-10-CM

## 2023-09-07 ENCOUNTER — OFFICE VISIT (OUTPATIENT)
Dept: INTERNAL MEDICINE | Facility: CLINIC | Age: 73
End: 2023-09-07
Payer: MEDICARE

## 2023-09-07 VITALS
OXYGEN SATURATION: 97 % | HEART RATE: 64 BPM | TEMPERATURE: 98.4 F | SYSTOLIC BLOOD PRESSURE: 121 MMHG | BODY MASS INDEX: 28.52 KG/M2 | DIASTOLIC BLOOD PRESSURE: 65 MMHG | WEIGHT: 161 LBS

## 2023-09-07 DIAGNOSIS — Z78.9 STATIN INTOLERANCE: ICD-10-CM

## 2023-09-07 DIAGNOSIS — I10 ESSENTIAL HYPERTENSION: ICD-10-CM

## 2023-09-07 DIAGNOSIS — E11.65 TYPE 2 DIABETES MELLITUS WITH HYPERGLYCEMIA, WITHOUT LONG-TERM CURRENT USE OF INSULIN: Primary | ICD-10-CM

## 2023-09-07 DIAGNOSIS — E78.5 HYPERLIPIDEMIA LDL GOAL <70: ICD-10-CM

## 2023-09-07 DIAGNOSIS — R25.2 MUSCLE CRAMP: ICD-10-CM

## 2023-09-07 DIAGNOSIS — F33.1 MAJOR DEPRESSIVE DISORDER, RECURRENT EPISODE, MODERATE DEGREE: ICD-10-CM

## 2023-09-07 LAB
ALBUMIN SERPL-MCNC: 4.5 G/DL (ref 3.5–5.2)
ALBUMIN/GLOB SERPL: 2 G/DL
ALP SERPL-CCNC: 61 U/L (ref 39–117)
ALT SERPL W P-5'-P-CCNC: 11 U/L (ref 1–33)
ANION GAP SERPL CALCULATED.3IONS-SCNC: 10.9 MMOL/L (ref 5–15)
AST SERPL-CCNC: 15 U/L (ref 1–32)
BASOPHILS # BLD AUTO: 0.05 10*3/MM3 (ref 0–0.2)
BASOPHILS NFR BLD AUTO: 0.7 % (ref 0–1.5)
BILIRUB SERPL-MCNC: 0.3 MG/DL (ref 0–1.2)
BUN SERPL-MCNC: 14 MG/DL (ref 8–23)
BUN/CREAT SERPL: 15.4 (ref 7–25)
CALCIUM SPEC-SCNC: 9.3 MG/DL (ref 8.6–10.5)
CHLORIDE SERPL-SCNC: 103 MMOL/L (ref 98–107)
CHOLEST SERPL-MCNC: 111 MG/DL (ref 0–200)
CO2 SERPL-SCNC: 26.1 MMOL/L (ref 22–29)
CREAT SERPL-MCNC: 0.91 MG/DL (ref 0.57–1)
DEPRECATED RDW RBC AUTO: 42.2 FL (ref 37–54)
EGFRCR SERPLBLD CKD-EPI 2021: 68 ML/MIN/1.73
EOSINOPHIL # BLD AUTO: 0.17 10*3/MM3 (ref 0–0.4)
EOSINOPHIL NFR BLD AUTO: 2.3 % (ref 0.3–6.2)
ERYTHROCYTE [DISTWIDTH] IN BLOOD BY AUTOMATED COUNT: 12.2 % (ref 12.3–15.4)
GLOBULIN UR ELPH-MCNC: 2.2 GM/DL
GLUCOSE SERPL-MCNC: 90 MG/DL (ref 65–99)
HBA1C MFR BLD: 5.9 % (ref 4.8–5.6)
HCT VFR BLD AUTO: 36.8 % (ref 34–46.6)
HDLC SERPL-MCNC: 50 MG/DL (ref 40–60)
HGB BLD-MCNC: 12.2 G/DL (ref 12–15.9)
IMM GRANULOCYTES # BLD AUTO: 0.02 10*3/MM3 (ref 0–0.05)
IMM GRANULOCYTES NFR BLD AUTO: 0.3 % (ref 0–0.5)
LDLC SERPL CALC-MCNC: 43 MG/DL (ref 0–100)
LDLC/HDLC SERPL: 0.85 {RATIO}
LYMPHOCYTES # BLD AUTO: 1.52 10*3/MM3 (ref 0.7–3.1)
LYMPHOCYTES NFR BLD AUTO: 20.4 % (ref 19.6–45.3)
MAGNESIUM SERPL-MCNC: 1.7 MG/DL (ref 1.6–2.4)
MCH RBC QN AUTO: 31 PG (ref 26.6–33)
MCHC RBC AUTO-ENTMCNC: 33.2 G/DL (ref 31.5–35.7)
MCV RBC AUTO: 93.4 FL (ref 79–97)
MONOCYTES # BLD AUTO: 0.76 10*3/MM3 (ref 0.1–0.9)
MONOCYTES NFR BLD AUTO: 10.2 % (ref 5–12)
NEUTROPHILS NFR BLD AUTO: 4.93 10*3/MM3 (ref 1.7–7)
NEUTROPHILS NFR BLD AUTO: 66.1 % (ref 42.7–76)
NRBC BLD AUTO-RTO: 0 /100 WBC (ref 0–0.2)
PLATELET # BLD AUTO: 239 10*3/MM3 (ref 140–450)
PMV BLD AUTO: 12 FL (ref 6–12)
POTASSIUM SERPL-SCNC: 4.3 MMOL/L (ref 3.5–5.2)
PROT SERPL-MCNC: 6.7 G/DL (ref 6–8.5)
RBC # BLD AUTO: 3.94 10*6/MM3 (ref 3.77–5.28)
SODIUM SERPL-SCNC: 140 MMOL/L (ref 136–145)
TRIGL SERPL-MCNC: 93 MG/DL (ref 0–150)
TSH SERPL DL<=0.05 MIU/L-ACNC: 1.48 UIU/ML (ref 0.27–4.2)
VLDLC SERPL-MCNC: 18 MG/DL (ref 5–40)
WBC NRBC COR # BLD: 7.45 10*3/MM3 (ref 3.4–10.8)

## 2023-09-07 PROCEDURE — 3078F DIAST BP <80 MM HG: CPT | Performed by: PHYSICIAN ASSISTANT

## 2023-09-07 PROCEDURE — 80053 COMPREHEN METABOLIC PANEL: CPT | Performed by: PHYSICIAN ASSISTANT

## 2023-09-07 PROCEDURE — 84443 ASSAY THYROID STIM HORMONE: CPT | Performed by: PHYSICIAN ASSISTANT

## 2023-09-07 PROCEDURE — 3044F HG A1C LEVEL LT 7.0%: CPT | Performed by: PHYSICIAN ASSISTANT

## 2023-09-07 PROCEDURE — 99214 OFFICE O/P EST MOD 30 MIN: CPT | Performed by: PHYSICIAN ASSISTANT

## 2023-09-07 PROCEDURE — 85025 COMPLETE CBC W/AUTO DIFF WBC: CPT | Performed by: PHYSICIAN ASSISTANT

## 2023-09-07 PROCEDURE — 1160F RVW MEDS BY RX/DR IN RCRD: CPT | Performed by: PHYSICIAN ASSISTANT

## 2023-09-07 PROCEDURE — 83036 HEMOGLOBIN GLYCOSYLATED A1C: CPT | Performed by: PHYSICIAN ASSISTANT

## 2023-09-07 PROCEDURE — 3074F SYST BP LT 130 MM HG: CPT | Performed by: PHYSICIAN ASSISTANT

## 2023-09-07 PROCEDURE — 80061 LIPID PANEL: CPT | Performed by: PHYSICIAN ASSISTANT

## 2023-09-07 PROCEDURE — 83735 ASSAY OF MAGNESIUM: CPT | Performed by: PHYSICIAN ASSISTANT

## 2023-09-07 PROCEDURE — 1159F MED LIST DOCD IN RCRD: CPT | Performed by: PHYSICIAN ASSISTANT

## 2023-09-07 PROCEDURE — 36415 COLL VENOUS BLD VENIPUNCTURE: CPT | Performed by: PHYSICIAN ASSISTANT

## 2023-09-07 NOTE — PROGRESS NOTES
Chief Complaint  Hypertension (3 mo follow up )    Subjective          Nivia Cagle presents to [facility]  History of Present Illness  Pt here for follow up    Depression: seeing psych and it has helped a lot  She recently got a light for seasonal dep disorder coming over the winter  Denies si/hi    Diarrhea: every other day   Stool is loose  Denies blood in stool   Gets cramping with bm   At times she has nausea  Pt states it is not bothering her     HLD: she has started Leqvio    BG: running 120s  Denies low bg  Currently taking metformin 500mg once in am with Januvia and 2 in pm.  She was told she could decrease dose but was worried sugars would spike up.   HTN: Denies chest pain, palpitations  Admits to muscle cramps at times  Sx seem worse at night  Also gets hand cramps after sitting and painting for long periods of times  She drinks a lot of water per pt.    Not currently taking fosamax due to dental implants  Past Medical History:   Diagnosis Date    ADHD (attention deficit hyperactivity disorder)     Allergic rhinitis     Anemia     Anxiety     Cataracts, bilateral     Chronic pain disorder     Depression 0421/2021    MOODNOT WELL CONTROLLED.  GIVEN NUMBER FOR LOCAL COUNSELOR.  WILL INCREASE TRINTELIX FROM 10MG TO 20MG RTC WEEK ER ID S/HI    Diabetes mellitus, type 2     Diverticulitis     GERD (gastroesophageal reflux disease)     Head injury     High blood pressure     Hyperlipemia     Migraine     EARL (obstructive sleep apnea) 04/21/2021    Panic disorder     Phlebitis     PTSD (post-traumatic stress disorder)         Past Surgical History:   Procedure Laterality Date    ANKLE SURGERY  2021    BILATERAL BREAST REDUCTION  2015    BREAST BIOPSY      CARPAL TUNNEL RELEASE      CHOLECYSTECTOMY  2001    COLONOSCOPY      [facility]    COLONOSCOPY N/A 9/28/2022    Procedure: COLONOSCOPY with biopsy;  Surgeon: Ghislaine Kilgore MD;  Location:   [facility];  Service: Gastroenterology;  Laterality: N/A;  colon polyp, diverticlosis, hemorrhoids    HYSTERECTOMY      ULNAR NERVE TRANSPOSITION Right     WRIST SURGERY          Current Outpatient Medications on File Prior to Visit   Medication Sig Dispense Refill    Accu-Chek Madeline Plus test strip by Other route 4 (Four) Times a Day. use to test blood sugar      Accu-Chek Softclix Lancets lancets by Other route 4 (Four) Times a Day. use to test blood sugar      alendronate (FOSAMAX) 70 MG tablet Take 1 tablet by mouth Every 7 (Seven) Days.      ARIPiprazole (ABILIFY) 2 MG tablet Take 2 tablets by mouth Daily. 180 tablet 3    Ascorbic Acid (VITAMIN C GUMMIE PO) Take  by mouth Daily.      aspirin (aspirin) 81 MG EC tablet Take 1 tablet by mouth Daily. 90 tablet 99    azelastine (ASTELIN) 0.1 % nasal spray 2 sprays into the nostril(s) as directed by provider 2 (Two) Times a Day. Use in each nostril as directed 90 mL 6    BL NASAL SALINE MIST NA 2 drops.      Blood Glucose Monitoring Suppl (FreeStyle Lite) device 1 each by Other route 4 (Four) Times a Day.      buPROPion XL (WELLBUTRIN XL) 300 MG 24 hr tablet TAKE 1 TABLET BY MOUTH EVERY MORNING 90 tablet 1    cetirizine (zyrTEC) 10 MG tablet Take 1 tablet by mouth Daily. 90 tablet 1    coenzyme Q10 50 MG capsule capsule Take  by mouth Daily.      cyclobenzaprine (FLEXERIL) 10 MG tablet Take 1 tablet by mouth Daily As Needed for Muscle Spasms. 90 tablet 1    Daily-Jelani Multivitamin tablet tablet Take 1 tablet by mouth every night at bedtime.      ezetimibe (ZETIA) 10 MG tablet Take 1 tablet by mouth every night at bedtime. 90 tablet 1    FLUoxetine (PROzac) 10 MG capsule TAKE 1 CAPSULE BY MOUTH DAILY 90 capsule 1    FLUoxetine (PROzac) 20 MG capsule Take 1 capsule by mouth Daily. (Patient taking differently: Take 30 mg by mouth Daily.) 90 capsule 3    fluticasone (Flonase) 50 MCG/ACT nasal spray 2 sprays into the nostril(s) as directed by provider Daily. Administer  2 sprays in each nostril for each dose. 16 g 6    Januvia 100 MG tablet Take 1 tablet by mouth Daily. 90 tablet 1    losartan (COZAAR) 25 MG tablet Take 1 tablet by mouth Daily. 90 tablet 1    Melatonin 10 MG tablet Take 2 tablets by mouth Every Night. 2 tabs      metFORMIN (Glucophage) 500 MG tablet Take 1 tab in am and 2 tabs in pm with meals 360 tablet 1    montelukast (SINGULAIR) 10 MG tablet Take 1 tablet by mouth Every Night. 90 tablet 1    pramipexole (Mirapex) 0.5 MG tablet Take 1 tablet by mouth Every Night. 90 tablet 1    prednisoLONE acetate (Pred Mild) 0.12 % ophthalmic suspension SHAKE LIQUID AND INSTILL 1 DROP IN RIGHT EYE TWICE DAILY      traZODone (DESYREL) 100 MG tablet Take 1 tablet by mouth Every Night. 90 tablet 1    vitamin D (ERGOCALCIFEROL) 1.25 MG (20251 UT) capsule capsule TAKE 1 CAPSULE BY MOUTH WEEKLY, TAKE WITH CALCIUM 12 capsule 0    Zinc 100 MG tablet Take 100 mg by mouth Daily.      Inclisiran Sodium (LEQVIO SC) Inject  under the skin into the appropriate area as directed.       No current facility-administered medications on file prior to visit.        Allergies   Allergen Reactions    Diclofenac Hives    New Skin [Benzethonium Chloride] Rash    Atorvastatin Myalgia    Adhesive Tape Rash and Other (See Comments)       Rash at area of bandaid    Niacin Rash       Social History     Tobacco Use   Smoking Status Former    Types: Cigarettes    Quit date:     Years since quittin.7   Smokeless Tobacco Never   Tobacco Comments    QUIT           Objective   Vital Signs:   /65 (BP Location: Left arm)   Pulse 64   Temp 98.4 °F (36.9 °C) (Temporal)   Wt 73 kg (161 lb)   SpO2 97%   BMI 28.52 kg/m²     Physical Exam  Vitals reviewed.   Constitutional:       Appearance: Normal appearance.   HENT:      Head: Normocephalic and atraumatic.      Nose: Nose normal.      Mouth/Throat:      Mouth: Mucous membranes are moist.   Eyes:      Extraocular Movements: Extraocular movements  intact.      Conjunctiva/sclera: Conjunctivae normal.      Pupils: Pupils are equal, round, and reactive to light.   Cardiovascular:      Rate and Rhythm: Normal rate and regular rhythm.   Pulmonary:      Effort: Pulmonary effort is normal.      Breath sounds: Normal breath sounds.   Abdominal:      General: Abdomen is flat. Bowel sounds are normal.      Palpations: Abdomen is soft.   Musculoskeletal:         General: Normal range of motion.   Neurological:      General: No focal deficit present.      Mental Status: She is alert and oriented to person, place, and time.   Psychiatric:         Mood and Affect: Mood normal.      Result Review :            BMI is >= 25 and <30. (Overweight) The following options were offered after discussion;: weight loss educational material (shared in after visit summary)              Assessment and Plan    Diagnoses and all orders for this visit:    1. Type 2 diabetes mellitus with hyperglycemia, without long-term current use of insulin (Primary)  Comments:  Labs today. Discussed last a1c well controlled. Will decrease metformin to 500mg BID and see if it helps with diarrhea  Orders:  -     Hemoglobin A1c    2. Statin intolerance  Comments:  cont follow up with cardiology as directed. Fasting labs today    3. Essential hypertension  Assessment & Plan:  Hypertension is improving with treatment.  Continue current medications.  Blood pressure will be reassessed at the next regular appointment.    Orders:  -     Comprehensive Metabolic Panel  -     CBC & Differential  -     TSH    4. Hyperlipidemia LDL goal <70  Comments:  will get fasting labs today  Orders:  -     Lipid Panel    5. Major depressive disorder, recurrent episode, moderate degree  Comments:  Mood improved, cont current meds and f/u with psych as directed.    6. Muscle cramp  Comments:  Discussed ddx. Labs today to eval. Encouraged hydration and stretching.  Orders:  -     Magnesium        Follow Up   Return in about 3  months (around 12/7/2023).  Patient was given instructions and counseling regarding her condition or for health maintenance advice. Please see specific information pulled into the AVS if appropriate.

## 2023-09-11 NOTE — PROGRESS NOTES
I have reviewed the notes, assessments, and/or procedures performed by Catalina Madsen PA-C, I concur with her documentation of Nivia Cagle.

## 2023-09-19 ENCOUNTER — OFFICE VISIT (OUTPATIENT)
Dept: PODIATRY | Facility: CLINIC | Age: 73
End: 2023-09-19
Payer: MEDICARE

## 2023-09-19 VITALS
SYSTOLIC BLOOD PRESSURE: 118 MMHG | TEMPERATURE: 98.2 F | OXYGEN SATURATION: 94 % | DIASTOLIC BLOOD PRESSURE: 75 MMHG | BODY MASS INDEX: 28.7 KG/M2 | HEIGHT: 63 IN | HEART RATE: 70 BPM | WEIGHT: 162 LBS

## 2023-09-19 DIAGNOSIS — M79.672 FOOT PAIN, BILATERAL: ICD-10-CM

## 2023-09-19 DIAGNOSIS — G62.9 NEUROPATHY: ICD-10-CM

## 2023-09-19 DIAGNOSIS — Z79.4 TYPE 2 DIABETES MELLITUS WITH DIABETIC POLYNEUROPATHY, WITH LONG-TERM CURRENT USE OF INSULIN: Primary | ICD-10-CM

## 2023-09-19 DIAGNOSIS — B35.1 ONYCHOMYCOSIS: ICD-10-CM

## 2023-09-19 DIAGNOSIS — E11.42 TYPE 2 DIABETES MELLITUS WITH DIABETIC POLYNEUROPATHY, WITH LONG-TERM CURRENT USE OF INSULIN: Primary | ICD-10-CM

## 2023-09-19 DIAGNOSIS — L60.0 ONYCHOCRYPTOSIS: ICD-10-CM

## 2023-09-19 DIAGNOSIS — M79.671 FOOT PAIN, BILATERAL: ICD-10-CM

## 2023-09-19 NOTE — PROGRESS NOTES
[facility] - PODIATRY    Today's Date: 09/19/23    Patient Name: Nivia Cagle  MRN: [id number]  CSN: [id number]  PCP: Catalina Madsen PA-C, Last PCP Visit:  9/12/2023  Referring Provider: No ref. provider found    SUBJECTIVE     Chief Complaint   Patient presents with    Left Foot - Follow-up, Diabetes, Annual Exam, Nail Problem    Right Foot - Follow-up, Diabetes, Annual Exam, Nail Problem     HPI: Nivia Cagle, a 73 y.o.female, presents to clinic for painful toenail and a diabetic foot evaluation.    New, Established, New Problem:  New problem  Location:  Toenails  Duration:   Greater than five years  Onset:  Gradual  Nature:  sore with palpation.  Stable, worsening, improving:   worsening  Aggravating factors:  Pain with shoe gear and ambulation.  Previous Treatment: Unable to trim their own toenails.    Patient controlling diabetes via:  NIDDM    Patient states their last blood glucose was:  137    Patient denies any fevers, chills, nausea, vomiting, shortness of breath, nor any other constitutional signs nor symptoms.    No other pedal complaints at this time.    Past Medical History:   Diagnosis Date    ADHD (attention deficit hyperactivity disorder)     Allergic rhinitis     Anemia     Anxiety     Cataracts, bilateral     Chronic pain disorder     Depression 0421/2021    MOODNOT WELL CONTROLLED.  GIVEN NUMBER FOR LOCAL COUNSELOR.  WILL INCREASE TRINTELIX FROM 10MG TO 20MG RTC WEEK ER ID S/HI    Diabetes mellitus, type 2     Diverticulitis     GERD (gastroesophageal reflux disease)     Head injury     High blood pressure     Hyperlipemia     Migraine     EARL (obstructive sleep apnea) 04/21/2021    Panic disorder     Phlebitis     PTSD (post-traumatic stress disorder)      Past Surgical History:   Procedure Laterality Date    ANKLE SURGERY  2021    BILATERAL BREAST REDUCTION  2015    BREAST BIOPSY      CARPAL TUNNEL RELEASE      CHOLECYSTECTOMY  2001    COLONOSCOPY      [place]  [place]    COLONOSCOPY N/A 2022    Procedure: COLONOSCOPY with biopsy;  Surgeon: Ghislaine Kilgore MD;  Location: [facility]COPY;  Service: Gastroenterology;  Laterality: N/A;  colon polyp, diverticlosis, hemorrhoids    HYSTERECTOMY      ULNAR NERVE TRANSPOSITION Right     WRIST SURGERY       Family History   Problem Relation Age of Onset    Kidney cancer Mother     Hyperlipidemia Mother     Heart disease Father     Heart attack Father     Diabetes Father     ADD / ADHD Father     Hyperlipidemia Father     Hyperlipidemia Sister     Cancer Sister     Brain cancer Sister     Lung cancer Sister     Hyperlipidemia Sister     Hyperlipidemia Brother     Diabetes Brother     Heart attack Brother     Hyperlipidemia Brother     No Known Problems Paternal Uncle     No Known Problems Cousin     Brenda Hyperthermia Neg Hx      Social History     Socioeconomic History    Marital status: Single   Tobacco Use    Smoking status: Former     Types: Cigarettes     Quit date:      Years since quittin.7    Smokeless tobacco: Never    Tobacco comments:     QUIT    Vaping Use    Vaping Use: Never used   Substance and Sexual Activity    Alcohol use: Yes     Comment: rarely    Drug use: Never    Sexual activity: Defer     Allergies   Allergen Reactions    Diclofenac Hives    New Skin [Benzethonium Chloride] Rash    Atorvastatin Myalgia    Adhesive Tape Rash and Other (See Comments)       Rash at area of bandaid    Niacin Rash     Current Outpatient Medications   Medication Sig Dispense Refill    Accu-Chek Madeline Plus test strip by Other route 4 (Four) Times a Day. use to test blood sugar      Accu-Chek Softclix Lancets lancets by Other route 4 (Four) Times a Day. use to test blood sugar      ARIPiprazole (ABILIFY) 2 MG tablet Take 2 tablets by mouth Daily. 180 tablet 3    Ascorbic Acid (VITAMIN C GUMMIE PO) Take  by mouth Daily.      aspirin (aspirin) 81 MG EC tablet Take 1 tablet by mouth Daily. 90 tablet 99     azelastine (ASTELIN) 0.1 % nasal spray 2 sprays into the nostril(s) as directed by provider 2 (Two) Times a Day. Use in each nostril as directed 90 mL 6    BL NASAL SALINE MIST NA 2 drops.      Blood Glucose Monitoring Suppl (FreeStyle Lite) device 1 each by Other route 4 (Four) Times a Day.      buPROPion XL (WELLBUTRIN XL) 300 MG 24 hr tablet TAKE 1 TABLET BY MOUTH EVERY MORNING 90 tablet 1    cetirizine (zyrTEC) 10 MG tablet Take 1 tablet by mouth Daily. 90 tablet 1    coenzyme Q10 50 MG capsule capsule Take  by mouth Daily.      cyclobenzaprine (FLEXERIL) 10 MG tablet Take 1 tablet by mouth Daily As Needed for Muscle Spasms. 90 tablet 1    Daily-Jelani Multivitamin tablet tablet Take 1 tablet by mouth every night at bedtime.      ezetimibe (ZETIA) 10 MG tablet Take 1 tablet by mouth every night at bedtime. 90 tablet 1    FLUoxetine (PROzac) 10 MG capsule TAKE 1 CAPSULE BY MOUTH DAILY 90 capsule 1    FLUoxetine (PROzac) 20 MG capsule Take 1 capsule by mouth Daily. (Patient taking differently: Take 30 mg by mouth Daily.) 90 capsule 3    fluticasone (Flonase) 50 MCG/ACT nasal spray 2 sprays into the nostril(s) as directed by provider Daily. Administer 2 sprays in each nostril for each dose. 16 g 6    Inclisiran Sodium (LEQVIO SC) Inject  under the skin into the appropriate area as directed.      Januvia 100 MG tablet Take 1 tablet by mouth Daily. 90 tablet 1    losartan (COZAAR) 25 MG tablet Take 1 tablet by mouth Daily. 90 tablet 1    Melatonin 10 MG tablet Take 2 tablets by mouth Every Night. 2 tabs      metFORMIN (Glucophage) 500 MG tablet Take 1 tab in am and 2 tabs in pm with meals 360 tablet 1    montelukast (SINGULAIR) 10 MG tablet Take 1 tablet by mouth Every Night. 90 tablet 1    pramipexole (Mirapex) 0.5 MG tablet Take 1 tablet by mouth Every Night. 90 tablet 1    prednisoLONE acetate (Pred Mild) 0.12 % ophthalmic suspension SHAKE LIQUID AND INSTILL 1 DROP IN RIGHT EYE TWICE DAILY      traZODone (DESYREL)  100 MG tablet Take 1 tablet by mouth Every Night. 90 tablet 1    vitamin D (ERGOCALCIFEROL) 1.25 MG (31331 UT) capsule capsule TAKE 1 CAPSULE BY MOUTH WEEKLY, TAKE WITH CALCIUM 12 capsule 0    Zinc 100 MG tablet Take 100 mg by mouth Daily.      alendronate (FOSAMAX) 70 MG tablet Take 1 tablet by mouth Every 7 (Seven) Days. (Patient not taking: Reported on 9/19/2023)       No current facility-administered medications for this visit.     Review of Systems   Constitutional: Negative.    Skin:         Painful toenails.   All other systems reviewed and are negative.    OBJECTIVE     Vitals:    09/19/23 1311   BP: 118/75   Pulse: 70   Temp: 98.2 °F (36.8 °C)   SpO2: 94%       Body mass index is 28.7 kg/m².    Lab Results   Component Value Date    HGBA1C 5.90 (H) 09/07/2023       Lab Results   Component Value Date    GLUCOSE 90 09/07/2023    CALCIUM 9.3 09/07/2023     09/07/2023    K 4.3 09/07/2023    CO2 26.1 09/07/2023     09/07/2023    BUN 14 09/07/2023    CREATININE 0.91 09/07/2023    EGFRIFNONA 68 02/15/2022    BCR 15.4 09/07/2023    ANIONGAP 10.9 09/07/2023       Patient seen in no apparent distress.      PHYSICAL EXAM:     Foot/Ankle Exam    GENERAL  Diabetic foot exam performed    Appearance:  chronically ill  Orientation:  AAOx3  Affect:  appropriate  Gait:  unimpaired  Assistance:  independent  Right shoe gear: casual shoe  Left shoe gear: casual shoe    VASCULAR     Right Foot Vascularity   Dorsalis pedis:  1+  Posterior tibial:  1+  Skin temperature:  warm  Edema grading:  None  CFT:  < 3 seconds  Pedal hair growth:  Absent  Varicosities:  mild varicosities     Left Foot Vascularity   Dorsalis pedis:  1+  Posterior tibial:  1+  Skin temperature:  warm  Edema grading:  None  CFT:  < 3 seconds  Pedal hair growth:  Absent  Varicosities:  mild varicosities     NEUROLOGIC     Right Foot Neurologic   Light touch sensation: diminished  Vibratory sensation: diminished  Hot/Cold sensation:  diminished  Protective Sensation using [place] Monofilament:   Sites intact: 3  Sites tested: 10     Left Foot Neurologic   Normal sensation    Light touch sensation: normal  Vibratory sensation: normal  Hot/Cold sensation:  normal  Protective Sensation using [place] Monofilament:   Sites intact: 10  Sites tested: 10    MUSCLE STRENGTH     Right Foot Muscle Strength   Foot dorsiflexion:  4-  Foot plantar flexion:  4-  Foot inversion:  4-  Foot eversion:  4-     Left Foot Muscle Strength   Foot dorsiflexion:  4-  Foot plantar flexion:  4-  Foot inversion:  4-  Foot eversion:  4-    RANGE OF MOTION     Right Foot Range of Motion   Foot and ankle ROM within normal limits       Left Foot Range of Motion   Foot and ankle ROM within normal limits      DERMATOLOGIC      Right Foot Dermatologic   Skin  Right foot skin is intact.   Nails  2.  Positive for elongated, onychomycosis, abnormal thickness, subungual debris and ingrown toenail.  3.  Positive for elongated, onychomycosis, abnormal thickness, subungual debris and ingrown toenail.  4.  Positive for elongated, onychomycosis, abnormal thickness, subungual debris and ingrown toenail.  5.  Positive for elongated, onychomycosis, abnormal thickness, subungual debris and ingrown toenail.  Nails comment:  Toenails 1, 2, 3, 4, and 5     Left Foot Dermatologic   Skin  Left foot skin is intact.   Nails comment:  Toenails 1, 2, 3, 4, and 5  Nails  1.  Positive for elongated, onychomycosis, abnormal thickness, subungual debris and ingrown toenail.  2.  Positive for elongated, onychomycosis, abnormal thickness, subungual debris and ingrown toenail.  3.  Positive for elongated, onychomycosis, abnormal thickness, subungual debris and ingrown toenail.  4.  Positive for elongated, onychomycosis, abnormally thick, subungual debris and ingrown toenail.  5.  Positive for elongated, onychomycosis, abnormally thick, subungual debris and ingrown toenail.    Diabetic Foot  Exam Performed and Monofilament Test Performed      ASSESSMENT/PLAN     Diagnoses and all orders for this visit:    1. Type 2 diabetes mellitus with diabetic polyneuropathy, with long-term current use of insulin (Primary)    2. Neuropathy    3. Onychomycosis    4. Onychocryptosis    5. Foot pain, bilateral        Comprehensive lower extremity examination and evaluation was performed.    Discussed findings and treatment plan including risks, benefits, and treatment options with patient in detail. Patient agreed with treatment plan.    Medications and allergies reviewed.  Reviewed available blood glucose and HgB A1C lab values along with other pertinent labs.  These were discussed with the patient as to their importance of diabetic maintenance.    Toenails 2, 3, 4, 5 on Right and 1, 2, 3, 4, 5 on Left were debrided with nail nippers then filed with a [employer] nail radha.  Patient tolerated procedure well without complications.    Diabetic foot exam performed and documented this date, compliant with CQM required standards. Detail of findings as noted in physical exam.  Lower extremity Neurologic exam for diabetic patient performed and documented this date, compliant with PQRS required standards. Detail of findings as noted in physical exam.  Advised patient importance of good routine lower extremity hygiene. Advised patient importance of evaluating for intact skin and pain free nail borders.  Advised patient to use mirror to evaluate plantar/ soles of feet for better visualization. Advised patient monitor and phone office to be seen if any cracking to skin, open lesions, painful nail borders or if nails become elongated prior to next visit. Advised patient importance of daily cleansing of lower extremities, followed by good skin cream to maintain normal hydration of skin. Also advised patient importance of close daily monitoring of blood sugar. Advised to regulate diet and medications to maintain control of blood sugar  in optimal range. Contact primary care provider if difficulties maintaining blood sugar levels.  Advised Patient of presence of Diabetes Mellitus condition.  Advised Patient risk of progression and worsening or improvement, then return of condition.  Will monitor condition for any change in future. Treat with most appropriate treatment pending status of condition.  Counseled and advised patient extensively on nature and ramifications of diabetes. Standard instructions given to patient for good diabetic foot care and maintenance. Advised importance of careful monitoring to avoid break down and complications secondary to diabetes. Advised patient importance of strict maintenance of blood sugar control. Advised patient of possible ominous results from neglect of condition, i.e.: amputation/ loss of digits, feet and legs, or even death.  Patient states understands counseling, will monitor closely, continue good hygiene and routine diabetic foot care. Patient will contact office should they have any questions or problems.      An After Visit Summary was printed and given to the patient at discharge, including (if requested) any available informative/educational handouts regarding diagnosis, treatment, or medications. All questions were answered to patient/family satisfaction. Should symptoms fail to improve or worsen they agree to call or return to clinic or to go to the Emergency Department. Discussed the importance of following up with any needed screening tests/labs/specialist appointments and any requested follow-up recommended by me today. Importance of maintaining follow-up discussed and patient accepts that missed appointments can delay diagnosis and potentially lead to worsening of conditions.    Return in about 9 weeks (around 11/21/2023) for Toenail Care., or sooner if acute issues arise.    This document has been electronically signed by Adarsh Flores DPM on September 19, 2023 14:00 EDT

## 2023-10-02 ENCOUNTER — OFFICE VISIT (OUTPATIENT)
Dept: NEUROLOGY | Facility: CLINIC | Age: 73
End: 2023-10-02
Payer: MEDICARE

## 2023-10-02 VITALS
HEART RATE: 77 BPM | SYSTOLIC BLOOD PRESSURE: 118 MMHG | BODY MASS INDEX: 28.17 KG/M2 | DIASTOLIC BLOOD PRESSURE: 47 MMHG | HEIGHT: 63 IN | WEIGHT: 159 LBS

## 2023-10-02 DIAGNOSIS — R25.3 MUSCLE TWITCHING: ICD-10-CM

## 2023-10-02 DIAGNOSIS — G25.81 RESTLESS LEGS SYNDROME (RLS): Primary | ICD-10-CM

## 2023-10-02 RX ORDER — PRAMIPEXOLE DIHYDROCHLORIDE 0.5 MG/1
0.5 TABLET ORAL NIGHTLY
Qty: 90 TABLET | Refills: 1 | Status: SHIPPED | OUTPATIENT
Start: 2023-10-02 | End: 2024-10-01

## 2023-10-02 RX ORDER — CYCLOBENZAPRINE HCL 10 MG
10 TABLET ORAL DAILY PRN
Qty: 90 TABLET | Refills: 1 | Status: SHIPPED | OUTPATIENT
Start: 2023-10-02

## 2023-10-02 NOTE — PROGRESS NOTES
"Chief Complaint  Neurologic Problem    Subjective          Nivia Cagle presents to [facility] NEUROLOGY & NEUROSURGERY  History of Present Illness  Following up for RLS and muscle cramping. Doing well on pramipexole and flexeril. States that muscle cramping is much improved.  RLS symptoms are also managed with pramipexole.  Denies side effects.     Objective   Vital Signs:   /47   Pulse 77   Ht 160 cm (63\")   Wt 72.1 kg (159 lb)   BMI 28.17 kg/m²     Physical Exam  HENT:      Head: Normocephalic.   Pulmonary:      Effort: Pulmonary effort is normal.   Neurological:      Mental Status: She is alert and oriented to person, place, and time.      Sensory: Sensation is intact.      Motor: Motor function is intact.      Coordination: Coordination is intact.      Deep Tendon Reflexes: Reflexes are normal and symmetric.      Comments: Mild intention tremor bilaterally      Neurologic Exam     Mental Status   Oriented to person, place, and time.      Result Review :               Assessment and Plan    Diagnoses and all orders for this visit:    1. Restless legs syndrome (RLS) (Primary)  Assessment & Plan:  Continue pramipexole.      2. Muscle twitching  Assessment & Plan:  Continue flexeril for muscle spasms.       Other orders  -     pramipexole (Mirapex) 0.5 MG tablet; Take 1 tablet by mouth Every Night.  Dispense: 90 tablet; Refill: 1  -     cyclobenzaprine (FLEXERIL) 10 MG tablet; Take 1 tablet by mouth Daily As Needed for Muscle Spasms.  Dispense: 90 tablet; Refill: 1        Follow Up   Return in about 6 months (around 4/2/2024).  Patient was given instructions and counseling regarding her condition or for health maintenance advice. Please see specific information pulled into the AVS if appropriate.       "

## 2023-11-02 DIAGNOSIS — F51.05 INSOMNIA DUE TO MENTAL CONDITION: ICD-10-CM

## 2023-11-02 RX ORDER — TRAZODONE HYDROCHLORIDE 100 MG/1
100 TABLET ORAL NIGHTLY
Qty: 90 TABLET | Refills: 1 | OUTPATIENT
Start: 2023-11-02

## 2023-11-02 NOTE — TELEPHONE ENCOUNTER
PT(PATIENT) VERIFIED     NEXT APPT WITH PROVIDER  Appointment with Vicky Barth MD (2023)     LAST MED REFILL  traZODone (DESYREL) 100 MG tablet (2023)   QTY 90 WITH 1 REFILL    TOO SOON FOR REFILL    PROVIDER PLEASE ADVISE

## 2023-11-02 NOTE — TELEPHONE ENCOUNTER
NEXT APPT WITH PROVIDER  NONE IN [facility]     LAST SEEN   Telemedicine with Vicky Barth MD (08/11/2023)     PER NOTE  Return in about 4 weeks (around 9/8/2023) for Video visit.

## 2023-11-09 ENCOUNTER — TELEMEDICINE (OUTPATIENT)
Dept: PSYCHIATRY | Facility: CLINIC | Age: 73
End: 2023-11-09
Payer: MEDICARE

## 2023-11-09 DIAGNOSIS — F41.1 GENERALIZED ANXIETY DISORDER: ICD-10-CM

## 2023-11-09 DIAGNOSIS — F43.10 POST TRAUMATIC STRESS DISORDER (PTSD): ICD-10-CM

## 2023-11-09 DIAGNOSIS — F33.40 RECURRENT MAJOR DEPRESSIVE DISORDER IN REMISSION: ICD-10-CM

## 2023-11-09 DIAGNOSIS — F51.05 INSOMNIA DUE TO MENTAL CONDITION: Primary | ICD-10-CM

## 2023-11-09 RX ORDER — DOXEPIN HYDROCHLORIDE 10 MG/1
10 CAPSULE ORAL NIGHTLY
Qty: 30 CAPSULE | Refills: 2 | Status: SHIPPED | OUTPATIENT
Start: 2023-11-09

## 2023-11-09 RX ORDER — BUPROPION HYDROCHLORIDE 300 MG/1
300 TABLET ORAL EVERY MORNING
Qty: 90 TABLET | Refills: 3 | Status: SHIPPED | OUTPATIENT
Start: 2023-11-09

## 2023-11-09 NOTE — PATIENT INSTRUCTIONS
1.  Please return to clinic at your next scheduled visit.  Contact the clinic ([phone]) at least 24 hours prior in the event you need to cancel.  2.  Do no harm to yourself or others.    3.  Avoid alcohol and drugs.    4.  Take all medications as prescribed.  Please contact the clinic with any concerns. If you are in need of medication refills, please call the clinic at [phone].    5. Should you want to get in touch with your provider, Dr. Vicky Barth, please utilize [employer] or contact the office ([phone]), and staff will be able to page Dr. Barth directly.  6.  In the event you have personal crisis, contact the following crisis numbers: Suicide Prevention Hotline 1-134.511.3636; [facility] Helpline 6-504-907-[place]; [facility] Emergency Room [phone]; text HELLO to 599498; or 197.

## 2023-11-09 NOTE — PROGRESS NOTES
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 tablet; Refill: 3    4. Post traumatic stress disorder (PTSD)      INITIAL presentation most consistent with major depressive disorder, recurrent, moderate to severe, with anxious distress.  PTSD.  Rule out personality disorder, cluster B specifically.  Rule out hypomania as patient was very difficult to interrupt today.      11/9: Doing well, isolated insomnia. Stop trazodone and start doxepin. Consider lunesta, belsomra, quiviviq. She stopped melatonin on her own.    Allowed patient to freely discuss and process issues, such as:  Saw her grandsons  Painting as a coping strategy, fulfilling hobby.  Procrastinating as a measure of how well she is doing  The importance of Latter-day  ... using Rogerian psychotherapeutic techniques including unconditional positive regard, reflective listening, and demonstrating clear empathy.     Time (minutes) spent providing supportive psychotherapy: 18  Functional status: mild impairment  Treatment plan: Medication management and supportive psychotherapy  Prognosis: good  Progress: stable, well  4w    8/11: Stable, well, no changes. Painting, having people over. Counseled to start light box in September, reiterated instructions. Will be inducted into the [employer] tomorrow. She's been working on it for a year.    7/11: Short visit as patient sleepy. Unusual sleep pattern recently, but MH is fine. Pt will call her son tonight to ensure someone is around when she goes back to sleep. She will call PCP about this tomorrow. Close follow up with me in 4 wks.    4/27: Stable, well. Restart melatonin for insomnia.     3/2: Prozac and BLT helped. Situational stressor in son, no changes. Better. Watch insomnia.     1/20: Start light box, increase prozac for dep.     12/9: Some insomnia. Increase melatonin.     10/25: Doing well. Increase prozac back to baseline dose (inadvertently reduced, she has been stable on a higher dose, so we should go back to that). Some initial insomnia, increase trazodone to 75 mg nightly. She is enjoying the Fall.     9/8: Start light box. Close follow up in case this is worsening depression.     7/27: Well, stable.     6/14: Better, no changes.     5/3: Increase prozac and melatonin.    Visit Diagnoses:    ICD-10-CM ICD-9-CM   1. Insomnia due to mental condition  F51.05 300.9     327.02   2. Recurrent major depressive disorder in remission  F33.40 296.35   3. Generalized anxiety disorder  F41.1 300.02   4. Post traumatic stress disorder (PTSD)  F43.10 309.81       PLAN:  Risk Assessment: Risk of self-harm acutely is moderate. Risk factors include chronic depressive disorder, possible personality disorder, recent psychosocial stressors (pandemic, moving). Protective factors include no present SI, no history of suicide attempts or self-harm in the past, no access to weapons, minimal AODA, healthcare seeking, future orientation, willingness to engage in care. Risk of self-harm chronically is also moderate, but could be further elevated in the event of treatment noncompliance and/or AODA.  Safety: No acute safety concerns.  Medications:   CONTINUE light box 9/1 - 3/31.  STOPPED melatonin 30 mg p.o. nightly.  Wasn't sleeping even on it  plus trazodone 11/23.   STOP trazodone 100 mg PO QHS. No longer effective 11/23.  START doxepin 10 mg qhs. Risks, benefits, side effects discussed with patient including GI upset, sedation, dizziness/falls risk, grogginess the following day, prolongation of the QTc interval.  After discussion of these risks and benefits, the patient voiced understanding and agreed to proceed.    CONTINUE bupropion xl 300 mg daily. Risks, benefits, alternatives discussed with patient including nausea, GI upset, increased energy, exacerbation of irritability, insomnia, lowering of seizure threshold.  After discussion of these risks and benefits, the patient voiced understanding and agreed to proceed.  CONTINUE Prozac 30 mg a day (HIGHEST dose is 40 given that she is also on bupropion). Risks, benefits, alternatives discussed with patient including GI upset, nausea vomiting diarrhea, theoretical decrease of seizure threshold predisposing the patient to a slightly higher seizure risk, headaches, sexual dysfunction, serotonin syndrome, bleeding risk, increased suicidality in patients 24 years and younger.  After discussion of these risks and benefits, the patient voiced understanding and agreed to proceed.  CONTINUE Abilify 4 mg p.o. daily to target depression, anxiety, decreased energy. Risks, benefits, alternatives discussed with patient including increased energy, exacerbation of irritability, akathisia, GI upset, orthostatic hypotension, increased appetite. After discussion of these risks and benefits, the patient voiced understanding and agreed to proceed.  S/P:  Mirtazapine 45 mg daily (RLS)  Ambien caused double vision, and possibly hallucinations  Was on lithium in the past: dizziness and falls, and hair curled.  Therapy: referred to [facility].  Labs/Studies: s/p TMS referral.  Follow Up: 6 weeks. (prefers 4 wks)      TREATMENT PLAN/GOALS: Continue supportive psychotherapy efforts and medications as indicated. Treatment and  medication options discussed during today's visit. Patient acknowledged and verbally consented to continue with current treatment plan and was educated on the importance of compliance with treatment and follow-up appointments.    MEDICATION ISSUES:  SAPNA reviewed as expected.  Discussed medication options and treatment plan of prescribed medication as well as the risks, benefits, and side effects including potential falls, possible impaired driving and metabolic adversities among others. Patient is agreeable to call the office with any worsening of symptoms or onset of side effects. Patient is agreeable to call 911 or go to the nearest ER should he/she begin having SI/HI. No medication side effects or related complaints today.     MEDS ORDERED DURING VISIT:  New Medications Ordered This Visit   Medications    doxepin (SINEquan) 10 MG capsule     Sig: Take 1 capsule by mouth Every Night.     Dispense:  30 capsule     Refill:  2    buPROPion XL (WELLBUTRIN XL) 300 MG 24 hr tablet     Sig: Take 1 tablet by mouth Every Morning.     Dispense:  90 tablet     Refill:  3       Return in about 4 weeks (around 12/7/2023) for Video visit.         This document has been electronically signed by Vicky Barth MD  November 9, 2023 07:59 EST      Part of this note may be an electronic transcription/translation of spoken language to printed text using the Dragon Dictation System.

## 2023-11-10 ENCOUNTER — HOSPITAL ENCOUNTER (OUTPATIENT)
Dept: MAMMOGRAPHY | Facility: HOSPITAL | Age: 73
Discharge: HOME | End: 2023-11-10
Admitting: PHYSICIAN ASSISTANT
Payer: MEDICARE

## 2023-11-10 DIAGNOSIS — Z12.31 VISIT FOR SCREENING MAMMOGRAM: ICD-10-CM

## 2023-11-10 PROCEDURE — 77063 BREAST TOMOSYNTHESIS BI: CPT

## 2023-11-10 PROCEDURE — 77067 SCR MAMMO BI INCL CAD: CPT

## 2023-11-15 ENCOUNTER — TRANSCRIBE ORDERS (OUTPATIENT)
Dept: LAB | Facility: HOSPITAL | Age: 73
End: 2023-11-15
Payer: MEDICARE

## 2023-11-15 ENCOUNTER — LAB (OUTPATIENT)
Dept: LAB | Facility: HOSPITAL | Age: 73
End: 2023-11-15
Payer: MEDICARE

## 2023-11-15 DIAGNOSIS — Z98.818 OTHER DENTAL PROCEDURE STATUS: ICD-10-CM

## 2023-11-15 DIAGNOSIS — Z01.818 PRE-OP TESTING: ICD-10-CM

## 2023-11-15 DIAGNOSIS — Z01.818 PRE-OP TESTING: Primary | ICD-10-CM

## 2023-11-15 DIAGNOSIS — Z98.890 S/P BONE GRAFT: ICD-10-CM

## 2023-11-15 PROCEDURE — 36415 COLL VENOUS BLD VENIPUNCTURE: CPT

## 2023-11-15 PROCEDURE — 82523 COLLAGEN CROSSLINKS: CPT

## 2023-11-18 DIAGNOSIS — F51.05 INSOMNIA DUE TO MENTAL CONDITION: ICD-10-CM

## 2023-11-20 LAB — COLLAGEN CTX SERPL-MCNC: 265 PG/ML

## 2023-11-20 RX ORDER — DOXEPIN HYDROCHLORIDE 10 MG/1
10 CAPSULE ORAL NIGHTLY
Qty: 30 CAPSULE | Refills: 2 | OUTPATIENT
Start: 2023-11-20

## 2023-11-20 NOTE — TELEPHONE ENCOUNTER
NEXT VISIT WITH PROVIDER   Appointment with Vicky Barth MD (12/07/2023)     LAST SEEN BY PROVIDER   Telemedicine with Vicky Barth MD (11/09/2023)     LAST MED REFILL  doxepin (SINEquan) 10 MG capsule (11/09/2023)     TOO SOON FOR REFILL    PROVIDER PLEASE ADVISE

## 2023-11-26 PROCEDURE — 87086 URINE CULTURE/COLONY COUNT: CPT | Performed by: PHYSICIAN ASSISTANT

## 2023-11-26 PROCEDURE — 87088 URINE BACTERIA CULTURE: CPT | Performed by: PHYSICIAN ASSISTANT

## 2023-11-26 PROCEDURE — 87186 SC STD MICRODIL/AGAR DIL: CPT | Performed by: PHYSICIAN ASSISTANT

## 2023-11-28 ENCOUNTER — HOSPITAL ENCOUNTER (OUTPATIENT)
Dept: GENERAL RADIOLOGY | Facility: HOSPITAL | Age: 73
Discharge: HOME | End: 2023-11-28
Payer: MEDICARE

## 2023-11-28 ENCOUNTER — LAB (OUTPATIENT)
Dept: LAB | Facility: HOSPITAL | Age: 73
End: 2023-11-28
Payer: MEDICARE

## 2023-11-28 ENCOUNTER — TRANSCRIBE ORDERS (OUTPATIENT)
Dept: LAB | Facility: HOSPITAL | Age: 73
End: 2023-11-28
Payer: MEDICARE

## 2023-11-28 ENCOUNTER — TRANSCRIBE ORDERS (OUTPATIENT)
Dept: ADMINISTRATIVE | Facility: HOSPITAL | Age: 73
End: 2023-11-28
Payer: MEDICARE

## 2023-11-28 DIAGNOSIS — E55.9 VITAMIN D DEFICIENCY: Primary | ICD-10-CM

## 2023-11-28 DIAGNOSIS — Z79.899 ENCOUNTER FOR LONG-TERM (CURRENT) USE OF OTHER MEDICATIONS: ICD-10-CM

## 2023-11-28 DIAGNOSIS — M85.89 OSTEOPENIA OF MULTIPLE SITES: ICD-10-CM

## 2023-11-28 DIAGNOSIS — M25.571 RIGHT ANKLE PAIN, UNSPECIFIED CHRONICITY: ICD-10-CM

## 2023-11-28 DIAGNOSIS — M25.471 RIGHT ANKLE SWELLING: ICD-10-CM

## 2023-11-28 DIAGNOSIS — E55.9 VITAMIN D DEFICIENCY: ICD-10-CM

## 2023-11-28 DIAGNOSIS — M25.471 RIGHT ANKLE SWELLING: Primary | ICD-10-CM

## 2023-11-28 LAB
CALCIUM SPEC-SCNC: 9.6 MG/DL (ref 8.6–10.5)
CREAT SERPL-MCNC: 0.92 MG/DL (ref 0.57–1)
EGFRCR SERPLBLD CKD-EPI 2021: 66.7 ML/MIN/1.73

## 2023-11-28 PROCEDURE — 82310 ASSAY OF CALCIUM: CPT

## 2023-11-28 PROCEDURE — 82565 ASSAY OF CREATININE: CPT

## 2023-11-28 PROCEDURE — 73630 X-RAY EXAM OF FOOT: CPT

## 2023-11-28 PROCEDURE — 36415 COLL VENOUS BLD VENIPUNCTURE: CPT

## 2023-11-28 PROCEDURE — 82306 VITAMIN D 25 HYDROXY: CPT

## 2023-11-28 PROCEDURE — 73610 X-RAY EXAM OF ANKLE: CPT

## 2023-11-29 LAB — 25(OH)D3 SERPL-MCNC: 58.5 NG/ML (ref 30–100)

## 2023-12-05 ENCOUNTER — TRANSCRIBE ORDERS (OUTPATIENT)
Dept: ADMINISTRATIVE | Facility: HOSPITAL | Age: 73
End: 2023-12-05
Payer: MEDICARE

## 2023-12-05 DIAGNOSIS — M85.89 OSTEOPENIA OF MULTIPLE SITES: Primary | ICD-10-CM

## 2023-12-07 ENCOUNTER — OFFICE VISIT (OUTPATIENT)
Dept: INTERNAL MEDICINE | Facility: CLINIC | Age: 73
End: 2023-12-07
Payer: MEDICARE

## 2023-12-07 VITALS
RESPIRATION RATE: 16 BRPM | SYSTOLIC BLOOD PRESSURE: 126 MMHG | TEMPERATURE: 98.4 F | DIASTOLIC BLOOD PRESSURE: 78 MMHG | WEIGHT: 165 LBS | HEIGHT: 63 IN | BODY MASS INDEX: 29.23 KG/M2 | HEART RATE: 91 BPM | OXYGEN SATURATION: 98 %

## 2023-12-07 DIAGNOSIS — G47.33 OBSTRUCTIVE SLEEP APNEA: ICD-10-CM

## 2023-12-07 DIAGNOSIS — N39.0 URINARY TRACT INFECTION WITHOUT HEMATURIA, SITE UNSPECIFIED: ICD-10-CM

## 2023-12-07 DIAGNOSIS — F33.1 MAJOR DEPRESSIVE DISORDER, RECURRENT EPISODE, MODERATE DEGREE: ICD-10-CM

## 2023-12-07 DIAGNOSIS — E11.65 TYPE 2 DIABETES MELLITUS WITH HYPERGLYCEMIA, WITHOUT LONG-TERM CURRENT USE OF INSULIN: ICD-10-CM

## 2023-12-07 DIAGNOSIS — Z00.00 MEDICARE ANNUAL WELLNESS VISIT, SUBSEQUENT: Primary | ICD-10-CM

## 2023-12-07 DIAGNOSIS — I10 ESSENTIAL HYPERTENSION: ICD-10-CM

## 2023-12-07 DIAGNOSIS — E78.2 MIXED HYPERLIPIDEMIA: ICD-10-CM

## 2023-12-07 LAB
ALBUMIN SERPL-MCNC: 4.6 G/DL (ref 3.5–5.2)
ALBUMIN/GLOB SERPL: 2 G/DL
ALP SERPL-CCNC: 80 U/L (ref 39–117)
ALT SERPL W P-5'-P-CCNC: 18 U/L (ref 1–33)
ANION GAP SERPL CALCULATED.3IONS-SCNC: 9.1 MMOL/L (ref 5–15)
AST SERPL-CCNC: 23 U/L (ref 1–32)
BASOPHILS # BLD AUTO: 0.05 10*3/MM3 (ref 0–0.2)
BASOPHILS NFR BLD AUTO: 0.7 % (ref 0–1.5)
BILIRUB SERPL-MCNC: 0.4 MG/DL (ref 0–1.2)
BUN SERPL-MCNC: 19 MG/DL (ref 8–23)
BUN/CREAT SERPL: 17.4 (ref 7–25)
CALCIUM SPEC-SCNC: 9.7 MG/DL (ref 8.6–10.5)
CHLORIDE SERPL-SCNC: 102 MMOL/L (ref 98–107)
CHOLEST SERPL-MCNC: 140 MG/DL (ref 0–200)
CO2 SERPL-SCNC: 27.9 MMOL/L (ref 22–29)
CREAT SERPL-MCNC: 1.09 MG/DL (ref 0.57–1)
DEPRECATED RDW RBC AUTO: 40.4 FL (ref 37–54)
EGFRCR SERPLBLD CKD-EPI 2021: 54.4 ML/MIN/1.73
EOSINOPHIL # BLD AUTO: 0.2 10*3/MM3 (ref 0–0.4)
EOSINOPHIL NFR BLD AUTO: 2.7 % (ref 0.3–6.2)
ERYTHROCYTE [DISTWIDTH] IN BLOOD BY AUTOMATED COUNT: 12.2 % (ref 12.3–15.4)
GLOBULIN UR ELPH-MCNC: 2.3 GM/DL
GLUCOSE SERPL-MCNC: 101 MG/DL (ref 65–99)
HBA1C MFR BLD: 6.4 % (ref 4.8–5.6)
HCT VFR BLD AUTO: 38.4 % (ref 34–46.6)
HDLC SERPL-MCNC: 70 MG/DL (ref 40–60)
HGB BLD-MCNC: 12.9 G/DL (ref 12–15.9)
IMM GRANULOCYTES # BLD AUTO: 0.03 10*3/MM3 (ref 0–0.05)
IMM GRANULOCYTES NFR BLD AUTO: 0.4 % (ref 0–0.5)
LDLC SERPL CALC-MCNC: 51 MG/DL (ref 0–100)
LDLC/HDLC SERPL: 0.7 {RATIO}
LYMPHOCYTES # BLD AUTO: 1.62 10*3/MM3 (ref 0.7–3.1)
LYMPHOCYTES NFR BLD AUTO: 21.7 % (ref 19.6–45.3)
MCH RBC QN AUTO: 30.5 PG (ref 26.6–33)
MCHC RBC AUTO-ENTMCNC: 33.6 G/DL (ref 31.5–35.7)
MCV RBC AUTO: 90.8 FL (ref 79–97)
MONOCYTES # BLD AUTO: 0.87 10*3/MM3 (ref 0.1–0.9)
MONOCYTES NFR BLD AUTO: 11.6 % (ref 5–12)
NEUTROPHILS NFR BLD AUTO: 4.71 10*3/MM3 (ref 1.7–7)
NEUTROPHILS NFR BLD AUTO: 62.9 % (ref 42.7–76)
NRBC BLD AUTO-RTO: 0 /100 WBC (ref 0–0.2)
PLATELET # BLD AUTO: 251 10*3/MM3 (ref 140–450)
PMV BLD AUTO: 11.5 FL (ref 6–12)
POTASSIUM SERPL-SCNC: 4.5 MMOL/L (ref 3.5–5.2)
PROT SERPL-MCNC: 6.9 G/DL (ref 6–8.5)
RBC # BLD AUTO: 4.23 10*6/MM3 (ref 3.77–5.28)
SODIUM SERPL-SCNC: 139 MMOL/L (ref 136–145)
TRIGL SERPL-MCNC: 105 MG/DL (ref 0–150)
TSH SERPL DL<=0.05 MIU/L-ACNC: 2.1 UIU/ML (ref 0.27–4.2)
VLDLC SERPL-MCNC: 19 MG/DL (ref 5–40)
WBC NRBC COR # BLD AUTO: 7.48 10*3/MM3 (ref 3.4–10.8)

## 2023-12-07 PROCEDURE — 80061 LIPID PANEL: CPT | Performed by: PHYSICIAN ASSISTANT

## 2023-12-07 PROCEDURE — 81003 URINALYSIS AUTO W/O SCOPE: CPT | Performed by: PHYSICIAN ASSISTANT

## 2023-12-07 PROCEDURE — 85025 COMPLETE CBC W/AUTO DIFF WBC: CPT | Performed by: PHYSICIAN ASSISTANT

## 2023-12-07 PROCEDURE — 83036 HEMOGLOBIN GLYCOSYLATED A1C: CPT | Performed by: PHYSICIAN ASSISTANT

## 2023-12-07 PROCEDURE — 84443 ASSAY THYROID STIM HORMONE: CPT | Performed by: PHYSICIAN ASSISTANT

## 2023-12-07 PROCEDURE — 80053 COMPREHEN METABOLIC PANEL: CPT | Performed by: PHYSICIAN ASSISTANT

## 2023-12-08 LAB
BILIRUB UR QL STRIP: NEGATIVE
CLARITY UR: ABNORMAL
COLOR UR: YELLOW
GLUCOSE UR STRIP-MCNC: NEGATIVE MG/DL
HGB UR QL STRIP.AUTO: NEGATIVE
KETONES UR QL STRIP: NEGATIVE
LEUKOCYTE ESTERASE UR QL STRIP.AUTO: NEGATIVE
NITRITE UR QL STRIP: NEGATIVE
PH UR STRIP.AUTO: 7 [PH] (ref 5–8)
PROT UR QL STRIP: ABNORMAL
SP GR UR STRIP: 1.02 (ref 1–1.03)
UROBILINOGEN UR QL STRIP: ABNORMAL

## 2023-12-08 NOTE — PROGRESS NOTES
The ABCs of the Annual Wellness Visit  Subsequent Medicare Wellness Visit    Subjective    Nivia Cagle is a 73 y.o. female who presents for a Subsequent Medicare Wellness Visit.    The following portions of the patient's history were reviewed and   updated as appropriate: allergies, current medications, past family history, past medical history, past social history, past surgical history, and problem list.    Compared to one year ago, the patient feels her physical   health is better.    Compared to one year ago, the patient feels her mental   health is better.    Recent Hospitalizations:  She was not admitted to the hospital during the last year.       Current Medical Providers:  Patient Care Team:  Catalina Madsen PA-C as PCP - General (Physician Assistant)  Kassandra Garay APRN (Inactive) as Nurse Practitioner (Otolaryngology)    Outpatient Medications Prior to Visit   Medication Sig Dispense Refill    Accu-Chek Madeline Plus test strip by Other route 4 (Four) Times a Day. use to test blood sugar      Accu-Chek Softclix Lancets lancets by Other route 4 (Four) Times a Day. use to test blood sugar      ARIPiprazole (ABILIFY) 2 MG tablet Take 2 tablets by mouth Daily. 180 tablet 3    Ascorbic Acid (VITAMIN C GUMMIE PO) Take  by mouth Daily.      aspirin (aspirin) 81 MG EC tablet Take 1 tablet by mouth Daily. 90 tablet 99    azelastine (ASTELIN) 0.1 % nasal spray 2 sprays into the nostril(s) as directed by provider 2 (Two) Times a Day. Use in each nostril as directed 90 mL 6    BL NASAL SALINE MIST NA 2 drops.      Blood Glucose Monitoring Suppl (FreeStyle Lite) device 1 each by Other route 4 (Four) Times a Day.      buPROPion XL (WELLBUTRIN XL) 300 MG 24 hr tablet Take 1 tablet by mouth Every Morning. 90 tablet 3    cetirizine (zyrTEC) 10 MG tablet Take 1 tablet by mouth Daily. 90 tablet 1    coenzyme Q10 50 MG capsule capsule Take  by mouth Daily.      cyclobenzaprine (FLEXERIL) 10 MG tablet Take 1 tablet by  mouth Daily As Needed for Muscle Spasms. 90 tablet 1    Daily-Jelani Multivitamin tablet tablet Take 1 tablet by mouth every night at bedtime.      doxepin (SINEquan) 10 MG capsule Take 1 capsule by mouth Every Night. 30 capsule 2    ezetimibe (ZETIA) 10 MG tablet Take 1 tablet by mouth every night at bedtime. 90 tablet 1    FLUoxetine (PROzac) 10 MG capsule TAKE 1 CAPSULE BY MOUTH DAILY 90 capsule 1    FLUoxetine (PROzac) 20 MG capsule Take 1 capsule by mouth Daily. 90 capsule 3    fluticasone (Flonase) 50 MCG/ACT nasal spray 2 sprays into the nostril(s) as directed by provider Daily. Administer 2 sprays in each nostril for each dose. 16 g 6    Inclisiran Sodium (LEQVIO SC) Inject  under the skin into the appropriate area as directed.      Januvia 100 MG tablet Take 1 tablet by mouth Daily. 90 tablet 1    losartan (COZAAR) 25 MG tablet Take 1 tablet by mouth Daily. 90 tablet 1    Melatonin 10 MG tablet Take 2 tablets by mouth Every Night. 2 tabs      metFORMIN (Glucophage) 500 MG tablet Take 1 tab in am and 2 tabs in pm with meals 360 tablet 1    montelukast (SINGULAIR) 10 MG tablet Take 1 tablet by mouth Every Night. 90 tablet 1    nitrofurantoin, macrocrystal-monohydrate, (MACROBID) 100 MG capsule Take 1 capsule by mouth 2 (Two) Times a Day. 20 capsule 0    pramipexole (Mirapex) 0.5 MG tablet Take 1 tablet by mouth Every Night. 90 tablet 1    prednisoLONE acetate (Pred Mild) 0.12 % ophthalmic suspension SHAKE LIQUID AND INSTILL 1 DROP IN RIGHT EYE TWICE DAILY      Zinc 100 MG tablet Take 100 mg by mouth Daily.      alendronate (FOSAMAX) 70 MG tablet Take 1 tablet by mouth Every 7 (Seven) Days. (Patient not taking: Reported on 9/19/2023)      traZODone (DESYREL) 100 MG tablet Take 1 tablet by mouth Every Night. (Patient not taking: Reported on 12/7/2023) 90 tablet 1    vitamin D (ERGOCALCIFEROL) 1.25 MG (59435 UT) capsule capsule TAKE 1 CAPSULE BY MOUTH WEEKLY, TAKE WITH CALCIUM (Patient not taking: Reported on  12/7/2023) 12 capsule 0     No facility-administered medications prior to visit.       No opioid medication identified on active medication list. I have reviewed chart for other potential  high risk medication/s and harmful drug interactions in the elderly.        Aspirin is on active medication list. Aspirin use is indicated based on review of current medical condition/s. Pros and cons of this therapy have been discussed today. Benefits of this medication outweigh potential harm.  Patient has been encouraged to continue taking this medication.  .      Patient Active Problem List   Diagnosis    Muscle twitching    Restless legs syndrome (RLS)    Allergic rhinitis    Anemia    Bilateral posterior capsular opacification    Cardiac murmur    Diverticulitis    Endometriosis    Gastroesophageal reflux disease    Essential hypertension    Low back pain    Migraines    Scoliosis deformity of spine    Major depressive disorder, recurrent episode, moderate degree    Generalized anxiety disorder    Type 2 diabetes mellitus    Hyperlipidemia LDL goal <70    Obstructive sleep apnea    Tremor    Bilateral pseudophakia    Dislocated intraocular lens    Traumatic injury of globe of right eye    Unspecified retinal detachment with retinal break, right eye    Osteoarthritis    Attention-deficit hyperactivity disorder, unspecified type    Epiretinal membrane (ERM) of right eye    Traction retinal detachment involving macula    Cystoid macular degeneration of right eye    Vitamin deficiency, unspecified    Dvtrcli of intest, part unsp, w/o perf or abscess w/o bleed    History of falling    Long term current use of insulin    Generalized muscle weakness    Medicare annual wellness visit, subsequent    Acute cystitis with hematuria    Other specified postprocedural states    Statin intolerance     Advance Care Planning   Advance Care Planning     Advance Directive is not on file.  ACP discussion was held with the patient during this  "visit. Patient does not have an advance directive, information provided.     Objective    Vitals:    23 1134   BP: 126/78   BP Location: Left arm   Patient Position: Sitting   Cuff Size: Adult   Pulse: 91   Resp: 16   Temp: 98.4 °F (36.9 °C)   TempSrc: Temporal   SpO2: 98%   Weight: 74.8 kg (165 lb)   Height: 160 cm (63\")     Estimated body mass index is 29.23 kg/m² as calculated from the following:    Height as of this encounter: 160 cm (63\").    Weight as of this encounter: 74.8 kg (165 lb).           Does the patient have evidence of cognitive impairment? No    Lab Results   Component Value Date    TRIG 105 2023    HDL 70 (H) 2023    LDL 51 2023    VLDL 19 2023    HGBA1C 6.40 (H) 2023        HEALTH RISK ASSESSMENT    Smoking Status:  Social History     Tobacco Use   Smoking Status Former    Types: Cigarettes    Quit date:     Years since quittin.9   Smokeless Tobacco Never   Tobacco Comments    QUIT      Alcohol Consumption:  Social History     Substance and Sexual Activity   Alcohol Use Yes    Comment: rarely     Fall Risk Screen:    WILLIE Fall Risk Assessment was completed, and patient is at HIGH risk for falls. Assessment completed on:2023    Depression Screenin/7/2023    11:02 AM   PHQ-2/PHQ-9 Depression Screening   Little Interest or Pleasure in Doing Things 0-->not at all   Feeling Down, Depressed or Hopeless 0-->not at all   PHQ-9: Brief Depression Severity Measure Score 0       Health Habits and Functional and Cognitive Screenin/21/2022     1:00 PM   Functional & Cognitive Status   Do you have difficulty preparing food and eating? No   Do you have difficulty bathing yourself, getting dressed or grooming yourself? No   Do you have difficulty using the toilet? No   Do you have difficulty moving around from place to place? No   Do you have trouble with steps or getting out of a bed or a chair? No   Current Diet Well Balanced Diet "   Dental Exam Not up to date   Eye Exam Up to date   Exercise (times per week) 7 times per week   Current Exercises Include House Cleaning   Do you need help using the phone?  No   Are you deaf or do you have serious difficulty hearing?  Yes   Do you need help to go to places out of walking distance? Yes   Do you need help shopping? No   Do you need help preparing meals?  No   Do you need help with housework?  No   Do you need help with laundry? No   Do you need help taking your medications? No   Do you need help managing money? No   Do you ever drive or ride in a car without wearing a seat belt? No   Have you felt unusual stress, anger or loneliness in the last month? No   Who do you live with? Alone   If you need help, do you have trouble finding someone available to you? No   Have you been bothered in the last four weeks by sexual problems? No   Do you have difficulty concentrating, remembering or making decisions? No       Age-appropriate Screening Schedule:  Refer to the list below for future screening recommendations based on patient's age, sex and/or medical conditions. Orders for these recommended tests are listed in the plan section. The patient has been provided with a written plan.    Health Maintenance   Topic Date Due    DIABETIC EYE EXAM  06/15/2023    DXA SCAN  02/15/2024    HEMOGLOBIN A1C  06/07/2024    URINE MICROALBUMIN  06/07/2024    BMI FOLLOWUP  09/07/2024    DIABETIC FOOT EXAM  09/19/2024    ANNUAL WELLNESS VISIT  12/07/2024    LIPID PANEL  12/07/2024    MAMMOGRAM  11/10/2025    COLORECTAL CANCER SCREENING  09/28/2027    TDAP/TD VACCINES (2 - Td or Tdap) 04/16/2032    HEPATITIS C SCREENING  Completed    COVID-19 Vaccine  Completed    INFLUENZA VACCINE  Completed    Pneumococcal Vaccine 65+  Completed    ZOSTER VACCINE  Completed                  CMS Preventative Services Quick Reference  Risk Factors Identified During Encounter  Immunizations Discussed/Encouraged: Influenza and COVID19  The  "above risks/problems have been discussed with the patient.  Pertinent information has been shared with the patient in the After Visit Summary.  An After Visit Summary and PPPS were made available to the patient.    Follow Up:   Next Medicare Wellness visit to be scheduled in 1 year.       Additional E&M Note during same encounter follows:  Patient has multiple medical problems which are significant and separately identifiable that require additional work above and beyond the Medicare Wellness Visit.      Chief Complaint  Diabetes    Subjective        HPI  Nivia Cagle is also being seen today for follow up   Dysuria and hematuria x 2 wks  Went to , given Macrobid. States sx have improved but would like to see if symptoms have resolved  Depression: feels mood is better   Denies si/hi   She is still seeing Dr. Barth  Dental implants: has been off fosamax in order to get dental implants at [facility]   Hoping to have procedure soon  Pt wears cpap at night. Has not had sleep study in years  States she wakes up numerous times through the night  At times she pulls off cpap in her bed/sleep  HLD: has had one dose of Leqvio       Objective   Vital Signs:  /78 (BP Location: Left arm, Patient Position: Sitting, Cuff Size: Adult)   Pulse 91   Temp 98.4 °F (36.9 °C) (Temporal)   Resp 16   Ht 160 cm (63\")   Wt 74.8 kg (165 lb)   SpO2 98%   BMI 29.23 kg/m²     Physical Exam  Vitals reviewed.   Constitutional:       Appearance: Normal appearance.   HENT:      Head: Normocephalic and atraumatic.      Nose: Nose normal.      Mouth/Throat:      Mouth: Mucous membranes are moist.   Eyes:      Extraocular Movements: Extraocular movements intact.      Conjunctiva/sclera: Conjunctivae normal.      Pupils: Pupils are equal, round, and reactive to light.   Cardiovascular:      Rate and Rhythm: Normal rate and regular rhythm.   Pulmonary:      Effort: Pulmonary effort is normal.      Breath sounds: Normal breath " sounds.   Abdominal:      General: Abdomen is flat. Bowel sounds are normal.      Palpations: Abdomen is soft.   Musculoskeletal:         General: Normal range of motion.   Neurological:      General: No focal deficit present.      Mental Status: She is alert and oriented to person, place, and time.   Psychiatric:         Mood and Affect: Mood normal.                         Assessment and Plan   Diagnoses and all orders for this visit:    1. Medicare annual wellness visit, subsequent (Primary)  Assessment & Plan:  Reviewed preventative medication recommendations that are age appropriate for the patient. Education provided for health and wellness. Encouraged healthy diet, regular exercise, and routine wellness checkups.        2. Urinary tract infection without hematuria, site unspecified  Comments:  UA WNL Today. No need for further abx today.  Orders:  -     Urinalysis With Culture If Indicated - Urine, Clean Catch    3. Essential hypertension  -     Comprehensive Metabolic Panel  -     CBC & Differential  -     TSH    4. Major depressive disorder, recurrent episode, moderate degree  Assessment & Plan:  Patient's depression is recurrent and is mild without psychosis. Their depression is currently active and the condition is improving with treatment. This will be reassessed at the next regular appointment. F/U as described:patient will continue current medication therapy. Keep f/u with psych as directed for med mgmt.       5. Type 2 diabetes mellitus with hyperglycemia, without long-term current use of insulin  Comments:  Labs today, will adjust dose if needed  Orders:  -     Hemoglobin A1c    6. Mixed hyperlipidemia  Comments:  Hayden today, keep upcoming appt with cardiology  Orders:  -     Lipid Panel    7. Obstructive sleep apnea  Comments:  Will refer to get CPAP checked to make sure settings correct since still having sleeping issues  Orders:  -     Ambulatory Referral to Sleep Medicine    Other orders  -      Fluzone High-Dose 65+yrs  -     COVID-19 F23 (Pfizer) 12yrs+ (COMIRNATY)             Follow Up   Return in about 3 months (around 3/7/2024).  Patient was given instructions and counseling regarding her condition or for health maintenance advice. Please see specific information pulled into the AVS if appropriate.

## 2023-12-08 NOTE — ASSESSMENT & PLAN NOTE
Patient's depression is recurrent and is mild without psychosis. Their depression is currently active and the condition is improving with treatment. This will be reassessed at the next regular appointment. F/U as described:patient will continue current medication therapy. Keep f/u with psych as directed for med mgmt.

## 2023-12-12 ENCOUNTER — OFFICE VISIT (OUTPATIENT)
Dept: PODIATRY | Facility: CLINIC | Age: 73
End: 2023-12-12
Payer: MEDICARE

## 2023-12-12 VITALS
BODY MASS INDEX: 29.95 KG/M2 | TEMPERATURE: 97.8 F | HEART RATE: 82 BPM | HEIGHT: 63 IN | OXYGEN SATURATION: 97 % | WEIGHT: 169 LBS | SYSTOLIC BLOOD PRESSURE: 119 MMHG | DIASTOLIC BLOOD PRESSURE: 78 MMHG

## 2023-12-12 DIAGNOSIS — E11.42 TYPE 2 DIABETES MELLITUS WITH DIABETIC POLYNEUROPATHY, WITH LONG-TERM CURRENT USE OF INSULIN: Primary | ICD-10-CM

## 2023-12-12 DIAGNOSIS — G62.9 NEUROPATHY: ICD-10-CM

## 2023-12-12 DIAGNOSIS — M79.671 FOOT PAIN, BILATERAL: ICD-10-CM

## 2023-12-12 DIAGNOSIS — M79.672 FOOT PAIN, BILATERAL: ICD-10-CM

## 2023-12-12 DIAGNOSIS — B35.1 ONYCHOMYCOSIS: ICD-10-CM

## 2023-12-12 DIAGNOSIS — L60.0 ONYCHOCRYPTOSIS: ICD-10-CM

## 2023-12-12 DIAGNOSIS — Z79.4 TYPE 2 DIABETES MELLITUS WITH DIABETIC POLYNEUROPATHY, WITH LONG-TERM CURRENT USE OF INSULIN: Primary | ICD-10-CM

## 2023-12-12 NOTE — PROGRESS NOTES
[facility] - PODIATRY    Today's Date: 12/12/23    Patient Name: Nivia Cagle  MRN: [id number]  CSN: [id number]  PCP: Catalina Madsen PA-C, Last PCP Visit:  12/5/2023  Referring Provider: No ref. provider found    SUBJECTIVE     Chief Complaint   Patient presents with    Left Foot - Follow-up, Nail Problem    Right Foot - Follow-up, Nail Problem     Also states rheumatology ordered an xray on her foot 11/28/23      HPI: Nivia Cagle, a 73 y.o.female, presents to clinic for painful toenail and a diabetic foot evaluation.    New, Established, New Problem:  New problem  Location:  Toenails  Duration:   Greater than five years  Onset:  Gradual  Nature:  sore with palpation.  Stable, worsening, improving:   worsening  Aggravating factors:  Pain with shoe gear and ambulation.  Previous Treatment: Unable to trim their own toenails.    Patient controlling diabetes via:  NIDDM    Patient states their last blood glucose was:  131    Patient states their most recent HgB A1C was 5.2.    Patient denies any fevers, chills, nausea, vomiting, shortness of breath, nor any other constitutional signs nor symptoms.    Medical changes:  decreased metformin    Just finished antibiotics for UTI    Recently diagnosed with Osteoporosis  .    Past Medical History:   Diagnosis Date    ADHD (attention deficit hyperactivity disorder)     Allergic rhinitis     Anemia     Anxiety     Cataracts, bilateral     Chronic pain disorder     Depression 0421/2021    MOODNOT WELL CONTROLLED.  GIVEN NUMBER FOR LOCAL COUNSELOR.  WILL INCREASE TRINTELIX FROM 10MG TO 20MG RTC WEEK ER ID S/HI    Diabetes mellitus, type 2     Diverticulitis     GERD (gastroesophageal reflux disease)     Head injury     High blood pressure     Hyperlipemia     Migraine     EARL (obstructive sleep apnea) 04/21/2021    Panic disorder     Phlebitis     PTSD (post-traumatic stress disorder)      Past Surgical History:   Procedure Laterality Date    ANKLE  SURGERY      BILATERAL BREAST REDUCTION      BREAST BIOPSY      CARPAL TUNNEL RELEASE      CHOLECYSTECTOMY      COLONOSCOPY      [facility]    COLONOSCOPY N/A 2022    Procedure: COLONOSCOPY with biopsy;  Surgeon: Ghislaine Kilgore MD;  Location: [facility] ENDOSCOPY;  Service: Gastroenterology;  Laterality: N/A;  colon polyp, diverticlosis, hemorrhoids    HYSTERECTOMY      ULNAR NERVE TRANSPOSITION Right     WRIST SURGERY       Family History   Problem Relation Age of Onset    Kidney cancer Mother     Hyperlipidemia Mother     Heart disease Father     Heart attack Father     Diabetes Father     ADD / ADHD Father     Hyperlipidemia Father     Hyperlipidemia Sister     Cancer Sister     Brain cancer Sister     Lung cancer Sister     Hyperlipidemia Sister     Hyperlipidemia Brother     Diabetes Brother     Heart attack Brother     Hyperlipidemia Brother     No Known Problems Paternal Uncle     No Known Problems Cousin     Malpreet Hyperthermia Neg Hx      Social History     Socioeconomic History    Marital status: Single   Tobacco Use    Smoking status: Former     Types: Cigarettes     Quit date:      Years since quittin.9    Smokeless tobacco: Never    Tobacco comments:     QUIT    Vaping Use    Vaping Use: Never used   Substance and Sexual Activity    Alcohol use: Yes     Comment: rarely    Drug use: Never    Sexual activity: Defer     Allergies   Allergen Reactions    Diclofenac Hives    New Skin [Benzethonium Chloride] Rash    Atorvastatin Myalgia    Adhesive Tape Rash and Other (See Comments)       Rash at area of bandaid    Niacin Rash     Current Outpatient Medications   Medication Sig Dispense Refill    Accu-Chek Madeline Plus test strip by Other route 4 (Four) Times a Day. use to test blood sugar      Accu-Chek Softclix Lancets lancets by Other route 4 (Four) Times a Day. use to test blood sugar      ARIPiprazole (ABILIFY) 2 MG tablet Take 2 tablets by mouth Daily. 180 tablet 3     Ascorbic Acid (VITAMIN C GUMMIE PO) Take  by mouth Daily.      aspirin (aspirin) 81 MG EC tablet Take 1 tablet by mouth Daily. 90 tablet 99    azelastine (ASTELIN) 0.1 % nasal spray 2 sprays into the nostril(s) as directed by provider 2 (Two) Times a Day. Use in each nostril as directed 90 mL 6    BL NASAL SALINE MIST NA 2 drops.      Blood Glucose Monitoring Suppl (FreeStyle Lite) device 1 each by Other route 4 (Four) Times a Day.      buPROPion XL (WELLBUTRIN XL) 300 MG 24 hr tablet Take 1 tablet by mouth Every Morning. 90 tablet 3    cetirizine (zyrTEC) 10 MG tablet Take 1 tablet by mouth Daily. 90 tablet 1    coenzyme Q10 50 MG capsule capsule Take  by mouth Daily.      cyclobenzaprine (FLEXERIL) 10 MG tablet Take 1 tablet by mouth Daily As Needed for Muscle Spasms. 90 tablet 1    Daily-Jelani Multivitamin tablet tablet Take 1 tablet by mouth every night at bedtime.      doxepin (SINEquan) 10 MG capsule Take 1 capsule by mouth Every Night. 30 capsule 2    ezetimibe (ZETIA) 10 MG tablet Take 1 tablet by mouth every night at bedtime. 90 tablet 1    FLUoxetine (PROzac) 10 MG capsule TAKE 1 CAPSULE BY MOUTH DAILY 90 capsule 1    FLUoxetine (PROzac) 20 MG capsule Take 1 capsule by mouth Daily. 90 capsule 3    fluticasone (Flonase) 50 MCG/ACT nasal spray 2 sprays into the nostril(s) as directed by provider Daily. Administer 2 sprays in each nostril for each dose. 16 g 6    Inclisiran Sodium (LEQVIO SC) Inject  under the skin into the appropriate area as directed.      Januvia 100 MG tablet Take 1 tablet by mouth Daily. 90 tablet 1    losartan (COZAAR) 25 MG tablet Take 1 tablet by mouth Daily. 90 tablet 1    Melatonin 10 MG tablet Take 2 tablets by mouth Every Night. 2 tabs      metFORMIN (Glucophage) 500 MG tablet Take 1 tab in am and 2 tabs in pm with meals 360 tablet 1    montelukast (SINGULAIR) 10 MG tablet Take 1 tablet by mouth Every Night. 90 tablet 1    nitrofurantoin, macrocrystal-monohydrate, (MACROBID) 100 MG  capsule Take 1 capsule by mouth 2 (Two) Times a Day. 20 capsule 0    pramipexole (Mirapex) 0.5 MG tablet Take 1 tablet by mouth Every Night. 90 tablet 1    prednisoLONE acetate (Pred Mild) 0.12 % ophthalmic suspension SHAKE LIQUID AND INSTILL 1 DROP IN RIGHT EYE TWICE DAILY      Zinc 100 MG tablet Take 100 mg by mouth Daily.       No current facility-administered medications for this visit.     Review of Systems   Constitutional: Negative.    Skin:         Painful toenails.   All other systems reviewed and are negative.      OBJECTIVE     Vitals:    12/12/23 0820   BP: 119/78   Pulse: 82   Temp: 97.8 °F (36.6 °C)   SpO2: 97%       Body mass index is 29.94 kg/m².    Lab Results   Component Value Date    HGBA1C 6.40 (H) 12/07/2023       Lab Results   Component Value Date    GLUCOSE 101 (H) 12/07/2023    CALCIUM 9.7 12/07/2023     12/07/2023    K 4.5 12/07/2023    CO2 27.9 12/07/2023     12/07/2023    BUN 19 12/07/2023    CREATININE 1.09 (H) 12/07/2023    EGFRIFNONA 68 02/15/2022    BCR 17.4 12/07/2023    ANIONGAP 9.1 12/07/2023       Patient seen in no apparent distress.      PHYSICAL EXAM:     Foot/Ankle Exam    GENERAL  Diabetic foot exam performed    Appearance:  chronically ill  Orientation:  AAOx3  Affect:  appropriate  Gait:  unimpaired  Assistance:  independent  Right shoe gear: casual shoe  Left shoe gear: casual shoe    VASCULAR     Right Foot Vascularity   Dorsalis pedis:  1+  Posterior tibial:  1+  Skin temperature:  warm  Edema grading:  None  CFT:  < 3 seconds  Pedal hair growth:  Absent  Varicosities:  mild varicosities     Left Foot Vascularity   Dorsalis pedis:  1+  Posterior tibial:  1+  Skin temperature:  warm  Edema grading:  None  CFT:  < 3 seconds  Pedal hair growth:  Absent  Varicosities:  mild varicosities     NEUROLOGIC     Right Foot Neurologic   Light touch sensation: diminished  Vibratory sensation: diminished  Hot/Cold sensation: diminished  Protective Sensation using  Kent-Darling Monofilament:   Sites intact: 3  Sites tested: 10     Left Foot Neurologic   Normal sensation    Light touch sensation: normal  Vibratory sensation: normal  Hot/Cold sensation:  normal  Protective Sensation using Kent-Darling Monofilament:   Sites intact: 10  Sites tested: 10    MUSCLE STRENGTH     Right Foot Muscle Strength   Foot dorsiflexion:  4-  Foot plantar flexion:  4-  Foot inversion:  4-  Foot eversion:  4-     Left Foot Muscle Strength   Foot dorsiflexion:  4-  Foot plantar flexion:  4-  Foot inversion:  4-  Foot eversion:  4-    RANGE OF MOTION     Right Foot Range of Motion   Foot and ankle ROM within normal limits       Left Foot Range of Motion   Foot and ankle ROM within normal limits      DERMATOLOGIC      Right Foot Dermatologic   Skin  Right foot skin is intact.   Nails  2.  Positive for elongated, onychomycosis, abnormal thickness, subungual debris and ingrown toenail.  3.  Positive for elongated, onychomycosis, abnormal thickness, subungual debris and ingrown toenail.  4.  Positive for elongated, onychomycosis, abnormal thickness, subungual debris and ingrown toenail.  5.  Positive for elongated, onychomycosis, abnormal thickness, subungual debris and ingrown toenail.  Nails comment:  Toenails 1, 2, 3, 4, and 5     Left Foot Dermatologic   Skin  Left foot skin is intact.   Nails comment:  Toenails 1, 2, 3, 4, and 5  Nails  1.  Positive for elongated, onychomycosis, abnormal thickness, subungual debris and ingrown toenail.  2.  Positive for elongated, onychomycosis, abnormal thickness, subungual debris and ingrown toenail.  3.  Positive for elongated, onychomycosis, abnormal thickness, subungual debris and ingrown toenail.  4.  Positive for elongated, onychomycosis, abnormally thick, subungual debris and ingrown toenail.  5.  Positive for elongated, onychomycosis, abnormally thick, subungual debris and ingrown toenail.      Diabetic Foot Exam Performed and Monofilament Test  Performed      ASSESSMENT/PLAN     Diagnoses and all orders for this visit:    1. Type 2 diabetes mellitus with diabetic polyneuropathy, with long-term current use of insulin (Primary)    2. Neuropathy    3. Onychomycosis    4. Onychocryptosis    5. Foot pain, bilateral        Comprehensive lower extremity examination and evaluation was performed.    Discussed findings and treatment plan including risks, benefits, and treatment options with patient in detail. Patient agreed with treatment plan.    Medications and allergies reviewed.  Reviewed available blood glucose and HgB A1C lab values along with other pertinent labs.  These were discussed with the patient as to their importance of diabetic maintenance.    Toenails 2, 3, 4, 5 on Right and 1, 2, 3, 4, 5 on Left were debrided with nail nippers then filed with a [employer] nail radha.  Patient tolerated procedure well without complications.    Diabetic foot exam performed and documented this date, compliant with CQM required standards. Detail of findings as noted in physical exam.  Lower extremity Neurologic exam for diabetic patient performed and documented this date, compliant with PQRS required standards. Detail of findings as noted in physical exam.  Advised patient importance of good routine lower extremity hygiene. Advised patient importance of evaluating for intact skin and pain free nail borders.  Advised patient to use mirror to evaluate plantar/ soles of feet for better visualization. Advised patient monitor and phone office to be seen if any cracking to skin, open lesions, painful nail borders or if nails become elongated prior to next visit. Advised patient importance of daily cleansing of lower extremities, followed by good skin cream to maintain normal hydration of skin. Also advised patient importance of close daily monitoring of blood sugar. Advised to regulate diet and medications to maintain control of blood sugar in optimal range. Contact primary  care provider if difficulties maintaining blood sugar levels.  Advised Patient of presence of Diabetes Mellitus condition.  Advised Patient risk of progression and worsening or improvement, then return of condition.  Will monitor condition for any change in future. Treat with most appropriate treatment pending status of condition.  Counseled and advised patient extensively on nature and ramifications of diabetes. Standard instructions given to patient for good diabetic foot care and maintenance. Advised importance of careful monitoring to avoid break down and complications secondary to diabetes. Advised patient importance of strict maintenance of blood sugar control. Advised patient of possible ominous results from neglect of condition, i.e.: amputation/ loss of digits, feet and legs, or even death.  Patient states understands counseling, will monitor closely, continue good hygiene and routine diabetic foot care. Patient will contact office should they have any questions or problems.      An After Visit Summary was printed and given to the patient at discharge, including (if requested) any available informative/educational handouts regarding diagnosis, treatment, or medications. All questions were answered to patient/family satisfaction. Should symptoms fail to improve or worsen they agree to call or return to clinic or to go to the Emergency Department. Discussed the importance of following up with any needed screening tests/labs/specialist appointments and any requested follow-up recommended by me today. Importance of maintaining follow-up discussed and patient accepts that missed appointments can delay diagnosis and potentially lead to worsening of conditions.    Return in about 9 weeks (around 2/13/2024) for Toenail Care., or sooner if acute issues arise.    This document has been electronically signed by Adarsh Flores DPM on December 12, 2023 08:55 EST

## 2023-12-14 ENCOUNTER — TELEMEDICINE (OUTPATIENT)
Dept: PSYCHIATRY | Facility: CLINIC | Age: 73
End: 2023-12-14
Payer: MEDICARE

## 2023-12-14 ENCOUNTER — TELEPHONE (OUTPATIENT)
Dept: PSYCHIATRY | Facility: CLINIC | Age: 73
End: 2023-12-14

## 2023-12-14 DIAGNOSIS — F33.40 RECURRENT MAJOR DEPRESSIVE DISORDER IN REMISSION: ICD-10-CM

## 2023-12-14 DIAGNOSIS — F43.10 POST TRAUMATIC STRESS DISORDER (PTSD): ICD-10-CM

## 2023-12-14 DIAGNOSIS — F51.05 INSOMNIA DUE TO MENTAL CONDITION: Primary | ICD-10-CM

## 2023-12-14 DIAGNOSIS — F41.1 GENERALIZED ANXIETY DISORDER: ICD-10-CM

## 2023-12-14 NOTE — PROGRESS NOTES
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 1.7  1.6 - 2.4 mg/dL Final    WBC 09/07/2023 7.45  3.40 - 10.80 10*3/mm3 Final    RBC 09/07/2023 3.94  3.77 - 5.28 10*6/mm3 Final    Hemoglobin 09/07/2023 12.2  12.0 - 15.9 g/dL Final    Hematocrit 09/07/2023 36.8  34.0 - 46.6 % Final    MCV 09/07/2023 93.4  79.0 - 97.0 fL Final    MCH 09/07/2023 31.0  26.6 - 33.0 pg Final    MCHC 09/07/2023 33.2  31.5 - 35.7 g/dL Final    RDW 09/07/2023 12.2 (L)  12.3 - 15.4 % Final    RDW-SD 09/07/2023 42.2  37.0 - 54.0 fl Final    MPV 09/07/2023 12.0  6.0 - 12.0 fL Final    Platelets 09/07/2023 239  140 - 450 10*3/mm3 Final    Neutrophil % 09/07/2023 66.1  42.7 - 76.0 % Final    Lymphocyte % 09/07/2023 20.4  19.6 - 45.3 % Final    Monocyte % 09/07/2023 10.2  5.0 - 12.0 % Final    Eosinophil % 09/07/2023 2.3  0.3 - 6.2 % Final    Basophil % 09/07/2023 0.7  0.0 - 1.5 % Final    Immature Grans % 09/07/2023 0.3  0.0 - 0.5 % Final    Neutrophils, Absolute 09/07/2023 4.93  1.70 - 7.00 10*3/mm3 Final    Lymphocytes, Absolute 09/07/2023 1.52  0.70 - 3.10 10*3/mm3 Final    Monocytes, Absolute 09/07/2023 0.76  0.10 - 0.90 10*3/mm3 Final    Eosinophils, Absolute 09/07/2023 0.17  0.00 - 0.40 10*3/mm3 Final    Basophils, Absolute 09/07/2023  0.05  0.00 - 0.20 10*3/mm3 Final    Immature Grans, Absolute 09/07/2023 0.02  0.00 - 0.05 10*3/mm3 Final    nRBC 09/07/2023 0.0  0.0 - 0.2 /100 WBC Final       EKG Results:  No orders to display       Imaging Results:  No Images in the past 120 days found..      Assessment & Plan   Diagnoses and all orders for this visit:    1. 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 Recurrent major depressive disorder in remission    3. Generalized anxiety disorder    4. Post traumatic stress disorder (PTSD)      INITIAL presentation most consistent with major depressive disorder, recurrent, moderate to severe, with anxious distress.  PTSD.  Rule out personality disorder, cluster B specifically.  Rule out hypomania as patient was very difficult to interrupt today.      12/14: Add melatonin for maintenance insomnia. Otherwise stable. Seeing a movie for [employer]. Got all her Xmas cards mailed out.    Allowed patient to freely discuss and process issues, such as:  Ongoing: Painting as a coping strategy, fulfilling hobby.  Ongoing: Procrastinating as a measure of how well she is doing. Not procrastinating.  Ongoing: The importance of Sabianism  ... using Rogerian psychotherapeutic techniques including unconditional positive regard, reflective listening, and demonstrating clear empathy.     Time (minutes) spent providing supportive psychotherapy: 19  Functional status: mild impairment  Treatment plan: Medication management and supportive psychotherapy  Prognosis: good  Progress: maintenance insomnia  4w    11/9: Doing well, isolated insomnia. Stop trazodone and start doxepin. Consider lunesta, belsomra, quiviviq. She stopped melatonin on her own.    8/11: Stable, well, no changes. Painting, having people over. Counseled to start light box in September, reiterated instructions. Will be inducted into the [employer] [facility] tomorrow. She's been working on it for a year.    7/11: Short visit as patient  sleepy. Unusual sleep pattern recently, but MH is fine. Pt will call her son tonight to ensure someone is around when she goes back to sleep. She will call PCP about this tomorrow. Close follow up with me in 4 wks.    4/27: Stable, well. Restart melatonin for insomnia.     3/2: Prozac and BLT helped. Situational stressor in son, no changes. Better. Watch insomnia.     1/20: Start light box, increase prozac for dep.     12/9: Some insomnia. Increase melatonin.     10/25: Doing well. Increase prozac back to baseline dose (inadvertently reduced, she has been stable on a higher dose, so we should go back to that). Some initial insomnia, increase trazodone to 75 mg nightly. She is enjoying the Fall.     9/8: Start light box. Close follow up in case this is worsening depression.     7/27: Well, stable.     6/14: Better, no changes.     5/3: Increase prozac and melatonin.    Visit Diagnoses:    ICD-10-CM ICD-9-CM   1. Insomnia due to mental condition  F51.05 300.9     327.02   2. Recurrent major depressive disorder in remission  F33.40 296.35   3. Generalized anxiety disorder  F41.1 300.02   4. Post traumatic stress disorder (PTSD)  F43.10 309.81       PLAN:  Risk Assessment: Risk of self-harm acutely is moderate. Risk factors include chronic depressive disorder, possible personality disorder, recent psychosocial stressors (pandemic, moving). Protective factors include no present SI, no history of suicide attempts or self-harm in the past, no access to weapons, minimal AODA, healthcare seeking, future orientation, willingness to engage in care. Risk of self-harm chronically is also moderate, but could be further elevated in the event of treatment noncompliance and/or AODA.  Safety: No acute safety concerns.  Medications:   CONTINUE light box 9/1 - 3/31.  RESTART melatonin 30 mg p.o. nightly.  Wasn't sleeping even on it plus trazodone 11/23. Risks, benefits, side effects discussed with patient including sedation,  dizziness/falls risk, GI upset.  Do not use before operating vehicle, vessel, or machine. After discussion of these risks and benefits, the patient voiced understanding and agreed to proceed.   CONTINUE doxepin 10 mg qhs. Risks, benefits, side effects discussed with patient including GI upset, sedation, dizziness/falls risk, grogginess the following day, prolongation of the QTc interval.  After discussion of these risks and benefits, the patient voiced understanding and agreed to proceed.    CONTINUE bupropion xl 300 mg daily. Risks, benefits, alternatives discussed with patient including nausea, GI upset, increased energy, exacerbation of irritability, insomnia, lowering of seizure threshold.  After discussion of these risks and benefits, the patient voiced understanding and agreed to proceed.  CONTINUE Prozac 30 mg a day (HIGHEST dose is 40 given that she is also on bupropion). Risks, benefits, alternatives discussed with patient including GI upset, nausea vomiting diarrhea, theoretical decrease of seizure threshold predisposing the patient to a slightly higher seizure risk, headaches, sexual dysfunction, serotonin syndrome, bleeding risk, increased suicidality in patients 24 years and younger.  After discussion of these risks and benefits, the patient voiced understanding and agreed to proceed.  CONTINUE Abilify 4 mg p.o. daily to target depression, anxiety, decreased energy. Risks, benefits, alternatives discussed with patient including increased energy, exacerbation of irritability, akathisia, GI upset, orthostatic hypotension, increased appetite. After discussion of these risks and benefits, the patient voiced understanding and agreed to proceed.  S/P:  trazodone 100 mg PO QHS. No longer effective 11/23.  Mirtazapine 45 mg daily (RLS)  Ambien caused double vision, and possibly hallucinations  Was on lithium in the past: dizziness and falls, and hair curled.  Therapy: referred to [facility].  Labs/Studies:  s/p TMS referral.  Follow Up: 6 weeks. (prefers 4 wks)      TREATMENT PLAN/GOALS: Continue supportive psychotherapy efforts and medications as indicated. Treatment and medication options discussed during today's visit. Patient acknowledged and verbally consented to continue with current treatment plan and was educated on the importance of compliance with treatment and follow-up appointments.    MEDICATION ISSUES:  SAPNA reviewed as expected.  Discussed medication options and treatment plan of prescribed medication as well as the risks, benefits, and side effects including potential falls, possible impaired driving and metabolic adversities among others. Patient is agreeable to call the office with any worsening of symptoms or onset of side effects. Patient is agreeable to call 911 or go to the nearest ER should he/she begin having SI/HI. No medication side effects or related complaints today.     MEDS ORDERED DURING VISIT:  No orders of the defined types were placed in this encounter.      Return in about 4 weeks (around 1/11/2024) for Video visit.         This document has been electronically signed by Vicky Barth MD  December 14, 2023 07:20 EST      Part of this note may be an electronic transcription/translation of spoken language to printed text using the Dragon Dictation System.

## 2023-12-14 NOTE — PATIENT INSTRUCTIONS
1.  Please return to clinic at your next scheduled visit.  Contact the clinic ([phone]) at least 24 hours prior in the event you need to cancel.  2.  Do no harm to yourself or others.    3.  Avoid alcohol and drugs.    4.  Take all medications as prescribed.  Please contact the clinic with any concerns. If you are in need of medication refills, please call the clinic at [phone].    5. Should you want to get in touch with your provider, Dr. Vicky Barth, please utilize [employer] or contact the office ([phone]), and staff will be able to page Dr. Barth directly.  6.  In the event you have personal crisis, contact the following crisis numbers: Suicide Prevention Hotline 1-227.944.1750; [facility] Helpline 3-670-909-[place]; [facility] [phone]; text HELLO to 863111; or 818.

## 2023-12-18 ENCOUNTER — TELEMEDICINE (OUTPATIENT)
Dept: PSYCHIATRY | Facility: CLINIC | Age: 73
End: 2023-12-18
Payer: MEDICARE

## 2023-12-18 DIAGNOSIS — F43.10 POST TRAUMATIC STRESS DISORDER (PTSD): Primary | ICD-10-CM

## 2023-12-18 DIAGNOSIS — F33.1 MAJOR DEPRESSIVE DISORDER, RECURRENT EPISODE, MODERATE: ICD-10-CM

## 2023-12-18 PROCEDURE — 90832 PSYTX W PT 30 MINUTES: CPT | Performed by: COUNSELOR

## 2023-12-18 NOTE — PROGRESS NOTES
Date: December 20, 2023  Time In: 1330  Time Out: 1407  This provider is located at home address for [facility] (through [facility]), [address], [place] [address] using a secure Interanat Video Visit through ideasoft. Patient is being seen remotely via telehealth at home address in [place] and stated they are in a secure environment for this session. The patient's condition being diagnosed/treated is appropriate for telemedicine. The provider identified herself as well as her credentials. The patient, and/or patients guardian, consent to be seen remotely, and when consent is given they understand that the consent allows for patient identifiable information to be sent to a third party as needed. They may refuse to be seen remotely at any time. The electronic data is encrypted and password protected, and the patient and/or guardian has been advised of the potential risks to privacy not withstanding such measures.     You have chosen to receive care through a telehealth visit.  Do you consent to use a video/audio connection for your medical care today? Yes    PROGRESS NOTE  Data:  Nivia Cagle is a 73 y.o. female who presents today for follow up    Chief Complaint: depression    History of Present Illness: Pt reports putting off a painting that needs to be completed and is uncertain as to why she can not bring herself to complete this task. Pt also reports dreams about her mother that make her feel uncomfortable. Pt reflects back on childhood experiences and how her mother had a tendency to avoid or belittle pt at times. Pt does reports that she loves her mother and after completing an art project will tell herself that her mother would have liked it.       Clinical Maneuvering/Intervention:    (Scales based on 0 - 10 with 10 being the worst)  Depression: 6 Anxiety: 3       Assisted patient in processing above session content; acknowledged and normalized  patient’s thoughts, feelings, and concerns.  Rationalized patient thought process regarding recent stressors and life events. Discussed triggers associated with patient's emotions. Also discussed coping skills for patient to implement. Discussed childhood experiences, dreams, and longing for acceptance. Discussed hand to hearth method of positive affirmations/mantra to be said after completing painting.     Allowed patient to freely discuss issues without interruption or judgment. Provided safe, confidential environment to facilitate the development of positive therapeutic relationship and encourage open, honest communication. Assisted patient in identifying risk factors which would indicate the need for higher level of care including thoughts to harm self or others and/or self-harming behavior and encouraged patient to contact this office, call 911, or present to the nearest emergency room should any of these events occur. Discussed crisis intervention services and means to access. Patient adamantly and convincingly denies current suicidal or homicidal ideation or perceptual disturbance.    Assessment:   Assessment   Patient appears to maintain relative stability as compared to their baseline.  However, patient continues to struggle with depression which continues to cause impairment in important areas of functioning.  A result, they can be reasonably expected to continue to benefit from treatment and would likely be at increased risk for decompensation otherwise.    Mental Status Exam:   Hygiene:   good  Cooperation:  Cooperative  Eye Contact:  Good  Psychomotor Behavior:  Appropriate  Affect:  Appropriate  Mood: depressed  Speech:  Normal  Thought Process:  Linear  Thought Content:  Mood congruent  Suicidal:  None  Homicidal:  None  Hallucinations:  None  Delusion:  None  Memory:  Intact  Orientation:  Person, Place, Time and Situation  Reliability:  fair  Insight:  Fair  Judgement:  Fair  Impulse Control:   Fair  Physical/Medical Issues:  No        Patient's Support Network Includes:  son    Functional Status: Mild impairment     Progress toward goal: Not at goal    Prognosis: Fair with Ongoing Treatment            Plan:    Patient will continue in individual outpatient therapy with focus on improved functioning and coping skills, maintaining stability, and avoiding decompensation and the need for higher level of care.    Patient will adhere to medication regimen as prescribed and report any side effects. Patient will contact this office, call 911 or present to the nearest emergency room should suicidal or homicidal ideations occur. Provide Cognitive Behavioral Therapy and Solution Focused Therapy to improve functioning, maintain stability, and avoid decompensation and the need for higher level of care.     Return in about 4 weeks, or earlier if symptoms worsen or fail to improve.           VISIT DIAGNOSIS:     ICD-10-CM ICD-9-CM   1. Post traumatic stress disorder (PTSD)  F43.10 309.81   2. Major depressive disorder, recurrent episode, moderate  F33.1 296.32        Diagnoses and all orders for this visit:    1. Post traumatic stress disorder (PTSD) (Primary)    2. Major depressive disorder, recurrent episode, moderate           [facility] No Show Policy:  We understand unexpected circumstances arise; however, anytime you miss your appointment we are unable to provide you appropriate care.  In addition, each appointment missed could have been used to provide care for others.  We ask that you call at least 24 hours in advance to cancel or reschedule an appointment.  We would like to take this opportunity to remind you of our policy stating patients who miss THREE or more appointments without cancelling or rescheduling 24 hours in advance of the appointment may be subject to cancellation of any further visits with our clinic and recommendation to seek in-person services/visits.    Please call  [phone] to reschedule your appointment. If there are reasons that make it difficult for you to keep the appointments, please call and let us know how we can help.  Please understand that medication prescribing will not continue without seeing your provider.      [facility]'s No Show Policy reviewed with patient at today's visit. Patient verbalized understanding of this policy. Discussed with patient that in the event that there are three or more no show visits, it will be recommended that they pursue in-person services/visits as noncompliance with telehealth visits indicates that patient is not an appropriate candidate for telemedicine and would likely be more appropriate for in-person services/visits. Patient verbalizes understanding and is agreeable to this.        This document has been electronically signed by Annita Knowles LCSW.  December 20, 2023 10:34 EST      Part of this note may be an electronic transcription/translation of spoken language to printed text using the Dragon Dictation System.

## 2023-12-28 ENCOUNTER — HOSPITAL ENCOUNTER (EMERGENCY)
Facility: HOSPITAL | Age: 73
Discharge: HOME | End: 2023-12-28
Attending: EMERGENCY MEDICINE
Payer: MEDICARE

## 2023-12-28 ENCOUNTER — APPOINTMENT (OUTPATIENT)
Dept: GENERAL RADIOLOGY | Facility: HOSPITAL | Age: 73
End: 2023-12-28
Payer: MEDICARE

## 2023-12-28 VITALS
HEIGHT: 63 IN | DIASTOLIC BLOOD PRESSURE: 74 MMHG | HEART RATE: 97 BPM | SYSTOLIC BLOOD PRESSURE: 135 MMHG | RESPIRATION RATE: 20 BRPM | WEIGHT: 173.06 LBS | OXYGEN SATURATION: 96 % | TEMPERATURE: 99.2 F | BODY MASS INDEX: 30.66 KG/M2

## 2023-12-28 DIAGNOSIS — M77.51 BONE SPUR OF RIGHT FOOT: Primary | ICD-10-CM

## 2023-12-28 DIAGNOSIS — M85.871 OSTEOPENIA OF RIGHT FOOT: ICD-10-CM

## 2023-12-28 PROCEDURE — 73630 X-RAY EXAM OF FOOT: CPT

## 2023-12-28 PROCEDURE — 99283 EMERGENCY DEPT VISIT LOW MDM: CPT

## 2023-12-28 NOTE — ED PROVIDER NOTES
Time: 3:54 PM EST  Date of encounter:  12/28/2023  Independent Historian/Clinical History and Information was obtained by:   Patient    History is limited by: N/A    Chief Complaint   Patient presents with    Fall    Foot Pain         History of Present Illness:  Patient is a 73 y.o. year old female who presents to the emergency department for evaluation of right foot pain after slipping on a puddle yesterday. Denies hitting head or LOC. Hx of right ankle surgery. She has been able to walk on it but guarded. (Triage Provider: Roby Good PA-C).    Patient states the pain is more in the plantar and she has been walking on her heel.    Patient Care Team  Primary Care Provider: Catalina Madsen PA-C    Past Medical History:     Allergies   Allergen Reactions    Diclofenac Hives    New Skin [Benzethonium Chloride] Rash    Atorvastatin Myalgia    Adhesive Tape Rash and Other (See Comments)       Rash at area of bandaid    Niacin Rash     Past Medical History:   Diagnosis Date    ADHD (attention deficit hyperactivity disorder)     Allergic rhinitis     Anemia     Anxiety     Cataracts, bilateral     Chronic pain disorder     Depression 0421/2021    MOODNOT WELL CONTROLLED.  GIVEN NUMBER FOR LOCAL COUNSELOR.  WILL INCREASE TRINTELIX FROM 10MG TO 20MG RTC WEEK ER ID S/HI    Diabetes mellitus, type 2     Diverticulitis     GERD (gastroesophageal reflux disease)     Head injury     High blood pressure     Hyperlipemia     Migraine     EARL (obstructive sleep apnea) 04/21/2021    Panic disorder     Phlebitis     PTSD (post-traumatic stress disorder)      Past Surgical History:   Procedure Laterality Date    ANKLE SURGERY  2021    BILATERAL BREAST REDUCTION  2015    BREAST BIOPSY      CARPAL TUNNEL RELEASE      CHOLECYSTECTOMY  2001    COLONOSCOPY      [facility]    COLONOSCOPY N/A 9/28/2022    Procedure: COLONOSCOPY with biopsy;  Surgeon: Ghislaine Kilgore MD;  Location: [facility] ENDOSCOPY;  Service: Gastroenterology;   Laterality: N/A;  colon polyp, diverticlosis, hemorrhoids    HYSTERECTOMY      ULNAR NERVE TRANSPOSITION Right     WRIST SURGERY       Family History   Problem Relation Age of Onset    Kidney cancer Mother     Hyperlipidemia Mother     Heart disease Father     Heart attack Father     Diabetes Father     ADD / ADHD Father     Hyperlipidemia Father     Hyperlipidemia Sister     Cancer Sister     Brain cancer Sister     Lung cancer Sister     Hyperlipidemia Sister     Hyperlipidemia Brother     Diabetes Brother     Heart attack Brother     Hyperlipidemia Brother     No Known Problems Paternal Uncle     No Known Problems Cousin     Brenda Hyperthermia Neg Hx        Home Medications:  Prior to Admission medications    Medication Sig Start Date End Date Taking? Authorizing Provider   Accu-Chek Madeline Plus test strip by Other route 4 (Four) Times a Day. use to test blood sugar 6/6/21   Iona Najera MD   Accu-Chek Softclix Lancets lancets by Other route 4 (Four) Times a Day. use to test blood sugar 6/4/21   Iona Najera MD   ARIPiprazole (ABILIFY) 2 MG tablet Take 2 tablets by mouth Daily. 8/11/23   Vicky Barth MD   Ascorbic Acid (VITAMIN C GUMMIE PO) Take  by mouth Daily.    Iona Najera MD   aspirin (aspirin) 81 MG EC tablet Take 1 tablet by mouth Daily. 7/11/22   NAM Ruiz MD   azelastine (ASTELIN) 0.1 % nasal spray 2 sprays into the nostril(s) as directed by provider 2 (Two) Times a Day. Use in each nostril as directed 7/18/22   Kassandra Garay APRN   BL NASAL SALINE MIST NA 2 drops. 7/1/21   Iona Najera MD   Blood Glucose Monitoring Suppl (FreeStyle Lite) device 1 each by Other route 4 (Four) Times a Day. 4/8/21   Iona Najera MD   buPROPion XL (WELLBUTRIN XL) 300 MG 24 hr tablet Take 1 tablet by mouth Every Morning. 11/9/23   Vicky Barth MD   cetirizine (zyrTEC) 10 MG tablet Take 1 tablet by mouth Daily. 6/7/23   Catalina Madsen PA-C   coenzyme Q10 50 MG  capsule capsule Take  by mouth Daily.    Iona Najera MD   cyclobenzaprine (FLEXERIL) 10 MG tablet Take 1 tablet by mouth Daily As Needed for Muscle Spasms. 10/2/23   Stefania Bray APRN   Daily-Jelani Multivitamin tablet tablet Take 1 tablet by mouth every night at bedtime. 4/3/21   Iona Najera MD   doxepin (SINEquan) 10 MG capsule Take 1 capsule by mouth Every Night. 11/9/23   Vicky Barth MD   ezetimibe (ZETIA) 10 MG tablet Take 1 tablet by mouth every night at bedtime. 6/7/23   Catalina Madsen PA-C   FLUoxetine (PROzac) 10 MG capsule TAKE 1 CAPSULE BY MOUTH DAILY 8/4/23   Vicky Barth MD   FLUoxetine (PROzac) 20 MG capsule Take 1 capsule by mouth Daily. 1/20/23   Vicky Barth MD   fluticasone (Flonase) 50 MCG/ACT nasal spray 2 sprays into the nostril(s) as directed by provider Daily. Administer 2 sprays in each nostril for each dose. 6/7/23   Catalina Madsen PA-C   Inclisiran Sodium (LEQVIO SC) Inject  under the skin into the appropriate area as directed.    Iona Najera MD   Januvia 100 MG tablet Take 1 tablet by mouth Daily. 6/7/23   Catalina Madsen PA-C   losartan (COZAAR) 25 MG tablet Take 1 tablet by mouth Daily. 6/7/23   Catalina Madsen PA-C   Melatonin 10 MG tablet Take 2 tablets by mouth Every Night. 2 tabs    Iona Najera MD   metFORMIN (Glucophage) 500 MG tablet Take 1 tab in am and 2 tabs in pm with meals 6/7/23   Catalina Madsen PA-C   montelukast (SINGULAIR) 10 MG tablet Take 1 tablet by mouth Every Night. 6/7/23   Catalina Madsen PA-C   nitrofurantoin, macrocrystal-monohydrate, (MACROBID) 100 MG capsule Take 1 capsule by mouth 2 (Two) Times a Day. 11/26/23   Mireille Chavarria MD   pramipexole (Mirapex) 0.5 MG tablet Take 1 tablet by mouth Every Night. 10/2/23 10/1/24  Stefania Bray APRN   prednisoLONE acetate (Pred Mild) 0.12 % ophthalmic suspension SHAKE LIQUID AND INSTILL 1 DROP IN RIGHT EYE TWICE DAILY 2/3/23   Provider,  "MD Iona   Zinc 100 MG tablet Take 100 mg by mouth Daily.    Provider, MD Iona        Social History:   Social History     Tobacco Use    Smoking status: Former     Types: Cigarettes     Quit date:      Years since quittin.0    Smokeless tobacco: Never    Tobacco comments:     QUIT    Vaping Use    Vaping Use: Never used   Substance Use Topics    Alcohol use: Yes     Comment: rarely    Drug use: Never         Review of Systems:  Review of Systems   Constitutional: Negative.    HENT: Negative.     Eyes: Negative.    Respiratory: Negative.     Cardiovascular: Negative.    Gastrointestinal: Negative.    Endocrine: Negative.    Genitourinary: Negative.    Musculoskeletal:  Positive for arthralgias. Negative for joint swelling.        Right foot pain     Skin: Negative.  Negative for color change and wound.   Allergic/Immunologic: Negative.    Neurological: Negative.    Hematological: Negative.    Psychiatric/Behavioral: Negative.          Physical Exam:  /74   Pulse 97   Temp 99.2 °F (37.3 °C) (Oral)   Resp 20   Ht 160 cm (63\")   Wt 78.5 kg (173 lb 1 oz)   SpO2 96%   BMI 30.66 kg/m²         Physical Exam  Vitals and nursing note reviewed.   Constitutional:       Appearance: Normal appearance. She is normal weight.   HENT:      Head: Normocephalic and atraumatic.      Nose: Nose normal.      Mouth/Throat:      Mouth: Mucous membranes are moist.   Eyes:      Extraocular Movements: Extraocular movements intact.      Conjunctiva/sclera: Conjunctivae normal.      Pupils: Pupils are equal, round, and reactive to light.   Cardiovascular:      Rate and Rhythm: Normal rate and regular rhythm.   Pulmonary:      Effort: Pulmonary effort is normal.      Breath sounds: Normal breath sounds.   Abdominal:      General: Abdomen is flat. Bowel sounds are normal. There is no distension.      Palpations: Abdomen is soft.   Musculoskeletal:         General: No swelling or tenderness. Normal range of " motion.      Cervical back: Normal range of motion and neck supple.      Right foot: Normal range of motion and normal capillary refill. Tenderness (not to touch) present. No swelling. Normal pulse.        Feet:       Comments: Right foot pain   Skin:     General: Skin is warm and dry.      Capillary Refill: Capillary refill takes less than 2 seconds.      Findings: No bruising or erythema.   Neurological:      General: No focal deficit present.      Mental Status: She is alert and oriented to person, place, and time.   Psychiatric:         Mood and Affect: Mood normal.         Behavior: Behavior normal.                  Procedures:  Procedures      Medical Decision Making:      Comorbidities that affect care:    Diabetes    External Notes reviewed:    Podiatry visit on December 12, 2023 for type 2 diabetes      The following orders were placed and all results were independently analyzed by me:  Orders Placed This Encounter   Procedures    [facility] 08.  CAM Boot; Yes; No; Yes    XR Foot 3+ View Right       Medications Given in the Emergency Department:  Medications - No data to display     ED Course:    The patient was initially evaluated in the triage area where orders were placed. The patient was later dispositioned by Sara Young PA-C.      The patient was advised to stay for completion of workup which includes but is not limited to communication of labs and radiological results, reassessment and plan. The patient was advised that leaving prior to disposition by a provider could result in critical findings that are not communicated to the patient.     ED Course as of 12/28/23 1757   Thu Dec 28, 2023   1556 PROVIDER IN TRIAGE  Patient was evaluated by Roby scott PA-C. Orders were placed and awaiting final results and disposition.   [MV]      ED Course User Index  [MV] Roby Good PA       Labs:    Lab Results (last 24 hours)       ** No results found for the last 24 hours. **              Imaging:    XR Foot 3+ View Right    Result Date: 12/28/2023  PROCEDURE: XR FOOT 3+ VW RIGHT  COMPARISON: [facility], CR, XR FOOT 3+ VW RIGHT, 11/28/2023, 14:52.  INDICATIONS: Injury to right foot today, pain when weight bearing  FINDINGS:  Surgical hardware is noted in the distal fibula and tibia.  No persistent fracture line is noted in this region.  No acute fracture is identified.  The bones appear diffusely osteopenic.  Severe tibiotalar osteoarthritic changes are noted.  Small plantar calcaneal spurs noted.        1. Previous open reduction internal fixation of a distal tibial and fibular fractures 2. No acute fracture identified 3. Diffuse osteopenia 4. Severe tibiotalar osteoarthritic change      Alexei Mora M.D.       Electronically Signed and Approved By: Alexei Mora M.D. on 12/28/2023 at 17:23                Differential Diagnosis and Discussion:      Extremity Pain: Differential diagnosis includes but is not limited to soft tissue sprain, tendonitis, tendon injury, dislocation, fracture, deep vein thrombosis, arterial insufficiency, osteoarthritis, bursitis, and ligamentous damage.    All X-rays impressions were independently interpreted by me.    MDM               Patient Care Considerations:    NARCOTICS: I considered prescribing opiate pain medication as an outpatient, however x-ray negative for any acute fractures or dislocation      Consultants/Shared Management Plan:    None    Social Determinants of Health:    Patient is independent, reliable, and has access to care.       Disposition and Care Coordination:    Discharged: The patient is suitable and stable for discharge with no need for consideration of observation or admission.    I have explained the patient´s condition, diagnoses and treatment plan based on the information available to me at this time. I have answered questions and addressed any concerns. The patient has a good  understanding of the patient´s diagnosis,  condition, and treatment plan as can be expected at this point. The vital signs have been stable. The patient´s condition is stable and appropriate for discharge from the emergency department.      The patient will pursue further outpatient evaluation with the primary care physician or other designated or consulting physician as outlined in the discharge instructions. They are agreeable to this plan of care and follow-up instructions have been explained in detail. The patient has received these instructions in written format and have expressed an understanding of the discharge instructions. The patient is aware that any significant change in condition or worsening of symptoms should prompt an immediate return to this or the closest emergency department or call to 911.  I have explained discharge medications and the need for follow up with the patient/caretakers. This was also printed in the discharge instructions. Patient was discharged with the following medications and follow up:      Medication List      No changes were made to your prescriptions during this visit.      No follow-up provider specified.     Final diagnoses:   Bone spur of right foot (plantar)\   Osteopenia of right foot        ED Disposition       ED Disposition   Discharge    Condition   Stable    Comment   --               This medical record created using voice recognition software.             Sara Young PA-C  12/28/23 0001

## 2023-12-28 NOTE — DISCHARGE INSTRUCTIONS
Your x-ray was negative for any acute fractures or dislocations it does show you have diffuse osteopenia.  You also have a plantar calcaneal bone spur    Please wear cam boot as needed, Tylenol Motrin follow-up with podiatry

## 2023-12-29 ENCOUNTER — TELEPHONE (OUTPATIENT)
Dept: PODIATRY | Facility: CLINIC | Age: 73
End: 2023-12-29

## 2023-12-29 NOTE — TELEPHONE ENCOUNTER
Caller: JORGE VILLARREAL    Relationship to Patient: the patient    Phone Number: [phone] (home)     Reason for Call:   PATIENT WAS SEEN IN ER ON YESTERDAY 12-28-23 FOR BONE SPURS PATIENT WAS TOLD TO KEEP BOOT ON UNTIL SHE SEES DR. RUTHERFORD FIRST AVAILABLE APPT ISNT UNTIL 1-24-24 AND PATIENT WANTED TO KNOW IF SHE WILL NEED TO KEEP BOOT ON UNTIL DR. ELLER.

## 2024-01-05 ENCOUNTER — TELEMEDICINE (OUTPATIENT)
Dept: PSYCHIATRY | Facility: CLINIC | Age: 74
End: 2024-01-05
Payer: MEDICARE

## 2024-01-05 ENCOUNTER — TELEPHONE (OUTPATIENT)
Dept: PSYCHIATRY | Facility: CLINIC | Age: 74
End: 2024-01-05

## 2024-01-05 DIAGNOSIS — F51.05 INSOMNIA DUE TO MENTAL CONDITION: ICD-10-CM

## 2024-01-05 DIAGNOSIS — F33.40 RECURRENT MAJOR DEPRESSIVE DISORDER IN REMISSION: ICD-10-CM

## 2024-01-05 DIAGNOSIS — F41.1 GENERALIZED ANXIETY DISORDER: ICD-10-CM

## 2024-01-05 DIAGNOSIS — F43.10 POST TRAUMATIC STRESS DISORDER (PTSD): Primary | ICD-10-CM

## 2024-01-05 RX ORDER — FLUOXETINE 10 MG/1
10 CAPSULE ORAL DAILY
Qty: 90 CAPSULE | Refills: 1 | Status: SHIPPED | OUTPATIENT
Start: 2024-01-05

## 2024-01-05 NOTE — PROGRESS NOTES
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 09/07/2023 140  136 - 145 mmol/L Final    Potassium 09/07/2023 4.3  3.5 - 5.2 mmol/L Final    Chloride 09/07/2023 103  98 - 107 mmol/L Final    CO2 09/07/2023 26.1  22.0 - 29.0 mmol/L Final    Calcium 09/07/2023 9.3  8.6 - 10.5 mg/dL Final    Total Protein 09/07/2023 6.7  6.0 - 8.5 g/dL Final    Albumin 09/07/2023 4.5  3.5 - 5.2 g/dL Final    ALT (SGPT) 09/07/2023 11  1 - 33 U/L Final    AST (SGOT) 09/07/2023 15  1 - 32 U/L Final    Alkaline Phosphatase 09/07/2023 61  39 - 117 U/L Final    Total Bilirubin 09/07/2023 0.3  0.0 - 1.2 mg/dL Final    Globulin 09/07/2023 2.2  gm/dL Final    A/G Ratio 09/07/2023 2.0  g/dL Final    BUN/Creatinine Ratio 09/07/2023 15.4  7.0 - 25.0 Final    Anion Gap 09/07/2023 10.9  5.0 - 15.0 mmol/L Final    eGFR 09/07/2023 68.0  >60.0 mL/min/1.73 Final    TSH 09/07/2023 1.480  0.270 - 4.200 uIU/mL Final    Total Cholesterol 09/07/2023 111  0 - 200 mg/dL Final    Triglycerides 09/07/2023 93  0 - 150 mg/dL Final    HDL Cholesterol 09/07/2023 50  40 - 60 mg/dL Final    LDL Cholesterol  09/07/2023 43  0 - 100 mg/dL Final    VLDL Cholesterol 09/07/2023 18  5 - 40 mg/dL Final    LDL/HDL Ratio 09/07/2023 0.85   Final    Hemoglobin A1C 09/07/2023 5.90 (H)   4.80 - 5.60 % Final    Magnesium 09/07/2023 1.7  1.6 - 2.4 mg/dL Final    WBC 09/07/2023 7.45  3.40 - 10.80 10*3/mm3 Final    RBC 09/07/2023 3.94  3.77 - 5.28 10*6/mm3 Final    Hemoglobin 09/07/2023 12.2  12.0 - 15.9 g/dL Final    Hematocrit 09/07/2023 36.8  34.0 - 46.6 % Final    MCV 09/07/2023 93.4  79.0 - 97.0 fL Final    MCH 09/07/2023 31.0  26.6 - 33.0 pg Final    MCHC 09/07/2023 33.2  31.5 - 35.7 g/dL Final    RDW 09/07/2023 12.2 (L)  12.3 - 15.4 % Final    RDW-SD 09/07/2023 42.2  37.0 - 54.0 fl Final    MPV 09/07/2023 12.0  6.0 - 12.0 fL Final    Platelets 09/07/2023 239  140 - 450 10*3/mm3 Final    Neutrophil % 09/07/2023 66.1  42.7 - 76.0 % Final    Lymphocyte % 09/07/2023 20.4  19.6 - 45.3 % Final    Monocyte % 09/07/2023 10.2  5.0 - 12.0 % Final    Eosinophil % 09/07/2023 2.3  0.3 - 6.2 % Final    Basophil % 09/07/2023 0.7  0.0 - 1.5 % Final    Immature Grans % 09/07/2023 0.3  0.0 - 0.5 % Final    Neutrophils, Absolute 09/07/2023 4.93  1.70 - 7.00 10*3/mm3 Final    Lymphocytes, Absolute 09/07/2023 1.52  0.70 - 3.10 10*3/mm3 Final    Monocytes, Absolute 09/07/2023 0.76  0.10 - 0.90 10*3/mm3 Final    Eosinophils, Absolute 09/07/2023 0.17  0.00 - 0.40 10*3/mm3 Final    Basophils, Absolute 09/07/2023 0.05  0.00 - 0.20 10*3/mm3 Final    Immature Grans, Absolute 09/07/2023 0.02  0.00 - 0.05 10*3/mm3 Final    nRBC 09/07/2023 0.0  0.0 - 0.2 /100 WBC Final       EKG Results:  No orders to display       Imaging Results:  No Images in the past 120 days found..      Assessment & Plan   Diagnoses and all orders for this visit:    1. Post traumatic stress disorder (PTSD) (Primary)  -     FLUoxetine (PROzac) 10 MG capsule; Take 1 capsule by mouth Daily.  Dispense: 90 capsule; Refill: 1    2. Insomnia due to mental condition  -     FLUoxetine (PROzac) 10 MG capsule; Take 1 capsule by mouth Daily.  Dispense: 90 capsule; Refill: 1    3. Recurrent major depressive disorder in remission  -     FLUoxetine (PROzac) 10 MG  capsule; Take 1 capsule by mouth Daily.  Dispense: 90 capsule; Refill: 1    4. Generalized anxiety disorder  -     FLUoxetine (PROzac) 10 MG capsule; Take 1 capsule by mouth Daily.  Dispense: 90 capsule; Refill: 1      INITIAL presentation most consistent with major depressive disorder, recurrent, moderate to severe, with anxious distress.  PTSD.  Rule out personality disorder, cluster B specifically.  Rule out hypomania as patient was very difficult to interrupt today.      1/5: Seasonal depression, start light box up again. May have to make further med changes.    Allowed patient to freely discuss and process issues, such as:  Ongoing: Painting as a coping strategy, fulfilling hobby.  Ongoing: Procrastinating as a measure of how well she is doing. Not procrastinating.  Ongoing: The importance of Mormon  ... using Rogerian psychotherapeutic techniques including unconditional positive regard, reflective listening, and demonstrating clear empathy.     Time (minutes) spent providing supportive psychotherapy: 16  Functional status: mild impairment  Treatment plan: Medication management and supportive psychotherapy  Prognosis: good  Progress: maintenance insomnia  4w    12/14: Add melatonin for maintenance insomnia. Otherwise stable. Seeing a movie for [employer]. Got all her [employer] cards mailed out.    11/9: Doing well, isolated insomnia. Stop trazodone and start doxepin. Consider lunesta, belsomra, quiviviq. She stopped melatonin on her own.    8/11: Stable, well, no changes. Painting, having people over. Counseled to start light box in September, reiterated instructions. Will be inducted into the [employer] [facility] tomorrow. She's been working on it for a year.    7/11: Short visit as patient sleepy. Unusual sleep pattern recently, but MH is fine. Pt will call her son tonight to ensure someone is around when she goes back to sleep. She will call PCP about this tomorrow. Close follow up  with me in 4 wks.    4/27: Stable, well. Restart melatonin for insomnia.     3/2: Prozac and BLT helped. Situational stressor in son, no changes. Better. Watch insomnia.     1/20: Start light box, increase prozac for dep.     12/9: Some insomnia. Increase melatonin.     10/25: Doing well. Increase prozac back to baseline dose (inadvertently reduced, she has been stable on a higher dose, so we should go back to that). Some initial insomnia, increase trazodone to 75 mg nightly. She is enjoying the Fall.     9/8: Start light box. Close follow up in case this is worsening depression.     7/27: Well, stable.     6/14: Better, no changes.     5/3: Increase prozac and melatonin.    Visit Diagnoses:    ICD-10-CM ICD-9-CM   1. Post traumatic stress disorder (PTSD)  F43.10 309.81   2. Insomnia due to mental condition  F51.05 300.9     327.02   3. Recurrent major depressive disorder in remission  F33.40 296.35   4. Generalized anxiety disorder  F41.1 300.02       PLAN:  Risk Assessment: Risk of self-harm acutely is moderate. Risk factors include chronic depressive disorder, possible personality disorder, recent psychosocial stressors (pandemic, moving). Protective factors include no present SI, no history of suicide attempts or self-harm in the past, no access to weapons, minimal AODA, healthcare seeking, future orientation, willingness to engage in care. Risk of self-harm chronically is also moderate, but could be further elevated in the event of treatment noncompliance and/or AODA.  Safety: No acute safety concerns.  Medications:   CONTINUE light box 9/1 - 3/31.  CONTINUE melatonin 30 mg p.o. nightly.  Wasn't sleeping even on it plus trazodone 11/23. Risks, benefits, side effects discussed with patient including sedation, dizziness/falls risk, GI upset.  Do not use before operating vehicle, vessel, or machine. After discussion of these risks and benefits, the patient voiced understanding and agreed to proceed.   CONTINUE  doxepin 10 mg qhs. Risks, benefits, side effects discussed with patient including GI upset, sedation, dizziness/falls risk, grogginess the following day, prolongation of the QTc interval.  After discussion of these risks and benefits, the patient voiced understanding and agreed to proceed.    CONTINUE bupropion xl 300 mg daily. Risks, benefits, alternatives discussed with patient including nausea, GI upset, increased energy, exacerbation of irritability, insomnia, lowering of seizure threshold.  After discussion of these risks and benefits, the patient voiced understanding and agreed to proceed.  CONTINUE Prozac 30 mg a day (HIGHEST dose is 40 given that she is also on bupropion). Risks, benefits, alternatives discussed with patient including GI upset, nausea vomiting diarrhea, theoretical decrease of seizure threshold predisposing the patient to a slightly higher seizure risk, headaches, sexual dysfunction, serotonin syndrome, bleeding risk, increased suicidality in patients 24 years and younger.  After discussion of these risks and benefits, the patient voiced understanding and agreed to proceed.  CONTINUE Abilify 4 mg p.o. daily to target depression, anxiety, decreased energy. Risks, benefits, alternatives discussed with patient including increased energy, exacerbation of irritability, akathisia, GI upset, orthostatic hypotension, increased appetite. After discussion of these risks and benefits, the patient voiced understanding and agreed to proceed.  S/P:  trazodone 100 mg PO QHS. No longer effective 11/23.  Mirtazapine 45 mg daily (RLS)  Ambien caused double vision, and possibly hallucinations  Was on lithium in the past: dizziness and falls, and hair curled.  Therapy: referred to [facility] 12/7.  Labs/Studies: s/p TMS referral.  Follow Up: 6 weeks. (prefers 4 wks)      TREATMENT PLAN/GOALS: Continue supportive psychotherapy efforts and medications as indicated. Treatment and medication options discussed during  today's visit. Patient acknowledged and verbally consented to continue with current treatment plan and was educated on the importance of compliance with treatment and follow-up appointments.    MEDICATION ISSUES:  SAPNA reviewed as expected.  Discussed medication options and treatment plan of prescribed medication as well as the risks, benefits, and side effects including potential falls, possible impaired driving and metabolic adversities among others. Patient is agreeable to call the office with any worsening of symptoms or onset of side effects. Patient is agreeable to call 911 or go to the nearest ER should he/she begin having SI/HI. No medication side effects or related complaints today.     MEDS ORDERED DURING VISIT:  New Medications Ordered This Visit   Medications    FLUoxetine (PROzac) 10 MG capsule     Sig: Take 1 capsule by mouth Daily.     Dispense:  90 capsule     Refill:  1     Total dose is 20+10=30 mg daily. Thank you.       Return in about 4 weeks (around 2/2/2024).         This document has been electronically signed by Vicky Barth MD  January 5, 2024 07:36 EST      Part of this note may be an electronic transcription/translation of spoken language to printed text using the Dragon Dictation System.

## 2024-01-24 ENCOUNTER — OFFICE VISIT (OUTPATIENT)
Dept: PODIATRY | Facility: CLINIC | Age: 74
End: 2024-01-24
Payer: MEDICARE

## 2024-01-24 VITALS
BODY MASS INDEX: 29.59 KG/M2 | OXYGEN SATURATION: 98 % | SYSTOLIC BLOOD PRESSURE: 118 MMHG | WEIGHT: 167 LBS | HEART RATE: 83 BPM | HEIGHT: 63 IN | DIASTOLIC BLOOD PRESSURE: 64 MMHG | TEMPERATURE: 97.5 F

## 2024-01-24 DIAGNOSIS — Z87.828: ICD-10-CM

## 2024-01-24 DIAGNOSIS — L03.031 PARONYCHIA OF TOE OF RIGHT FOOT: ICD-10-CM

## 2024-01-24 DIAGNOSIS — M79.671 FOOT PAIN, RIGHT: ICD-10-CM

## 2024-01-24 DIAGNOSIS — Z79.4 TYPE 2 DIABETES MELLITUS WITH DIABETIC POLYNEUROPATHY, WITH LONG-TERM CURRENT USE OF INSULIN: Primary | ICD-10-CM

## 2024-01-24 DIAGNOSIS — L60.0 ONYCHOCRYPTOSIS: ICD-10-CM

## 2024-01-24 DIAGNOSIS — G62.9 NEUROPATHY: ICD-10-CM

## 2024-01-24 DIAGNOSIS — E11.42 TYPE 2 DIABETES MELLITUS WITH DIABETIC POLYNEUROPATHY, WITH LONG-TERM CURRENT USE OF INSULIN: Primary | ICD-10-CM

## 2024-01-24 NOTE — PROGRESS NOTES
[facility] - PODIATRY    Today's Date: 01/24/24    Patient Name: Nivia Cagle  MRN: [id number]  CSN: [id number]  PCP: Catalina Madsen PA-C  Referring Provider: No ref. provider found    SUBJECTIVE     Chief Complaint   Patient presents with    Right Foot - Pain     States she slipped and fell in the garage 12/28/23 went to ER exam xrays and placed in a boot     HPI: Nivia Cagle, a 73 y.o.female, comes to clinic.    New, Established, New Problem: New problem  Location: Lateral border of right first toe  Duration:   Greater than 1 week  Onset:  Gradual  Nature:  sore with palpation.  Aggravating factors:  Pain with shoe gear and ambulation.  Previous Treatment: None    Patient states she fell in December injuring her right forefoot.  She states x-rays were taken.  She states it is improving but still sore.    Patient denies any fevers, chills, nausea, vomiting, shortness of breath, nor any other constitutional signs nor symptoms.       Past Medical History:   Diagnosis Date    ADHD (attention deficit hyperactivity disorder)     Allergic rhinitis     Anemia     Anxiety     Cataracts, bilateral     Chronic pain disorder     Depression 0421/2021    MOODNOT WELL CONTROLLED.  GIVEN NUMBER FOR LOCAL COUNSELOR.  WILL INCREASE TRINTELIX FROM 10MG TO 20MG RTC WEEK ER ID S/HI    Diabetes mellitus, type 2     Diverticulitis     GERD (gastroesophageal reflux disease)     Head injury     High blood pressure     Hyperlipemia     Migraine     EARL (obstructive sleep apnea) 04/21/2021    Panic disorder     Phlebitis     PTSD (post-traumatic stress disorder)      Past Surgical History:   Procedure Laterality Date    ANKLE SURGERY  2021    BILATERAL BREAST REDUCTION  2015    BREAST BIOPSY      CARPAL TUNNEL RELEASE      CHOLECYSTECTOMY  2001    COLONOSCOPY      [facility]    COLONOSCOPY N/A 9/28/2022    Procedure: COLONOSCOPY with biopsy;  Surgeon: Ghislaine Kilgore MD;  Location: [facility];   Service: Gastroenterology;  Laterality: N/A;  colon polyp, diverticlosis, hemorrhoids    HYSTERECTOMY      ULNAR NERVE TRANSPOSITION Right     WRIST SURGERY       Family History   Problem Relation Age of Onset    Kidney cancer Mother     Hyperlipidemia Mother     Heart disease Father     Heart attack Father     Diabetes Father     ADD / ADHD Father     Hyperlipidemia Father     Hyperlipidemia Sister     Cancer Sister     Brain cancer Sister     Lung cancer Sister     Hyperlipidemia Sister     Hyperlipidemia Brother     Diabetes Brother     Heart attack Brother     Hyperlipidemia Brother     No Known Problems Paternal Uncle     No Known Problems Cousin     Brenda Hyperthermia Neg Hx      Social History     Socioeconomic History    Marital status: Single   Tobacco Use    Smoking status: Former     Types: Cigarettes     Quit date:      Years since quittin.0    Smokeless tobacco: Never    Tobacco comments:     QUIT    Vaping Use    Vaping Use: Never used   Substance and Sexual Activity    Alcohol use: Yes     Comment: rarely    Drug use: Never    Sexual activity: Defer     Allergies   Allergen Reactions    Diclofenac Hives    New Skin [Benzethonium Chloride] Rash    Atorvastatin Myalgia    Adhesive Tape Rash and Other (See Comments)       Rash at area of bandaid    Niacin Rash     Current Outpatient Medications   Medication Sig Dispense Refill    Accu-Chek Madeline Plus test strip by Other route 4 (Four) Times a Day. use to test blood sugar      Accu-Chek Softclix Lancets lancets by Other route 4 (Four) Times a Day. use to test blood sugar      ARIPiprazole (ABILIFY) 2 MG tablet Take 2 tablets by mouth Daily. 180 tablet 3    Ascorbic Acid (VITAMIN C GUMMIE PO) Take  by mouth Daily.      aspirin (aspirin) 81 MG EC tablet Take 1 tablet by mouth Daily. 90 tablet 99    azelastine (ASTELIN) 0.1 % nasal spray 2 sprays into the nostril(s) as directed by provider 2 (Two) Times a Day. Use in each nostril as directed 90  mL 6    BL NASAL SALINE MIST NA 2 drops.      Blood Glucose Monitoring Suppl (FreeStyle Lite) device 1 each by Other route 4 (Four) Times a Day.      buPROPion XL (WELLBUTRIN XL) 300 MG 24 hr tablet Take 1 tablet by mouth Every Morning. 90 tablet 3    cetirizine (zyrTEC) 10 MG tablet Take 1 tablet by mouth Daily. 90 tablet 1    coenzyme Q10 50 MG capsule capsule Take  by mouth Daily.      cyclobenzaprine (FLEXERIL) 10 MG tablet Take 1 tablet by mouth Daily As Needed for Muscle Spasms. 90 tablet 1    Daily-Jelani Multivitamin tablet tablet Take 1 tablet by mouth every night at bedtime.      doxepin (SINEquan) 10 MG capsule Take 1 capsule by mouth Every Night. 30 capsule 2    ezetimibe (ZETIA) 10 MG tablet Take 1 tablet by mouth every night at bedtime. 90 tablet 1    FLUoxetine (PROzac) 10 MG capsule Take 1 capsule by mouth Daily. 90 capsule 1    FLUoxetine (PROzac) 20 MG capsule Take 1 capsule by mouth Daily. 90 capsule 3    fluticasone (Flonase) 50 MCG/ACT nasal spray 2 sprays into the nostril(s) as directed by provider Daily. Administer 2 sprays in each nostril for each dose. 16 g 6    Inclisiran Sodium (LEQVIO SC) Inject  under the skin into the appropriate area as directed.      Januvia 100 MG tablet Take 1 tablet by mouth Daily. 90 tablet 1    losartan (COZAAR) 25 MG tablet Take 1 tablet by mouth Daily. 90 tablet 1    Melatonin 10 MG tablet Take 2 tablets by mouth Every Night. 2 tabs      metFORMIN (GLUCOPHAGE) 500 MG tablet TAKE 1 TABLET BY MOUTH EVERY MORNING AND 2 TABLETS EVERY EVENING WITH MEALS 635 tablet 0    montelukast (SINGULAIR) 10 MG tablet Take 1 tablet by mouth Every Night. 90 tablet 1    nitrofurantoin, macrocrystal-monohydrate, (MACROBID) 100 MG capsule Take 1 capsule by mouth 2 (Two) Times a Day. 20 capsule 0    pramipexole (Mirapex) 0.5 MG tablet Take 1 tablet by mouth Every Night. 90 tablet 1    prednisoLONE acetate (Pred Mild) 0.12 % ophthalmic suspension SHAKE LIQUID AND INSTILL 1 DROP IN RIGHT  EYE TWICE DAILY      Zinc 100 MG tablet Take 100 mg by mouth Daily.       No current facility-administered medications for this visit.     Review of Systems   Constitutional: Negative.    Skin:         Painful Right in-grown toenail   All other systems reviewed and are negative.      OBJECTIVE     Vitals:    01/24/24 0729   BP: 118/64   Pulse: 83   Temp: 97.5 °F (36.4 °C)   SpO2: 98%       PHYSICAL EXAM  GEN:      Foot/Ankle Exam    GENERAL  Appearance:  chronically ill  Orientation:  AAOx3  Affect:  appropriate  Gait:  unimpaired  Assistance:  independent  Right shoe gear: boot  Left shoe gear: boot    VASCULAR     Right Foot Vascularity   Dorsalis pedis:  1+  Posterior tibial:  1+  Skin temperature:  warm  Edema grading:  None  CFT:  < 3 seconds  Pedal hair growth:  Absent  Varicosities:  mild varicosities     Left Foot Vascularity   Dorsalis pedis:  1+  Posterior tibial:  1+  Skin temperature:  warm  Edema grading:  None  CFT:  < 3 seconds  Pedal hair growth:  Absent  Varicosities:  mild varicosities     NEUROLOGIC     Right Foot Neurologic   Light touch sensation: diminished  Vibratory sensation: diminished  Hot/Cold sensation: diminished  Protective Sensation using Prescott-Darling Monofilament:   Sites intact: 3  Sites tested: 10     Left Foot Neurologic   Normal sensation    Light touch sensation: normal  Vibratory sensation: normal  Hot/Cold sensation:  normal  Protective Sensation using Prescott-Darling Monofilament:   Sites intact: 10  Sites tested: 10    MUSCULOSKELETAL     Right Foot Musculoskeletal   Tenderness:  toe 1 tenderness      MUSCLE STRENGTH     Right Foot Muscle Strength   Foot dorsiflexion:  4-  Foot plantar flexion:  4-  Foot inversion:  4-  Foot eversion:  4-     Left Foot Muscle Strength   Foot dorsiflexion:  4-  Foot plantar flexion:  4-  Foot inversion:  4-  Foot eversion:  4-    RANGE OF MOTION     Right Foot Range of Motion   Foot and ankle ROM within normal limits    Ankle  dorsiflexion: decreased  with pain  Ankle plantar flexion: decreased  with pain     Left Foot Range of Motion   Foot and ankle ROM within normal limits      DERMATOLOGIC      Right Foot Dermatologic   Skin  Right foot skin is intact.   Nails  1.  Positive for ingrown toenail and paronychia. (Lateral nail border)  Nails comment:  Right 1st lateral nail border     Left Foot Dermatologic   Skin  Left foot skin is intact.   Nails comment:  Toenails 1, 2, 3, 4, and 5    XR Foot 3+ View Right    Result Date: 12/28/2023  Narrative: PROCEDURE: XR FOOT 3+ VW RIGHT  COMPARISON: [facility], CR, XR FOOT 3+ VW RIGHT, 11/28/2023, 14:52.  INDICATIONS: Injury to right foot today, pain when weight bearing  FINDINGS:  Surgical hardware is noted in the distal fibula and tibia.  No persistent fracture line is noted in this region.  No acute fracture is identified.  The bones appear diffusely osteopenic.  Severe tibiotalar osteoarthritic changes are noted.  Small plantar calcaneal spurs noted.      Impression:   1. Previous open reduction internal fixation of a distal tibial and fibular fractures 2. No acute fracture identified 3. Diffuse osteopenia 4. Severe tibiotalar osteoarthritic change      Alexei Mora M.D.       Electronically Signed and Approved By: Alexei Mora M.D. on 12/28/2023 at 17:23                ASSESSMENT/PLAN     Diagnoses and all orders for this visit:    1. Type 2 diabetes mellitus with diabetic polyneuropathy, with long-term current use of insulin (Primary)    2. Neuropathy    3. Paronychia of toe of right foot    4. Onychocryptosis    5. Foot pain, right    6. History of sprained foot      Comprehensive lower extremity examination and evaluation was performed.    Discussed findings and treatment plan including risks, benefits, and treatment options with patient in detail. Patient agreed with treatment plan.    Phenol and Alcohol Chemical Matrixectomy Procedure - This procedure is indicated for  onychocryptosis of the right first lateral nail border(s). Indications, risks and benefits and alternative treatments have been discussed with this patient who has agreed to this procedure. The area was sterilely prepped with a povidone-iodine solution. The affected area was locally anesthetized with 3 ml, of 0.5% Marcaine plain. The offending nail plate was completely excised.  Next 3 applications of 89% phenol were applied to the matrix area x 30 seconds followed by irrigation with copious amounts of isopropyl alcohol.  A sterile dressing was applied. The patient tolerated the procedure well.     Patient was given post procedure care instructions.  The patient states understanding and agreement with this plan.    Rice Therapy: It is important to treat any injury as soon as possible to help control swelling and increase recovery time. The recognized regimen for immediate treatment of sport injuries includes rest, ice (cold application), compression, and elevation (RICE). Remove the injured athlete from play, apply ice to the affected area, wrap or compress the injured area with an elastic bandage when appropriate, and elevate the injured area above heart level to reduce swelling.  The patient is to not use ice for longer than 20 minutes at a time, with at least 20 minutes of no ice usage between applications.  The patient states understanding and agreement with this plan.    Patient instructed use OTC analgesics with dosing per package insert as needed.  Patient states understanding and agreement with this plan.    Diabetic foot exam performed and documented this date, compliant with CQM required standards. Detail of findings as noted in physical exam.  Lower extremity Neurologic exam for diabetic patient performed and documented this date, compliant with PQRS required standards. Detail of findings as noted in physical exam.  Advised patient importance of good routine lower extremity hygiene. Advised patient  importance of evaluating for intact skin and pain free nail borders.  Advised patient to use mirror to evaluate plantar/ soles of feet for better visualization. Advised patient monitor and phone office to be seen if any cracking to skin, open lesions, painful nail borders or if nails become elongated prior to next visit. Advised patient importance of daily cleansing of lower extremities, followed by good skin cream to maintain normal hydration of skin. Also advised patient importance of close daily monitoring of blood sugar. Advised to regulate diet and medications to maintain control of blood sugar in optimal range. Contact primary care provider if difficulties maintaining blood sugar levels.  Advised Patient of presence of Diabetes Mellitus condition.  Advised Patient risk of progression and worsening or improvement, then return of condition.  Will monitor condition for any change in future. Treat with most appropriate treatment pending status of condition.  Counseled and advised patient extensively on nature and ramifications of diabetes. Standard instructions given to patient for good diabetic foot care and maintenance. Advised importance of careful monitoring to avoid break down and complications secondary to diabetes. Advised patient importance of strict maintenance of blood sugar control. Advised patient of possible ominous results from neglect of condition, i.e.: amputation/ loss of digits, feet and legs, or even death.  Patient states understands counseling, will monitor closely, continue good hygiene and routine diabetic foot care. Patient will contact office is questions or problems.      Patient is to monitor for recurrence and any new symptoms and to contact Dr. Flores's office for a follow-up appointment.      The patient states understanding and agreement with this plan.    An After Visit Summary was printed and given to the patient at discharge, including (if requested) any available  informative/educational handouts regarding diagnosis, treatment, or medications. All questions were answered to patient/family satisfaction. Should symptoms fail to improve or worsen they agree to call or return to clinic or to go to the Emergency Department. Discussed the importance of following up with any needed screening tests/labs/specialist appointments and any requested follow-up recommended by me today. Importance of maintaining follow-up discussed and patient accepts that missed appointments can delay diagnosis and potentially lead to worsening of conditions.    Return in about 2 weeks (around 2/7/2024) for Post-Procedure., or sooner if acute issues arise.    This document has been electronically signed by Adarsh Flores DPM on January 24, 2024 08:05 EST

## 2024-01-31 RX ORDER — LOSARTAN POTASSIUM 25 MG/1
25 TABLET ORAL DAILY
Qty: 90 TABLET | Refills: 1 | Status: SHIPPED | OUTPATIENT
Start: 2024-01-31

## 2024-02-05 RX ORDER — MONTELUKAST SODIUM 10 MG/1
10 TABLET ORAL NIGHTLY
Qty: 90 TABLET | Refills: 1 | Status: SHIPPED | OUTPATIENT
Start: 2024-02-05

## 2024-02-05 RX ORDER — SITAGLIPTIN 100 MG/1
100 TABLET, FILM COATED ORAL DAILY
Qty: 90 TABLET | Refills: 1 | Status: SHIPPED | OUTPATIENT
Start: 2024-02-05

## 2024-02-05 RX ORDER — EZETIMIBE 10 MG/1
10 TABLET ORAL
Qty: 90 TABLET | Refills: 1 | Status: SHIPPED | OUTPATIENT
Start: 2024-02-05

## 2024-02-06 ENCOUNTER — TELEMEDICINE (OUTPATIENT)
Dept: PSYCHIATRY | Facility: CLINIC | Age: 74
End: 2024-02-06
Payer: MEDICARE

## 2024-02-06 ENCOUNTER — TELEPHONE (OUTPATIENT)
Dept: PSYCHIATRY | Facility: CLINIC | Age: 74
End: 2024-02-06

## 2024-02-06 DIAGNOSIS — F51.05 INSOMNIA DUE TO MENTAL CONDITION: ICD-10-CM

## 2024-02-06 DIAGNOSIS — F33.40 RECURRENT MAJOR DEPRESSIVE DISORDER IN REMISSION: ICD-10-CM

## 2024-02-06 DIAGNOSIS — F43.10 POST TRAUMATIC STRESS DISORDER (PTSD): ICD-10-CM

## 2024-02-06 DIAGNOSIS — F41.1 GENERALIZED ANXIETY DISORDER: Primary | ICD-10-CM

## 2024-02-06 PROCEDURE — 1159F MED LIST DOCD IN RCRD: CPT | Performed by: STUDENT IN AN ORGANIZED HEALTH CARE EDUCATION/TRAINING PROGRAM

## 2024-02-06 PROCEDURE — 1160F RVW MEDS BY RX/DR IN RCRD: CPT | Performed by: STUDENT IN AN ORGANIZED HEALTH CARE EDUCATION/TRAINING PROGRAM

## 2024-02-06 PROCEDURE — 90833 PSYTX W PT W E/M 30 MIN: CPT | Performed by: STUDENT IN AN ORGANIZED HEALTH CARE EDUCATION/TRAINING PROGRAM

## 2024-02-06 PROCEDURE — 99214 OFFICE O/P EST MOD 30 MIN: CPT | Performed by: STUDENT IN AN ORGANIZED HEALTH CARE EDUCATION/TRAINING PROGRAM

## 2024-02-06 NOTE — PROGRESS NOTES
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 Value Ref Range Status    C-Telopeptides 11/15/2023 265  pg/mL Final    Reference Range:  Premenopausal Women: 34 - 635  Postmenopausal Women: 34 - 1037   Admission on 10/01/2023, Discharged on 10/01/2023   Component Date Value Ref Range Status    Rapid Strep A Screen 10/01/2023 Negative   Final    Internal Control 10/01/2023 Passed   Final    Lot Number 10/01/2023 [id number]   Final    Expiration Date 10/01/2023 12/31/2024   Final    Rapid Influenza A Ag 10/01/2023 Negative  Negative Final    Rapid Influenza B Ag 10/01/2023 Negative  Negative Final    Internal Control 10/01/2023 Passed  Passed Final    Lot Number 10/01/2023 [id number]   Final    Expiration Date 10/01/2023 12/15/2024   Final    SARS Antigen 10/01/2023 Not Detected  Not Detected, Presumptive Negative Final    Internal Control 10/01/2023 Passed  Passed Final    Lot Number 10/01/2023 [id number]   Final    Expiration Date 10/01/2023 10/02/2023   Final   Office Visit on 09/07/2023   Component Date Value Ref Range Status    Glucose 09/07/2023 90  65 - 99 mg/dL Final    BUN 09/07/2023 14  8 - 23 mg/dL Final    Creatinine 09/07/2023 0.91  0.57 - 1.00 mg/dL Final    Sodium 09/07/2023 140  136 - 145 mmol/L  Final    Potassium 09/07/2023 4.3  3.5 - 5.2 mmol/L Final    Chloride 09/07/2023 103  98 - 107 mmol/L Final    CO2 09/07/2023 26.1  22.0 - 29.0 mmol/L Final    Calcium 09/07/2023 9.3  8.6 - 10.5 mg/dL Final    Total Protein 09/07/2023 6.7  6.0 - 8.5 g/dL Final    Albumin 09/07/2023 4.5  3.5 - 5.2 g/dL Final    ALT (SGPT) 09/07/2023 11  1 - 33 U/L Final    AST (SGOT) 09/07/2023 15  1 - 32 U/L Final    Alkaline Phosphatase 09/07/2023 61  39 - 117 U/L Final    Total Bilirubin 09/07/2023 0.3  0.0 - 1.2 mg/dL Final    Globulin 09/07/2023 2.2  gm/dL Final    A/G Ratio 09/07/2023 2.0  g/dL Final    BUN/Creatinine Ratio 09/07/2023 15.4  7.0 - 25.0 Final    Anion Gap 09/07/2023 10.9  5.0 - 15.0 mmol/L Final    eGFR 09/07/2023 68.0  >60.0 mL/min/1.73 Final    TSH 09/07/2023 1.480  0.270 - 4.200 uIU/mL Final    Total Cholesterol 09/07/2023 111  0 - 200 mg/dL Final    Triglycerides 09/07/2023 93  0 - 150 mg/dL Final    HDL Cholesterol 09/07/2023 50  40 - 60 mg/dL Final    LDL Cholesterol  09/07/2023 43  0 - 100 mg/dL Final    VLDL Cholesterol 09/07/2023 18  5 - 40 mg/dL Final    LDL/HDL Ratio 09/07/2023 0.85   Final    Hemoglobin A1C 09/07/2023 5.90 (H)  4.80 - 5.60 % Final    Magnesium 09/07/2023 1.7  1.6 - 2.4 mg/dL Final    WBC 09/07/2023 7.45  3.40 - 10.80 10*3/mm3 Final    RBC 09/07/2023 3.94  3.77 - 5.28 10*6/mm3 Final    Hemoglobin 09/07/2023 12.2  12.0 - 15.9 g/dL Final    Hematocrit 09/07/2023 36.8  34.0 - 46.6 % Final    MCV 09/07/2023 93.4  79.0 - 97.0 fL Final    MCH 09/07/2023 31.0  26.6 - 33.0 pg Final    MCHC 09/07/2023 33.2  31.5 - 35.7 g/dL Final    RDW 09/07/2023 12.2 (L)  12.3 - 15.4 % Final    RDW-SD 09/07/2023 42.2  37.0 - 54.0 fl Final    MPV 09/07/2023 12.0  6.0 - 12.0 fL Final    Platelets 09/07/2023 239  140 - 450 10*3/mm3 Final    Neutrophil % 09/07/2023 66.1  42.7 - 76.0 % Final    Lymphocyte % 09/07/2023 20.4  19.6 - 45.3 % Final    Monocyte % 09/07/2023 10.2  5.0 - 12.0 % Final    Eosinophil %  09/07/2023 2.3  0.3 - 6.2 % Final    Basophil % 09/07/2023 0.7  0.0 - 1.5 % Final    Immature Grans % 09/07/2023 0.3  0.0 - 0.5 % Final    Neutrophils, Absolute 09/07/2023 4.93  1.70 - 7.00 10*3/mm3 Final    Lymphocytes, Absolute 09/07/2023 1.52  0.70 - 3.10 10*3/mm3 Final    Monocytes, Absolute 09/07/2023 0.76  0.10 - 0.90 10*3/mm3 Final    Eosinophils, Absolute 09/07/2023 0.17  0.00 - 0.40 10*3/mm3 Final    Basophils, Absolute 09/07/2023 0.05  0.00 - 0.20 10*3/mm3 Final    Immature Grans, Absolute 09/07/2023 0.02  0.00 - 0.05 10*3/mm3 Final    nRBC 09/07/2023 0.0  0.0 - 0.2 /100 WBC Final       EKG Results:  No orders to display       Imaging Results:  No Images in the past 120 days found..      Assessment & Plan   Diagnoses and all orders for this visit:    1. Generalized anxiety disorder (Primary)    2. Recurrent major depressive disorder in remission    3. Post traumatic stress disorder (PTSD)    4. Insomnia due to mental condition      INITIAL presentation most consistent with major depressive disorder, recurrent, moderate to severe, with anxious distress.  PTSD.  Rule out personality disorder, cluster B specifically.  Rule out hypomania as patient was very difficult to interrupt today.    2/6: Stable, discussed dietary changes involving decreasing sugar. Sugar is comforting and helps her depression. Also discussed stevia. Now using light box and sleeping a little better.    Allowed patient to freely discuss and process issues, such as:  Ongoing: Painting as a coping strategy, fulfilling hobby.  Ongoing: Procrastinating as a measure of how well she is doing. Not procrastinating.  Ongoing: The importance of Anabaptist  ... using Rogerian psychotherapeutic techniques including unconditional positive regard, reflective listening, and demonstrating clear empathy.     Time (minutes) spent providing supportive psychotherapy: 22  Functional status: mild impairment  Treatment plan: Medication management and supportive  psychotherapy  Prognosis: good  Progress: maintenance insomnia, some mild depression  4w    1/5: Seasonal depression, start light box up again. May have to make further med changes.    12/14: Add melatonin for maintenance insomnia. Otherwise stable. Seeing a movie for [employer]. Got all her Xmas cards mailed out.    11/9: Doing well, isolated insomnia. Stop trazodone and start doxepin. Consider lunesta, belsomra, quiviviq. She stopped melatonin on her own.    8/11: Stable, well, no changes. Painting, having people over. Counseled to start light box in September, reiterated instructions. Will be inducted into the [employer] [facility] and [facility] tomorrow. She's been working on it for a year.    7/11: Short visit as patient sleepy. Unusual sleep pattern recently, but MH is fine. Pt will call her son tonight to ensure someone is around when she goes back to sleep. She will call PCP about this tomorrow. Close follow up with me in 4 wks.    4/27: Stable, well. Restart melatonin for insomnia.     3/2: Prozac and BLT helped. Situational stressor in son, no changes. Better. Watch insomnia.     1/20: Start light box, increase prozac for dep.     12/9: Some insomnia. Increase melatonin.     10/25: Doing well. Increase prozac back to baseline dose (inadvertently reduced, she has been stable on a higher dose, so we should go back to that). Some initial insomnia, increase trazodone to 75 mg nightly. She is enjoying the Fall.     9/8: Start light box. Close follow up in case this is worsening depression.     7/27: Well, stable.     6/14: Better, no changes.     5/3: Increase prozac and melatonin.    Visit Diagnoses:    ICD-10-CM ICD-9-CM   1. Generalized anxiety disorder  F41.1 300.02   2. Recurrent major depressive disorder in remission  F33.40 296.35   3. Post traumatic stress disorder (PTSD)  F43.10 309.81   4. Insomnia due to mental condition  F51.05 300.9     327.02       PLAN:  Risk Assessment: Risk of  self-harm acutely is moderate. Risk factors include chronic depressive disorder, possible personality disorder, recent psychosocial stressors (pandemic, moving). Protective factors include no present SI, no history of suicide attempts or self-harm in the past, no access to weapons, minimal AODA, healthcare seeking, future orientation, willingness to engage in care. Risk of self-harm chronically is also moderate, but could be further elevated in the event of treatment noncompliance and/or AODA.  Safety: No acute safety concerns.  Medications:   CONTINUE light box 9/1 - 3/31.  CONTINUE melatonin 30 mg p.o. nightly.  Wasn't sleeping even on it plus trazodone 11/23. Risks, benefits, side effects discussed with patient including sedation, dizziness/falls risk, GI upset.  Do not use before operating vehicle, vessel, or machine. After discussion of these risks and benefits, the patient voiced understanding and agreed to proceed.   CONTINUE doxepin 10 mg qhs. Risks, benefits, side effects discussed with patient including GI upset, sedation, dizziness/falls risk, grogginess the following day, prolongation of the QTc interval.  After discussion of these risks and benefits, the patient voiced understanding and agreed to proceed.    CONTINUE bupropion xl 300 mg daily. Risks, benefits, alternatives discussed with patient including nausea, GI upset, increased energy, exacerbation of irritability, insomnia, lowering of seizure threshold.  After discussion of these risks and benefits, the patient voiced understanding and agreed to proceed.  CONTINUE Prozac 30 mg a day (HIGHEST dose is 40 given that she is also on bupropion). Risks, benefits, alternatives discussed with patient including GI upset, nausea vomiting diarrhea, theoretical decrease of seizure threshold predisposing the patient to a slightly higher seizure risk, headaches, sexual dysfunction, serotonin syndrome, bleeding risk, increased suicidality in patients 24 years  and younger.  After discussion of these risks and benefits, the patient voiced understanding and agreed to proceed.  CONTINUE Abilify 4 mg p.o. daily to target depression, anxiety, decreased energy. Risks, benefits, alternatives discussed with patient including increased energy, exacerbation of irritability, akathisia, GI upset, orthostatic hypotension, increased appetite. After discussion of these risks and benefits, the patient voiced understanding and agreed to proceed.  S/P:  trazodone 100 mg PO QHS. No longer effective 11/23.  Mirtazapine 45 mg daily (RLS)  Ambien caused double vision, and possibly hallucinations  Was on lithium in the past: dizziness and falls, and hair curled.  Therapy: referred to [facility] 12[facility]7.  Labs/Studies: s/p TMS referral.  Follow Up: 6 weeks. (prefers 4 wks)      TREATMENT PLAN/GOALS: Continue supportive psychotherapy efforts and medications as indicated. Treatment and medication options discussed during today's visit. Patient acknowledged and verbally consented to continue with current treatment plan and was educated on the importance of compliance with treatment and follow-up appointments.    MEDICATION ISSUES:  SAPNA reviewed as expected.  Discussed medication options and treatment plan of prescribed medication as well as the risks, benefits, and side effects including potential falls, possible impaired driving and metabolic adversities among others. Patient is agreeable to call the office with any worsening of symptoms or onset of side effects. Patient is agreeable to call 911 or go to the nearest ER should he/she begin having SI/HI. No medication side effects or related complaints today.     MEDS ORDERED DURING VISIT:  No orders of the defined types were placed in this encounter.      Return in about 4 weeks (around 3/5/2024) for Video visit.         This document has been electronically signed by Vicky Barth MD  February 6, 2024 07:21 EST      Part of this note may be an  electronic transcription/translation of spoken language to printed text using the Dragon Dictation System.

## 2024-02-07 NOTE — TELEPHONE ENCOUNTER
PA APPROVAL RECEIVED FROM INSURANCE FOR PTS ABILIFY 2 MG TABLETS.    APPROVAL DATES ARE: 01/01/2024-02/05/2025.  BEHAVIORAL HEALTH - SCAN - [place] APPR[place]AL ABILIFY 2 MG; [employer]; 02/06/2024. (02/07/2024)     PT INFORMED OF APPROVAL VIA TELEPHONE VIA VOICEMAIL.

## 2024-02-08 ENCOUNTER — OFFICE VISIT (OUTPATIENT)
Dept: PODIATRY | Facility: CLINIC | Age: 74
End: 2024-02-08
Payer: MEDICARE

## 2024-02-08 VITALS
OXYGEN SATURATION: 95 % | TEMPERATURE: 98.3 F | WEIGHT: 171 LBS | HEART RATE: 78 BPM | BODY MASS INDEX: 30.29 KG/M2 | SYSTOLIC BLOOD PRESSURE: 132 MMHG | DIASTOLIC BLOOD PRESSURE: 63 MMHG

## 2024-02-08 DIAGNOSIS — G62.9 NEUROPATHY: ICD-10-CM

## 2024-02-08 DIAGNOSIS — L03.031 PARONYCHIA OF TOE OF RIGHT FOOT: Primary | ICD-10-CM

## 2024-02-08 DIAGNOSIS — Z79.4 TYPE 2 DIABETES MELLITUS WITH DIABETIC POLYNEUROPATHY, WITH LONG-TERM CURRENT USE OF INSULIN: ICD-10-CM

## 2024-02-08 DIAGNOSIS — M79.671 FOOT PAIN, RIGHT: ICD-10-CM

## 2024-02-08 DIAGNOSIS — E11.42 TYPE 2 DIABETES MELLITUS WITH DIABETIC POLYNEUROPATHY, WITH LONG-TERM CURRENT USE OF INSULIN: ICD-10-CM

## 2024-02-08 NOTE — PROGRESS NOTES
[facility] - PODIATRY    Today's Date: 02/08/24    Patient Name: Nivia Cagle  MRN: [id number]  CSN: [id number]  PCP: Catalina Madsen PA-C  Referring Provider: No ref. provider found    SUBJECTIVE     Chief Complaint   Patient presents with    Right Foot - Follow-up, Ingrown Toenail     P&A follow up     Pt went to ED, 2/3/24 , on abx      HPI: Nivia Cagle, a 73 y.o.female, comes to clinic.    New, Established, New Problem: Established  Location: Lateral border of right first toe  Duration:   Greater than 1 week  Onset:  Gradual  Nature:  sore with palpation.  Aggravating factors:  Pain with shoe gear and ambulation.  Previous Treatment: Chemical matrixectomy to affected nail border.    Patient denies any fevers, chills, nausea, vomiting, shortness of breath, nor any other constitutional signs nor symptoms.       Past Medical History:   Diagnosis Date    ADHD (attention deficit hyperactivity disorder)     Allergic rhinitis     Anemia     Anxiety     Cataracts, bilateral     Chronic pain disorder     Depression 0421/2021    MOODNOT WELL CONTROLLED.  GIVEN NUMBER FOR LOCAL COUNSELOR.  WILL INCREASE TRINTELIX FROM 10MG TO 20MG RTC WEEK ER ID S/HI    Diabetes mellitus, type 2     Diverticulitis     GERD (gastroesophageal reflux disease)     Head injury     High blood pressure     Hyperlipemia     Migraine     EARL (obstructive sleep apnea) 04/21/2021    Panic disorder     Phlebitis     PTSD (post-traumatic stress disorder)      Past Surgical History:   Procedure Laterality Date    ANKLE SURGERY  2021    BILATERAL BREAST REDUCTION  2015    BREAST BIOPSY      CARPAL TUNNEL RELEASE      CHOLECYSTECTOMY  2001    COLONOSCOPY      [facility]    COLONOSCOPY N/A 9/28/2022    Procedure: COLONOSCOPY with biopsy;  Surgeon: Ghislaine Kilgore MD;  Location: [facility];  Service: Gastroenterology;  Laterality: N/A;  colon polyp, diverticlosis, hemorrhoids    HYSTERECTOMY      ULNAR NERVE  TRANSPOSITION Right     WRIST SURGERY       Family History   Problem Relation Age of Onset    Kidney cancer Mother     Hyperlipidemia Mother     Heart disease Father     Heart attack Father     Diabetes Father     ADD / ADHD Father     Hyperlipidemia Father     Hyperlipidemia Sister     Cancer Sister     Brain cancer Sister     Lung cancer Sister     Hyperlipidemia Sister     Hyperlipidemia Brother     Diabetes Brother     Heart attack Brother     Hyperlipidemia Brother     No Known Problems Paternal Uncle     No Known Problems Cousin     Malpreet Hyperthermia Neg Hx      Social History     Socioeconomic History    Marital status: Single   Tobacco Use    Smoking status: Former     Types: Cigarettes     Quit date:      Years since quittin.1    Smokeless tobacco: Never    Tobacco comments:     QUIT    Vaping Use    Vaping Use: Never used   Substance and Sexual Activity    Alcohol use: Yes     Comment: rarely    Drug use: Never    Sexual activity: Defer     Allergies   Allergen Reactions    Diclofenac Hives    New Skin [Benzethonium Chloride] Rash    Atorvastatin Myalgia    Adhesive Tape Rash and Other (See Comments)       Rash at area of bandaid    Niacin Rash     Current Outpatient Medications   Medication Sig Dispense Refill    Accu-Chek Madeline Plus test strip by Other route 4 (Four) Times a Day. use to test blood sugar      Accu-Chek Softclix Lancets lancets by Other route 4 (Four) Times a Day. use to test blood sugar      ARIPiprazole (ABILIFY) 2 MG tablet Take 2 tablets by mouth Daily. 180 tablet 3    Ascorbic Acid (VITAMIN C GUMMIE PO) Take  by mouth Daily.      aspirin (aspirin) 81 MG EC tablet Take 1 tablet by mouth Daily. 90 tablet 99    azelastine (ASTELIN) 0.1 % nasal spray 2 sprays into the nostril(s) as directed by provider 2 (Two) Times a Day. Use in each nostril as directed 90 mL 6    BL NASAL SALINE MIST NA 2 drops.      Blood Glucose Monitoring Suppl (FreeStyle Lite) device 1 each by Other  route 4 (Four) Times a Day.      buPROPion XL (WELLBUTRIN XL) 300 MG 24 hr tablet Take 1 tablet by mouth Every Morning. 90 tablet 3    cephalexin (KEFLEX) 500 MG capsule Take 1 capsule by mouth 4 (Four) Times a Day for 7 days. 28 capsule 0    cetirizine (zyrTEC) 10 MG tablet Take 1 tablet by mouth Daily. 90 tablet 1    coenzyme Q10 50 MG capsule capsule Take  by mouth Daily.      cyclobenzaprine (FLEXERIL) 10 MG tablet Take 1 tablet by mouth Daily As Needed for Muscle Spasms. 90 tablet 1    Daily-Jelani Multivitamin tablet tablet Take 1 tablet by mouth every night at bedtime.      doxepin (SINEquan) 10 MG capsule Take 1 capsule by mouth Every Night. 30 capsule 2    ezetimibe (ZETIA) 10 MG tablet TAKE 1 TABLET BY MOUTH EVERY NIGHT AT BEDTIME 90 tablet 1    FLUoxetine (PROzac) 10 MG capsule Take 1 capsule by mouth Daily. 90 capsule 1    FLUoxetine (PROzac) 20 MG capsule Take 1 capsule by mouth Daily. 90 capsule 3    fluticasone (Flonase) 50 MCG/ACT nasal spray 2 sprays into the nostril(s) as directed by provider Daily. Administer 2 sprays in each nostril for each dose. 16 g 6    Inclisiran Sodium (LEQVIO SC) Inject  under the skin into the appropriate area as directed.      Januvia 100 MG tablet TAKE 1 TABLET BY MOUTH DAILY 90 tablet 1    losartan (COZAAR) 25 MG tablet TAKE 1 TABLET BY MOUTH DAILY 90 tablet 1    Melatonin 10 MG tablet Take 2 tablets by mouth Every Night. 2 tabs      metFORMIN (GLUCOPHAGE) 500 MG tablet TAKE 1 TABLET BY MOUTH EVERY MORNING AND 2 TABLETS EVERY EVENING WITH MEALS 635 tablet 0    montelukast (SINGULAIR) 10 MG tablet TAKE 1 TABLET BY MOUTH EVERY NIGHT 90 tablet 1    nitrofurantoin, macrocrystal-monohydrate, (MACROBID) 100 MG capsule Take 1 capsule by mouth 2 (Two) Times a Day. 20 capsule 0    pramipexole (Mirapex) 0.5 MG tablet Take 1 tablet by mouth Every Night. 90 tablet 1    prednisoLONE acetate (Pred Mild) 0.12 % ophthalmic suspension SHAKE LIQUID AND INSTILL 1 DROP IN RIGHT EYE TWICE  DAILY      Zinc 100 MG tablet Take 100 mg by mouth Daily.       No current facility-administered medications for this visit.     Review of Systems   Constitutional: Negative.    Skin:         F/U for Painful Right in-grown toenail   All other systems reviewed and are negative.      OBJECTIVE     Vitals:    02/08/24 1437   BP: 132/63   Pulse: 78   Temp: 98.3 °F (36.8 °C)   SpO2: 95%         PHYSICAL EXAM  GEN:      Foot/Ankle Exam    GENERAL  Appearance:  chronically ill  Orientation:  AAOx3  Affect:  appropriate  Gait:  unimpaired  Assistance:  independent  Right shoe gear: boot  Left shoe gear: boot    VASCULAR     Right Foot Vascularity   Dorsalis pedis:  1+  Posterior tibial:  1+  Skin temperature:  warm  Edema grading:  None  CFT:  < 3 seconds  Pedal hair growth:  Absent  Varicosities:  mild varicosities     Left Foot Vascularity   Dorsalis pedis:  1+  Posterior tibial:  1+  Skin temperature:  warm  Edema grading:  None  CFT:  < 3 seconds  Pedal hair growth:  Absent  Varicosities:  mild varicosities     NEUROLOGIC     Right Foot Neurologic   Light touch sensation: diminished  Vibratory sensation: diminished  Hot/Cold sensation: diminished  Protective Sensation using [place]Dar[place] Monofilament:   Sites intact: 3  Sites tested: 10     Left Foot Neurologic   Normal sensation    Light touch sensation: normal  Vibratory sensation: normal  Hot/Cold sensation:  normal  Protective Sensation using [place]-Dar[place] Monofilament:   Sites intact: 10  Sites tested: 10    MUSCULOSKELETAL     Right Foot Musculoskeletal   Tenderness:  toe 1 tenderness      MUSCLE STRENGTH     Right Foot Muscle Strength   Foot dorsiflexion:  4-  Foot plantar flexion:  4-  Foot inversion:  4-  Foot eversion:  4-     Left Foot Muscle Strength   Foot dorsiflexion:  4-  Foot plantar flexion:  4-  Foot inversion:  4-  Foot eversion:  4-    RANGE OF MOTION     Right Foot Range of Motion   Foot and ankle ROM within normal limits       Left Foot  Range of Motion   Foot and ankle ROM within normal limits      DERMATOLOGIC      Right Foot Dermatologic   Skin  Right foot skin is intact.   Nails  1.  Negative for ingrown toenail and paronychia. (Lateral nail border; healing without complications.)  Nails comment:  Right 1st lateral nail border     Left Foot Dermatologic   Skin  Left foot skin is intact.   Nails comment:  Toenails 1, 2, 3, 4, and 5    ASSESSMENT/PLAN     Diagnoses and all orders for this visit:    1. Paronychia of toe of right foot (Primary)    2. Neuropathy    3. Foot pain, right    4. Type 2 diabetes mellitus with diabetic polyneuropathy, with long-term current use of insulin      Comprehensive lower extremity examination and evaluation was performed.    Discussed findings and treatment plan including risks, benefits, and treatment options with patient in detail. Patient agreed with treatment plan.    Patient is to monitor for recurrence and any new symptoms and to contact Dr. Flores's office for a follow-up appointment.      The patient states understanding and agreement with this plan.    Patient is to monitor for recurrence and any new symptoms and to contact Dr. Flores's office for a follow-up appointment.      The patient states understanding and agreement with this plan.    An After Visit Summary was printed and given to the patient at discharge, including (if requested) any available informative/educational handouts regarding diagnosis, treatment, or medications. All questions were answered to patient/family satisfaction. Should symptoms fail to improve or worsen they agree to call or return to clinic or to go to the Emergency Department. Discussed the importance of following up with any needed screening tests/labs/specialist appointments and any requested follow-up recommended by me today. Importance of maintaining follow-up discussed and patient accepts that missed appointments can delay diagnosis and potentially lead to worsening  of conditions.    Return in about 26 days (around 3/5/2024) for Toenail Care., or sooner if acute issues arise.    This document has been electronically signed by Adarsh Flores DPM on February 8, 2024 14:54 EST

## 2024-02-14 ENCOUNTER — TELEMEDICINE (OUTPATIENT)
Dept: PSYCHIATRY | Facility: CLINIC | Age: 74
End: 2024-02-14
Payer: MEDICARE

## 2024-02-14 DIAGNOSIS — F33.40 RECURRENT MAJOR DEPRESSIVE DISORDER IN REMISSION: Primary | ICD-10-CM

## 2024-02-16 ENCOUNTER — HOSPITAL ENCOUNTER (OUTPATIENT)
Dept: BONE DENSITY | Facility: HOSPITAL | Age: 74
Discharge: HOME | End: 2024-02-16
Payer: MEDICARE

## 2024-02-16 DIAGNOSIS — M85.89 OSTEOPENIA OF MULTIPLE SITES: ICD-10-CM

## 2024-02-16 PROCEDURE — 77080 DXA BONE DENSITY AXIAL: CPT

## 2024-02-21 ENCOUNTER — OFFICE VISIT (OUTPATIENT)
Dept: SLEEP MEDICINE | Facility: HOSPITAL | Age: 74
End: 2024-02-21
Payer: MEDICARE

## 2024-02-21 VITALS
HEART RATE: 83 BPM | HEIGHT: 63 IN | SYSTOLIC BLOOD PRESSURE: 113 MMHG | OXYGEN SATURATION: 96 % | DIASTOLIC BLOOD PRESSURE: 45 MMHG | BODY MASS INDEX: 31.24 KG/M2 | WEIGHT: 176.3 LBS

## 2024-02-21 DIAGNOSIS — E66.9 OBESITY (BMI 30-39.9): ICD-10-CM

## 2024-02-21 DIAGNOSIS — G47.33 OSA (OBSTRUCTIVE SLEEP APNEA): Primary | ICD-10-CM

## 2024-02-21 DIAGNOSIS — I10 ESSENTIAL HYPERTENSION: ICD-10-CM

## 2024-02-21 DIAGNOSIS — G47.63 SLEEP-RELATED BRUXISM: ICD-10-CM

## 2024-02-21 PROCEDURE — 3074F SYST BP LT 130 MM HG: CPT | Performed by: INTERNAL MEDICINE

## 2024-02-21 PROCEDURE — 3078F DIAST BP <80 MM HG: CPT | Performed by: INTERNAL MEDICINE

## 2024-02-21 PROCEDURE — G0463 HOSPITAL OUTPT CLINIC VISIT: HCPCS

## 2024-02-21 NOTE — PROGRESS NOTES
Sleep Consultation    Patient Name: Nivia Cagle  Age/Sex: 73 y.o. female  : 1950  MRN: [id number]    Date of Encounter Visit: 2024  Encounter Provider: Alden Dela Cruz MD  Referring Provider: Catalina Madsen PA-C  Place of Service: [facility]  Patient Care Team:  Catalina Madsen PA-C as PCP - General (Physician Assistant)  Kassandra Garay APRN (Inactive) as Nurse Practitioner (Otolaryngology)    Subjective:     Reason for Consult: Obstructive sleep apnea    History of Present Illness:  Nivia Cagle is a 73 y.o. female is here for evaluation of EARL with prior diagnosis of such currently on no treatment.  Patient had her initial sleep study from 2019, it showed mildly abnormal sleep apnea with overall AHI of 6, her AHI was up to 11 and REM sleep.  Patient was started on CPAP at 5 cm of water that was probably an adequate with residual AHI of 10 based on the download however the patient has not used the CPAP for couple of months now  Patient reported that the CPAP because more nasal congestion, and did not provide any subjective improvement in her sleep apnea symptoms.  She is to discuss alternative treatment options    Patient complains of daytime fatigue and sleepiness with an Carson Sleepiness Scale (ESS) of 13.  Patient complains of typical sleep apnea symptom with loud snoring in all position, waking up with morning headache, night sweats, nocturnal bruxism, sleep paralysis, difficulty initiating and maintaining sleep with frequent awakenings.  Denies any symptoms of restless leg syndrome.   Patient denies any cataplexy, sleep paralysis or other symptoms to suggest narcolepsy.  Patient denies any parasomnias.  Denies any history of seizure disorder or recent head trauma.  Patient spends adequate amount of time in bed with no evidence of sleep restriction or improper sleep hygiene.   Bedtime between 9 PM and 6 AM, sleep onset can be up to 45  minutes  Caffeine intake is usually 2 caffeinated beverages per day, occasional alcohol, former smoker no illicit substance abuse  Comorbidities include: ADHD, insomnia, anxiety and depression, hypertension, restless leg, diabetes    Review of Systems:   A twelve-system review was conducted and was negative except for the following: Swollen ankles, cough, sores in the mouth, neck pain, anxiety and depression and problems swallowing.        Past Medical History:  Past Medical History:   Diagnosis Date    ADHD (attention deficit hyperactivity disorder)     Allergic rhinitis     Anemia     Anxiety     Cataracts, bilateral     Chronic pain disorder     Depression 0421/2021    MOODNOT WELL CONTROLLED.  GIVEN NUMBER FOR LOCAL COUNSELOR.  WILL INCREASE TRINTELIX FROM 10MG TO 20MG RTC WEEK ER ID S/HI    Diabetes mellitus, type 2     Diverticulitis     GERD (gastroesophageal reflux disease)     Head injury     High blood pressure     Hyperlipemia     Insomnia     Migraine     EARL (obstructive sleep apnea) 04/21/2021    Panic disorder     Phlebitis     PTSD (post-traumatic stress disorder)     RLS (restless legs syndrome)        Past Surgical History:   Procedure Laterality Date    ANKLE SURGERY  2021    BILATERAL BREAST REDUCTION  2015    BREAST BIOPSY      CARPAL TUNNEL RELEASE      CHOLECYSTECTOMY  2001    COLONOSCOPY      [facility]    COLONOSCOPY N/A 9/28/2022    Procedure: COLONOSCOPY with biopsy;  Surgeon: Ghislaine Kilgore MD;  Location: [facility] END[facility]COPY;  Service: Gastroenterology;  Laterality: N/A;  colon polyp, diverticlosis, hemorrhoids    HYSTERECTOMY      ULNAR NERVE TRANSPOSITION Right     WRIST SURGERY         Home Medications:     Current Outpatient Medications:     Accu-Chek Madeline Plus test strip, by Other route 4 (Four) Times a Day. use to test blood sugar, Disp: , Rfl:     Accu-Chek Softclix Lancets lancets, by Other route 4 (Four) Times a Day. use to test blood sugar, Disp: , Rfl:      ARIPiprazole (ABILIFY) 2 MG tablet, Take 2 tablets by mouth Daily., Disp: 180 tablet, Rfl: 3    Ascorbic Acid (VITAMIN C GUMMIE PO), Take  by mouth Daily., Disp: , Rfl:     aspirin (aspirin) 81 MG EC tablet, Take 1 tablet by mouth Daily., Disp: 90 tablet, Rfl: 99    azelastine (ASTELIN) 0.1 % nasal spray, 2 sprays into the nostril(s) as directed by provider 2 (Two) Times a Day. Use in each nostril as directed, Disp: 90 mL, Rfl: 6    BL NASAL SALINE MIST NA, 2 drops., Disp: , Rfl:     Blood Glucose Monitoring Suppl (FreeStyle Lite) device, 1 each by Other route 4 (Four) Times a Day., Disp: , Rfl:     buPROPion XL (WELLBUTRIN XL) 300 MG 24 hr tablet, Take 1 tablet by mouth Every Morning., Disp: 90 tablet, Rfl: 3    cetirizine (zyrTEC) 10 MG tablet, Take 1 tablet by mouth Daily., Disp: 90 tablet, Rfl: 1    coenzyme Q10 50 MG capsule capsule, Take  by mouth Daily., Disp: , Rfl:     cyclobenzaprine (FLEXERIL) 10 MG tablet, Take 1 tablet by mouth Daily As Needed for Muscle Spasms., Disp: 90 tablet, Rfl: 1    Daily-Jelani Multivitamin tablet tablet, Take 1 tablet by mouth every night at bedtime., Disp: , Rfl:     doxepin (SINEquan) 10 MG capsule, Take 1 capsule by mouth Every Night., Disp: 30 capsule, Rfl: 2    ezetimibe (ZETIA) 10 MG tablet, TAKE 1 TABLET BY MOUTH EVERY NIGHT AT BEDTIME, Disp: 90 tablet, Rfl: 1    FLUoxetine (PROzac) 10 MG capsule, Take 1 capsule by mouth Daily., Disp: 90 capsule, Rfl: 1    FLUoxetine (PROzac) 20 MG capsule, Take 1 capsule by mouth Daily., Disp: 90 capsule, Rfl: 3    fluticasone (Flonase) 50 MCG/ACT nasal spray, 2 sprays into the nostril(s) as directed by provider Daily. Administer 2 sprays in each nostril for each dose., Disp: 16 g, Rfl: 6    Inclisiran Sodium (LEQVIO SC), Inject  under the skin into the appropriate area as directed., Disp: , Rfl:     Januvia 100 MG tablet, TAKE 1 TABLET BY MOUTH DAILY, Disp: 90 tablet, Rfl: 1    losartan (COZAAR) 25 MG tablet, TAKE 1 TABLET BY MOUTH  DAILY, Disp: 90 tablet, Rfl: 1    Melatonin 10 MG tablet, Take 2 tablets by mouth Every Night. 2 tabs, Disp: , Rfl:     metFORMIN (GLUCOPHAGE) 500 MG tablet, TAKE 1 TABLET BY MOUTH EVERY MORNING AND 2 TABLETS EVERY EVENING WITH MEALS, Disp: 635 tablet, Rfl: 0    montelukast (SINGULAIR) 10 MG tablet, TAKE 1 TABLET BY MOUTH EVERY NIGHT, Disp: 90 tablet, Rfl: 1    pramipexole (Mirapex) 0.5 MG tablet, Take 1 tablet by mouth Every Night., Disp: 90 tablet, Rfl: 1    prednisoLONE acetate (Pred Mild) 0.12 % ophthalmic suspension, SHAKE LIQUID AND INSTILL 1 DROP IN RIGHT EYE TWICE DAILY, Disp: , Rfl:     Zinc 100 MG tablet, Take 100 mg by mouth Daily., Disp: , Rfl:     nitrofurantoin, macrocrystal-monohydrate, (MACROBID) 100 MG capsule, Take 1 capsule by mouth 2 (Two) Times a Day. (Patient not taking: Reported on 2024), Disp: 20 capsule, Rfl: 0    Allergies:  Allergies   Allergen Reactions    Diclofenac Hives    New Skin [Benzethonium Chloride] Rash    Atorvastatin Myalgia    Adhesive Tape Rash and Other (See Comments)       Rash at area of bandaid    Niacin Rash       Past Social History:  Social History     Socioeconomic History    Marital status: Single   Tobacco Use    Smoking status: Former     Types: Cigarettes     Quit date:      Years since quittin.1    Smokeless tobacco: Never    Tobacco comments:     QUIT    Vaping Use    Vaping Use: Never used   Substance and Sexual Activity    Alcohol use: Yes     Comment: rarely    Drug use: Never    Sexual activity: Defer       Past Family History:  Family History   Problem Relation Age of Onset    Kidney cancer Mother     Hyperlipidemia Mother     Heart disease Father     Heart attack Father     Diabetes Father     ADD / ADHD Father     Hyperlipidemia Father     Sleep apnea Father     Restless legs syndrome Father     Hyperlipidemia Sister     Cancer Sister     Brain cancer Sister     Lung cancer Sister     Hyperlipidemia Sister     Hyperlipidemia Brother      "Diabetes Brother     Heart attack Brother     Hyperlipidemia Brother     No Known Problems Paternal Uncle     No Known Problems Cousin     Brenda Hyperthermia Neg Hx      Positive family history for sleep apnea, restless leg and insomnia  Objective:        Vital Signs:   Visit Vitals  /45   Pulse 83   Ht 160 cm (62.99\")   Wt 80 kg (176 lb 4.8 oz)   SpO2 96%   BMI 31.24 kg/m²     Wt Readings from Last 3 Encounters:   02/21/24 80 kg (176 lb 4.8 oz)   02/08/24 77.6 kg (171 lb)   02/03/24 78.5 kg (173 lb)     Neck Circumference: 14 inches    Physical Exam:   GEN:  No acute distress, alert, cooperative, well developed   EYES:   Sclerae clear. No icterus. PERRL. Normal EOM  ENT:   External ears/nose normal, no oral lesions, no thrush, mucous membranes moist, Septum midline. Mallampati III airway, Redundant membranous soft palate  NECK:  Supple, midline trachea, no JVD  LUNGS: Normal chest on inspection, CTAB, no wheezes. No rhonchi. No crackles. Respirations regular, even and unlabored.   CV:  Regular rhythm and rate. Normal S1/S2. No murmurs, gallops, or rubs noted.  ABD:  Soft, nontender and nondistended. Normal bowel sounds. No guarding  EXT:  Moves all extremities well. No cyanosis. No redness. No edema.   Skin: Dry, intact, no bleeding      Diagnostic Data:  In lab polysomnography 6/25/2019  Reported weight on the night of study was 180 pounds  AHI was 6 with a REM AHI of 11, no significant hypoxemia with a yolanda of 89%      CPAP download  9/19/2023- 12/17/2023: Showed no compliance with minimal usage on CPAP of 5 with residual AHI of 10.6    Assessment and Plan:       ICD-10-CM ICD-9-CM   1. EARL (obstructive sleep apnea)  G47.33 327.23   2. Obesity (BMI 30-39.9)  E66.9 278.00   3. Sleep-related bruxism  G47.63 327.53   4. Essential hypertension  I10 401.9       Recommendations:     Patient has mild case of obstructive sleep apnea based on in lab polysomnography  Patient has significant symptoms but marginal " benefit from the CPAP not to see a worsening symptoms because the CPAP make her more comfortable  She does not have any significant cardiovascular history  She already have problem with nocturnal bruxism  Patient might benefit from the oral appliance but that will be contingent on whether it is covered by the insurance or not  Will hold on any further measures with the CPAP and will refer to sleep dentistry  She is definitely not a candidate for the inspire device given how mild her sleep apnea is  Will follow-up with the patient in 4 months    I did discuss with the patient the indication for treatment in her case, we did discuss the impact of her mild sleep apnea on the cardiovascular system which is marginal we did discuss the treatment option including the oral appliance and how it works and she is willing to give it a try  Further recommendation will depend on the follow-up visit, we may reconsider given the CPAP another try since she already has a machine at home specially if she could not get the oral appliance.    Orders Placed This Encounter   Procedures    Ambulatory Referral to Dentistry    SCANNED - SLEEP STUDY EXTERNAL     No orders of the defined types were placed in this encounter.     Return in about 4 months (around 6/21/2024).    Alden Dela Cruz MD   [facility]   02/21/24  14:11 EST    Dictated utilizing Dragon dictation

## 2024-03-04 DIAGNOSIS — F51.05 INSOMNIA DUE TO MENTAL CONDITION: ICD-10-CM

## 2024-03-04 RX ORDER — DOXEPIN HYDROCHLORIDE 10 MG/1
10 CAPSULE ORAL NIGHTLY
Qty: 30 CAPSULE | Refills: 5 | Status: SHIPPED | OUTPATIENT
Start: 2024-03-04

## 2024-03-04 RX ORDER — CETIRIZINE HYDROCHLORIDE 10 MG/1
10 TABLET ORAL DAILY
Qty: 90 TABLET | Refills: 1 | Status: SHIPPED | OUTPATIENT
Start: 2024-03-04

## 2024-03-04 NOTE — TELEPHONE ENCOUNTER
NEXT VISIT WITH PROVIDER   Appointment with Vicky Barth MD (03/05/2024)     LAST SEEN BY PROVIDER   Telemedicine with Vicky Barth MD (02/06/2024)     LAST MED REFILL  doxepin (SINEquan) 10 MG capsule (11/09/2023)   Dispense Quantity: 30 capsule Refills: 2          Sig: Take 1 capsule by mouth Every Night.     PROVIDER PLEASE ADVISE

## 2024-03-04 NOTE — PROGRESS NOTES
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 09/07/2023 15.4  7.0 - 25.0 Final    Anion Gap 09/07/2023 10.9  5.0 - 15.0 mmol/L Final    eGFR 09/07/2023 68.0  >60.0 mL/min/1.73 Final    TSH 09/07/2023 1.480  0.270 - 4.200 uIU/mL Final    Total Cholesterol 09/07/2023 111  0 - 200 mg/dL Final    Triglycerides 09/07/2023 93  0 - 150 mg/dL Final    HDL Cholesterol 09/07/2023 50  40 - 60 mg/dL Final    LDL Cholesterol  09/07/2023 43  0 - 100 mg/dL Final    VLDL Cholesterol 09/07/2023 18  5 - 40 mg/dL Final    LDL/HDL Ratio 09/07/2023 0.85   Final    Hemoglobin A1C 09/07/2023 5.90 (H)  4.80 - 5.60 % Final    Magnesium 09/07/2023 1.7  1.6 - 2.4 mg/dL Final    WBC 09/07/2023 7.45  3.40 - 10.80 10*3/mm3 Final    RBC 09/07/2023 3.94  3.77 - 5.28 10*6/mm3 Final    Hemoglobin 09/07/2023 12.2  12.0 - 15.9 g/dL Final    Hematocrit 09/07/2023 36.8  34.0 - 46.6 % Final    MCV 09/07/2023 93.4  79.0 - 97.0 fL Final    MCH 09/07/2023 31.0  26.6 - 33.0 pg Final    MCHC 09/07/2023 33.2  31.5 - 35.7 g/dL Final    RDW 09/07/2023 12.2 (L)  12.3 - 15.4 % Final    RDW-SD 09/07/2023 42.2  37.0 - 54.0 fl Final    MPV 09/07/2023 12.0  6.0 - 12.0 fL Final    Platelets 09/07/2023 239  140 - 450 10*3/mm3 Final    Neutrophil % 09/07/2023 66.1   "42.7 - 76.0 % Final    Lymphocyte % 09/07/2023 20.4  19.6 - 45.3 % Final    Monocyte % 09/07/2023 10.2  5.0 - 12.0 % Final    Eosinophil % 09/07/2023 2.3  0.3 - 6.2 % Final    Basophil % 09/07/2023 0.7  0.0 - 1.5 % Final    Immature Grans % 09/07/2023 0.3  0.0 - 0.5 % Final    Neutrophils, Absolute 09/07/2023 4.93  1.70 - 7.00 10*3/mm3 Final    Lymphocytes, Absolute 09/07/2023 1.52  0.70 - 3.10 10*3/mm3 Final    Monocytes, Absolute 09/07/2023 0.76  0.10 - 0.90 10*3/mm3 Final    Eosinophils, Absolute 09/07/2023 0.17  0.00 - 0.40 10*3/mm3 Final    Basophils, Absolute 09/07/2023 0.05  0.00 - 0.20 10*3/mm3 Final    Immature Grans, Absolute 09/07/2023 0.02  0.00 - 0.05 10*3/mm3 Final    nRBC 09/07/2023 0.0  0.0 - 0.2 /100 WBC Final       EKG Results:  No orders to display       Imaging Results:  No Images in the past 120 days found..      Assessment & Plan   Diagnoses and all orders for this visit:    1. 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 Generalized anxiety disorder    3. Recurrent major depressive disorder in remission    4. Post traumatic stress disorder (PTSD)      INITIAL presentation most consistent with major depressive disorder, recurrent, moderate to severe, with anxious distress.  PTSD.  Rule out personality disorder, cluster B specifically.  Rule out hypomania as patient was very difficult to interrupt today.    3/4: Pt ist stable, but lost her 20 mg dose of prozac. Re-sent in. Insurance won't pay for abilify. DC abilify. Some unusual issues with swallowing. Processed dreams about her mother. Mom isn't happy, \"it's not good enough.\"    Allowed patient to freely discuss and process issues, such as:  Ongoing: Painting as a coping strategy, fulfilling hobby.  Ongoing: Procrastinating as a measure of how well she is doing. Not procrastinating.  Ongoing: The importance of Jainism  Hx of childhood trauma. Dreams of Mom, disappointed, are worse around Xmas time, when it was worse for pt growing up (she " was left out).  ... using Rogerian psychotherapeutic techniques including unconditional positive regard, reflective listening, and demonstrating clear empathy.     Time (minutes) spent providing supportive psychotherapy: 16  (This time is exclusive to the therapy session and separate from the time spent on activities used to meet the criteria for the E/M service (history, exam, medical decision-making).)  Functional status: mild impairment  Treatment plan: Medication management and supportive psychotherapy  Prognosis: good  Progress: maintenance insomnia, not depressed anymore  4w    2/6: Stable, discussed dietary changes involving decreasing sugar. Sugar is comforting and helps her depression. Also discussed stevia. Now using light box and sleeping a little better.    1/5: Seasonal depression, start light box up again. May have to make further med changes.    12/14: Add melatonin for maintenance insomnia. Otherwise stable. Seeing a movie for [email or web address]. Got all her Xmas cards mailed out.    11/9: Doing well, isolated insomnia. Stop trazodone and start doxepin. Consider lunesta, belsomra, quiviviq. She stopped melatonin on her own.    8/11: Stable, well, no changes. Painting, having people over. Counseled to start light box in September, reiterated instructions. Will be inducted into the [employer] confronternity of [facility] and [facility] tomorrow. She's been working on it for a year.    7/11: Short visit as patient sleepy. Unusual sleep pattern recently, but MH is fine. Pt will call her son tonight to ensure someone is around when she goes back to sleep. She will call PCP about this tomorrow. Close follow up with me in 4 wks.    4/27: Stable, well. Restart melatonin for insomnia.     3/2: Prozac and BLT helped. Situational stressor in son, no changes. Better. Watch insomnia.     1/20: Start light box, increase prozac for dep.     12/9: Some insomnia. Increase melatonin.     10/25: Doing well. Increase prozac back to  baseline dose (inadvertently reduced, she has been stable on a higher dose, so we should go back to that). Some initial insomnia, increase trazodone to 75 mg nightly. She is enjoying the Fall.     9/8: Start light box. Close follow up in case this is worsening depression.     7/27: Well, stable.     6/14: Better, no changes.     5/3: Increase prozac and melatonin.    Visit Diagnoses:    ICD-10-CM ICD-9-CM   1. Insomnia due to mental condition  F51.05 300.9     327.02   2. Generalized anxiety disorder  F41.1 300.02   3. Recurrent major depressive disorder in remission  F33.40 296.35   4. Post traumatic stress disorder (PTSD)  F43.10 309.81       PLAN:  Risk Assessment: Risk of self-harm acutely is moderate. Risk factors include chronic depressive disorder, possible personality disorder, recent psychosocial stressors (pandemic, moving). Protective factors include no present SI, no history of suicide attempts or self-harm in the past, no access to weapons, minimal AODA, healthcare seeking, future orientation, willingness to engage in care. Risk of self-harm chronically is also moderate, but could be further elevated in the event of treatment noncompliance and/or AODA.  Safety: No acute safety concerns.  Medications:   CONTINUE light box 9/1 - 3/31.  CONTINUE melatonin 30 mg p.o. nightly.  Wasn't sleeping even on it plus trazodone 11/23. Risks, benefits, side effects discussed with patient including sedation, dizziness/falls risk, GI upset.  Do not use before operating vehicle, vessel, or machine. After discussion of these risks and benefits, the patient voiced understanding and agreed to proceed.   CONTINUE doxepin 10 mg qhs. Risks, benefits, side effects discussed with patient including GI upset, sedation, dizziness/falls risk, grogginess the following day, prolongation of the QTc interval.  After discussion of these risks and benefits, the patient voiced understanding and agreed to proceed.    CONTINUE bupropion xl  300 mg daily. Risks, benefits, alternatives discussed with patient including nausea, GI upset, increased energy, exacerbation of irritability, insomnia, lowering of seizure threshold.  After discussion of these risks and benefits, the patient voiced understanding and agreed to proceed.  CONTINUE Prozac 30 mg a day (HIGHEST dose is 40 given that she is also on bupropion). Risks, benefits, alternatives discussed with patient including GI upset, nausea vomiting diarrhea, theoretical decrease of seizure threshold predisposing the patient to a slightly higher seizure risk, headaches, sexual dysfunction, serotonin syndrome, bleeding risk, increased suicidality in patients 24 years and younger.  After discussion of these risks and benefits, the patient voiced understanding and agreed to proceed.  CONTINUE Abilify 4 mg p.o. daily to target depression, anxiety, decreased energy. Risks, benefits, alternatives discussed with patient including increased energy, exacerbation of irritability, akathisia, GI upset, orthostatic hypotension, increased appetite. After discussion of these risks and benefits, the patient voiced understanding and agreed to proceed.  S/P:  trazodone 100 mg PO QHS. No longer effective 11/23.  Mirtazapine 45 mg daily (RLS)  Ambien caused double vision, and possibly hallucinations  Was on lithium in the past: dizziness and falls, and hair curled.  Therapy: referred to [facility].  Labs/Studies: s/p TMS referral.  Follow Up: 6 weeks. (prefers 4 wks)      TREATMENT PLAN/GOALS: Continue supportive psychotherapy efforts and medications as indicated. Treatment and medication options discussed during today's visit. Patient acknowledged and verbally consented to continue with current treatment plan and was educated on the importance of compliance with treatment and follow-up appointments.    MEDICATION ISSUES:  SAPNA reviewed as expected.  Discussed medication options and treatment plan of prescribed  medication as well as the risks, benefits, and side effects including potential falls, possible impaired driving and metabolic adversities among others. Patient is agreeable to call the office with any worsening of symptoms or onset of side effects. Patient is agreeable to call 911 or go to the nearest ER should he/she begin having SI/HI. No medication side effects or related complaints today.     MEDS ORDERED DURING VISIT:  No orders of the defined types were placed in this encounter.      No follow-ups on file.         This document has been electronically signed by Vicky Barth MD  March 4, 2024 15:04 EST      Part of this note may be an electronic transcription/translation of spoken language to printed text using the Dragon Dictation System.

## 2024-03-04 NOTE — PATIENT INSTRUCTIONS
1.  Please return to clinic at your next scheduled visit.  Contact the clinic ([phone]) at least 24 hours prior in the event you need to cancel.  2.  Do no harm to yourself or others.    3.  Avoid alcohol and drugs.    4.  Take all medications as prescribed.  Please contact the clinic with any concerns. If you are in need of medication refills, please call the clinic at [phone].    5. Should you want to get in touch with your provider, Dr. Vicky Barth, please utilize [email or web address] or contact the office ([phone]), and staff will be able to page Dr. Barth directly.  6.  In the event you have personal crisis, contact the following crisis numbers: Suicide Prevention Hotline 1-723.801.9205; [facility] Helpline 3-271-700-[facility]; [facility] Emergency Room [phone]; text HELLO to 547939; or 017.

## 2024-03-05 ENCOUNTER — TELEMEDICINE (OUTPATIENT)
Dept: PSYCHIATRY | Facility: CLINIC | Age: 74
End: 2024-03-05
Payer: MEDICARE

## 2024-03-05 ENCOUNTER — OFFICE VISIT (OUTPATIENT)
Dept: PODIATRY | Facility: CLINIC | Age: 74
End: 2024-03-05
Payer: MEDICARE

## 2024-03-05 VITALS
OXYGEN SATURATION: 98 % | BODY MASS INDEX: 30.65 KG/M2 | DIASTOLIC BLOOD PRESSURE: 65 MMHG | WEIGHT: 173 LBS | SYSTOLIC BLOOD PRESSURE: 113 MMHG | TEMPERATURE: 98.4 F | HEART RATE: 70 BPM

## 2024-03-05 DIAGNOSIS — M79.672 FOOT PAIN, BILATERAL: ICD-10-CM

## 2024-03-05 DIAGNOSIS — E11.42 TYPE 2 DIABETES MELLITUS WITH DIABETIC POLYNEUROPATHY, WITH LONG-TERM CURRENT USE OF INSULIN: ICD-10-CM

## 2024-03-05 DIAGNOSIS — G62.9 NEUROPATHY: ICD-10-CM

## 2024-03-05 DIAGNOSIS — F33.40 RECURRENT MAJOR DEPRESSIVE DISORDER IN REMISSION: ICD-10-CM

## 2024-03-05 DIAGNOSIS — E11.8 DM FEET: ICD-10-CM

## 2024-03-05 DIAGNOSIS — B35.1 ONYCHOMYCOSIS: ICD-10-CM

## 2024-03-05 DIAGNOSIS — F51.05 INSOMNIA DUE TO MENTAL CONDITION: Primary | ICD-10-CM

## 2024-03-05 DIAGNOSIS — F41.1 GENERALIZED ANXIETY DISORDER: ICD-10-CM

## 2024-03-05 DIAGNOSIS — Z79.4 TYPE 2 DIABETES MELLITUS WITH DIABETIC POLYNEUROPATHY, WITH LONG-TERM CURRENT USE OF INSULIN: ICD-10-CM

## 2024-03-05 DIAGNOSIS — M79.671 FOOT PAIN, BILATERAL: ICD-10-CM

## 2024-03-05 DIAGNOSIS — L60.0 ONYCHOCRYPTOSIS: Primary | ICD-10-CM

## 2024-03-05 DIAGNOSIS — F43.10 POST TRAUMATIC STRESS DISORDER (PTSD): ICD-10-CM

## 2024-03-05 PROCEDURE — 1160F RVW MEDS BY RX/DR IN RCRD: CPT | Performed by: PODIATRIST

## 2024-03-05 PROCEDURE — 3078F DIAST BP <80 MM HG: CPT | Performed by: PODIATRIST

## 2024-03-05 PROCEDURE — 1159F MED LIST DOCD IN RCRD: CPT | Performed by: PODIATRIST

## 2024-03-05 PROCEDURE — 3074F SYST BP LT 130 MM HG: CPT | Performed by: PODIATRIST

## 2024-03-05 PROCEDURE — 11721 DEBRIDE NAIL 6 OR MORE: CPT | Performed by: PODIATRIST

## 2024-03-05 RX ORDER — FLUOXETINE HYDROCHLORIDE 20 MG/1
20 CAPSULE ORAL DAILY
Qty: 90 CAPSULE | Refills: 3 | Status: SHIPPED | OUTPATIENT
Start: 2024-03-05

## 2024-03-05 NOTE — PROGRESS NOTES
[facility] - PODIATRY    Today's Date: 03/05/24    Patient Name: Nivia Cagle  MRN: [id number]  CSN: [id number]  PCP: Catalina Madsen PA-C, Last PCP Visit:  12/5/2023  Referring Provider: No ref. provider found    SUBJECTIVE     Chief Complaint   Patient presents with    Left Foot - Follow-up, Nail Problem    Right Foot - Follow-up, Nail Problem     She had a right great toe P and A 1/24/24     HPI: Nivia Cagle, a 73 y.o.female, presents to clinic for painful toenail and a diabetic foot evaluation.    New, Established, New Problem:  New problem  Location:  Toenails  Duration:   Greater than five years  Onset:  Gradual  Nature:  sore with palpation.  Stable, worsening, improving:   worsening  Aggravating factors:  Pain with shoe gear and ambulation.  Previous Treatment: Unable to trim their own toenails.    Patient controlling diabetes via:  NIDDM    Patient states their last blood glucose was: 148    Patient denies any fevers, chills, nausea, vomiting, shortness of breath, nor any other constitutional signs nor symptoms.    Medical changes: None    Past Medical History:   Diagnosis Date    ADHD (attention deficit hyperactivity disorder)     Allergic rhinitis     Anemia     Anxiety     Cataracts, bilateral     Chronic pain disorder     Depression 0421/2021    MOODNOT WELL CONTROLLED.  GIVEN NUMBER FOR LOCAL COUNSELOR.  WILL INCREASE TRINTELIX FROM 10MG TO 20MG RTC WEEK ER ID S/HI    Diabetes mellitus, type 2     Diverticulitis     GERD (gastroesophageal reflux disease)     Head injury     High blood pressure     Hyperlipemia     Insomnia     Migraine     EARL (obstructive sleep apnea) 04/21/2021    Panic disorder     Phlebitis     PTSD (post-traumatic stress disorder)     RLS (restless legs syndrome)      Past Surgical History:   Procedure Laterality Date    ANKLE SURGERY  2021    BILATERAL BREAST REDUCTION  2015    BREAST BIOPSY      CARPAL TUNNEL RELEASE      CHOLECYSTECTOMY  2001     COLONOSCOPY      [facility]    COLONOSCOPY N/A 2022    Procedure: COLONOSCOPY with biopsy;  Surgeon: Ghislaine Kilgore MD;  Location: [facility] ENDOSCOPY;  Service: Gastroenterology;  Laterality: N/A;  colon polyp, diverticlosis, hemorrhoids    HYSTERECTOMY      ULNAR NERVE TRANSPOSITION Right     WRIST SURGERY       Family History   Problem Relation Age of Onset    Kidney cancer Mother     Hyperlipidemia Mother     Heart disease Father     Heart attack Father     Diabetes Father     ADD / ADHD Father     Hyperlipidemia Father     Sleep apnea Father     Restless legs syndrome Father     Hyperlipidemia Sister     Cancer Sister     Brain cancer Sister     Lung cancer Sister     Hyperlipidemia Sister     Hyperlipidemia Brother     Diabetes Brother     Heart attack Brother     Hyperlipidemia Brother     No Known Problems Paternal Uncle     No Known Problems Cousin     Malig Hyperthermia Neg Hx      Social History     Socioeconomic History    Marital status: Single   Tobacco Use    Smoking status: Former     Current packs/day: 0.00     Types: Cigarettes     Quit date:      Years since quittin.2    Smokeless tobacco: Never    Tobacco comments:     QUIT    Vaping Use    Vaping status: Never Used   Substance and Sexual Activity    Alcohol use: Yes     Comment: rarely    Drug use: Never    Sexual activity: Defer     Allergies   Allergen Reactions    Diclofenac Hives    New Skin [Benzethonium Chloride] Rash    Atorvastatin Myalgia    Adhesive Tape Rash and Other (See Comments)       Rash at area of bandaid    Niacin Rash     Current Outpatient Medications   Medication Sig Dispense Refill    Accu-Chek Madeline Plus test strip by Other route 4 (Four) Times a Day. use to test blood sugar      Accu-Chek Softclix Lancets lancets by Other route 4 (Four) Times a Day. use to test blood sugar      Ascorbic Acid (VITAMIN C GUMMIE PO) Take  by mouth Daily.      aspirin (aspirin) 81 MG EC tablet Take 1 tablet by  mouth Daily. 90 tablet 99    azelastine (ASTELIN) 0.1 % nasal spray 2 sprays into the nostril(s) as directed by provider 2 (Two) Times a Day. Use in each nostril as directed 90 mL 6    BL NASAL SALINE MIST NA 2 drops.      Blood Glucose Monitoring Suppl (FreeStyle Lite) device 1 each by Other route 4 (Four) Times a Day.      buPROPion XL (WELLBUTRIN XL) 300 MG 24 hr tablet Take 1 tablet by mouth Every Morning. 90 tablet 3    cetirizine (zyrTEC) 10 MG tablet TAKE 1 TABLET BY MOUTH EVERY DAY 90 tablet 1    coenzyme Q10 50 MG capsule capsule Take  by mouth Daily.      cyclobenzaprine (FLEXERIL) 10 MG tablet Take 1 tablet by mouth Daily As Needed for Muscle Spasms. 90 tablet 1    Daily-Jelani Multivitamin tablet tablet Take 1 tablet by mouth every night at bedtime.      doxepin (SINEquan) 10 MG capsule TAKE 1 CAPSULE BY MOUTH EVERY NIGHT 30 capsule 5    ezetimibe (ZETIA) 10 MG tablet TAKE 1 TABLET BY MOUTH EVERY NIGHT AT BEDTIME 90 tablet 1    FLUoxetine (PROzac) 10 MG capsule Take 1 capsule by mouth Daily. 90 capsule 1    FLUoxetine (PROzac) 20 MG capsule Take 1 capsule by mouth Daily. 90 capsule 3    fluticasone (Flonase) 50 MCG/ACT nasal spray 2 sprays into the nostril(s) as directed by provider Daily. Administer 2 sprays in each nostril for each dose. 16 g 6    Inclisiran Sodium (LEQVIO SC) Inject  under the skin into the appropriate area as directed.      Januvia 100 MG tablet TAKE 1 TABLET BY MOUTH DAILY 90 tablet 1    losartan (COZAAR) 25 MG tablet TAKE 1 TABLET BY MOUTH DAILY 90 tablet 1    Melatonin 10 MG tablet Take 2 tablets by mouth Every Night. 2 tabs      metFORMIN (GLUCOPHAGE) 500 MG tablet TAKE 1 TABLET BY MOUTH EVERY MORNING AND 2 TABLETS EVERY EVENING WITH MEALS 635 tablet 0    montelukast (SINGULAIR) 10 MG tablet TAKE 1 TABLET BY MOUTH EVERY NIGHT 90 tablet 1    pramipexole (Mirapex) 0.5 MG tablet Take 1 tablet by mouth Every Night. 90 tablet 1    prednisoLONE acetate (Pred Mild) 0.12 % ophthalmic  suspension SHAKE LIQUID AND INSTILL 1 DROP IN RIGHT EYE TWICE DAILY      Zinc 100 MG tablet Take 100 mg by mouth Daily.      ARIPiprazole (ABILIFY) 2 MG tablet Take 2 tablets by mouth Daily. (Patient not taking: Reported on 3/5/2024) 180 tablet 3    nitrofurantoin, macrocrystal-monohydrate, (MACROBID) 100 MG capsule Take 1 capsule by mouth 2 (Two) Times a Day. (Patient not taking: Reported on 2/21/2024) 20 capsule 0     No current facility-administered medications for this visit.     Review of Systems   Constitutional: Negative.    Skin:         Painful toenails.   All other systems reviewed and are negative.      OBJECTIVE     Vitals:    03/05/24 0911   BP: 113/65   Pulse: 70   Temp: 98.4 °F (36.9 °C)   SpO2: 98%       Body mass index is 30.65 kg/m².    Lab Results   Component Value Date    HGBA1C 6.40 (H) 12/07/2023       Lab Results   Component Value Date    GLUCOSE 101 (H) 12/07/2023    CALCIUM 9.7 12/07/2023     12/07/2023    K 4.5 12/07/2023    CO2 27.9 12/07/2023     12/07/2023    BUN 19 12/07/2023    CREATININE 1.09 (H) 12/07/2023    EGFRIFNONA 68 02/15/2022    BCR 17.4 12/07/2023    ANIONGAP 9.1 12/07/2023       Patient seen in no apparent distress.      PHYSICAL EXAM:     Foot/Ankle Exam    GENERAL  Appearance:  chronically ill  Orientation:  AAOx3  Affect:  appropriate  Gait:  unimpaired  Assistance:  independent  Right shoe gear: casual shoe  Left shoe gear: casual shoe    VASCULAR     Right Foot Vascularity   Dorsalis pedis:  1+  Posterior tibial:  1+  Skin temperature:  warm  Edema grading:  None  CFT:  < 3 seconds  Pedal hair growth:  Absent  Varicosities:  mild varicosities     Left Foot Vascularity   Dorsalis pedis:  1+  Posterior tibial:  1+  Skin temperature:  warm  Edema grading:  None  CFT:  < 3 seconds  Pedal hair growth:  Absent  Varicosities:  mild varicosities     NEUROLOGIC     Right Foot Neurologic   Light touch sensation: diminished  Vibratory sensation: diminished  Hot/Cold  sensation: diminished  Protective Sensation using Wayne-Darling Monofilament:   Sites intact: 3  Sites tested: 10     Left Foot Neurologic   Normal sensation    Light touch sensation: normal  Vibratory sensation: normal  Hot/Cold sensation:  normal  Protective Sensation using Wayne-Darling Monofilament:   Sites intact: 10  Sites tested: 10    MUSCLE STRENGTH     Right Foot Muscle Strength   Foot dorsiflexion:  4-  Foot plantar flexion:  4-  Foot inversion:  4-  Foot eversion:  4-     Left Foot Muscle Strength   Foot dorsiflexion:  4-  Foot plantar flexion:  4-  Foot inversion:  4-  Foot eversion:  4-    RANGE OF MOTION     Right Foot Range of Motion   Foot and ankle ROM within normal limits       Left Foot Range of Motion   Foot and ankle ROM within normal limits      DERMATOLOGIC      Right Foot Dermatologic   Skin  Right foot skin is intact.   Nails  2.  Positive for elongated, onychomycosis, abnormal thickness, subungual debris and ingrown toenail.  3.  Positive for elongated, onychomycosis, abnormal thickness, subungual debris and ingrown toenail.  4.  Positive for elongated, onychomycosis, abnormal thickness, subungual debris and ingrown toenail.  5.  Positive for elongated, onychomycosis, abnormal thickness, subungual debris and ingrown toenail.  Nails comment:  Toenails 1, 2, 3, 4, and 5     Left Foot Dermatologic   Skin  Left foot skin is intact.   Nails comment:  Toenails 1, 2, 3, 4, and 5  Nails  1.  Positive for elongated, onychomycosis, abnormal thickness, subungual debris and ingrown toenail.  2.  Positive for elongated, onychomycosis, abnormal thickness, subungual debris and ingrown toenail.  3.  Positive for elongated, onychomycosis, abnormal thickness, subungual debris and ingrown toenail.  4.  Positive for elongated, onychomycosis, abnormally thick, subungual debris and ingrown toenail.  5.  Positive for elongated, onychomycosis, abnormally thick, subungual debris and ingrown toenail.    I  have reexamined the patient the results are consistent with the previously documented exam.    ASSESSMENT/PLAN     Diagnoses and all orders for this visit:    1. Onychocryptosis (Primary)    2. Onychomycosis    3. Neuropathy    4. Type 2 diabetes mellitus with diabetic polyneuropathy, with long-term current use of insulin    5. Foot pain, bilateral    6. DM feet        Comprehensive lower extremity examination and evaluation was performed.    Discussed findings and treatment plan including risks, benefits, and treatment options with patient in detail. Patient agreed with treatment plan.    Medications and allergies reviewed.  Reviewed available blood glucose and HgB A1C lab values along with other pertinent labs.  These were discussed with the patient as to their importance of diabetic maintenance.    Toenails 2, 3, 4, 5 on Right and 1, 2, 3, 4, 5 on Left were debrided with nail nippers then filed with a Dremel nail radha.  Patient tolerated procedure well without complications.    An After Visit Summary was printed and given to the patient at discharge, including (if requested) any available informative/educational handouts regarding diagnosis, treatment, or medications. All questions were answered to patient/family satisfaction. Should symptoms fail to improve or worsen they agree to call or return to clinic or to go to the Emergency Department. Discussed the importance of following up with any needed screening tests/labs/specialist appointments and any requested follow-up recommended by me today. Importance of maintaining follow-up discussed and patient accepts that missed appointments can delay diagnosis and potentially lead to worsening of conditions.    Return in about 9 weeks (around 5/7/2024) for Toenail Care., or sooner if acute issues arise.    This document has been electronically signed by Adarsh Flores DPM on March 5, 2024 09:42 EST

## 2024-03-07 ENCOUNTER — OFFICE VISIT (OUTPATIENT)
Dept: INTERNAL MEDICINE | Facility: CLINIC | Age: 74
End: 2024-03-07
Payer: MEDICARE

## 2024-03-07 VITALS
OXYGEN SATURATION: 97 % | HEART RATE: 83 BPM | WEIGHT: 177 LBS | HEIGHT: 63 IN | SYSTOLIC BLOOD PRESSURE: 108 MMHG | TEMPERATURE: 98.2 F | RESPIRATION RATE: 16 BRPM | DIASTOLIC BLOOD PRESSURE: 64 MMHG | BODY MASS INDEX: 31.36 KG/M2

## 2024-03-07 DIAGNOSIS — Z91.81 HISTORY OF FALLING: ICD-10-CM

## 2024-03-07 DIAGNOSIS — E11.65 TYPE 2 DIABETES MELLITUS WITH HYPERGLYCEMIA, WITHOUT LONG-TERM CURRENT USE OF INSULIN: ICD-10-CM

## 2024-03-07 DIAGNOSIS — E78.2 MIXED HYPERLIPIDEMIA: ICD-10-CM

## 2024-03-07 DIAGNOSIS — H53.8 BLURRY VISION: ICD-10-CM

## 2024-03-07 DIAGNOSIS — I10 ESSENTIAL HYPERTENSION: Primary | ICD-10-CM

## 2024-03-07 PROBLEM — H26.40 SECONDARY CATARACT: Status: ACTIVE | Noted: 2024-01-17

## 2024-03-07 PROBLEM — H26.499 AFTER-CATARACT WITH VISION OBSCURED: Status: ACTIVE | Noted: 2023-10-18

## 2024-03-07 PROBLEM — Z00.00 MEDICARE ANNUAL WELLNESS VISIT, SUBSEQUENT: Status: RESOLVED | Noted: 2022-05-24 | Resolved: 2024-03-07

## 2024-03-07 PROBLEM — N30.01 ACUTE CYSTITIS WITH HEMATURIA: Status: RESOLVED | Noted: 2022-06-15 | Resolved: 2024-03-07

## 2024-03-07 PROBLEM — Z98.890 OTHER SPECIFIED POSTPROCEDURAL STATES: Status: RESOLVED | Noted: 2021-12-03 | Resolved: 2024-03-07

## 2024-03-07 PROBLEM — H04.123 DRY EYES: Status: ACTIVE | Noted: 2023-04-05

## 2024-03-07 RX ORDER — LOSARTAN POTASSIUM 25 MG/1
12.5 TABLET ORAL DAILY
Qty: 90 TABLET | Refills: 1 | Status: SHIPPED | OUTPATIENT
Start: 2024-03-07

## 2024-03-07 RX ORDER — BIOTIN 10 MG
TABLET ORAL
COMMUNITY

## 2024-03-07 NOTE — PROGRESS NOTES
Chief Complaint  Diabetes, blurry vision, balance issues    Subjective          Nivia Cagle presents to [facility]  History of Present Illness  Pt here with numerous concerns     Pt has been having blurry vision more recently  She has appt 3/15 with optho. She has seen them regularly for this issue.  She has had blurry vision over the past year  States sugar is elevated when blurry vision occurs- usually around 250s  This am bg was 150s.  Currently taking Januvia and 2 [id number] at night  She is eating more but has not changed diet  She eats a lot of starch  She drinks 2 bottles of water/day  At times she feels light headed. Sx worse when going from sitting to standing  Denies low bg.  She feels unsteady on her feet at times  Denies recent falls  She is interested in a cane  She is concerned about hair and nail thinning   Denies chest pain, palpitations, ha  Past Medical History:   Diagnosis Date    ADHD (attention deficit hyperactivity disorder)     Allergic rhinitis     Anemia     Anxiety     Cataracts, bilateral     Chronic pain disorder     Depression 0421/2021    MOODNOT WELL CONTROLLED.  GIVEN NUMBER FOR LOCAL COUNSELOR.  WILL INCREASE TRINTELIX FROM 10MG TO 20MG RTC WEEK ER ID S/HI    Diabetes mellitus, type 2     Diverticulitis     GERD (gastroesophageal reflux disease)     Head injury     High blood pressure     Hyperlipemia     Insomnia     Migraine     EARL (obstructive sleep apnea) 04/21/2021    Panic disorder     Phlebitis     PTSD (post-traumatic stress disorder)     RLS (restless legs syndrome)         Past Surgical History:   Procedure Laterality Date    ANKLE SURGERY  2021    BILATERAL BREAST REDUCTION  2015    BREAST BIOPSY      CARPAL TUNNEL RELEASE      CHOLECYSTECTOMY  2001    COLONOSCOPY      [facility]    COLONOSCOPY N/A 9/28/2022    Procedure: COLONOSCOPY with biopsy;  Surgeon: Ghislaine Kilgore MD;  Location: [facility]  ENDOSCOPY;  Service: Gastroenterology;  Laterality: N/A;  colon polyp, diverticlosis, hemorrhoids    HYSTERECTOMY      ULNAR NERVE TRANSPOSITION Right     WRIST SURGERY          Current Outpatient Medications on File Prior to Visit   Medication Sig Dispense Refill    Accu-Chek Madeline Plus test strip by Other route 4 (Four) Times a Day. use to test blood sugar      Accu-Chek Softclix Lancets lancets by Other route 4 (Four) Times a Day. use to test blood sugar      Ascorbic Acid (VITAMIN C GUMMIE PO) Take  by mouth Daily.      aspirin (aspirin) 81 MG EC tablet Take 1 tablet by mouth Daily. 90 tablet 99    azelastine (ASTELIN) 0.1 % nasal spray 2 sprays into the nostril(s) as directed by provider 2 (Two) Times a Day. Use in each nostril as directed 90 mL 6    Biotin 10 MG tablet Take  by mouth.      BL NASAL SALINE MIST NA 2 drops.      Blood Glucose Monitoring Suppl (FreeStyle Lite) device 1 each by Other route 4 (Four) Times a Day.      buPROPion XL (WELLBUTRIN XL) 300 MG 24 hr tablet Take 1 tablet by mouth Every Morning. 90 tablet 3    cetirizine (zyrTEC) 10 MG tablet TAKE 1 TABLET BY MOUTH EVERY DAY 90 tablet 1    coenzyme Q10 50 MG capsule capsule Take  by mouth Daily.      cyclobenzaprine (FLEXERIL) 10 MG tablet Take 1 tablet by mouth Daily As Needed for Muscle Spasms. 90 tablet 1    Daily-Jelani Multivitamin tablet tablet Take 1 tablet by mouth every night at bedtime.      doxepin (SINEquan) 10 MG capsule TAKE 1 CAPSULE BY MOUTH EVERY NIGHT 30 capsule 5    ezetimibe (ZETIA) 10 MG tablet TAKE 1 TABLET BY MOUTH EVERY NIGHT AT BEDTIME 90 tablet 1    FLUoxetine (PROzac) 10 MG capsule Take 1 capsule by mouth Daily. 90 capsule 1    FLUoxetine (PROzac) 20 MG capsule Take 1 capsule by mouth Daily. 90 capsule 3    fluticasone (Flonase) 50 MCG/ACT nasal spray 2 sprays into the nostril(s) as directed by provider Daily. Administer 2 sprays in each nostril for each dose. 16 g 6    Inclisiran Sodium (LEQVIO SC) Inject  under the  "skin into the appropriate area as directed.      Januvia 100 MG tablet TAKE 1 TABLET BY MOUTH DAILY 90 tablet 1    Melatonin 10 MG tablet Take 2 tablets by mouth Every Night. 2 tabs      metFORMIN (GLUCOPHAGE) 500 MG tablet TAKE 1 TABLET BY MOUTH EVERY MORNING AND 2 TABLETS EVERY EVENING WITH MEALS 635 tablet 0    montelukast (SINGULAIR) 10 MG tablet TAKE 1 TABLET BY MOUTH EVERY NIGHT 90 tablet 1    pramipexole (Mirapex) 0.5 MG tablet Take 1 tablet by mouth Every Night. 90 tablet 1    prednisoLONE acetate (Pred Mild) 0.12 % ophthalmic suspension SHAKE LIQUID AND INSTILL 1 DROP IN RIGHT EYE TWICE DAILY      Zinc 100 MG tablet Take 100 mg by mouth Daily.      [DISCONTINUED] losartan (COZAAR) 25 MG tablet TAKE 1 TABLET BY MOUTH DAILY 90 tablet 1    [DISCONTINUED] ARIPiprazole (ABILIFY) 2 MG tablet Take 2 tablets by mouth Daily. 180 tablet 3    [DISCONTINUED] nitrofurantoin, macrocrystal-monohydrate, (MACROBID) 100 MG capsule Take 1 capsule by mouth 2 (Two) Times a Day. 20 capsule 0     No current facility-administered medications on file prior to visit.        Allergies   Allergen Reactions    Diclofenac Hives    New Skin [Benzethonium Chloride] Rash    Atorvastatin Myalgia    Adhesive Tape Rash and Other (See Comments)       Rash at area of bandaid    Niacin Rash       Social History     Tobacco Use   Smoking Status Former    Current packs/day: 0.25    Average packs/day: 0.3 packs/day for 49.2 years (12.3 ttl pk-yrs)    Types: Cigarettes    Start date: 1975   Smokeless Tobacco Never   Tobacco Comments    QUIT 1988          Objective   Vital Signs:   /64 (BP Location: Left arm, Patient Position: Sitting, Cuff Size: Adult)   Pulse 83   Temp 98.2 °F (36.8 °C) (Temporal)   Resp 16   Ht 160 cm (62.99\")   Wt 80.3 kg (177 lb)   SpO2 97%   BMI 31.36 kg/m²     Physical Exam  Vitals reviewed.   Constitutional:       Appearance: Normal appearance.   HENT:      Head: Normocephalic and atraumatic.      Nose: Nose " normal.      Mouth/Throat:      Mouth: Mucous membranes are moist.   Eyes:      Extraocular Movements: Extraocular movements intact.      Conjunctiva/sclera: Conjunctivae normal.      Pupils: Pupils are equal, round, and reactive to light.   Cardiovascular:      Rate and Rhythm: Normal rate and regular rhythm.   Pulmonary:      Effort: Pulmonary effort is normal.      Breath sounds: Normal breath sounds.   Abdominal:      General: Abdomen is flat. Bowel sounds are normal.      Palpations: Abdomen is soft.   Musculoskeletal:         General: Normal range of motion.   Neurological:      General: No focal deficit present.      Mental Status: She is alert and oriented to person, place, and time.   Psychiatric:         Mood and Affect: Mood normal.        Result Review :                            Assessment and Plan    Diagnoses and all orders for this visit:    1. Essential hypertension (Primary)  Comments:  discussed low bp. Will decrease losartan 25 mg to 1/2 tab daily. Change position slowly. Increase water intake. Will recheck in 1 month.  Orders:  -     Comprehensive Metabolic Panel  -     CBC & Differential  -     TSH    2. Type 2 diabetes mellitus with hyperglycemia, without long-term current use of insulin  Comments:  Labs today, will adjust medicine if indicated based on results. Pt will go ahead and increase metformin to 500mg in am and 1000mg in pm  Orders:  -     Hemoglobin A1c    3. Mixed hyperlipidemia  -     Lipid Panel    4. History of falling  Comments:  will get cane to help with balance  Orders:  -     Cancel: Cane  Quad or Three Prong Cane with Tips  -     Cane  Quad or Three Prong Cane with Tips    5. Blurry vision  Comments:  Pt est with optho for this issue. Keep upcoming appt next wk. To er if sx worsen, vision loss, unilateral weakness, confusion.    Other orders  -     losartan (COZAAR) 25 MG tablet; Take 0.5 tablets by mouth Daily.  Dispense: 90 tablet; Refill: 1        Follow Up    Return in about 1 month (around 4/7/2024).  Patient was given instructions and counseling regarding her condition or for health maintenance advice. Please see specific information pulled into the AVS if appropriate.

## 2024-03-12 ENCOUNTER — TELEMEDICINE (OUTPATIENT)
Dept: PSYCHIATRY | Facility: CLINIC | Age: 74
End: 2024-03-12
Payer: MEDICARE

## 2024-03-12 DIAGNOSIS — F43.10 POST TRAUMATIC STRESS DISORDER (PTSD): Primary | ICD-10-CM

## 2024-03-14 ENCOUNTER — CLINICAL SUPPORT (OUTPATIENT)
Dept: INTERNAL MEDICINE | Facility: CLINIC | Age: 74
End: 2024-03-14
Payer: MEDICARE

## 2024-03-14 DIAGNOSIS — D64.9 ANEMIA, UNSPECIFIED TYPE: Primary | ICD-10-CM

## 2024-03-14 DIAGNOSIS — E78.5 HYPERLIPIDEMIA LDL GOAL <70: ICD-10-CM

## 2024-03-14 DIAGNOSIS — E11.9 DIABETES MELLITUS TYPE 2 WITHOUT RETINOPATHY: ICD-10-CM

## 2024-03-14 LAB
ALBUMIN SERPL-MCNC: 4.2 G/DL (ref 3.5–5.2)
ALBUMIN/GLOB SERPL: 1.8 G/DL
ALP SERPL-CCNC: 77 U/L (ref 39–117)
ALT SERPL W P-5'-P-CCNC: 20 U/L (ref 1–33)
ANION GAP SERPL CALCULATED.3IONS-SCNC: 10.8 MMOL/L (ref 5–15)
AST SERPL-CCNC: 17 U/L (ref 1–32)
BASOPHILS # BLD AUTO: 0.03 10*3/MM3 (ref 0–0.2)
BASOPHILS NFR BLD AUTO: 0.4 % (ref 0–1.5)
BILIRUB SERPL-MCNC: 0.7 MG/DL (ref 0–1.2)
BUN SERPL-MCNC: 17 MG/DL (ref 8–23)
BUN/CREAT SERPL: 15.9 (ref 7–25)
CALCIUM SPEC-SCNC: 9.1 MG/DL (ref 8.6–10.5)
CHLORIDE SERPL-SCNC: 104 MMOL/L (ref 98–107)
CHOLEST SERPL-MCNC: 139 MG/DL (ref 0–200)
CO2 SERPL-SCNC: 26.2 MMOL/L (ref 22–29)
CREAT SERPL-MCNC: 1.07 MG/DL (ref 0.57–1)
DEPRECATED RDW RBC AUTO: 41.5 FL (ref 37–54)
EGFRCR SERPLBLD CKD-EPI 2021: 55.6 ML/MIN/1.73
EOSINOPHIL # BLD AUTO: 0.15 10*3/MM3 (ref 0–0.4)
EOSINOPHIL NFR BLD AUTO: 1.8 % (ref 0.3–6.2)
ERYTHROCYTE [DISTWIDTH] IN BLOOD BY AUTOMATED COUNT: 12.6 % (ref 12.3–15.4)
GLOBULIN UR ELPH-MCNC: 2.4 GM/DL
GLUCOSE SERPL-MCNC: 118 MG/DL (ref 65–99)
HBA1C MFR BLD: 6.4 % (ref 4.8–5.6)
HCT VFR BLD AUTO: 38.5 % (ref 34–46.6)
HDLC SERPL-MCNC: 60 MG/DL (ref 40–60)
HGB BLD-MCNC: 12.9 G/DL (ref 12–15.9)
IMM GRANULOCYTES # BLD AUTO: 0.03 10*3/MM3 (ref 0–0.05)
IMM GRANULOCYTES NFR BLD AUTO: 0.4 % (ref 0–0.5)
LDLC SERPL CALC-MCNC: 58 MG/DL (ref 0–100)
LDLC/HDLC SERPL: 0.93 {RATIO}
LYMPHOCYTES # BLD AUTO: 1.29 10*3/MM3 (ref 0.7–3.1)
LYMPHOCYTES NFR BLD AUTO: 15.6 % (ref 19.6–45.3)
MCH RBC QN AUTO: 30.6 PG (ref 26.6–33)
MCHC RBC AUTO-ENTMCNC: 33.5 G/DL (ref 31.5–35.7)
MCV RBC AUTO: 91.4 FL (ref 79–97)
MONOCYTES # BLD AUTO: 0.82 10*3/MM3 (ref 0.1–0.9)
MONOCYTES NFR BLD AUTO: 9.9 % (ref 5–12)
NEUTROPHILS NFR BLD AUTO: 5.93 10*3/MM3 (ref 1.7–7)
NEUTROPHILS NFR BLD AUTO: 71.9 % (ref 42.7–76)
NRBC BLD AUTO-RTO: 0 /100 WBC (ref 0–0.2)
PLATELET # BLD AUTO: 239 10*3/MM3 (ref 140–450)
PMV BLD AUTO: 11.1 FL (ref 6–12)
POTASSIUM SERPL-SCNC: 4.1 MMOL/L (ref 3.5–5.2)
PROT SERPL-MCNC: 6.6 G/DL (ref 6–8.5)
RBC # BLD AUTO: 4.21 10*6/MM3 (ref 3.77–5.28)
SODIUM SERPL-SCNC: 141 MMOL/L (ref 136–145)
TRIGL SERPL-MCNC: 117 MG/DL (ref 0–150)
TSH SERPL DL<=0.05 MIU/L-ACNC: 2.11 UIU/ML (ref 0.27–4.2)
VLDLC SERPL-MCNC: 21 MG/DL (ref 5–40)
WBC NRBC COR # BLD AUTO: 8.25 10*3/MM3 (ref 3.4–10.8)

## 2024-03-14 PROCEDURE — 83036 HEMOGLOBIN GLYCOSYLATED A1C: CPT | Performed by: PHYSICIAN ASSISTANT

## 2024-03-14 PROCEDURE — 85025 COMPLETE CBC W/AUTO DIFF WBC: CPT | Performed by: PHYSICIAN ASSISTANT

## 2024-03-14 PROCEDURE — 80061 LIPID PANEL: CPT | Performed by: PHYSICIAN ASSISTANT

## 2024-03-14 PROCEDURE — 36415 COLL VENOUS BLD VENIPUNCTURE: CPT | Performed by: PHYSICIAN ASSISTANT

## 2024-03-14 PROCEDURE — 80053 COMPREHEN METABOLIC PANEL: CPT | Performed by: PHYSICIAN ASSISTANT

## 2024-03-14 PROCEDURE — 84443 ASSAY THYROID STIM HORMONE: CPT | Performed by: PHYSICIAN ASSISTANT

## 2024-03-14 NOTE — PROGRESS NOTES
Patient confirmed that she had an upset stomach yesterday followed by vomiting.  She confirmed she had not ate much in the last 24 hours.  Confirmed she is feeling better this morning and she will be seeing provider this week.  Told her I would make a note of this as well.        Venipuncture Blood Specimen Collection  Venipuncture performed in [facility] by Kamla Hyde RN with good hemostasis. Patient tolerated the procedure well without complications.   03/14/24   Kamla Hyde RN
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
## 2024-04-03 NOTE — PATIENT INSTRUCTIONS
1.  Please return to clinic at your next scheduled visit.  Contact the clinic ([phone]) at least 24 hours prior in the event you need to cancel.  2.  Do no harm to yourself or others.    3.  Avoid alcohol and drugs.    4.  Take all medications as prescribed.  Please contact the clinic with any concerns. If you are in need of medication refills, please call the clinic at [phone].    5. Should you want to get in touch with your provider, Dr. Vicky Barth, please utilize [employer] or contact the office ([phone]), and staff will be able to page Dr. Barth directly.  6.  In the event you have personal crisis, contact the following crisis numbers: Suicide Prevention Hotline 1-162.401.6691; [facility] Helpline 5-560-253-[place]; [facility] Emergency Room [phone]; text HELLO to 736180; or 433.

## 2024-04-03 NOTE — PROGRESS NOTES
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 12/07/2023 7.48  3.40 - 10.80 10*3/mm3 Final    RBC 12/07/2023 4.23  3.77 - 5.28 10*6/mm3 Final    Hemoglobin 12/07/2023 12.9  12.0 - 15.9 g/dL Final    Hematocrit 12/07/2023 38.4  34.0 - 46.6 % Final    MCV 12/07/2023 90.8  79.0 - 97.0 fL Final    MCH 12/07/2023 30.5  26.6 - 33.0 pg Final    MCHC 12/07/2023 33.6  31.5 - 35.7 g/dL Final    RDW 12/07/2023 12.2 (L)  12.3 - 15.4 % Final    RDW-SD 12/07/2023 40.4  37.0 - 54.0 fl Final    MPV 12/07/2023 11.5  6.0 - 12.0 fL Final    Platelets 12/07/2023 251  140 - 450 10*3/mm3 Final    Neutrophil % 12/07/2023 62.9  42.7 - 76.0 % Final    Lymphocyte % 12/07/2023 21.7  19.6 - 45.3 % Final    Monocyte % 12/07/2023 11.6  5.0 - 12.0 % Final    Eosinophil % 12/07/2023 2.7  0.3 - 6.2 % Final    Basophil % 12/07/2023 0.7  0.0 - 1.5 % Final    Immature Grans % 12/07/2023 0.4  0.0 - 0.5 % Final    Neutrophils, Absolute 12/07/2023 4.71  1.70 - 7.00 10*3/mm3 Final    Lymphocytes, Absolute 12/07/2023 1.62  0.70 - 3.10 10*3/mm3 Final    Monocytes, Absolute 12/07/2023 0.87  0.10 - 0.90 10*3/mm3 Final    Eosinophils, Absolute 12/07/2023 0.20  0.00 - 0.40 10*3/mm3 Final    Basophils, Absolute 12/07/2023 0.05  0.00 - 0.20 10*3/mm3  Final    Immature Grans, Absolute 12/07/2023 0.03  0.00 - 0.05 10*3/mm3 Final    nRBC 12/07/2023 0.0  0.0 - 0.2 /100 WBC Final   Lab on 11/28/2023   Component Date Value Ref Range Status    Creatinine 11/28/2023 0.92  0.57 - 1.00 mg/dL Final    eGFR 11/28/2023 66.7  >60.0 mL/min/1.73 Final    25 Hydroxy, Vitamin D 11/28/2023 58.5  30.0 - 100.0 ng/ml Final    Calcium 11/28/2023 9.6  8.6 - 10.5 mg/dL Final   Admission on 11/26/2023, Discharged on 11/26/2023   Component Date Value Ref Range Status    Color 11/26/2023 Yellow   Final    Clarity, UA 11/26/2023 Cloudy (A)   Final    Glucose, UA 11/26/2023 Negative  mg/dL Final    Bilirubin 11/26/2023 Negative   Final    Ketones, UA 11/26/2023 Negative   Final    Specific Gravity  11/26/2023 1.025  1.005 - 1.030 Final    Blood, UA 11/26/2023 Large (A)   Final    pH, Urine 11/26/2023 5.5  5.0 - 8.0 Final    Protein, POC 11/26/2023 30 mg/dL (A)  mg/dL Final    Urobilinogen, UA 11/26/2023 0.2 E.U./dL   Final    Nitrite, UA 11/26/2023 Negative   Final    Leukocytes 11/26/2023 Moderate (2+) (A)   Final    Urine Culture 11/26/2023 50,000 CFU/mL Escherichia coli (A)   Final   Lab on 11/15/2023   Component Date Value Ref Range Status    C-Telopeptides 11/15/2023 265  pg/mL Final    Reference Range:  Premenopausal Women: 34 - 635  Postmenopausal Women: 34 - 1037       EKG Results:  No orders to display       Imaging Results:  No Images in the past 120 days found..      Assessment & Plan   Diagnoses and all orders for this visit:    1. Post traumatic stress disorder (PTSD) (Primary)    2. Insomnia due to mental condition    3. Generalized anxiety disorder    4. Recurrent major depressive disorder in remission      INITIAL presentation most consistent with major depressive disorder, recurrent, moderate to severe, with anxious distress.  PTSD.  Rule out personality disorder, cluster B specifically.  Rule out hypomania as patient was very difficult to interrupt today.    4/3: Stable,  "but monitor if worsening depression creeps in.    Allowed patient to freely discuss and process issues, such as:  Ongoing: Painting as a coping strategy, fulfilling hobby.  Ongoing: Procrastinating as a measure of how well she is doing. Not procrastinating.  Ongoing: The importance of Yarsanism  Hx of childhood trauma. Dreams of Mom, disappointed, are worse around Xmas time, when it was worse for pt growing up (she was left out).  ... using Rogerian psychotherapeutic techniques including unconditional positive regard, reflective listening, and demonstrating clear empathy.     Time (minutes) spent providing supportive psychotherapy: 16  (This time is exclusive to the therapy session and separate from the time spent on activities used to meet the criteria for the E/M service (history, exam, medical decision-making).)  Functional status: mild impairment  Treatment plan: Medication management and supportive psychotherapy  Prognosis: good  Progress: stable  4w    3/4: Pt ist stable, but lost her 20 mg dose of prozac. Re-sent in. Insurance won't pay for abilify. DC abilify. Some unusual issues with swallowing. Processed dreams about her mother. Mom isn't happy, \"it's not good enough.\"    2/6: Stable, discussed dietary changes involving decreasing sugar. Sugar is comforting and helps her depression. Also discussed stevia. Now using light box and sleeping a little better.    1/5: Seasonal depression, start light box up again. May have to make further med changes.    12/14: Add melatonin for maintenance insomnia. Otherwise stable. Seeing a movie for [employer]. Got all her Xmas cards mailed out.    11/9: Doing well, isolated insomnia. Stop trazodone and start doxepin. Consider lunesta, belsomra, quiviviq. She stopped melatonin on her own.    8/11: Stable, well, no changes. Painting, having people over. Counseled to start light box in September, reiterated instructions. Will be inducted into the [employer] confronternity of [facility] " and [employer] tomorrow. She's been working on it for a year.    7/11: Short visit as patient sleepy. Unusual sleep pattern recently, but MH is fine. Pt will call her son tonight to ensure someone is around when she goes back to sleep. She will call PCP about this tomorrow. Close follow up with me in 4 wks.    4/27: Stable, well. Restart melatonin for insomnia.     3/2: Prozac and BLT helped. Situational stressor in son, no changes. Better. Watch insomnia.     1/20: Start light box, increase prozac for dep.     12/9: Some insomnia. Increase melatonin.     10/25: Doing well. Increase prozac back to baseline dose (inadvertently reduced, she has been stable on a higher dose, so we should go back to that). Some initial insomnia, increase trazodone to 75 mg nightly. She is enjoying the Fall.     9/8: Start light box. Close follow up in case this is worsening depression.     7/27: Well, stable.     6/14: Better, no changes.     5/3: Increase prozac and melatonin.    Visit Diagnoses:    ICD-10-CM ICD-9-CM   1. Post traumatic stress disorder (PTSD)  F43.10 309.81   2. Insomnia due to mental condition  F51.05 300.9     327.02   3. Generalized anxiety disorder  F41.1 300.02   4. Recurrent major depressive disorder in remission  F33.40 296.35       PLAN:  Risk Assessment: Risk of self-harm acutely is moderate. Risk factors include chronic depressive disorder, possible personality disorder, recent psychosocial stressors (pandemic, moving). Protective factors include no present SI, no history of suicide attempts or self-harm in the past, no access to weapons, minimal AODA, healthcare seeking, future orientation, willingness to engage in care. Risk of self-harm chronically is also moderate, but could be further elevated in the event of treatment noncompliance and/or AODA.  Safety: No acute safety concerns.  Medications:   CONTINUE light box 9/1 - 3/31.  CONTINUE melatonin 30 mg p.o. nightly.  Wasn't sleeping even on it plus  trazodone 11/23. Risks, benefits, side effects discussed with patient including sedation, dizziness/falls risk, GI upset.  Do not use before operating vehicle, vessel, or machine. After discussion of these risks and benefits, the patient voiced understanding and agreed to proceed.   CONTINUE doxepin 10 mg qhs. Risks, benefits, side effects discussed with patient including GI upset, sedation, dizziness/falls risk, grogginess the following day, prolongation of the QTc interval.  After discussion of these risks and benefits, the patient voiced understanding and agreed to proceed.    CONTINUE bupropion xl 300 mg daily. Risks, benefits, alternatives discussed with patient including nausea, GI upset, increased energy, exacerbation of irritability, insomnia, lowering of seizure threshold.  After discussion of these risks and benefits, the patient voiced understanding and agreed to proceed.  CONTINUE Prozac 30 mg a day (HIGHEST dose is 40 given that she is also on bupropion). Risks, benefits, alternatives discussed with patient including GI upset, nausea vomiting diarrhea, theoretical decrease of seizure threshold predisposing the patient to a slightly higher seizure risk, headaches, sexual dysfunction, serotonin syndrome, bleeding risk, increased suicidality in patients 24 years and younger.  After discussion of these risks and benefits, the patient voiced understanding and agreed to proceed.  STOPPED Abilify 4 mg p.o. daily cost, 3/24.  S/P:  trazodone 100 mg PO QHS. No longer effective 11/23.  Mirtazapine 45 mg daily (RLS)  Ambien caused double vision, and possibly hallucinations  Was on lithium in the past: dizziness and falls, and hair curled.  Therapy: referred to [facility] 12[facility]7.  Labs/Studies: s/p TMS referral.  Follow Up: 6 weeks. (prefers 4 wks)      TREATMENT PLAN/GOALS: Continue supportive psychotherapy efforts and medications as indicated. Treatment and medication options discussed during today's visit. Patient  acknowledged and verbally consented to continue with current treatment plan and was educated on the importance of compliance with treatment and follow-up appointments.    MEDICATION ISSUES:  SAPNA reviewed as expected.  Discussed medication options and treatment plan of prescribed medication as well as the risks, benefits, and side effects including potential falls, possible impaired driving and metabolic adversities among others. Patient is agreeable to call the office with any worsening of symptoms or onset of side effects. Patient is agreeable to call 911 or go to the nearest ER should he/she begin having SI/HI. No medication side effects or related complaints today.     MEDS ORDERED DURING VISIT:  No orders of the defined types were placed in this encounter.      Return in about 4 weeks (around 5/2/2024) for Video visit.         This document has been electronically signed by Vicky Barth MD  April 4, 2024 08:47 EDT      Part of this note may be an electronic transcription/translation of spoken language to printed text using the Dragon Dictation System.

## 2024-04-04 ENCOUNTER — TELEMEDICINE (OUTPATIENT)
Dept: PSYCHIATRY | Facility: CLINIC | Age: 74
End: 2024-04-04
Payer: MEDICARE

## 2024-04-04 DIAGNOSIS — F43.10 POST TRAUMATIC STRESS DISORDER (PTSD): Primary | ICD-10-CM

## 2024-04-04 DIAGNOSIS — F33.40 RECURRENT MAJOR DEPRESSIVE DISORDER IN REMISSION: ICD-10-CM

## 2024-04-04 DIAGNOSIS — F41.1 GENERALIZED ANXIETY DISORDER: ICD-10-CM

## 2024-04-04 DIAGNOSIS — F51.05 INSOMNIA DUE TO MENTAL CONDITION: ICD-10-CM

## 2024-04-11 ENCOUNTER — OFFICE VISIT (OUTPATIENT)
Dept: INTERNAL MEDICINE | Facility: CLINIC | Age: 74
End: 2024-04-11
Payer: MEDICARE

## 2024-04-11 VITALS
HEART RATE: 93 BPM | SYSTOLIC BLOOD PRESSURE: 120 MMHG | OXYGEN SATURATION: 97 % | TEMPERATURE: 97.2 F | DIASTOLIC BLOOD PRESSURE: 64 MMHG | WEIGHT: 174 LBS | BODY MASS INDEX: 30.83 KG/M2 | HEIGHT: 63 IN | RESPIRATION RATE: 15 BRPM

## 2024-04-11 DIAGNOSIS — M54.50 ACUTE BILATERAL LOW BACK PAIN WITHOUT SCIATICA: Primary | ICD-10-CM

## 2024-04-11 DIAGNOSIS — E11.65 TYPE 2 DIABETES MELLITUS WITH HYPERGLYCEMIA, WITHOUT LONG-TERM CURRENT USE OF INSULIN: ICD-10-CM

## 2024-04-11 RX ORDER — KETOROLAC TROMETHAMINE 30 MG/ML
60 INJECTION, SOLUTION INTRAMUSCULAR; INTRAVENOUS ONCE
Status: COMPLETED | OUTPATIENT
Start: 2024-04-11 | End: 2024-04-11

## 2024-04-11 RX ORDER — NAPROXEN 500 MG/1
500 TABLET ORAL 2 TIMES DAILY WITH MEALS
Qty: 30 TABLET | Refills: 0 | Status: SHIPPED | OUTPATIENT
Start: 2024-04-11 | End: 2024-04-26

## 2024-04-11 RX ADMIN — KETOROLAC TROMETHAMINE 60 MG: 30 INJECTION, SOLUTION INTRAMUSCULAR; INTRAVENOUS at 09:47

## 2024-04-11 NOTE — PROGRESS NOTES
Chief Complaint  Diabetes and Hypertension    Subjective          Nivia Cagle presents to [facility]  History of Present Illness  Pt here for follow up   She had surgery on R eye and blurry vision has improved.   She states bg spikes would make the blurry vision worse but her sugars recently have improved   Last A1c 6.4  Avg bg 124    Pt c/o pain in lower back since yesterday   Denies known injury, trauma, falls   Pt was bending over yesterday working on her oven before symptoms started   Denies incontinence or saddle anesthesia   Denies numbness/tingling down legs  Pt c/o cramping in L leg last night, took cyclobenzaprine last night which helped  She tried tylenol for back with minimal relief     Past Medical History:   Diagnosis Date    ADHD (attention deficit hyperactivity disorder)     Allergic rhinitis     Anemia     Anxiety     Cataracts, bilateral     Chronic pain disorder     Depression 0421/2021    MOODNOT WELL CONTROLLED.  GIVEN NUMBER FOR LOCAL COUNSELOR.  WILL INCREASE TRINTELIX FROM 10MG TO 20MG RTC WEEK ER ID S/HI    Diabetes mellitus, type 2     Diverticulitis     GERD (gastroesophageal reflux disease)     Head injury     High blood pressure     Hyperlipemia     Insomnia     Migraine     EARL (obstructive sleep apnea) 04/21/2021    Panic disorder     Phlebitis     PTSD (post-traumatic stress disorder)     RLS (restless legs syndrome)         Past Surgical History:   Procedure Laterality Date    ANKLE SURGERY  2021    BILATERAL BREAST REDUCTION  2015    BREAST BIOPSY      CARPAL TUNNEL RELEASE      CHOLECYSTECTOMY  2001    COLONOSCOPY      [facility]    COLONOSCOPY N/A 9/28/2022    Procedure: COLONOSCOPY with biopsy;  Surgeon: Ghislaine Kilgore MD;  Location: [facility];  Service: Gastroenterology;  Laterality: N/A;  colon polyp, diverticlosis, hemorrhoids    HYSTERECTOMY      ULNAR NERVE TRANSPOSITION Right     WRIST SURGERY           Current Outpatient Medications on File Prior to Visit   Medication Sig Dispense Refill    Accu-Chek Madeline Plus test strip by Other route 4 (Four) Times a Day. use to test blood sugar      Accu-Chek Softclix Lancets lancets by Other route 4 (Four) Times a Day. use to test blood sugar      Ascorbic Acid (VITAMIN C GUMMIE PO) Take  by mouth Daily.      aspirin (aspirin) 81 MG EC tablet Take 1 tablet by mouth Daily. 90 tablet 99    azelastine (ASTELIN) 0.1 % nasal spray 2 sprays into the nostril(s) as directed by provider 2 (Two) Times a Day. Use in each nostril as directed 90 mL 6    Biotin 10 MG tablet Take  by mouth.      BL NASAL SALINE MIST NA 2 drops.      Blood Glucose Monitoring Suppl (FreeStyle Lite) device 1 each by Other route 4 (Four) Times a Day.      buPROPion XL (WELLBUTRIN XL) 300 MG 24 hr tablet Take 1 tablet by mouth Every Morning. 90 tablet 3    cetirizine (zyrTEC) 10 MG tablet TAKE 1 TABLET BY MOUTH EVERY DAY 90 tablet 1    coenzyme Q10 50 MG capsule capsule Take  by mouth Daily.      cyclobenzaprine (FLEXERIL) 10 MG tablet Take 1 tablet by mouth Daily As Needed for Muscle Spasms. 90 tablet 1    Daily-Jelani Multivitamin tablet tablet Take 1 tablet by mouth every night at bedtime.      doxepin (SINEquan) 10 MG capsule TAKE 1 CAPSULE BY MOUTH EVERY NIGHT 30 capsule 5    ezetimibe (ZETIA) 10 MG tablet TAKE 1 TABLET BY MOUTH EVERY NIGHT AT BEDTIME 90 tablet 1    FLUoxetine (PROzac) 10 MG capsule Take 1 capsule by mouth Daily. 90 capsule 1    FLUoxetine (PROzac) 20 MG capsule Take 1 capsule by mouth Daily. 90 capsule 3    fluticasone (Flonase) 50 MCG/ACT nasal spray 2 sprays into the nostril(s) as directed by provider Daily. Administer 2 sprays in each nostril for each dose. 16 g 6    Inclisiran Sodium (LEQVIO SC) Inject  under the skin into the appropriate area as directed.      Januvia 100 MG tablet TAKE 1 TABLET BY MOUTH DAILY 90 tablet 1    losartan (COZAAR) 25 MG tablet Take 0.5 tablets by mouth  "Daily. 90 tablet 1    Melatonin 10 MG tablet Take 2 tablets by mouth Every Night. 2 tabs      montelukast (SINGULAIR) 10 MG tablet TAKE 1 TABLET BY MOUTH EVERY NIGHT 90 tablet 1    pramipexole (Mirapex) 0.5 MG tablet Take 1 tablet by mouth Every Night. 90 tablet 1    prednisoLONE acetate (Pred Mild) 0.12 % ophthalmic suspension SHAKE LIQUID AND INSTILL 1 DROP IN RIGHT EYE TWICE DAILY      Zinc 100 MG tablet Take 100 mg by mouth Daily.       No current facility-administered medications on file prior to visit.        Allergies   Allergen Reactions    Diclofenac Hives    New Skin [Benzethonium Chloride] Rash    Atorvastatin Myalgia    Adhesive Tape Rash and Other (See Comments)       Rash at area of bandaid    Niacin Rash       Social History     Tobacco Use   Smoking Status Former    Current packs/day: 0.25    Average packs/day: 0.2 packs/day for 49.3 years (12.3 ttl pk-yrs)    Types: Cigarettes    Start date: 1975   Smokeless Tobacco Never   Tobacco Comments    QUIT 1988          Objective   Vital Signs:   /64 (BP Location: Right arm, Patient Position: Sitting, Cuff Size: Adult)   Pulse 93   Temp 97.2 °F (36.2 °C) (Temporal)   Resp 15   Ht 160 cm (62.99\")   Wt 78.9 kg (174 lb)   SpO2 97%   BMI 30.83 kg/m²     Physical Exam  Vitals reviewed.   Constitutional:       Appearance: Normal appearance.   HENT:      Head: Normocephalic and atraumatic.      Nose: Nose normal.      Mouth/Throat:      Mouth: Mucous membranes are moist.   Eyes:      Extraocular Movements: Extraocular movements intact.      Conjunctiva/sclera: Conjunctivae normal.      Pupils: Pupils are equal, round, and reactive to light.   Cardiovascular:      Rate and Rhythm: Normal rate and regular rhythm.   Pulmonary:      Effort: Pulmonary effort is normal.      Breath sounds: Normal breath sounds.   Abdominal:      General: Abdomen is flat. Bowel sounds are normal.      Palpations: Abdomen is soft.   Musculoskeletal:         General: Normal " range of motion.   Neurological:      General: No focal deficit present.      Mental Status: She is alert and oriented to person, place, and time.   Psychiatric:         Mood and Affect: Mood normal.        Result Review :   The following data was reviewed by: Catalina Madsen PA-C on 04/11/2024:  Common labs          11/28/2023    13:59 12/7/2023    11:51 3/14/2024    08:34   Common Labs   Glucose  101  118    BUN  19  17    Creatinine 0.92  1.09  1.07    Sodium  139  141    Potassium  4.5  4.1    Chloride  102  104    Calcium 9.6  9.7  9.1    Albumin  4.6  4.2    Total Bilirubin  0.4  0.7    Alkaline Phosphatase  80  77    AST (SGOT)  23  17    ALT (SGPT)  18  20    WBC  7.48  8.25    Hemoglobin  12.9  12.9    Hematocrit  38.4  38.5    Platelets  251  239    Total Cholesterol  140  139    Triglycerides  105  117    HDL Cholesterol  70  60    LDL Cholesterol   51  58    Hemoglobin A1C  6.40  6.40                           Assessment and Plan    Diagnoses and all orders for this visit:    1. Acute bilateral low back pain without sciatica (Primary)  Assessment & Plan:  Discussed ddx. Discussed likely MSK due to bending yesterday. Pt given toradol today. She states she has tolerated in the past. Has only had hives one occasion with diclofenac but no issue with ibu, naproxen, etc. Denies swelling of lips, tongue, throat with NSAID. Pt will monitor for reaction.  Heat/ice. Can use muscle relaxer prn. Discussed proper ergonomics. Pt will let us know if sx worsen or not resolved with conservative tx and we will get Xray to eval. Pt understands and agrees with plan.    Orders:  -     Ambulatory Referral to Physical Therapy  -     ketorolac (TORADOL) injection 60 mg    2. Type 2 diabetes mellitus with hyperglycemia, without long-term current use of insulin  Assessment & Plan:  Reviewed last A1c, dm well controlled. Cont current medicines.      Other orders  -     metFORMIN (GLUCOPHAGE) 500 MG tablet; TAKE 1 TABLET BY MOUTH  EVERY MORNING AND 2 TABLETS EVERY EVENING WITH MEALS  Dispense: 270 tablet; Refill: 1  -     naproxen (Naprosyn) 500 MG tablet; Take 1 tablet by mouth 2 (Two) Times a Day With Meals for 15 days.  Dispense: 30 tablet; Refill: 0        Follow Up   Return in about 3 months (around 7/11/2024).  Patient was given instructions and counseling regarding her condition or for health maintenance advice. Please see specific information pulled into the AVS if appropriate.

## 2024-04-12 NOTE — ASSESSMENT & PLAN NOTE
Discussed ddx. Discussed likely MSK due to bending yesterday. Pt given toradol today. She states she has tolerated in the past. Has only had hives one occasion with diclofenac but no issue with ibu, naproxen, etc. Denies swelling of lips, tongue, throat with NSAID. Pt will monitor for reaction.  Heat/ice. Can use muscle relaxer prn. Discussed proper ergonomics. Pt will let us know if sx worsen or not resolved with conservative tx and we will get Xray to eval. Pt understands and agrees with plan.

## 2024-04-15 ENCOUNTER — TELEMEDICINE (OUTPATIENT)
Dept: PSYCHIATRY | Facility: CLINIC | Age: 74
End: 2024-04-15
Payer: MEDICARE

## 2024-04-15 DIAGNOSIS — F41.1 GENERALIZED ANXIETY DISORDER: Primary | ICD-10-CM

## 2024-04-15 PROCEDURE — 90834 PSYTX W PT 45 MINUTES: CPT | Performed by: COUNSELOR

## 2024-04-15 NOTE — PROGRESS NOTES
Date: April 15, 2024  Time In: 1100  Time Out: 1150  This provider is located at home address for [facility] (through [facility]), [address], [place] [address] using a secure [employer] Video Visit through [employer]. Patient is being seen remotely via telehealth at home address in [place] and stated they are in a secure environment for this session. The patient's condition being diagnosed/treated is appropriate for telemedicine. The provider identified herself as well as her credentials. The patient, and/or patients guardian, consent to be seen remotely, and when consent is given they understand that the consent allows for patient identifiable information to be sent to a third party as needed. They may refuse to be seen remotely at any time. The electronic data is encrypted and password protected, and the patient and/or guardian has been advised of the potential risks to privacy not withstanding such measures.     You have chosen to receive care through a telehealth visit.  Do you consent to use a video/audio connection for your medical care today? Yes    PROGRESS NOTE  Data:  Nivia Cagle is a 73 y.o. female who presents today for follow up    Chief Complaint: anxiety     History of Present Illness: Pt reports staying productive here recently by maintaining household chores. Pt reports major stressor at this time is making a decision as to traveling out of state to help her friend while she recovers from surgery or to stay home due to health concerns of her own. Pt reports she is really struggling with this decision and is mad at herself for having health concerns of her own to consider. Pt reports that this situation makes her feel helpless and useless.       Clinical Maneuvering/Intervention:    (Scales based on 0 - 10 with 10 being the worst)  Depression: 6 Anxiety: 6       Assisted patient in processing above session content; acknowledged and normalized  patient’s thoughts, feelings, and concerns.  Rationalized patient thought process regarding recent stressors and life events. Discussed triggers associated with patient's emotions. Also discussed coping skills for patient to implement. Discussed risks of traveling and assisted with processing this desire. Encouraged Pt to identify pros and cons of traveling to friend to help as a visual aid.     Allowed patient to freely discuss issues without interruption or judgment. Provided safe, confidential environment to facilitate the development of positive therapeutic relationship and encourage open, honest communication. Assisted patient in identifying risk factors which would indicate the need for higher level of care including thoughts to harm self or others and/or self-harming behavior and encouraged patient to contact this office, call 911, or present to the nearest emergency room should any of these events occur. Discussed crisis intervention services and means to access. Patient adamantly and convincingly denies current suicidal or homicidal ideation or perceptual disturbance.    Assessment:   Assessment   Patient appears to maintain relative stability as compared to their baseline.  However, patient continues to struggle with anxiety and depression which continues to cause impairment in important areas of functioning.  A result, they can be reasonably expected to continue to benefit from treatment and would likely be at increased risk for decompensation otherwise.    Mental Status Exam:   Hygiene:   good  Cooperation:  Cooperative  Eye Contact:  Good  Psychomotor Behavior:  Appropriate  Affect:  Full range  Mood: sad and anxious  Speech:  Normal  Thought Process:  Linear  Thought Content:  Mood congruent  Suicidal:  None  Homicidal:  None  Hallucinations:  None  Delusion:  None  Memory:  Intact  Orientation:  Person, Place, Time and Situation  Reliability:  fair  Insight:  Fair  Judgement:  Fair  Impulse Control:   Fair  Physical/Medical Issues:  No        Patient's Support Network Includes:  son    Functional Status: Mild impairment     Progress toward goal: Not at goal    Prognosis: Fair with Ongoing Treatment            Plan:    Patient will continue in individual outpatient therapy with focus on improved functioning and coping skills, maintaining stability, and avoiding decompensation and the need for higher level of care.    Patient will adhere to medication regimen as prescribed and report any side effects. Patient will contact this office, call 911 or present to the nearest emergency room should suicidal or homicidal ideations occur. Provide Cognitive Behavioral Therapy and Solution Focused Therapy to improve functioning, maintain stability, and avoid decompensation and the need for higher level of care.     Return in about 6 weeks, or earlier if symptoms worsen or fail to improve.           VISIT DIAGNOSIS:     ICD-10-CM ICD-9-CM   1. Generalized anxiety disorder  F41.1 300.02        Diagnoses and all orders for this visit:    1. Generalized anxiety disorder (Primary)           [facility] No Show Policy:  We understand unexpected circumstances arise; however, anytime you miss your appointment we are unable to provide you appropriate care.  In addition, each appointment missed could have been used to provide care for others.  We ask that you call at least 24 hours in advance to cancel or reschedule an appointment.  We would like to take this opportunity to remind you of our policy stating patients who miss THREE or more appointments without cancelling or rescheduling 24 hours in advance of the appointment may be subject to cancellation of any further visits with our clinic and recommendation to seek in-person services/visits.    Please call [phone] to reschedule your appointment. If there are reasons that make it difficult for you to keep the appointments, please call and let us know how we  can help.  Please understand that medication prescribing will not continue without seeing your provider.      [facility]'s No Show Policy reviewed with patient at today's visit. Patient verbalized understanding of this policy. Discussed with patient that in the event that there are three or more no show visits, it will be recommended that they pursue in-person services/visits as noncompliance with telehealth visits indicates that patient is not an appropriate candidate for telemedicine and would likely be more appropriate for in-person services/visits. Patient verbalizes understanding and is agreeable to this.        This document has been electronically signed by Annita Knowles LCSW.  April 15, 2024 14:48 EDT      Part of this note may be an electronic transcription/translation of spoken language to printed text using the Dragon Dictation System.

## 2024-04-18 ENCOUNTER — TRANSCRIBE ORDERS (OUTPATIENT)
Dept: LAB | Facility: HOSPITAL | Age: 74
End: 2024-04-18
Payer: MEDICARE

## 2024-04-18 ENCOUNTER — LAB (OUTPATIENT)
Dept: LAB | Facility: HOSPITAL | Age: 74
End: 2024-04-18
Payer: MEDICARE

## 2024-04-18 ENCOUNTER — OFFICE VISIT (OUTPATIENT)
Dept: NEUROLOGY | Facility: CLINIC | Age: 74
End: 2024-04-18
Payer: MEDICARE

## 2024-04-18 VITALS
HEIGHT: 63 IN | BODY MASS INDEX: 31.48 KG/M2 | WEIGHT: 177.7 LBS | SYSTOLIC BLOOD PRESSURE: 128 MMHG | DIASTOLIC BLOOD PRESSURE: 59 MMHG | HEART RATE: 75 BPM

## 2024-04-18 DIAGNOSIS — Z79.899 ENCOUNTER FOR LONG-TERM (CURRENT) USE OF OTHER MEDICATIONS: ICD-10-CM

## 2024-04-18 DIAGNOSIS — R25.3 MUSCLE TWITCHING: ICD-10-CM

## 2024-04-18 DIAGNOSIS — M85.89 OSTEOPENIA OF MULTIPLE SITES: ICD-10-CM

## 2024-04-18 DIAGNOSIS — E55.9 VITAMIN D DEFICIENCY: ICD-10-CM

## 2024-04-18 DIAGNOSIS — G25.81 RESTLESS LEGS SYNDROME (RLS): Primary | ICD-10-CM

## 2024-04-18 DIAGNOSIS — M85.89 OSTEOPENIA OF MULTIPLE SITES: Primary | ICD-10-CM

## 2024-04-18 LAB
25(OH)D3 SERPL-MCNC: 60.9 NG/ML (ref 30–100)
CALCIUM SPEC-SCNC: 9 MG/DL (ref 8.6–10.5)
CREAT SERPL-MCNC: 0.85 MG/DL (ref 0.57–1)
EGFRCR SERPLBLD CKD-EPI 2021: 73.4 ML/MIN/1.73

## 2024-04-18 PROCEDURE — 82310 ASSAY OF CALCIUM: CPT

## 2024-04-18 PROCEDURE — 82306 VITAMIN D 25 HYDROXY: CPT

## 2024-04-18 PROCEDURE — 36415 COLL VENOUS BLD VENIPUNCTURE: CPT

## 2024-04-18 PROCEDURE — 82565 ASSAY OF CREATININE: CPT

## 2024-04-18 RX ORDER — PRAMIPEXOLE DIHYDROCHLORIDE 1 MG/1
1 TABLET ORAL NIGHTLY
Qty: 90 TABLET | Refills: 1 | Status: SHIPPED | OUTPATIENT
Start: 2024-04-18 | End: 2025-04-18

## 2024-04-18 RX ORDER — CYCLOBENZAPRINE HCL 10 MG
10 TABLET ORAL DAILY PRN
Qty: 90 TABLET | Refills: 1 | Status: SHIPPED | OUTPATIENT
Start: 2024-04-18

## 2024-04-18 NOTE — PROGRESS NOTES
Date: February 13, 2024  Time In: 1100  Time Out: 1141  This provider is located at home address for [facility] (through [facility]), [address], [place] [address] using a secure [employer] Video Visit through [employer]. Patient is being seen remotely via telehealth at home address in [place] and stated they are in a secure environment for this session. The patient's condition being diagnosed/treated is appropriate for telemedicine. The provider identified herself as well as her credentials. The patient, and/or patients guardian, consent to be seen remotely, and when consent is given they understand that the consent allows for patient identifiable information to be sent to a third party as needed. They may refuse to be seen remotely at any time. The electronic data is encrypted and password protected, and the patient and/or guardian has been advised of the potential risks to privacy not withstanding such measures.     You have chosen to receive care through a telehealth visit.  Do you consent to use a video/audio connection for your medical care today? Yes    PROGRESS NOTE  Data:  Nivia Cagle is a 73 y.o. female who presents today for follow up    Chief Complaint: depression     History of Present Illness: Pt reports that depression has worsened since previous session and she has noticed signs such as reduced energy and motivation, decline in house work, and little interest in art. Pt reports that she noticed painting is not enjoyable and wonders after completing a piece what her mother would think of it explaining that her mother never did acknowledge pt's talents.     Clinical Maneuvering/Intervention:    (Scales based on 0 - 10 with 10 being the worst)  Depression: 6 Anxiety: 6       Assisted patient in processing above session content; acknowledged and normalized patient’s thoughts, feelings, and concerns.  Rationalized patient thought process regarding  recent stressors and life events. Discussed triggers associated with patient's emotions. Also discussed coping skills for patient to implement. Discussed childhood experiences, positive affirmations, and routine.     Allowed patient to freely discuss issues without interruption or judgment. Provided safe, confidential environment to facilitate the development of positive therapeutic relationship and encourage open, honest communication. Assisted patient in identifying risk factors which would indicate the need for higher level of care including thoughts to harm self or others and/or self-harming behavior and encouraged patient to contact this office, call 911, or present to the nearest emergency room should any of these events occur. Discussed crisis intervention services and means to access. Patient adamantly and convincingly denies current suicidal or homicidal ideation or perceptual disturbance.    Assessment:   Assessment   Patient appears to maintain relative stability as compared to their baseline.  However, patient continues to struggle with depression which continues to cause impairment in important areas of functioning.  A result, they can be reasonably expected to continue to benefit from treatment and would likely be at increased risk for decompensation otherwise.    Mental Status Exam:   Hygiene:   good  Cooperation:  Cooperative  Eye Contact:  Good  Psychomotor Behavior:  Appropriate  Affect:  Appropriate  Mood: sad and depressed  Speech:  Normal  Thought Process:  Linear  Thought Content:  Mood congruent  Suicidal:  None  Homicidal:  None  Hallucinations:  None  Delusion:  None  Memory:  Intact  Orientation:  Person, Place, Time and Situation  Reliability:  fair  Insight:  Fair  Judgement:  Fair  Impulse Control:  Fair  Physical/Medical Issues:  No        Patient's Support Network Includes:  son    Functional Status: Mild impairment     Progress toward goal: Not at goal    Prognosis: Fair with Ongoing  Treatment            Plan:    Patient will continue in individual outpatient therapy with focus on improved functioning and coping skills, maintaining stability, and avoiding decompensation and the need for higher level of care.    Patient will adhere to medication regimen as prescribed and report any side effects. Patient will contact this office, call 911 or present to the nearest emergency room should suicidal or homicidal ideations occur. Provide Cognitive Behavioral Therapy and Solution Focused Therapy to improve functioning, maintain stability, and avoid decompensation and the need for higher level of care.     Return in about 4 weeks, or earlier if symptoms worsen or fail to improve.           VISIT DIAGNOSIS:     ICD-10-CM ICD-9-CM   1. Recurrent major depressive disorder in remission  F33.40 296.35        Diagnoses and all orders for this visit:    1. Recurrent major depressive disorder in remission (Primary)           [facility] No Show Policy:  We understand unexpected circumstances arise; however, anytime you miss your appointment we are unable to provide you appropriate care.  In addition, each appointment missed could have been used to provide care for others.  We ask that you call at least 24 hours in advance to cancel or reschedule an appointment.  We would like to take this opportunity to remind you of our policy stating patients who miss THREE or more appointments without cancelling or rescheduling 24 hours in advance of the appointment may be subject to cancellation of any further visits with our clinic and recommendation to seek in-person services/visits.    Please call [phone] to reschedule your appointment. If there are reasons that make it difficult for you to keep the appointments, please call and let us know how we can help.  Please understand that medication prescribing will not continue without seeing your provider.      [facility]'s No  Show Policy reviewed with patient at today's visit. Patient verbalized understanding of this policy. Discussed with patient that in the event that there are three or more no show visits, it will be recommended that they pursue in-person services/visits as noncompliance with telehealth visits indicates that patient is not an appropriate candidate for telemedicine and would likely be more appropriate for in-person services/visits. Patient verbalizes understanding and is agreeable to this.        This document has been electronically signed by Annita Knowles LCSW.  April 18, 2024 15:13 EDT      Part of this note may be an electronic transcription/translation of spoken language to printed text using the Dragon Dictation System.

## 2024-04-18 NOTE — PROGRESS NOTES
"Chief Complaint  Neurologic Problem    Subjective          Nivia Cagle presents to [facility]  History of Present Illness  Following up for RLS and muscle cramping. Doing well on pramipexole and flexeril. States that muscle cramping is much improved.  RLS symptoms are also managed with pramipexole.  Denies side effects.       Objective   Vital Signs:   /59   Pulse 75   Ht 160 cm (62.99\")   Wt 80.6 kg (177 lb 11.2 oz)   BMI 31.49 kg/m²     Physical Exam  HENT:      Head: Normocephalic.   Pulmonary:      Effort: Pulmonary effort is normal.   Neurological:      Mental Status: She is alert and oriented to person, place, and time.      Sensory: Sensation is intact.      Motor: Motor function is intact.      Coordination: Coordination is intact.      Deep Tendon Reflexes: Reflexes are normal and symmetric.      Comments: Mild intention tremor bilaterally        Neurologic Exam     Mental Status   Oriented to person, place, and time.        Result Review :               Assessment and Plan    Diagnoses and all orders for this visit:    1. Restless legs syndrome (RLS) (Primary)  Assessment & Plan:  Increase pramipexole to 1mg qHS      2. Muscle twitching  Assessment & Plan:  Continue flexeril for muscle spasms.       Other orders  -     pramipexole (Mirapex) 1 MG tablet; Take 1 tablet by mouth Every Night.  Dispense: 90 tablet; Refill: 1  -     cyclobenzaprine (FLEXERIL) 10 MG tablet; Take 1 tablet by mouth Daily As Needed for Muscle Spasms.  Dispense: 90 tablet; Refill: 1        Follow Up   Return in about 6 months (around 10/18/2024).  Patient was given instructions and counseling regarding her condition or for health maintenance advice. Please see specific information pulled into the AVS if appropriate.       "

## 2024-04-29 NOTE — PROGRESS NOTES
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 E.U./dL  0.2 - 1.0 E.U./dL Final    Glucose 12/07/2023 101 (H)  65 - 99 mg/dL Final    BUN 12/07/2023 19  8 - 23 mg/dL Final    Creatinine 12/07/2023 1.09 (H)  0.57 - 1.00 mg/dL Final    Sodium 12/07/2023 139  136 - 145 mmol/L Final    Potassium 12/07/2023 4.5  3.5 - 5.2 mmol/L Final    Chloride 12/07/2023 102  98 - 107 mmol/L Final    CO2 12/07/2023 27.9  22.0 - 29.0 mmol/L Final    Calcium 12/07/2023 9.7  8.6 - 10.5 mg/dL Final    Total Protein 12/07/2023 6.9  6.0 - 8.5 g/dL Final    Albumin 12/07/2023 4.6  3.5 - 5.2 g/dL Final    ALT (SGPT) 12/07/2023 18  1 - 33 U/L Final    AST (SGOT) 12/07/2023 23  1 - 32 U/L Final    Alkaline Phosphatase 12/07/2023 80  39 - 117 U/L Final    Total Bilirubin 12/07/2023 0.4  0.0 - 1.2 mg/dL Final    Globulin 12/07/2023 2.3  gm/dL Final    A/G Ratio 12/07/2023 2.0  g/dL Final    BUN/Creatinine Ratio 12/07/2023 17.4  7.0 - 25.0 Final    Anion Gap 12/07/2023 9.1  5.0 - 15.0 mmol/L Final    eGFR 12/07/2023 54.4 (L)  >60.0 mL/min/1.73 Final    TSH 12/07/2023 2.100  0.270 - 4.200 uIU/mL Final    Total Cholesterol 12/07/2023 140  0 - 200 mg/dL Final    Triglycerides 12/07/2023 105  0 - 150 mg/dL Final    HDL Cholesterol 12/07/2023 70 (H)  40 - 60  mg/dL Final    LDL Cholesterol  12/07/2023 51  0 - 100 mg/dL Final    VLDL Cholesterol 12/07/2023 19  5 - 40 mg/dL Final    LDL/HDL Ratio 12/07/2023 0.70   Final    Hemoglobin A1C 12/07/2023 6.40 (H)  4.80 - 5.60 % Final    WBC 12/07/2023 7.48  3.40 - 10.80 10*3/mm3 Final    RBC 12/07/2023 4.23  3.77 - 5.28 10*6/mm3 Final    Hemoglobin 12/07/2023 12.9  12.0 - 15.9 g/dL Final    Hematocrit 12/07/2023 38.4  34.0 - 46.6 % Final    MCV 12/07/2023 90.8  79.0 - 97.0 fL Final    MCH 12/07/2023 30.5  26.6 - 33.0 pg Final    MCHC 12/07/2023 33.6  31.5 - 35.7 g/dL Final    RDW 12/07/2023 12.2 (L)  12.3 - 15.4 % Final    RDW-SD 12/07/2023 40.4  37.0 - 54.0 fl Final    MPV 12/07/2023 11.5  6.0 - 12.0 fL Final    Platelets 12/07/2023 251  140 - 450 10*3/mm3 Final    Neutrophil % 12/07/2023 62.9  42.7 - 76.0 % Final    Lymphocyte % 12/07/2023 21.7  19.6 - 45.3 % Final    Monocyte % 12/07/2023 11.6  5.0 - 12.0 % Final    Eosinophil % 12/07/2023 2.7  0.3 - 6.2 % Final    Basophil % 12/07/2023 0.7  0.0 - 1.5 % Final    Immature Grans % 12/07/2023 0.4  0.0 - 0.5 % Final    Neutrophils, Absolute 12/07/2023 4.71  1.70 - 7.00 10*3/mm3 Final    Lymphocytes, Absolute 12/07/2023 1.62  0.70 - 3.10 10*3/mm3 Final    Monocytes, Absolute 12/07/2023 0.87  0.10 - 0.90 10*3/mm3 Final    Eosinophils, Absolute 12/07/2023 0.20  0.00 - 0.40 10*3/mm3 Final    Basophils, Absolute 12/07/2023 0.05  0.00 - 0.20 10*3/mm3 Final    Immature Grans, Absolute 12/07/2023 0.03  0.00 - 0.05 10*3/mm3 Final    nRBC 12/07/2023 0.0  0.0 - 0.2 /100 WBC Final   Lab on 11/28/2023   Component Date Value Ref Range Status    Creatinine 11/28/2023 0.92  0.57 - 1.00 mg/dL Final    eGFR 11/28/2023 66.7  >60.0 mL/min/1.73 Final    25 Hydroxy, Vitamin D 11/28/2023 58.5  30.0 - 100.0 ng/ml Final    Calcium 11/28/2023 9.6  8.6 - 10.5 mg/dL Final   Admission on 11/26/2023, Discharged on 11/26/2023   Component Date Value Ref Range Status    Color 11/26/2023 Yellow   Final     Clarity, UA 11/26/2023 Cloudy (A)   Final    Glucose, UA 11/26/2023 Negative  mg/dL Final    Bilirubin 11/26/2023 Negative   Final    Ketones, UA 11/26/2023 Negative   Final    Specific Gravity  11/26/2023 1.025  1.005 - 1.030 Final    Blood, UA 11/26/2023 Large (A)   Final    pH, Urine 11/26/2023 5.5  5.0 - 8.0 Final    Protein, POC 11/26/2023 30 mg/dL (A)  mg/dL Final    Urobilinogen, UA 11/26/2023 0.2 E.U./dL   Final    Nitrite, UA 11/26/2023 Negative   Final    Leukocytes 11/26/2023 Moderate (2+) (A)   Final    Urine Culture 11/26/2023 50,000 CFU/mL Escherichia coli (A)   Final   Lab on 11/15/2023   Component Date Value Ref Range Status    C-Telopeptides 11/15/2023 265  pg/mL Final    Reference Range:  Premenopausal Women: 34 - 635  Postmenopausal Women: 34 - 1037       EKG Results:  No orders to display       Imaging Results:  No Images in the past 120 days found..      Assessment & Plan   Diagnoses and all orders for this visit:    1. Generalized anxiety disorder (Primary)    2. Post traumatic stress disorder (PTSD)    3. Insomnia due to mental condition    4. Recurrent major depressive disorder in remission      INITIAL presentation 2021 most consistent with major depressive disorder, recurrent, moderate to severe, with anxious distress.  PTSD.  Rule out personality disorder, cluster B specifically.  Rule out hypomania as patient was very difficult to interrupt today.    4/30/24: Insomnia, and some procrastination. Increase doxepin. Procrastinating on painting a certain painting; processed why. Monitor.    Allowed patient to freely discuss and process issues, such as:  Anxiety related to not keeping the house clean. Related to an ongoing issue of procrastination as a measure of how well she is doing.  Anxiety related to getting taxes done for a trust.  Anxiety related to finances.  Spiritual fulfillment from going to and being involved in [employer]  Enjoyment from painting  ... using Rogerian psychotherapeutic  "techniques including unconditional positive regard, reflective listening, and demonstrating clear empathy, with the goal of ameliorating symptoms and maintaining, restoring, or improving self-esteem, adaptive skills, and ego or psychological functions (Ara et al. 1991).  Time (minutes) spent providing supportive psychotherapy: 17  (This time is exclusive to the therapy session and separate from the time spent on activities used to meet the criteria for the E/M service (history, exam, medical decision-making).)  Start: 7:41  Stop: 7:58  Functional status: mild impairment  Treatment plan: Medication management and supportive psychotherapy  Prognosis: good  Progress: insomnia  4w    4/3: Stable, but monitor if worsening depression creeps in.    3/4: Pt ist stable, but lost her 20 mg dose of prozac. Re-sent in. Insurance won't pay for abilify. DC abilify. Some unusual issues with swallowing. Processed dreams about her mother. Mom isn't happy, \"it's not good enough.\"    2/6: Stable, discussed dietary changes involving decreasing sugar. Sugar is comforting and helps her depression. Also discussed stevia. Now using light box and sleeping a little better.    1/5: Seasonal depression, start light box up again. May have to make further med changes.    12/14: Add melatonin for maintenance insomnia. Otherwise stable. Seeing a movie for [employer]. Got all her [employer] cards mailed out.    11/9: Doing well, isolated insomnia. Stop trazodone and start doxepin. Consider lunesta, belsomra, quiviviq. She stopped melatonin on her own.    8/11: Stable, well, no changes. Painting, having people over. Counseled to start light box in September, reiterated instructions. Will be inducted into the [employer] [facility] tomorrow. She's been working on it for a year.    7/11: Short visit as patient sleepy. Unusual sleep pattern recently, but MH is fine. Pt will call her son tonight to ensure someone is around when " she goes back to sleep. She will call PCP about this tomorrow. Close follow up with me in 4 wks.    4/27: Stable, well. Restart melatonin for insomnia.     3/2: Prozac and BLT helped. Situational stressor in son, no changes. Better. Watch insomnia.     1/20: Start light box, increase prozac for dep.     12/9: Some insomnia. Increase melatonin.     10/25: Doing well. Increase prozac back to baseline dose (inadvertently reduced, she has been stable on a higher dose, so we should go back to that). Some initial insomnia, increase trazodone to 75 mg nightly. She is enjoying the Fall.     9/8: Start light box. Close follow up in case this is worsening depression.     7/27: Well, stable.     6/14: Better, no changes.     5/3: Increase prozac and melatonin.    Visit Diagnoses:    ICD-10-CM ICD-9-CM   1. Generalized anxiety disorder  F41.1 300.02   2. Post traumatic stress disorder (PTSD)  F43.10 309.81   3. Insomnia due to mental condition  F51.05 300.9     327.02   4. Recurrent major depressive disorder in remission  F33.40 296.35       PLAN:  Risk Assessment: Risk of self-harm acutely is moderate. Risk factors include chronic depressive disorder, possible personality disorder, recent psychosocial stressors (pandemic, moving). Protective factors include no present SI, no history of suicide attempts or self-harm in the past, no access to weapons, minimal AODA, healthcare seeking, future orientation, willingness to engage in care. Risk of self-harm chronically is also moderate, but could be further elevated in the event of treatment noncompliance and/or AODA.  Safety: No acute safety concerns.  Medications:   CONTINUE light box 9/1 - 3/31.  CONTINUE melatonin 30 mg p.o. nightly.  Risks, benefits, side effects discussed with patient including sedation, dizziness/falls risk, GI upset.  Do not use before operating vehicle, vessel, or machine. After discussion of these risks and benefits, the patient voiced understanding and  agreed to proceed.   CONTINUE doxepin 10 mg qhs. Risks, benefits, side effects discussed with patient including GI upset, sedation, dizziness/falls risk, grogginess the following day, prolongation of the QTc interval.  After discussion of these risks and benefits, the patient voiced understanding and agreed to proceed.    CONTINUE bupropion xl 300 mg daily. Risks, benefits, alternatives discussed with patient including nausea, GI upset, increased energy, exacerbation of irritability, insomnia, lowering of seizure threshold.  After discussion of these risks and benefits, the patient voiced understanding and agreed to proceed.  CONTINUE Prozac 30 mg a day (HIGHEST dose is 40 given that she is also on bupropion). Risks, benefits, alternatives discussed with patient including GI upset, nausea vomiting diarrhea, theoretical decrease of seizure threshold predisposing the patient to a slightly higher seizure risk, headaches, sexual dysfunction, serotonin syndrome, bleeding risk, increased suicidality in patients 24 years and younger.  After discussion of these risks and benefits, the patient voiced understanding and agreed to proceed.  STOPPED Abilify 4 mg p.o. daily cost, 3/24.  S/P:  trazodone 100 mg PO QHS. No longer effective 11/23.  Mirtazapine 45 mg daily (RLS)  Ambien caused double vision, and possibly hallucinations  Was on lithium in the past: dizziness and falls, and hair curled.  Therapy: referred to [facility].  Labs/Studies: s/p TMS referral.  Follow Up: 6 weeks. (prefers 4 wks)      TREATMENT PLAN/GOALS: Continue supportive psychotherapy efforts and medications as indicated. Treatment and medication options discussed during today's visit. Patient acknowledged and verbally consented to continue with current treatment plan and was educated on the importance of compliance with treatment and follow-up appointments.    MEDICATION ISSUES:  SAPNA reviewed as expected.  Discussed medication options and treatment  plan of prescribed medication as well as the risks, benefits, and side effects including potential falls, possible impaired driving and metabolic adversities among others. Patient is agreeable to call the office with any worsening of symptoms or onset of side effects. Patient is agreeable to call 911 or go to the nearest ER should he/she begin having SI/HI. No medication side effects or related complaints today.     MEDS ORDERED DURING VISIT:  No orders of the defined types were placed in this encounter.      No follow-ups on file.         This document has been electronically signed by Vicky Barth MD  April 29, 2024 12:20 EDT      Part of this note may be an electronic transcription/translation of spoken language to printed text using the Dragon Dictation System.

## 2024-04-29 NOTE — PATIENT INSTRUCTIONS
1.  Please return to clinic at your next scheduled visit.  Contact the clinic ([phone]) at least 24 hours prior in the event you need to cancel.  2.  Do no harm to yourself or others.    3.  Avoid alcohol and drugs.    4.  Take all medications as prescribed.  Please contact the clinic with any concerns. If you are in need of medication refills, please call the clinic at [phone].    5. Should you want to get in touch with your provider, Dr. Vicky Barth, please utilize "[employer]" or contact the office ([phone]), and staff will be able to page Dr. Barth directly.  6.  In the event you have personal crisis, contact the following crisis numbers: Suicide Prevention Hotline 1-951.800.1686; [facility] Helpline 0-277-508-[facility]; [facility] Emergency Room [phone]; text HELLO to [phone]; or 110.

## 2024-04-30 ENCOUNTER — TELEMEDICINE (OUTPATIENT)
Dept: PSYCHIATRY | Facility: CLINIC | Age: 74
End: 2024-04-30
Payer: MEDICARE

## 2024-04-30 DIAGNOSIS — F51.05 INSOMNIA DUE TO MENTAL CONDITION: ICD-10-CM

## 2024-04-30 DIAGNOSIS — F43.10 POST TRAUMATIC STRESS DISORDER (PTSD): ICD-10-CM

## 2024-04-30 DIAGNOSIS — F33.40 RECURRENT MAJOR DEPRESSIVE DISORDER IN REMISSION: ICD-10-CM

## 2024-04-30 DIAGNOSIS — F41.1 GENERALIZED ANXIETY DISORDER: Primary | ICD-10-CM

## 2024-04-30 PROCEDURE — 1160F RVW MEDS BY RX/DR IN RCRD: CPT | Performed by: STUDENT IN AN ORGANIZED HEALTH CARE EDUCATION/TRAINING PROGRAM

## 2024-04-30 PROCEDURE — 99214 OFFICE O/P EST MOD 30 MIN: CPT | Performed by: STUDENT IN AN ORGANIZED HEALTH CARE EDUCATION/TRAINING PROGRAM

## 2024-04-30 PROCEDURE — 1159F MED LIST DOCD IN RCRD: CPT | Performed by: STUDENT IN AN ORGANIZED HEALTH CARE EDUCATION/TRAINING PROGRAM

## 2024-04-30 PROCEDURE — 90833 PSYTX W PT W E/M 30 MIN: CPT | Performed by: STUDENT IN AN ORGANIZED HEALTH CARE EDUCATION/TRAINING PROGRAM

## 2024-04-30 RX ORDER — DOXEPIN HYDROCHLORIDE 25 MG/1
25 CAPSULE ORAL NIGHTLY
Qty: 90 CAPSULE | Refills: 1 | Status: SHIPPED | OUTPATIENT
Start: 2024-04-30

## 2024-05-10 ENCOUNTER — TREATMENT (OUTPATIENT)
Dept: PHYSICAL THERAPY | Facility: CLINIC | Age: 74
End: 2024-05-10
Payer: MEDICARE

## 2024-05-10 DIAGNOSIS — R26.9 GAIT DISTURBANCE: ICD-10-CM

## 2024-05-10 DIAGNOSIS — M54.50 ACUTE BILATERAL LOW BACK PAIN WITHOUT SCIATICA: Primary | ICD-10-CM

## 2024-05-10 DIAGNOSIS — R29.898 WEAKNESS OF BOTH HIPS: ICD-10-CM

## 2024-05-10 PROCEDURE — 97110 THERAPEUTIC EXERCISES: CPT | Performed by: PHYSICAL THERAPIST

## 2024-05-10 PROCEDURE — 97530 THERAPEUTIC ACTIVITIES: CPT | Performed by: PHYSICAL THERAPIST

## 2024-05-10 PROCEDURE — 97161 PT EVAL LOW COMPLEX 20 MIN: CPT | Performed by: PHYSICAL THERAPIST

## 2024-05-21 ENCOUNTER — TREATMENT (OUTPATIENT)
Dept: PHYSICAL THERAPY | Facility: CLINIC | Age: 74
End: 2024-05-21
Payer: MEDICARE

## 2024-05-21 DIAGNOSIS — M54.50 ACUTE BILATERAL LOW BACK PAIN WITHOUT SCIATICA: Primary | ICD-10-CM

## 2024-05-21 DIAGNOSIS — R26.9 GAIT DISTURBANCE: ICD-10-CM

## 2024-05-21 DIAGNOSIS — R29.898 WEAKNESS OF BOTH HIPS: ICD-10-CM

## 2024-05-21 PROCEDURE — 97530 THERAPEUTIC ACTIVITIES: CPT | Performed by: PHYSICAL THERAPIST

## 2024-05-21 PROCEDURE — 97110 THERAPEUTIC EXERCISES: CPT | Performed by: PHYSICAL THERAPIST

## 2024-05-21 NOTE — PROGRESS NOTES
Physical Therapy Daily Treatment Note  [address], [address] [place] [address]        Patient: Nivia Cagle   : 1950  Diagnosis/ICD-10 Code:  Acute bilateral low back pain without sciatica [M54.50]  Referring practitioner: Catalina Madsen PA-C  Date of Initial Visit: Type: THERAPY  Noted: 5/10/2024  Today's Date: 2024  Patient seen for 2 sessions             Subjective   Nivia Cagle reports having her normal 2/10 pain in her central -Right lower back and ankle.     Objective     See Exercise Logs for complete treatment.       Assessment/Plan    Pt tolerated therapy session well - with progression of therapeutic exercises, CKC-Functional activities, and Manual therapy. She  continues to have deficits in Her Trunk and Bilateral Lower Extremity ROM,  Strength, and Stability; limiting functional mobility and ability to perform ADLs without increased pain, difficulty, and without increased risk for falls at this time.  She ambulated into clinic today with single tip cane secondary to unsteady gait and decreased balance.    Progress per Plan of Care - as tolerated.           Timed:  Manual Therapy:    0     mins  29878;  Therapeutic Exercise:    20     mins  29708;     Neuromuscular Evon:    0    mins  42944;    Therapeutic Activity:     8     mins  86358;     Gait Trainin     mins  86848;     Ultrasound:     0     mins  55197;    Electrical Stimulation:    0     mins  98622;  Iontophoresis     0     mins  97352    Untimed:  Electrical Stimulation:    0     mins  98961 ( );  Mechanical Traction:    0     mins  88599;   Fluidotherapy     0     mins  67206  Hot pack     0     mins  20050  Cold pack     0     mins  98368    Timed Treatment:   28   mins   Total Treatment:     35   mins        Kely Madsen PTA  Physical Therapist Assistant

## 2024-05-22 ENCOUNTER — OFFICE VISIT (OUTPATIENT)
Dept: PODIATRY | Facility: CLINIC | Age: 74
End: 2024-05-22
Payer: MEDICARE

## 2024-05-22 VITALS
SYSTOLIC BLOOD PRESSURE: 129 MMHG | TEMPERATURE: 98.2 F | WEIGHT: 178 LBS | BODY MASS INDEX: 31.54 KG/M2 | DIASTOLIC BLOOD PRESSURE: 68 MMHG | HEIGHT: 63 IN | HEART RATE: 77 BPM | OXYGEN SATURATION: 95 %

## 2024-05-22 DIAGNOSIS — M79.671 FOOT PAIN, BILATERAL: ICD-10-CM

## 2024-05-22 DIAGNOSIS — G62.9 NEUROPATHY: ICD-10-CM

## 2024-05-22 DIAGNOSIS — B35.1 ONYCHOMYCOSIS: ICD-10-CM

## 2024-05-22 DIAGNOSIS — M79.672 FOOT PAIN, BILATERAL: ICD-10-CM

## 2024-05-22 DIAGNOSIS — L60.0 ONYCHOCRYPTOSIS: Primary | ICD-10-CM

## 2024-05-22 DIAGNOSIS — Z79.4 TYPE 2 DIABETES MELLITUS WITH DIABETIC POLYNEUROPATHY, WITH LONG-TERM CURRENT USE OF INSULIN: ICD-10-CM

## 2024-05-22 DIAGNOSIS — E11.8 DM FEET: ICD-10-CM

## 2024-05-22 DIAGNOSIS — E11.42 TYPE 2 DIABETES MELLITUS WITH DIABETIC POLYNEUROPATHY, WITH LONG-TERM CURRENT USE OF INSULIN: ICD-10-CM

## 2024-05-22 RX ORDER — ALENDRONATE SODIUM 70 MG/1
70 TABLET ORAL
COMMUNITY

## 2024-05-22 NOTE — PROGRESS NOTES
[facility] - PODIATRY    Today's Date: 05/22/24    Patient Name: Nivia Cagle  MRN: [id number]  CSN: [id number]  PCP: Catalina Madsen PA-C, Last PCP Visit:  4/11/2024  Referring Provider: No ref. provider found    SUBJECTIVE     Chief Complaint   Patient presents with    Left Foot - Follow-up, Nail Problem    Right Foot - Follow-up, Nail Problem     HPI: Nivia Cagle, a 73 y.o.female, presents to clinic for painful toenail:    New, Established, New Problem:  est  Location:  Toenails  Duration:   Greater than five years  Onset:  Gradual  Nature:  sore with palpation.  Stable, worsening, improving:   worsening  Aggravating factors:  Pain with shoe gear and ambulation.  Previous Treatment: Unable to trim their own toenails.    Patient controlling diabetes via:  NIDDM    Patient states their last blood glucose was: 100    Patient denies any fevers, chills, nausea, vomiting, shortness of breath, nor any other constitutional signs nor symptoms.    Medical changes: started Fosamax     I have reviewed/confirmed previously documented HPI with no changes.       Past Medical History:   Diagnosis Date    ADHD (attention deficit hyperactivity disorder)     Allergic rhinitis     Anemia     Anxiety     Cataracts, bilateral     Chronic pain disorder     Depression 0421/2021    MOODNOT WELL CONTROLLED.  GIVEN NUMBER FOR LOCAL COUNSELOR.  WILL INCREASE TRINTELIX FROM 10MG TO 20MG RTC WEEK ER ID S/HI    Diabetes mellitus, type 2     Diverticulitis     GERD (gastroesophageal reflux disease)     Head injury     High blood pressure     Hyperlipemia     Insomnia     Migraine     EARL (obstructive sleep apnea) 04/21/2021    Panic disorder     Phlebitis     PTSD (post-traumatic stress disorder)     RLS (restless legs syndrome)      Past Surgical History:   Procedure Laterality Date    ANKLE SURGERY  2021    BILATERAL BREAST REDUCTION  2015    BREAST BIOPSY      CARPAL TUNNEL RELEASE      CHOLECYSTECTOMY  2001     COLONOSCOPY      [facility]    COLONOSCOPY N/A 09/28/2022    Procedure: COLONOSCOPY with biopsy;  Surgeon: Ghislaine Kilgore MD;  Location: [facility] ENDOSCOPY;  Service: Gastroenterology;  Laterality: N/A;  colon polyp, diverticlosis, hemorrhoids    EYE SURGERY Right     scar tissue removal    HYSTERECTOMY      ULNAR NERVE TRANSPOSITION Right     WRIST SURGERY       Family History   Problem Relation Age of Onset    Kidney cancer Mother     Hyperlipidemia Mother     Heart disease Father     Heart attack Father     Diabetes Father     ADD / ADHD Father     Hyperlipidemia Father     Sleep apnea Father     Restless legs syndrome Father     Hyperlipidemia Sister     Cancer Sister     Brain cancer Sister     Lung cancer Sister     Hyperlipidemia Sister     Hyperlipidemia Brother     Diabetes Brother     Heart attack Brother     Hyperlipidemia Brother     No Known Problems Paternal Uncle     No Known Problems Cousin     Malpreet Hyperthermia Neg Hx      Social History     Socioeconomic History    Marital status: Single   Tobacco Use    Smoking status: Former     Current packs/day: 0.25     Average packs/day: 0.2 packs/day for 49.4 years (12.3 ttl pk-yrs)     Types: Cigarettes     Start date: 1975    Smokeless tobacco: Never    Tobacco comments:     QUIT 1988   Vaping Use    Vaping status: Never Used   Substance and Sexual Activity    Alcohol use: Yes     Comment: rarely    Drug use: Never    Sexual activity: Defer     Allergies   Allergen Reactions    Diclofenac Hives    New Skin [Benzethonium Chloride] Rash    Atorvastatin Myalgia    Adhesive Tape Rash and Other (See Comments)       Rash at area of bandaid    Niacin Rash     Current Outpatient Medications   Medication Sig Dispense Refill    Accu-Chek Madeline Plus test strip by Other route 4 (Four) Times a Day. use to test blood sugar      Accu-Chek Softclix Lancets lancets by Other route 4 (Four) Times a Day. use to test blood sugar      alendronate (FOSAMAX) 70 MG  tablet Take 1 tablet by mouth Every 7 (Seven) Days.      Ascorbic Acid (VITAMIN C GUMMIE PO) Take  by mouth Daily.      aspirin (aspirin) 81 MG EC tablet Take 1 tablet by mouth Daily. 90 tablet 99    azelastine (ASTELIN) 0.1 % nasal spray 2 sprays into the nostril(s) as directed by provider 2 (Two) Times a Day. Use in each nostril as directed 90 mL 6    Biotin 10 MG tablet Take  by mouth.      BL NASAL SALINE MIST NA 2 drops.      Blood Glucose Monitoring Suppl (FreeStyle Lite) device 1 each by Other route 4 (Four) Times a Day.      buPROPion XL (WELLBUTRIN XL) 300 MG 24 hr tablet Take 1 tablet by mouth Every Morning. 90 tablet 3    cetirizine (zyrTEC) 10 MG tablet TAKE 1 TABLET BY MOUTH EVERY DAY 90 tablet 1    coenzyme Q10 50 MG capsule capsule Take  by mouth Daily.      cyclobenzaprine (FLEXERIL) 10 MG tablet Take 1 tablet by mouth Daily As Needed for Muscle Spasms. 90 tablet 1    Daily-Jelani Multivitamin tablet tablet Take 1 tablet by mouth every night at bedtime.      doxepin (SINEquan) 25 MG capsule Take 1 capsule by mouth Every Night. 90 capsule 1    ezetimibe (ZETIA) 10 MG tablet TAKE 1 TABLET BY MOUTH EVERY NIGHT AT BEDTIME 90 tablet 1    FLUoxetine (PROzac) 10 MG capsule Take 1 capsule by mouth Daily. 90 capsule 1    FLUoxetine (PROzac) 20 MG capsule Take 1 capsule by mouth Daily. 90 capsule 3    fluticasone (Flonase) 50 MCG/ACT nasal spray 2 sprays into the nostril(s) as directed by provider Daily. Administer 2 sprays in each nostril for each dose. 16 g 6    Inclisiran Sodium (LEQVIO SC) Inject  under the skin into the appropriate area as directed.      Januvia 100 MG tablet TAKE 1 TABLET BY MOUTH DAILY 90 tablet 1    losartan (COZAAR) 25 MG tablet Take 0.5 tablets by mouth Daily. 90 tablet 1    Melatonin 10 MG tablet Take 2 tablets by mouth Every Night. 2 tabs      metFORMIN (GLUCOPHAGE) 500 MG tablet TAKE 1 TABLET BY MOUTH EVERY MORNING AND 2 TABLETS EVERY EVENING WITH MEALS 270 tablet 1    montelukast  (SINGULAIR) 10 MG tablet TAKE 1 TABLET BY MOUTH EVERY NIGHT 90 tablet 1    pramipexole (Mirapex) 1 MG tablet Take 1 tablet by mouth Every Night. 90 tablet 1    prednisoLONE acetate (Pred Mild) 0.12 % ophthalmic suspension SHAKE LIQUID AND INSTILL 1 DROP IN RIGHT EYE TWICE DAILY      Zinc 100 MG tablet Take 100 mg by mouth Daily.       No current facility-administered medications for this visit.     Review of Systems   Constitutional: Negative.    Skin:         Painful toenails.   All other systems reviewed and are negative.      OBJECTIVE     Vitals:    05/22/24 1016   BP: 129/68   Pulse: 77   Temp: 98.2 °F (36.8 °C)   SpO2: 95%       Body mass index is 31.53 kg/m².    Lab Results   Component Value Date    HGBA1C 6.40 (H) 03/14/2024       Lab Results   Component Value Date    GLUCOSE 118 (H) 03/14/2024    CALCIUM 9.0 04/18/2024     03/14/2024    K 4.1 03/14/2024    CO2 26.2 03/14/2024     03/14/2024    BUN 17 03/14/2024    CREATININE 0.85 04/18/2024    EGFRIFNONA 68 02/15/2022    BCR 15.9 03/14/2024    ANIONGAP 10.8 03/14/2024       Patient seen in no apparent distress.      PHYSICAL EXAM:     Foot/Ankle Exam    GENERAL  Appearance:  chronically ill  Orientation:  AAOx3  Affect:  appropriate  Gait:  unimpaired  Assistance:  independent  Right shoe gear: casual shoe  Left shoe gear: casual shoe    VASCULAR     Right Foot Vascularity   Dorsalis pedis:  1+  Posterior tibial:  1+  Skin temperature:  warm  Edema grading:  None  CFT:  < 3 seconds  Pedal hair growth:  Absent  Varicosities:  mild varicosities     Left Foot Vascularity   Dorsalis pedis:  1+  Posterior tibial:  1+  Skin temperature:  warm  Edema grading:  None  CFT:  < 3 seconds  Pedal hair growth:  Absent  Varicosities:  mild varicosities     NEUROLOGIC     Right Foot Neurologic   Light touch sensation: diminished  Vibratory sensation: diminished  Hot/Cold sensation: diminished  Protective Sensation using Holden-Darling Monofilament:   Sites  intact: 3  Sites tested: 10     Left Foot Neurologic   Normal sensation    Light touch sensation: normal  Vibratory sensation: normal  Hot/Cold sensation:  normal  Protective Sensation using [place]-Darling Monofilament:   Sites intact: 10  Sites tested: 10    MUSCLE STRENGTH     Right Foot Muscle Strength   Foot dorsiflexion:  4-  Foot plantar flexion:  4-  Foot inversion:  4-  Foot eversion:  4-     Left Foot Muscle Strength   Foot dorsiflexion:  4-  Foot plantar flexion:  4-  Foot inversion:  4-  Foot eversion:  4-    RANGE OF MOTION     Right Foot Range of Motion   Foot and ankle ROM within normal limits       Left Foot Range of Motion   Foot and ankle ROM within normal limits      DERMATOLOGIC      Right Foot Dermatologic   Skin  Right foot skin is intact.   Nails  2.  Positive for elongated, onychomycosis, abnormal thickness, subungual debris and ingrown toenail.  3.  Positive for elongated, onychomycosis, abnormal thickness, subungual debris and ingrown toenail.  4.  Positive for elongated, onychomycosis, abnormal thickness, subungual debris and ingrown toenail.  5.  Positive for elongated, onychomycosis, abnormal thickness, subungual debris and ingrown toenail.  Nails comment:  Toenails 1, 2, 3, 4, and 5     Left Foot Dermatologic   Skin  Left foot skin is intact.   Nails comment:  Toenails 1, 2, 3, 4, and 5  Nails  1.  Positive for elongated, onychomycosis, abnormal thickness, subungual debris and ingrown toenail.  2.  Positive for elongated, onychomycosis, abnormal thickness, subungual debris and ingrown toenail.  3.  Positive for elongated, onychomycosis, abnormal thickness, subungual debris and ingrown toenail.  4.  Positive for elongated, onychomycosis, abnormally thick, subungual debris and ingrown toenail.  5.  Positive for elongated, onychomycosis, abnormally thick, subungual debris and ingrown toenail.    I have reexamined the patient the results are consistent with the previously documented  exam.    ASSESSMENT/PLAN     Diagnoses and all orders for this visit:    1. Onychocryptosis (Primary)    2. Onychomycosis    3. Neuropathy    4. Type 2 diabetes mellitus with diabetic polyneuropathy, with long-term current use of insulin    5. DM feet    6. Foot pain, bilateral    Comprehensive lower extremity examination and evaluation was performed.    Discussed findings and treatment plan including risks, benefits, and treatment options with patient in detail. Patient agreed with treatment plan.    Medications and allergies reviewed.  Reviewed available blood glucose and HgB A1C lab values along with other pertinent labs.  These were discussed with the patient as to their importance of diabetic maintenance.    Toenails 2, 3, 4, 5 on Right and 1, 2, 3, 4, 5 on Left were debrided with nail nippers then filed with a Dremel nail radha.  Patient tolerated procedure well without complications.    An After Visit Summary was printed and given to the patient at discharge, including (if requested) any available informative/educational handouts regarding diagnosis, treatment, or medications. All questions were answered to patient/family satisfaction. Should symptoms fail to improve or worsen they agree to call or return to clinic or to go to the Emergency Department. Discussed the importance of following up with any needed screening tests/labs/specialist appointments and any requested follow-up recommended by me today. Importance of maintaining follow-up discussed and patient accepts that missed appointments can delay diagnosis and potentially lead to worsening of conditions.    Return in about 9 weeks (around 7/24/2024) for Toenail Care., or sooner if acute issues arise.    I have reviewed the assessment and plan and verified the accuracy of it. No changes to assessment and plan since the information was documented. Adarsh Flores DPM 05/22/24     I have dictated this note utilizing Dragon Dictation.  Please note  that portions of this note were completed with a voice recognition program.  Part of this note may be an electronic transcription/translation of spoken language to printed text using the Dragon Dictation System.          This document has been electronically signed by Adarsh Flores DPM on May 22, 2024 10:37 EDT

## 2024-05-28 ENCOUNTER — TREATMENT (OUTPATIENT)
Dept: PHYSICAL THERAPY | Facility: CLINIC | Age: 74
End: 2024-05-28
Payer: MEDICARE

## 2024-05-28 ENCOUNTER — TELEMEDICINE (OUTPATIENT)
Dept: PSYCHIATRY | Facility: CLINIC | Age: 74
End: 2024-05-28
Payer: MEDICARE

## 2024-05-28 DIAGNOSIS — R29.898 WEAKNESS OF BOTH HIPS: ICD-10-CM

## 2024-05-28 DIAGNOSIS — R26.9 GAIT DISTURBANCE: ICD-10-CM

## 2024-05-28 DIAGNOSIS — F51.05 INSOMNIA DUE TO MENTAL CONDITION: Primary | ICD-10-CM

## 2024-05-28 DIAGNOSIS — M54.50 ACUTE BILATERAL LOW BACK PAIN WITHOUT SCIATICA: Primary | ICD-10-CM

## 2024-05-28 DIAGNOSIS — F41.1 GENERALIZED ANXIETY DISORDER: ICD-10-CM

## 2024-05-28 PROCEDURE — 97110 THERAPEUTIC EXERCISES: CPT | Performed by: PHYSICAL THERAPIST

## 2024-05-28 PROCEDURE — 90834 PSYTX W PT 45 MINUTES: CPT | Performed by: COUNSELOR

## 2024-05-28 PROCEDURE — 97530 THERAPEUTIC ACTIVITIES: CPT | Performed by: PHYSICAL THERAPIST

## 2024-05-28 NOTE — PROGRESS NOTES
Physical Therapy Daily Treatment Note  [address], [address] [place] [address]        Patient: Nivia Cagle   : 1950  Diagnosis/ICD-10 Code:  Acute bilateral low back pain without sciatica [M54.50]  Referring practitioner: Catalina Madsen PA-C  Date of Initial Visit: Type: THERAPY  Noted: 5/10/2024  Today's Date: 2024  Patient seen for 3 sessions             Subjective   Nivia Cagle reports that she has been feeling better overall.  She reports that she walked more at her gym at [employer] over the weekend.  She states that she has to sit down after a lap or 2 when her hips start to burn.  She rates her pain at 2/10 and states that it is more in her right ankle upon arrival today.    Objective     + Tightness bilateral hip/knee musculature  (Greater pain reported on right as compared to left)    See Exercise and Manual Logs for complete treatment.       Assessment/Plan    Pt tolerated therapy session well - with progression of therapeutic exercises, CKC-Functional activities, and Manual therapy.  She  has improved, but continues to have deficits in Her Trunk-Core and Bilateral Lower Extremity ROM,  Strength, and Stability; limiting functional mobility and ability to perform ADLs without increased pain, difficulty, and without increased risk for falls at this time.  She encouraged to continue with use of Single tip cant cane secondary to unsteady gait and decreased balance at times.  She ambulated into clinic without assistive device today.     Progress per Plan of Care - as tolerated               Timed:  Manual Therapy:    7     mins  96617;  Therapeutic Exercise:    28     mins  38651;     Neuromuscular Evon:    0    mins  58331;    Therapeutic Activity:     10     mins  04352;     Gait Trainin     mins  52330;     Ultrasound:     0     mins  85945;    Electrical Stimulation:    0     mins  73163;  Iontophoresis     0     mins  04883    Untimed:  Electrical Stimulation:    0      mins  99989 ( );  Mechanical Traction:    0     mins  59057;   Fluidotherapy     0     mins  59627  Hot pack     0     mins  13515  Cold pack     0     mins  95018    Timed Treatment:   45   mins   Total Treatment:     45   mins        Kely Madsen PTA  Physical Therapist Assistant

## 2024-05-28 NOTE — PROGRESS NOTES
"Date: May 28, 2024  Time In: 1200  Time Out: 1238  This provider is located at home address for [facility] ([facility] [address], [place] [address] using a secure [employer] Video Visit through [employer]. Patient is being seen remotely via telehealth at home address in [place] and stated they are in a secure environment for this session. The patient's condition being diagnosed/treated is appropriate for telemedicine. The provider identified herself as well as her credentials. The patient, and/or patients guardian, consent to be seen remotely, and when consent is given they understand that the consent allows for patient identifiable information to be sent to a third party as needed. They may refuse to be seen remotely at any time. The electronic data is encrypted and password protected, and the patient and/or guardian has been advised of the potential risks to privacy not withstanding such measures.     You have chosen to receive care through a telehealth visit.  Do you consent to use a video/audio connection for your medical care today? Yes    PROGRESS NOTE  Data:  Nivia Cagle is a 73 y.o. female who presents today for follow up    Chief Complaint: sleep     History of Present Illness: Pt reports the ability to fall asleep however discussed that despite best efforts can not remain asleep waking several times throughout the night. Pt reports that she has been noticing that she will chew on her mouth, side of mouth, and tongue during these hours and becomes restless. Pt reports that she has been staying productive and busy; discussing completed tasks including a portrait and junk journal. Pt reports becoming more motivated and active in [employer]. Pt states that whenever she finds herself becoming more restless or anxious she will remind self that \"God reigns and remains in control\".        Clinical Maneuvering/Intervention:    (Scales based on 0 - 10 " with 10 being the worst)  Depression: 0 Anxiety: 3       Assisted patient in processing above session content; acknowledged and normalized patient’s thoughts, feelings, and concerns.  Rationalized patient thought process regarding recent stressors and life events. Discussed triggers associated with patient's emotions. Also discussed coping skills for patient to implement. Discussed sleeping pattern and provided education on subconscious and suppression.     Allowed patient to freely discuss issues without interruption or judgment. Provided safe, confidential environment to facilitate the development of positive therapeutic relationship and encourage open, honest communication. Assisted patient in identifying risk factors which would indicate the need for higher level of care including thoughts to harm self or others and/or self-harming behavior and encouraged patient to contact this office, call 911, or present to the nearest emergency room should any of these events occur. Discussed crisis intervention services and means to access. Patient adamantly and convincingly denies current suicidal or homicidal ideation or perceptual disturbance.    Assessment:   Assessment   Patient appears to maintain relative stability as compared to their baseline.  However, patient continues to struggle with anxiety which continues to cause impairment in important areas of functioning.  A result, they can be reasonably expected to continue to benefit from treatment and would likely be at increased risk for decompensation otherwise.    Mental Status Exam:   Hygiene:   good  Cooperation:  Cooperative  Eye Contact:  Good  Psychomotor Behavior:  Appropriate  Affect:  Appropriate  Mood: normal  Speech:  Normal  Thought Process:  Linear  Thought Content:  Mood congruent  Suicidal:  None  Homicidal:  None  Hallucinations:  None  Delusion:  None  Memory:  Intact  Orientation:  Person, Place, Time and Situation  Reliability:  fair  Insight:   Fair  Judgement:  Fair  Impulse Control:  Fair  Physical/Medical Issues:  No        Patient's Support Network Includes:  extended family    Functional Status: Mild impairment     Progress toward goal: Not at goal    Prognosis: Fair with Ongoing Treatment            Plan:    Patient will continue in individual outpatient therapy with focus on improved functioning and coping skills, maintaining stability, and avoiding decompensation and the need for higher level of care.    Patient will adhere to medication regimen as prescribed and report any side effects. Patient will contact this office, call 911 or present to the nearest emergency room should suicidal or homicidal ideations occur. Provide Cognitive Behavioral Therapy and Solution Focused Therapy to improve functioning, maintain stability, and avoid decompensation and the need for higher level of care.     Return in about 6 weeks, or earlier if symptoms worsen or fail to improve.           VISIT DIAGNOSIS:     ICD-10-CM ICD-9-CM   1. Insomnia due to mental condition  F51.05 300.9     327.02   2. Generalized anxiety disorder  F41.1 300.02        Diagnoses and all orders for this visit:    1. 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 Generalized anxiety disorder           [facility] No Show Policy:  We understand unexpected circumstances arise; however, anytime you miss your appointment we are unable to provide you appropriate care.  In addition, each appointment missed could have been used to provide care for others.  We ask that you call at least 24 hours in advance to cancel or reschedule an appointment.  We would like to take this opportunity to remind you of our policy stating patients who miss THREE or more appointments without cancelling or rescheduling 24 hours in advance of the appointment may be subject to cancellation of any further visits with our clinic and recommendation to seek in-person services/visits.    Please call  [phone] to reschedule your appointment. If there are reasons that make it difficult for you to keep the appointments, please call and let us know how we can help.  Please understand that medication prescribing will not continue without seeing your provider.      [facility]'s No Show Policy reviewed with patient at today's visit. Patient verbalized understanding of this policy. Discussed with patient that in the event that there are three or more no show visits, it will be recommended that they pursue in-person services/visits as noncompliance with telehealth visits indicates that patient is not an appropriate candidate for telemedicine and would likely be more appropriate for in-person services/visits. Patient verbalizes understanding and is agreeable to this.        This document has been electronically signed by Annita Knowles LCSW.  May 28, 2024 12:49 EDT      Part of this note may be an electronic transcription/translation of spoken language to printed text using the Dragon Dictation System.

## 2024-06-03 RX ORDER — PRAMIPEXOLE DIHYDROCHLORIDE 0.5 MG/1
0.5 TABLET ORAL NIGHTLY
Qty: 90 TABLET | Refills: 1 | OUTPATIENT
Start: 2024-06-03 | End: 2025-06-03

## 2024-06-03 NOTE — PATIENT INSTRUCTIONS
1.  Please return to clinic at your next scheduled visit.  Contact the clinic ([phone]) at least 24 hours prior in the event you need to cancel.  2.  Do no harm to yourself or others.    3.  Avoid alcohol and drugs.    4.  Take all medications as prescribed.  Please contact the clinic with any concerns. If you are in need of medication refills, please call the clinic at [phone].    5. Should you want to get in touch with your provider, Dr. Vicky Barth, please utilize [employer] or contact the office ([phone]), and staff will be able to page Dr. Barth directly.  6.  In the event you have personal crisis, contact the following crisis numbers: Suicide Prevention Hotline 1-147.650.4318; [facility] Helpline 4-849-066-[place]; [facility] Emergency Room [phone]; text HELLO to 660390; or 037.

## 2024-06-03 NOTE — PROGRESS NOTES
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    Platelets 12/07/2023 251  140 - 450 10*3/mm3 Final    Neutrophil % 12/07/2023 62.9  42.7 - 76.0 % Final    Lymphocyte % 12/07/2023 21.7  19.6 - 45.3 % Final    Monocyte % 12/07/2023 11.6  5.0 - 12.0 % Final    Eosinophil % 12/07/2023 2.7  0.3 - 6.2 % Final    Basophil % 12/07/2023 0.7  0.0 - 1.5 % Final    Immature Grans % 12/07/2023 0.4  0.0 - 0.5 % Final    Neutrophils, Absolute 12/07/2023 4.71  1.70 - 7.00 10*3/mm3 Final    Lymphocytes, Absolute 12/07/2023 1.62  0.70 - 3.10 10*3/mm3 Final    Monocytes, Absolute 12/07/2023 0.87  0.10 - 0.90 10*3/mm3 Final    Eosinophils, Absolute 12/07/2023 0.20  0.00 - 0.40 10*3/mm3 Final    Basophils, Absolute 12/07/2023 0.05  0.00 - 0.20 10*3/mm3 Final    Immature Grans, Absolute 12/07/2023 0.03  0.00 - 0.05 10*3/mm3 Final    nRBC 12/07/2023 0.0  0.0 - 0.2 /100 WBC Final       EKG Results:  No orders to display       Imaging Results:  No Images in the past 120 days found..      Assessment & Plan   Diagnoses and all orders for this visit:    1. 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  Dispense: 30 tablet; Refill: 2    2. Generalized anxiety disorder  -     ARIPiprazole  (Abilify) 2 MG tablet; Take 1 tablet by mouth Daily.  Dispense: 30 tablet; Refill: 2    3. Post traumatic stress disorder (PTSD)  -     ARIPiprazole (Abilify) 2 MG tablet; Take 1 tablet by mouth Daily.  Dispense: 30 tablet; Refill: 2    4. Recurrent major depressive disorder in remission  -     ARIPiprazole (Abilify) 2 MG tablet; Take 1 tablet by mouth Daily.  Dispense: 30 tablet; Refill: 2      INITIAL presentation 2021 most consistent with major depressive disorder, recurrent, moderate to severe, with anxious distress.  PTSD.  Rule out personality disorder, cluster B specifically.  Rule out hypomania as patient was very difficult to interrupt today.    06/04/2024: Insomnia, but also more activity, and possibly some depressed mood. Restart abilify; stopped previously thinking it wasn't covered by insurance, but this may have just been an early request that was denied. Watch swallowing issues. Consider lamictal, vraylar, rexulti, low dose seroquel.    Allowed patient to freely discuss and process issues, such as:  Anxiety, PTSD, depression regarding relationships  Enjoyment, coping from painting  ... using Rogerian psychotherapeutic techniques including unconditional positive regard, reflective listening, and demonstrating clear empathy, with the goal of ameliorating symptoms and maintaining, restoring, or improving self-esteem, adaptive skills, and ego or psychological functions (Ara et al. 1991).  Time (minutes) spent providing supportive psychotherapy: 17  (This time is exclusive to the therapy session and separate from the time spent on activities used to meet the criteria for the E/M service (history, exam, medical decision-making).)  Start: 7:19  Stop: 7:36  Functional status: mild impairment  Treatment plan: Medication management and supportive psychotherapy  Prognosis: good  Progress: insomnia  4w    4/30/24: Insomnia, and some procrastination. Increase doxepin. Procrastinating on painting a certain  "painting; processed why. Monitor.    4/3: Stable, but monitor if worsening depression creeps in.    3/4/24: Pt ist stable, but lost her 20 mg dose of prozac. Re-sent in. Insurance won't pay for abilify. DC abilify. Some unusual issues with swallowing. Processed dreams about her mother. Mom isn't happy, \"it's not good enough.\"    2/6: Stable, discussed dietary changes involving decreasing sugar. Sugar is comforting and helps her depression. Also discussed stevia. Now using light box and sleeping a little better.    1/5: Seasonal depression, start light box up again. May have to make further med changes.    12/14: Add melatonin for maintenance insomnia. Otherwise stable. Seeing a movie for [employer]. Got all her Xmas cards mailed out.    11/9: Doing well, isolated insomnia. Stop trazodone and start doxepin. Consider lunesta, belsomra, quiviviq. She stopped melatonin on her own.    8/11: Stable, well, no changes. Painting, having people over. Counseled to start light box in September, reiterated instructions. Will be inducted into the [employer] of [facility] and [facility] tomorrow. She's been working on it for a year.    7/11: Short visit as patient sleepy. Unusual sleep pattern recently, but MH is fine. Pt will call her son tonight to ensure someone is around when she goes back to sleep. She will call PCP about this tomorrow. Close follow up with me in 4 wks.    4/27: Stable, well. Restart melatonin for insomnia.     3/2: Prozac and BLT helped. Situational stressor in son, no changes. Better. Watch insomnia.     1/20: Start light box, increase prozac for dep.     12/9: Some insomnia. Increase melatonin.     10/25: Doing well. Increase prozac back to baseline dose (inadvertently reduced, she has been stable on a higher dose, so we should go back to that). Some initial insomnia, increase trazodone to 75 mg nightly. She is enjoying the Fall.     9/8: Start light box. Close follow up in case this is worsening " depression.     7/27: Well, stable.     6/14: Better, no changes.     5/3: Increase prozac and melatonin.    Visit Diagnoses:    ICD-10-CM ICD-9-CM   1. Insomnia due to mental condition  F51.05 300.9     327.02   2. Generalized anxiety disorder  F41.1 300.02   3. Post traumatic stress disorder (PTSD)  F43.10 309.81   4. Recurrent major depressive disorder in remission  F33.40 296.35       PLAN:  Risk Assessment: Risk of self-harm acutely is moderate. Risk factors include chronic depressive disorder, possible personality disorder, recent psychosocial stressors (pandemic, moving). Protective factors include no present SI, no history of suicide attempts or self-harm in the past, no access to weapons, minimal AODA, healthcare seeking, future orientation, willingness to engage in care. Risk of self-harm chronically is also moderate, but could be further elevated in the event of treatment noncompliance and/or AODA.  Safety: No acute safety concerns.  Medications:   CONTINUE light box 9/1 - 3/31.  CONTINUE melatonin 30 mg p.o. nightly.  Risks, benefits, side effects discussed with patient including sedation, dizziness/falls risk, GI upset.  Do not use before operating vehicle, vessel, or machine. After discussion of these risks and benefits, the patient voiced understanding and agreed to proceed.   CONTINUE doxepin 10 mg qhs. Risks, benefits, side effects discussed with patient including GI upset, sedation, dizziness/falls risk, grogginess the following day, prolongation of the QTc interval.  After discussion of these risks and benefits, the patient voiced understanding and agreed to proceed.    CONTINUE bupropion xl 300 mg daily. Risks, benefits, alternatives discussed with patient including nausea, GI upset, increased energy, exacerbation of irritability, insomnia, lowering of seizure threshold.  After discussion of these risks and benefits, the patient voiced understanding and agreed to proceed.  CONTINUE Prozac 30 mg a  day (HIGHEST dose is 40 given that she is also on bupropion). Risks, benefits, alternatives discussed with patient including GI upset, nausea vomiting diarrhea, theoretical decrease of seizure threshold predisposing the patient to a slightly higher seizure risk, headaches, sexual dysfunction, serotonin syndrome, bleeding risk, increased suicidality in patients 24 years and younger.  After discussion of these risks and benefits, the patient voiced understanding and agreed to proceed.  RESTART Abilify 2 mg p.o. nightly. Previously stopped 2/2 cost, 3/24, but this may have been rejected simply because pt requested too soon (she states). Risks, benefits, alternatives discussed with patient including increased energy, exacerbation of irritability, akathisia, movement issues, GI upset, orthostatic hypotension, increased appetite, tardive dyskinesia.  Use care when operating vehicle, vessel, or machine. After discussion of these risks and benefits, the patient voiced understanding and agreed to proceed.  S/P:  trazodone 100 mg PO QHS. No longer effective 11/23.  Mirtazapine 45 mg daily (RLS)  Ambien caused double vision, and possibly hallucinations  Was on lithium in the past: dizziness and falls, and hair curled.  Therapy: referred to [facility]7.  Labs/Studies: s/p TMS referral.  Follow Up: 6 weeks. (prefers 4 wks)      TREATMENT PLAN/GOALS: Continue supportive psychotherapy efforts and medications as indicated. Treatment and medication options discussed during today's visit. Patient acknowledged and verbally consented to continue with current treatment plan and was educated on the importance of compliance with treatment and follow-up appointments.    MEDICATION ISSUES:  SAPNA reviewed as expected.  Discussed medication options and treatment plan of prescribed medication as well as the risks, benefits, and side effects including potential falls, possible impaired driving and metabolic adversities among others.  Patient is agreeable to call the office with any worsening of symptoms or onset of side effects. Patient is agreeable to call 911 or go to the nearest ER should he/she begin having SI/HI. No medication side effects or related complaints today.     MEDS ORDERED DURING VISIT:  New Medications Ordered This Visit   Medications    ARIPiprazole (Abilify) 2 MG tablet     Sig: Take 1 tablet by mouth Daily.     Dispense:  30 tablet     Refill:  2       Return in about 4 weeks (around 7/2/2024) for Video visit.         This document has been electronically signed by Vicky Barth MD  June 4, 2024 07:37 EDT      Part of this note may be an electronic transcription/translation of spoken language to printed text using the Dragon Dictation System.

## 2024-06-04 ENCOUNTER — TREATMENT (OUTPATIENT)
Dept: PHYSICAL THERAPY | Facility: CLINIC | Age: 74
End: 2024-06-04
Payer: MEDICARE

## 2024-06-04 ENCOUNTER — TELEMEDICINE (OUTPATIENT)
Dept: PSYCHIATRY | Facility: CLINIC | Age: 74
End: 2024-06-04
Payer: MEDICARE

## 2024-06-04 DIAGNOSIS — F51.05 INSOMNIA DUE TO MENTAL CONDITION: Primary | ICD-10-CM

## 2024-06-04 DIAGNOSIS — R26.9 GAIT DISTURBANCE: ICD-10-CM

## 2024-06-04 DIAGNOSIS — F33.40 RECURRENT MAJOR DEPRESSIVE DISORDER IN REMISSION: ICD-10-CM

## 2024-06-04 DIAGNOSIS — M54.50 ACUTE BILATERAL LOW BACK PAIN WITHOUT SCIATICA: Primary | ICD-10-CM

## 2024-06-04 DIAGNOSIS — F43.10 POST TRAUMATIC STRESS DISORDER (PTSD): ICD-10-CM

## 2024-06-04 DIAGNOSIS — R29.898 WEAKNESS OF BOTH HIPS: ICD-10-CM

## 2024-06-04 DIAGNOSIS — F41.1 GENERALIZED ANXIETY DISORDER: ICD-10-CM

## 2024-06-04 PROCEDURE — 1159F MED LIST DOCD IN RCRD: CPT | Performed by: STUDENT IN AN ORGANIZED HEALTH CARE EDUCATION/TRAINING PROGRAM

## 2024-06-04 PROCEDURE — 90833 PSYTX W PT W E/M 30 MIN: CPT | Performed by: STUDENT IN AN ORGANIZED HEALTH CARE EDUCATION/TRAINING PROGRAM

## 2024-06-04 PROCEDURE — 99214 OFFICE O/P EST MOD 30 MIN: CPT | Performed by: STUDENT IN AN ORGANIZED HEALTH CARE EDUCATION/TRAINING PROGRAM

## 2024-06-04 PROCEDURE — 97110 THERAPEUTIC EXERCISES: CPT | Performed by: PHYSICAL THERAPIST

## 2024-06-04 PROCEDURE — 97530 THERAPEUTIC ACTIVITIES: CPT | Performed by: PHYSICAL THERAPIST

## 2024-06-04 PROCEDURE — 1160F RVW MEDS BY RX/DR IN RCRD: CPT | Performed by: STUDENT IN AN ORGANIZED HEALTH CARE EDUCATION/TRAINING PROGRAM

## 2024-06-04 RX ORDER — ARIPIPRAZOLE 2 MG/1
2 TABLET ORAL DAILY
Qty: 30 TABLET | Refills: 2 | Status: SHIPPED | OUTPATIENT
Start: 2024-06-04

## 2024-06-04 NOTE — PROGRESS NOTES
"Physical Therapy Daily Treatment Note  [address], [address] [place] [address]        Patient: Nivia Cagle   : 1950  Diagnosis/ICD-10 Code:  Acute bilateral low back pain without sciatica [M54.50]  Referring practitioner: Catalina Madsen PA-C  Date of Initial Visit: Type: THERAPY  Noted: 5/10/2024  Today's Date: 2024  Patient seen for 4 sessions             Subjective   Nivia Cagle reports having 1-2/10 pain in her Left leg upon arrival today.  She reports that she has increased her walking and is now doing 15 laps \"Around the Gym\".  She states she has to stop every 2-3 laps because of hip pain.  She states- \"We walk as many laps as we can in 1 hour\".  She states she that she does it 3 times a week.    Objective       See Exercise and Manual Logs for complete treatment.       Assessment/Plan    Pt tolerated therapy session well - with progression of therapeutic exercises, CKC-Functional activities, and Manual therapy.  She  has improved, but continues to have deficits in Her Trunk-Core and Bilateral Lower Extremity ROM,  Strength, and Stability; limiting functional mobility and ability to perform ADLs without increased pain, difficulty, and without increased risk for falls at this time.      Progress per Plan of Care - as tolerated               Timed:  Manual Therapy:    7     mins  49494;  Therapeutic Exercise:    23     mins  13799;     Neuromuscular Evon:    0    mins  29584;    Therapeutic Activity:     8     mins  06713;     Gait Trainin     mins  73449;     Ultrasound:     0     mins  38561;    Electrical Stimulation:    0     mins  28990;  Iontophoresis     0     mins  54287    Untimed:  Electrical Stimulation:    0     mins  91906 ( );  Mechanical Traction:    0     mins  45549;   Fluidotherapy     0     mins  64528  Hot pack     0     mins  08700  Cold pack     0     mins  03856    Timed Treatment:   38   mins   Total Treatment:     38   mins        Kely" BABR Madsen  Physical Therapist Assistant

## 2024-06-11 ENCOUNTER — TELEPHONE (OUTPATIENT)
Dept: PHYSICAL THERAPY | Facility: CLINIC | Age: 74
End: 2024-06-11

## 2024-06-11 NOTE — TELEPHONE ENCOUNTER
Caller: Nivia Cagle    Relationship: Self         What was the call regarding: STOMACH ISSUES

## 2024-06-13 ENCOUNTER — TREATMENT (OUTPATIENT)
Dept: PHYSICAL THERAPY | Facility: CLINIC | Age: 74
End: 2024-06-13
Payer: MEDICARE

## 2024-06-13 DIAGNOSIS — R29.898 WEAKNESS OF BOTH HIPS: ICD-10-CM

## 2024-06-13 DIAGNOSIS — M54.50 ACUTE BILATERAL LOW BACK PAIN WITHOUT SCIATICA: Primary | ICD-10-CM

## 2024-06-13 DIAGNOSIS — R26.9 GAIT DISTURBANCE: ICD-10-CM

## 2024-06-13 NOTE — PROGRESS NOTES
Progress note  [address], [address] [place] [address]      Patient: Nivia Cagle   : 1950  Diagnosis/ICD-10 Code:  Acute bilateral low back pain without sciatica [M54.50]  Referring practitioner: Catalina Madsen PA-C  Date of Initial Visit: Type: THERAPY  Noted: 5/10/2024  Today's Date: 2024  Patient seen for 5 sessions        Subjective:   Nivia Cagle reports: that she feels she is much stronger during ADLs than she was prior to starting PT.  Pt reports that she only uses the cane when she walks for long distances.  Pt reports improvements in overall back pain and states that it mainly increases with prolonged standing activities.  Pt reports that she now is walking about 25 laps around the gym at [employer] for exercise.    Subjective Questionnaire: Oswestry:       Subjective   Objective     Strength/Myotome Testing      Left Hip   Planes of Motion   Flexion: 4+  Extension: 4  Abduction: 4     Right Hip   Planes of Motion   Flexion: 4+  Extension: 4  Abduction: 4     Left Knee   Flexion: 5  Extension: 5     Right Knee   Flexion:5  Extension: 5     Left Ankle/Foot   Dorsiflexion: 5     Right Ankle/Foot   Dorsiflexion: 4-     Muscle Activation      Additional Muscle Activation Details  Good PPT/TA activation    Assessment & Plan       Assessment  Assessment details: Compared to initial evaluation the pt demonstrates improvements in bilateral hip/core strength, gait, PPT activation and reports of overall pain and function during ADLs.  Due to significant improvements towards functional goals the pt will be discharged from PT at this time with updated HEP.  Pt is comfortable with this plan and will continue updated HEP.      Goals  Plan Goals: 1. The patient complains of low back pain.  LTG 1: 12 weeks:  The patient will report a pain rating of 2/10 or better in order to improve  tolerance to activities of daily living and improve sleep quality.  STATUS:  not met consistently  STG 1a: 6  weeks:  The patient will report a pain rating of 3/10 or better.  STATUS:  Not met consistently     2. The patient demonstrates weakness of the bilateral hips.  LTG 2: 12 weeks:  The patient will demonstrate 4+/5 strength for bilateral hip flexion, abduction,  and extension in order to improve hip stability.  STATUS:  partially met  STG 2a: 6 weeks:  The patient will demonstrate 4 /5 strength for bilateral hip flexion, abduction,  and extension.  STATUS:  met     3. The patient has gait dysfunction.  LTG 3: 12 weeks:  The patient will ambulate with straight cane, independently, for   community distances with minimal limp in order to improve mobility and allow   patient to perform activities such as grocery shopping with greater ease.  STATUS:  met     4. Mobility: Walking/Moving Around Functional Limitation                   LTG 1: 12 weeks:  The patient will demonstrate 22 % limitation by achieving a score of 10/45 on the SINDY.  STATUS:  met  STG 1: 6 weeks:  The patient will demonstrate 11 % limitation by achieving a score of 5/45 on the SINDY.  STATUS:  New     5. The patient demonstrates weakness and decreased activation of core stabilizers.  LTG 5: 12 weeks:  The patient will demonstrate good PPT activation in standing and supine without cues in order to improve overall lumbar stability during ADLs and to avoid further injury during lifting and other tasks.  STATUS:  met         Plan  Therapy options: will not be seen for skilled therapy services      Progress toward previous goals: Partially Met      Recommendations: Discharge    PT SIGNATURE: Serena James PT     Electronically signed 6/13/2024  DATE TREATMENT INITIATED: 6/13/2024  [place] License: [id number]      Based upon review of the patient's progress and continued therapy plan, it is my medical opinion that Nivia Cagle should continue physical therapy treatment at [facility] PHYSICAL THERAPY  [address] [place] [address] [address]  [facility]  [address]  [phone].      Timed:  Manual Therapy:    8     mins  70843;  Therapeutic Exercise:    30     mins  95078;     Neuromuscular Evon:    0    mins  98373;    Therapeutic Activity:     15     mins  19955;     Gait Trainin     mins  46597;     Ultrasound:     0     mins  94915;    Electrical Stimulation:    0     mins  56056 ( );    Untimed:  Electrical Stimulation:    0     mins  40148 ( );  Mechanical Traction:    0     mins  29450;     Timed Treatment:   53   mins   Total Treatment:     58   mins

## 2024-07-02 ENCOUNTER — TELEMEDICINE (OUTPATIENT)
Dept: PSYCHIATRY | Facility: CLINIC | Age: 74
End: 2024-07-02
Payer: MEDICARE

## 2024-07-02 DIAGNOSIS — F43.10 POST TRAUMATIC STRESS DISORDER (PTSD): ICD-10-CM

## 2024-07-02 DIAGNOSIS — F41.1 GENERALIZED ANXIETY DISORDER: ICD-10-CM

## 2024-07-02 DIAGNOSIS — F33.40 RECURRENT MAJOR DEPRESSIVE DISORDER IN REMISSION: ICD-10-CM

## 2024-07-02 DIAGNOSIS — F33.1 MAJOR DEPRESSIVE DISORDER, RECURRENT EPISODE, MODERATE: ICD-10-CM

## 2024-07-02 DIAGNOSIS — F51.05 INSOMNIA DUE TO MENTAL CONDITION: Primary | ICD-10-CM

## 2024-07-02 PROCEDURE — 1160F RVW MEDS BY RX/DR IN RCRD: CPT | Performed by: STUDENT IN AN ORGANIZED HEALTH CARE EDUCATION/TRAINING PROGRAM

## 2024-07-02 PROCEDURE — 1159F MED LIST DOCD IN RCRD: CPT | Performed by: STUDENT IN AN ORGANIZED HEALTH CARE EDUCATION/TRAINING PROGRAM

## 2024-07-02 PROCEDURE — 90833 PSYTX W PT W E/M 30 MIN: CPT | Performed by: STUDENT IN AN ORGANIZED HEALTH CARE EDUCATION/TRAINING PROGRAM

## 2024-07-02 PROCEDURE — 99214 OFFICE O/P EST MOD 30 MIN: CPT | Performed by: STUDENT IN AN ORGANIZED HEALTH CARE EDUCATION/TRAINING PROGRAM

## 2024-07-02 RX ORDER — ARIPIPRAZOLE 2 MG/1
4 TABLET ORAL DAILY
Qty: 180 TABLET | Refills: 1 | Status: SHIPPED | OUTPATIENT
Start: 2024-07-02

## 2024-07-02 NOTE — PATIENT INSTRUCTIONS
1.  Please return to clinic at your next scheduled visit.  Contact the clinic ([phone]) at least 24 hours prior in the event you need to cancel.  2.  Do no harm to yourself or others.    3.  Avoid alcohol and drugs.    4.  Take all medications as prescribed.  Please contact the clinic with any concerns. If you are in need of medication refills, please call the clinic at [phone].    5. Should you want to get in touch with your provider, Dr. Vicky Barth, please utilize [employer] or contact the office ([phone]), and staff will be able to page Dr. Barth directly.  6.  In the event you have personal crisis, contact the following crisis numbers: Suicide Prevention Hotline 1-417.225.8950; [facility] Helpline 6-976-321-[place]; [facility] Emergency Room [phone]; text HELLO to 270614; or 058.

## 2024-07-02 NOTE — PROGRESS NOTES
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 disorder in remission  -     ARIPiprazole (Abilify) 2 MG tablet; Take 2 tablets by mouth Daily.  Dispense: 180 tablet; Refill: 1      INITIAL presentation 2021 most consistent with major depressive disorder, recurrent, moderate to severe, with anxious distress.  PTSD.  Rule out personality disorder, cluster B specifically.  Rule out hypomania as patient was very difficult to interrupt today.    07/02/2024: Improving mood, but persistent insomnia. Increase abilify. Constipation issues, but had 2 BM recently. Consider gabapentin as neuropathic pain impeding sleep.    Allowed patient to freely discuss and process issues, such as:  Anxiety, PTSD, and depression regarding family conflict.  ... using Rogerian psychotherapeutic techniques including unconditional positive regard, reflective listening, and demonstrating clear empathy, with the goal of ameliorating symptoms and maintaining, restoring, or improving self-esteem, adaptive skills, and ego or psychological functions (Ara et al. 1991).  Time (minutes) spent providing supportive psychotherapy: 16  (This time is exclusive to the therapy session and separate from the time spent on  "activities used to meet the criteria for the E/M service (history, exam, medical decision-making).)  Start: 12:42  Stop: 12:58  Functional status: mild impairment  Treatment plan: Medication management and supportive psychotherapy  Prognosis: good  Progress: insomnia  4w    06/04/2024: Insomnia, but also more activity, and possibly some depressed mood. Restart abilify; stopped previously thinking it wasn't covered by insurance, but this may have just been an early request that was denied. Watch swallowing issues. Consider lamictal, vraylar, rexulti, low dose seroquel.    4/30/24: Insomnia, and some procrastination. Increase doxepin. Procrastinating on painting a certain painting; processed why. Monitor.    4/3: Stable, but monitor if worsening depression creeps in.    3/4/24: Pt ist stable, but lost her 20 mg dose of prozac. Re-sent in. Insurance won't pay for abilify. DC abilify. Some unusual issues with swallowing. Processed dreams about her mother. Mom isn't happy, \"it's not good enough.\"    2/6: Stable, discussed dietary changes involving decreasing sugar. Sugar is comforting and helps her depression. Also discussed stevia. Now using light box and sleeping a little better.    1/5: Seasonal depression, start light box up again. May have to make further med changes.    12/14: Add melatonin for maintenance insomnia. Otherwise stable. Seeing a movie for [employer]. Got all her [employer] cards mailed out.    11/9: Doing well, isolated insomnia. Stop trazodone and start doxepin. Consider lunesta, belsomra, quiviviq. She stopped melatonin on her own.    8/11: Stable, well, no changes. Painting, having people over. Counseled to start light box in September, reiterated instructions. Will be inducted into the [employer] confronternity of [facility] and [facility] tomorrow. She's been working on it for a year.    7/11: Short visit as patient sleepy. Unusual sleep pattern recently, but MH is fine. Pt will call her son sara to " ensure someone is around when she goes back to sleep. She will call PCP about this tomorrow. Close follow up with me in 4 wks.    4/27: Stable, well. Restart melatonin for insomnia.     3/2: Prozac and BLT helped. Situational stressor in son, no changes. Better. Watch insomnia.     1/20: Start light box, increase prozac for dep.     12/9: Some insomnia. Increase melatonin.     10/25: Doing well. Increase prozac back to baseline dose (inadvertently reduced, she has been stable on a higher dose, so we should go back to that). Some initial insomnia, increase trazodone to 75 mg nightly. She is enjoying the Fall.     9/8: Start light box. Close follow up in case this is worsening depression.     7/27: Well, stable.     6/14: Better, no changes.     5/3: Increase prozac and melatonin.    Visit Diagnoses:    ICD-10-CM ICD-9-CM   1. Insomnia due to mental condition  F51.05 300.9     327.02   2. Generalized anxiety disorder  F41.1 300.02   3. Post traumatic stress disorder (PTSD)  F43.10 309.81   4. Major depressive disorder, recurrent episode, moderate  F33.1 296.32   5. Recurrent major depressive disorder in remission  F33.40 296.35       PLAN:  Risk Assessment: Risk of self-harm acutely is moderate. Risk factors include chronic depressive disorder, possible personality disorder, recent psychosocial stressors (pandemic, moving). Protective factors include no present SI, no history of suicide attempts or self-harm in the past, no access to weapons, minimal AODA, healthcare seeking, future orientation, willingness to engage in care. Risk of self-harm chronically is also moderate, but could be further elevated in the event of treatment noncompliance and/or AODA.  Safety: No acute safety concerns.  Medications:   CONTINUE light box 9/1 - 3/31.  CONTINUE melatonin 30 mg p.o. nightly.  Risks, benefits, side effects discussed with patient including sedation, dizziness/falls risk, GI upset.  Do not use before operating vehicle,  vessel, or machine. After discussion of these risks and benefits, the patient voiced understanding and agreed to proceed.   CONTINUE doxepin 10 mg qhs. Risks, benefits, side effects discussed with patient including GI upset, sedation, dizziness/falls risk, grogginess the following day, prolongation of the QTc interval.  After discussion of these risks and benefits, the patient voiced understanding and agreed to proceed.    CONTINUE bupropion xl 300 mg daily. Risks, benefits, alternatives discussed with patient including nausea, GI upset, increased energy, exacerbation of irritability, insomnia, lowering of seizure threshold.  After discussion of these risks and benefits, the patient voiced understanding and agreed to proceed.  CONTINUE Prozac 30 mg a day (HIGHEST dose is 40 given that she is also on bupropion). Risks, benefits, alternatives discussed with patient including GI upset, nausea vomiting diarrhea, theoretical decrease of seizure threshold predisposing the patient to a slightly higher seizure risk, headaches, sexual dysfunction, serotonin syndrome, bleeding risk, increased suicidality in patients 24 years and younger.  After discussion of these risks and benefits, the patient voiced understanding and agreed to proceed.  INCREASE Abilify 2 to 4 mg p.o. nightly. Previously stopped 2/2 cost, 3/24, but this may have been rejected simply because pt requested too soon (she states). Risks, benefits, alternatives discussed with patient including increased energy, exacerbation of irritability, akathisia, movement issues, GI upset, orthostatic hypotension, increased appetite, tardive dyskinesia.  Use care when operating vehicle, vessel, or machine. After discussion of these risks and benefits, the patient voiced understanding and agreed to proceed.  S/P:  trazodone 100 mg PO QHS. No longer effective 11/23.  Mirtazapine 45 mg daily (RLS)  Ambien caused double vision, and possibly hallucinations  Was on lithium in the  past: dizziness and falls, and hair curled.  Therapy: referred to [facility] 12/7.  Labs/Studies: s/p TMS referral.  Follow Up: 6 weeks. (prefers 4 wks)      TREATMENT PLAN/GOALS: Continue supportive psychotherapy efforts and medications as indicated. Treatment and medication options discussed during today's visit. Patient acknowledged and verbally consented to continue with current treatment plan and was educated on the importance of compliance with treatment and follow-up appointments.    MEDICATION ISSUES:  SAPNA reviewed as expected.  Discussed medication options and treatment plan of prescribed medication as well as the risks, benefits, and side effects including potential falls, possible impaired driving and metabolic adversities among others. Patient is agreeable to call the office with any worsening of symptoms or onset of side effects. Patient is agreeable to call 911 or go to the nearest ER should he/she begin having SI/HI. No medication side effects or related complaints today.     MEDS ORDERED DURING VISIT:  New Medications Ordered This Visit   Medications    ARIPiprazole (Abilify) 2 MG tablet     Sig: Take 2 tablets by mouth Daily.     Dispense:  180 tablet     Refill:  1     Increasing dose and sending in 90 day supply       Return in about 4 weeks (around 7/30/2024) for Video visit.         This document has been electronically signed by Vicky Barth MD  July 2, 2024 13:00 EDT      Part of this note may be an electronic transcription/translation of spoken language to printed text using the Dragon Dictation System.

## 2024-07-11 ENCOUNTER — OFFICE VISIT (OUTPATIENT)
Dept: INTERNAL MEDICINE | Facility: CLINIC | Age: 74
End: 2024-07-11
Payer: MEDICARE

## 2024-07-11 VITALS
OXYGEN SATURATION: 98 % | HEIGHT: 63 IN | RESPIRATION RATE: 15 BRPM | DIASTOLIC BLOOD PRESSURE: 72 MMHG | HEART RATE: 110 BPM | WEIGHT: 176 LBS | SYSTOLIC BLOOD PRESSURE: 136 MMHG | TEMPERATURE: 97.1 F | BODY MASS INDEX: 31.18 KG/M2

## 2024-07-11 DIAGNOSIS — J01.90 ACUTE SINUSITIS, RECURRENCE NOT SPECIFIED, UNSPECIFIED LOCATION: ICD-10-CM

## 2024-07-11 DIAGNOSIS — R09.81 NASAL CONGESTION: ICD-10-CM

## 2024-07-11 DIAGNOSIS — E11.65 TYPE 2 DIABETES MELLITUS WITH HYPERGLYCEMIA, WITHOUT LONG-TERM CURRENT USE OF INSULIN: Primary | ICD-10-CM

## 2024-07-11 DIAGNOSIS — E78.5 HYPERLIPIDEMIA LDL GOAL <70: ICD-10-CM

## 2024-07-11 DIAGNOSIS — I10 ESSENTIAL HYPERTENSION: ICD-10-CM

## 2024-07-11 DIAGNOSIS — K13.0 LIP LESION: ICD-10-CM

## 2024-07-11 PROCEDURE — 1126F AMNT PAIN NOTED NONE PRSNT: CPT | Performed by: PHYSICIAN ASSISTANT

## 2024-07-11 PROCEDURE — 1160F RVW MEDS BY RX/DR IN RCRD: CPT | Performed by: PHYSICIAN ASSISTANT

## 2024-07-11 PROCEDURE — 99214 OFFICE O/P EST MOD 30 MIN: CPT | Performed by: PHYSICIAN ASSISTANT

## 2024-07-11 PROCEDURE — 3078F DIAST BP <80 MM HG: CPT | Performed by: PHYSICIAN ASSISTANT

## 2024-07-11 PROCEDURE — 1159F MED LIST DOCD IN RCRD: CPT | Performed by: PHYSICIAN ASSISTANT

## 2024-07-11 PROCEDURE — 3075F SYST BP GE 130 - 139MM HG: CPT | Performed by: PHYSICIAN ASSISTANT

## 2024-07-11 PROCEDURE — 3044F HG A1C LEVEL LT 7.0%: CPT | Performed by: PHYSICIAN ASSISTANT

## 2024-07-11 RX ORDER — FLUTICASONE PROPIONATE 50 MCG
2 SPRAY, SUSPENSION (ML) NASAL DAILY
Qty: 16 G | Refills: 6 | Status: SHIPPED | OUTPATIENT
Start: 2024-07-11

## 2024-07-11 RX ORDER — AMOXICILLIN AND CLAVULANATE POTASSIUM 875; 125 MG/1; MG/1
1 TABLET, FILM COATED ORAL 2 TIMES DAILY
Qty: 20 TABLET | Refills: 0 | Status: SHIPPED | OUTPATIENT
Start: 2024-07-11 | End: 2024-07-21

## 2024-07-11 RX ORDER — BENZONATATE 100 MG/1
100 CAPSULE ORAL 3 TIMES DAILY PRN
Qty: 30 CAPSULE | Refills: 0 | Status: SHIPPED | OUTPATIENT
Start: 2024-07-11

## 2024-07-11 NOTE — PROGRESS NOTES
Chief Complaint    DM, HTN, cough, congestion, skin problem   Subjective          Nivia Cagle presents to [facility]  History of Present Illness  Pt here for 3 month f/u with several complaints     Pt has had cough, nasal congestion x 1 wk  Cough is dry   She has heard wheezing initially but that resolved   She has had fever to touch, sweats, achy   Denies feeling sob or resp distress  Bilateral ear pain   Was around sick people at [employer]   She tried otc cough medicine and tylenol   Appetite decreased   Energy decreased   Pt using singulair and zyrtec daily    DM: bg have been running higher recently   Running 120-190   Denies low bg, dizziness  Denies chest pain, palpitations     Bump on R side of bottom lip has been there x 4 months  Crusts and bleeds at times  It is not painful      Past Medical History:   Diagnosis Date    ADHD (attention deficit hyperactivity disorder)     Allergic rhinitis     Anemia     Anxiety     Cataracts, bilateral     Chronic pain disorder     Depression 0421/2021    MOODNOT WELL CONTROLLED.  GIVEN NUMBER FOR LOCAL COUNSELOR.  WILL INCREASE TRINTELIX FROM 10MG TO 20MG RTC WEEK ER ID S/HI    Diabetes mellitus, type 2     Diverticulitis     GERD (gastroesophageal reflux disease)     Head injury     High blood pressure     Hyperlipemia     Insomnia     Migraine     EARL (obstructive sleep apnea) 04/21/2021    Panic disorder     Phlebitis     PTSD (post-traumatic stress disorder)     RLS (restless legs syndrome)         Past Surgical History:   Procedure Laterality Date    ANKLE SURGERY  2021    BILATERAL BREAST REDUCTION  2015    BREAST BIOPSY      CARPAL TUNNEL RELEASE      CHOLECYSTECTOMY  2001    COLONOSCOPY      [facility]    COLONOSCOPY N/A 09/28/2022    Procedure: COLONOSCOPY with biopsy;  Surgeon: Ghislaine Kilgore MD;  Location: [facility] ENDOSCOPY;  Service: Gastroenterology;  Laterality: N/A;  colon polyp, diverticlosis,  hemorrhoids    EYE SURGERY Right     scar tissue removal    HYSTERECTOMY      ULNAR NERVE TRANSPOSITION Right     WRIST SURGERY          Current Outpatient Medications on File Prior to Visit   Medication Sig Dispense Refill    Accu-Chek Madeline Plus test strip by Other route 4 (Four) Times a Day. use to test blood sugar      Accu-Chek Softclix Lancets lancets by Other route 4 (Four) Times a Day. use to test blood sugar      ARIPiprazole (Abilify) 2 MG tablet Take 2 tablets by mouth Daily. 180 tablet 1    Ascorbic Acid (VITAMIN C GUMMIE PO) Take  by mouth Daily.      aspirin (aspirin) 81 MG EC tablet Take 1 tablet by mouth Daily. 90 tablet 99    Blood Glucose Monitoring Suppl (FreeStyle Lite) device 1 each by Other route 4 (Four) Times a Day.      buPROPion XL (WELLBUTRIN XL) 300 MG 24 hr tablet Take 1 tablet by mouth Every Morning. 90 tablet 3    cetirizine (zyrTEC) 10 MG tablet TAKE 1 TABLET BY MOUTH EVERY DAY 90 tablet 1    Cholecalciferol 25 MCG (1000 UT) tablet dispersible Take  by mouth Daily.      coenzyme Q10 50 MG capsule capsule Take  by mouth Daily.      cyclobenzaprine (FLEXERIL) 10 MG tablet Take 1 tablet by mouth Daily As Needed for Muscle Spasms. 90 tablet 1    Daily-Jelani Multivitamin tablet tablet Take 1 tablet by mouth every night at bedtime.      doxepin (SINEquan) 25 MG capsule Take 1 capsule by mouth Every Night. 90 capsule 1    ezetimibe (ZETIA) 10 MG tablet TAKE 1 TABLET BY MOUTH EVERY NIGHT AT BEDTIME 90 tablet 1    FLUoxetine (PROzac) 10 MG capsule Take 1 capsule by mouth Daily. 90 capsule 1    FLUoxetine (PROzac) 20 MG capsule Take 1 capsule by mouth Daily. 90 capsule 3    Inclisiran Sodium (LEQVIO SC) Inject  under the skin into the appropriate area as directed.      Januvia 100 MG tablet TAKE 1 TABLET BY MOUTH DAILY 90 tablet 1    losartan (COZAAR) 25 MG tablet Take 0.5 tablets by mouth Daily. 90 tablet 1    metFORMIN (GLUCOPHAGE) 500 MG tablet TAKE 1 TABLET BY MOUTH EVERY MORNING AND 2  "TABLETS EVERY EVENING WITH MEALS 270 tablet 1    montelukast (SINGULAIR) 10 MG tablet TAKE 1 TABLET BY MOUTH EVERY NIGHT 90 tablet 1    pramipexole (Mirapex) 1 MG tablet Take 1 tablet by mouth Every Night. 90 tablet 1    prednisoLONE acetate (PRED FORTE) 1 % ophthalmic suspension SHAKE LIQUID AND INSTILL 1 DROP IN RIGHT EYE TWICE DAILY      Zinc 100 MG tablet Take 100 mg by mouth Daily.      [DISCONTINUED] fluticasone (Flonase) 50 MCG/ACT nasal spray 2 sprays into the nostril(s) as directed by provider Daily. Administer 2 sprays in each nostril for each dose. 16 g 6    alendronate (FOSAMAX) 70 MG tablet Take 1 tablet by mouth Every 7 (Seven) Days. (Patient not taking: Reported on 7/11/2024)      [DISCONTINUED] azelastine (ASTELIN) 0.1 % nasal spray 2 sprays into the nostril(s) as directed by provider 2 (Two) Times a Day. Use in each nostril as directed (Patient not taking: Reported on 7/11/2024) 90 mL 6    [DISCONTINUED] Biotin 10 MG tablet Take  by mouth.      [DISCONTINUED] BL NASAL SALINE MIST NA 2 drops.       No current facility-administered medications on file prior to visit.        Allergies   Allergen Reactions    Diclofenac Hives    New Skin [Benzethonium Chloride] Rash    Atorvastatin Myalgia    Adhesive Tape Rash and Other (See Comments)       Rash at area of bandaid    Niacin Rash       Social History     Tobacco Use   Smoking Status Former    Current packs/day: 0.25    Average packs/day: 0.3 packs/day for 49.5 years (12.4 ttl pk-yrs)    Types: Cigarettes    Start date: 1975   Smokeless Tobacco Never   Tobacco Comments    QUIT 1988          Objective   Vital Signs:   /72 (BP Location: Left arm, Patient Position: Sitting, Cuff Size: Adult)   Pulse 110   Temp 97.1 °F (36.2 °C) (Temporal)   Resp 15   Ht 160 cm (63\")   Wt 79.8 kg (176 lb)   SpO2 98%   BMI 31.18 kg/m²     Physical Exam  Vitals reviewed.   Constitutional:       Appearance: Normal appearance.   HENT:      Head: Normocephalic and " atraumatic.        Nose: Nose normal.      Mouth/Throat:      Mouth: Mucous membranes are moist.   Eyes:      Extraocular Movements: Extraocular movements intact.      Conjunctiva/sclera: Conjunctivae normal.      Pupils: Pupils are equal, round, and reactive to light.   Cardiovascular:      Rate and Rhythm: Normal rate and regular rhythm.   Pulmonary:      Effort: Pulmonary effort is normal.      Breath sounds: Normal breath sounds.   Abdominal:      General: Abdomen is flat. Bowel sounds are normal.      Palpations: Abdomen is soft.   Musculoskeletal:         General: Normal range of motion.   Neurological:      General: No focal deficit present.      Mental Status: She is alert and oriented to person, place, and time.   Psychiatric:         Mood and Affect: Mood normal.        Result Review :                            Assessment and Plan    Diagnoses and all orders for this visit:    1. Type 2 diabetes mellitus with hyperglycemia, without long-term current use of insulin (Primary)  Comments:  Pt will rtc for labs after acute illness resolved. Will adjust medicines if indicated based on results, discussed could be elevated from illness.  Orders:  -     Hemoglobin A1c  -     MicroAlbumin, Urine, Random - Urine, Clean Catch    2. Hyperlipidemia LDL goal <70  Comments:  pt will rtc for fasting labs  Orders:  -     Lipid Panel    3. Essential hypertension  -     Comprehensive Metabolic Panel  -     CBC & Differential  -     TSH    4. Nasal congestion  -     fluticasone (Flonase) 50 MCG/ACT nasal spray; 2 sprays into the nostril(s) as directed by provider Daily. Administer 2 sprays in each nostril for each dose.  Dispense: 16 g; Refill: 6    5. Lip lesion  Comments:  Will refer to derm for eval  Orders:  -     Ambulatory Referral to Dermatology    6. Acute sinusitis, recurrence not specified, unspecified location  Comments:  sx overall improving. Will cont allergy meds, start tessalon pearls for cough. Discussed abx  if no improvement after 10 days.    Other orders  -     benzonatate (Tessalon Perles) 100 MG capsule; Take 1 capsule by mouth 3 (Three) Times a Day As Needed for Cough.  Dispense: 30 capsule; Refill: 0  -     amoxicillin-clavulanate (AUGMENTIN) 875-125 MG per tablet; Take 1 tablet by mouth 2 (Two) Times a Day for 10 days.  Dispense: 20 tablet; Refill: 0        Follow Up   Return in about 3 months (around 10/11/2024), or if symptoms worsen or fail to improve.  Patient was given instructions and counseling regarding her condition or for health maintenance advice. Please see specific information pulled into the AVS if appropriate.

## 2024-07-22 ENCOUNTER — TELEMEDICINE (OUTPATIENT)
Dept: PSYCHIATRY | Facility: CLINIC | Age: 74
End: 2024-07-22
Payer: MEDICARE

## 2024-07-22 DIAGNOSIS — F33.1 MAJOR DEPRESSIVE DISORDER, RECURRENT EPISODE, MODERATE: Primary | ICD-10-CM

## 2024-07-22 PROCEDURE — 90832 PSYTX W PT 30 MINUTES: CPT | Performed by: COUNSELOR

## 2024-07-22 NOTE — TREATMENT PLAN
Multi-Disciplinary Problems (from Behavioral Health Treatment Plan)      Active Problems       Problem: Depression  Start Date: 12/22/21      Problem Details: The patient self-scales this problem as a 10 with 10 being the worst.          Goal Priority Start Date Expected End Date End Date    Patient will demonstrate the ability to initiate new constructive life skills outside of sessions on a consistent basis. -- 12/22/21 -- --    Goal Details: Progress toward goal:  Not appropriate to rate progress toward goal since this is the initial treatment plan.          Goal Intervention Frequency Start Date End Date    Assist patient in setting attainable activities of daily living goals. PRN 12/22/21 --      Goal Intervention Frequency Start Date End Date    Provide education about depression PRN 12/22/21 --    Intervention Details: Duration of treatment until remission of symptoms.          Goal Intervention Frequency Start Date End Date    Assist patient in developing healthy coping strategies. Q3 Weeks 12/22/21 --    Intervention Details: Duration of treatment until remission of symptoms.                                 I have discussed and reviewed this treatment plan with the patient.

## 2024-07-23 ENCOUNTER — OFFICE VISIT (OUTPATIENT)
Dept: CARDIOLOGY | Facility: CLINIC | Age: 74
End: 2024-07-23
Payer: MEDICARE

## 2024-07-23 ENCOUNTER — CLINICAL SUPPORT (OUTPATIENT)
Dept: INTERNAL MEDICINE | Facility: CLINIC | Age: 74
End: 2024-07-23
Payer: MEDICARE

## 2024-07-23 VITALS
SYSTOLIC BLOOD PRESSURE: 130 MMHG | HEIGHT: 63 IN | DIASTOLIC BLOOD PRESSURE: 85 MMHG | HEART RATE: 100 BPM | WEIGHT: 177.4 LBS | BODY MASS INDEX: 31.43 KG/M2

## 2024-07-23 DIAGNOSIS — E11.9 DIABETES MELLITUS TYPE 2 WITHOUT RETINOPATHY: ICD-10-CM

## 2024-07-23 DIAGNOSIS — D64.9 ANEMIA, UNSPECIFIED TYPE: Primary | ICD-10-CM

## 2024-07-23 DIAGNOSIS — E78.2 MIXED HYPERLIPIDEMIA: Primary | ICD-10-CM

## 2024-07-23 DIAGNOSIS — E78.5 HYPERLIPIDEMIA LDL GOAL <70: ICD-10-CM

## 2024-07-23 DIAGNOSIS — I10 ESSENTIAL HYPERTENSION: ICD-10-CM

## 2024-07-23 DIAGNOSIS — Z78.9 STATIN INTOLERANCE: ICD-10-CM

## 2024-07-23 LAB
ALBUMIN SERPL-MCNC: 4.5 G/DL (ref 3.5–5.2)
ALBUMIN UR-MCNC: 2.7 MG/DL
ALBUMIN/GLOB SERPL: 1.6 G/DL
ALP SERPL-CCNC: 81 U/L (ref 39–117)
ALT SERPL W P-5'-P-CCNC: 23 U/L (ref 1–33)
ANION GAP SERPL CALCULATED.3IONS-SCNC: 10 MMOL/L (ref 5–15)
AST SERPL-CCNC: 24 U/L (ref 1–32)
BASOPHILS # BLD AUTO: 0.05 10*3/MM3 (ref 0–0.2)
BASOPHILS NFR BLD AUTO: 0.7 % (ref 0–1.5)
BILIRUB SERPL-MCNC: 0.4 MG/DL (ref 0–1.2)
BUN SERPL-MCNC: 14 MG/DL (ref 8–23)
BUN/CREAT SERPL: 12.1 (ref 7–25)
CALCIUM SPEC-SCNC: 9.8 MG/DL (ref 8.6–10.5)
CHLORIDE SERPL-SCNC: 101 MMOL/L (ref 98–107)
CHOLEST SERPL-MCNC: 168 MG/DL (ref 0–200)
CO2 SERPL-SCNC: 28 MMOL/L (ref 22–29)
CREAT SERPL-MCNC: 1.16 MG/DL (ref 0.57–1)
DEPRECATED RDW RBC AUTO: 42 FL (ref 37–54)
EGFRCR SERPLBLD CKD-EPI 2021: 50.5 ML/MIN/1.73
EOSINOPHIL # BLD AUTO: 0.17 10*3/MM3 (ref 0–0.4)
EOSINOPHIL NFR BLD AUTO: 2.4 % (ref 0.3–6.2)
ERYTHROCYTE [DISTWIDTH] IN BLOOD BY AUTOMATED COUNT: 12.7 % (ref 12.3–15.4)
GLOBULIN UR ELPH-MCNC: 2.8 GM/DL
GLUCOSE SERPL-MCNC: 87 MG/DL (ref 65–99)
HBA1C MFR BLD: 6.9 % (ref 4.8–5.6)
HCT VFR BLD AUTO: 39.7 % (ref 34–46.6)
HDLC SERPL-MCNC: 56 MG/DL (ref 40–60)
HGB BLD-MCNC: 13.1 G/DL (ref 12–15.9)
IMM GRANULOCYTES # BLD AUTO: 0.01 10*3/MM3 (ref 0–0.05)
IMM GRANULOCYTES NFR BLD AUTO: 0.1 % (ref 0–0.5)
LDLC SERPL CALC-MCNC: 84 MG/DL (ref 0–100)
LDLC/HDLC SERPL: 1.4 {RATIO}
LYMPHOCYTES # BLD AUTO: 1.47 10*3/MM3 (ref 0.7–3.1)
LYMPHOCYTES NFR BLD AUTO: 20.5 % (ref 19.6–45.3)
MCH RBC QN AUTO: 29.9 PG (ref 26.6–33)
MCHC RBC AUTO-ENTMCNC: 33 G/DL (ref 31.5–35.7)
MCV RBC AUTO: 90.6 FL (ref 79–97)
MONOCYTES # BLD AUTO: 0.79 10*3/MM3 (ref 0.1–0.9)
MONOCYTES NFR BLD AUTO: 11 % (ref 5–12)
NEUTROPHILS NFR BLD AUTO: 4.69 10*3/MM3 (ref 1.7–7)
NEUTROPHILS NFR BLD AUTO: 65.3 % (ref 42.7–76)
NRBC BLD AUTO-RTO: 0 /100 WBC (ref 0–0.2)
PLATELET # BLD AUTO: 263 10*3/MM3 (ref 140–450)
PMV BLD AUTO: 11.4 FL (ref 6–12)
POTASSIUM SERPL-SCNC: 5 MMOL/L (ref 3.5–5.2)
PROT SERPL-MCNC: 7.3 G/DL (ref 6–8.5)
RBC # BLD AUTO: 4.38 10*6/MM3 (ref 3.77–5.28)
SODIUM SERPL-SCNC: 139 MMOL/L (ref 136–145)
TRIGL SERPL-MCNC: 167 MG/DL (ref 0–150)
TSH SERPL DL<=0.05 MIU/L-ACNC: 2.97 UIU/ML (ref 0.27–4.2)
VLDLC SERPL-MCNC: 28 MG/DL (ref 5–40)
WBC NRBC COR # BLD AUTO: 7.18 10*3/MM3 (ref 3.4–10.8)

## 2024-07-23 PROCEDURE — 84443 ASSAY THYROID STIM HORMONE: CPT | Performed by: PHYSICIAN ASSISTANT

## 2024-07-23 PROCEDURE — 80061 LIPID PANEL: CPT | Performed by: PHYSICIAN ASSISTANT

## 2024-07-23 PROCEDURE — 3079F DIAST BP 80-89 MM HG: CPT | Performed by: NURSE PRACTITIONER

## 2024-07-23 PROCEDURE — G2211 COMPLEX E/M VISIT ADD ON: HCPCS | Performed by: NURSE PRACTITIONER

## 2024-07-23 PROCEDURE — 1159F MED LIST DOCD IN RCRD: CPT | Performed by: NURSE PRACTITIONER

## 2024-07-23 PROCEDURE — 1160F RVW MEDS BY RX/DR IN RCRD: CPT | Performed by: NURSE PRACTITIONER

## 2024-07-23 PROCEDURE — 99214 OFFICE O/P EST MOD 30 MIN: CPT | Performed by: NURSE PRACTITIONER

## 2024-07-23 PROCEDURE — 3075F SYST BP GE 130 - 139MM HG: CPT | Performed by: NURSE PRACTITIONER

## 2024-07-23 PROCEDURE — 85025 COMPLETE CBC W/AUTO DIFF WBC: CPT | Performed by: PHYSICIAN ASSISTANT

## 2024-07-23 PROCEDURE — 83036 HEMOGLOBIN GLYCOSYLATED A1C: CPT | Performed by: PHYSICIAN ASSISTANT

## 2024-07-23 PROCEDURE — 82043 UR ALBUMIN QUANTITATIVE: CPT | Performed by: PHYSICIAN ASSISTANT

## 2024-07-23 PROCEDURE — 80053 COMPREHEN METABOLIC PANEL: CPT | Performed by: PHYSICIAN ASSISTANT

## 2024-07-23 PROCEDURE — 36415 COLL VENOUS BLD VENIPUNCTURE: CPT | Performed by: PHYSICIAN ASSISTANT

## 2024-07-23 NOTE — PROGRESS NOTES
Venipuncture Blood Specimen Collection  Venipuncture performed in [facility] by Kamla Hyde RN with good hemostasis. Patient tolerated the procedure well without complications.Patient left urine for micro albumin testing while in office.   07/23/24   Kamla Hyde RN

## 2024-07-23 NOTE — PROGRESS NOTES
Chief Complaint  Hyperlipidemia and Hypertension    Subjective            Nivia Cagle presents to [facility] CARDIOLOGY  History of Present Illness    Nina is here for follow-up evaluation management essential hypertension, mixed hyperlipidemia, and family history of coronary artery disease.  Since her last visit she is doing well.  She denies chest pain, excessive shortness of breath, or palpitations.  She received 1 Leqvio injection last fall but did not get a call or any subsequent treatments.    PMH  Past Medical History:   Diagnosis Date    ADHD (attention deficit hyperactivity disorder)     Allergic rhinitis     Anemia     Anxiety     Cataracts, bilateral     Chronic pain disorder     Depression 0421/2021    MOODNOT WELL CONTROLLED.  GIVEN NUMBER FOR LOCAL COUNSELOR.  WILL INCREASE TRINTELIX FROM 10MG TO 20MG RTC WEEK ER ID S/HI    Diabetes mellitus, type 2     Diverticulitis     GERD (gastroesophageal reflux disease)     Head injury     High blood pressure     Hyperlipemia     Insomnia     Migraine     EARL (obstructive sleep apnea) 04/21/2021    Panic disorder     Phlebitis     PTSD (post-traumatic stress disorder)     RLS (restless legs syndrome)          SURGICALHX  Past Surgical History:   Procedure Laterality Date    ANKLE SURGERY  2021    BILATERAL BREAST REDUCTION  2015    BREAST BIOPSY      CARPAL TUNNEL RELEASE      CHOLECYSTECTOMY  2001    COLONOSCOPY      [facility]    COLONOSCOPY N/A 09/28/2022    Procedure: COLONOSCOPY with biopsy;  Surgeon: Ghislaine Kilgore MD;  Location: [facility] ENDOSCOPY;  Service: Gastroenterology;  Laterality: N/A;  colon polyp, diverticlosis, hemorrhoids    EYE SURGERY Right     scar tissue removal    HYSTERECTOMY      ULNAR NERVE TRANSPOSITION Right     WRIST SURGERY          SOC  Social History     Socioeconomic History    Marital status: Single   Tobacco Use    Smoking status: Former     Current packs/day: 0.25     Average packs/day:  0.3 packs/day for 49.6 years (12.4 ttl pk-yrs)     Types: Cigarettes     Start date: 1975    Smokeless tobacco: Never    Tobacco comments:     QUIT 1988   Vaping Use    Vaping status: Never Used   Substance and Sexual Activity    Alcohol use: Yes     Comment: rarely    Drug use: Never    Sexual activity: Defer         FAMHX  Family History   Problem Relation Age of Onset    Kidney cancer Mother     Hyperlipidemia Mother     Heart disease Father     Heart attack Father     Diabetes Father     ADD / ADHD Father     Hyperlipidemia Father     Sleep apnea Father     Restless legs syndrome Father     Hyperlipidemia Sister     Cancer Sister     Brain cancer Sister     Lung cancer Sister     Hyperlipidemia Sister     Hyperlipidemia Brother     Diabetes Brother     Heart attack Brother     Hyperlipidemia Brother     No Known Problems Paternal Uncle     No Known Problems Cousin     Malpreet Hyperthermia Neg Hx           ALLERGY  Allergies   Allergen Reactions    Diclofenac Hives    New Skin [Benzethonium Chloride] Rash    Atorvastatin Myalgia    Adhesive Tape Rash and Other (See Comments)       Rash at area of bandaid    Niacin Rash        MEDSCURRENT    Current Outpatient Medications:     Accu-Chek Madeline Plus test strip, by Other route 4 (Four) Times a Day. use to test blood sugar, Disp: , Rfl:     Accu-Chek Softclix Lancets lancets, by Other route 4 (Four) Times a Day. use to test blood sugar, Disp: , Rfl:     ARIPiprazole (Abilify) 2 MG tablet, Take 2 tablets by mouth Daily., Disp: 180 tablet, Rfl: 1    Ascorbic Acid (VITAMIN C GUMMIE PO), Take  by mouth Daily., Disp: , Rfl:     aspirin (aspirin) 81 MG EC tablet, Take 1 tablet by mouth Daily., Disp: 90 tablet, Rfl: 99    benzonatate (Tessalon Perles) 100 MG capsule, Take 1 capsule by mouth 3 (Three) Times a Day As Needed for Cough., Disp: 30 capsule, Rfl: 0    Blood Glucose Monitoring Suppl (FreeStyle Lite) device, 1 each by Other route 4 (Four) Times a Day., Disp: , Rfl:      buPROPion XL (WELLBUTRIN XL) 300 MG 24 hr tablet, Take 1 tablet by mouth Every Morning., Disp: 90 tablet, Rfl: 3    cetirizine (zyrTEC) 10 MG tablet, TAKE 1 TABLET BY MOUTH EVERY DAY, Disp: 90 tablet, Rfl: 1    Cholecalciferol 25 MCG (1000 UT) tablet dispersible, Take  by mouth Daily., Disp: , Rfl:     coenzyme Q10 50 MG capsule capsule, Take  by mouth Daily., Disp: , Rfl:     cyclobenzaprine (FLEXERIL) 10 MG tablet, Take 1 tablet by mouth Daily As Needed for Muscle Spasms., Disp: 90 tablet, Rfl: 1    Daily-Jelani Multivitamin tablet tablet, Take 1 tablet by mouth every night at bedtime., Disp: , Rfl:     doxepin (SINEquan) 25 MG capsule, Take 1 capsule by mouth Every Night., Disp: 90 capsule, Rfl: 1    ezetimibe (ZETIA) 10 MG tablet, TAKE 1 TABLET BY MOUTH EVERY NIGHT AT BEDTIME, Disp: 90 tablet, Rfl: 1    FLUoxetine (PROzac) 10 MG capsule, Take 1 capsule by mouth Daily., Disp: 90 capsule, Rfl: 1    FLUoxetine (PROzac) 20 MG capsule, Take 1 capsule by mouth Daily., Disp: 90 capsule, Rfl: 3    fluticasone (Flonase) 50 MCG/ACT nasal spray, 2 sprays into the nostril(s) as directed by provider Daily. Administer 2 sprays in each nostril for each dose., Disp: 16 g, Rfl: 6    Inclisiran Sodium (LEQVIO SC), Inject  under the skin into the appropriate area as directed., Disp: , Rfl:     Januvia 100 MG tablet, TAKE 1 TABLET BY MOUTH DAILY, Disp: 90 tablet, Rfl: 1    losartan (COZAAR) 25 MG tablet, Take 0.5 tablets by mouth Daily. (Patient taking differently: Take 1 tablet by mouth Daily.), Disp: 90 tablet, Rfl: 1    metFORMIN (GLUCOPHAGE) 500 MG tablet, TAKE 1 TABLET BY MOUTH EVERY MORNING AND 2 TABLETS EVERY EVENING WITH MEALS, Disp: 270 tablet, Rfl: 1    montelukast (SINGULAIR) 10 MG tablet, TAKE 1 TABLET BY MOUTH EVERY NIGHT, Disp: 90 tablet, Rfl: 1    pramipexole (Mirapex) 1 MG tablet, Take 1 tablet by mouth Every Night., Disp: 90 tablet, Rfl: 1    prednisoLONE acetate (PRED FORTE) 1 % ophthalmic suspension, SHAKE LIQUID  "AND INSTILL 1 DROP IN RIGHT EYE TWICE DAILY, Disp: , Rfl:     Zinc 100 MG tablet, Take 100 mg by mouth Daily., Disp: , Rfl:     alendronate (FOSAMAX) 70 MG tablet, Take 1 tablet by mouth Every 7 (Seven) Days. (Patient not taking: Reported on 7/11/2024), Disp: , Rfl:       Review of Systems   Cardiovascular:  Negative for chest pain, dyspnea on exertion and palpitations.        Objective     /85   Pulse 100   Ht 160 cm (62.99\")   Wt 80.5 kg (177 lb 6.4 oz)   BMI 31.43 kg/m²       General Appearance:   well developed  well nourished  HENT:   oropharynx moist  lips not cyanotic  Neck:  thyroid not enlarged  supple  Respiratory:  no respiratory distress  normal breath sounds  no rales  Cardiovascular:  no jugular venous distention  regular rhythm  apical impulse normal  S1 normal, S2 normal  no S3, no S4   no murmur  no rub, no thrill  carotid pulses normal; no bruit  pedal pulses normal  lower extremity edema: none    Musculoskeletal:  no clubbing of fingers.   normocephalic, head atraumatic  Skin:   warm, dry  Psychiatric:  judgement and insight appropriate  normal mood and affect      Result Review :     The following data was reviewed by: JEANNIE Haq on 07/23/2024:    CMP          12/7/2023    11:51 3/14/2024    08:34 4/18/2024    11:20   CMP   Glucose 101  118     BUN 19  17     Creatinine 1.09  1.07  0.85    EGFR 54.4  55.6  73.4    Sodium 139  141     Potassium 4.5  4.1     Chloride 102  104     Calcium 9.7  9.1  9.0    Total Protein 6.9  6.6     Albumin 4.6  4.2     Globulin 2.3  2.4     Total Bilirubin 0.4  0.7     Alkaline Phosphatase 80  77     AST (SGOT) 23  17     ALT (SGPT) 18  20     Albumin/Globulin Ratio 2.0  1.8     BUN/Creatinine Ratio 17.4  15.9     Anion Gap 9.1  10.8       CBC          9/7/2023    11:36 12/7/2023    11:51 3/14/2024    08:34   CBC   WBC 7.45  7.48  8.25    RBC 3.94  4.23  4.21    Hemoglobin 12.2  12.9  12.9    Hematocrit 36.8  38.4  38.5    MCV 93.4  90.8  " 91.4    MCH 31.0  30.5  30.6    MCHC 33.2  33.6  33.5    RDW 12.2  12.2  12.6    Platelets 239  251  239      Lipid Panel          9/7/2023    11:36 12/7/2023    11:51 3/14/2024    08:34   Lipid Panel   Total Cholesterol 111  140  139    Triglycerides 93  105  117    HDL Cholesterol 50  70  60    VLDL Cholesterol 18  19  21    LDL Cholesterol  43  51  58    LDL/HDL Ratio 0.85  0.70  0.93      TSH          9/7/2023    11:36 12/7/2023    11:51 3/14/2024    08:34   TSH   TSH 1.480  2.100  2.110             Procedures      Nivia Cagle  reports that she has quit smoking. Her smoking use included cigarettes. She started smoking about 49 years ago. She has a 12.4 pack-year smoking history. She has never used smokeless tobacco. I have educated her on the risk of diseases from using tobacco products such as cancer, COPD, and heart disease.                Assessment and Plan        ASSESSMENT:  Encounter Diagnoses   Name Primary?    Mixed hyperlipidemia Yes    Statin intolerance     Essential hypertension          PLAN:    1.  Mixed hyperlipidemia with statin intolerance-PCSK9 inhibitor was cost prohibitive.  She was approved for Leqvio and received 1 infusion but no subsequent infusions.  I have reordered this today.  2.  Essential hypertension-controlled, continue current medical therapy.  3.  Family history of early CAD-no symptoms of angina or anginal-like equivalents.  Continue with preventative strategies.    Follow-up annually unless problems arise.      Patient was given instructions and counseling regarding her condition or for health maintenance advice. Please see specific information pulled into the AVS if appropriate.           Lois Cochran, APRN   7/23/2024  12:40 EDT

## 2024-07-30 NOTE — PROGRESS NOTES
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    Immature Grans % 03/14/2024 0.4  0.0 - 0.5 % Final    Neutrophils, Absolute 03/14/2024 5.93  1.70 - 7.00 10*3/mm3 Final    Lymphocytes, Absolute 03/14/2024 1.29  0.70 - 3.10 10*3/mm3 Final    Monocytes, Absolute 03/14/2024 0.82  0.10 - 0.90 10*3/mm3 Final    Eosinophils, Absolute 03/14/2024 0.15  0.00 - 0.40 10*3/mm3 Final    Basophils, Absolute 03/14/2024 0.03  0.00 - 0.20 10*3/mm3 Final    Immature Grans, Absolute 03/14/2024 0.03  0.00 - 0.05 10*3/mm3 Final    nRBC 03/14/2024 0.0  0.0 - 0.2 /100 WBC Final       EKG Results:  No orders to display       Imaging Results:  No Images in the past 120 days found..      Assessment & Plan   Diagnoses and all orders for this visit:    1. Recurrent major depressive disorder in remission (Primary)    2. Insomnia due to mental condition    3. Generalized anxiety disorder    4. Post traumatic stress disorder (PTSD)      INITIAL presentation 2021 most consistent with major depressive disorder, recurrent, moderate to severe, with anxious distress.  PTSD.  Rule out personality disorder, cluster B specifically.  Rule out hypomania as patient was very difficult to interrupt today.    07/31/2024: Not depressed, persistent maintenance  insomnia. Restarted her meds 10 days ago, the above improving. No changes, as her s/sx are related to stopping her meds for a time. Also has restarted light box.    Allowed patient to freely discuss and process issues, such as:  Anxiety and depression regarding family conflict/relationships.  Anxiety and depression regarding medical conditions.  ... using Rogerian psychotherapeutic techniques including unconditional positive regard, reflective listening, and demonstrating clear empathy, with the goal of ameliorating symptoms and maintaining, restoring, or improving self-esteem, adaptive skills, and ego or psychological functions (Ara et al. 1991).  Time (minutes) spent providing supportive psychotherapy: 16  (This time is exclusive to the therapy session and separate from the time spent on activities used to meet the criteria for the E/M service (history, exam, medical decision-making).)  Start: 7:40  Stop: 7:56  Functional status: mild impairment  Treatment plan: Medication management and supportive psychotherapy  Prognosis: good  Progress: insomnia  4w    07/02/2024: Improving mood, but persistent insomnia. Increase abilify. Constipation issues, but had 2 BM recently. Consider gabapentin as neuropathic pain impeding sleep.    06/04/2024: Insomnia, but also more activity, and possibly some depressed mood. Restart abilify; stopped previously thinking it wasn't covered by insurance, but this may have just been an early request that was denied. Watch swallowing issues. Consider lamictal, vraylar, rexulti, low dose seroquel.    4/30/24: Insomnia, and some procrastination. Increase doxepin. Procrastinating on painting a certain painting; processed why. Monitor.    4/3: Stable, but monitor if worsening depression creeps in.    3/4/24: Pt ist stable, but lost her 20 mg dose of prozac. Re-sent in. Insurance won't pay for abilify. DC abilify. Some unusual issues with swallowing. Processed dreams about her mother. Mom  "isn't happy, \"it's not good enough.\"    2/6: Stable, discussed dietary changes involving decreasing sugar. Sugar is comforting and helps her depression. Also discussed stevia. Now using light box and sleeping a little better.    1/5: Seasonal depression, start light box up again. May have to make further med changes.    12/14: Add melatonin for maintenance insomnia. Otherwise stable. Seeing a movie for [employer]. Got all her Xmas cards mailed out.    11/9: Doing well, isolated insomnia. Stop trazodone and start doxepin. Consider lunesta, belsomra, quiviviq. She stopped melatonin on her own.    8/11: Stable, well, no changes. Painting, having people over. Counseled to start light box in September, reiterated instructions. Will be inducted into the [employer] [facility] tomorrow. She's been working on it for a year.    7/11: Short visit as patient sleepy. Unusual sleep pattern recently, but MH is fine. Pt will call her son tonight to ensure someone is around when she goes back to sleep. She will call PCP about this tomorrow. Close follow up with me in 4 wks.    4/27: Stable, well. Restart melatonin for insomnia.     3/2: Prozac and BLT helped. Situational stressor in son, no changes. Better. Watch insomnia.     1/20: Start light box, increase prozac for dep.     12/9: Some insomnia. Increase melatonin.     10/25: Doing well. Increase prozac back to baseline dose (inadvertently reduced, she has been stable on a higher dose, so we should go back to that). Some initial insomnia, increase trazodone to 75 mg nightly. She is enjoying the Fall.     9/8: Start light box. Close follow up in case this is worsening depression.     7/27: Well, stable.     6/14: Better, no changes.     5/3: Increase prozac and melatonin.    Visit Diagnoses:    ICD-10-CM ICD-9-CM   1. Recurrent major depressive disorder in remission  F33.40 296.35   2. Insomnia due to mental condition  F51.05 300.9     327.02   3. " Generalized anxiety disorder  F41.1 300.02   4. Post traumatic stress disorder (PTSD)  F43.10 309.81       PLAN:  Risk Assessment: Risk of self-harm acutely is moderate. Risk factors include chronic depressive disorder, possible personality disorder, recent psychosocial stressors (pandemic, moving). Protective factors include no present SI, no history of suicide attempts or self-harm in the past, no access to weapons, minimal AODA, healthcare seeking, future orientation, willingness to engage in care. Risk of self-harm chronically is also moderate, but could be further elevated in the event of treatment noncompliance and/or AODA.  Safety: No acute safety concerns.  Medications:   CONTINUE light box 9/1 - 3/31.  CONTINUE melatonin 30 mg p.o. nightly.  Risks, benefits, side effects discussed with patient including sedation, dizziness/falls risk, GI upset.  Do not use before operating vehicle, vessel, or machine. After discussion of these risks and benefits, the patient voiced understanding and agreed to proceed.   CONTINUE doxepin 25 mg qhs. Risks, benefits, side effects discussed with patient including GI upset, sedation, dizziness/falls risk, grogginess the following day, prolongation of the QTc interval.  After discussion of these risks and benefits, the patient voiced understanding and agreed to proceed.    CONTINUE bupropion xl 300 mg daily. Risks, benefits, alternatives discussed with patient including nausea, GI upset, increased energy, exacerbation of irritability, insomnia, lowering of seizure threshold.  After discussion of these risks and benefits, the patient voiced understanding and agreed to proceed.  CONTINUE Prozac 30 mg a day (HIGHEST dose is 40 given that she is also on bupropion). Risks, benefits, alternatives discussed with patient including GI upset, nausea vomiting diarrhea, theoretical decrease of seizure threshold predisposing the patient to a slightly higher seizure risk, headaches, sexual  dysfunction, serotonin syndrome, bleeding risk, increased suicidality in patients 24 years and younger.  After discussion of these risks and benefits, the patient voiced understanding and agreed to proceed.  CONTINUE Abilify 4 mg p.o. nightly. Previously stopped 2/2 cost, 3/24, but this may have been rejected by insurance simply because pt requested too soon (she states). Risks, benefits, alternatives discussed with patient including increased energy, exacerbation of irritability, akathisia, movement issues, GI upset, orthostatic hypotension, increased appetite, tardive dyskinesia.  Use care when operating vehicle, vessel, or machine. After discussion of these risks and benefits, the patient voiced understanding and agreed to proceed.  S/P:  trazodone 100 mg PO QHS. No longer effective 11/23.  Mirtazapine 45 mg daily (RLS)  Ambien caused double vision, and possibly hallucinations  Was on lithium in the past: dizziness and falls, and hair curled.  Therapy: referred to [facility].  Labs/Studies: s/p TMS referral.  Follow Up: 6 weeks. (prefers 4 wks)      TREATMENT PLAN/GOALS: Continue supportive psychotherapy efforts and medications as indicated. Treatment and medication options discussed during today's visit. Patient acknowledged and verbally consented to continue with current treatment plan and was educated on the importance of compliance with treatment and follow-up appointments.    MEDICATION ISSUES:  SAPNA reviewed as expected.  Discussed medication options and treatment plan of prescribed medication as well as the risks, benefits, and side effects including potential falls, possible impaired driving and metabolic adversities among others. Patient is agreeable to call the office with any worsening of symptoms or onset of side effects. Patient is agreeable to call 911 or go to the nearest ER should he/she begin having SI/HI. No medication side effects or related complaints today.     MEDS ORDERED DURING  VISIT:  No orders of the defined types were placed in this encounter.      No follow-ups on file.         This document has been electronically signed by Vicky Barth MD  July 30, 2024 18:21 EDT      Part of this note may be an electronic transcription/translation of spoken language to printed text using the Dragon Dictation System.

## 2024-07-30 NOTE — PATIENT INSTRUCTIONS
1.  Please return to clinic at your next scheduled visit.  Contact the clinic ([phone]) at least 24 hours prior in the event you need to cancel.  2.  Do no harm to yourself or others.    3.  Avoid alcohol and drugs.    4.  Take all medications as prescribed.  Please contact the clinic with any concerns. If you are in need of medication refills, please call the clinic at [phone].    5. Should you want to get in touch with your provider, Dr. Vicky Barth, please utilize [employer] or contact the office ([phone]), and staff will be able to page Dr. Barth directly.  6.  In the event you have personal crisis, contact the following crisis numbers: Suicide Prevention Hotline 1-561.854.5226; [place] Helpline 8-416-516-[place]; [facility] Emergency Room [phone]; text HELLO to 057177; or 767.

## 2024-07-31 ENCOUNTER — TELEMEDICINE (OUTPATIENT)
Dept: PSYCHIATRY | Facility: CLINIC | Age: 74
End: 2024-07-31
Payer: MEDICARE

## 2024-07-31 DIAGNOSIS — F33.40 RECURRENT MAJOR DEPRESSIVE DISORDER IN REMISSION: Primary | ICD-10-CM

## 2024-07-31 DIAGNOSIS — F43.10 POST TRAUMATIC STRESS DISORDER (PTSD): ICD-10-CM

## 2024-07-31 DIAGNOSIS — F41.1 GENERALIZED ANXIETY DISORDER: ICD-10-CM

## 2024-07-31 DIAGNOSIS — F51.05 INSOMNIA DUE TO MENTAL CONDITION: ICD-10-CM

## 2024-07-31 RX ORDER — FLUOXETINE 10 MG/1
10 CAPSULE ORAL DAILY
Qty: 90 CAPSULE | Refills: 3 | Status: SHIPPED | OUTPATIENT
Start: 2024-07-31

## 2024-08-13 ENCOUNTER — OFFICE VISIT (OUTPATIENT)
Dept: SLEEP MEDICINE | Facility: HOSPITAL | Age: 74
End: 2024-08-13
Payer: MEDICARE

## 2024-08-13 VITALS
BODY MASS INDEX: 31.36 KG/M2 | HEIGHT: 63 IN | WEIGHT: 177 LBS | DIASTOLIC BLOOD PRESSURE: 65 MMHG | OXYGEN SATURATION: 97 % | HEART RATE: 83 BPM | SYSTOLIC BLOOD PRESSURE: 127 MMHG

## 2024-08-13 DIAGNOSIS — E66.9 OBESITY (BMI 30-39.9): ICD-10-CM

## 2024-08-13 DIAGNOSIS — G47.33 OSA (OBSTRUCTIVE SLEEP APNEA): Primary | ICD-10-CM

## 2024-08-13 DIAGNOSIS — I10 ESSENTIAL HYPERTENSION: ICD-10-CM

## 2024-08-13 PROCEDURE — 3074F SYST BP LT 130 MM HG: CPT | Performed by: INTERNAL MEDICINE

## 2024-08-13 PROCEDURE — 3078F DIAST BP <80 MM HG: CPT | Performed by: INTERNAL MEDICINE

## 2024-08-13 PROCEDURE — G0463 HOSPITAL OUTPT CLINIC VISIT: HCPCS

## 2024-08-13 NOTE — PROGRESS NOTES
Sleep Disorder Follow Up    Patient Name: Nivia Cagle  Age/Sex: 73 y.o. female  : 1950  MRN: [id number]    Date of Encounter Visit: 24   Referring Provider: Catalina Madsen PA-C  Place of Service: [facility]  Patient Care Team:  Catalina Madsen PA-C as PCP - General (Physician Assistant)  Kassandra Garay APRN (Inactive) as Nurse Practitioner (Otolaryngology)    PROBLEM LIST:  Obstructive sleep apnea  Obesity    History of Present Illness:  Nivia Cagle is a 73 y.o. female who is here for follow up on obstructive sleep apnea. Patient was last seen in the office on 2024.  Since last visit, patient had evidence of sleep apnea already confirmed from prior evaluation and was intolerant to the CPAP so we did discuss the alternative treatment option including the oral mandibular advancement device and she was referred for sleep dentistry for the device evaluation and she is here for her follow-up afterwards.  She did the dental evaluation and she found it very expensive because she needs to have an implant first and she does not have dental.  She is planning to get dental insurance to cover for the implants and, then get the [facility] and follow up afterwards  Patient weight 180 pounds at the time of the sleep study which showed a mild case of sleep apnea and she may benefit from weight loss if that is an achievable goal.    Currently on no CPAP therapy and only using the oral mandibular advancement device  [place] Sleepiness Scale (ESS) is 3.  Weight is up by 1 pound since last visit.  Other comorbidities include hypertension    Review of Systems:   A ten-system review was conducted and was negative except for the following: Dry mouth.        Past Medical History:  Past medical, surgical, social, and family history, except as mentioned above, was unchanged from the last visit.     Past Medical History:   Diagnosis Date    ADHD (attention deficit hyperactivity  disorder)     Allergic rhinitis     Anemia     Anxiety     Cataracts, bilateral     Chronic pain disorder     Depression 0421/2021    MOODNOT WELL CONTROLLED.  GIVEN NUMBER FOR LOCAL COUNSELOR.  WILL INCREASE TRINTELIX FROM 10MG TO 20MG RTC WEEK ER ID S/HI    Diabetes mellitus, type 2     Diverticulitis     GERD (gastroesophageal reflux disease)     Head injury     High blood pressure     Hyperlipemia     Insomnia     Migraine     EARL (obstructive sleep apnea) 04/21/2021    Panic disorder     Phlebitis     PTSD (post-traumatic stress disorder)     RLS (restless legs syndrome)        Past Surgical History:   Procedure Laterality Date    ANKLE SURGERY  2021    BILATERAL BREAST REDUCTION  2015    BREAST BIOPSY      CARPAL TUNNEL RELEASE      CHOLECYSTECTOMY  2001    COLONOSCOPY      [facility]    COLONOSCOPY N/A 09/28/2022    Procedure: COLONOSCOPY with biopsy;  Surgeon: Ghislaine Kilgore MD;  Location: [facility] ENDOSCOPY;  Service: Gastroenterology;  Laterality: N/A;  colon polyp, diverticlosis, hemorrhoids    EYE SURGERY Right     scar tissue removal    HYSTERECTOMY      ULNAR NERVE TRANSPOSITION Right     WRIST SURGERY         Home Medications:     Current Outpatient Medications:     Accu-Chek Madeline Plus test strip, by Other route 4 (Four) Times a Day. use to test blood sugar, Disp: , Rfl:     Accu-Chek Softclix Lancets lancets, by Other route 4 (Four) Times a Day. use to test blood sugar, Disp: , Rfl:     ARIPiprazole (Abilify) 2 MG tablet, Take 2 tablets by mouth Daily., Disp: 180 tablet, Rfl: 1    Ascorbic Acid (VITAMIN C GUMMIE PO), Take  by mouth Daily., Disp: , Rfl:     aspirin (aspirin) 81 MG EC tablet, Take 1 tablet by mouth Daily., Disp: 90 tablet, Rfl: 99    benzonatate (Tessalon Perles) 100 MG capsule, Take 1 capsule by mouth 3 (Three) Times a Day As Needed for Cough., Disp: 30 capsule, Rfl: 0    Blood Glucose Monitoring Suppl (FreeStyle Lite) device, 1 each by Other route 4 (Four) Times a Day.,  Disp: , Rfl:     buPROPion XL (WELLBUTRIN XL) 300 MG 24 hr tablet, Take 1 tablet by mouth Every Morning., Disp: 90 tablet, Rfl: 3    cetirizine (zyrTEC) 10 MG tablet, TAKE 1 TABLET BY MOUTH EVERY DAY, Disp: 90 tablet, Rfl: 1    Cholecalciferol 25 MCG (1000 UT) tablet dispersible, Take  by mouth Daily., Disp: , Rfl:     coenzyme Q10 50 MG capsule capsule, Take  by mouth Daily., Disp: , Rfl:     cyclobenzaprine (FLEXERIL) 10 MG tablet, Take 1 tablet by mouth Daily As Needed for Muscle Spasms., Disp: 90 tablet, Rfl: 1    Daily-Jelani Multivitamin tablet tablet, Take 1 tablet by mouth every night at bedtime., Disp: , Rfl:     doxepin (SINEquan) 25 MG capsule, Take 1 capsule by mouth Every Night., Disp: 90 capsule, Rfl: 1    ezetimibe (ZETIA) 10 MG tablet, TAKE 1 TABLET BY MOUTH EVERY NIGHT AT BEDTIME, Disp: 90 tablet, Rfl: 1    FLUoxetine (PROzac) 10 MG capsule, Take 1 capsule by mouth Daily., Disp: 90 capsule, Rfl: 3    FLUoxetine (PROzac) 20 MG capsule, Take 1 capsule by mouth Daily., Disp: 90 capsule, Rfl: 3    fluticasone (Flonase) 50 MCG/ACT nasal spray, 2 sprays into the nostril(s) as directed by provider Daily. Administer 2 sprays in each nostril for each dose., Disp: 16 g, Rfl: 6    Inclisiran Sodium (LEQVIO SC), Inject  under the skin into the appropriate area as directed., Disp: , Rfl:     Januvia 100 MG tablet, TAKE 1 TABLET BY MOUTH DAILY, Disp: 90 tablet, Rfl: 1    losartan (COZAAR) 25 MG tablet, Take 0.5 tablets by mouth Daily. (Patient taking differently: Take 1 tablet by mouth Daily.), Disp: 90 tablet, Rfl: 1    metFORMIN (GLUCOPHAGE) 500 MG tablet, TAKE 1 TABLET BY MOUTH EVERY MORNING AND 2 TABLETS EVERY EVENING WITH MEALS, Disp: 270 tablet, Rfl: 1    montelukast (SINGULAIR) 10 MG tablet, TAKE 1 TABLET BY MOUTH EVERY NIGHT, Disp: 90 tablet, Rfl: 1    pramipexole (Mirapex) 1 MG tablet, Take 1 tablet by mouth Every Night., Disp: 90 tablet, Rfl: 1    prednisoLONE acetate (PRED FORTE) 1 % ophthalmic  "suspension, SHAKE LIQUID AND INSTILL 1 DROP IN RIGHT EYE TWICE DAILY, Disp: , Rfl:     Zinc 100 MG tablet, Take 100 mg by mouth Daily., Disp: , Rfl:     alendronate (FOSAMAX) 70 MG tablet, Take 1 tablet by mouth Every 7 (Seven) Days. (Patient not taking: Reported on 7/11/2024), Disp: , Rfl:     Allergies:  Allergies   Allergen Reactions    Diclofenac Hives    New Skin [Benzethonium Chloride] Rash    Atorvastatin Myalgia    Adhesive Tape Rash and Other (See Comments)       Rash at area of bandaid    Niacin Rash       Past Social History:  Social History     Socioeconomic History    Marital status: Single   Tobacco Use    Smoking status: Former     Current packs/day: 0.25     Average packs/day: 0.3 packs/day for 49.6 years (12.4 ttl pk-yrs)     Types: Cigarettes     Start date: 1975    Smokeless tobacco: Never    Tobacco comments:     QUIT 1988   Vaping Use    Vaping status: Never Used   Substance and Sexual Activity    Alcohol use: Yes     Comment: rarely    Drug use: Never    Sexual activity: Defer       Past Family History:  Family History   Problem Relation Age of Onset    Kidney cancer Mother     Hyperlipidemia Mother     Heart disease Father     Heart attack Father     Diabetes Father     ADD / ADHD Father     Hyperlipidemia Father     Sleep apnea Father     Restless legs syndrome Father     Hyperlipidemia Sister     Cancer Sister     Brain cancer Sister     Lung cancer Sister     Hyperlipidemia Sister     Hyperlipidemia Brother     Diabetes Brother     Heart attack Brother     Hyperlipidemia Brother     No Known Problems Paternal Uncle     No Known Problems Cousin     Malig Hyperthermia Neg Hx          Objective:        Vital Signs:   Visit Vitals  /65   Pulse 83   Ht 160 cm (62.99\")   Wt 80.3 kg (177 lb)   SpO2 97%   BMI 31.36 kg/m²     Wt Readings from Last 3 Encounters:   08/13/24 80.3 kg (177 lb)   07/23/24 80.5 kg (177 lb 6.4 oz)   07/11/24 79.8 kg (176 lb)          Physical Exam:   GEN:  No acute " distress, alert, cooperative, well developed   EYES:   Sclerae clear. No icterus. PERRL. Normal EOM  ENT:   External ears/nose normal, no oral lesions, no thrush, mucous membranes moist, Septum midline. Mallampati III airway, Redundant membranous soft palate  NECK:  Supple, midline trachea, no JVD  LUNGS: Normal chest on inspection, CTAB, no wheezes. No rhonchi. No crackles. Respirations regular, even and unlabored.   CV:  Regular rhythm and rate. Normal S1/S2. No murmurs, gallops, or rubs noted.  ABD:  Soft, nontender and nondistended. Normal bowel sounds. No guarding  EXT:  Moves all extremities well. No cyanosis. No redness. No edema.   Skin: Dry, intact, no bleeding      Diagnostic Data:  In lab polysomnography 6/25/2019  Reported weight on the night of study was 180 pounds  AHI was 6 with a REM AHI of 11, no significant hypoxemia with a yolanda of 89%      Assessment and Plan:       ICD-10-CM ICD-9-CM   1. EARL (obstructive sleep apnea)  G47.33 327.23   2. Obesity (BMI 30-39.9)  E66.9 278.00   3. Essential hypertension  I10 401.9       Recommendations:     Patient could not get oral mandibular advancement device yet because she needs to have couple of implants before she can be eligible for that.  She did not have dental insurance to cover for the implants, even though the medical insurance do cover the oral appliance.  She is waiting for the open enrollment to the dental insurance then she is planning to get the implants and then pursue the oral appliance.  This might take anything between few months to even more than that.  Meanwhile she did buy 1 of these over-the-counter mandibular advancement device, and she wants to give it a try before she invests 100s of dollars into an oral appliance that may not guarantee a good outcome.  Her sleep apnea is mild and her CPAP was not a good experience because she is already a restless sleeper and the CPAP make her sleep quality even worse.  We did discuss the options of  weight loss, her sleep apnea is mild and if she can get her weight down to 160 pounds, she might benefit from a reevaluation because there is a reasonable chance that her sleep apnea may be gone by then.  Patient will be leaving today with 1 of 2 goals in mind before she can schedule a follow-up appointment, either we can follow-up with repeat sleep study if she can get her weight down to 160 pounds or we can follow-up to reevaluate if she managed to get her oral appliance from her dentist.  Patient has several questions that were discussed and answered to her satisfaction  She will call us for the follow-up appointment once one of the 2  above goals have been achieved    No orders of the defined types were placed in this encounter.    No orders of the defined types were placed in this encounter.    Return if symptoms worsen or fail to improve.    Alden Dela Cruz MD   [facility]   08/13/24  10:56 EDT    Dictated utilizing Dragon dictation

## 2024-08-15 ENCOUNTER — OFFICE VISIT (OUTPATIENT)
Dept: PODIATRY | Facility: CLINIC | Age: 74
End: 2024-08-15
Payer: MEDICARE

## 2024-08-15 VITALS
BODY MASS INDEX: 31.89 KG/M2 | WEIGHT: 180 LBS | RESPIRATION RATE: 18 BRPM | SYSTOLIC BLOOD PRESSURE: 128 MMHG | DIASTOLIC BLOOD PRESSURE: 83 MMHG | HEIGHT: 63 IN | TEMPERATURE: 98.6 F | HEART RATE: 79 BPM | OXYGEN SATURATION: 96 %

## 2024-08-15 DIAGNOSIS — M79.672 FOOT PAIN, BILATERAL: ICD-10-CM

## 2024-08-15 DIAGNOSIS — E11.42 TYPE 2 DIABETES MELLITUS WITH DIABETIC POLYNEUROPATHY, WITH LONG-TERM CURRENT USE OF INSULIN: ICD-10-CM

## 2024-08-15 DIAGNOSIS — E11.8 DM FEET: ICD-10-CM

## 2024-08-15 DIAGNOSIS — G62.9 NEUROPATHY: ICD-10-CM

## 2024-08-15 DIAGNOSIS — Z79.4 TYPE 2 DIABETES MELLITUS WITH DIABETIC POLYNEUROPATHY, WITH LONG-TERM CURRENT USE OF INSULIN: ICD-10-CM

## 2024-08-15 DIAGNOSIS — B35.1 ONYCHOMYCOSIS: ICD-10-CM

## 2024-08-15 DIAGNOSIS — M79.671 FOOT PAIN, BILATERAL: ICD-10-CM

## 2024-08-15 DIAGNOSIS — L60.0 ONYCHOCRYPTOSIS: Primary | ICD-10-CM

## 2024-08-15 NOTE — PROGRESS NOTES
[facility] - PODIATRY    Today's Date: 08/15/24    Patient Name: Nivia Cagle  MRN: [id number]  CSN: [id number]  PCP: Catalina Madsen PA-C, Last PCP Visit:  7/11/2024  Referring Provider: No ref. provider found    SUBJECTIVE     Chief Complaint   Patient presents with    Left Foot - Follow-up, Nail Problem    Right Foot - Follow-up, Nail Problem     HPI: Nivia Cagle, a 73 y.o.female, presents to clinic for painful toenail:    New, Established, New Problem:  est  Location:  Toenails  Duration:   Greater than five years  Onset:  Gradual  Nature:  sore with palpation.  Stable, worsening, improving:   Stable  Aggravating factors:  Pain with shoe gear and ambulation.  Previous Treatment: Unable to trim their own toenails.    Patient controlling diabetes via:  NIDDM    Patient states their last blood glucose was: 101    Patient denies any fevers, chills, nausea, vomiting, shortness of breath, nor any other constitutional signs nor symptoms.    Medical changes: None    I have reviewed/confirmed previously documented HPI with no changes.       Past Medical History:   Diagnosis Date    ADHD (attention deficit hyperactivity disorder)     Allergic rhinitis     Anemia     Anxiety     Cataracts, bilateral     Chronic pain disorder     Depression 0421/2021    MOODNOT WELL CONTROLLED.  GIVEN NUMBER FOR LOCAL COUNSELOR.  WILL INCREASE TRINTELIX FROM 10MG TO 20MG RTC WEEK ER ID S/HI    Diabetes mellitus, type 2     Diverticulitis     GERD (gastroesophageal reflux disease)     Head injury     High blood pressure     Hyperlipemia     Insomnia     Migraine     EARL (obstructive sleep apnea) 04/21/2021    Panic disorder     Phlebitis     PTSD (post-traumatic stress disorder)     RLS (restless legs syndrome)      Past Surgical History:   Procedure Laterality Date    ANKLE SURGERY  2021    BILATERAL BREAST REDUCTION  2015    BREAST BIOPSY      CARPAL TUNNEL RELEASE      CHOLECYSTECTOMY  2001    COLONOSCOPY       [facility]    COLONOSCOPY N/A 2022    Procedure: COLONOSCOPY with biopsy;  Surgeon: Ghislaine Kilgore MD;  Location: [facility];  Service: Gastroenterology;  Laterality: N/A;  colon polyp, diverticlosis, hemorrhoids    EYE SURGERY Right     scar tissue removal    HYSTERECTOMY      ULNAR NERVE TRANSPOSITION Right     WRIST SURGERY       Family History   Problem Relation Age of Onset    Kidney cancer Mother     Hyperlipidemia Mother     Heart disease Father     Heart attack Father     Diabetes Father     ADD / ADHD Father     Hyperlipidemia Father     Sleep apnea Father     Restless legs syndrome Father     Hyperlipidemia Sister     Cancer Sister     Brain cancer Sister     Lung cancer Sister     Hyperlipidemia Sister     Hyperlipidemia Brother     Diabetes Brother     Heart attack Brother     Hyperlipidemia Brother     No Known Problems Paternal Uncle     No Known Problems Cousin     Malpreet Hyperthermia Neg Hx      Social History     Socioeconomic History    Marital status: Single   Tobacco Use    Smoking status: Former     Current packs/day: 0.00     Average packs/day: 0.3 packs/day for 13.0 years (3.3 ttl pk-yrs)     Types: Cigarettes     Start date:      Quit date:      Years since quittin.6    Smokeless tobacco: Never    Tobacco comments:     QUIT    Vaping Use    Vaping status: Never Used   Substance and Sexual Activity    Alcohol use: Yes     Comment: rarely    Drug use: Never    Sexual activity: Defer     Allergies   Allergen Reactions    Diclofenac Hives    New Skin [Benzethonium Chloride] Rash    Atorvastatin Myalgia    Adhesive Tape Rash and Other (See Comments)       Rash at area of bandaid    Niacin Rash     Current Outpatient Medications   Medication Sig Dispense Refill    Accu-Chek Madeline Plus test strip by Other route 4 (Four) Times a Day. use to test blood sugar      Accu-Chek Softclix Lancets lancets by Other route 4 (Four) Times a Day. use to test blood sugar       alendronate (FOSAMAX) 70 MG tablet Take 1 tablet by mouth Every 7 (Seven) Days.      ARIPiprazole (Abilify) 2 MG tablet Take 2 tablets by mouth Daily. 180 tablet 1    Ascorbic Acid (VITAMIN C GUMMIE PO) Take  by mouth Daily.      aspirin (aspirin) 81 MG EC tablet Take 1 tablet by mouth Daily. 90 tablet 99    benzonatate (Tessalon Perles) 100 MG capsule Take 1 capsule by mouth 3 (Three) Times a Day As Needed for Cough. 30 capsule 0    Blood Glucose Monitoring Suppl (FreeStyle Lite) device 1 each by Other route 4 (Four) Times a Day.      buPROPion XL (WELLBUTRIN XL) 300 MG 24 hr tablet Take 1 tablet by mouth Every Morning. 90 tablet 3    cetirizine (zyrTEC) 10 MG tablet TAKE 1 TABLET BY MOUTH EVERY DAY 90 tablet 1    Cholecalciferol 25 MCG (1000 UT) tablet dispersible Take  by mouth Daily.      coenzyme Q10 50 MG capsule capsule Take  by mouth Daily.      cyclobenzaprine (FLEXERIL) 10 MG tablet Take 1 tablet by mouth Daily As Needed for Muscle Spasms. 90 tablet 1    Daily-Jelani Multivitamin tablet tablet Take 1 tablet by mouth every night at bedtime.      doxepin (SINEquan) 25 MG capsule Take 1 capsule by mouth Every Night. 90 capsule 1    ezetimibe (ZETIA) 10 MG tablet TAKE 1 TABLET BY MOUTH EVERY NIGHT AT BEDTIME 90 tablet 1    FLUoxetine (PROzac) 10 MG capsule Take 1 capsule by mouth Daily. 90 capsule 3    FLUoxetine (PROzac) 20 MG capsule Take 1 capsule by mouth Daily. 90 capsule 3    fluticasone (Flonase) 50 MCG/ACT nasal spray 2 sprays into the nostril(s) as directed by provider Daily. Administer 2 sprays in each nostril for each dose. 16 g 6    Inclisiran Sodium (LEQVIO SC) Inject  under the skin into the appropriate area as directed.      Januvia 100 MG tablet TAKE 1 TABLET BY MOUTH DAILY 90 tablet 1    losartan (COZAAR) 25 MG tablet Take 0.5 tablets by mouth Daily. (Patient taking differently: Take 1 tablet by mouth Daily.) 90 tablet 1    metFORMIN (GLUCOPHAGE) 500 MG tablet TAKE 1 TABLET BY MOUTH EVERY  MORNING AND 2 TABLETS EVERY EVENING WITH MEALS 270 tablet 1    montelukast (SINGULAIR) 10 MG tablet TAKE 1 TABLET BY MOUTH EVERY NIGHT 90 tablet 1    pramipexole (Mirapex) 1 MG tablet Take 1 tablet by mouth Every Night. 90 tablet 1    prednisoLONE acetate (PRED FORTE) 1 % ophthalmic suspension SHAKE LIQUID AND INSTILL 1 DROP IN RIGHT EYE TWICE DAILY      Zinc 100 MG tablet Take 100 mg by mouth Daily.       No current facility-administered medications for this visit.     Review of Systems   Constitutional: Negative.    Skin:         Painful toenails.   All other systems reviewed and are negative.      OBJECTIVE     Vitals:    08/15/24 0941   BP: 128/83   Pulse: 79   Resp: 18   Temp: 98.6 °F (37 °C)   SpO2: 96%       Body mass index is 31.9 kg/m².    Lab Results   Component Value Date    HGBA1C 6.90 (H) 07/23/2024       Lab Results   Component Value Date    GLUCOSE 87 07/23/2024    CALCIUM 9.8 07/23/2024     07/23/2024    K 5.0 07/23/2024    CO2 28.0 07/23/2024     07/23/2024    BUN 14 07/23/2024    CREATININE 1.16 (H) 07/23/2024    EGFRIFNONA 68 02/15/2022    BCR 12.1 07/23/2024    ANIONGAP 10.0 07/23/2024       Patient seen in no apparent distress.      PHYSICAL EXAM:     Foot/Ankle Exam    GENERAL  Appearance:  chronically ill  Orientation:  AAOx3  Affect:  appropriate  Gait:  unimpaired  Assistance:  independent  Right shoe gear: casual shoe  Left shoe gear: casual shoe    VASCULAR     Right Foot Vascularity   Dorsalis pedis:  1+  Posterior tibial:  1+  Skin temperature:  warm  Edema grading:  None  CFT:  < 3 seconds  Pedal hair growth:  Absent  Varicosities:  mild varicosities     Left Foot Vascularity   Dorsalis pedis:  1+  Posterior tibial:  1+  Skin temperature:  warm  Edema grading:  None  CFT:  < 3 seconds  Pedal hair growth:  Absent  Varicosities:  mild varicosities     NEUROLOGIC     Right Foot Neurologic   Light touch sensation: diminished  Vibratory sensation: diminished  Hot/Cold sensation:  diminished  Protective Sensation using Guinda-Darling Monofilament:   Sites intact: 3  Sites tested: 10     Left Foot Neurologic   Normal sensation    Light touch sensation: normal  Vibratory sensation: normal  Hot/Cold sensation:  normal  Protective Sensation using Guinda-Darling Monofilament:   Sites intact: 10  Sites tested: 10    MUSCLE STRENGTH     Right Foot Muscle Strength   Foot dorsiflexion:  4-  Foot plantar flexion:  4-  Foot inversion:  4-  Foot eversion:  4-     Left Foot Muscle Strength   Foot dorsiflexion:  4-  Foot plantar flexion:  4-  Foot inversion:  4-  Foot eversion:  4-    RANGE OF MOTION     Right Foot Range of Motion   Foot and ankle ROM within normal limits       Left Foot Range of Motion   Foot and ankle ROM within normal limits      DERMATOLOGIC      Right Foot Dermatologic   Skin  Right foot skin is intact.   Nails  2.  Positive for elongated, onychomycosis, abnormal thickness, subungual debris and ingrown toenail.  3.  Positive for elongated, onychomycosis, abnormal thickness, subungual debris and ingrown toenail.  4.  Positive for elongated, onychomycosis, abnormal thickness, subungual debris and ingrown toenail.  5.  Positive for elongated, onychomycosis, abnormal thickness, subungual debris and ingrown toenail.  Nails comment:  Toenails 1, 2, 3, 4, and 5     Left Foot Dermatologic   Skin  Left foot skin is intact.   Nails comment:  Toenails 1, 2, 3, 4, and 5  Nails  1.  Positive for elongated, onychomycosis, abnormal thickness, subungual debris and ingrown toenail.  2.  Positive for elongated, onychomycosis, abnormal thickness, subungual debris and ingrown toenail.  3.  Positive for elongated, onychomycosis, abnormal thickness, subungual debris and ingrown toenail.  4.  Positive for elongated, onychomycosis, abnormally thick, subungual debris and ingrown toenail.  5.  Positive for elongated, onychomycosis, abnormally thick, subungual debris and ingrown toenail.    I have  reexamined the patient the results are consistent with the previously documented exam.    ASSESSMENT/PLAN     Diagnoses and all orders for this visit:    1. Onychocryptosis (Primary)    2. DM feet    3. Onychomycosis    4. Foot pain, bilateral    5. Neuropathy    6. Type 2 diabetes mellitus with diabetic polyneuropathy, with long-term current use of insulin    Comprehensive lower extremity examination and evaluation was performed.    Discussed findings and treatment plan including risks, benefits, and treatment options with patient in detail. Patient agreed with treatment plan.    Medications and allergies reviewed.  Reviewed available blood glucose and HgB A1C lab values along with other pertinent labs.  These were discussed with the patient as to their importance of diabetic maintenance.    Toenails 2, 3, 4, 5 on Right and 1, 2, 3, 4, 5 on Left were debrided with nail nippers then filed with a Dremel nail radha.  Patient tolerated procedure well without complications.    An After Visit Summary was printed and given to the patient at discharge, including (if requested) any available informative/educational handouts regarding diagnosis, treatment, or medications. All questions were answered to patient/family satisfaction. Should symptoms fail to improve or worsen they agree to call or return to clinic or to go to the Emergency Department. Discussed the importance of following up with any needed screening tests/labs/specialist appointments and any requested follow-up recommended by me today. Importance of maintaining follow-up discussed and patient accepts that missed appointments can delay diagnosis and potentially lead to worsening of conditions.    Return in about 9 weeks (around 10/17/2024) for Toenail Care., or sooner if acute issues arise.    I have reviewed the assessment and plan and verified the accuracy of it. No changes to assessment and plan since the information was documented. Adarsh Flores DPM  08/15/24     I have dictated this note utilizing Dragon Dictation.  Please note that portions of this note were completed with a voice recognition program.  Part of this note may be an electronic transcription/translation of spoken language to printed text using the Dragon Dictation System.      This document has been electronically signed by Adarsh Flores DPM on August 15, 2024 10:05 EDT

## 2024-08-27 ENCOUNTER — TELEMEDICINE (OUTPATIENT)
Dept: PSYCHIATRY | Facility: CLINIC | Age: 74
End: 2024-08-27
Payer: COMMERCIAL

## 2024-08-27 DIAGNOSIS — F33.40 RECURRENT MAJOR DEPRESSIVE DISORDER IN REMISSION: Primary | ICD-10-CM

## 2024-08-27 PROCEDURE — 90832 PSYTX W PT 30 MINUTES: CPT | Performed by: COUNSELOR

## 2024-08-27 NOTE — PATIENT INSTRUCTIONS
1.  Please return to clinic at your next scheduled visit.  Contact the clinic ([phone]) at least 24 hours prior in the event you need to cancel.  2.  Do no harm to yourself or others.    3.  Avoid alcohol and drugs.    4.  Take all medications as prescribed.  Please contact the clinic with any concerns. If you are in need of medication refills, please call the clinic at [phone].    5. Should you want to get in touch with your provider, Dr. Vicky Barth, please utilize [employer] or contact the office ([phone]), and staff will be able to page Dr. Barth directly.  6.  In the event you have personal crisis, contact the following crisis numbers: Suicide Prevention Hotline 1-384.114.6143; [facility] Helpline 1-181-591-[place]; [facility] Emergency Room [phone]; text HELLO to 654339; or 655.

## 2024-08-27 NOTE — PROGRESS NOTES
Date: August 27, 2024  Time In: 1300  Time Out: 1316  This provider is located at home address for [facility] (through [facility] [address], [place] [address] using a secure fotobabblehart Video Visit through [employer]. Patient is being seen remotely via telehealth at home address in [place] and stated they are in a secure environment for this session. The patient's condition being diagnosed/treated is appropriate for telemedicine. The provider identified herself as well as her credentials. The patient, and/or patients guardian, consent to be seen remotely, and when consent is given they understand that the consent allows for patient identifiable information to be sent to a third party as needed. They may refuse to be seen remotely at any time. The electronic data is encrypted and password protected, and the patient and/or guardian has been advised of the potential risks to privacy not withstanding such measures.     You have chosen to receive care through a telehealth visit.  Do you consent to use a video/audio connection for your medical care today? Yes    PROGRESS NOTE  Data:  Nivia Cagle is a 73 y.o. female who presents today for follow up    Chief Complaint: depression     History of Present Illness: Pt reports improved mood. Pt reports that she has been placing herself on a more rigid routine in efforts to decrease depression from worsening since previous session. Pt reports being more physically active, volunteering at [employer], and attending mass more regularly. Pt reports that she was able to see her grandsons last month which helped with improving mood and has also been working on projects to have completed before [place]sha.        Clinical Maneuvering/Intervention:    (Scales based on 0 - 10 with 10 being the worst)  Depression: 2 Anxiety: 2       Assisted patient in processing above session content; acknowledged and normalized patient’s thoughts,  feelings, and concerns.  Rationalized patient thought process regarding recent stressors and life events. Discussed triggers associated with patient's emotions. Also discussed coping skills for patient to implement. Praised pt for identifying stressors within her control to change and address in efforts to improve mood.     Reviewed treatment goals and progress regarding identified goals. Progress remains on going at this time. Discussed expectations for next session.     Allowed patient to freely discuss issues without interruption or judgment. Provided safe, confidential environment to facilitate the development of positive therapeutic relationship and encourage open, honest communication. Assisted patient in identifying risk factors which would indicate the need for higher level of care including thoughts to harm self or others and/or self-harming behavior and encouraged patient to contact this office, call 911, or present to the nearest emergency room should any of these events occur. Discussed crisis intervention services and means to access. Patient adamantly and convincingly denies current suicidal or homicidal ideation or perceptual disturbance.    Assessment:   Assessment   Patient appears to maintain relative stability as compared to their baseline.  However, patient continues to struggle with depression which continues to cause impairment in important areas of functioning.  A result, they can be reasonably expected to continue to benefit from treatment and would likely be at increased risk for decompensation otherwise.    Mental Status Exam:   Hygiene:   good  Cooperation:  Cooperative  Eye Contact:  Good  Psychomotor Behavior:  Appropriate  Affect:  Appropriate  Mood: anxious  Speech:  Normal  Thought Process:  Linear  Thought Content:  Mood congruent  Suicidal:  None  Homicidal:  None  Hallucinations:  None  Delusion:  None  Memory:  Intact  Orientation:  Person, Place, Time and Situation  Reliability:   fair  Insight:  Fair  Judgement:  Fair  Impulse Control:  Fair  Physical/Medical Issues:  No        Patient's Support Network Includes:  son    Functional Status: Mild impairment     Progress toward goal: Not at goal    Prognosis: Fair with Ongoing Treatment            Plan:    Patient will continue in individual outpatient therapy with focus on improved functioning and coping skills, maintaining stability, and avoiding decompensation and the need for higher level of care.    Patient will adhere to medication regimen as prescribed and report any side effects. Patient will contact this office, call 911 or present to the nearest emergency room should suicidal or homicidal ideations occur. Provide Cognitive Behavioral Therapy and Solution Focused Therapy to improve functioning, maintain stability, and avoid decompensation and the need for higher level of care.     Return in about 6 weeks, or earlier if symptoms worsen or fail to improve.           VISIT DIAGNOSIS:     ICD-10-CM ICD-9-CM   1. Recurrent major depressive disorder in remission  F33.40 296.35        Diagnoses and all orders for this visit:    1. Recurrent major depressive disorder in remission (Primary)           [facility] No Show Policy:  We understand unexpected circumstances arise; however, anytime you miss your appointment we are unable to provide you appropriate care.  In addition, each appointment missed could have been used to provide care for others.  We ask that you call at least 24 hours in advance to cancel or reschedule an appointment.  We would like to take this opportunity to remind you of our policy stating patients who miss THREE or more appointments without cancelling or rescheduling 24 hours in advance of the appointment may be subject to cancellation of any further visits with our clinic and recommendation to seek in-person services/visits.    Please call [phone] to reschedule your appointment. If there are  reasons that make it difficult for you to keep the appointments, please call and let us know how we can help.  Please understand that medication prescribing will not continue without seeing your provider.      [facility]'s No Show Policy reviewed with patient at today's visit. Patient verbalized understanding of this policy. Discussed with patient that in the event that there are three or more no show visits, it will be recommended that they pursue in-person services/visits as noncompliance with telehealth visits indicates that patient is not an appropriate candidate for telemedicine and would likely be more appropriate for in-person services/visits. Patient verbalizes understanding and is agreeable to this.        This document has been electronically signed by Annita Knowles LCSW.  August 27, 2024 13:19 EDT      Part of this note may be an electronic transcription/translation of spoken language to printed text using the Dragon Dictation System.

## 2024-08-27 NOTE — PROGRESS NOTES
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 Ratio 03/14/2024 0.93   Final    Hemoglobin A1C 03/14/2024 6.40 (H)  4.80 - 5.60 % Final    WBC 03/14/2024 8.25  3.40 - 10.80 10*3/mm3 Final    RBC 03/14/2024 4.21  3.77 - 5.28 10*6/mm3 Final    Hemoglobin 03/14/2024 12.9  12.0 - 15.9 g/dL Final    Hematocrit 03/14/2024 38.5  34.0 - 46.6 % Final    MCV 03/14/2024 91.4  79.0 - 97.0 fL Final    MCH 03/14/2024 30.6  26.6 - 33.0 pg Final    MCHC 03/14/2024 33.5  31.5 - 35.7 g/dL Final    RDW 03/14/2024 12.6  12.3 - 15.4 % Final    RDW-SD 03/14/2024 41.5  37.0 - 54.0 fl Final    MPV 03/14/2024 11.1  6.0 - 12.0 fL Final    Platelets 03/14/2024 239  140 - 450 10*3/mm3 Final    Neutrophil % 03/14/2024 71.9  42.7 - 76.0 % Final    Lymphocyte % 03/14/2024 15.6 (L)  19.6 - 45.3 % Final    Monocyte % 03/14/2024 9.9  5.0 - 12.0 % Final    Eosinophil % 03/14/2024 1.8  0.3 - 6.2 % Final    Basophil % 03/14/2024 0.4  0.0 - 1.5 % Final    Immature Grans % 03/14/2024 0.4  0.0 - 0.5 % Final    Neutrophils, Absolute 03/14/2024 5.93  1.70 - 7.00 10*3/mm3 Final    Lymphocytes, Absolute 03/14/2024 1.29  0.70 - 3.10 10*3/mm3 Final    Monocytes, Absolute 03/14/2024 0.82  0.10 - 0.90 10*3/mm3 Final    Eosinophils, Absolute  03/14/2024 0.15  0.00 - 0.40 10*3/mm3 Final    Basophils, Absolute 03/14/2024 0.03  0.00 - 0.20 10*3/mm3 Final    Immature Grans, Absolute 03/14/2024 0.03  0.00 - 0.05 10*3/mm3 Final    nRBC 03/14/2024 0.0  0.0 - 0.2 /100 WBC Final       EKG Results:  No orders to display       Imaging Results:  No Images in the past 120 days found..      Assessment & Plan   Diagnoses and all orders for this visit:    1. Recurrent major depressive disorder in remission (Primary)  -     ARIPiprazole (Abilify) 5 MG tablet; Take 1 tablet by mouth Daily.  Dispense: 90 tablet; Refill: 1    2. Insomnia due to mental condition  -     ARIPiprazole (Abilify) 5 MG tablet; Take 1 tablet by mouth Daily.  Dispense: 90 tablet; Refill: 1    3. Generalized anxiety disorder  -     ARIPiprazole (Abilify) 5 MG tablet; Take 1 tablet by mouth Daily.  Dispense: 90 tablet; Refill: 1    4. Post traumatic stress disorder (PTSD)  -     ARIPiprazole (Abilify) 5 MG tablet; Take 1 tablet by mouth Daily.  Dispense: 90 tablet; Refill: 1      INITIAL presentation 2021 most consistent with major depressive disorder, recurrent, moderate to severe, with anxious distress.  PTSD.  Rule out personality disorder, cluster B specifically.  Rule out hypomania as patient was very difficult to interrupt today.    08/28/2024: Mild MDD, but maintenance insomnia. Increase abilify. [place] with [employer]reg[employer], prayer.    Allowed patient to freely discuss and process issues, such as:  Anxiety and depression regarding family conflict/relationships.  Anxiety and depression regarding medical conditions.  ... using Rogerian psychotherapeutic techniques including unconditional positive regard, reflective listening, and demonstrating clear empathy, with the goal of ameliorating symptoms and maintaining, restoring, or improving self-esteem, adaptive skills, and ego or psychological functions (Ara et al. 1991), the long-term goal of which is to develop a better, healthier perspective and help  "the patient bear their circumstances more easily.  Time (minutes) spent providing supportive psychotherapy: 16  (This time is exclusive to the therapy session and separate from the time spent on activities used to meet the criteria for the E/M service (history, exam, medical decision-making).)  Start: 8:20  Stop: 8:36  Functional status: mild impairment  Treatment plan: Medication management and supportive psychotherapy  Prognosis: good  Progress: insomnia, mild MDD  4w    07/31/2024: Not depressed, persistent maintenance insomnia. Restarted her meds 10 days ago, the above improving. No changes, as her s/sx are related to stopping her meds for a time. Also has restarted light box.    07/02/2024: Improving mood, but persistent insomnia. Increase abilify. Constipation issues, but had 2 BM recently. Consider gabapentin as neuropathic pain impeding sleep.    06/04/2024: Insomnia, but also more activity, and possibly some depressed mood. Restart abilify; stopped previously thinking it wasn't covered by insurance, but this may have just been an early request that was denied. Watch swallowing issues. Consider lamictal, vraylar, rexulti, low dose seroquel.    4/30/24: Insomnia, and some procrastination. Increase doxepin. Procrastinating on painting a certain painting; processed why. Monitor.    4/3: Stable, but monitor if worsening depression creeps in.    3/4/24: Pt ist stable, but lost her 20 mg dose of prozac. Re-sent in. Insurance won't pay for abilify. DC abilify. Some unusual issues with swallowing. Processed dreams about her mother. Mom isn't happy, \"it's not good enough.\"    2/6: Stable, discussed dietary changes involving decreasing sugar. Sugar is comforting and helps her depression. Also discussed stevia. Now using light box and sleeping a little better.    1/5: Seasonal depression, start light box up again. May have to make further med changes.    12/14: Add melatonin for maintenance insomnia. Otherwise stable. " Seeing a movie for [employer]. Got all her [employer] cards mailed out.    11/9: Doing well, isolated insomnia. Stop trazodone and start doxepin. Consider lunesta, belsomra, juju. She stopped melatonin on her own.    8/11: Stable, well, no changes. Painting, having people over. Counseled to start light box in September, reiterated instructions. Will be inducted into the [employer] of [facility] and [facility] tomorrow. She's been working on it for a year.    7/11: Short visit as patient sleepy. Unusual sleep pattern recently, but MH is fine. Pt will call her son tonight to ensure someone is around when she goes back to sleep. She will call PCP about this tomorrow. Close follow up with me in 4 wks.    4/27: Stable, well. Restart melatonin for insomnia.     3/2: Prozac and BLT helped. Situational stressor in son, no changes. Better. Watch insomnia.     1/20: Start light box, increase prozac for dep.     12/9: Some insomnia. Increase melatonin.     10/25: Doing well. Increase prozac back to baseline dose (inadvertently reduced, she has been stable on a higher dose, so we should go back to that). Some initial insomnia, increase trazodone to 75 mg nightly. She is enjoying the Fall.     9/8: Start light box. Close follow up in case this is worsening depression.     7/27: Well, stable.     6/14: Better, no changes.     5/3: Increase prozac and melatonin.    Visit Diagnoses:    ICD-10-CM ICD-9-CM   1. Recurrent major depressive disorder in remission  F33.40 296.35   2. Insomnia due to mental condition  F51.05 300.9     327.02   3. Generalized anxiety disorder  F41.1 300.02   4. Post traumatic stress disorder (PTSD)  F43.10 309.81       PLAN:  Risk Assessment: Risk of self-harm acutely is moderate. Risk factors include chronic depressive disorder, possible personality disorder, recent psychosocial stressors (pandemic, moving). Protective factors include no present SI, no history of suicide attempts or self-harm in the  past, no access to weapons, minimal AODA, healthcare seeking, future orientation, willingness to engage in care. Risk of self-harm chronically is also moderate, but could be further elevated in the event of treatment noncompliance and/or AODA.  Safety: No acute safety concerns.  Medications:   CONTINUE light box 9/1 - 3/31.  CONTINUE melatonin 30 mg p.o. nightly.  Risks, benefits, side effects discussed with patient including sedation, dizziness/falls risk, GI upset.  Do not use before operating vehicle, vessel, or machine. After discussion of these risks and benefits, the patient voiced understanding and agreed to proceed.   CONTINUE doxepin 25 mg qhs. Risks, benefits, side effects discussed with patient including GI upset, sedation, dizziness/falls risk, grogginess the following day, prolongation of the QTc interval.  After discussion of these risks and benefits, the patient voiced understanding and agreed to proceed.    CONTINUE bupropion xl 300 mg daily. Risks, benefits, alternatives discussed with patient including nausea, GI upset, increased energy, exacerbation of irritability, insomnia, lowering of seizure threshold.  After discussion of these risks and benefits, the patient voiced understanding and agreed to proceed.  CONTINUE Prozac 30 mg a day (HIGHEST dose is 40 given that she is also on bupropion). Risks, benefits, alternatives discussed with patient including GI upset, nausea vomiting diarrhea, theoretical decrease of seizure threshold predisposing the patient to a slightly higher seizure risk, headaches, sexual dysfunction, serotonin syndrome, bleeding risk, increased suicidality in patients 24 years and younger.  After discussion of these risks and benefits, the patient voiced understanding and agreed to proceed.  INCREASE Abilify 4 to 5 mg p.o. nightly. Previously stopped 2/2 cost, 3/24, but this may have been rejected by insurance simply because pt requested too soon (she states). Risks, benefits,  alternatives discussed with patient including increased energy, exacerbation of irritability, akathisia, movement issues, GI upset, orthostatic hypotension, increased appetite, tardive dyskinesia.  Use care when operating vehicle, vessel, or machine. After discussion of these risks and benefits, the patient voiced understanding and agreed to proceed.  S/P:  trazodone 100 mg PO QHS. No longer effective 11/23.  Mirtazapine 45 mg daily (RLS)  Ambien caused double vision, and possibly hallucinations  Was on lithium in the past: dizziness and falls, and hair curled.  Therapy: referred to [facility] 12[facility]7.  Labs/Studies: s/p TMS referral.  Follow Up: 6 weeks. (prefers 4 wks)      TREATMENT PLAN/GOALS: Continue supportive psychotherapy efforts and medications as indicated. Treatment and medication options discussed during today's visit. Patient acknowledged and verbally consented to continue with current treatment plan and was educated on the importance of compliance with treatment and follow-up appointments.    MEDICATION ISSUES:  SAPNA reviewed as expected.  Discussed medication options and treatment plan of prescribed medication as well as the risks, benefits, and side effects including potential falls, possible impaired driving and metabolic adversities among others. Patient is agreeable to call the office with any worsening of symptoms or onset of side effects. Patient is agreeable to call 911 or go to the nearest ER should he/she begin having SI/HI. No medication side effects or related complaints today.     MEDS ORDERED DURING VISIT:  New Medications Ordered This Visit   Medications    ARIPiprazole (Abilify) 5 MG tablet     Sig: Take 1 tablet by mouth Daily.     Dispense:  90 tablet     Refill:  1     Increasing dose from 4 to 5 mg and sending in 90 day supply       Return in about 4 weeks (around 9/25/2024) for Video visit.         This document has been electronically signed by Vciky Barth MD  August 28, 2024  08:38 EDT      Part of this note may be an electronic transcription/translation of spoken language to printed text using the Dragon Dictation System.

## 2024-08-28 ENCOUNTER — TELEMEDICINE (OUTPATIENT)
Dept: PSYCHIATRY | Facility: CLINIC | Age: 74
End: 2024-08-28
Payer: MEDICARE

## 2024-08-28 DIAGNOSIS — F43.10 POST TRAUMATIC STRESS DISORDER (PTSD): ICD-10-CM

## 2024-08-28 DIAGNOSIS — F33.40 RECURRENT MAJOR DEPRESSIVE DISORDER IN REMISSION: Primary | ICD-10-CM

## 2024-08-28 DIAGNOSIS — F41.1 GENERALIZED ANXIETY DISORDER: ICD-10-CM

## 2024-08-28 DIAGNOSIS — F51.05 INSOMNIA DUE TO MENTAL CONDITION: ICD-10-CM

## 2024-08-28 RX ORDER — ARIPIPRAZOLE 5 MG/1
5 TABLET ORAL DAILY
Qty: 90 TABLET | Refills: 1 | Status: SHIPPED | OUTPATIENT
Start: 2024-08-28

## 2024-08-28 NOTE — PLAN OF CARE
Ravindraeriaann-marie psychotherapy to help manage depression and anxiety related to family conflict, relationships, seasonal changes

## 2024-08-28 NOTE — TREATMENT PLAN
Multi-Disciplinary Problems (from Behavioral Health Treatment Plan)      Active Problems       Problem: Anxiety  Start Date: 08/28/24      Problem Details: The patient self-scales this problem as a 2 with 10 being the worst.    family conflict, relationships, seasonal changes        Goal Priority Start Date Expected End Date End Date    Patient will develop and implement behavioral and cognitive strategies to reduce anxiety and irrational fears. -- 08/28/24 02/26/25 --    Goal Details: Progress toward goal: improving          Goal Intervention Frequency Start Date End Date    Help patient explore past emotional issues in relation to present anxiety. Q Month 08/28/24 --    Intervention Details: Duration of treatment until remission of symptoms.          Goal Intervention Frequency Start Date End Date    Help patient develop an awareness of their cognitive and physical responses to anxiety. Q Month 08/28/24 --    Intervention Details: Duration of treatment until remission of symptoms.                  Problem: Depression  Start Date: 08/28/24      Problem Details: The patient self-scales this problem as a 2 with 10 being the worst.  family conflict, relationships, seasonal changes        Goal Priority Start Date Expected End Date End Date    Patient will demonstrate the ability to initiate new constructive life skills outside of sessions on a consistent basis. -- 08/28/24 02/26/25 --    Goal Details: Progress toward goal:   improving          Goal Intervention Frequency Start Date End Date    Assist patient in setting attainable activities of daily living goals. PRN 08/28/24 --      Goal Intervention Frequency Start Date End Date    Provide education about depression Q Month 08/28/24 --    Intervention Details: Duration of treatment until remission of symptoms.          Goal Intervention Frequency Start Date End Date    Assist patient in developing healthy coping strategies. Q Month 08/28/24 --    Intervention  Details: Duration of treatment until remission of symptoms.                          Reviewed By       Vicky Barth MD 08/28/24 [id number]                     I have discussed and reviewed this treatment plan with the patient.

## 2024-09-20 ENCOUNTER — TELEPHONE (OUTPATIENT)
Dept: PHARMACY | Facility: HOSPITAL | Age: 74
End: 2024-09-20
Payer: MEDICARE

## 2024-09-20 ENCOUNTER — APPOINTMENT (OUTPATIENT)
Dept: GENERAL RADIOLOGY | Facility: HOSPITAL | Age: 74
End: 2024-09-20
Payer: MEDICARE

## 2024-09-20 ENCOUNTER — HOSPITAL ENCOUNTER (EMERGENCY)
Facility: HOSPITAL | Age: 74
Discharge: HOME | End: 2024-09-20
Attending: EMERGENCY MEDICINE
Payer: MEDICARE

## 2024-09-20 VITALS
SYSTOLIC BLOOD PRESSURE: 138 MMHG | HEIGHT: 63 IN | RESPIRATION RATE: 16 BRPM | DIASTOLIC BLOOD PRESSURE: 81 MMHG | OXYGEN SATURATION: 99 % | WEIGHT: 179.45 LBS | HEART RATE: 91 BPM | TEMPERATURE: 98.5 F | BODY MASS INDEX: 31.8 KG/M2

## 2024-09-20 DIAGNOSIS — E78.5 HYPERLIPIDEMIA LDL GOAL <70: Primary | ICD-10-CM

## 2024-09-20 DIAGNOSIS — S63.502A SPRAIN OF LEFT WRIST, INITIAL ENCOUNTER: Primary | ICD-10-CM

## 2024-09-20 DIAGNOSIS — Z78.9 STATIN INTOLERANCE: ICD-10-CM

## 2024-09-20 PROCEDURE — 99283 EMERGENCY DEPT VISIT LOW MDM: CPT

## 2024-09-20 PROCEDURE — 73110 X-RAY EXAM OF WRIST: CPT

## 2024-09-20 PROCEDURE — 73130 X-RAY EXAM OF HAND: CPT

## 2024-09-26 ENCOUNTER — TELEPHONE (OUTPATIENT)
Dept: PSYCHIATRY | Facility: CLINIC | Age: 74
End: 2024-09-26

## 2024-09-26 ENCOUNTER — TELEMEDICINE (OUTPATIENT)
Dept: PSYCHIATRY | Facility: CLINIC | Age: 74
End: 2024-09-26
Payer: MEDICARE

## 2024-09-26 DIAGNOSIS — F33.40 RECURRENT MAJOR DEPRESSIVE DISORDER IN REMISSION: Primary | ICD-10-CM

## 2024-09-26 DIAGNOSIS — F51.05 INSOMNIA DUE TO MENTAL CONDITION: ICD-10-CM

## 2024-09-26 DIAGNOSIS — F41.1 GENERALIZED ANXIETY DISORDER: ICD-10-CM

## 2024-09-26 DIAGNOSIS — F43.10 POST TRAUMATIC STRESS DISORDER (PTSD): ICD-10-CM

## 2024-09-26 RX ORDER — DOXEPIN HYDROCHLORIDE 25 MG/1
25 CAPSULE ORAL NIGHTLY
Qty: 90 CAPSULE | Refills: 3 | Status: SHIPPED | OUTPATIENT
Start: 2024-09-26

## 2024-10-01 ENCOUNTER — HOSPITAL ENCOUNTER (OUTPATIENT)
Dept: INFUSION THERAPY | Facility: HOSPITAL | Age: 74
Discharge: HOME | End: 2024-10-01
Admitting: NURSE PRACTITIONER
Payer: MEDICARE

## 2024-10-01 VITALS
RESPIRATION RATE: 20 BRPM | HEIGHT: 63 IN | BODY MASS INDEX: 31.52 KG/M2 | WEIGHT: 177.91 LBS | OXYGEN SATURATION: 98 % | SYSTOLIC BLOOD PRESSURE: 126 MMHG | DIASTOLIC BLOOD PRESSURE: 74 MMHG | HEART RATE: 95 BPM | TEMPERATURE: 98.5 F

## 2024-10-01 DIAGNOSIS — E78.5 HYPERLIPIDEMIA LDL GOAL <70: Primary | ICD-10-CM

## 2024-10-01 DIAGNOSIS — Z78.9 STATIN INTOLERANCE: ICD-10-CM

## 2024-10-01 PROCEDURE — 96372 THER/PROPH/DIAG INJ SC/IM: CPT

## 2024-10-01 PROCEDURE — 25010000002 INCLISIRAN SODIUM 284 MG/1.5ML SOLUTION PREFILLED SYRINGE: Performed by: NURSE PRACTITIONER

## 2024-10-01 RX ADMIN — INCLISIRAN 284 MG: 284 INJECTION, SOLUTION SUBCUTANEOUS at 15:23

## 2024-10-08 DIAGNOSIS — F51.05 INSOMNIA DUE TO MENTAL CONDITION: ICD-10-CM

## 2024-10-08 RX ORDER — DOXEPIN HYDROCHLORIDE 10 MG/1
10 CAPSULE ORAL NIGHTLY
Qty: 30 CAPSULE | Refills: 5 | OUTPATIENT
Start: 2024-10-08

## 2024-10-08 NOTE — TELEPHONE ENCOUNTER
The original prescription was discontinued on 4/30/2024 by Vicky Barth MD for the following reason: Reorder. Renewing this prescription may not be appropriate.     PLEASE ADVISE

## 2024-10-11 ENCOUNTER — OFFICE VISIT (OUTPATIENT)
Dept: INTERNAL MEDICINE | Facility: CLINIC | Age: 74
End: 2024-10-11
Payer: MEDICARE

## 2024-10-11 VITALS
HEIGHT: 63 IN | WEIGHT: 174 LBS | HEART RATE: 91 BPM | SYSTOLIC BLOOD PRESSURE: 150 MMHG | OXYGEN SATURATION: 95 % | BODY MASS INDEX: 30.83 KG/M2 | TEMPERATURE: 98.2 F | RESPIRATION RATE: 18 BRPM | DIASTOLIC BLOOD PRESSURE: 90 MMHG

## 2024-10-11 DIAGNOSIS — R19.7 DIARRHEA, UNSPECIFIED TYPE: ICD-10-CM

## 2024-10-11 DIAGNOSIS — S62.102G CLOSED FRACTURE OF LEFT WRIST WITH DELAYED HEALING, SUBSEQUENT ENCOUNTER: ICD-10-CM

## 2024-10-11 DIAGNOSIS — F33.1 MAJOR DEPRESSIVE DISORDER, RECURRENT EPISODE, MODERATE DEGREE: ICD-10-CM

## 2024-10-11 DIAGNOSIS — M62.838 MUSCLE SPASM: Primary | ICD-10-CM

## 2024-10-11 DIAGNOSIS — M25.532 LEFT WRIST PAIN: ICD-10-CM

## 2024-10-11 DIAGNOSIS — E11.65 TYPE 2 DIABETES MELLITUS WITH HYPERGLYCEMIA, WITHOUT LONG-TERM CURRENT USE OF INSULIN: ICD-10-CM

## 2024-10-11 DIAGNOSIS — E78.5 HYPERLIPIDEMIA LDL GOAL <70: ICD-10-CM

## 2024-10-11 PROCEDURE — 1160F RVW MEDS BY RX/DR IN RCRD: CPT | Performed by: PHYSICIAN ASSISTANT

## 2024-10-11 PROCEDURE — 3080F DIAST BP >= 90 MM HG: CPT | Performed by: PHYSICIAN ASSISTANT

## 2024-10-11 PROCEDURE — 3044F HG A1C LEVEL LT 7.0%: CPT | Performed by: PHYSICIAN ASSISTANT

## 2024-10-11 PROCEDURE — 3077F SYST BP >= 140 MM HG: CPT | Performed by: PHYSICIAN ASSISTANT

## 2024-10-11 PROCEDURE — 99214 OFFICE O/P EST MOD 30 MIN: CPT | Performed by: PHYSICIAN ASSISTANT

## 2024-10-11 PROCEDURE — 90662 IIV NO PRSV INCREASED AG IM: CPT | Performed by: PHYSICIAN ASSISTANT

## 2024-10-11 PROCEDURE — G0008 ADMIN INFLUENZA VIRUS VAC: HCPCS | Performed by: PHYSICIAN ASSISTANT

## 2024-10-11 PROCEDURE — 1159F MED LIST DOCD IN RCRD: CPT | Performed by: PHYSICIAN ASSISTANT

## 2024-10-11 PROCEDURE — 1125F AMNT PAIN NOTED PAIN PRSNT: CPT | Performed by: PHYSICIAN ASSISTANT

## 2024-10-11 NOTE — PROGRESS NOTES
"Chief Complaint  DM, muscle cramping, diarrhea    Subjective          Nivia Cagle presents to [facility]  History of Present Illness  Pt here today with several complaints     DM: has been having bg more elevated in the am   Today am bg 177  Denies low bg, shakiness  She states she has no motivation to eat or cook  She has been eating pre made foods    She has been sleeping all day which is very unusual for her   She admits to feeling more depressed and stressed due to upcoming court trail that she is a witness for a family matter  She has no motivation to leave the house or clean  Denies si/hi   She has not discussed mood changes with psych     Denies chest pain  She gets cramping and spasm in hands bilaterally   She has to manually uncurl fingers   Pt drinks 3 bottles of water/day    Diarrhea x 1 wk   Denies blood in stool   Stool looks \"foam\"  Pt has up to 5 episodes/day   She has had nausea and threw  up once yesterday    Also c/o pain in L wrist  She had a fall 9/20. Went to ER and had no acute fracture noted.  Was told to f/u with pcp if not improvement  Unable to wear watch on wrist due to pain  Past Medical History:   Diagnosis Date    ADHD (attention deficit hyperactivity disorder)     Allergic rhinitis     Anemia     Anxiety     Cataracts, bilateral     Chronic pain disorder     Depression 0421/2021    MOODNOT WELL CONTROLLED.  GIVEN NUMBER FOR LOCAL COUNSELOR.  WILL INCREASE TRINTELIX FROM 10MG TO 20MG RTC WEEK ER ID S/HI    Diabetes mellitus, type 2     Diverticulitis     GERD (gastroesophageal reflux disease)     Head injury     High blood pressure     Hyperlipemia     Insomnia     Migraine     EARL (obstructive sleep apnea) 04/21/2021    Panic disorder     Phlebitis     PTSD (post-traumatic stress disorder)     RLS (restless legs syndrome)         Past Surgical History:   Procedure Laterality Date    ANKLE SURGERY  2021    BILATERAL BREAST REDUCTION "  2015    BREAST BIOPSY      CARPAL TUNNEL RELEASE      CHOLECYSTECTOMY  2001    COLONOSCOPY      [facility]    COLONOSCOPY N/A 09/28/2022    Procedure: COLONOSCOPY with biopsy;  Surgeon: Ghislaine Kilgore MD;  Location: [facility] ENDOSCOPY;  Service: Gastroenterology;  Laterality: N/A;  colon polyp, diverticlosis, hemorrhoids    EYE SURGERY Right     scar tissue removal    HYSTERECTOMY      ULNAR NERVE TRANSPOSITION Right     WRIST SURGERY          Current Outpatient Medications on File Prior to Visit   Medication Sig Dispense Refill    Accu-Chek Madeline Plus test strip by Other route 4 (Four) Times a Day. use to test blood sugar      Accu-Chek Softclix Lancets lancets by Other route 4 (Four) Times a Day. use to test blood sugar      alendronate (FOSAMAX) 70 MG tablet Take 1 tablet by mouth Every 7 (Seven) Days.      ARIPiprazole (Abilify) 5 MG tablet Take 1 tablet by mouth Daily. 90 tablet 1    Ascorbic Acid (VITAMIN C GUMMIE PO) Take  by mouth Daily.      aspirin (aspirin) 81 MG EC tablet Take 1 tablet by mouth Daily. 90 tablet 99    Blood Glucose Monitoring Suppl (FreeStyle Lite) device 1 each by Other route 4 (Four) Times a Day.      buPROPion XL (WELLBUTRIN XL) 300 MG 24 hr tablet Take 1 tablet by mouth Every Morning. 90 tablet 3    cetirizine (zyrTEC) 10 MG tablet TAKE 1 TABLET BY MOUTH EVERY DAY 90 tablet 1    Cholecalciferol 25 MCG (1000 UT) tablet dispersible Take  by mouth Daily.      coenzyme Q10 50 MG capsule capsule Take  by mouth Daily.      cyclobenzaprine (FLEXERIL) 10 MG tablet Take 1 tablet by mouth Daily As Needed for Muscle Spasms. 90 tablet 1    Daily-Jelani Multivitamin tablet tablet Take 1 tablet by mouth every night at bedtime.      doxepin (SINEquan) 25 MG capsule Take 1 capsule by mouth Every Night. 90 capsule 3    ezetimibe (ZETIA) 10 MG tablet TAKE 1 TABLET BY MOUTH EVERY NIGHT AT BEDTIME 90 tablet 1    FLUoxetine (PROzac) 10 MG capsule Take 1 capsule by mouth Daily. 90 capsule 3     "FLUoxetine (PROzac) 20 MG capsule Take 1 capsule by mouth Daily. 90 capsule 3    fluticasone (Flonase) 50 MCG/ACT nasal spray 2 sprays into the nostril(s) as directed by provider Daily. Administer 2 sprays in each nostril for each dose. 16 g 6    Inclisiran Sodium (LEQVIO SC) Inject  under the skin into the appropriate area as directed.      Januvia 100 MG tablet TAKE 1 TABLET BY MOUTH DAILY 90 tablet 1    losartan (COZAAR) 25 MG tablet Take 0.5 tablets by mouth Daily. (Patient taking differently: Take 1 tablet by mouth Daily.) 90 tablet 1    metFORMIN (GLUCOPHAGE) 500 MG tablet TAKE 1 TABLET BY MOUTH EVERY MORNING AND 2 TABLETS EVERY EVENING WITH MEALS 270 tablet 1    montelukast (SINGULAIR) 10 MG tablet TAKE 1 TABLET BY MOUTH EVERY NIGHT 90 tablet 1    pramipexole (Mirapex) 1 MG tablet Take 1 tablet by mouth Every Night. 90 tablet 1    prednisoLONE acetate (PRED FORTE) 1 % ophthalmic suspension SHAKE LIQUID AND INSTILL 1 DROP IN RIGHT EYE TWICE DAILY      Zinc 100 MG tablet Take 100 mg by mouth Daily.       No current facility-administered medications on file prior to visit.        Allergies   Allergen Reactions    Diclofenac Hives    New Skin [Benzethonium Chloride] Rash    Atorvastatin Myalgia    Adhesive Tape Rash and Other (See Comments)       Rash at area of bandaid    Niacin Rash       Social History     Tobacco Use   Smoking Status Former    Current packs/day: 0.00    Average packs/day: 0.3 packs/day for 13.0 years (3.3 ttl pk-yrs)    Types: Cigarettes    Start date:     Quit date:     Years since quittin.8   Smokeless Tobacco Never   Tobacco Comments    QUIT           Objective   Vital Signs:   /90 (BP Location: Left arm, Patient Position: Sitting, Cuff Size: Adult)   Pulse 91   Temp 98.2 °F (36.8 °C) (Temporal)   Resp 18   Ht 160 cm (62.99\")   Wt 78.9 kg (174 lb)   SpO2 95%   BMI 30.83 kg/m²     Physical Exam  Vitals reviewed.   Constitutional:       Appearance: Normal " appearance.   HENT:      Head: Normocephalic and atraumatic.      Nose: Nose normal.      Mouth/Throat:      Mouth: Mucous membranes are moist.   Eyes:      Extraocular Movements: Extraocular movements intact.      Conjunctiva/sclera: Conjunctivae normal.      Pupils: Pupils are equal, round, and reactive to light.   Cardiovascular:      Rate and Rhythm: Normal rate and regular rhythm.   Pulmonary:      Effort: Pulmonary effort is normal.      Breath sounds: Normal breath sounds.   Abdominal:      General: Abdomen is flat. Bowel sounds are normal.      Palpations: Abdomen is soft.   Musculoskeletal:         General: Normal range of motion.   Neurological:      General: No focal deficit present.      Mental Status: She is alert and oriented to person, place, and time.   Psychiatric:         Mood and Affect: Mood normal.        Result Review :                  Assessment and Plan    Diagnoses and all orders for this visit:    1. Muscle spasm (Primary)  Comments:  Discussed ddx. Labs today. Encouraged increase hydration. Pt will let us know if sx worsen/change.  Orders:  -     Magnesium    2. Diarrhea, unspecified type  Comments:  Discussed ddx. BRAT diet. Will do stool culture to r/o infectious origin. To er if sx worsen, concern for dehydration, fever, blood in stool.  Orders:  -     Fecal Lactoferrin Qual. - Stool, Per Rectum; Future  -     Clostridioides difficile Toxin, PCR - Stool, Per Rectum; Future  -     Enteric Bacterial Panel - Stool, Per Rectum; Future    3. Left wrist pain  -     XR Wrist 3+ View Bilateral (In Office)  -     Ambulatory Referral to Orthopedic Surgery    4. Type 2 diabetes mellitus with hyperglycemia, without long-term current use of insulin  -     Comprehensive Metabolic Panel  -     CBC & Differential  -     TSH  -     Cancel: Hemoglobin A1c  -     Magnesium  -     Hemoglobin A1c; Future    5. Hyperlipidemia LDL goal <70  -     Lipid Panel    6. Closed fracture of left wrist with delayed  healing, subsequent encounter  Comments:  Xray showing old hallie, will refer to hand to see next steps.    7. Major depressive disorder, recurrent episode, moderate degree  Comments:  Encouraged to discuss mood worsening with psych for med adjustment, they control medications.    Other orders  -     Fluzone High-Dose 65+yrs ([id number])        Follow Up   Return in about 8 weeks (around 12/8/2024), or if symptoms worsen or fail to improve, for Medicare Wellness.  Patient was given instructions and counseling regarding her condition or for health maintenance advice. Please see specific information pulled into the AVS if appropriate.

## 2024-10-22 ENCOUNTER — OFFICE VISIT (OUTPATIENT)
Dept: NEUROLOGY | Facility: CLINIC | Age: 74
End: 2024-10-22
Payer: MEDICARE

## 2024-10-22 VITALS
SYSTOLIC BLOOD PRESSURE: 149 MMHG | WEIGHT: 178.1 LBS | BODY MASS INDEX: 31.55 KG/M2 | DIASTOLIC BLOOD PRESSURE: 74 MMHG | HEIGHT: 63 IN | HEART RATE: 82 BPM

## 2024-10-22 DIAGNOSIS — G25.81 RESTLESS LEGS SYNDROME (RLS): Primary | ICD-10-CM

## 2024-10-22 DIAGNOSIS — R25.3 MUSCLE TWITCHING: ICD-10-CM

## 2024-10-22 RX ORDER — CYCLOBENZAPRINE HCL 10 MG
10 TABLET ORAL DAILY PRN
Qty: 90 TABLET | Refills: 1 | Status: SHIPPED | OUTPATIENT
Start: 2024-10-22

## 2024-10-22 RX ORDER — PRAMIPEXOLE 1.5 MG/1
1.5 TABLET, EXTENDED RELEASE ORAL NIGHTLY
Qty: 30 TABLET | Refills: 5 | Status: SHIPPED | OUTPATIENT
Start: 2024-10-22

## 2024-10-22 RX ORDER — PRAMIPEXOLE DIHYDROCHLORIDE 1 MG/1
1 TABLET ORAL NIGHTLY
Qty: 90 TABLET | Refills: 3 | Status: CANCELLED | OUTPATIENT
Start: 2024-10-22 | End: 2025-10-22

## 2024-10-23 ENCOUNTER — CLINICAL SUPPORT (OUTPATIENT)
Dept: INTERNAL MEDICINE | Facility: CLINIC | Age: 74
End: 2024-10-23
Payer: MEDICARE

## 2024-10-23 DIAGNOSIS — R19.7 DIARRHEA, UNSPECIFIED TYPE: ICD-10-CM

## 2024-10-23 DIAGNOSIS — E11.65 TYPE 2 DIABETES MELLITUS WITH HYPERGLYCEMIA, WITHOUT LONG-TERM CURRENT USE OF INSULIN: ICD-10-CM

## 2024-10-23 LAB
027 TOXIN: NORMAL
ALBUMIN SERPL-MCNC: 4.1 G/DL (ref 3.5–5.2)
ALBUMIN/GLOB SERPL: 1.4 G/DL
ALP SERPL-CCNC: 71 U/L (ref 39–117)
ALT SERPL W P-5'-P-CCNC: 33 U/L (ref 1–33)
ANION GAP SERPL CALCULATED.3IONS-SCNC: 14.5 MMOL/L (ref 5–15)
AST SERPL-CCNC: 23 U/L (ref 1–32)
BASOPHILS # BLD AUTO: 0.03 10*3/MM3 (ref 0–0.2)
BASOPHILS NFR BLD AUTO: 0.4 % (ref 0–1.5)
BILIRUB SERPL-MCNC: 0.6 MG/DL (ref 0–1.2)
BUN SERPL-MCNC: 15 MG/DL (ref 8–23)
BUN/CREAT SERPL: 15.2 (ref 7–25)
C DIFF TOX GENS STL QL NAA+PROBE: NEGATIVE
CALCIUM SPEC-SCNC: 9.1 MG/DL (ref 8.6–10.5)
CHLORIDE SERPL-SCNC: 102 MMOL/L (ref 98–107)
CHOLEST SERPL-MCNC: 181 MG/DL (ref 0–200)
CO2 SERPL-SCNC: 24.5 MMOL/L (ref 22–29)
CREAT SERPL-MCNC: 0.99 MG/DL (ref 0.57–1)
DEPRECATED RDW RBC AUTO: 41.3 FL (ref 37–54)
EGFRCR SERPLBLD CKD-EPI 2021: 60.7 ML/MIN/1.73
EOSINOPHIL # BLD AUTO: 0.23 10*3/MM3 (ref 0–0.4)
EOSINOPHIL NFR BLD AUTO: 3.4 % (ref 0.3–6.2)
ERYTHROCYTE [DISTWIDTH] IN BLOOD BY AUTOMATED COUNT: 12.4 % (ref 12.3–15.4)
GLOBULIN UR ELPH-MCNC: 3 GM/DL
GLUCOSE SERPL-MCNC: 180 MG/DL (ref 65–99)
HBA1C MFR BLD: 7.3 % (ref 4.8–5.6)
HCT VFR BLD AUTO: 40.3 % (ref 34–46.6)
HDLC SERPL-MCNC: 49 MG/DL (ref 40–60)
HGB BLD-MCNC: 13.6 G/DL (ref 12–15.9)
IMM GRANULOCYTES # BLD AUTO: 0.02 10*3/MM3 (ref 0–0.05)
IMM GRANULOCYTES NFR BLD AUTO: 0.3 % (ref 0–0.5)
LACTOFERRIN STL QL LA: NEGATIVE
LDLC SERPL CALC-MCNC: 91 MG/DL (ref 0–100)
LDLC/HDLC SERPL: 1.71 {RATIO}
LYMPHOCYTES # BLD AUTO: 1.38 10*3/MM3 (ref 0.7–3.1)
LYMPHOCYTES NFR BLD AUTO: 20.1 % (ref 19.6–45.3)
MAGNESIUM SERPL-MCNC: 1.7 MG/DL (ref 1.6–2.4)
MCH RBC QN AUTO: 31 PG (ref 26.6–33)
MCHC RBC AUTO-ENTMCNC: 33.7 G/DL (ref 31.5–35.7)
MCV RBC AUTO: 91.8 FL (ref 79–97)
MONOCYTES # BLD AUTO: 0.68 10*3/MM3 (ref 0.1–0.9)
MONOCYTES NFR BLD AUTO: 9.9 % (ref 5–12)
NEUTROPHILS NFR BLD AUTO: 4.51 10*3/MM3 (ref 1.7–7)
NEUTROPHILS NFR BLD AUTO: 65.9 % (ref 42.7–76)
NRBC BLD AUTO-RTO: 0 /100 WBC (ref 0–0.2)
PLATELET # BLD AUTO: 228 10*3/MM3 (ref 140–450)
PMV BLD AUTO: 12.4 FL (ref 6–12)
POTASSIUM SERPL-SCNC: 4.2 MMOL/L (ref 3.5–5.2)
PROT SERPL-MCNC: 7.1 G/DL (ref 6–8.5)
RBC # BLD AUTO: 4.39 10*6/MM3 (ref 3.77–5.28)
SODIUM SERPL-SCNC: 141 MMOL/L (ref 136–145)
TRIGL SERPL-MCNC: 242 MG/DL (ref 0–150)
TSH SERPL DL<=0.05 MIU/L-ACNC: 1.13 UIU/ML (ref 0.27–4.2)
VLDLC SERPL-MCNC: 41 MG/DL (ref 5–40)
WBC NRBC COR # BLD AUTO: 6.85 10*3/MM3 (ref 3.4–10.8)

## 2024-10-23 PROCEDURE — 83036 HEMOGLOBIN GLYCOSYLATED A1C: CPT | Performed by: PHYSICIAN ASSISTANT

## 2024-10-23 PROCEDURE — 83735 ASSAY OF MAGNESIUM: CPT | Performed by: PHYSICIAN ASSISTANT

## 2024-10-23 PROCEDURE — 85025 COMPLETE CBC W/AUTO DIFF WBC: CPT | Performed by: PHYSICIAN ASSISTANT

## 2024-10-23 PROCEDURE — 80053 COMPREHEN METABOLIC PANEL: CPT | Performed by: PHYSICIAN ASSISTANT

## 2024-10-23 PROCEDURE — 84443 ASSAY THYROID STIM HORMONE: CPT | Performed by: PHYSICIAN ASSISTANT

## 2024-10-23 PROCEDURE — 83630 LACTOFERRIN FECAL (QUAL): CPT | Performed by: PHYSICIAN ASSISTANT

## 2024-10-23 PROCEDURE — 80061 LIPID PANEL: CPT | Performed by: PHYSICIAN ASSISTANT

## 2024-10-23 PROCEDURE — 87493 C DIFF AMPLIFIED PROBE: CPT | Performed by: PHYSICIAN ASSISTANT

## 2024-10-23 PROCEDURE — 36415 COLL VENOUS BLD VENIPUNCTURE: CPT | Performed by: PHYSICIAN ASSISTANT

## 2024-10-23 PROCEDURE — 87505 NFCT AGENT DETECTION GI: CPT | Performed by: PHYSICIAN ASSISTANT

## 2024-10-23 NOTE — PROGRESS NOTES
Venipuncture Blood Specimen Collection  Venipuncture performed in [facility] by Kamla Hyde RN with good hemostasis. Patient tolerated the procedure well without complications.Patient was given supplies for stool collection again while in office.   10/23/24   Kamla Hyde RN

## 2024-10-24 ENCOUNTER — PRIOR AUTHORIZATION (OUTPATIENT)
Dept: NEUROLOGY | Facility: CLINIC | Age: 74
End: 2024-10-24
Payer: MEDICARE

## 2024-10-24 LAB
C COLI+JEJ+UPSA DNA STL QL NAA+NON-PROBE: NOT DETECTED
EC STX1+STX2 GENES STL QL NAA+NON-PROBE: NOT DETECTED
S ENT+BONG DNA STL QL NAA+NON-PROBE: NOT DETECTED
SHIGELLA SP+EIEC IPAH ST NAA+NON-PROBE: NOT DETECTED

## 2024-10-24 RX ORDER — LOSARTAN POTASSIUM 25 MG/1
25 TABLET ORAL DAILY
Qty: 90 TABLET | Refills: 1 | Status: SHIPPED | OUTPATIENT
Start: 2024-10-24

## 2024-10-25 ENCOUNTER — TELEMEDICINE (OUTPATIENT)
Dept: PSYCHIATRY | Facility: CLINIC | Age: 74
End: 2024-10-25
Payer: MEDICARE

## 2024-10-25 DIAGNOSIS — F33.1 MAJOR DEPRESSIVE DISORDER, RECURRENT EPISODE, MODERATE: Primary | ICD-10-CM

## 2024-10-25 PROCEDURE — 90837 PSYTX W PT 60 MINUTES: CPT | Performed by: COUNSELOR

## 2024-10-25 NOTE — PROGRESS NOTES
Date: October 28, 2024  Time In: 0830  Time Out: 0930  This provider is located at home address for [facility] (through [facility]), [address], [place] [address] using a secure [employer] Video Visit through [employer]. Patient is being seen remotely via telehealth at home address in [place] and stated they are in a secure environment for this session. The patient's condition being diagnosed/treated is appropriate for telemedicine. The provider identified herself as well as her credentials. The patient, and/or patients guardian, consent to be seen remotely, and when consent is given they understand that the consent allows for patient identifiable information to be sent to a third party as needed. They may refuse to be seen remotely at any time. The electronic data is encrypted and password protected, and the patient and/or guardian has been advised of the potential risks to privacy not withstanding such measures.     You have chosen to receive care through a telehealth visit.  Do you consent to use a video/audio connection for your medical care today? Yes    PROGRESS NOTE  Data:  Nivia Cagle is a 74 y.o. female who presents today for follow up    Chief Complaint: depression     History of Present Illness: Pt shares increase pain and discomfort due to hip issues. Pt report that household has been disorganized due to ongoing need to rest due to pain from hip. Pt reports that her home being unkept has increased depression also discussing a sense of guilt and shame associated with uncompleted tasks. Pt reports goals associated with the home and hopes that one mediation and therapy is applied to hip that she will be closer to baseline and will be able to tackle tasks around the home.       Clinical Maneuvering/Intervention:    (Scales based on 0 - 10 with 10 being the worst)  Depression: 6 Anxiety: 6       Assisted patient in processing above session content;  acknowledged and normalized patient’s thoughts, feelings, and concerns.  Rationalized patient thought process regarding recent stressors and life events. Discussed triggers associated with patient's emotions. Also discussed coping skills for patient to implement. Therapist assisted with processing emotions and thoughts correlated to identified stressors. Discussed limitations associated with identified stressors. Therapist offered alternative perspectives, support, and validation as needed.     Reviewed treatment goals and progress regarding identified goals. Progress remains on going at this time. Discussed expectations for next session. Therapist discussed cyndy associated with unmet goals within home and discussed need to reduce expectation to become achievable especially since hip issues have increased.     Allowed patient to freely discuss issues without interruption or judgment. Provided safe, confidential environment to facilitate the development of positive therapeutic relationship and encourage open, honest communication. Assisted patient in identifying risk factors which would indicate the need for higher level of care including thoughts to harm self or others and/or self-harming behavior and encouraged patient to contact this office, call 911, or present to the nearest emergency room should any of these events occur. Discussed crisis intervention services and means to access. Patient adamantly and convincingly denies current suicidal or homicidal ideation or perceptual disturbance.    Assessment:   Assessment   Patient appears to maintain relative stability as compared to their baseline.  However, patient continues to struggle with depression which continues to cause impairment in important areas of functioning.  A result, they can be reasonably expected to continue to benefit from treatment and would likely be at increased risk for decompensation otherwise.    Mental Status Exam:   Hygiene:    good  Cooperation:  Cooperative  Eye Contact:  Good  Psychomotor Behavior:  Appropriate  Affect:  Appropriate  Mood: anxious  Speech:  Normal  Thought Process:  Linear  Thought Content:  Mood congruent  Suicidal:  None  Homicidal:  None  Hallucinations:  None  Delusion:  None  Memory:  Intact  Orientation:  Person, Place, Time and Situation  Reliability:  fair  Insight:  Fair  Judgement:  Fair  Impulse Control:  Fair  Physical/Medical Issues:  No        Patient's Support Network Includes:  son    Functional Status: Mild impairment     Progress toward goal: Not at goal    Prognosis: Fair with Ongoing Treatment            Plan:    Patient will continue in individual outpatient therapy with focus on improved functioning and coping skills, maintaining stability, and avoiding decompensation and the need for higher level of care.    Patient will adhere to medication regimen as prescribed and report any side effects. Patient will contact this office, call 911 or present to the nearest emergency room should suicidal or homicidal ideations occur. Provide Cognitive Behavioral Therapy and Solution Focused Therapy to improve functioning, maintain stability, and avoid decompensation and the need for higher level of care.     Return in about 6 weeks, or earlier if symptoms worsen or fail to improve.           VISIT DIAGNOSIS:     ICD-10-CM ICD-9-CM   1. Major depressive disorder, recurrent episode, moderate  F33.1 296.32        Diagnoses and all orders for this visit:    1. Major depressive disorder, recurrent episode, moderate (Primary)           [facility] No Show Policy:  We understand unexpected circumstances arise; however, anytime you miss your appointment we are unable to provide you appropriate care.  In addition, each appointment missed could have been used to provide care for others.  We ask that you call at least 24 hours in advance to cancel or reschedule an appointment.  We would like to take  this opportunity to remind you of our policy stating patients who miss THREE or more appointments without cancelling or rescheduling 24 hours in advance of the appointment may be subject to cancellation of any further visits with our clinic and recommendation to seek in-person services/visits.    Please call [phone] to reschedule your appointment. If there are reasons that make it difficult for you to keep the appointments, please call and let us know how we can help.  Please understand that medication prescribing will not continue without seeing your provider.      [facility]'s No Show Policy reviewed with patient at today's visit. Patient verbalized understanding of this policy. Discussed with patient that in the event that there are three or more no show visits, it will be recommended that they pursue in-person services/visits as noncompliance with telehealth visits indicates that patient is not an appropriate candidate for telemedicine and would likely be more appropriate for in-person services/visits. Patient verbalizes understanding and is agreeable to this.        This document has been electronically signed by Annita Knowles LCSW.  October 28, 2024 09:32 EDT      Part of this note may be an electronic transcription/translation of spoken language to printed text using the Dragon Dictation System.

## 2024-10-25 NOTE — PROGRESS NOTES
"Chief Complaint  Neurologic Problem    Subjective          Nivia Cagle presents to [facility]  History of Present Illness    History of Present Illness  The patient is a 74-year-old female who presents to the office for follow-up for restless legs and muscle spasms. She remains on 1 mg pramipexole nightly for restless legs and 10 mg of Flexeril as needed for muscle spasms.    She reports experiencing restlessness in her legs this morning and is currently experiencing pain in her left buttock, which radiates down the front of her leg. This discomfort began approximately a week ago. Despite taking pramipexole 1 mg at night, she does not feel it has been effective. She often wakes up during the night due to sensations of electric shocks in her legs, cramping, or the need to use the bathroom.    Recently, she has been experiencing frequent cramping, which she suspects may be due to poor sleep quality. She ensures adequate hydration and takes Flexeril as needed. She also took cetirizine this morning.    She has an appointment with her primary care physician tomorrow.       Objective   Vital Signs:   /74   Pulse 82   Ht 160 cm (62.99\")   Wt 80.8 kg (178 lb 1.6 oz)   BMI 31.56 kg/m²     Physical Exam  HENT:      Head: Normocephalic.   Pulmonary:      Effort: Pulmonary effort is normal.   Neurological:      Mental Status: She is alert and oriented to person, place, and time.      Sensory: Sensation is intact.      Motor: Motor function is intact.      Coordination: Coordination is intact.      Deep Tendon Reflexes: Reflexes are normal and symmetric.      Comments: Mild intention tremor bilaterally        Neurological Exam  Mental Status  Alert. Oriented to person, place, and time.    Sensory  Normal sensation.    Reflexes  Deep tendon reflexes are 2+ and symmetric in all four extremities.    Coordination    Finger-to-nose, rapid alternating movements and " heel-to-shin normal bilaterally without dysmetria.      Result Review :               Assessment and Plan    There are no diagnoses linked to this encounter.    Assessment & Plan  1. Restless Legs Syndrome.  The patient reports that her restless legs have been causing electric shock feelings and cramping at night. The current pramipexole 1 mg nightly is not effectively managing her symptoms. The pramipexole will be switched to an extended release version at a dosage of 1.5 mg nightly to provide longer-lasting relief throughout the night. The potential benefits and side effects of the new medication regimen were discussed.    2. Muscle Spasms.  The patient continues to experience muscle spasms and has been using Flexeril 10 mg as needed. She reports that she needs a new prescription for Flexeril. A new prescription for Flexeril has been issued to continue managing her muscle spasms.             Follow Up   Return in about 1 year (around 10/22/2025) for RLS, muscle spasms .  Patient was given instructions and counseling regarding her condition or for health maintenance advice. Please see specific information pulled into the AVS if appropriate.       Patient or patient representative verbalized consent for the use of Ambient Listening during the visit with  JEANNIE Hernandez for chart documentation. 10/25/2024  09:56 EDT

## 2024-10-29 ENCOUNTER — TELEPHONE (OUTPATIENT)
Dept: NEUROLOGY | Facility: CLINIC | Age: 74
End: 2024-10-29
Payer: MEDICARE

## 2024-10-29 NOTE — TELEPHONE ENCOUNTER
Provider: HODA TA    Caller: IVY WITH [facility] DE[facility]    Phone Number: [phone]    Reason for Call: CALLED WITH CLINICAL QUESTIONS FOR PATIENT'S CYCLOBENZAPRINE PRIOR AUTH. STATES SHE WILL FAX FORM TO OFFICE. PLEASE REVIEW, THANK YOU.

## 2024-11-08 ENCOUNTER — TRANSCRIBE ORDERS (OUTPATIENT)
Dept: LAB | Facility: HOSPITAL | Age: 74
End: 2024-11-08
Payer: MEDICARE

## 2024-11-08 ENCOUNTER — LAB (OUTPATIENT)
Dept: LAB | Facility: HOSPITAL | Age: 74
End: 2024-11-08
Payer: MEDICARE

## 2024-11-08 ENCOUNTER — OFFICE VISIT (OUTPATIENT)
Dept: PODIATRY | Facility: CLINIC | Age: 74
End: 2024-11-08
Payer: MEDICARE

## 2024-11-08 VITALS
WEIGHT: 179 LBS | HEIGHT: 63 IN | SYSTOLIC BLOOD PRESSURE: 135 MMHG | BODY MASS INDEX: 31.71 KG/M2 | HEART RATE: 91 BPM | DIASTOLIC BLOOD PRESSURE: 81 MMHG | OXYGEN SATURATION: 97 %

## 2024-11-08 DIAGNOSIS — M79.671 FOOT PAIN, BILATERAL: ICD-10-CM

## 2024-11-08 DIAGNOSIS — G62.9 NEUROPATHY: ICD-10-CM

## 2024-11-08 DIAGNOSIS — M85.89 OSTEOPENIA OF MULTIPLE SITES: ICD-10-CM

## 2024-11-08 DIAGNOSIS — L60.0 ONYCHOCRYPTOSIS: Primary | ICD-10-CM

## 2024-11-08 DIAGNOSIS — B35.1 ONYCHOMYCOSIS: ICD-10-CM

## 2024-11-08 DIAGNOSIS — E11.8 DM FEET: ICD-10-CM

## 2024-11-08 DIAGNOSIS — M79.672 FOOT PAIN, BILATERAL: ICD-10-CM

## 2024-11-08 DIAGNOSIS — Z79.899 ENCOUNTER FOR LONG-TERM (CURRENT) USE OF MEDICATIONS: ICD-10-CM

## 2024-11-08 DIAGNOSIS — M85.89 OSTEOPENIA OF MULTIPLE SITES: Primary | ICD-10-CM

## 2024-11-08 DIAGNOSIS — Z79.4 TYPE 2 DIABETES MELLITUS WITH DIABETIC POLYNEUROPATHY, WITH LONG-TERM CURRENT USE OF INSULIN: ICD-10-CM

## 2024-11-08 DIAGNOSIS — E11.42 TYPE 2 DIABETES MELLITUS WITH DIABETIC POLYNEUROPATHY, WITH LONG-TERM CURRENT USE OF INSULIN: ICD-10-CM

## 2024-11-08 LAB
25(OH)D3 SERPL-MCNC: 35.9 NG/ML (ref 30–100)
CALCIUM SPEC-SCNC: 9.3 MG/DL (ref 8.6–10.5)
CREAT SERPL-MCNC: 0.99 MG/DL (ref 0.57–1)
EGFRCR SERPLBLD CKD-EPI 2021: 60.7 ML/MIN/1.73

## 2024-11-08 PROCEDURE — 82306 VITAMIN D 25 HYDROXY: CPT

## 2024-11-08 PROCEDURE — 82310 ASSAY OF CALCIUM: CPT

## 2024-11-08 PROCEDURE — 36415 COLL VENOUS BLD VENIPUNCTURE: CPT

## 2024-11-08 PROCEDURE — 82565 ASSAY OF CREATININE: CPT

## 2024-11-08 NOTE — PROGRESS NOTES
[facility] - PODIATRY    Today's Date: 11/08/24    Patient Name: Nivia Cagle  MRN: [id number]  CSN: [id number]  PCP: Catalina Madsen PA-C, Last PCP Visit:  7/11/2024  Referring Provider: No ref. provider found    SUBJECTIVE     Chief Complaint   Patient presents with    Left Foot - Follow-up, Nail Problem    Right Foot - Follow-up, Nail Problem     HPI: Nivia Cagle, a 74 y.o.female, presents to clinic for painful toenail:    New, Established, New Problem:  est  Location:  Toenails  Duration:   Greater than five years  Onset:  Gradual  Nature:  sore with palpation.  Stable, worsening, improving:   Stable  Aggravating factors:  Pain with shoe gear and ambulation.  Previous Treatment: Unable to trim their own toenails.    Patient controlling diabetes via:  NIDDM    Patient states their last blood glucose was: 164    Patient denies any fevers, chills, nausea, vomiting, shortness of breath, nor any other constitutional signs nor symptoms.    Medical changes: UC 10/24/24 L hip pain      I have reviewed/confirmed previously documented HPI with no changes.       Past Medical History:   Diagnosis Date    ADHD (attention deficit hyperactivity disorder)     Allergic rhinitis     Anemia     Anxiety     Cataracts, bilateral     Chronic pain disorder     Depression 0421/2021    MOODNOT WELL CONTROLLED.  GIVEN NUMBER FOR LOCAL COUNSELOR.  WILL INCREASE TRINTELIX FROM 10MG TO 20MG RTC WEEK ER ID S/HI    Diabetes mellitus, type 2     Diverticulitis     GERD (gastroesophageal reflux disease)     Head injury     High blood pressure     Hyperlipemia     Insomnia     Migraine     EARL (obstructive sleep apnea) 04/21/2021    Panic disorder     Phlebitis     PTSD (post-traumatic stress disorder)     RLS (restless legs syndrome)      Past Surgical History:   Procedure Laterality Date    ANKLE SURGERY  2021    BILATERAL BREAST REDUCTION  2015    BREAST BIOPSY      CARPAL TUNNEL RELEASE      CHOLECYSTECTOMY  2001     COLONOSCOPY      [facility]    COLONOSCOPY N/A 2022    Procedure: COLONOSCOPY with biopsy;  Surgeon: Ghislaine Kilgore MD;  Location: [facility] ENDOSCOPY;  Service: Gastroenterology;  Laterality: N/A;  colon polyp, diverticlosis, hemorrhoids    EYE SURGERY Right     scar tissue removal    HYSTERECTOMY      ULNAR NERVE TRANSPOSITION Right     WRIST SURGERY       Family History   Problem Relation Age of Onset    Kidney cancer Mother     Hyperlipidemia Mother     Heart disease Father     Heart attack Father     Diabetes Father     ADD / ADHD Father     Hyperlipidemia Father     Sleep apnea Father     Restless legs syndrome Father     Hyperlipidemia Sister     Cancer Sister     Brain cancer Sister     Lung cancer Sister     Hyperlipidemia Sister     Hyperlipidemia Brother     Diabetes Brother     Heart attack Brother     Hyperlipidemia Brother     No Known Problems Paternal Uncle     No Known Problems Cousin     Malpreet Hyperthermia Neg Hx      Social History     Socioeconomic History    Marital status: Single   Tobacco Use    Smoking status: Former     Current packs/day: 0.00     Average packs/day: 0.3 packs/day for 13.0 years (3.3 ttl pk-yrs)     Types: Cigarettes     Start date:      Quit date:      Years since quittin.8    Smokeless tobacco: Never    Tobacco comments:     QUIT    Vaping Use    Vaping status: Never Used   Substance and Sexual Activity    Alcohol use: Yes     Comment: rarely    Drug use: Never    Sexual activity: Defer     Allergies   Allergen Reactions    Diclofenac Hives    New Skin [Benzethonium Chloride] Rash    Atorvastatin Myalgia    Adhesive Tape Rash and Other (See Comments)       Rash at area of bandaid    Niacin Rash     Current Outpatient Medications   Medication Sig Dispense Refill    Accu-Chek Madeline Plus test strip by Other route 4 (Four) Times a Day. use to test blood sugar      Accu-Chek Softclix Lancets lancets by Other route 4 (Four) Times a Day. use to  test blood sugar      alendronate (FOSAMAX) 70 MG tablet Take 1 tablet by mouth Every 7 (Seven) Days.      ARIPiprazole (Abilify) 5 MG tablet Take 1 tablet by mouth Daily. 90 tablet 1    Ascorbic Acid (VITAMIN C GUMMIE PO) Take  by mouth Daily.      aspirin (aspirin) 81 MG EC tablet Take 1 tablet by mouth Daily. 90 tablet 99    Blood Glucose Monitoring Suppl (FreeStyle Lite) device 1 each by Other route 4 (Four) Times a Day.      buPROPion XL (WELLBUTRIN XL) 300 MG 24 hr tablet Take 1 tablet by mouth Every Morning. 90 tablet 3    cetirizine (zyrTEC) 10 MG tablet TAKE 1 TABLET BY MOUTH EVERY DAY 90 tablet 1    Cholecalciferol 25 MCG (1000 UT) tablet dispersible Take  by mouth Daily.      coenzyme Q10 50 MG capsule capsule Take  by mouth Daily.      cyclobenzaprine (FLEXERIL) 10 MG tablet Take 1 tablet by mouth Daily As Needed for Muscle Spasms. 90 tablet 1    Daily-Jelani Multivitamin tablet tablet Take 1 tablet by mouth every night at bedtime.      doxepin (SINEquan) 25 MG capsule Take 1 capsule by mouth Every Night. 90 capsule 3    ezetimibe (ZETIA) 10 MG tablet TAKE 1 TABLET BY MOUTH EVERY NIGHT AT BEDTIME 90 tablet 1    FLUoxetine (PROzac) 10 MG capsule Take 1 capsule by mouth Daily. 90 capsule 3    FLUoxetine (PROzac) 20 MG capsule Take 1 capsule by mouth Daily. 90 capsule 3    fluticasone (Flonase) 50 MCG/ACT nasal spray 2 sprays into the nostril(s) as directed by provider Daily. Administer 2 sprays in each nostril for each dose. 16 g 6    ibuprofen (ADVIL,MOTRIN) 800 MG tablet Take 1 tablet by mouth Every 8 (Eight) Hours As Needed for Mild Pain or Moderate Pain for up to 15 days. 42 tablet 0    Inclisiran Sodium (LEQVIO SC) Inject  under the skin into the appropriate area as directed.      Januvia 100 MG tablet TAKE 1 TABLET BY MOUTH DAILY 90 tablet 1    losartan (COZAAR) 25 MG tablet TAKE 1 TABLET BY MOUTH DAILY 90 tablet 1    metFORMIN (GLUCOPHAGE) 500 MG tablet TAKE 1 TABLET BY MOUTH EVERY MORNING AND 2  TABLETS BY MOUTH EVERY EVENING WITH MEALS 270 tablet 1    montelukast (SINGULAIR) 10 MG tablet TAKE 1 TABLET BY MOUTH EVERY NIGHT 90 tablet 1    Pramipexole Dihydrochloride ER (Mirapex ER) 1.5 MG tablet sustained-release 24 hour Take 1.5 mg by mouth Every Night. 30 tablet 5    prednisoLONE acetate (PRED FORTE) 1 % ophthalmic suspension SHAKE LIQUID AND INSTILL 1 DROP IN RIGHT EYE TWICE DAILY      Zinc 100 MG tablet Take 100 mg by mouth Daily.       No current facility-administered medications for this visit.     Review of Systems   Constitutional: Negative.    Skin:         Painful toenails.   All other systems reviewed and are negative.      OBJECTIVE     Vitals:    11/08/24 0855   BP: 135/81   Pulse: 91   SpO2: 97%       Body mass index is 31.71 kg/m².    Lab Results   Component Value Date    HGBA1C 7.30 (H) 10/23/2024       Lab Results   Component Value Date    GLUCOSE 180 (H) 10/23/2024    CALCIUM 9.1 10/23/2024     10/23/2024    K 4.2 10/23/2024    CO2 24.5 10/23/2024     10/23/2024    BUN 15 10/23/2024    CREATININE 0.99 10/23/2024    EGFRIFNONA 68 02/15/2022    BCR 15.2 10/23/2024    ANIONGAP 14.5 10/23/2024       Patient seen in no apparent distress.      PHYSICAL EXAM:     Foot/Ankle Exam    GENERAL  Appearance:  chronically ill  Orientation:  AAOx3  Affect:  appropriate  Gait:  unimpaired  Assistance:  independent  Right shoe gear: casual shoe  Left shoe gear: casual shoe    VASCULAR     Right Foot Vascularity   Dorsalis pedis:  1+  Posterior tibial:  1+  Skin temperature:  warm  Edema grading:  None  CFT:  < 3 seconds  Pedal hair growth:  Absent  Varicosities:  mild varicosities     Left Foot Vascularity   Dorsalis pedis:  1+  Posterior tibial:  1+  Skin temperature:  warm  Edema grading:  None  CFT:  < 3 seconds  Pedal hair growth:  Absent  Varicosities:  mild varicosities     NEUROLOGIC     Right Foot Neurologic   Light touch sensation: diminished  Vibratory sensation: diminished  Hot/Cold  sensation: diminished  Protective Sensation using [place] Monofilament:   Sites intact: 3  Sites tested: 10     Left Foot Neurologic   Normal sensation    Light touch sensation: normal  Vibratory sensation: normal  Hot/Cold sensation:  normal  Protective Sensation using [place] Monofilament:   Sites intact: 10  Sites tested: 10    MUSCLE STRENGTH     Right Foot Muscle Strength   Foot dorsiflexion:  4-  Foot plantar flexion:  4-  Foot inversion:  4-  Foot eversion:  4-     Left Foot Muscle Strength   Foot dorsiflexion:  4-  Foot plantar flexion:  4-  Foot inversion:  4-  Foot eversion:  4-    RANGE OF MOTION     Right Foot Range of Motion   Foot and ankle ROM within normal limits       Left Foot Range of Motion   Foot and ankle ROM within normal limits      DERMATOLOGIC      Right Foot Dermatologic   Skin  Right foot skin is intact.   Nails  2.  Positive for elongated, onychomycosis, abnormal thickness, subungual debris and ingrown toenail.  3.  Positive for elongated, onychomycosis, abnormal thickness, subungual debris and ingrown toenail.  4.  Positive for elongated, onychomycosis, abnormal thickness, subungual debris and ingrown toenail.  5.  Positive for elongated, onychomycosis, abnormal thickness, subungual debris and ingrown toenail.  Nails comment:  Toenails 1, 2, 3, 4, and 5     Left Foot Dermatologic   Skin  Left foot skin is intact.   Nails comment:  Toenails 1, 2, 3, 4, and 5  Nails  1.  Positive for elongated, onychomycosis, abnormal thickness, subungual debris and ingrown toenail.  2.  Positive for elongated, onychomycosis, abnormal thickness, subungual debris and ingrown toenail.  3.  Positive for elongated, onychomycosis, abnormal thickness, subungual debris and ingrown toenail.  4.  Positive for elongated, onychomycosis, abnormally thick, subungual debris and ingrown toenail.  5.  Positive for elongated, onychomycosis, abnormally thick, subungual debris and ingrown toenail.    I  have reexamined the patient the results are consistent with the previously documented exam.    ASSESSMENT/PLAN     Diagnoses and all orders for this visit:    1. Onychocryptosis (Primary)    2. Onychomycosis    3. Foot pain, bilateral    4. DM feet    5. Type 2 diabetes mellitus with diabetic polyneuropathy, with long-term current use of insulin    6. Neuropathy    Comprehensive lower extremity examination and evaluation was performed.    Discussed findings and treatment plan including risks, benefits, and treatment options with patient in detail. Patient agreed with treatment plan.    Medications and allergies reviewed.  Reviewed available blood glucose and HgB A1C lab values along with other pertinent labs.  These were discussed with the patient as to their importance of diabetic maintenance.    Toenails 2, 3, 4, 5 on Right and 1, 2, 3, 4, 5 on Left were debrided with nail nippers then filed with a Dremel nail radha.  Patient tolerated procedure well without complications.    An After Visit Summary was printed and given to the patient at discharge, including (if requested) any available informative/educational handouts regarding diagnosis, treatment, or medications. All questions were answered to patient/family satisfaction. Should symptoms fail to improve or worsen they agree to call or return to clinic or to go to the Emergency Department. Discussed the importance of following up with any needed screening tests/labs/specialist appointments and any requested follow-up recommended by me today. Importance of maintaining follow-up discussed and patient accepts that missed appointments can delay diagnosis and potentially lead to worsening of conditions.    Return in about 9 weeks (around 1/10/2025) for Toenail Care., or sooner if acute issues arise.    I have reviewed the assessment and plan and verified the accuracy of it. No changes to assessment and plan since the information was documented. Adarsh Flores,  DEX 11/08/24     I have dictated this note utilizing Dragon Dictation.  Please note that portions of this note were completed with a voice recognition program.  Part of this note may be an electronic transcription/translation of spoken language to printed text using the Dragon Dictation System.      This document has been electronically signed by Adarsh Flores DPM on November 8, 2024 09:37 EST

## 2024-11-14 ENCOUNTER — OFFICE VISIT (OUTPATIENT)
Dept: INTERNAL MEDICINE | Facility: CLINIC | Age: 74
End: 2024-11-14
Payer: MEDICARE

## 2024-11-14 VITALS
TEMPERATURE: 98.9 F | RESPIRATION RATE: 16 BRPM | HEART RATE: 98 BPM | WEIGHT: 179.4 LBS | OXYGEN SATURATION: 98 % | BODY MASS INDEX: 31.79 KG/M2 | SYSTOLIC BLOOD PRESSURE: 136 MMHG | DIASTOLIC BLOOD PRESSURE: 70 MMHG | HEIGHT: 63 IN

## 2024-11-14 DIAGNOSIS — E11.65 TYPE 2 DIABETES MELLITUS WITH HYPERGLYCEMIA, WITHOUT LONG-TERM CURRENT USE OF INSULIN: ICD-10-CM

## 2024-11-14 DIAGNOSIS — R13.10 DYSPHAGIA, UNSPECIFIED TYPE: ICD-10-CM

## 2024-11-14 DIAGNOSIS — I10 ESSENTIAL HYPERTENSION: ICD-10-CM

## 2024-11-14 DIAGNOSIS — M25.552 LEFT HIP PAIN: Primary | ICD-10-CM

## 2024-11-14 PROCEDURE — 1159F MED LIST DOCD IN RCRD: CPT | Performed by: PHYSICIAN ASSISTANT

## 2024-11-14 PROCEDURE — 3051F HG A1C>EQUAL 7.0%<8.0%: CPT | Performed by: PHYSICIAN ASSISTANT

## 2024-11-14 PROCEDURE — 1126F AMNT PAIN NOTED NONE PRSNT: CPT | Performed by: PHYSICIAN ASSISTANT

## 2024-11-14 PROCEDURE — 3075F SYST BP GE 130 - 139MM HG: CPT | Performed by: PHYSICIAN ASSISTANT

## 2024-11-14 PROCEDURE — 99214 OFFICE O/P EST MOD 30 MIN: CPT | Performed by: PHYSICIAN ASSISTANT

## 2024-11-14 PROCEDURE — 1160F RVW MEDS BY RX/DR IN RCRD: CPT | Performed by: PHYSICIAN ASSISTANT

## 2024-11-14 PROCEDURE — 3078F DIAST BP <80 MM HG: CPT | Performed by: PHYSICIAN ASSISTANT

## 2024-11-14 RX ORDER — FAMOTIDINE 20 MG/1
20 TABLET, FILM COATED ORAL 2 TIMES DAILY
Qty: 60 TABLET | Refills: 1 | Status: SHIPPED | OUTPATIENT
Start: 2024-11-14

## 2024-11-14 NOTE — PROGRESS NOTES
Chief Complaint  Hip Pain and trouble swallowing    Subjective          Nivia Cagle presents to [facility]  Hip Pain       L hip pain : has been going on 7 wks . Sx started after she fell on 9/20    Went to  on 10/24. Xray showed Osteopenia. Moderate osteoarthritis of the hips. No acute fractures or dislocations. If continued pain or difficulty weightbearing, MRI the pelvis is recommended    Pain has improved slightly   Pain increasing when laying on L hip   No pain in R hip   Denies pain in knees   Pt is limping when walking     Pt states food has been getting stuck in esophagus for the past few months  At times she has to throw up due to food feeling stuck   Bread and meat seem to make sx worse  She has tried taking small bites and chewing a lot  Sometimes she chokes when swallowing liquids  Denies pain when swallowing  She is getting dental implants soon for 2 missing teeth  She has changed diet to accommodate to softer foods  Am bg running 140-160  Denies chest pain, shortness of breath    Past Medical History:   Diagnosis Date    ADHD (attention deficit hyperactivity disorder)     Allergic rhinitis     Anemia     Anxiety     Cataracts, bilateral     Chronic pain disorder     Depression 0421/2021    MOODNOT WELL CONTROLLED.  GIVEN NUMBER FOR LOCAL COUNSELOR.  WILL INCREASE TRINTELIX FROM 10MG TO 20MG RTC WEEK ER ID S/HI    Diabetes mellitus, type 2     Diverticulitis     GERD (gastroesophageal reflux disease)     Head injury     High blood pressure     Hyperlipemia     Insomnia     Migraine     EARL (obstructive sleep apnea) 04/21/2021    Panic disorder     Phlebitis     PTSD (post-traumatic stress disorder)     RLS (restless legs syndrome)         Past Surgical History:   Procedure Laterality Date    ANKLE SURGERY  2021    BILATERAL BREAST REDUCTION  2015    BREAST BIOPSY      CARPAL TUNNEL RELEASE      CHOLECYSTECTOMY  2001    [place]OS[place]      [place]    COLONOSCOPY N/A 09/28/2022    Procedure: COLONOSCOPY with biopsy;  Surgeon: Ghislaine Kilgore MD;  Location: [facility] ENDOSCOPY;  Service: Gastroenterology;  Laterality: N/A;  colon polyp, diverticlosis, hemorrhoids    EYE SURGERY Right     scar tissue removal    HYSTERECTOMY      ULNAR NERVE TRANSPOSITION Right     WRIST SURGERY          Current Outpatient Medications on File Prior to Visit   Medication Sig Dispense Refill    Accu-Chek Madeline Plus test strip by Other route 4 (Four) Times a Day. use to test blood sugar      Accu-Chek Softclix Lancets lancets by Other route 4 (Four) Times a Day. use to test blood sugar      alendronate (FOSAMAX) 70 MG tablet Take 1 tablet by mouth Every 7 (Seven) Days.      ARIPiprazole (Abilify) 5 MG tablet Take 1 tablet by mouth Daily. 90 tablet 1    Ascorbic Acid (VITAMIN C GUMMIE PO) Take  by mouth Daily.      aspirin (aspirin) 81 MG EC tablet Take 1 tablet by mouth Daily. 90 tablet 99    Blood Glucose Monitoring Suppl (FreeStyle Lite) device 1 each by Other route 4 (Four) Times a Day.      buPROPion XL (WELLBUTRIN XL) 300 MG 24 hr tablet Take 1 tablet by mouth Every Morning. 90 tablet 3    cetirizine (zyrTEC) 10 MG tablet TAKE 1 TABLET BY MOUTH EVERY DAY 90 tablet 1    Cholecalciferol 25 MCG (1000 UT) tablet dispersible Take  by mouth Daily.      coenzyme Q10 50 MG capsule capsule Take  by mouth Daily.      cyclobenzaprine (FLEXERIL) 10 MG tablet Take 1 tablet by mouth Daily As Needed for Muscle Spasms. 90 tablet 1    Daily-Jelani Multivitamin tablet tablet Take 1 tablet by mouth every night at bedtime.      doxepin (SINEquan) 25 MG capsule Take 1 capsule by mouth Every Night. 90 capsule 3    ezetimibe (ZETIA) 10 MG tablet TAKE 1 TABLET BY MOUTH EVERY NIGHT AT BEDTIME 90 tablet 1    FLUoxetine (PROzac) 10 MG capsule Take 1 capsule by mouth Daily. 90 capsule 3    FLUoxetine (PROzac) 20 MG capsule Take 1 capsule by mouth Daily. 90 capsule 3    fluticasone (Flonase) 50  "MCG/ACT nasal spray 2 sprays into the nostril(s) as directed by provider Daily. Administer 2 sprays in each nostril for each dose. 16 g 6    Inclisiran Sodium (LEQVIO SC) Inject  under the skin into the appropriate area as directed.      Januvia 100 MG tablet TAKE 1 TABLET BY MOUTH DAILY 90 tablet 1    losartan (COZAAR) 25 MG tablet TAKE 1 TABLET BY MOUTH DAILY 90 tablet 1    metFORMIN (GLUCOPHAGE) 500 MG tablet TAKE 1 TABLET BY MOUTH EVERY MORNING AND 2 TABLETS BY MOUTH EVERY EVENING WITH MEALS 270 tablet 1    montelukast (SINGULAIR) 10 MG tablet TAKE 1 TABLET BY MOUTH EVERY NIGHT 90 tablet 1    Pramipexole Dihydrochloride ER (Mirapex ER) 1.5 MG tablet sustained-release 24 hour Take 1.5 mg by mouth Every Night. 30 tablet 5    prednisoLONE acetate (PRED FORTE) 1 % ophthalmic suspension SHAKE LIQUID AND INSTILL 1 DROP IN RIGHT EYE TWICE DAILY      Zinc 100 MG tablet Take 100 mg by mouth Daily.       No current facility-administered medications on file prior to visit.        Allergies   Allergen Reactions    Diclofenac Hives    New Skin [Benzethonium Chloride] Rash    Atorvastatin Myalgia    Adhesive Tape Rash and Other (See Comments)       Rash at area of bandaid    Niacin Rash       Social History     Tobacco Use   Smoking Status Former    Current packs/day: 0.00    Average packs/day: 0.3 packs/day for 13.0 years (3.3 ttl pk-yrs)    Types: Cigarettes    Start date:     Quit date:     Years since quittin.8   Smokeless Tobacco Never   Tobacco Comments    QUIT           Objective   Vital Signs:   /70 (BP Location: Left arm, Patient Position: Sitting, Cuff Size: Adult)   Pulse 98   Temp 98.9 °F (37.2 °C) (Temporal)   Resp 16   Ht 160 cm (63\")   Wt 81.4 kg (179 lb 6.4 oz)   SpO2 98%   BMI 31.78 kg/m²     Physical Exam  Vitals reviewed.   Constitutional:       Appearance: Normal appearance.   HENT:      Head: Normocephalic and atraumatic.      Nose: Nose normal.      Mouth/Throat:      Mouth: " Mucous membranes are moist.   Eyes:      Extraocular Movements: Extraocular movements intact.      Conjunctiva/sclera: Conjunctivae normal.      Pupils: Pupils are equal, round, and reactive to light.   Cardiovascular:      Rate and Rhythm: Normal rate and regular rhythm.   Pulmonary:      Effort: Pulmonary effort is normal.      Breath sounds: Normal breath sounds.   Abdominal:      General: Abdomen is flat. Bowel sounds are normal.      Palpations: Abdomen is soft.   Musculoskeletal:         General: Normal range of motion.   Neurological:      General: No focal deficit present.      Mental Status: She is alert and oriented to person, place, and time.   Psychiatric:         Mood and Affect: Mood normal.        Result Review :   The following data was reviewed by: Catalina Madsen PA-C on 11/14/2024:    Data reviewed : Radiologic studies cxr             Assessment and Plan    Diagnoses and all orders for this visit:    1. Left hip pain (Primary)  Comments:  reviewed xray, will refer to ortho and order mri to eval  Orders:  -     Ambulatory Referral to Orthopedic Surgery  -     MRI Hip Left Without Contrast; Future    2. Dysphagia, unspecified type  Comments:  Discussed ddx. Will trial H2 blocker and get swallow study. Pt will let us know if sx worsen/change.  Orders:  -     FL Video Swallow With Speech Single Contrast; Future    3. Type 2 diabetes mellitus with hyperglycemia, without long-term current use of insulin    4. Essential hypertension    Other orders  -     famotidine (Pepcid) 20 MG tablet; Take 1 tablet by mouth 2 (Two) Times a Day.  Dispense: 60 tablet; Refill: 1        Follow Up   Return in about 23 days (around 12/7/2024) for Medicare Wellness.  Patient was given instructions and counseling regarding her condition or for health maintenance advice. Please see specific information pulled into the AVS if appropriate.

## 2024-11-15 ENCOUNTER — HOSPITAL ENCOUNTER (OUTPATIENT)
Dept: MRI IMAGING | Facility: HOSPITAL | Age: 74
Discharge: HOME | End: 2024-11-15
Payer: MEDICARE

## 2024-11-15 ENCOUNTER — HOSPITAL ENCOUNTER (OUTPATIENT)
Dept: GENERAL RADIOLOGY | Facility: HOSPITAL | Age: 74
Discharge: HOME | End: 2024-11-15
Payer: MEDICARE

## 2024-11-15 DIAGNOSIS — F41.1 GENERALIZED ANXIETY DISORDER: ICD-10-CM

## 2024-11-15 DIAGNOSIS — R13.10 DYSPHAGIA, UNSPECIFIED TYPE: ICD-10-CM

## 2024-11-15 DIAGNOSIS — M25.552 LEFT HIP PAIN: ICD-10-CM

## 2024-11-15 PROCEDURE — 63710000001 BARIUM SULFATE 40 % SUSPENSION: Performed by: PHYSICIAN ASSISTANT

## 2024-11-15 PROCEDURE — 63710000001 BARIUM SULFATE 40 % RECONSTITUTED SUSPENSION: Performed by: PHYSICIAN ASSISTANT

## 2024-11-15 PROCEDURE — A9270 NON-COVERED ITEM OR SERVICE: HCPCS | Performed by: PHYSICIAN ASSISTANT

## 2024-11-15 PROCEDURE — 73721 MRI JNT OF LWR EXTRE W/O DYE: CPT

## 2024-11-15 PROCEDURE — 92611 MOTION FLUOROSCOPY/SWALLOW: CPT

## 2024-11-15 PROCEDURE — 74230 X-RAY XM SWLNG FUNCJ C+: CPT

## 2024-11-15 PROCEDURE — 63710000001 BARIUM SULFATE 40 % PASTE: Performed by: PHYSICIAN ASSISTANT

## 2024-11-15 RX ORDER — BUPROPION HYDROCHLORIDE 300 MG/1
300 TABLET ORAL EVERY MORNING
Qty: 90 TABLET | Refills: 3 | Status: SHIPPED | OUTPATIENT
Start: 2024-11-15

## 2024-11-15 RX ADMIN — BARIUM SULFATE 50 ML: 400 SUSPENSION ORAL at 13:24

## 2024-11-15 RX ADMIN — BARIUM SULFATE 20 ML: 400 PASTE ORAL at 13:24

## 2024-11-15 RX ADMIN — BARIUM SULFATE 60 ML: 0.81 POWDER, FOR SUSPENSION ORAL at 13:24

## 2024-11-15 NOTE — MBS/VFSS/FEES
Outpatient  - Speech Language Pathology   Swallow  Modified Barium Swallow Study  RICHAR Austin     Patient Name: Nivia Cagle  : 1950  MRN: [id number]  Today's Date: 11/15/2024               Admit Date: 11/15/2024    Visit Dx:     ICD-10-CM ICD-9-CM   1. Dysphagia, unspecified type  R13.10 787.20     Patient Active Problem List   Diagnosis    Muscle twitching    Restless legs syndrome (RLS)    Allergic rhinitis    Anemia    Bilateral posterior capsular opacification    Cardiac murmur    Diverticulitis    Endometriosis    Gastroesophageal reflux disease    Essential hypertension    Low back pain    Migraines    Scoliosis deformity of spine    Major depressive disorder, recurrent episode, moderate degree    Generalized anxiety disorder    Type 2 diabetes mellitus    Hyperlipidemia LDL goal <70    EARL (obstructive sleep apnea)    Tremor    Bilateral pseudophakia    Dislocated intraocular lens    Traumatic injury of globe of right eye    Unspecified retinal detachment with retinal break, right eye    Osteoarthritis    Attention-deficit hyperactivity disorder, unspecified type    Epiretinal membrane (ERM) of right eye    Traction retinal detachment involving macula    Cystoid macular degeneration of right eye    Vitamin deficiency, unspecified    Dvtrcli of intest, part unsp, w/o perf or abscess w/o bleed    History of falling    Long term current use of insulin    Generalized muscle weakness    Statin intolerance    Diabetes mellitus type 2 without retinopathy    Dry eyes    Secondary cataract    After-cataract with vision obscured    Obesity (BMI 30-39.9)     Past Medical History:   Diagnosis Date    ADHD (attention deficit hyperactivity disorder)     Allergic rhinitis     Anemia     Anxiety     Cataracts, bilateral     Chronic pain disorder     Depression     MOODNOT WELL CONTROLLED.  GIVEN NUMBER FOR LOCAL COUNSELOR.  WILL INCREASE TRINTELIX FROM 10MG TO 20MG RTC WEEK ER ID S/HI    Diabetes  mellitus, type 2     Diverticulitis     GERD (gastroesophageal reflux disease)     Head injury     High blood pressure     Hyperlipemia     Insomnia     Migraine     EARL (obstructive sleep apnea) 04/21/2021    Panic disorder     Phlebitis     PTSD (post-traumatic stress disorder)     RLS (restless legs syndrome)      Past Surgical History:   Procedure Laterality Date    ANKLE SURGERY  2021    BILATERAL BREAST REDUCTION  2015    BREAST BIOPSY      CARPAL TUNNEL RELEASE      CHOLECYSTECTOMY  2001    COLONOSCOPY      [facility]    COLONOSCOPY N/A 09/28/2022    Procedure: COLONOSCOPY with biopsy;  Surgeon: Ghislaine Kilgore MD;  Location: [facility] ENDOSCOPY;  Service: Gastroenterology;  Laterality: N/A;  colon polyp, diverticlosis, hemorrhoids    EYE SURGERY Right     scar tissue removal    HYSTERECTOMY      ULNAR NERVE TRANSPOSITION Right     WRIST SURGERY         MODIFIED BARIUM SWALLOW STUDY: SPEECH PATHOLOGY REPORT      DATE OF SERVICE: 11/15/2024    PERTINENT INFORMATION:  Ms. Cagle is a 74year old female with diagnosis of dysphagia.  Patient states complaint of foods catching with her indicating mid chest, she further complains of difficulty swallowing thin liquids.    She was referred for an MBSS by Dr. Madsen to rule out aspiration as well as to determine appropriate treatment plan for this patient.      PROCEDURE:    Ms. Cagle was alert and cooperative.  The patient was viewed in the lateral plane.  The following Ba consistencies were administered: Thin liquids, nectar thick liquids, barium mixed with applesauce, barium paste, barium mixed with cracker. The following compensatory swallowing strategies were performed: Bolus modification, cyclic ingestion, chin tuck.    RESULTS:    1. Nectar liquid by spoon with maximum spillage to the vallecula, swallow completed.  2. Nectar liquid by cup with maximum spillage to the vallecula, swallow completed.  3. Thin liquid with  Spillage to the vallecula and  into the laryngeal vestibule, swallow completed small amount of aspiration occurring with no immediate cough.  4. Purée with pumping, maximum spillage to the vallecula, swallow completed.  5. Pudding with  Spillage to the vallecula, swallow completed.  6. Solid results with chewing followed by swallow completed.  7. Thin liquid by cup with chin tuck, maximum spillage to the vallecula and into the laryngeal vestibule, swallow completed with small amount of aspiration, cough noted.  Second trial of thin liquid by cup with patient taking very small sip, maximum spillage to the vallecula, swallow completed with trace laryngeal penetration.  8.  Nectar liquid by cup with maximum spillage to the vallecula, swallow completed.  Throughout study irregular bolus flow was noted through the cricopharyngeal area this did not appear to impede the bolus.      IMPRESSIONS:    Ms. Cagle demonstrated oropharyngeal dysphagia characterized by swallow delay.  Aspiration was noted with thin liquids.  Chin tuck did not appear effective to decrease risk.  Irregular bolus flow noted through the cricopharyngeal area.  Please see radiologist report.    FUNCTIONAL DEFICIT: Patient scored level 5 of 7 on Functional Communication Measures for swallowing indicating a 20-39% limitation in function for current status, goal status, and discharge status.      RECOMMENDATIONS:   1.  Diet of regular solids cut small with additional moisture, nectar thickened liquids.  2.  Positioning fully upright for all p.o. intake and 30 minutes following.  3.  Alternate small bites and small single sips of liquid.  Small single sips of liquid.  Patient may benefit from supraglottic technique.  4.  Speech pathology consult to address dysphagia.        Yes, patient/responsible party agrees with the plan of care and has been informed of all alternatives, risks and benefits.    Thank you for this referral.                                                                                             EDUCATION  The patient has been educated in the following areas:   Modified Diet Instruction.                Time Calculation:    Time Calculation- SLP       Row Name 11/15/24 1406             Time Calculation- SLP    SLP Received On 11/15/24  -TB         Untimed Charges    SLP Eval/Re-eval  ST Motion Fluoro Eval Swallow - 81349  -TB      06977-FI Motion Fluoro Eval Swallow Minutes 90  -TB         Total Minutes    Untimed Charges Total Minutes 90  -TB       Total Minutes 90  -TB                User Key  (r) = Recorded By, (t) = Taken By, (c) = Cosigned By      Initials Name Provider Type    TB Violette Ball SLP Speech and Language Pathologist                    Therapy Charges for Today       Code Description Service Date Service Provider Modifiers Qty    91549908028 HC ST MOTION FLUORO EVAL SWALLOW 6 11/15/2024 Violette Ball SLP GN 1                 LEON Zambrano  11/15/2024

## 2024-11-18 DIAGNOSIS — K22.9 IRREGULAR ESOPHAGO-GASTRIC MUCOSAL JUNCTION: ICD-10-CM

## 2024-11-18 DIAGNOSIS — T17.908A ASPIRATION INTO AIRWAY, INITIAL ENCOUNTER: Primary | ICD-10-CM

## 2024-11-18 DIAGNOSIS — R13.10 DYSPHAGIA, UNSPECIFIED TYPE: ICD-10-CM

## 2024-11-19 ENCOUNTER — TRANSCRIBE ORDERS (OUTPATIENT)
Dept: ADMINISTRATIVE | Facility: HOSPITAL | Age: 74
End: 2024-11-19
Payer: MEDICARE

## 2024-11-19 DIAGNOSIS — Z12.31 SCREENING MAMMOGRAM FOR BREAST CANCER: Primary | ICD-10-CM

## 2024-11-20 ENCOUNTER — TELEPHONE (OUTPATIENT)
Dept: INTERNAL MEDICINE | Facility: CLINIC | Age: 74
End: 2024-11-20
Payer: MEDICARE

## 2024-11-20 NOTE — TELEPHONE ENCOUNTER
LM for patient to return call to discuss results.    ----- Message from Catalina Madsen sent at 11/18/2024  3:06 PM EST -----  Study shows aspiration of thin liquids, meaning when you swallow liquids it goes into your lungs.  I have put in a referral to see the speech therapy to help with manage swallowing issues.  Speech recommends diet of regular solids cut into small pieces with additional moisture and nectar thickened liquids.  Remain fully upright after eating for 30 minutes minimum.  Alternate small bite with single sips of liquid.  Also shows mucosal irregularity, I have put in a referral to the gastroenterologist to discuss an EGD to evaluate this further.  They will call you to schedule this appointment.

## 2024-11-21 ENCOUNTER — OFFICE VISIT (OUTPATIENT)
Dept: GASTROENTEROLOGY | Facility: CLINIC | Age: 74
End: 2024-11-21
Payer: MEDICARE

## 2024-11-21 VITALS
HEART RATE: 98 BPM | DIASTOLIC BLOOD PRESSURE: 62 MMHG | HEIGHT: 63 IN | SYSTOLIC BLOOD PRESSURE: 132 MMHG | BODY MASS INDEX: 31.89 KG/M2 | WEIGHT: 180 LBS

## 2024-11-21 DIAGNOSIS — R13.10 DYSPHAGIA, UNSPECIFIED TYPE: ICD-10-CM

## 2024-11-21 DIAGNOSIS — R11.2 NAUSEA AND VOMITING, UNSPECIFIED VOMITING TYPE: ICD-10-CM

## 2024-11-21 DIAGNOSIS — K21.9 GASTROESOPHAGEAL REFLUX DISEASE, UNSPECIFIED WHETHER ESOPHAGITIS PRESENT: Primary | ICD-10-CM

## 2024-11-21 DIAGNOSIS — R93.3 ABNORMAL ESOPHAGRAM: ICD-10-CM

## 2024-11-21 DIAGNOSIS — R10.10 PAIN OF UPPER ABDOMEN: ICD-10-CM

## 2024-11-21 PROCEDURE — 1160F RVW MEDS BY RX/DR IN RCRD: CPT | Performed by: NURSE PRACTITIONER

## 2024-11-21 PROCEDURE — 3075F SYST BP GE 130 - 139MM HG: CPT | Performed by: NURSE PRACTITIONER

## 2024-11-21 PROCEDURE — 99214 OFFICE O/P EST MOD 30 MIN: CPT | Performed by: NURSE PRACTITIONER

## 2024-11-21 PROCEDURE — 3078F DIAST BP <80 MM HG: CPT | Performed by: NURSE PRACTITIONER

## 2024-11-21 PROCEDURE — 1159F MED LIST DOCD IN RCRD: CPT | Performed by: NURSE PRACTITIONER

## 2024-11-21 RX ORDER — AMANTADINE HYDROCHLORIDE 100 MG/1
TABLET ORAL
COMMUNITY
Start: 2024-11-14

## 2024-11-21 NOTE — PROGRESS NOTES
Chief Complaint   Difficulty Swallowing    History of Present Illness       Nivia Cagle is a 74 y.o. female who presents to [facility] for follow-up trouble swallowing.  She is new to me today.    She does have hx GERD===she has trouble swallowing worse with breads and pasta. Bowels are irregular. She just started pepcid 20 mg BID yesterday.     Last colonoscopy was with Dr. Kilgore on 9/28/2022 for colon cancer screening.  Colonoscopy showed mild diverticulosis in the right colon.  Moderate diverticulosis in the left colon.  Nonbleeding internal and external hemorrhoids.  4 mm transverse colon polyp was removed, 3 mm sigmoid colon polyp was removed.  Path positive for tubular adenoma.  Repeat colonoscopy in 5 years.    She had a swallow study done on 11/15/2024 for trouble swallowing.  It showed take tracheal aspiration of thin liquid barium.  Prominent cricopharyngeus muscle.  Anterior mucosal irregularity at the C4-C6 cervical level of unknown significance.  Degenerative changes in the cervical spine.    She had previous EGD done in [place] more than 5 years ago.     GI FH---Sister with melanoma. Mother with kidney cancer.   Results       Result Review :       CMP          7/23/2024    12:49 10/23/2024    10:12 11/8/2024    10:29   CMP   Glucose 87  180     BUN 14  15     Creatinine 1.16  0.99  0.99    EGFR 50.5  60.7  60.7    Sodium 139  141     Potassium 5.0  4.2     Chloride 101  102     Calcium 9.8  9.1  9.3    Total Protein 7.3  7.1     Albumin 4.5  4.1     Globulin 2.8  3.0     Total Bilirubin 0.4  0.6     Alkaline Phosphatase 81  71     AST (SGOT) 24  23     ALT (SGPT) 23  33     Albumin/Globulin Ratio 1.6  1.4     BUN/Creatinine Ratio 12.1  15.2     Anion Gap 10.0  14.5       CBC          3/14/2024    08:34 7/23/2024    12:49 10/23/2024    10:12   CBC   WBC 8.25  7.18  6.85    RBC 4.21  4.38  4.39    Hemoglobin 12.9  13.1  13.6    Hematocrit 38.5  39.7  40.3   "  MCV 91.4  90.6  91.8    MCH 30.6  29.9  31.0    MCHC 33.5  33.0  33.7    RDW 12.6  12.7  12.4    Platelets 239  263  228      CBC w/diff          3/14/2024    08:34 7/23/2024    12:49 10/23/2024    10:12   CBC w/Diff   WBC 8.25  7.18  6.85    RBC 4.21  4.38  4.39    Hemoglobin 12.9  13.1  13.6    Hematocrit 38.5  39.7  40.3    MCV 91.4  90.6  91.8    MCH 30.6  29.9  31.0    MCHC 33.5  33.0  33.7    RDW 12.6  12.7  12.4    Platelets 239  263  228    Neutrophil Rel % 71.9  65.3  65.9    Immature Granulocyte Rel % 0.4  0.1  0.3    Lymphocyte Rel % 15.6  20.5  20.1    Monocyte Rel % 9.9  11.0  9.9    Eosinophil Rel % 1.8  2.4  3.4    Basophil Rel % 0.4  0.7  0.4      Lipid Panel          3/14/2024    08:34 7/23/2024    12:49 10/23/2024    10:12   Lipid Panel   Total Cholesterol 139  168  181    Triglycerides 117  167  242    HDL Cholesterol 60  56  49    VLDL Cholesterol 21  28  41    LDL Cholesterol  58  84  91    LDL/HDL Ratio 0.93  1.40  1.71      TSH          3/14/2024    08:34 7/23/2024    12:49 10/23/2024    10:12   TSH   TSH 2.110  2.970  1.130        Lipase No results found for: \"LIPASE\"  Amylase No results found for: \"AMYLASE\"  Iron Profile No results found for: \"IRON\", \"TIBC\", \"LABIRON\", \"TRANSFERRIN\"  Ferritin No results found for: \"FERRITIN\"            Past Medical History       Past Medical History:   Diagnosis Date    ADHD (attention deficit hyperactivity disorder)     Allergic rhinitis     Anemia     Anxiety     Cataracts, bilateral     Chronic pain disorder     Depression 0421/2021    MOODNOT WELL CONTROLLED.  GIVEN NUMBER FOR LOCAL COUNSELOR.  WILL INCREASE TRINTELIX FROM 10MG TO 20MG RTC WEEK ER ID S/HI    Diabetes mellitus, type 2     Diverticulitis     GERD (gastroesophageal reflux disease)     Head injury     High blood pressure     Hyperlipemia     Insomnia     Migraine     EARL (obstructive sleep apnea) 04/21/2021    Panic disorder     Phlebitis     PTSD (post-traumatic stress disorder)     RLS " (restless legs syndrome)        Past Surgical History:   Procedure Laterality Date    ANKLE SURGERY  2021    BILATERAL BREAST REDUCTION  2015    BREAST BIOPSY      CARPAL TUNNEL RELEASE      CHOLECYSTECTOMY  2001    COLONOSCOPY      [place]    COLONOSCOPY N/A 09/28/2022    Procedure: COLONOSCOPY with biopsy;  Surgeon: Ghislaine Kilgore MD;  Location: [facility] ENDOSCOPY;  Service: Gastroenterology;  Laterality: N/A;  colon polyp, diverticlosis, hemorrhoids    EYE SURGERY Right     scar tissue removal    HYSTERECTOMY      ULNAR NERVE TRANSPOSITION Right     WRIST SURGERY           Current Outpatient Medications:     Accu-Chek Madeline Plus test strip, by Other route 4 (Four) Times a Day. use to test blood sugar, Disp: , Rfl:     Accu-Chek Softclix Lancets lancets, by Other route 4 (Four) Times a Day. use to test blood sugar, Disp: , Rfl:     alendronate (FOSAMAX) 70 MG tablet, Take 1 tablet by mouth Every 7 (Seven) Days., Disp: , Rfl:     amantadine (SYMMETREL) 100 MG tablet, , Disp: , Rfl:     ARIPiprazole (Abilify) 5 MG tablet, Take 1 tablet by mouth Daily., Disp: 90 tablet, Rfl: 1    Ascorbic Acid (VITAMIN C GUMMIE PO), Take  by mouth Daily., Disp: , Rfl:     aspirin (aspirin) 81 MG EC tablet, Take 1 tablet by mouth Daily., Disp: 90 tablet, Rfl: 99    Blood Glucose Monitoring Suppl (FreeStyle Lite) device, 1 each by Other route 4 (Four) Times a Day., Disp: , Rfl:     buPROPion XL (WELLBUTRIN XL) 300 MG 24 hr tablet, TAKE 1 TABLET BY MOUTH EVERY MORNING, Disp: 90 tablet, Rfl: 3    cetirizine (zyrTEC) 10 MG tablet, TAKE 1 TABLET BY MOUTH EVERY DAY, Disp: 90 tablet, Rfl: 1    Cholecalciferol 25 MCG (1000 UT) tablet dispersible, Take  by mouth 2 (Two) Times a Day., Disp: , Rfl:     coenzyme Q10 50 MG capsule capsule, Take  by mouth Daily., Disp: , Rfl:     cyclobenzaprine (FLEXERIL) 10 MG tablet, Take 1 tablet by mouth Daily As Needed for Muscle Spasms., Disp: 90 tablet, Rfl: 1    Daily-Jelani Multivitamin tablet  tablet, Take 1 tablet by mouth every night at bedtime., Disp: , Rfl:     doxepin (SINEquan) 25 MG capsule, Take 1 capsule by mouth Every Night., Disp: 90 capsule, Rfl: 3    ezetimibe (ZETIA) 10 MG tablet, TAKE 1 TABLET BY MOUTH EVERY NIGHT AT BEDTIME, Disp: 90 tablet, Rfl: 1    famotidine (Pepcid) 20 MG tablet, Take 1 tablet by mouth 2 (Two) Times a Day., Disp: 60 tablet, Rfl: 1    FLUoxetine (PROzac) 10 MG capsule, Take 1 capsule by mouth Daily., Disp: 90 capsule, Rfl: 3    FLUoxetine (PROzac) 20 MG capsule, Take 1 capsule by mouth Daily., Disp: 90 capsule, Rfl: 3    fluticasone (Flonase) 50 MCG/ACT nasal spray, 2 sprays into the nostril(s) as directed by provider Daily. Administer 2 sprays in each nostril for each dose., Disp: 16 g, Rfl: 6    Inclisiran Sodium (LEQVIO SC), Inject  under the skin into the appropriate area as directed., Disp: , Rfl:     Januvia 100 MG tablet, TAKE 1 TABLET BY MOUTH DAILY, Disp: 90 tablet, Rfl: 1    losartan (COZAAR) 25 MG tablet, TAKE 1 TABLET BY MOUTH DAILY, Disp: 90 tablet, Rfl: 1    metFORMIN (GLUCOPHAGE) 500 MG tablet, TAKE 1 TABLET BY MOUTH EVERY MORNING AND 2 TABLETS BY MOUTH EVERY EVENING WITH MEALS, Disp: 270 tablet, Rfl: 1    montelukast (SINGULAIR) 10 MG tablet, TAKE 1 TABLET BY MOUTH EVERY NIGHT, Disp: 90 tablet, Rfl: 1    Pramipexole Dihydrochloride ER (Mirapex ER) 1.5 MG tablet sustained-release 24 hour, Take 1.5 mg by mouth Every Night., Disp: 30 tablet, Rfl: 5    prednisoLONE acetate (PRED FORTE) 1 % ophthalmic suspension, SHAKE LIQUID AND INSTILL 1 DROP IN RIGHT EYE TWICE DAILY, Disp: , Rfl:     Zinc 100 MG tablet, Take 100 mg by mouth Daily., Disp: , Rfl:      Allergies   Allergen Reactions    Diclofenac Hives    New Skin [Benzethonium Chloride] Rash    Atorvastatin Myalgia    Adhesive Tape Rash and Other (See Comments)       Rash at area of bandaid    Niacin Rash       Family History   Problem Relation Age of Onset    Kidney cancer Mother     Hyperlipidemia Mother   "   Heart disease Father     Heart attack Father     Diabetes Father     ADD / ADHD Father     Hyperlipidemia Father     Sleep apnea Father     Restless legs syndrome Father     Hyperlipidemia Sister     Cancer Sister     Brain cancer Sister     Lung cancer Sister     Hyperlipidemia Sister     Hyperlipidemia Brother     Diabetes Brother     Heart attack Brother     Hyperlipidemia Brother     No Known Problems Paternal Uncle     No Known Problems Cousin     Brenda Hyperthermia Neg Hx         Social History     Social History Narrative    Not on file       Objective       Review of Systems   Constitutional:  Negative for appetite change, fever, unexpected weight gain and unexpected weight loss.   HENT:  Positive for trouble swallowing.    Respiratory:  Positive for choking. Negative for cough, chest tightness, shortness of breath, wheezing and stridor.    Cardiovascular:  Negative for chest pain, palpitations and leg swelling.   Gastrointestinal:  Positive for abdominal distention, abdominal pain, nausea, vomiting, GERD and indigestion. Negative for anal bleeding, blood in stool, constipation, diarrhea and rectal pain.        Vital Signs:   /62 (BP Location: Left arm, Patient Position: Sitting, Cuff Size: Adult)   Pulse 98   Ht 160 cm (63\")   Wt 81.6 kg (180 lb)   BMI 31.89 kg/m²       Physical Exam  Constitutional:       General: She is not in acute distress.     Appearance: She is well-developed. She is not ill-appearing.   HENT:      Head: Normocephalic.   Eyes:      Pupils: Pupils are equal, round, and reactive to light.   Cardiovascular:      Rate and Rhythm: Normal rate and regular rhythm.      Heart sounds: Normal heart sounds.   Pulmonary:      Effort: Pulmonary effort is normal.      Breath sounds: Normal breath sounds.   Abdominal:      General: Bowel sounds are normal. There is no distension.      Palpations: Abdomen is soft. There is no mass.      Tenderness: There is no abdominal tenderness. There " is no guarding or rebound.      Hernia: No hernia is present.   Musculoskeletal:         General: Normal range of motion.   Skin:     General: Skin is warm and dry.   Neurological:      Mental Status: She is alert and oriented to person, place, and time.   Psychiatric:         Speech: Speech normal.         Behavior: Behavior normal.         Judgment: Judgment normal.           Assessment & Plan          Assessment and Plan    Diagnoses and all orders for this visit:    1. Gastroesophageal reflux disease, unspecified whether esophagitis present (Primary)  -     Case Request; Standing  -     Follow Anesthesia Guidelines / Protocol; Future  -     Case Request    2. Dysphagia, unspecified type  -     Case Request; Standing  -     Follow Anesthesia Guidelines / Protocol; Future  -     Case Request    3. Pain of upper abdomen  -     Case Request; Standing  -     Follow Anesthesia Guidelines / Protocol; Future  -     Case Request    4. Nausea and vomiting, unspecified vomiting type  -     Case Request; Standing  -     Follow Anesthesia Guidelines / Protocol; Future  -     Case Request    5. Abnormal esophagram  -     Case Request; Standing  -     Follow Anesthesia Guidelines / Protocol; Future  -     Case Request    Other orders  -     Verify NPO; Standing    Reviewed medical history with her today.  Given her history and current symptoms recommend EGD with Dr. Kilgore for further evaluation.  Patient is agreeable to the scope.  No clearances, no blood thinners.  Continue Pepcid 20 mg twice daily since she just started it yesterday.  Continue GERD precautions.  Patient to call the office with any issues.  Patient to follow-up with me after her scope.  Patient is agreeable to the plan.    The risk of the endoscopy were discussed in detail. Possible risks/complications, benefits, and alternatives to surgical or invasive procedure have been explained to patient and/or legal guardian; risks include bleeding, infection, and  perforation. Patient has been evaluated and can tolerate anesthesia and/or sedation.             Follow Up       Follow Up   Return for F/U AFTER PROCEDURE.  Patient was given instructions and counseling regarding her condition or for health maintenance advice. Please see specific information pulled into the AVS if appropriate.

## 2024-11-22 ENCOUNTER — TELEPHONE (OUTPATIENT)
Dept: GASTROENTEROLOGY | Facility: CLINIC | Age: 74
End: 2024-11-22
Payer: MEDICARE

## 2024-11-26 NOTE — TELEPHONE ENCOUNTER
Hub staff attempted to follow warm transfer process and was unsuccessful     Caller: JORGE VILLARREAL    Relationship to patient: SELF    Best call back number: [phone]     Patient is needing: MS. VILLARREAL WOULD LIKE SOMEONE TO CONTACT HER TO SCHEDULE EGD. PLEASE REVIEW AND ADVISE.    OK TO CALL BACK ANYTIME. OK TO LEAVE .           
Called and spoke with patient and she was scheduled for her EGD on 1/23/2025 with a 7am arrival time. Patient verbalized understanding and confirmed having a prep card.   
BURN

## 2024-12-04 ENCOUNTER — OFFICE VISIT (OUTPATIENT)
Dept: ORTHOPEDIC SURGERY | Facility: CLINIC | Age: 74
End: 2024-12-04
Payer: MEDICARE

## 2024-12-04 VITALS
BODY MASS INDEX: 31.89 KG/M2 | SYSTOLIC BLOOD PRESSURE: 128 MMHG | HEIGHT: 63 IN | OXYGEN SATURATION: 94 % | HEART RATE: 98 BPM | DIASTOLIC BLOOD PRESSURE: 81 MMHG | WEIGHT: 180 LBS

## 2024-12-04 DIAGNOSIS — M25.552 LEFT HIP PAIN: Primary | ICD-10-CM

## 2024-12-04 DIAGNOSIS — M70.62 TROCHANTERIC BURSITIS OF LEFT HIP: ICD-10-CM

## 2024-12-04 NOTE — PROGRESS NOTES
"Chief Complaint  Initial Evaluation of the Left Hip     Subjective      Nivia Cagle presents to [facility] for initial evalution of the left hip. She saw [facility] and was referred here.  She had a MRI and has pain on the lateral aspect of the hip.  She denies any pain in the groin.      Allergies   Allergen Reactions    Diclofenac Hives    New Skin [Benzethonium Chloride] Rash    Atorvastatin Myalgia    Adhesive Tape Rash and Other (See Comments)       Rash at area of bandaid    Niacin Rash        Social History     Socioeconomic History    Marital status: Single   Tobacco Use    Smoking status: Former     Current packs/day: 0.00     Average packs/day: 0.3 packs/day for 13.0 years (3.3 ttl pk-yrs)     Types: Cigarettes     Start date:      Quit date:      Years since quittin.9    Smokeless tobacco: Never    Tobacco comments:     QUIT    Vaping Use    Vaping status: Never Used   Substance and Sexual Activity    Alcohol use: Not Currently     Comment: rarely    Drug use: Never    Sexual activity: Defer        I reviewed the patient's chief complaint, history of present illness, review of systems, past medical history, surgical history, family history, social history, medications, and allergy list.     Review of Systems     Constitutional: Denies fevers, chills, weight loss  Cardiovascular: Denies chest pain, shortness of breath  Skin: Denies rashes, acute skin changes  Neurologic: Denies headache, loss of consciousness      Vital Signs:   /81   Pulse 98   Ht 160 cm (62.99\")   Wt 81.6 kg (180 lb)   SpO2 94%   BMI 31.89 kg/m²          Physical Exam  General: Alert. No acute distress    Ortho Exam        LEFT HIP Tender over the greater trochanter. No skin discoloration, atrophy or swelling. Positive EHL, FHL, GS, and TA. Sensation intact to all 5 nerves of the foot. Negative straight leg raise. Leg lengths appear equal. Ambulates with " Non-antalgic gait Negative Alia. Negative Jessica. Good strength to hamstrings, quadriceps, dorsiflexors, and plantar flexors. Knee Extensor Mechanism  intact        Procedures        Imaging Results (Most Recent)       None             Result Review :         F  MRI Hip Left Without Contrast    Result Date: 11/15/2024  Narrative: MRI HIP LEFT WO CONTRAST Date of Exam: 11/15/2024 12:15 PM EST Indication: hip pain, fall.  Comparison: None available. Technique:  Routine multiplanar/multisequence images of the left hip were obtained without contrast administration.  Findings: No fracture or malalignment is straightened. Marrow signal appears normal. Dedicated images of the left hip were obtained. No labral tear is seen. No joint effusion or loose body is seen. Cartilage in the hip joint appears normal. Dedicated images of the right hip were not obtained. No hip joint or loose body is seen. Dedicated images of the right hip were not obtained. No hip joint effusion or loose body is seen. On the right, subgluteal bursitis is noted. There is mild gluteus minimus tendinopathy. There is trochanteric bursitis on the left. Other visualized tendons and musculature around the pelvis appear unremarkable.     Impression: Impression: 1.Trochanteric bursitis on the left. 2.Subgluteal bursitis on the right. 3.Mild right gluteus minimus tendinopathy. Electronically Signed: Alexei Mora MD  11/15/2024 1:41 PM EST  Workstation ID: [id number]    XR Femur Ap & Lat LT    Result Date: 2024  Narrative: REVIEWING YOUR TEST RESULTS IN [facility] IS NOT A SUBSTITUTE FOR DISCUSSING THOSE RESULTS WITH YOUR HEALTH CARE PROVIDER. PLEASE CONTACT YOUR PROVIDER [facility] TO DISCUSS ANY QUESTIONS OR CONCERNS YOU MAY HAVE REGARDING THESE TEST RESULTS.  RADIOLOGY REPORT FACILITY:  [facility] SERVICES UNIT/AGE/GENDER: P.OSE2  OP      AGE:74 Y          SEX:F PATIENT NAME/:  JORGE VILLARREAL    1950 UNIT NUMBER:  [id number]  ACCOUNT NUMBER:  [id number] ACCESSION NUMBER:  [id number] EXAMINATION: XR FEMUR AP AND LAT LT DATE: 11/11/2024 COMPARISON: None available HISTORY: Left thigh pain FINDINGS/IMPRESSION: Soft tissues appear within normal limits. Alignment anatomic. No acute fracture or dislocation. Left hip joint space is maintained. Mild osteitis pubis. Dictated by: Cristino Molina M.D. Images and Report reviewed and interpreted by: Cristino Molina M.D. <PS><Electronically signed by: Cristino Molina M.D.> 11/12/2024 1809 D: 11/12/2024 1807 T: 11/12/2024 1807            Assessment and Plan     Diagnoses and all orders for this visit:    1. Left hip pain (Primary)    2. Trochanteric bursitis of left hip        Discussed the treatment plan with the patient. I reviewed the MRI results with the patient.     Discussed injections for trochanteric bursitis if pain persists.      HEP exercises.      Call or return if worsening symptoms.    Follow Up     PRN  She can call back for physical therapy.       Patient was given instructions and counseling regarding her condition or for health maintenance advice. Please see specific information pulled into the AVS if appropriate.     Scribed for Zach Sánchez MD by Andreina Driscoll MA.  12/04/24   08:30 EST    I have personally performed the services described in this document as scribed by the above individual and it is both accurate and complete. Zach Sánchez MD 12/04/24

## 2024-12-11 ENCOUNTER — TELEMEDICINE (OUTPATIENT)
Dept: PSYCHIATRY | Facility: CLINIC | Age: 74
End: 2024-12-11
Payer: MEDICARE

## 2024-12-11 DIAGNOSIS — F33.40 RECURRENT MAJOR DEPRESSIVE DISORDER IN REMISSION: ICD-10-CM

## 2024-12-11 DIAGNOSIS — F51.05 INSOMNIA DUE TO MENTAL CONDITION: ICD-10-CM

## 2024-12-11 DIAGNOSIS — F43.10 POST TRAUMATIC STRESS DISORDER (PTSD): ICD-10-CM

## 2024-12-11 DIAGNOSIS — F41.1 GENERALIZED ANXIETY DISORDER: Primary | ICD-10-CM

## 2024-12-11 RX ORDER — ARIPIPRAZOLE 5 MG/1
5 TABLET ORAL DAILY
Qty: 90 TABLET | Refills: 3 | Status: SHIPPED | OUTPATIENT
Start: 2024-12-11

## 2024-12-11 RX ORDER — CETIRIZINE HYDROCHLORIDE 10 MG/1
10 TABLET ORAL DAILY
Qty: 90 TABLET | Refills: 1 | Status: SHIPPED | OUTPATIENT
Start: 2024-12-11

## 2024-12-11 NOTE — TREATMENT PLAN
Multi-Disciplinary Problems (from Behavioral Health Treatment Plan)      Active Problems       Problem: Anxiety  Start Date: 12/11/24      Problem Details: The patient self-scales this problem as a 2 with 10 being the worst.  family conflict, relationships, medical conditions, seasonal changes        Goal Priority Start Date Expected End Date End Date    Patient will develop and implement behavioral and cognitive strategies to reduce anxiety and irrational fears. -- 12/11/24 06/11/25 --    Goal Details: Progress toward goal:  improving          Goal Intervention Frequency Start Date End Date    Help patient explore past emotional issues in relation to present anxiety. Q Month 12/11/24 --    Intervention Details: Duration of treatment until remission of symptoms.          Goal Intervention Frequency Start Date End Date    Help patient develop an awareness of their cognitive and physical responses to anxiety. Q Month 12/11/24 --    Intervention Details: Duration of treatment until remission of symptoms.                  Problem: Depression  Start Date: 12/11/24      Problem Details: The patient self-scales this problem as a 2 with 10 being the worst.  family conflict, relationships, medical conditions, seasonal changes        Goal Priority Start Date Expected End Date End Date    Patient will demonstrate the ability to initiate new constructive life skills outside of sessions on a consistent basis. -- 12/11/24 06/11/25 --    Goal Details: Progress toward goal:  improving          Goal Intervention Frequency Start Date End Date    Assist patient in setting attainable activities of daily living goals. PRN 12/11/24 --      Goal Intervention Frequency Start Date End Date    Provide education about depression Q Month 12/11/24 --    Intervention Details: Duration of treatment until remission of symptoms.          Goal Intervention Frequency Start Date End Date    Assist patient in developing healthy coping strategies. Q  Month 12/11/24 --    Intervention Details: Duration of treatment until remission of symptoms.                          Reviewed By       Vicky Barth MD 12/11/24 4118                     I have discussed and reviewed this treatment plan with the patient.

## 2024-12-11 NOTE — PROGRESS NOTES
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 07/23/2024 10.0  5.0 - 15.0 mmol/L Final    eGFR 07/23/2024 50.5 (L)  >60.0 mL/min/1.73 Final    TSH 07/23/2024 2.970  0.270 - 4.200 uIU/mL Final    Total Cholesterol 07/23/2024 168  0 - 200 mg/dL Final    Triglycerides 07/23/2024 167 (H)  0 - 150 mg/dL Final    HDL Cholesterol 07/23/2024 56  40 - 60 mg/dL Final    LDL Cholesterol  07/23/2024 84  0 - 100 mg/dL Final    VLDL Cholesterol 07/23/2024 28  5 - 40 mg/dL Final    LDL/HDL Ratio 07/23/2024 1.40   Final    Microalbumin, Urine 07/23/2024 2.7  mg/dL Final    WBC 07/23/2024 7.18  3.40 - 10.80 10*3/mm3 Final    RBC 07/23/2024 4.38  3.77 - 5.28 10*6/mm3 Final    Hemoglobin 07/23/2024 13.1  12.0 - 15.9 g/dL Final    Hematocrit 07/23/2024 39.7  34.0 - 46.6 % Final    MCV 07/23/2024 90.6  79.0 - 97.0 fL Final    MCH 07/23/2024 29.9  26.6 - 33.0 pg Final    MCHC 07/23/2024 33.0  31.5 - 35.7 g/dL Final    RDW 07/23/2024 12.7  12.3 - 15.4 % Final    RDW-SD 07/23/2024 42.0  37.0 - 54.0 fl Final    MPV 07/23/2024 11.4  6.0 - 12.0 fL Final    Platelets 07/23/2024 263  140 - 450 10*3/mm3 Final    Neutrophil % 07/23/2024 65.3  42.7 - 76.0 % Final    Lymphocyte % 07/23/2024 20.5   19.6 - 45.3 % Final    Monocyte % 07/23/2024 11.0  5.0 - 12.0 % Final    Eosinophil % 07/23/2024 2.4  0.3 - 6.2 % Final    Basophil % 07/23/2024 0.7  0.0 - 1.5 % Final    Immature Grans % 07/23/2024 0.1  0.0 - 0.5 % Final    Neutrophils, Absolute 07/23/2024 4.69  1.70 - 7.00 10*3/mm3 Final    Lymphocytes, Absolute 07/23/2024 1.47  0.70 - 3.10 10*3/mm3 Final    Monocytes, Absolute 07/23/2024 0.79  0.10 - 0.90 10*3/mm3 Final    Eosinophils, Absolute 07/23/2024 0.17  0.00 - 0.40 10*3/mm3 Final    Basophils, Absolute 07/23/2024 0.05  0.00 - 0.20 10*3/mm3 Final    Immature Grans, Absolute 07/23/2024 0.01  0.00 - 0.05 10*3/mm3 Final    nRBC 07/23/2024 0.0  0.0 - 0.2 /100 WBC Final   Admission on 06/19/2024, Discharged on 06/19/2024   Component Date Value Ref Range Status    Color 06/19/2024 Yellow  Yellow, Straw, Dark Yellow, Annita Final    Clarity, UA 06/19/2024 Slightly Cloudy (A)  Clear Final    Glucose, UA 06/19/2024 Negative  Negative mg/dL Final    Bilirubin 06/19/2024 Negative  Negative Final    Ketones, UA 06/19/2024 Trace (A)  Negative Final    Specific Gravity  06/19/2024 1.030  1.005 - 1.030 Final    Blood, UA 06/19/2024 Negative  Negative Final    pH, Urine 06/19/2024 6.0  5.0 - 8.0 Final    Protein, POC 06/19/2024 Trace (A)  Negative mg/dL Final    Urobilinogen, UA 06/19/2024 0.2 E.U./dL  Normal, 0.2 E.U./dL Final    Nitrite, UA 06/19/2024 Negative  Negative Final    Leukocytes 06/19/2024 Negative  Negative Final       EKG Results:  No orders to display       Imaging Results:  No Images in the past 120 days found..      Assessment & Plan   Diagnoses and all orders for this visit:    1. Generalized anxiety disorder (Primary)  -     ARIPiprazole (Abilify) 5 MG tablet; Take 1 tablet by mouth Daily.  Dispense: 90 tablet; Refill: 3    2. Insomnia due to mental condition  -     ARIPiprazole (Abilify) 5 MG tablet; Take 1 tablet by mouth Daily.  Dispense: 90 tablet; Refill: 3    3. Recurrent major depressive disorder  in remission  -     ARIPiprazole (Abilify) 5 MG tablet; Take 1 tablet by mouth Daily.  Dispense: 90 tablet; Refill: 3    4. Post traumatic stress disorder (PTSD)  -     ARIPiprazole (Abilify) 5 MG tablet; Take 1 tablet by mouth Daily.  Dispense: 90 tablet; Refill: 3      INITIAL presentation 2021 most consistent with major depressive disorder, recurrent, moderate to severe, with anxious distress.  PTSD.  Rule out personality disorder, cluster B specifically.  Rule out hypomania as patient was very difficult to interrupt today.    12/11/2024: Stable, well, no med changes.     Allowed patient to freely discuss and process issues, such as:  Anxiety and depression regarding family conflict/relationships.  Anxiety and depression regarding medical conditions.  ... using Rogerian psychotherapeutic techniques including unconditional positive regard, reflective listening, and demonstrating clear empathy, with the goal of ameliorating symptoms and maintaining, restoring, or improving self-esteem, adaptive skills, and ego or psychological functions (Ara et al. 1991), the long-term goal of which is to develop a better, healthier perspective and help the patient bear their circumstances more easily.  Time (minutes) spent providing supportive psychotherapy: 16  (This time is exclusive to the therapy session and separate from the time spent on activities used to meet the criteria for the E/M service (history, exam, medical decision-making).)  Start: 5:19  Stop: 5:35  Functional status: mild impairment  Treatment plan: Medication management and supportive psychotherapy  Prognosis: good  Progress: improved, stable  6w    09/26/2024: Stable, well, no med changes.     08/28/2024: Mild MDD, but maintenance insomnia. Increase abilify. [place]ina with [employer], prayer.    07/31/2024: Not depressed, persistent maintenance insomnia. Restarted her meds 10 days ago, the above improving. No changes, as her s/sx are related to stopping her meds  "for a time. Also has restarted light box.    07/02/2024: Improving mood, but persistent insomnia. Increase abilify. Constipation issues, but had 2 BM recently. Consider gabapentin as neuropathic pain impeding sleep.    06/04/2024: Insomnia, but also more activity, and possibly some depressed mood. Restart abilify; stopped previously thinking it wasn't covered by insurance, but this may have just been an early request that was denied. Watch swallowing issues. Consider lamictal, vraylar, rexulti, low dose seroquel.    4/30/24: Insomnia, and some procrastination. Increase doxepin. Procrastinating on painting a certain painting; processed why. Monitor.    4/3: Stable, but monitor if worsening depression creeps in.    3/4/24: Pt ist stable, but lost her 20 mg dose of prozac. Re-sent in. Insurance won't pay for abilify. DC abilify. Some unusual issues with swallowing. Processed dreams about her mother. Mom isn't happy, \"it's not good enough.\"    2/6: Stable, discussed dietary changes involving decreasing sugar. Sugar is comforting and helps her depression. Also discussed stevia. Now using light box and sleeping a little better.    1/5: Seasonal depression, start light box up again. May have to make further med changes.    12/14: Add melatonin for maintenance insomnia. Otherwise stable. Seeing a movie for [employer]. Got all her [employer] cards mailed out.    11/9: Doing well, isolated insomnia. Stop trazodone and start doxepin. Consider lunesta, belsomra, quiviviq. She stopped melatonin on her own.    8/11: Stable, well, no changes. Painting, having people over. Counseled to start light box in September, reiterated instructions. Will be inducted into the [employer] confronternity of [facility] and [facility] tomorrow. She's been working on it for a year.    7/11: Short visit as patient sleepy. Unusual sleep pattern recently, but MH is fine. Pt will call her son tonight to ensure someone is around when she goes back to sleep. She " will call PCP about this tomorrow. Close follow up with me in 4 wks.    4/27: Stable, well. Restart melatonin for insomnia.     3/2: Prozac and BLT helped. Situational stressor in son, no changes. Better. Watch insomnia.     1/20: Start light box, increase prozac for dep.     12/9: Some insomnia. Increase melatonin.     10/25: Doing well. Increase prozac back to baseline dose (inadvertently reduced, she has been stable on a higher dose, so we should go back to that). Some initial insomnia, increase trazodone to 75 mg nightly. She is enjoying the Fall.     9/8: Start light box. Close follow up in case this is worsening depression.     7/27: Well, stable.     6/14: Better, no changes.     5/3: Increase prozac and melatonin.    Visit Diagnoses:    ICD-10-CM ICD-9-CM   1. Generalized anxiety disorder  F41.1 300.02   2. Insomnia due to mental condition  F51.05 300.9     327.02   3. Recurrent major depressive disorder in remission  F33.40 296.35   4. Post traumatic stress disorder (PTSD)  F43.10 309.81       PLAN:  Risk Assessment: Risk of self-harm acutely is moderate. Risk factors include chronic depressive disorder, possible personality disorder, recent psychosocial stressors (pandemic, moving). Protective factors include no present SI, no history of suicide attempts or self-harm in the past, no access to weapons, minimal AODA, healthcare seeking, future orientation, willingness to engage in care. Risk of self-harm chronically is also moderate, but could be further elevated in the event of treatment noncompliance and/or AODA.  Safety: No acute safety concerns.  Medications:   CONTINUE light box 9/1 - 3/31.  CONTINUE melatonin 30 mg p.o. nightly.  Risks, benefits, side effects discussed with patient including sedation, dizziness/falls risk, GI upset.  Do not use before operating vehicle, vessel, or machine. After discussion of these risks and benefits, the patient voiced understanding and agreed to proceed.   CONTINUE  doxepin 25 mg qhs. Risks, benefits, side effects discussed with patient including GI upset, sedation, dizziness/falls risk, grogginess the following day, prolongation of the QTc interval.  After discussion of these risks and benefits, the patient voiced understanding and agreed to proceed.    CONTINUE bupropion xl 300 mg daily. Risks, benefits, alternatives discussed with patient including nausea, GI upset, increased energy, exacerbation of irritability, insomnia, lowering of seizure threshold.  After discussion of these risks and benefits, the patient voiced understanding and agreed to proceed.  CONTINUE Prozac 30 mg a day (HIGHEST dose is 40 given that she is also on bupropion). Risks, benefits, alternatives discussed with patient including GI upset, nausea vomiting diarrhea, theoretical decrease of seizure threshold predisposing the patient to a slightly higher seizure risk, headaches, sexual dysfunction, serotonin syndrome, bleeding risk, increased suicidality in patients 24 years and younger.  After discussion of these risks and benefits, the patient voiced understanding and agreed to proceed.  CONTINUE Abilify 5 mg p.o. nightly. Previously stopped 2/2 cost, 3/24, but this may have been rejected by insurance simply because pt requested too soon (she states). Risks, benefits, alternatives discussed with patient including increased energy, exacerbation of irritability, akathisia, movement issues, GI upset, orthostatic hypotension, increased appetite, tardive dyskinesia.  Use care when operating vehicle, vessel, or machine. After discussion of these risks and benefits, the patient voiced understanding and agreed to proceed.  S/P:  trazodone 100 mg PO QHS. No longer effective 11/23.  Mirtazapine 45 mg daily (RLS)  Ambien caused double vision, and possibly hallucinations  Was on lithium in the past: dizziness and falls, and hair curled.  Therapy: referred to [facility].  Labs/Studies: s/p TMS referral.  Follow  Up: 6 weeks. (prefers 4 wks)      TREATMENT PLAN/GOALS: Continue supportive psychotherapy efforts and medications as indicated. Treatment and medication options discussed during today's visit. Patient acknowledged and verbally consented to continue with current treatment plan and was educated on the importance of compliance with treatment and follow-up appointments.    MEDICATION ISSUES:  SAPNA reviewed as expected.  Discussed medication options and treatment plan of prescribed medication as well as the risks, benefits, and side effects including potential falls, possible impaired driving and metabolic adversities among others. Patient is agreeable to call the office with any worsening of symptoms or onset of side effects. Patient is agreeable to call 911 or go to the nearest ER should he/she begin having SI/HI. No medication side effects or related complaints today.     MEDS ORDERED DURING VISIT:  New Medications Ordered This Visit   Medications    ARIPiprazole (Abilify) 5 MG tablet     Sig: Take 1 tablet by mouth Daily.     Dispense:  90 tablet     Refill:  3     Thank you for the help. Please call with questions: [phone].       Return in about 6 weeks (around 1/22/2025) for Video visit.         This document has been electronically signed by Vicky Barth MD  December 11, 2024 17:35 EST      Part of this note may be an electronic transcription/translation of spoken language to printed text using the Dragon Dictation System.

## 2024-12-11 NOTE — PLAN OF CARE
[facility]avindraeria[facility]-mar[facility] psychotherapy to help manage depression and anxiety related to family conflict, relationships, medical conditions, seasonal changes

## 2024-12-12 ENCOUNTER — TELEPHONE (OUTPATIENT)
Dept: PSYCHIATRY | Facility: CLINIC | Age: 74
End: 2024-12-12
Payer: MEDICARE

## 2024-12-17 ENCOUNTER — TELEPHONE (OUTPATIENT)
Dept: INTERNAL MEDICINE | Facility: CLINIC | Age: 74
End: 2024-12-17
Payer: MEDICARE

## 2024-12-17 NOTE — TELEPHONE ENCOUNTER
Caller: Nivia Cagle    Relationship to patient: Self    Best call back number:[phone]     Patient is needing: PATIENT CALLED STATING SHE GOT A LETTER STATING SHE MISSED A VISIT. PER THE LETTER THE PATIENT WAS A NO SHOW FOR A VISIT ON 12.11.24.    THE APPOINTMENT ON 12.11.24 WAS A SUBSEQUENT MEDICARE WELLNESS VISIT.     THE PATIENT COMPLETED A SUBSEQUENT MEDICARE WELLNESS VISIT ON 10.11.24 THIS YEAR.    PATIENT IS ASKING FOR A CALL BACK TO ADVISE THAT THE NO SHOW IS REMOVED AND LET HER KNOW IF ANYTHING ELSE IS NEEDED.

## 2025-01-13 ENCOUNTER — HOSPITAL ENCOUNTER (OUTPATIENT)
Dept: INFUSION THERAPY | Facility: HOSPITAL | Age: 75
Discharge: HOME | End: 2025-01-13
Payer: MEDICARE

## 2025-01-15 ENCOUNTER — TELEMEDICINE (OUTPATIENT)
Dept: PSYCHIATRY | Facility: CLINIC | Age: 75
End: 2025-01-15
Payer: MEDICARE

## 2025-01-15 DIAGNOSIS — F43.10 POST TRAUMATIC STRESS DISORDER (PTSD): Primary | ICD-10-CM

## 2025-01-15 DIAGNOSIS — F41.1 GENERALIZED ANXIETY DISORDER: ICD-10-CM

## 2025-01-16 NOTE — PROGRESS NOTES
Date: January 16, 2025  Time In: 1500  Time Out: 1600  This provider is located at home address for [facility] (through [facility] [address] [place] [address] using a secure [employer] Video Visit through [employer].     Mode of Visit: Video  Location of patient: -HOME-  Location of provider: +HOME+  You have chosen to receive care through a telehealth visit.  The patient has signed the video visit consent form.  The visit included audio and video interaction. No technical issues occurred during this visit.    The patient's condition being diagnosed/treated is appropriate for telemedicine. The provider identified herself as well as her credentials. The patient, and/or patients guardian, consent to be seen remotely, and when consent is given they understand that the consent allows for patient identifiable information to be sent to a third party as needed. They may refuse to be seen remotely at any time. The electronic data is encrypted and password protected, and the patient and/or guardian has been advised of the potential risks to privacy not withstanding such measures.     You have chosen to receive care through a telehealth visit.  Do you consent to use a video/audio connection for your medical care today? Yes    Reviewed by provider on 01/15/2025     PROGRESS NOTE  Data:  Nivia Cagle is a 74 y.o. female who presents today for follow up    Chief Complaint: anxiety     History of Present Illness: Pt discussed recent court hearing and being informed of new information regarding her mother's estate due to lack of communication from family's trustee. Pt reports that this trustee is a friend of her's that she has known since she was a teenager. Pt reports that it is an unsettling feeling that someone that she has known and loved for so long broke her trust. Pt reports upcoming tasks required regarding court procedure and how she is dreading this task due to  the belief that once she completes these tasks she understands that a friendship will be over. Pt discussed childhood memories associated with her mother and how certain memories still upset her to this day and will never understand as to why her mother treated her so differently compared to the other children.       Clinical Maneuvering/Intervention:    (Scales based on 0 - 10 with 10 being the worst)  Depression: 8 Anxiety: 8       Assisted patient in processing above session content; acknowledged and normalized patient’s thoughts, feelings, and concerns.  Rationalized patient thought process regarding recent stressors and life events. Discussed triggers associated with patient's emotions. Also discussed coping skills for patient to implement. Discussed healthy vs unhealthy relationships. Discussed pros and cons list in efforts to help with court assigned task.     Reviewed treatment goals, progress regarding identified goals and readiness for change through motivational interviewing approach. Treatment progress towards goals remains on going at this time.     Allowed patient to freely discuss issues without interruption or judgment. Assisted with application of appropriate intervention such as: cognitive behavioral therapy techniques, acceptance and commitment therapy methods, solution-focused brief therapy practices, psychoeducation, mindfulness approaches, emotional regulation strategies, attachment based assessments, and interpersonal interventions. Provided safe, confidential environment to facilitate the development of positive therapeutic relationship and encourage open, honest communication. Assisted patient in identifying risk factors which would indicate the need for higher level of care including thoughts to harm self or others and/or self-harming behavior and encouraged patient to contact this office, call 911, or present to the nearest emergency room should any of these events occur. Discussed crisis  intervention services and means to access. Patient adamantly and convincingly denies current suicidal or homicidal ideation or perceptual disturbance.    Assessment:   Assessment   Patient appears to maintain relative stability as compared to their baseline.  However, patient continues to struggle with anxiety and ptsd which continues to cause impairment in important areas of functioning.  A result, they can be reasonably expected to continue to benefit from treatment and would likely be at increased risk for decompensation otherwise.    Mental Status Exam:   Hygiene:   good; normal for Pt   Cooperation:  Cooperative  Eye Contact:  Good  Psychomotor Behavior:  Appropriate  Affect:   tearful  Mood: sad  Speech:  Normal  Thought Process:  Linear  Thought Content:  Mood congruent  Suicidal:  None today  Homicidal:  None  Hallucinations:  None  Delusion:  None  Memory:  Intact  Orientation:  Person, Place, Time and Situation  Reliability:  fair  Insight:  Fair  Judgement:  Fair  Impulse Control:  Fair  Physical/Medical Issues:  No        Patient's Support Network Includes:  son    Functional Status: Mild impairment     Progress toward goal: Not at goal    Prognosis: Fair with Ongoing Treatment            Plan:    Patient will continue in individual outpatient therapy with focus on improved functioning and coping skills, maintaining stability, and avoiding decompensation and the need for higher level of care.    Patient will adhere to medication regimen as prescribed and report any side effects. Patient will contact this office, call 911 or present to the nearest emergency room should suicidal or homicidal ideations occur.     Return in about 3 weeks, or earlier if symptoms worsen or fail to improve.           VISIT DIAGNOSIS:     ICD-10-CM ICD-9-CM   1. Post traumatic stress disorder (PTSD)  F43.10 309.81   2. Generalized anxiety disorder  F41.1 300.02        Diagnoses and all orders for this visit:    1. Post traumatic  stress disorder (PTSD) (Primary)    2. Generalized anxiety disorder           [facility] No Show Policy:  We understand unexpected circumstances arise; however, anytime you miss your appointment we are unable to provide you appropriate care.  In addition, each appointment missed could have been used to provide care for others.  We ask that you call at least 24 hours in advance to cancel or reschedule an appointment.  We would like to take this opportunity to remind you of our policy stating patients who miss THREE or more appointments without cancelling or rescheduling 24 hours in advance of the appointment may be subject to cancellation of any further visits with our clinic and recommendation to seek in-person services/visits.    Please call [phone] to reschedule your appointment. If there are reasons that make it difficult for you to keep the appointments, please call and let us know how we can help.Please understand that medication prescribing will not continue without seeing your provider.      Patient to email any documentation or needed paperwork to support staff at:   [email or web address]       This document has been electronically signed by Annita Knowles LCSW.  January 16, 2025 08:26 EST      Part of this note may be an electronic transcription/translation of spoken language to printed text using the Dragon Dictation System.

## 2025-01-17 NOTE — PAT
Arrival time of 0700  given.    Bring picture ID and insurance care. Have licensed  for transportation home after the procedure.    Nothing to eat or drink after midnight.     Hold all medications the morning of the procedure except nebulizers or inhalers.     Bring mediations and inhalers to hospital with you.     Pt verbalized understanding of instructions.

## 2025-01-21 ENCOUNTER — TELEPHONE (OUTPATIENT)
Dept: PSYCHIATRY | Facility: CLINIC | Age: 75
End: 2025-01-21
Payer: MEDICARE

## 2025-01-21 ENCOUNTER — TELEPHONE (OUTPATIENT)
Dept: NEUROLOGY | Facility: CLINIC | Age: 75
End: 2025-01-21
Payer: MEDICARE

## 2025-01-21 ENCOUNTER — OFFICE VISIT (OUTPATIENT)
Dept: INTERNAL MEDICINE | Facility: CLINIC | Age: 75
End: 2025-01-21
Payer: MEDICARE

## 2025-01-21 VITALS
HEIGHT: 63 IN | BODY MASS INDEX: 33.27 KG/M2 | OXYGEN SATURATION: 99 % | TEMPERATURE: 97.8 F | SYSTOLIC BLOOD PRESSURE: 130 MMHG | DIASTOLIC BLOOD PRESSURE: 66 MMHG | HEART RATE: 95 BPM | WEIGHT: 187.8 LBS

## 2025-01-21 DIAGNOSIS — M62.838 MUSCLE SPASM: Primary | ICD-10-CM

## 2025-01-21 DIAGNOSIS — Z20.822 EXPOSURE TO COVID-19 VIRUS: ICD-10-CM

## 2025-01-21 DIAGNOSIS — R79.9 ABNORMAL FINDING OF BLOOD CHEMISTRY, UNSPECIFIED: ICD-10-CM

## 2025-01-21 DIAGNOSIS — E11.65 TYPE 2 DIABETES MELLITUS WITH HYPERGLYCEMIA, WITHOUT LONG-TERM CURRENT USE OF INSULIN: ICD-10-CM

## 2025-01-21 LAB
ALBUMIN SERPL-MCNC: 4.8 G/DL (ref 3.5–5.2)
ALBUMIN/GLOB SERPL: 1.5 G/DL
ALP SERPL-CCNC: 92 U/L (ref 39–117)
ALT SERPL W P-5'-P-CCNC: 44 U/L (ref 1–33)
ANION GAP SERPL CALCULATED.3IONS-SCNC: 14 MMOL/L (ref 5–15)
AST SERPL-CCNC: 34 U/L (ref 1–32)
BASOPHILS # BLD AUTO: 0.06 10*3/MM3 (ref 0–0.2)
BASOPHILS NFR BLD AUTO: 0.7 % (ref 0–1.5)
BILIRUB SERPL-MCNC: 1 MG/DL (ref 0–1.2)
BUN SERPL-MCNC: 20 MG/DL (ref 8–23)
BUN/CREAT SERPL: 17.2 (ref 7–25)
CALCIUM SPEC-SCNC: 10.2 MG/DL (ref 8.6–10.5)
CHLORIDE SERPL-SCNC: 96 MMOL/L (ref 98–107)
CO2 SERPL-SCNC: 26 MMOL/L (ref 22–29)
CREAT SERPL-MCNC: 1.16 MG/DL (ref 0.57–1)
DEPRECATED RDW RBC AUTO: 40 FL (ref 37–54)
EGFRCR SERPLBLD CKD-EPI 2021: 50.2 ML/MIN/1.73
EOSINOPHIL # BLD AUTO: 0.2 10*3/MM3 (ref 0–0.4)
EOSINOPHIL NFR BLD AUTO: 2.3 % (ref 0.3–6.2)
ERYTHROCYTE [DISTWIDTH] IN BLOOD BY AUTOMATED COUNT: 12 % (ref 12.3–15.4)
EXPIRATION DATE: NORMAL
FLUAV AG UPPER RESP QL IA.RAPID: NOT DETECTED
FLUBV AG UPPER RESP QL IA.RAPID: NOT DETECTED
FOLATE SERPL-MCNC: 15.1 NG/ML (ref 4.78–24.2)
GLOBULIN UR ELPH-MCNC: 3.1 GM/DL
GLUCOSE SERPL-MCNC: 169 MG/DL (ref 65–99)
HCT VFR BLD AUTO: 43.9 % (ref 34–46.6)
HGB BLD-MCNC: 14.9 G/DL (ref 12–15.9)
IMM GRANULOCYTES # BLD AUTO: 0.05 10*3/MM3 (ref 0–0.05)
IMM GRANULOCYTES NFR BLD AUTO: 0.6 % (ref 0–0.5)
INTERNAL CONTROL: NORMAL
LYMPHOCYTES # BLD AUTO: 1.88 10*3/MM3 (ref 0.7–3.1)
LYMPHOCYTES NFR BLD AUTO: 21.9 % (ref 19.6–45.3)
Lab: NORMAL
MAGNESIUM SERPL-MCNC: 1.8 MG/DL (ref 1.6–2.4)
MCH RBC QN AUTO: 30.7 PG (ref 26.6–33)
MCHC RBC AUTO-ENTMCNC: 33.9 G/DL (ref 31.5–35.7)
MCV RBC AUTO: 90.5 FL (ref 79–97)
MONOCYTES # BLD AUTO: 0.88 10*3/MM3 (ref 0.1–0.9)
MONOCYTES NFR BLD AUTO: 10.3 % (ref 5–12)
NEUTROPHILS NFR BLD AUTO: 5.5 10*3/MM3 (ref 1.7–7)
NEUTROPHILS NFR BLD AUTO: 64.2 % (ref 42.7–76)
NRBC BLD AUTO-RTO: 0 /100 WBC (ref 0–0.2)
PLATELET # BLD AUTO: 278 10*3/MM3 (ref 140–450)
PMV BLD AUTO: 11.8 FL (ref 6–12)
POTASSIUM SERPL-SCNC: 4.7 MMOL/L (ref 3.5–5.2)
PROT SERPL-MCNC: 7.9 G/DL (ref 6–8.5)
RBC # BLD AUTO: 4.85 10*6/MM3 (ref 3.77–5.28)
SARS-COV-2 AG UPPER RESP QL IA.RAPID: NOT DETECTED
SODIUM SERPL-SCNC: 136 MMOL/L (ref 136–145)
TSH SERPL DL<=0.05 MIU/L-ACNC: 3.12 UIU/ML (ref 0.27–4.2)
VIT B12 BLD-MCNC: 702 PG/ML (ref 211–946)
WBC NRBC COR # BLD AUTO: 8.57 10*3/MM3 (ref 3.4–10.8)

## 2025-01-21 PROCEDURE — 36415 COLL VENOUS BLD VENIPUNCTURE: CPT | Performed by: PHYSICIAN ASSISTANT

## 2025-01-21 PROCEDURE — 82607 VITAMIN B-12: CPT | Performed by: PHYSICIAN ASSISTANT

## 2025-01-21 PROCEDURE — 80053 COMPREHEN METABOLIC PANEL: CPT | Performed by: PHYSICIAN ASSISTANT

## 2025-01-21 PROCEDURE — 82746 ASSAY OF FOLIC ACID SERUM: CPT | Performed by: PHYSICIAN ASSISTANT

## 2025-01-21 PROCEDURE — 3075F SYST BP GE 130 - 139MM HG: CPT | Performed by: PHYSICIAN ASSISTANT

## 2025-01-21 PROCEDURE — 87428 SARSCOV & INF VIR A&B AG IA: CPT | Performed by: PHYSICIAN ASSISTANT

## 2025-01-21 PROCEDURE — 99213 OFFICE O/P EST LOW 20 MIN: CPT | Performed by: PHYSICIAN ASSISTANT

## 2025-01-21 PROCEDURE — 1126F AMNT PAIN NOTED NONE PRSNT: CPT | Performed by: PHYSICIAN ASSISTANT

## 2025-01-21 PROCEDURE — 1159F MED LIST DOCD IN RCRD: CPT | Performed by: PHYSICIAN ASSISTANT

## 2025-01-21 PROCEDURE — 84443 ASSAY THYROID STIM HORMONE: CPT | Performed by: PHYSICIAN ASSISTANT

## 2025-01-21 PROCEDURE — 3078F DIAST BP <80 MM HG: CPT | Performed by: PHYSICIAN ASSISTANT

## 2025-01-21 PROCEDURE — 85025 COMPLETE CBC W/AUTO DIFF WBC: CPT | Performed by: PHYSICIAN ASSISTANT

## 2025-01-21 PROCEDURE — 1160F RVW MEDS BY RX/DR IN RCRD: CPT | Performed by: PHYSICIAN ASSISTANT

## 2025-01-21 PROCEDURE — 83735 ASSAY OF MAGNESIUM: CPT | Performed by: PHYSICIAN ASSISTANT

## 2025-01-21 NOTE — TELEPHONE ENCOUNTER
ATTEMPTED TO CONTACT PT(PATIENT)      UNABLE TO LEAVE A VOICEMAIL WITH INSTRUCTIONS TO RETURN CALL TO OFFICE AT [phone]

## 2025-01-21 NOTE — TELEPHONE ENCOUNTER
This is not a side effect of the meds she is on through us. This is something she should discuss with neurology, which she has done for years (with management).     She also needs a follow up appnt to discuss at length within the next few weeks.

## 2025-01-21 NOTE — TELEPHONE ENCOUNTER
PT(PATIENT) VERIFIED     PT(PATIENT) STATES SHE SAW PCP TODAY AND THERE ARE CONCERNED ABOUT POSSIBLE SIDE EFFECTS WITH HER MEDICATION     Office Visit with Rae Yin PA-C (2025)     PT(PATIENT) STATES SHE IS HAVING MUSCLE CRAMPS AND SPASMS    PT(PATIENT) STATES SHE WAS ADVISED TO CONTACT PROVIDER REGARDING HER BEHAVIORAL HEALTH MEDICATION     PLEASE ADVISE

## 2025-01-21 NOTE — TELEPHONE ENCOUNTER
Patient called states she went to pcp and pcp suggested she call neurology, patient states she's having severe and frequent muscle spasms in her leg causing her to fall states she has had some bloodwork done today, please advise

## 2025-01-21 NOTE — ASSESSMENT & PLAN NOTE
Chronic issue.  Uncertain etiology.  Will check blood work today that could be contributing to worsening symptoms such as electrolyte deficiency.  Would recommend follow up with Psychiatry to review medications for possible side effect and Neurology as patient is established with them.  Call for any questions or concerns.  Follow up with PCP in 2-4 weeks.

## 2025-01-21 NOTE — PROGRESS NOTES
"Chief Complaint  Fall (Fell on 1/19/2025) and Body Cramps (Patient states her body is cramping all over, the cramping is what caused the fall. Patient can not  things in her hands cause they cramp. Patient cramps in her chest. )    Subjective          Nivia Cagle presents to [facility]    Muscle cramps- patient has had chronic muscle cramps, worsening over the past month. She has seen Neurology in the past for this problem as well. The cramping is becoming so frequent throughout the day it is affecting her daily life as well as it will affect her gait or items she is holding.  Patient states that she has been out of her wellbutrin so she has not been taking that for the past 3 weeks.  She denies not taking any other medications.    Chronic cough- is scheduled to have an EGD in two days, patient would like to be tested to rule out COVID prior to the procedure.     Left hip pain- patient fell last week after her legs cramped up from a muscle spasm.  Her hip pain has improved and she no longer has any pain.  She is not interested in xray at this time or further evaluation.    Objective   Vital Signs:   /66   Pulse 95   Temp 97.8 °F (36.6 °C)   Ht 160 cm (62.99\")   Wt 85.2 kg (187 lb 12.8 oz)   SpO2 99%   BMI 33.28 kg/m²     Physical Exam  Vitals reviewed.   Constitutional:       Appearance: Normal appearance. She is well-developed.   HENT:      Head: Normocephalic and atraumatic.   Eyes:      Conjunctiva/sclera: Conjunctivae normal.      Pupils: Pupils are equal, round, and reactive to light.   Cardiovascular:      Rate and Rhythm: Normal rate and regular rhythm.      Heart sounds: No murmur heard.     No friction rub. No gallop.   Pulmonary:      Effort: Pulmonary effort is normal.      Breath sounds: Normal breath sounds. No wheezing or rhonchi.   Skin:     General: Skin is warm and dry.   Neurological:      Mental Status: She is alert and " oriented to person, place, and time.      Cranial Nerves: No cranial nerve deficit.   Psychiatric:         Mood and Affect: Mood and affect normal.         Behavior: Behavior normal.         Thought Content: Thought content normal.         Judgment: Judgment normal.        Result Review :          Procedures      Assessment and Plan    Diagnoses and all orders for this visit:    1. Muscle spasm (Primary)  Assessment & Plan:  Chronic issue.  Uncertain etiology.  Will check blood work today that could be contributing to worsening symptoms such as electrolyte deficiency.  Would recommend follow up with Psychiatry to review medications for possible side effect and Neurology as patient is established with them.  Call for any questions or concerns.  Follow up with PCP in 2-4 weeks.    Orders:  -     CBC w AUTO Differential  -     Comprehensive metabolic panel  -     Magnesium  -     Vitamin B12  -     Folate  -     TSH    2. Exposure to COVID-19 virus  -     POCT SARS-CoV-2 Antigen ARCHIE + Flu    3. Abnormal finding of blood chemistry, unspecified  -     CBC w AUTO Differential    4. Type 2 diabetes mellitus with hyperglycemia, without long-term current use of insulin  -     TSH              Follow Up   No follow-ups on file.  Patient was given instructions and counseling regarding her condition or for health maintenance advice. Please see specific information pulled into the AVS if appropriate.

## 2025-01-22 ENCOUNTER — HOSPITAL ENCOUNTER (OUTPATIENT)
Dept: MAMMOGRAPHY | Facility: HOSPITAL | Age: 75
Discharge: HOME | End: 2025-01-22
Payer: MEDICARE

## 2025-01-22 ENCOUNTER — ANESTHESIA EVENT (OUTPATIENT)
Dept: GASTROENTEROLOGY | Facility: HOSPITAL | Age: 75
End: 2025-01-22
Payer: MEDICARE

## 2025-01-22 ENCOUNTER — HOSPITAL ENCOUNTER (OUTPATIENT)
Dept: INFUSION THERAPY | Facility: HOSPITAL | Age: 75
Discharge: HOME | End: 2025-01-22
Payer: MEDICARE

## 2025-01-22 VITALS
OXYGEN SATURATION: 99 % | BODY MASS INDEX: 32.66 KG/M2 | TEMPERATURE: 98.3 F | HEART RATE: 99 BPM | WEIGHT: 184.3 LBS | SYSTOLIC BLOOD PRESSURE: 155 MMHG | DIASTOLIC BLOOD PRESSURE: 77 MMHG | HEIGHT: 63 IN | RESPIRATION RATE: 20 BRPM

## 2025-01-22 DIAGNOSIS — E78.5 HYPERLIPIDEMIA LDL GOAL <70: Primary | ICD-10-CM

## 2025-01-22 DIAGNOSIS — Z78.9 STATIN INTOLERANCE: ICD-10-CM

## 2025-01-22 DIAGNOSIS — Z12.31 SCREENING MAMMOGRAM FOR BREAST CANCER: ICD-10-CM

## 2025-01-22 PROCEDURE — 25010000002 INCLISIRAN SODIUM 284 MG/1.5ML SOLUTION PREFILLED SYRINGE: Performed by: NURSE PRACTITIONER

## 2025-01-22 PROCEDURE — 77067 SCR MAMMO BI INCL CAD: CPT

## 2025-01-22 PROCEDURE — 77063 BREAST TOMOSYNTHESIS BI: CPT

## 2025-01-22 PROCEDURE — 96372 THER/PROPH/DIAG INJ SC/IM: CPT

## 2025-01-22 RX ADMIN — INCLISIRAN 284 MG: 284 INJECTION, SOLUTION SUBCUTANEOUS at 09:29

## 2025-01-22 NOTE — TELEPHONE ENCOUNTER
Patient reports that spasms started about 2 months ago and have gradually gotten worse. She has been on the abilify over a year and denies starting any new medications. We are getting her in for a sooner appointment.

## 2025-01-22 NOTE — TELEPHONE ENCOUNTER
Did the symptoms start with the onset of her taking Abilify? If so, could be related as abilify can have extrapyramidal symptoms in some patients. Labs are WNL. Should not take more Flexeril than prescribed.  Needs sooner f/u with our office.

## 2025-01-22 NOTE — TELEPHONE ENCOUNTER
Spoke with patient. She stated that she is having severe muscle spasms involving her extremities and even her jaw. She checked with her psychiatrist and they don't believe that it is medication related and referred her back to neuro. She was seen by her primary. Labs in chart. Patient does not have an appointment with you until October. Patient reports taking flexeril with minimal effect. Stated that she is taking more than prescribed. Instructed to take flexeril only as ordered.

## 2025-01-22 NOTE — ANESTHESIA PREPROCEDURE EVALUATION
" Anesthesia Evaluation     Patient summary reviewed and Nursing notes reviewed   NPO Solid Status: > 8 hours  NPO Liquid Status: > 6 hours           Airway   Mallampati: II  TM distance: >3 FB  Neck ROM: full  No difficulty expected  Dental          Pulmonary - normal exam   (+) ,sleep apnea  Cardiovascular - normal exam    ECG reviewed    (+) hypertension, valvular problems/murmurs murmur, hyperlipidemia      Neuro/Psych  (+) headaches, tremors, psychiatric history Anxiety, Depression, ADHD and PTSD  GI/Hepatic/Renal/Endo    (+) obesity, GERD, diabetes mellitus type 2    Musculoskeletal     (+) arthralgias, back pain, chronic pain  Abdominal  - normal exam   Substance History      OB/GYN          Other   arthritis,     ROS/Med Hx Other: GERD, dysphagia, upper abd pain, abnormal esophogram     EKG 09/01/2205, HR 72, SR  *pt took a flexeril she brought this am after getting dressed in preop    Pt c/o of cramping pains in side and \"the cramps move throughout her body.\" She has an appointment w/ neurology in April.    Last ASA 1/20                Anesthesia Plan    ASA 3     general   total IV anesthesia  (Total IV Anesthesia    Patient understands anesthesia not responsible for dental damage.      Discussed risks with pt including aspiration, allergic reactions, apnea, advanced airway placement. Pt verbalized understanding. All questions answered.     )  intravenous induction     Anesthetic plan, risks, benefits, and alternatives have been provided, discussed and informed consent has been obtained with: patient.    Plan discussed with CRNA.      CODE STATUS:         "

## 2025-01-23 ENCOUNTER — ANESTHESIA (OUTPATIENT)
Dept: GASTROENTEROLOGY | Facility: HOSPITAL | Age: 75
End: 2025-01-23
Payer: MEDICARE

## 2025-01-23 ENCOUNTER — HOSPITAL ENCOUNTER (OUTPATIENT)
Facility: HOSPITAL | Age: 75
Setting detail: HOSPITAL OUTPATIENT SURGERY
Discharge: HOME | End: 2025-01-23
Attending: INTERNAL MEDICINE | Admitting: INTERNAL MEDICINE
Payer: MEDICARE

## 2025-01-23 VITALS
SYSTOLIC BLOOD PRESSURE: 104 MMHG | HEART RATE: 104 BPM | OXYGEN SATURATION: 97 % | TEMPERATURE: 97.6 F | HEIGHT: 63 IN | BODY MASS INDEX: 32.34 KG/M2 | DIASTOLIC BLOOD PRESSURE: 70 MMHG | RESPIRATION RATE: 16 BRPM | WEIGHT: 182.54 LBS

## 2025-01-23 DIAGNOSIS — R10.10 PAIN OF UPPER ABDOMEN: ICD-10-CM

## 2025-01-23 DIAGNOSIS — K21.9 GASTROESOPHAGEAL REFLUX DISEASE, UNSPECIFIED WHETHER ESOPHAGITIS PRESENT: ICD-10-CM

## 2025-01-23 DIAGNOSIS — R93.3 ABNORMAL ESOPHAGRAM: ICD-10-CM

## 2025-01-23 DIAGNOSIS — R11.2 NAUSEA AND VOMITING, UNSPECIFIED VOMITING TYPE: ICD-10-CM

## 2025-01-23 DIAGNOSIS — R13.10 DYSPHAGIA, UNSPECIFIED TYPE: ICD-10-CM

## 2025-01-23 LAB — GLUCOSE BLDC GLUCOMTR-MCNC: 209 MG/DL (ref 70–99)

## 2025-01-23 PROCEDURE — 43239 EGD BIOPSY SINGLE/MULTIPLE: CPT | Performed by: INTERNAL MEDICINE

## 2025-01-23 PROCEDURE — 25810000003 LACTATED RINGERS PER 1000 ML

## 2025-01-23 PROCEDURE — C1726 CATH, BAL DIL, NON-VASCULAR: HCPCS | Performed by: INTERNAL MEDICINE

## 2025-01-23 PROCEDURE — 88305 TISSUE EXAM BY PATHOLOGIST: CPT | Performed by: INTERNAL MEDICINE

## 2025-01-23 PROCEDURE — 25010000002 LIDOCAINE PF 2% 2 % SOLUTION: Performed by: NURSE ANESTHETIST, CERTIFIED REGISTERED

## 2025-01-23 PROCEDURE — 43249 ESOPH EGD DILATION <30 MM: CPT | Performed by: INTERNAL MEDICINE

## 2025-01-23 PROCEDURE — 82948 REAGENT STRIP/BLOOD GLUCOSE: CPT

## 2025-01-23 PROCEDURE — 25010000002 PROPOFOL 10 MG/ML EMULSION: Performed by: NURSE ANESTHETIST, CERTIFIED REGISTERED

## 2025-01-23 RX ORDER — LIDOCAINE HYDROCHLORIDE 20 MG/ML
INJECTION, SOLUTION EPIDURAL; INFILTRATION; INTRACAUDAL; PERINEURAL AS NEEDED
Status: DISCONTINUED | OUTPATIENT
Start: 2025-01-23 | End: 2025-01-23 | Stop reason: SURG

## 2025-01-23 RX ORDER — PANTOPRAZOLE SODIUM 20 MG/1
20 TABLET, DELAYED RELEASE ORAL DAILY
Qty: 30 TABLET | Refills: 1 | Status: SHIPPED | OUTPATIENT
Start: 2025-01-23 | End: 2025-01-23

## 2025-01-23 RX ORDER — LIDOCAINE HYDROCHLORIDE 10 MG/ML
0.5 INJECTION, SOLUTION INFILTRATION; PERINEURAL ONCE AS NEEDED
Status: DISCONTINUED | OUTPATIENT
Start: 2025-01-23 | End: 2025-01-23 | Stop reason: HOSPADM

## 2025-01-23 RX ORDER — PANTOPRAZOLE SODIUM 20 MG/1
20 TABLET, DELAYED RELEASE ORAL DAILY
Qty: 90 TABLET | Refills: 0 | Status: SHIPPED | OUTPATIENT
Start: 2025-01-23

## 2025-01-23 RX ORDER — PROPOFOL 10 MG/ML
VIAL (ML) INTRAVENOUS AS NEEDED
Status: DISCONTINUED | OUTPATIENT
Start: 2025-01-23 | End: 2025-01-23 | Stop reason: SURG

## 2025-01-23 RX ORDER — SODIUM CHLORIDE 0.9 % (FLUSH) 0.9 %
10 SYRINGE (ML) INJECTION AS NEEDED
Status: DISCONTINUED | OUTPATIENT
Start: 2025-01-23 | End: 2025-01-23 | Stop reason: HOSPADM

## 2025-01-23 RX ORDER — SODIUM CHLORIDE, SODIUM LACTATE, POTASSIUM CHLORIDE, CALCIUM CHLORIDE 600; 310; 30; 20 MG/100ML; MG/100ML; MG/100ML; MG/100ML
30 INJECTION, SOLUTION INTRAVENOUS CONTINUOUS
Status: DISCONTINUED | OUTPATIENT
Start: 2025-01-23 | End: 2025-01-23 | Stop reason: HOSPADM

## 2025-01-23 RX ADMIN — LIDOCAINE HYDROCHLORIDE 60 MG: 20 INJECTION, SOLUTION INTRAVENOUS at 08:43

## 2025-01-23 RX ADMIN — LIDOCAINE HYDROCHLORIDE 20 MG: 20 INJECTION, SOLUTION INTRAVENOUS at 08:44

## 2025-01-23 RX ADMIN — PROPOFOL 200 MCG/KG/MIN: 10 INJECTION, EMULSION INTRAVENOUS at 08:44

## 2025-01-23 RX ADMIN — SODIUM CHLORIDE, POTASSIUM CHLORIDE, SODIUM LACTATE AND CALCIUM CHLORIDE 30 ML/HR: 600; 310; 30; 20 INJECTION, SOLUTION INTRAVENOUS at 07:58

## 2025-01-23 RX ADMIN — PROPOFOL 30 MG: 10 INJECTION, EMULSION INTRAVENOUS at 08:45

## 2025-01-23 RX ADMIN — PROPOFOL 80 MG: 10 INJECTION, EMULSION INTRAVENOUS at 08:43

## 2025-01-23 NOTE — ANESTHESIA POSTPROCEDURE EVALUATION
Patient: Nivia Cagle    Procedure Summary       Date: 01/23/25 Room / Location: [facility] 3 / [facility]    Anesthesia Start: 0839 Anesthesia Stop: 0905    Procedure: ESOPHAGOGASTRODUODENOSCOPY WITH BIOPSIES, BALLOON DILATATION 12-15MM, DILATATION WITH 54FR MULLINS Diagnosis:       Gastroesophageal reflux disease, unspecified whether esophagitis present      Dysphagia, unspecified type      Pain of upper abdomen      Nausea and vomiting, unspecified vomiting type      Abnormal esophagram      (Gastroesophageal reflux disease, unspecified whether esophagitis present [K21.9])      (Dysphagia, unspecified type [R13.10])      (Pain of upper abdomen [R10.10])      (Nausea and vomiting, unspecified vomiting type [R11.2])      (Abnormal esophagram [R93.3])    Surgeons: Ghislaine Kilgore MD Provider: Kely Burkett CRNA    Anesthesia Type: general ASA Status: 3            Anesthesia Type: general    Vitals  Vitals Value Taken Time   /70 01/23/25 0917   Temp 36.4 °C (97.6 °F) 01/23/25 0915   Pulse 102 01/23/25 0918   Resp 16 01/23/25 0915   SpO2 98 % 01/23/25 0918   Vitals shown include unfiled device data.        Post Anesthesia Care and Evaluation    Patient location during evaluation: bedside  Patient participation: complete - patient participated  Level of consciousness: awake  Pain management: adequate    Airway patency: patent  Anesthetic complications: No anesthetic complications  PONV Status: controlled  Cardiovascular status: acceptable and stable  Respiratory status: acceptable

## 2025-01-23 NOTE — H&P
Pre Procedure History & Physical    Chief Complaint:   Nausea, vomiting, upper abd pain, dysphagia, GERD    Subjective     HPI:   73 yo F here for eval of nausea, vomiting, upper abd pain, dysphagia, GERD.    Past Medical History:   Past Medical History:   Diagnosis Date    ADHD (attention deficit hyperactivity disorder)     Allergic rhinitis     Anemia     Anxiety     Cataracts, bilateral     Chronic pain disorder     Depression 0421/2021    MOODNOT WELL CONTROLLED.  GIVEN NUMBER FOR LOCAL COUNSELOR.  WILL INCREASE TRINTELIX FROM 10MG TO 20MG RTC WEEK ER ID S/HI    Diabetes mellitus, type 2     Diverticulitis     GERD (gastroesophageal reflux disease)     Head injury     High blood pressure     Hyperlipemia     Insomnia     Migraine     EARL (obstructive sleep apnea) 04/21/2021    Panic disorder     Phlebitis     PTSD (post-traumatic stress disorder)     RLS (restless legs syndrome)        Past Surgical History:  Past Surgical History:   Procedure Laterality Date    ANKLE SURGERY  2021    BILATERAL BREAST REDUCTION  2015    BREAST BIOPSY      CARPAL TUNNEL RELEASE      CHOLECYSTECTOMY  2001    COLONOSCOPY      [facility]    COLONOSCOPY N/A 09/28/2022    Procedure: COLONOSCOPY with biopsy;  Surgeon: Ghislaine Kilgore MD;  Location: [facility] ENDOSCOPY;  Service: Gastroenterology;  Laterality: N/A;  colon polyp, diverticlosis, hemorrhoids    EYE SURGERY Right     scar tissue removal    HYSTERECTOMY      ULNAR NERVE TRANSPOSITION Right     WRIST SURGERY         Family History:  Family History   Problem Relation Age of Onset    Kidney cancer Mother     Hyperlipidemia Mother     Heart disease Father     Heart attack Father     Diabetes Father     ADD / ADHD Father     Hyperlipidemia Father     Sleep apnea Father     Restless legs syndrome Father     Hyperlipidemia Sister     Cancer Sister     Brain cancer Sister     Lung cancer Sister     Hyperlipidemia Sister     Hyperlipidemia Brother     Diabetes Brother      Heart attack Brother     Hyperlipidemia Brother     No Known Problems Paternal Uncle     No Known Problems Cousin     Brenda Hyperthermia Neg Hx        Social History:   reports that she quit smoking about 37 years ago. Her smoking use included cigarettes. She started smoking about 50 years ago. She has a 3.3 pack-year smoking history. She has never used smokeless tobacco. She reports that she does not currently use alcohol. She reports that she does not use drugs.    Medications:   Medications Prior to Admission   Medication Sig Dispense Refill Last Dose/Taking    Accu-Chek Madeline Plus test strip by Other route 4 (Four) Times a Day. use to test blood sugar   1/22/2025    Accu-Chek Softclix Lancets lancets by Other route 4 (Four) Times a Day. use to test blood sugar   1/22/2025    alendronate (FOSAMAX) 70 MG tablet Take 1 tablet by mouth Every 7 (Seven) Days.   Past Week    amantadine (SYMMETREL) 100 MG tablet    Past Week    ARIPiprazole (Abilify) 5 MG tablet Take 1 tablet by mouth Daily. 90 tablet 3 Past Week    Ascorbic Acid (VITAMIN C GUMMIE PO) Take  by mouth Daily.   Past Week    aspirin (aspirin) 81 MG EC tablet Take 1 tablet by mouth Daily. 90 tablet 99 Past Week    Blood Glucose Monitoring Suppl (FreeStyle Lite) device 1 each by Other route 4 (Four) Times a Day.   1/22/2025    buPROPion XL (WELLBUTRIN XL) 300 MG 24 hr tablet TAKE 1 TABLET BY MOUTH EVERY MORNING 90 tablet 3 Past Week    cetirizine (zyrTEC) 10 MG tablet TAKE 1 TABLET BY MOUTH EVERY DAY 90 tablet 1 Past Week    Cholecalciferol 25 MCG (1000 UT) tablet dispersible Take  by mouth 2 (Two) Times a Day.   Past Week    coenzyme Q10 50 MG capsule capsule Take  by mouth Daily.   Past Week    cyclobenzaprine (FLEXERIL) 10 MG tablet Take 1 tablet by mouth Daily As Needed for Muscle Spasms. 90 tablet 1 1/23/2025 Morning    Daily-Jelani Multivitamin tablet tablet Take 1 tablet by mouth every night at bedtime.   Past Week    doxepin (SINEquan) 25 MG capsule  "Take 1 capsule by mouth Every Night. 90 capsule 3 Past Week    ezetimibe (ZETIA) 10 MG tablet TAKE 1 TABLET BY MOUTH EVERY NIGHT AT BEDTIME 90 tablet 1 Past Week    famotidine (Pepcid) 20 MG tablet Take 1 tablet by mouth 2 (Two) Times a Day. 60 tablet 1 Past Week    FLUoxetine (PROzac) 10 MG capsule Take 1 capsule by mouth Daily. 90 capsule 3 Past Week    FLUoxetine (PROzac) 20 MG capsule Take 1 capsule by mouth Daily. 90 capsule 3 Past Week    fluticasone (Flonase) 50 MCG/ACT nasal spray 2 sprays into the nostril(s) as directed by provider Daily. Administer 2 sprays in each nostril for each dose. 16 g 6 Past Week    Inclisiran Sodium (LEQVIO SC) Inject  under the skin into the appropriate area as directed.   1/23/2025 Morning    Januvia 100 MG tablet TAKE 1 TABLET BY MOUTH DAILY 90 tablet 1 Past Week    losartan (COZAAR) 25 MG tablet TAKE 1 TABLET BY MOUTH DAILY 90 tablet 1 Past Week    metFORMIN (GLUCOPHAGE) 500 MG tablet TAKE 1 TABLET BY MOUTH EVERY MORNING AND 2 TABLETS BY MOUTH EVERY EVENING WITH MEALS 270 tablet 1 1/22/2025    montelukast (SINGULAIR) 10 MG tablet TAKE 1 TABLET BY MOUTH EVERY NIGHT 90 tablet 1 Past Week    Pramipexole Dihydrochloride ER (Mirapex ER) 1.5 MG tablet sustained-release 24 hour Take 1.5 mg by mouth Every Night. 30 tablet 5 Past Week    prednisoLONE acetate (PRED FORTE) 1 % ophthalmic suspension SHAKE LIQUID AND INSTILL 1 DROP IN RIGHT EYE TWICE DAILY   Past Week    Zinc 100 MG tablet Take 100 mg by mouth Daily.   Past Week       Allergies:  Diclofenac, New skin [benzethonium chloride], Atorvastatin, Adhesive tape, and Niacin    ROS:    Pertinent items are noted in HPI     Objective     Blood pressure 122/97, pulse 105, temperature 97.8 °F (36.6 °C), temperature source Temporal, resp. rate 19, height 160 cm (63\"), weight 82.8 kg (182 lb 8.7 oz), SpO2 98%.    Physical Exam   Constitutional: Pt is oriented to person, place, and time and well-developed, well-nourished, and in no " distress.   Mouth/Throat: Oropharynx is clear and moist.   Neck: Normal range of motion.   Cardiovascular: Normal rate, regular rhythm and normal heart sounds.    Pulmonary/Chest: Effort normal and breath sounds normal.   Abdominal: Soft. Nontender  Skin: Skin is warm and dry.   Psychiatric: Mood, memory, affect and judgment normal.     Assessment & Plan     Diagnosis:  Nausea, vomiting, upper abd pain, dysphagia, GERD    Anticipated Surgical Procedure:  EGD    The risks, benefits, and alternatives of this procedure have been discussed with the patient or the responsible party- the patient understands and agrees to proceed.

## 2025-01-24 NOTE — SIGNIFICANT NOTE
Pt stated that she had one black,loose stool this morning. RN advised pt to go to ED. Pt stated that she wasn't going until she had another one. RN advised of risks. Pt verbalized understanding.

## 2025-01-29 ENCOUNTER — TELEPHONE (OUTPATIENT)
Dept: GASTROENTEROLOGY | Facility: CLINIC | Age: 75
End: 2025-01-29
Payer: MEDICARE

## 2025-01-29 NOTE — TELEPHONE ENCOUNTER
----- Message from Livier Pathak sent at 1/27/2025 10:05 AM EST -----  EGD biopsies did show gastritis and reflux esophagitis with Saenz's esophagus.  No dysplasia.  Will need repeat EGD in 1 year for confirmation of Saenz's.  Continue PPI.  Avoid NSAIDs.  Patient to follow-up with speech as planned.  Patient to follow-up with me in 6 to 8 weeks.

## 2025-01-29 NOTE — TELEPHONE ENCOUNTER
Spoke to patient and informed of JEANNIE Rivas result note and recommendations. Verified patient understanding.    Educated on Saenz's esophagus occurs when the normal cells that line the lower part of the esophagus (called squamous cells) are replaced by a different cell type (called intestinal cells).  This is called intestinal metaplasia.    Intestinal metaplasia:  the transition of cells into a different normal type of cell, but can be associated with an increased risk of cancer.  This is a very slow process of cellular change.  General recommendations to decrease risk of cancer would be smoking cessation, abstaining from alcohol, and avoiding NSAIDs.    Patient agrees to speak with PCP regarding possible alternative therapies for chronic pain.    Intestinal metaplasia in the esophagus usually occurs as a result of repetitive damage to the inside of the esophagus caused by longstanding gastroesophageal reflux disease (GERD). The intestinal cells of Saenz's esophagus are more resistant to acid and bile than squamous cells, suggesting that these cells may develop to protect the esophagus from acid exposure.    Other risk factors for developing Saenz's esophagus include age, gender, smoking, and family history.     It's important to know if you have Saenz's as it can later turn into pre-cancer or cancer of the esophagus.    The symptoms that may be seen are usually symptoms caused by the acid reflux:  heartburn, burning in the throat or acid taste in the throat, vomiting after eating or difficulty swallowing.    Treatment is geared towards reducing or getting rid of acid reflux:  PPI medication, avoid foods that can trigger reflux (caffeine, alcohol, chocolate, peppermint, fatty foods), avoid laying down within 3 hours of eating, raise head of bed, lose weight - especially in abdominal area.    Treatment does not usually cure Saenz's esophagus, but it keeps it from getting worse.    Patient has not  yet started pantoprazole, but does plan to. Discussed why it was prescribed and how best to take it.    Patient does want to follow through with speech therapy however needs to schedule around her schedule.  Advised patient to speak with PCP at her next appointment on 02.12.25 to help get that scheduled.  Patient verbalized agreement.    Scheduled follow up with JEANNIE Rivas on 04.02.25 at 1300.    [employer] message sent.  Advised patient to reach out to our office with any GI needs.    Care Gap updated:  1 year EGD recall placed

## 2025-01-31 ENCOUNTER — OFFICE VISIT (OUTPATIENT)
Dept: PODIATRY | Facility: CLINIC | Age: 75
End: 2025-01-31
Payer: MEDICARE

## 2025-01-31 VITALS
WEIGHT: 181 LBS | OXYGEN SATURATION: 96 % | HEIGHT: 63 IN | BODY MASS INDEX: 32.07 KG/M2 | DIASTOLIC BLOOD PRESSURE: 85 MMHG | SYSTOLIC BLOOD PRESSURE: 154 MMHG | TEMPERATURE: 96.9 F | HEART RATE: 102 BPM

## 2025-01-31 DIAGNOSIS — M79.672 FOOT PAIN, BILATERAL: ICD-10-CM

## 2025-01-31 DIAGNOSIS — B35.1 ONYCHOMYCOSIS: ICD-10-CM

## 2025-01-31 DIAGNOSIS — M79.671 FOOT PAIN, BILATERAL: ICD-10-CM

## 2025-01-31 DIAGNOSIS — G62.9 NEUROPATHY: ICD-10-CM

## 2025-01-31 DIAGNOSIS — E11.42 TYPE 2 DIABETES MELLITUS WITH DIABETIC POLYNEUROPATHY, WITH LONG-TERM CURRENT USE OF INSULIN: ICD-10-CM

## 2025-01-31 DIAGNOSIS — L60.0 ONYCHOCRYPTOSIS: Primary | ICD-10-CM

## 2025-01-31 DIAGNOSIS — Z79.4 TYPE 2 DIABETES MELLITUS WITH DIABETIC POLYNEUROPATHY, WITH LONG-TERM CURRENT USE OF INSULIN: ICD-10-CM

## 2025-01-31 DIAGNOSIS — E11.8 DM FEET: ICD-10-CM

## 2025-01-31 NOTE — PROGRESS NOTES
"    [facility] - PODIATRY    Today's Date: 01/31/25    Patient Name: Nivia Cagle  MRN: [id number]  CSN: [id number]  PCP: Catalina Madsen PA-C, Last PCP Visit:  10/1/2024  Referring Provider: No ref. provider found    SUBJECTIVE     Chief Complaint   Patient presents with    Left Foot - Follow-up, Nail Problem    Right Foot - Follow-up, Nail Problem     HPI: Nivia Cagle, a 74 y.o.female, presents to clinic for painful toenail:    New, Established, New Problem:  est  Location:  Toenails  Duration:   Greater than five years  Onset:  Gradual  Nature:  sore with palpation.  Stable, worsening, improving:   Stable  Aggravating factors:  Pain with shoe gear and ambulation.  Previous Treatment: Unable to trim their own toenails.    Patient controlling diabetes via:  NIDDM    Patient states their last blood glucose was: \"164\"    Patient denies any fevers, chills, nausea, vomiting, shortness of breath, nor any other constitutional signs nor symptoms.    Medical changes: none    I have reviewed/confirmed previously documented HPI with no changes.       Past Medical History:   Diagnosis Date    ADD (attention deficit disorder)     Allergic rhinitis     Anemia     Anxiety     Bursitis     bilateral hips    Cataracts, bilateral     Chronic pain disorder     Depression 0421/2021    MOODNOT WELL CONTROLLED.  GIVEN NUMBER FOR LOCAL COUNSELOR.  WILL INCREASE TRINTELIX FROM 10MG TO 20MG RTC WEEK ER ID S/HI    Diabetes mellitus, type 2     Diverticulitis     GERD (gastroesophageal reflux disease)     Head injury     High blood pressure     Hyperlipemia     Insomnia     Migraine     EARL (obstructive sleep apnea) 04/21/2021    Panic disorder     Phlebitis     PTSD (post-traumatic stress disorder)     RLS (restless legs syndrome)      Past Surgical History:   Procedure Laterality Date    ANKLE SURGERY Bilateral 2021    BILATERAL BREAST REDUCTION  2015    BREAST BIOPSY      CARPAL TUNNEL RELEASE Bilateral     " CHOLECYSTECTOMY      COLONOSCOPY      [facility]    COLONOSCOPY N/A 2022    Procedure: COLONOSCOPY with biopsy;  Surgeon: Ghislaine Kilgore MD;  Location: [facility] ENDOSCOPY;  Service: Gastroenterology;  Laterality: N/A;  colon polyp, diverticlosis, hemorrhoids    ENDOSCOPY N/A 2025    Procedure: ESOPHAGOGASTRODUODENOSCOPY WITH BIOPSIES, BALLOON DILATATION 12-15MM, DILATATION WITH 54FR MULLINS;  Surgeon: Ghislaine Kilgore MD;  Location: [facility] ENDOSCOPY;  Service: Gastroenterology;  Laterality: N/A;  SCHATZKI'S RING, HIATAL HERNIA    EYE SURGERY Right     scar tissue removal    HYSTERECTOMY      ULNAR NERVE TRANSPOSITION Right     WRIST SURGERY       Family History   Problem Relation Age of Onset    Kidney cancer Mother     Hyperlipidemia Mother     Heart disease Father     Heart attack Father     Diabetes Father     ADD / ADHD Father     Hyperlipidemia Father     Sleep apnea Father     Restless legs syndrome Father     Hyperlipidemia Sister     Cancer Sister     Brain cancer Sister     Lung cancer Sister     Hyperlipidemia Sister     Hyperlipidemia Brother     Diabetes Brother     Heart attack Brother     Hyperlipidemia Brother     No Known Problems Paternal Uncle     No Known Problems Cousin     Malig Hyperthermia Neg Hx      Social History     Socioeconomic History    Marital status: Single   Tobacco Use    Smoking status: Former     Current packs/day: 0.00     Average packs/day: 0.3 packs/day for 13.0 years (3.3 ttl pk-yrs)     Types: Cigarettes     Start date:      Quit date:      Years since quittin.1    Smokeless tobacco: Never    Tobacco comments:     QUIT    Vaping Use    Vaping status: Never Used   Substance and Sexual Activity    Alcohol use: Not Currently     Comment: rarely    Drug use: Never    Sexual activity: Defer     Allergies   Allergen Reactions    Diclofenac Hives    New Skin [Benzethonium Chloride] Rash    Atorvastatin Myalgia    Adhesive Tape Rash  and Other (See Comments)       Rash at area of bandaid    Niacin Rash     Current Outpatient Medications   Medication Sig Dispense Refill    Accu-Chek Madeline Plus test strip by Other route 4 (Four) Times a Day. use to test blood sugar      Accu-Chek Softclix Lancets lancets by Other route 4 (Four) Times a Day. use to test blood sugar      alendronate (FOSAMAX) 70 MG tablet Take 1 tablet by mouth Every 7 (Seven) Days.      amantadine (SYMMETREL) 100 MG tablet       ARIPiprazole (Abilify) 5 MG tablet Take 1 tablet by mouth Daily. 90 tablet 3    Ascorbic Acid (VITAMIN C GUMMIE PO) Take  by mouth Daily.      aspirin (aspirin) 81 MG EC tablet Take 1 tablet by mouth Daily. 90 tablet 99    Blood Glucose Monitoring Suppl (FreeStyle Lite) device 1 each by Other route 4 (Four) Times a Day.      buPROPion XL (WELLBUTRIN XL) 300 MG 24 hr tablet TAKE 1 TABLET BY MOUTH EVERY MORNING 90 tablet 3    cetirizine (zyrTEC) 10 MG tablet TAKE 1 TABLET BY MOUTH EVERY DAY 90 tablet 1    Cholecalciferol 25 MCG (1000 UT) tablet dispersible Take  by mouth 2 (Two) Times a Day.      coenzyme Q10 50 MG capsule capsule Take  by mouth Daily.      cyclobenzaprine (FLEXERIL) 10 MG tablet Take 1 tablet by mouth Daily As Needed for Muscle Spasms. 90 tablet 1    Daily-Jelani Multivitamin tablet tablet Take 1 tablet by mouth every night at bedtime.      doxepin (SINEquan) 25 MG capsule Take 1 capsule by mouth Every Night. 90 capsule 3    ezetimibe (ZETIA) 10 MG tablet TAKE 1 TABLET BY MOUTH EVERY NIGHT AT BEDTIME 90 tablet 1    famotidine (Pepcid) 20 MG tablet Take 1 tablet by mouth 2 (Two) Times a Day. 60 tablet 1    FLUoxetine (PROzac) 10 MG capsule Take 1 capsule by mouth Daily. 90 capsule 3    FLUoxetine (PROzac) 20 MG capsule Take 1 capsule by mouth Daily. 90 capsule 3    fluticasone (Flonase) 50 MCG/ACT nasal spray 2 sprays into the nostril(s) as directed by provider Daily. Administer 2 sprays in each nostril for each dose. 16 g 6    Inclisiran  Sodium (LEQVIO SC) Inject  under the skin into the appropriate area as directed.      Januvia 100 MG tablet TAKE 1 TABLET BY MOUTH DAILY 90 tablet 1    losartan (COZAAR) 25 MG tablet TAKE 1 TABLET BY MOUTH DAILY 90 tablet 1    metFORMIN (GLUCOPHAGE) 500 MG tablet TAKE 1 TABLET BY MOUTH EVERY MORNING AND 2 TABLETS BY MOUTH EVERY EVENING WITH MEALS 270 tablet 1    montelukast (SINGULAIR) 10 MG tablet TAKE 1 TABLET BY MOUTH EVERY NIGHT 90 tablet 1    pantoprazole (PROTONIX) 20 MG EC tablet TAKE 1 TABLET BY MOUTH DAILY 90 tablet 0    Pramipexole Dihydrochloride ER (Mirapex ER) 1.5 MG tablet sustained-release 24 hour Take 1.5 mg by mouth Every Night. 30 tablet 5    prednisoLONE acetate (PRED FORTE) 1 % ophthalmic suspension SHAKE LIQUID AND INSTILL 1 DROP IN RIGHT EYE TWICE DAILY      Zinc 100 MG tablet Take 100 mg by mouth Daily.       No current facility-administered medications for this visit.     Review of Systems   Constitutional: Negative.    Skin:         Painful toenails.   All other systems reviewed and are negative.      OBJECTIVE     Vitals:    01/31/25 1034   BP: 154/85   Pulse: 102   Temp: 96.9 °F (36.1 °C)   SpO2: 96%       Body mass index is 32.06 kg/m².    Lab Results   Component Value Date    HGBA1C 7.30 (H) 10/23/2024       Lab Results   Component Value Date    GLUCOSE 169 (H) 01/21/2025    CALCIUM 10.2 01/21/2025     01/21/2025    K 4.7 01/21/2025    CO2 26.0 01/21/2025    CL 96 (L) 01/21/2025    BUN 20 01/21/2025    CREATININE 1.16 (H) 01/21/2025    EGFRIFNONA 68 02/15/2022    BCR 17.2 01/21/2025    ANIONGAP 14.0 01/21/2025       Patient seen in no apparent distress.      PHYSICAL EXAM:     Foot/Ankle Exam    GENERAL  Appearance:  chronically ill  Orientation:  AAOx3  Affect:  appropriate  Gait:  unimpaired  Assistance:  independent  Right shoe gear: casual shoe  Left shoe gear: casual shoe    VASCULAR     Right Foot Vascularity   Dorsalis pedis:  1+  Posterior tibial:  1+  Skin temperature:   warm  Edema grading:  None  CFT:  < 3 seconds  Pedal hair growth:  Absent  Varicosities:  mild varicosities     Left Foot Vascularity   Dorsalis pedis:  1+  Posterior tibial:  1+  Skin temperature:  warm  Edema grading:  None  CFT:  < 3 seconds  Pedal hair growth:  Absent  Varicosities:  mild varicosities     NEUROLOGIC     Right Foot Neurologic   Light touch sensation: diminished  Vibratory sensation: diminished  Hot/Cold sensation: diminished  Protective Sensation using [place] Monofilament:   Sites intact: 3  Sites tested: 10     Left Foot Neurologic   Normal sensation    Light touch sensation: normal  Vibratory sensation: normal  Hot/Cold sensation:  normal  Protective Sensation using [place] Monofilament:   Sites intact: 10  Sites tested: 10    MUSCLE STRENGTH     Right Foot Muscle Strength   Foot dorsiflexion:  4-  Foot plantar flexion:  4-  Foot inversion:  4-  Foot eversion:  4-     Left Foot Muscle Strength   Foot dorsiflexion:  4-  Foot plantar flexion:  4-  Foot inversion:  4-  Foot eversion:  4-    RANGE OF MOTION     Right Foot Range of Motion   Foot and ankle ROM within normal limits       Left Foot Range of Motion   Foot and ankle ROM within normal limits      DERMATOLOGIC      Right Foot Dermatologic   Skin  Right foot skin is intact.   Nails  2.  Positive for elongated, onychomycosis, abnormal thickness, subungual debris and ingrown toenail.  3.  Positive for elongated, onychomycosis, abnormal thickness, subungual debris and ingrown toenail.  4.  Positive for elongated, onychomycosis, abnormal thickness, subungual debris and ingrown toenail.  5.  Positive for elongated, onychomycosis, abnormal thickness, subungual debris and ingrown toenail.  Nails comment:  Toenails 1, 2, 3, 4, and 5     Left Foot Dermatologic   Skin  Left foot skin is intact.   Nails comment:  Toenails 1, 2, 3, 4, and 5  Nails  1.  Positive for elongated, onychomycosis, abnormal thickness, subungual debris and  ingrown toenail.  2.  Positive for elongated, onychomycosis, abnormal thickness, subungual debris and ingrown toenail.  3.  Positive for elongated, onychomycosis, abnormal thickness, subungual debris and ingrown toenail.  4.  Positive for elongated, onychomycosis, abnormally thick, subungual debris and ingrown toenail.  5.  Positive for elongated, onychomycosis, abnormally thick, subungual debris and ingrown toenail.    I have reexamined the patient the results are consistent with the previously documented exam.    ASSESSMENT/PLAN     Diagnoses and all orders for this visit:    1. Onychocryptosis (Primary)    2. Onychomycosis    3. DM feet    4. Foot pain, bilateral    5. Type 2 diabetes mellitus with diabetic polyneuropathy, with long-term current use of insulin    6. Neuropathy    Comprehensive lower extremity examination and evaluation was performed.    Discussed findings and treatment plan including risks, benefits, and treatment options with patient in detail. Patient agreed with treatment plan.    Medications and allergies reviewed.  Reviewed available blood glucose and HgB A1C lab values along with other pertinent labs.  These were discussed with the patient as to their importance of diabetic maintenance.    Toenails 2, 3, 4, 5 on Right and 1, 2, 3, 4, 5 on Left were debrided with nail nippers then filed with a Dremel nail radha.  Patient tolerated procedure well without complications.    An After Visit Summary was printed and given to the patient at discharge, including (if requested) any available informative/educational handouts regarding diagnosis, treatment, or medications. All questions were answered to patient/family satisfaction. Should symptoms fail to improve or worsen they agree to call or return to clinic or to go to the Emergency Department. Discussed the importance of following up with any needed screening tests/labs/specialist appointments and any requested follow-up recommended by me today.  Importance of maintaining follow-up discussed and patient accepts that missed appointments can delay diagnosis and potentially lead to worsening of conditions.    Return in about 9 weeks (around 4/4/2025) for Toenail Care., or sooner if acute issues arise.    I have reviewed the assessment and plan and verified the accuracy of it. No changes to assessment and plan since the information was documented. Adarsh Flores DPM 01/31/25     I have dictated this note utilizing Dragon Dictation.  Please note that portions of this note were completed with a voice recognition program.  Part of this note may be an electronic transcription/translation of spoken language to printed text using the Dragon Dictation System.      This document has been electronically signed by Adarsh Flores DPM on January 31, 2025 10:52 EST

## 2025-02-14 RX ORDER — MONTELUKAST SODIUM 10 MG/1
10 TABLET ORAL NIGHTLY
Qty: 90 TABLET | Refills: 1 | Status: SHIPPED | OUTPATIENT
Start: 2025-02-14

## 2025-02-19 ENCOUNTER — TELEMEDICINE (OUTPATIENT)
Dept: INTERNAL MEDICINE | Facility: CLINIC | Age: 75
End: 2025-02-19
Payer: MEDICARE

## 2025-02-19 DIAGNOSIS — J01.10 ACUTE NON-RECURRENT FRONTAL SINUSITIS: ICD-10-CM

## 2025-02-19 DIAGNOSIS — I10 ESSENTIAL HYPERTENSION: ICD-10-CM

## 2025-02-19 DIAGNOSIS — R79.89 ELEVATED LIVER FUNCTION TESTS: ICD-10-CM

## 2025-02-19 DIAGNOSIS — E11.65 TYPE 2 DIABETES MELLITUS WITH HYPERGLYCEMIA, WITHOUT LONG-TERM CURRENT USE OF INSULIN: Primary | ICD-10-CM

## 2025-02-19 PROBLEM — Z20.822 EXPOSURE TO COVID-19 VIRUS: Status: RESOLVED | Noted: 2022-01-04 | Resolved: 2025-02-19

## 2025-02-19 PROCEDURE — 1126F AMNT PAIN NOTED NONE PRSNT: CPT | Performed by: PHYSICIAN ASSISTANT

## 2025-02-19 PROCEDURE — 1160F RVW MEDS BY RX/DR IN RCRD: CPT | Performed by: PHYSICIAN ASSISTANT

## 2025-02-19 PROCEDURE — 1159F MED LIST DOCD IN RCRD: CPT | Performed by: PHYSICIAN ASSISTANT

## 2025-02-19 PROCEDURE — 99214 OFFICE O/P EST MOD 30 MIN: CPT | Performed by: PHYSICIAN ASSISTANT

## 2025-02-19 RX ORDER — PEN NEEDLE, DIABETIC 32 GX 1/4"
1 NEEDLE, DISPOSABLE MISCELLANEOUS DAILY
Qty: 100 EACH | Refills: 1 | Status: SHIPPED | OUTPATIENT
Start: 2025-02-19

## 2025-02-19 RX ORDER — BENZONATATE 100 MG/1
100 CAPSULE ORAL 3 TIMES DAILY PRN
Qty: 30 CAPSULE | Refills: 0 | Status: SHIPPED | OUTPATIENT
Start: 2025-02-19 | End: 2025-03-01

## 2025-02-19 NOTE — ASSESSMENT & PLAN NOTE
Unable to fully assess vitals due to telehealth. Will start abx due to length of symptoms. Increase water intake. Tylenol/motrin as needed. Encouraged daily antihistamine and flonase. To UC/ER if sx worsen/change, difficulty breathing, resp distress. Pt understands and agrees with plan.

## 2025-02-19 NOTE — ASSESSMENT & PLAN NOTE
Discussed bg may be higher due ene combo of illness, change in diet. Encourage to check electrolyte drinks and d/c if added sugars. Pt previously on insulin. Will restart 10 units at night and titrate q 3 days accordingly. Return to clinic 1 wk for in person eval.

## 2025-02-19 NOTE — PROGRESS NOTES
"Chief Complaint  Cough, diabetes    Subjective          Nivia Cagle presents to [facility]  History of Present Illness      Patient authorizes the electronic transmission of medical information and/or videoconference session. Patient understands that he/she can still pursue face-to-face consultation if desired. Patient understands that as with any technology, telemedicine does have its limitations. There is no guarantee, therefore, that this telemedicine session will eliminate the need for patient to see a provider in person if the provider feels a physical exams or vital signs are neccessary to care for the patient. Patient understands and would like to proceed with telemedicine visit at this time.Patient agrees to participate in a telemedicine evaluation performed by Catalina Madsen PA-C.    Pt is located at her home  Provider located in [place], [place]    DM: Pt states she ran out of metformin  BG has been very elevated   BG running 300s the past few wks. Pt also been sick lately. Has admitted to eating more \"comfort foods\" and junk  Has also started drinking electrolyte drinks to help cramping. She does not know if it has sugar or not.  Denies chest pain, palpitations, dizziness    Pink eye: x 2 wks  She has discharge from bilateral eyes  Denies vision loss    Pt states she has not been feeling well the past 4 wks  She has sinus pressure along forehead  Eyes are red   She has a cough  Denies wheezing, resp distress, sob  Low grade temp 99F  Runny nose, nasal congestion  She has fatigue  She is using honey tea, tylenol XS      Past Medical History:   Diagnosis Date    ADD (attention deficit disorder)     Allergic rhinitis     Anemia     Anxiety     Bursitis     bilateral hips    Cataracts, bilateral     Chronic pain disorder     Depression 0421/2021    MOODNOT WELL CONTROLLED.  GIVEN NUMBER FOR LOCAL COUNSELOR.  WILL INCREASE TRINTELIX FROM 10MG TO 20MG RTC " WEEK ER ID S/HI    Diabetes mellitus, type 2     Diverticulitis     GERD (gastroesophageal reflux disease)     Head injury     High blood pressure     Hyperlipemia     Insomnia     Migraine     EARL (obstructive sleep apnea) 04/21/2021    Panic disorder     Phlebitis     PTSD (post-traumatic stress disorder)     RLS (restless legs syndrome)         Past Surgical History:   Procedure Laterality Date    ANKLE SURGERY Bilateral 2021    BILATERAL BREAST REDUCTION  2015    BREAST BIOPSY      CARPAL TUNNEL RELEASE Bilateral     CHOLECYSTECTOMY  2001    COLONOSCOPY      [facility]    COLONOSCOPY N/A 09/28/2022    Procedure: COLONOSCOPY with biopsy;  Surgeon: Ghislaine Kilgore MD;  Location: [facility] ENDOSCOPY;  Service: Gastroenterology;  Laterality: N/A;  colon polyp, diverticlosis, hemorrhoids    ENDOSCOPY N/A 1/23/2025    Procedure: ESOPHAGOGASTRODUODENOSCOPY WITH BIOPSIES, BALLOON DILATATION 12-15MM, DILATATION WITH 54FR MULLINS;  Surgeon: Ghislaine Kilgore MD;  Location: [facility] ENDOSCOPY;  Service: Gastroenterology;  Laterality: N/A;  SCHATZKI'S RING, HIATAL HERNIA    EYE SURGERY Right     scar tissue removal    HYSTERECTOMY      ULNAR NERVE TRANSPOSITION Right     WRIST SURGERY          Current Outpatient Medications on File Prior to Visit   Medication Sig Dispense Refill    Accu-Chek Madeline Plus test strip by Other route 4 (Four) Times a Day. use to test blood sugar      Accu-Chek Softclix Lancets lancets by Other route 4 (Four) Times a Day. use to test blood sugar      alendronate (FOSAMAX) 70 MG tablet Take 1 tablet by mouth Every 7 (Seven) Days.      amantadine (SYMMETREL) 100 MG tablet       ARIPiprazole (Abilify) 5 MG tablet Take 1 tablet by mouth Daily. 90 tablet 3    Ascorbic Acid (VITAMIN C GUMMIE PO) Take  by mouth Daily.      aspirin (aspirin) 81 MG EC tablet Take 1 tablet by mouth Daily. 90 tablet 99    Blood Glucose Monitoring Suppl (FreeStyle Lite) device 1 each by Other route 4 (Four) Times  a Day.      buPROPion XL (WELLBUTRIN XL) 300 MG 24 hr tablet TAKE 1 TABLET BY MOUTH EVERY MORNING 90 tablet 3    cetirizine (zyrTEC) 10 MG tablet TAKE 1 TABLET BY MOUTH EVERY DAY 90 tablet 1    Cholecalciferol 25 MCG (1000 UT) tablet dispersible Take  by mouth 2 (Two) Times a Day.      coenzyme Q10 50 MG capsule capsule Take  by mouth Daily.      cyclobenzaprine (FLEXERIL) 10 MG tablet Take 1 tablet by mouth Daily As Needed for Muscle Spasms. 90 tablet 1    Daily-Jelani Multivitamin tablet tablet Take 1 tablet by mouth every night at bedtime.      doxepin (SINEquan) 25 MG capsule Take 1 capsule by mouth Every Night. 90 capsule 3    ezetimibe (ZETIA) 10 MG tablet TAKE 1 TABLET BY MOUTH EVERY NIGHT AT BEDTIME 90 tablet 1    famotidine (Pepcid) 20 MG tablet Take 1 tablet by mouth 2 (Two) Times a Day. 60 tablet 1    FLUoxetine (PROzac) 10 MG capsule Take 1 capsule by mouth Daily. 90 capsule 3    FLUoxetine (PROzac) 20 MG capsule Take 1 capsule by mouth Daily. 90 capsule 3    fluticasone (Flonase) 50 MCG/ACT nasal spray 2 sprays into the nostril(s) as directed by provider Daily. Administer 2 sprays in each nostril for each dose. 16 g 6    Inclisiran Sodium (LEQVIO SC) Inject  under the skin into the appropriate area as directed.      Januvia 100 MG tablet TAKE 1 TABLET BY MOUTH DAILY 90 tablet 1    losartan (COZAAR) 25 MG tablet TAKE 1 TABLET BY MOUTH DAILY 90 tablet 1    metFORMIN (GLUCOPHAGE) 500 MG tablet TAKE 1 TABLET BY MOUTH EVERY MORNING AND 2 TABLETS BY MOUTH EVERY EVENING WITH MEALS 270 tablet 1    montelukast (SINGULAIR) 10 MG tablet TAKE 1 TABLET BY MOUTH EVERY NIGHT 90 tablet 1    pantoprazole (PROTONIX) 20 MG EC tablet TAKE 1 TABLET BY MOUTH DAILY 90 tablet 0    Pramipexole Dihydrochloride ER (Mirapex ER) 1.5 MG tablet sustained-release 24 hour Take 1.5 mg by mouth Every Night. 30 tablet 5    prednisoLONE acetate (PRED FORTE) 1 % ophthalmic suspension SHAKE LIQUID AND INSTILL 1 DROP IN RIGHT EYE TWICE DAILY       Zinc 100 MG tablet Take 100 mg by mouth Daily.       No current facility-administered medications on file prior to visit.        Allergies   Allergen Reactions    Diclofenac Hives    New Skin [Benzethonium Chloride] Rash    Atorvastatin Myalgia    Adhesive Tape Rash and Other (See Comments)       Rash at area of bandaid    Niacin Rash       Social History     Tobacco Use   Smoking Status Former    Current packs/day: 0.00    Average packs/day: 0.3 packs/day for 13.0 years (3.3 ttl pk-yrs)    Types: Cigarettes    Start date:     Quit date:     Years since quittin.1   Smokeless Tobacco Never   Tobacco Comments    QUIT           Objective   Vital Signs:   There were no vitals taken for this visit.    Physical Exam  Constitutional:       Appearance: Normal appearance.   HENT:      Head: Normocephalic.      Nose: Congestion present.   Pulmonary:      Effort: Pulmonary effort is normal. No respiratory distress.   Musculoskeletal:      Cervical back: No rigidity.   Neurological:      Mental Status: She is alert and oriented to person, place, and time.   Psychiatric:         Mood and Affect: Mood normal.        Result Review :   The following data was reviewed by: Catalina Madsen PA-C on 2025:  Common labs          10/23/2024    10:12 2024    10:29 2025    13:12   Common Labs   Glucose 180   169    BUN 15   20    Creatinine 0.99  0.99  1.16    Sodium 141   136    Potassium 4.2   4.7    Chloride 102   96    Calcium 9.1  9.3  10.2    Albumin 4.1   4.8    Total Bilirubin 0.6   1.0    Alkaline Phosphatase 71   92    AST (SGOT) 23   34    ALT (SGPT) 33   44    WBC 6.85   8.57    Hemoglobin 13.6   14.9    Hematocrit 40.3   43.9    Platelets 228   278    Total Cholesterol 181      Triglycerides 242      HDL Cholesterol 49      LDL Cholesterol  91      Hemoglobin A1C 7.30                     Assessment and Plan    Diagnoses and all orders for this visit:    1. Type 2 diabetes mellitus with  hyperglycemia, without long-term current use of insulin (Primary)  Assessment & Plan:  Discussed bg may be higher due ene combo of illness, change in diet. Encourage to check electrolyte drinks and d/c if added sugars. Pt previously on insulin. Will restart 10 units at night and titrate q 3 days accordingly. Return to clinic 1 wk for in person eval.    Orders:  -     Hemoglobin A1c  -     Lipid Panel  -     Microalbumin / Creatinine Urine Ratio - Urine, Clean Catch    2. Essential hypertension  Comments:  unable to assess due to telehealth    3. Elevated liver function tests  Comments:  reviewed recent labs. Will repeat LFT  Orders:  -     Comprehensive Metabolic Panel    4. Acute non-recurrent frontal sinusitis  Assessment & Plan:  Unable to fully assess vitals due to telehealth. Will start abx due to length of symptoms. Increase water intake. Tylenol/motrin as needed. Encouraged daily antihistamine and flonase. To UC/ER if sx worsen/change, difficulty breathing, resp distress. Pt understands and agrees with plan.      Other orders  -     insulin degludec (TRESIBA FLEXTOUCH) 100 UNIT/ML solution pen-injector injection; Inject 10 Units under the skin into the appropriate area as directed Daily.  Dispense: 10 mL; Refill: 1  -     Insulin Pen Needle (BD Pen Needle Micro U/F) 32G X 6 MM misc; Use 1 each Daily.  Dispense: 100 each; Refill: 1  -     amoxicillin-clavulanate (AUGMENTIN) 875-125 MG per tablet; Take 1 tablet by mouth 2 (Two) Times a Day for 10 days.  Dispense: 20 tablet; Refill: 0  -     benzonatate (Tessalon Perles) 100 MG capsule; Take 1 capsule by mouth 3 (Three) Times a Day As Needed for Cough for up to 10 days.  Dispense: 30 capsule; Refill: 0        Follow Up   No follow-ups on file.  Patient was given instructions and counseling regarding her condition or for health maintenance advice. Please see specific information pulled into the AVS if appropriate.

## 2025-02-20 ENCOUNTER — PRIOR AUTHORIZATION (OUTPATIENT)
Dept: INTERNAL MEDICINE | Facility: CLINIC | Age: 75
End: 2025-02-20
Payer: MEDICARE

## 2025-02-20 ENCOUNTER — TELEMEDICINE (OUTPATIENT)
Dept: PSYCHIATRY | Facility: CLINIC | Age: 75
End: 2025-02-20
Payer: MEDICARE

## 2025-02-20 DIAGNOSIS — F51.05 INSOMNIA DUE TO MENTAL CONDITION: ICD-10-CM

## 2025-02-20 DIAGNOSIS — F43.10 POST TRAUMATIC STRESS DISORDER (PTSD): Primary | ICD-10-CM

## 2025-02-20 DIAGNOSIS — F33.40 RECURRENT MAJOR DEPRESSIVE DISORDER IN REMISSION: ICD-10-CM

## 2025-02-20 DIAGNOSIS — F41.1 GENERALIZED ANXIETY DISORDER: ICD-10-CM

## 2025-02-20 RX ORDER — FLUOXETINE 10 MG/1
10 CAPSULE ORAL DAILY
Qty: 90 CAPSULE | Refills: 3 | Status: SHIPPED | OUTPATIENT
Start: 2025-02-20

## 2025-02-20 NOTE — TREATMENT PLAN
Anxiety:  2/10 progressing    Goals:  Patient will develop and implement behavioral and cognitive strategies to reduce anxiety and irrational fears, monthly, using Rogerian psychotherapy and CBT where appropriate.  Help patient explore past emotional issues in relation to present anxiety, monthly, until remission of symptoms, using Rogerian psychotherapy and CBT where appropriate.  Help patient develop an awareness of their cognitive and physical responses to anxiety, monthly, until remission of symptoms, using Rogerian psychotherapy and CBT where appropriate.          Depression:  2/10 progressing    Goals:  Patient will demonstrate the ability to initiate new constructive life skills outside of sessions on a consistent basis, monthly, using Rogerian psychotherapy and CBT where appropriate.  Assist patient in setting attainable activities of daily living goals, monthly, using Rogerian psychotherapy and CBT where appropriate.  Provide education about depression, monthly, using Rogerian psychotherapy and CBT where appropriate.  Assist patient in developing healthy coping strategies, monthly, using Rogerian psychotherapy and CBT where appropriate.    Rogerian psychotherapy and CBT will be used to help accomplish the above goals and manage depression and anxiety related to family conflict, relationships, medical conditions, seasonal changes       I have discussed and reviewed this treatment plan with the patient.      Reviewed by Vicky Barth MD   02/20/2025

## 2025-02-20 NOTE — PROGRESS NOTES
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 Grans, Absolute 01/21/2025 0.05  0.00 - 0.05 10*3/mm3 Final    nRBC 01/21/2025 0.0  0.0 - 0.2 /100 WBC Final   Lab on 11/08/2024   Component Date Value Ref Range Status    Creatinine 11/08/2024 0.99  0.57 - 1.00 mg/dL Final    eGFR 11/08/2024 60.7  >60.0 mL/min/1.73 Final    25 Hydroxy, Vitamin D 11/08/2024 35.9  30.0 - 100.0 ng/ml Final    Calcium 11/08/2024 9.3  8.6 - 10.5 mg/dL Final   Orders Only on 10/23/2024   Component Date Value Ref Range Status    Lactoferrin, Qual 10/23/2024 Negative  Negative Final    Toxigenic C. difficile by PCR 10/23/2024 Negative  Negative Final    027 Toxin 10/23/2024 Presumptive Negative   Final    Salmonella 10/23/2024 Not Detected  Not Detected Final    Campylobacter 10/23/2024 Not Detected  Not Detected Final    Shigella/Enteroinvasive E. coli (E* 10/23/2024 Not Detected  Not Detected Final    Shiga-like toxin-producing E. coli* 10/23/2024 Not Detected  Not Detected Final   Clinical Support on 10/23/2024   Component Date Value Ref Range Status    Hemoglobin A1C 10/23/2024 7.30 (H)  4.80 - 5.60 % Final   Office Visit on 10/11/2024   Component Date Value Ref Range Status     Glucose 10/23/2024 180 (H)  65 - 99 mg/dL Final    BUN 10/23/2024 15  8 - 23 mg/dL Final    Creatinine 10/23/2024 0.99  0.57 - 1.00 mg/dL Final    Sodium 10/23/2024 141  136 - 145 mmol/L Final    Potassium 10/23/2024 4.2  3.5 - 5.2 mmol/L Final    Chloride 10/23/2024 102  98 - 107 mmol/L Final    CO2 10/23/2024 24.5  22.0 - 29.0 mmol/L Final    Calcium 10/23/2024 9.1  8.6 - 10.5 mg/dL Final    Total Protein 10/23/2024 7.1  6.0 - 8.5 g/dL Final    Albumin 10/23/2024 4.1  3.5 - 5.2 g/dL Final    ALT (SGPT) 10/23/2024 33  1 - 33 U/L Final    AST (SGOT) 10/23/2024 23  1 - 32 U/L Final    Alkaline Phosphatase 10/23/2024 71  39 - 117 U/L Final    Total Bilirubin 10/23/2024 0.6  0.0 - 1.2 mg/dL Final    Globulin 10/23/2024 3.0  gm/dL Final    A/G Ratio 10/23/2024 1.4  g/dL Final    BUN/Creatinine Ratio 10/23/2024 15.2  7.0 - 25.0 Final    Anion Gap 10/23/2024 14.5  5.0 - 15.0 mmol/L Final    eGFR 10/23/2024 60.7  >60.0 mL/min/1.73 Final    TSH 10/23/2024 1.130  0.270 - 4.200 uIU/mL Final    Total Cholesterol 10/23/2024 181  0 - 200 mg/dL Final    Triglycerides 10/23/2024 242 (H)  0 - 150 mg/dL Final    HDL Cholesterol 10/23/2024 49  40 - 60 mg/dL Final    LDL Cholesterol  10/23/2024 91  0 - 100 mg/dL Final    VLDL Cholesterol 10/23/2024 41 (H)  5 - 40 mg/dL Final    LDL/HDL Ratio 10/23/2024 1.71   Final    Magnesium 10/23/2024 1.7  1.6 - 2.4 mg/dL Final    WBC 10/23/2024 6.85  3.40 - 10.80 10*3/mm3 Final    RBC 10/23/2024 4.39  3.77 - 5.28 10*6/mm3 Final    Hemoglobin 10/23/2024 13.6  12.0 - 15.9 g/dL Final    Hematocrit 10/23/2024 40.3  34.0 - 46.6 % Final    MCV 10/23/2024 91.8  79.0 - 97.0 fL Final    MCH 10/23/2024 31.0  26.6 - 33.0 pg Final    MCHC 10/23/2024 33.7  31.5 - 35.7 g/dL Final    RDW 10/23/2024 12.4  12.3 - 15.4 % Final    RDW-SD 10/23/2024 41.3  37.0 - 54.0 fl Final    MPV 10/23/2024 12.4 (H)  6.0 - 12.0 fL Final    Platelets 10/23/2024 228  140 - 450 10*3/mm3 Final    Neutrophil % 10/23/2024 65.9  42.7 -  76.0 % Final    Lymphocyte % 10/23/2024 20.1  19.6 - 45.3 % Final    Monocyte % 10/23/2024 9.9  5.0 - 12.0 % Final    Eosinophil % 10/23/2024 3.4  0.3 - 6.2 % Final    Basophil % 10/23/2024 0.4  0.0 - 1.5 % Final    Immature Grans % 10/23/2024 0.3  0.0 - 0.5 % Final    Neutrophils, Absolute 10/23/2024 4.51  1.70 - 7.00 10*3/mm3 Final    Lymphocytes, Absolute 10/23/2024 1.38  0.70 - 3.10 10*3/mm3 Final    Monocytes, Absolute 10/23/2024 0.68  0.10 - 0.90 10*3/mm3 Final    Eosinophils, Absolute 10/23/2024 0.23  0.00 - 0.40 10*3/mm3 Final    Basophils, Absolute 10/23/2024 0.03  0.00 - 0.20 10*3/mm3 Final    Immature Grans, Absolute 10/23/2024 0.02  0.00 - 0.05 10*3/mm3 Final    nRBC 10/23/2024 0.0  0.0 - 0.2 /100 WBC Final       EKG Results:  No orders to display       Imaging Results:  No Images in the past 120 days found..      Assessment & Plan   Diagnoses and all orders for this visit:    1. Post traumatic stress disorder (PTSD) (Primary)  -     FLUoxetine (PROzac) 10 MG capsule; Take 1 capsule by mouth Daily.  Dispense: 90 capsule; Refill: 3  -     FLUoxetine (PROzac) 20 MG capsule; Take 1 capsule by mouth Daily.  Dispense: 90 capsule; Refill: 3    2. Generalized anxiety disorder  -     FLUoxetine (PROzac) 10 MG capsule; Take 1 capsule by mouth Daily.  Dispense: 90 capsule; Refill: 3  -     FLUoxetine (PROzac) 20 MG capsule; Take 1 capsule by mouth Daily.  Dispense: 90 capsule; Refill: 3    3. Insomnia due to mental condition  -     FLUoxetine (PROzac) 10 MG capsule; Take 1 capsule by mouth Daily.  Dispense: 90 capsule; Refill: 3    4. Recurrent major depressive disorder in remission  -     FLUoxetine (PROzac) 10 MG capsule; Take 1 capsule by mouth Daily.  Dispense: 90 capsule; Refill: 3  -     FLUoxetine (PROzac) 20 MG capsule; Take 1 capsule by mouth Daily.  Dispense: 90 capsule; Refill: 3      INITIAL presentation 2021 most consistent with major depressive disorder, recurrent, moderate to severe, with anxious  distress.  PTSD.  Rule out personality disorder, cluster B specifically.  Rule out hypomania as patient was very difficult to interrupt today.    02/20/2025: Stable, well, no med changes.     Acknowledged and normalized patient's thoughts, feelings, and concerns. Allowed patient to freely discuss and process issues, such as:  Anxiety and depression regarding family conflict/relationships.  Anxiety and depression regarding medical conditions.  ... using Rogerian psychotherapeutic techniques including unconditional positive regard, reflective listening, and demonstrating clear empathy, with the goal of ameliorating symptoms and maintaining, restoring, or improving self-esteem, adaptive skills, and ego or psychological functions (Ara et al. 1991), the long-term goal of which is to develop a better, healthier perspective and help the patient bear their circumstances more easily.  Time (minutes) spent providing supportive psychotherapy: 16  (This time is exclusive to the therapy session and separate from the time spent on activities used to meet the criteria for the E/M service (history, exam, medical decision-making).)  Start: 12:38  Stop: 12:54  Functional status: mild impairment  Treatment plan: Medication management and supportive psychotherapy  Prognosis: good  Progress: stable  4w    12/11/2024: Stable, well, no med changes.     09/26/2024: Stable, well, no med changes.     08/28/2024: Mild MDD, but maintenance insomnia. Increase abilify. [place] with Baptism, prayer.    07/31/2024: Not depressed, persistent maintenance insomnia. Restarted her meds 10 days ago, the above improving. No changes, as her s/sx are related to stopping her meds for a time. Also has restarted light box.    07/02/2024: Improving mood, but persistent insomnia. Increase abilify. Constipation issues, but had 2 BM recently. Consider gabapentin as neuropathic pain impeding sleep.    06/04/2024: Insomnia, but also more activity, and possibly  "some depressed mood. Restart abilify; stopped previously thinking it wasn't covered by insurance, but this may have just been an early request that was denied. Watch swallowing issues. Consider lamictal, vraylar, rexulti, low dose seroquel.    4/30/24: Insomnia, and some procrastination. Increase doxepin. Procrastinating on painting a certain painting; processed why. Monitor.    4/3: Stable, but monitor if worsening depression creeps in.    3/4/24: Pt ist stable, but lost her 20 mg dose of prozac. Re-sent in. Insurance won't pay for abilify. DC abilify. Some unusual issues with swallowing. Processed dreams about her mother. Mom isn't happy, \"it's not good enough.\"    2/6: Stable, discussed dietary changes involving decreasing sugar. Sugar is comforting and helps her depression. Also discussed stevia. Now using light box and sleeping a little better.    1/5: Seasonal depression, start light box up again. May have to make further med changes.    12/14: Add melatonin for maintenance insomnia. Otherwise stable. Seeing a movie for [employer]. Got all her [employer] cards mailed out.    11/9: Doing well, isolated insomnia. Stop trazodone and start doxepin. Consider lunesta, belsomra, quiviviq. She stopped melatonin on her own.    8/11: Stable, well, no changes. Painting, having people over. Counseled to start light box in September, reiterated instructions. Will be inducted into the [employer]ity of [facility] and [facility] tomorrow. She's been working on it for a year.    7/11: Short visit as patient sleepy. Unusual sleep pattern recently, but MH is fine. Pt will call her son tonight to ensure someone is around when she goes back to sleep. She will call PCP about this tomorrow. Close follow up with me in 4 wks.    4/27: Stable, well. Restart melatonin for insomnia.     3/2: Prozac and BLT helped. Situational stressor in son, no changes. Better. Watch insomnia.     1/20: Start light box, increase prozac for dep. "     12/9: Some insomnia. Increase melatonin.     10/25: Doing well. Increase prozac back to baseline dose (inadvertently reduced, she has been stable on a higher dose, so we should go back to that). Some initial insomnia, increase trazodone to 75 mg nightly. She is enjoying the Fall.     9/8: Start light box. Close follow up in case this is worsening depression.     7/27: Well, stable.     6/14: Better, no changes.     5/3: Increase prozac and melatonin.    Visit Diagnoses:    ICD-10-CM ICD-9-CM   1. Post traumatic stress disorder (PTSD)  F43.10 309.81   2. Generalized anxiety disorder  F41.1 300.02   3. Insomnia due to mental condition  F51.05 300.9     327.02   4. Recurrent major depressive disorder in remission  F33.40 296.35       PLAN:  Risk Assessment: Risk of self-harm acutely is moderate. Risk factors include chronic depressive disorder, possible personality disorder, recent psychosocial stressors (pandemic, moving). Protective factors include no present SI, no history of suicide attempts or self-harm in the past, no access to weapons, minimal AODA, healthcare seeking, future orientation, willingness to engage in care. Risk of self-harm chronically is also moderate, but could be further elevated in the event of treatment noncompliance and/or AODA.  Safety: No acute safety concerns.  Medications:   CONTINUE light box 9/1 - 3/31.  CONTINUE melatonin 30 mg p.o. nightly.  Risks, benefits, side effects discussed with patient including sedation, dizziness/falls risk, GI upset.  Do not use before operating vehicle, vessel, or machine. After discussion of these risks and benefits, the patient voiced understanding and agreed to proceed.   CONTINUE doxepin 25 mg qhs. Risks, benefits, side effects discussed with patient including GI upset, sedation, dizziness/falls risk, grogginess the following day, prolongation of the QTc interval.  After discussion of these risks and benefits, the patient voiced understanding and  agreed to proceed.    CONTINUE bupropion xl 300 mg daily. Risks, benefits, alternatives discussed with patient including nausea, GI upset, increased energy, exacerbation of irritability, insomnia, lowering of seizure threshold.  After discussion of these risks and benefits, the patient voiced understanding and agreed to proceed.  CONTINUE Prozac 30 mg a day (HIGHEST dose is 40 given that she is also on bupropion). Risks, benefits, alternatives discussed with patient including GI upset, nausea vomiting diarrhea, theoretical decrease of seizure threshold predisposing the patient to a slightly higher seizure risk, headaches, sexual dysfunction, serotonin syndrome, bleeding risk, increased suicidality in patients 24 years and younger.  After discussion of these risks and benefits, the patient voiced understanding and agreed to proceed.  CONTINUE Abilify 5 mg p.o. nightly. Previously stopped 2/2 cost, 3/24, but this may have been rejected by insurance simply because pt requested too soon (she states). Risks, benefits, alternatives discussed with patient including increased energy, exacerbation of irritability, akathisia, movement issues, GI upset, orthostatic hypotension, increased appetite, tardive dyskinesia.  Use care when operating vehicle, vessel, or machine. After discussion of these risks and benefits, the patient voiced understanding and agreed to proceed.  S/P:  trazodone 100 mg PO QHS. No longer effective 11/23.  Mirtazapine 45 mg daily (RLS)  Ambien caused double vision, and possibly hallucinations  Was on lithium in the past: dizziness and falls, and hair curled.  Therapy: referred to [facility] 12[facility]7.  Labs/Studies: s/p TMS referral.  Follow Up: 6 weeks. (prefers 4 wks)      TREATMENT PLAN/GOALS: Continue supportive psychotherapy efforts and medications as indicated. Treatment and medication options discussed during today's visit. Patient acknowledged and verbally consented to continue with current treatment  plan and was educated on the importance of compliance with treatment and follow-up appointments.    MEDICATION ISSUES:  SAPNA reviewed as expected.  Discussed medication options and treatment plan of prescribed medication as well as the risks, benefits, and side effects including potential falls, possible impaired driving and metabolic adversities among others. Patient is agreeable to call the office with any worsening of symptoms or onset of side effects. Patient is agreeable to call 911 or go to the nearest ER should he/she begin having SI/HI. No medication side effects or related complaints today.     MEDS ORDERED DURING VISIT:  New Medications Ordered This Visit   Medications    FLUoxetine (PROzac) 10 MG capsule     Sig: Take 1 capsule by mouth Daily.     Dispense:  90 capsule     Refill:  3     Total dose is 20+10=30 mg daily. Thank you for the help. Please call with questions: [phone].    FLUoxetine (PROzac) 20 MG capsule     Sig: Take 1 capsule by mouth Daily.     Dispense:  90 capsule     Refill:  3     Total dose is 20+10=30 mg daily. Thank you for the help. Please call with questions: [phone].       Return in about 4 weeks (around 3/20/2025) for Video visit.         This document has been electronically signed by Vicky Barth MD  February 20, 2025 12:57 EST      Part of this note may be an electronic transcription/translation of spoken language to printed text using the Dragon Dictation System.

## 2025-02-20 NOTE — TELEPHONE ENCOUNTER
BD Pen Needle Sallie 2nd Gen 32G X 4 MM    This approval authorizes your coverage from 11/22/2024 - 02/20/2026.

## 2025-02-24 ENCOUNTER — HOSPITAL ENCOUNTER (OUTPATIENT)
Facility: HOSPITAL | Age: 75
Setting detail: THERAPIES SERIES
Discharge: HOME | End: 2025-02-24
Payer: MEDICARE

## 2025-02-24 DIAGNOSIS — R13.12 OROPHARYNGEAL DYSPHAGIA: Primary | ICD-10-CM

## 2025-02-24 PROCEDURE — 92610 EVALUATE SWALLOWING FUNCTION: CPT | Performed by: SPEECH-LANGUAGE PATHOLOGIST
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
## 2025-02-27 ENCOUNTER — OFFICE VISIT (OUTPATIENT)
Dept: INTERNAL MEDICINE | Facility: CLINIC | Age: 75
End: 2025-02-27
Payer: MEDICARE

## 2025-02-27 VITALS
DIASTOLIC BLOOD PRESSURE: 90 MMHG | WEIGHT: 179 LBS | SYSTOLIC BLOOD PRESSURE: 126 MMHG | HEIGHT: 63 IN | TEMPERATURE: 97.4 F | OXYGEN SATURATION: 95 % | HEART RATE: 103 BPM | RESPIRATION RATE: 16 BRPM | BODY MASS INDEX: 31.71 KG/M2

## 2025-02-27 DIAGNOSIS — R68.89 FORGETFULNESS: ICD-10-CM

## 2025-02-27 DIAGNOSIS — Z00.00 MEDICARE ANNUAL WELLNESS VISIT, SUBSEQUENT: Primary | ICD-10-CM

## 2025-02-27 DIAGNOSIS — I10 ESSENTIAL HYPERTENSION: ICD-10-CM

## 2025-02-27 DIAGNOSIS — E11.65 TYPE 2 DIABETES MELLITUS WITH HYPERGLYCEMIA, WITHOUT LONG-TERM CURRENT USE OF INSULIN: ICD-10-CM

## 2025-02-27 DIAGNOSIS — M79.671 RIGHT FOOT PAIN: ICD-10-CM

## 2025-02-27 LAB
ALBUMIN SERPL-MCNC: 4.4 G/DL (ref 3.5–5.2)
ALBUMIN UR-MCNC: 1.7 MG/DL
ALBUMIN/GLOB SERPL: 1.5 G/DL
ALP SERPL-CCNC: 78 U/L (ref 39–117)
ALT SERPL W P-5'-P-CCNC: 34 U/L (ref 1–33)
ANION GAP SERPL CALCULATED.3IONS-SCNC: 11 MMOL/L (ref 5–15)
AST SERPL-CCNC: 27 U/L (ref 1–32)
BILIRUB SERPL-MCNC: 0.9 MG/DL (ref 0–1.2)
BUN SERPL-MCNC: 17 MG/DL (ref 8–23)
BUN/CREAT SERPL: 14.4 (ref 7–25)
CALCIUM SPEC-SCNC: 10.1 MG/DL (ref 8.6–10.5)
CHLORIDE SERPL-SCNC: 102 MMOL/L (ref 98–107)
CHOLEST SERPL-MCNC: 116 MG/DL (ref 0–200)
CO2 SERPL-SCNC: 25 MMOL/L (ref 22–29)
CREAT SERPL-MCNC: 1.18 MG/DL (ref 0.57–1)
CREAT UR-MCNC: 160.1 MG/DL
EGFRCR SERPLBLD CKD-EPI 2021: 49.2 ML/MIN/1.73
GLOBULIN UR ELPH-MCNC: 2.9 GM/DL
GLUCOSE SERPL-MCNC: 165 MG/DL (ref 65–99)
HBA1C MFR BLD: 8.9 % (ref 4.8–5.6)
HDLC SERPL-MCNC: 43 MG/DL (ref 40–60)
LDLC SERPL CALC-MCNC: 43 MG/DL (ref 0–100)
LDLC/HDLC SERPL: 0.83 {RATIO}
MICROALBUMIN/CREAT UR: 10.6 MG/G (ref 0–29)
POTASSIUM SERPL-SCNC: 4.2 MMOL/L (ref 3.5–5.2)
PROT SERPL-MCNC: 7.3 G/DL (ref 6–8.5)
SODIUM SERPL-SCNC: 138 MMOL/L (ref 136–145)
TRIGL SERPL-MCNC: 186 MG/DL (ref 0–150)
VLDLC SERPL-MCNC: 30 MG/DL (ref 5–40)

## 2025-02-27 PROCEDURE — 82570 ASSAY OF URINE CREATININE: CPT | Performed by: PHYSICIAN ASSISTANT

## 2025-02-27 PROCEDURE — 3074F SYST BP LT 130 MM HG: CPT | Performed by: PHYSICIAN ASSISTANT

## 2025-02-27 PROCEDURE — 1160F RVW MEDS BY RX/DR IN RCRD: CPT | Performed by: PHYSICIAN ASSISTANT

## 2025-02-27 PROCEDURE — 1170F FXNL STATUS ASSESSED: CPT | Performed by: PHYSICIAN ASSISTANT

## 2025-02-27 PROCEDURE — 1159F MED LIST DOCD IN RCRD: CPT | Performed by: PHYSICIAN ASSISTANT

## 2025-02-27 PROCEDURE — 99214 OFFICE O/P EST MOD 30 MIN: CPT | Performed by: PHYSICIAN ASSISTANT

## 2025-02-27 PROCEDURE — 1126F AMNT PAIN NOTED NONE PRSNT: CPT | Performed by: PHYSICIAN ASSISTANT

## 2025-02-27 PROCEDURE — 36415 COLL VENOUS BLD VENIPUNCTURE: CPT | Performed by: PHYSICIAN ASSISTANT

## 2025-02-27 PROCEDURE — 3080F DIAST BP >= 90 MM HG: CPT | Performed by: PHYSICIAN ASSISTANT

## 2025-02-27 PROCEDURE — 82043 UR ALBUMIN QUANTITATIVE: CPT | Performed by: PHYSICIAN ASSISTANT

## 2025-02-27 PROCEDURE — 80061 LIPID PANEL: CPT | Performed by: PHYSICIAN ASSISTANT

## 2025-02-27 PROCEDURE — 80053 COMPREHEN METABOLIC PANEL: CPT | Performed by: PHYSICIAN ASSISTANT

## 2025-02-27 PROCEDURE — G0439 PPPS, SUBSEQ VISIT: HCPCS | Performed by: PHYSICIAN ASSISTANT

## 2025-02-27 PROCEDURE — 83036 HEMOGLOBIN GLYCOSYLATED A1C: CPT | Performed by: PHYSICIAN ASSISTANT

## 2025-02-27 RX ORDER — PEN NEEDLE, DIABETIC 32 GX 1/4"
1 NEEDLE, DISPOSABLE MISCELLANEOUS DAILY
Qty: 100 EACH | Refills: 1 | Status: SHIPPED | OUTPATIENT
Start: 2025-02-27 | End: 2025-02-28 | Stop reason: SDUPTHER

## 2025-02-27 RX ORDER — ASPIRIN 81 MG/1
81 TABLET ORAL DAILY
Qty: 90 TABLET | Refills: 1 | Status: SHIPPED | OUTPATIENT
Start: 2025-02-27

## 2025-02-27 NOTE — ASSESSMENT & PLAN NOTE
Resent pen needles, Will restart 10 units at night and titrate q 3 days accordingly. Return to clinic with bg log in 1 month.

## 2025-02-27 NOTE — PROGRESS NOTES
Subjective   The ABCs of the Annual Wellness Visit  Medicare Wellness Visit      Nivia Cagle is a 74 y.o. patient who presents for a Medicare Wellness Visit.    The following portions of the patient's history were reviewed and   updated as appropriate: allergies, current medications, past family history, past medical history, past social history, past surgical history, and problem list.    Compared to one year ago, the patient's physical   health is worse.  Compared to one year ago, the patient's mental   health is the same.    Recent Hospitalizations:  She was not admitted to the hospital during the last year.     Current Medical Providers:  Patient Care Team:  Catalina Madsen PA-C as PCP - General (Physician Assistant)  Kassandra Garay APRN (Inactive) as Nurse Practitioner (Otolaryngology)  Livier Pathak APRN as Nurse Practitioner (Nurse Practitioner)  Annita Knowles LCSW as  ()    Outpatient Medications Prior to Visit   Medication Sig Dispense Refill    Accu-Chek Madeline Plus test strip by Other route 4 (Four) Times a Day. use to test blood sugar      Accu-Chek Softclix Lancets lancets by Other route 4 (Four) Times a Day. use to test blood sugar      alendronate (FOSAMAX) 70 MG tablet Take 1 tablet by mouth Every 7 (Seven) Days.      amoxicillin-clavulanate (AUGMENTIN) 875-125 MG per tablet Take 1 tablet by mouth 2 (Two) Times a Day for 10 days. 20 tablet 0    ARIPiprazole (Abilify) 5 MG tablet Take 1 tablet by mouth Daily. 90 tablet 3    Ascorbic Acid (VITAMIN C GUMMIE PO) Take  by mouth Daily.      benzonatate (Tessalon Perles) 100 MG capsule Take 1 capsule by mouth 3 (Three) Times a Day As Needed for Cough for up to 10 days. 30 capsule 0    Blood Glucose Monitoring Suppl (FreeStyle Lite) device 1 each by Other route 4 (Four) Times a Day.      buPROPion XL (WELLBUTRIN XL) 300 MG 24 hr tablet TAKE 1 TABLET BY MOUTH EVERY MORNING 90 tablet 3    cetirizine (zyrTEC) 10  MG tablet TAKE 1 TABLET BY MOUTH EVERY DAY 90 tablet 1    Cholecalciferol 25 MCG (1000 UT) tablet dispersible Take  by mouth 2 (Two) Times a Day.      cyclobenzaprine (FLEXERIL) 10 MG tablet Take 1 tablet by mouth Daily As Needed for Muscle Spasms. 90 tablet 1    Daily-Jelani Multivitamin tablet tablet Take 1 tablet by mouth every night at bedtime.      ezetimibe (ZETIA) 10 MG tablet TAKE 1 TABLET BY MOUTH EVERY NIGHT AT BEDTIME 90 tablet 1    famotidine (Pepcid) 20 MG tablet Take 1 tablet by mouth 2 (Two) Times a Day. 60 tablet 1    FLUoxetine (PROzac) 10 MG capsule Take 1 capsule by mouth Daily. 90 capsule 3    FLUoxetine (PROzac) 20 MG capsule Take 1 capsule by mouth Daily. 90 capsule 3    fluticasone (Flonase) 50 MCG/ACT nasal spray 2 sprays into the nostril(s) as directed by provider Daily. Administer 2 sprays in each nostril for each dose. 16 g 6    Inclisiran Sodium (LEQVIO SC) Inject  under the skin into the appropriate area as directed.      Januvia 100 MG tablet TAKE 1 TABLET BY MOUTH DAILY 90 tablet 1    losartan (COZAAR) 25 MG tablet TAKE 1 TABLET BY MOUTH DAILY 90 tablet 1    metFORMIN (GLUCOPHAGE) 500 MG tablet TAKE 1 TABLET BY MOUTH EVERY MORNING AND 2 TABLETS BY MOUTH EVERY EVENING WITH MEALS 270 tablet 1    montelukast (SINGULAIR) 10 MG tablet TAKE 1 TABLET BY MOUTH EVERY NIGHT 90 tablet 1    pantoprazole (PROTONIX) 20 MG EC tablet TAKE 1 TABLET BY MOUTH DAILY 90 tablet 0    Pramipexole Dihydrochloride ER (Mirapex ER) 1.5 MG tablet sustained-release 24 hour Take 1.5 mg by mouth Every Night. 30 tablet 5    prednisoLONE acetate (PRED FORTE) 1 % ophthalmic suspension SHAKE LIQUID AND INSTILL 1 DROP IN RIGHT EYE TWICE DAILY      Zinc 100 MG tablet Take 100 mg by mouth Daily.      Insulin Pen Needle (BD Pen Needle Micro U/F) 32G X 6 MM misc Use 1 each Daily. 100 each 1    insulin degludec (TRESIBA FLEXTOUCH) 100 UNIT/ML solution pen-injector injection Inject 10 Units under the skin into the appropriate  area as directed Daily. (Patient not taking: Reported on 2/27/2025) 10 mL 1    amantadine (SYMMETREL) 100 MG tablet  (Patient not taking: Reported on 2/27/2025)      aspirin (aspirin) 81 MG EC tablet Take 1 tablet by mouth Daily. (Patient not taking: Reported on 2/27/2025) 90 tablet 99    coenzyme Q10 50 MG capsule capsule Take  by mouth Daily. (Patient not taking: Reported on 2/27/2025)      doxepin (SINEquan) 25 MG capsule Take 1 capsule by mouth Every Night. (Patient not taking: Reported on 2/27/2025) 90 capsule 3     No facility-administered medications prior to visit.     No opioid medication identified on active medication list. I have reviewed chart for other potential  high risk medication/s and harmful drug interactions in the elderly.      Aspirin is on active medication list. Aspirin use is indicated based on review of current medical condition/s. Pros and cons of this therapy have been discussed today. Benefits of this medication outweigh potential harm.  Patient has been encouraged to continue taking this medication.  .      Patient Active Problem List   Diagnosis    Muscle twitching    Restless legs syndrome (RLS)    Allergic rhinitis    Anemia    Bilateral posterior capsular opacification    Cardiac murmur    Diverticulitis    Endometriosis    Gastroesophageal reflux disease    Essential hypertension    Low back pain    Migraines    Scoliosis deformity of spine    Major depressive disorder, recurrent episode, moderate degree    Generalized anxiety disorder    Type 2 diabetes mellitus    Hyperlipidemia LDL goal <70    EARL (obstructive sleep apnea)    Tremor    Bilateral pseudophakia    Dislocated intraocular lens    Traumatic injury of globe of right eye    Unspecified retinal detachment with retinal break, right eye    Osteoarthritis    Attention-deficit hyperactivity disorder, unspecified type    Epiretinal membrane (ERM) of right eye    Traction retinal detachment involving macula    Cystoid macular  "degeneration of right eye    Vitamin deficiency, unspecified    Dvtrcli of intest, part unsp, w/o perf or abscess w/o bleed    History of falling    Long term current use of insulin    Generalized muscle weakness    Statin intolerance    Diabetes mellitus type 2 without retinopathy    Dry eyes    Secondary cataract    After-cataract with vision obscured    Obesity (BMI 30-39.9)    Dysphagia    Pain of upper abdomen    Nausea and vomiting    Abnormal esophagram    Abnormal finding of blood chemistry, unspecified    Muscle spasm    Acute non-recurrent frontal sinusitis     Advance Care Planning Advance Directive is not on file.  ACP discussion was held with the patient during this visit. Patient has an advance directive (not in EMR), copy requested.      Objective   Vitals:    25 1059   BP: 126/90   BP Location: Right arm   Patient Position: Sitting   Cuff Size: Adult   Pulse: 103   Resp: 16   Temp: 97.4 °F (36.3 °C)   TempSrc: Temporal   SpO2: 95%   Weight: 81.2 kg (179 lb)   Height: 160 cm (63\")       Estimated body mass index is 31.71 kg/m² as calculated from the following:    Height as of this encounter: 160 cm (63\").    Weight as of this encounter: 81.2 kg (179 lb).                Does the patient have evidence of cognitive impairment? Yes                                                                                                Health  Risk Assessment    Smoking Status:  Social History     Tobacco Use   Smoking Status Former    Current packs/day: 0.00    Average packs/day: 0.3 packs/day for 13.0 years (3.3 ttl pk-yrs)    Types: Cigarettes    Start date:     Quit date:     Years since quittin.1   Smokeless Tobacco Never   Tobacco Comments    QUIT      Alcohol Consumption:  Social History     Substance and Sexual Activity   Alcohol Use Not Currently    Comment: rarely       Fall Risk Screen  STEADI Fall Risk Assessment has not been completed.    Depression Screening   The PHQ has not " been completed during this encounter.     Health Habits and Functional and Cognitive Screenin/27/2025    11:00 AM   Functional & Cognitive Status   Do you have difficulty preparing food and eating? Yes   Do you have difficulty bathing yourself, getting dressed or grooming yourself? No   Do you have difficulty using the toilet? No   Do you have difficulty moving around from place to place? No   Do you have trouble with steps or getting out of a bed or a chair? Yes   Current Diet Unhealthy Diet   Dental Exam Up to date   Eye Exam Up to date   Exercise (times per week) 0 times per week   Current Exercises Include No Regular Exercise   Do you need help using the phone?  No   Are you deaf or do you have serious difficulty hearing?  No   Do you need help to go to places out of walking distance? No   Do you need help shopping? No   Do you need help preparing meals?  No   Do you need help with housework?  No   Do you need help with laundry? No   Do you need help taking your medications? Yes   Do you need help managing money? No   Do you ever drive or ride in a car without wearing a seat belt? No   Have you felt unusual stress, anger or loneliness in the last month? Yes   Who do you live with? Alone   If you need help, do you have trouble finding someone available to you? No   Have you been bothered in the last four weeks by sexual problems? No   Do you have difficulty concentrating, remembering or making decisions? Yes           Age-appropriate Screening Schedule:  Refer to the list below for future screening recommendations based on patient's age, sex and/or medical conditions. Orders for these recommended tests are listed in the plan section. The patient has been provided with a written plan.    Health Maintenance List  Health Maintenance   Topic Date Due    URINE MICROALBUMIN-CREATININE RATIO (uACR)  Never done    COVID-19 Vaccine ( season) 2024    DIABETIC FOOT EXAM  2024    DIABETIC EYE  EXAM  03/01/2025    HEMOGLOBIN A1C  04/23/2025    BMI FOLLOWUP  04/11/2025    LIPID PANEL  10/23/2025    GASTROSCOPY (EGD)  01/23/2026    DXA SCAN  02/16/2026    ANNUAL WELLNESS VISIT  02/27/2026    MAMMOGRAM  01/22/2027    COLORECTAL CANCER SCREENING  09/28/2027    TDAP/TD VACCINES (2 - Td or Tdap) 04/16/2032    HEPATITIS C SCREENING  Completed    INFLUENZA VACCINE  Completed    Pneumococcal Vaccine 50+  Completed    ZOSTER VACCINE  Completed                                                                                                                                                CMS Preventative Services Quick Reference  Risk Factors Identified During Encounter  Inadequate Social Support, Isolation, Loneliness, Lack of Transportation, Financial Difficulties, or Caregiver Stress: has son coming to help her.    The above risks/problems have been discussed with the patient.  Pertinent information has been shared with the patient in the After Visit Summary.  An After Visit Summary and PPPS were made available to the patient.    Follow Up:   Next Medicare Wellness visit to be scheduled in 1 year.         Additional E&M Note during same encounter follows:  Patient has additional, significant, and separately identifiable condition(s)/problem(s) that require work above and beyond the Medicare Wellness Visit     Chief Complaint  Diabetes, Anxiety, Depression, Osteoporosis, and Memory Loss    Subjective   HPI  Nivia is also being seen today for an annual adult preventative physical exam.     DM: has not been able to use tresiba due to not getting needles from pharmacy   BG running in 200s  Denies chest pain, palpitations, ha, dizziness    Pink eye: resolved  Sinus infection: improved with abx use. She is on day 6  Cough improved. Denies wheezing, sob   Fever resolved     R foot pain: using cane due to increase in pain  Pain worsened a few wks ago   Denies recent injury, trauma, falls     Forgetfulness: has upcoming  "appt with neurology  Has difficulty word finding at times.   Denies forgetting who she is or where she is going  She is very stressed over court case dealing with her mother's estate currently  She is seeing psych. Counseling appt had to be rescheduled.         Objective   Vital Signs:  /90 (BP Location: Right arm, Patient Position: Sitting, Cuff Size: Adult)   Pulse 103   Temp 97.4 °F (36.3 °C) (Temporal)   Resp 16   Ht 160 cm (63\")   Wt 81.2 kg (179 lb)   SpO2 95%   BMI 31.71 kg/m²   Physical Exam  Vitals reviewed.   Constitutional:       Appearance: Normal appearance.   HENT:      Head: Normocephalic and atraumatic.      Nose: Nose normal.      Mouth/Throat:      Mouth: Mucous membranes are moist.   Eyes:      Extraocular Movements: Extraocular movements intact.      Conjunctiva/sclera: Conjunctivae normal.      Pupils: Pupils are equal, round, and reactive to light.   Cardiovascular:      Rate and Rhythm: Normal rate and regular rhythm.   Pulmonary:      Effort: Pulmonary effort is normal.      Breath sounds: Normal breath sounds.   Abdominal:      General: Abdomen is flat. Bowel sounds are normal.      Palpations: Abdomen is soft.   Musculoskeletal:         General: Normal range of motion.   Neurological:      General: No focal deficit present.      Mental Status: She is alert and oriented to person, place, and time.   Psychiatric:         Mood and Affect: Mood normal.                       Assessment and Plan            Medicare annual wellness visit, subsequent  Reviewed preventative medication recommendations that are age appropriate for the patient. Education provided for health and wellness. Encouraged healthy diet, regular exercise, and routine wellness checkups.           Forgetfulness  Keep upcoming apt with neurology. Let us know if sx change/worsen       Right foot pain  xray showing OA, will refer to podiatry (already est for other issues) to discuss next steps in tx.   Orders:    XR " Foot 3+ View Right (In Office)    Ambulatory Referral to Podiatry    Essential hypertension  Hypertension is stable and controlled  Continue current treatment regimen.  Blood pressure will be reassessed in 3 months.         Type 2 diabetes mellitus with hyperglycemia, without long-term current use of insulin  Resent pen needles, Will restart 10 units at night and titrate q 3 days accordingly. Return to clinic with bg log in 1 month.                 Follow Up   Return in about 1 month (around 3/27/2025).  Patient was given instructions and counseling regarding her condition or for health maintenance advice. Please see specific information pulled into the AVS if appropriate.

## 2025-02-28 ENCOUNTER — TELEPHONE (OUTPATIENT)
Dept: INTERNAL MEDICINE | Facility: CLINIC | Age: 75
End: 2025-02-28
Payer: MEDICARE

## 2025-02-28 RX ORDER — PEN NEEDLE, DIABETIC 32 GX 1/4"
1 NEEDLE, DISPOSABLE MISCELLANEOUS DAILY
Qty: 100 EACH | Refills: 1 | Status: SHIPPED | OUTPATIENT
Start: 2025-02-28

## 2025-02-28 NOTE — TELEPHONE ENCOUNTER
Caller: Nivia Cagle    Relationship: Self    Best call back number: [phone]    Requested Prescriptions:   THE PENS AND NEEDLES FOR [facility]       Pharmacy where request should be sent: [facility] - [address] - [address] AT PORTAL TO [facility] [facility] - [phone]  - [phone] FX     Last office visit with prescribing clinician: 2/27/2025   Last telemedicine visit with prescribing clinician: 2/19/2025   Next office visit with prescribing clinician: 3/20/2025     Additional details provided by patient: [facility] [place] HOWEVER, THE PRICE IS VERY EXPENSIVE FOR THE PENS AND NEEDLES WHERE AS WITH THE [facility]IL ORDER PHARMACY, IT IS MUCH CHEAPER AND SHE WOULD BE ABLE TO GET THEM MONTHS IN ADVANCE AS WELL. SHE WOULD LIKE FOR THE PENS AND NEEDLES TO BE SENT TO [facility] AS SOON AS POSSIBLE.     Does the patient have less than a 3 day supply:  [] Yes  [] No    Would you like a call back once the refill request has been completed: [x] Yes [] No    If the office needs to give you a call back, can they leave a voicemail: [x] Yes [] No    David Knowles Rep   02/28/25 14:23 EST

## 2025-03-03 ENCOUNTER — APPOINTMENT (OUTPATIENT)
Facility: HOSPITAL | Age: 75
End: 2025-03-03
Payer: MEDICARE

## 2025-03-05 ENCOUNTER — HOSPITAL ENCOUNTER (OUTPATIENT)
Facility: HOSPITAL | Age: 75
Setting detail: THERAPIES SERIES
Discharge: HOME | End: 2025-03-05
Payer: MEDICARE

## 2025-03-05 DIAGNOSIS — R13.12 OROPHARYNGEAL DYSPHAGIA: Primary | ICD-10-CM

## 2025-03-05 PROCEDURE — 92526 ORAL FUNCTION THERAPY: CPT | Performed by: SPEECH-LANGUAGE PATHOLOGIST

## 2025-03-05 NOTE — PROGRESS NOTES
Outpatient Adult Speech Language Therapy           Treatment Note    Patient: Nivia Cagle                                                                                     Visit Date: 3/5/2025  :     1950    Referring practitioner:    Catalina Madsen PA-C  Date of Initial Visit:          Type: THERAPY  Episode: Aspiration into airway, irregular esophago-gastric mucosal j    Patient seen for 2 sessions    Visit Diagnoses:    ICD-10-CM ICD-9-CM   1. Oropharyngeal dysphagia  R13.12 787.22     SUBJECTIVE   Patient is here today to increase tongue base strength and swallow coordination to decrease risk of aspiration before and after the swallow reducing risk of aspiration.       Education:POC, HEP, signs and symptoms of aspiration      OBJECTIVE   GOALS      GOALS ACTIVITY PERFORMED TRIALS COMPLETED LEVEL OF CUEING REQUIRED   LTG 1: 8 weeks:  The patient will demonstrate 0% swallow limitation by achieving a score of  7/7 on the Functional Communication Measures for swallowing to increase safety of swallow to highest levels of functioning using compensatory strategies to reduce risk of aspiration.          STG 1a: 8 weeks Patient will accept 80% trials of thin liquids on  2 consecutive trials with min to no clinical signs and symptoms of aspiration to facilitate a safe swallow. Thin liquids by spoon 8/10 Max assist to cue for a quick swallow as patient allows the fluid to drain over the tongue base and swallow appears to trigger a little late.    STG 1b: 8 weeks: Patient will complete a snack and/or meal  using compensatory strategies with min assist and minimal clinical signs and symptoms of aspiration  to  facilitate a safe swallow. pudding 10/10 Max assist to  complete an effortful swallow with each swallow   STG 1c: 8 weeks:  Patient will be educated on signs and symptoms of aspiration and diet with patient verbalizing understanding  with min cueing. POC  Signs and symptoms of aspiration, GERD management, HEP  Mod assist to teach and answer questions    STG 2a: 8 weeks:  Patient will complete tongue base isometrics to increase tongue base strength to a 5/5 reducing  residual after the swallow, and increasing timeliness of swallow.  Yawn technique  Tongue base elevation against resistance 10 repetitions Max assist to demonstrate with patient returning demonstration.  Patient given a handout of exercise   STG2b: 8 weeks:Patient will complete coordination exercises with min assist to  reduce premature spillage/timely eppiglottic closure prior, during and/or after  the swallow decreasing the risk of aspiration Effortful swallow 10 repetitions using pudding for weight Max assist to demonstrate with patient returning demonstration.  Patient given a handout of exercise   STG 2c: 8 weeks:  Patient will be given a home exercise program and demonstrate understanding of the program with min assist. HEP 3-5 times per day, 3 sets of 10 each exercise    -effortful swallow  -Yawn  -Tongue base elevation against resistance   Max assist to teach through demonstration.  Patient returned demonstration and was given a teaching handout of the exercises.                    ASSESSMENT/PLAN     ASSESSMENT: Patient requires the skills of a therapist to provide maximum assist to increase timeliness of swallow and increase tongue base strength to develop a safer swallow reducing risk of aspiration.   Progress:    PLAN: Continue plan of care    Progress Note Due Date:3/24/25  Recertification Due Date:4/22/25    SIGNATURE: LEON Shahid, [place] License #: [id number]  Electronically Signed on 3/5/2025            Adult Outpatient Rehabiliation

## 2025-03-10 ENCOUNTER — HOSPITAL ENCOUNTER (OUTPATIENT)
Facility: HOSPITAL | Age: 75
Setting detail: THERAPIES SERIES
Discharge: HOME | End: 2025-03-10
Payer: MEDICARE

## 2025-03-10 DIAGNOSIS — R13.12 OROPHARYNGEAL DYSPHAGIA: Primary | ICD-10-CM

## 2025-03-10 PROCEDURE — 92526 ORAL FUNCTION THERAPY: CPT | Performed by: SPEECH-LANGUAGE PATHOLOGIST

## 2025-03-10 RX ORDER — EZETIMIBE 10 MG/1
10 TABLET ORAL
Qty: 90 TABLET | Refills: 1 | Status: SHIPPED | OUTPATIENT
Start: 2025-03-10

## 2025-03-10 RX ORDER — SITAGLIPTIN 100 MG/1
100 TABLET, FILM COATED ORAL DAILY
Qty: 90 TABLET | Refills: 1 | Status: SHIPPED | OUTPATIENT
Start: 2025-03-10

## 2025-03-17 ENCOUNTER — APPOINTMENT (OUTPATIENT)
Facility: HOSPITAL | Age: 75
End: 2025-03-17
Payer: MEDICARE

## 2025-03-17 NOTE — PROGRESS NOTES
Outpatient Adult Speech Language Therapy           Treatment Note    Patient: Nivia Cagle                                                                                     Visit Date: 3/17/2025  :     1950    Referring practitioner:    Catalina Madsen PA-C  Date of Initial Visit:          No linked episodes    Patient seen for Visit count could not be calculated. Make sure you are using a visit which is associated with an episode. sessions    Visit Diagnoses:  No diagnosis found.  SUBJECTIVE   Patient is here today to increase tongue base strength and swallow coordination to decrease risk of aspiration before and after the swallow reducing risk of aspiration.         Education:POC, HEP, signs and symptoms of aspiration        OBJECTIVE   GOALS        GOALS ACTIVITY PERFORMED TRIALS COMPLETED LEVEL OF CUEING REQUIRED   LTG 1: 8 weeks:  The patient will demonstrate 0% swallow limitation by achieving a score of  7/7 on the Functional Communication Measures for swallowing to increase safety of swallow to highest levels of functioning using compensatory strategies to reduce risk of aspiration.             STG 1a: 8 weeks Patient will accept 80% trials of thin liquids on  2 consecutive trials with min to no clinical signs and symptoms of aspiration to facilitate a safe swallow. Thin liquids by spoon 8/10 Max assist to cue for a quick swallow as patient allows the fluid to drain over the tongue base and swallow appears to trigger a little late.    STG 1b: 8 weeks: Patient will complete a snack and/or meal  using compensatory strategies with min assist and minimal clinical signs and symptoms of aspiration  to  facilitate a safe swallow. pudding 10/10 Max assist to  complete an effortful swallow with each swallow   STG 1c: 8 weeks:  Patient will be educated on signs and symptoms of aspiration and diet with patient verbalizing understanding  with min cueing. POC  Signs and symptoms of aspiration, GERD management, HEP   Mod assist to teach and answer questions    STG 2a: 8 weeks:  Patient will complete tongue base isometrics to increase tongue base strength to a 5/5 reducing  residual after the swallow, and increasing timeliness of swallow.  Yawn technique  Tongue base elevation against resistance 10 repetitions Max assist to demonstrate with patient returning demonstration.  Patient given a handout of exercise   STG2b: 8 weeks:Patient will complete coordination exercises with min assist to  reduce premature spillage/timely eppiglottic closure prior, during and/or after  the swallow decreasing the risk of aspiration Effortful swallow 10 repetitions using pudding for weight Max assist to demonstrate with patient returning demonstration.  Patient given a handout of exercise   STG 2c: 8 weeks:  Patient will be given a home exercise program and demonstrate understanding of the program with min assist. HEP 3-5 times per day, 3 sets of 10 each exercise     -effortful swallow  -Yawn  -Tongue base elevation against resistance    Max assist to teach through demonstration.  Patient returned demonstration and was given a teaching handout of the exercises.                      ASSESSMENT/PLAN      ASSESSMENT: Patient requires the skills of a therapist to provide maximum assist to increase timeliness of swallow and increase tongue base strength to develop a safer swallow reducing risk of aspiration.   Progress:    PLAN: Continue plan of care     Progress Note Due Date:3/24/25  Recertification Due Date:4/22/25     SIGNATURE: LEON Shahid, [place] License #: [id number]  Electronically Signed on 3/17/2025            Adult Outpatient Rehabiliation

## 2025-03-18 ENCOUNTER — TELEPHONE (OUTPATIENT)
Dept: GASTROENTEROLOGY | Facility: CLINIC | Age: 75
End: 2025-03-18
Payer: MEDICARE

## 2025-03-18 NOTE — TELEPHONE ENCOUNTER
Left voice message for patient to return call regarding rescheduling her appointment with Livier Pathak on 4/2/25, due to provider being out of the office. She can be scheduled on any day if she isn't over 20.

## 2025-03-20 ENCOUNTER — TELEPHONE (OUTPATIENT)
Dept: GASTROENTEROLOGY | Facility: CLINIC | Age: 75
End: 2025-03-20

## 2025-03-20 NOTE — TELEPHONE ENCOUNTER
Hub staff attempted to follow warm transfer process and was unsuccessful     Caller: Nivia Cagle    Relationship to patient: Self    Best call back number:  [phone]    Patient is needing: PT IS RETURNING CALL TO OFFICE

## 2025-03-20 NOTE — TELEPHONE ENCOUNTER
Spoke with patient regarding rescheduling her appointment on 4/2/25 with Livier Pathak, due to provider being out of the office. Patient has rescheduled her appointment to 3/24/258@2:15. Added Erlinda for back up.

## 2025-03-21 ENCOUNTER — TELEMEDICINE (OUTPATIENT)
Dept: PSYCHIATRY | Facility: CLINIC | Age: 75
End: 2025-03-21
Payer: MEDICARE

## 2025-03-21 DIAGNOSIS — F43.10 POST TRAUMATIC STRESS DISORDER (PTSD): Primary | ICD-10-CM

## 2025-03-21 DIAGNOSIS — F41.1 GENERALIZED ANXIETY DISORDER: ICD-10-CM

## 2025-03-21 DIAGNOSIS — F51.05 INSOMNIA DUE TO MENTAL CONDITION: ICD-10-CM

## 2025-03-21 DIAGNOSIS — F33.40 RECURRENT MAJOR DEPRESSIVE DISORDER IN REMISSION: ICD-10-CM

## 2025-03-21 NOTE — PROGRESS NOTES
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    Internal Control 01/21/2025 Passed  Passed Final    Lot Number 01/21/2025 [id number]   Final    Expiration Date 01/21/2025 07/30/2025   Final    WBC 01/21/2025 8.57  3.40 - 10.80 10*3/mm3 Final    RBC 01/21/2025 4.85  3.77 - 5.28 10*6/mm3 Final    Hemoglobin 01/21/2025 14.9  12.0 - 15.9 g/dL Final    Hematocrit 01/21/2025 43.9  34.0 - 46.6 % Final    MCV 01/21/2025 90.5  79.0 - 97.0 fL Final    MCH 01/21/2025 30.7  26.6 - 33.0 pg Final    MCHC 01/21/2025 33.9  31.5 - 35.7 g/dL Final    RDW 01/21/2025 12.0 (L)  12.3 - 15.4 % Final    RDW-SD 01/21/2025 40.0  37.0 - 54.0 fl Final    MPV 01/21/2025 11.8  6.0 - 12.0 fL Final    Platelets 01/21/2025 278  140 - 450 10*3/mm3 Final    Neutrophil % 01/21/2025 64.2  42.7 - 76.0 % Final    Lymphocyte % 01/21/2025 21.9  19.6 - 45.3 % Final    Monocyte % 01/21/2025 10.3  5.0 - 12.0 % Final    Eosinophil % 01/21/2025 2.3  0.3 - 6.2 % Final    Basophil % 01/21/2025 0.7  0.0 - 1.5 % Final    Immature Grans % 01/21/2025 0.6 (H)  0.0 - 0.5 % Final    Neutrophils, Absolute 01/21/2025 5.50  1.70 - 7.00 10*3/mm3 Final    Lymphocytes, Absolute 01/21/2025 1.88  0.70 - 3.10 10*3/mm3 Final    Monocytes, Absolute 01/21/2025  0.88  0.10 - 0.90 10*3/mm3 Final    Eosinophils, Absolute 01/21/2025 0.20  0.00 - 0.40 10*3/mm3 Final    Basophils, Absolute 01/21/2025 0.06  0.00 - 0.20 10*3/mm3 Final    Immature Grans, Absolute 01/21/2025 0.05  0.00 - 0.05 10*3/mm3 Final    nRBC 01/21/2025 0.0  0.0 - 0.2 /100 WBC Final   Lab on 11/08/2024   Component Date Value Ref Range Status    Creatinine 11/08/2024 0.99  0.57 - 1.00 mg/dL Final    eGFR 11/08/2024 60.7  >60.0 mL/min/1.73 Final    25 Hydroxy, Vitamin D 11/08/2024 35.9  30.0 - 100.0 ng/ml Final    Calcium 11/08/2024 9.3  8.6 - 10.5 mg/dL Final   Orders Only on 10/23/2024   Component Date Value Ref Range Status    Lactoferrin, Qual 10/23/2024 Negative  Negative Final    Toxigenic C. difficile by PCR 10/23/2024 Negative  Negative Final    027 Toxin 10/23/2024 Presumptive Negative   Final    Salmonella 10/23/2024 Not Detected  Not Detected Final    Campylobacter 10/23/2024 Not Detected  Not Detected Final    Shigella/Enteroinvasive E. coli (E* 10/23/2024 Not Detected  Not Detected Final    Shiga-like toxin-producing E. coli* 10/23/2024 Not Detected  Not Detected Final   Clinical Support on 10/23/2024   Component Date Value Ref Range Status    Hemoglobin A1C 10/23/2024 7.30 (H)  4.80 - 5.60 % Final   Office Visit on 10/11/2024   Component Date Value Ref Range Status    Glucose 10/23/2024 180 (H)  65 - 99 mg/dL Final    BUN 10/23/2024 15  8 - 23 mg/dL Final    Creatinine 10/23/2024 0.99  0.57 - 1.00 mg/dL Final    Sodium 10/23/2024 141  136 - 145 mmol/L Final    Potassium 10/23/2024 4.2  3.5 - 5.2 mmol/L Final    Chloride 10/23/2024 102  98 - 107 mmol/L Final    CO2 10/23/2024 24.5  22.0 - 29.0 mmol/L Final    Calcium 10/23/2024 9.1  8.6 - 10.5 mg/dL Final    Total Protein 10/23/2024 7.1  6.0 - 8.5 g/dL Final    Albumin 10/23/2024 4.1  3.5 - 5.2 g/dL Final    ALT (SGPT) 10/23/2024 33  1 - 33 U/L Final    AST (SGOT) 10/23/2024 23  1 - 32 U/L Final    Alkaline Phosphatase 10/23/2024 71  39 - 117 U/L  Final    Total Bilirubin 10/23/2024 0.6  0.0 - 1.2 mg/dL Final    Globulin 10/23/2024 3.0  gm/dL Final    A/G Ratio 10/23/2024 1.4  g/dL Final    BUN/Creatinine Ratio 10/23/2024 15.2  7.0 - 25.0 Final    Anion Gap 10/23/2024 14.5  5.0 - 15.0 mmol/L Final    eGFR 10/23/2024 60.7  >60.0 mL/min/1.73 Final    TSH 10/23/2024 1.130  0.270 - 4.200 uIU/mL Final    Total Cholesterol 10/23/2024 181  0 - 200 mg/dL Final    Triglycerides 10/23/2024 242 (H)  0 - 150 mg/dL Final    HDL Cholesterol 10/23/2024 49  40 - 60 mg/dL Final    LDL Cholesterol  10/23/2024 91  0 - 100 mg/dL Final    VLDL Cholesterol 10/23/2024 41 (H)  5 - 40 mg/dL Final    LDL/HDL Ratio 10/23/2024 1.71   Final    Magnesium 10/23/2024 1.7  1.6 - 2.4 mg/dL Final    WBC 10/23/2024 6.85  3.40 - 10.80 10*3/mm3 Final    RBC 10/23/2024 4.39  3.77 - 5.28 10*6/mm3 Final    Hemoglobin 10/23/2024 13.6  12.0 - 15.9 g/dL Final    Hematocrit 10/23/2024 40.3  34.0 - 46.6 % Final    MCV 10/23/2024 91.8  79.0 - 97.0 fL Final    MCH 10/23/2024 31.0  26.6 - 33.0 pg Final    MCHC 10/23/2024 33.7  31.5 - 35.7 g/dL Final    RDW 10/23/2024 12.4  12.3 - 15.4 % Final    RDW-SD 10/23/2024 41.3  37.0 - 54.0 fl Final    MPV 10/23/2024 12.4 (H)  6.0 - 12.0 fL Final    Platelets 10/23/2024 228  140 - 450 10*3/mm3 Final    Neutrophil % 10/23/2024 65.9  42.7 - 76.0 % Final    Lymphocyte % 10/23/2024 20.1  19.6 - 45.3 % Final    Monocyte % 10/23/2024 9.9  5.0 - 12.0 % Final    Eosinophil % 10/23/2024 3.4  0.3 - 6.2 % Final    Basophil % 10/23/2024 0.4  0.0 - 1.5 % Final    Immature Grans % 10/23/2024 0.3  0.0 - 0.5 % Final    Neutrophils, Absolute 10/23/2024 4.51  1.70 - 7.00 10*3/mm3 Final    Lymphocytes, Absolute 10/23/2024 1.38  0.70 - 3.10 10*3/mm3 Final    Monocytes, Absolute 10/23/2024 0.68  0.10 - 0.90 10*3/mm3 Final    Eosinophils, Absolute 10/23/2024 0.23  0.00 - 0.40 10*3/mm3 Final    Basophils, Absolute 10/23/2024 0.03  0.00 - 0.20 10*3/mm3 Final    Immature Grans, Absolute  10/23/2024 0.02  0.00 - 0.05 10*3/mm3 Final    nRBC 10/23/2024 0.0  0.0 - 0.2 /100 WBC Final       EKG Results:  No orders to display       Imaging Results:  No Images in the past 120 days found..      Assessment & Plan   Diagnoses and all orders for this visit:    1. Post traumatic stress disorder (PTSD) (Primary)    2. Generalized anxiety disorder    3. Insomnia due to mental condition    4. Recurrent major depressive disorder in remission      INITIAL presentation 2021 most consistent with major depressive disorder, recurrent, moderate to severe, with anxious distress.  PTSD.  Rule out personality disorder, cluster B specifically.  Rule out hypomania as patient was very difficult to interrupt today.    03/21/2025: Stable, well, no med changes.     Acknowledged and normalized patient's thoughts, feelings, and concerns. Allowed patient to freely discuss and process issues, such as:  Anxiety and depression regarding family conflict/relationships.  Anxiety and depression regarding medical conditions.  ... using Rogerian psychotherapeutic techniques including unconditional positive regard, reflective listening, and demonstrating clear empathy, with the goal of ameliorating symptoms and maintaining, restoring, or improving self-esteem, adaptive skills, and ego or psychological functions (Ara et al. 1991), the long-term goal of which is to develop a better, healthier perspective and help the patient bear their circumstances more easily.  Time (minutes) spent providing supportive psychotherapy: 16  (This time is exclusive to the therapy session and separate from the time spent on activities used to meet the criteria for the E/M service (history, exam, medical decision-making).)  Start: 11:15  Stop: 11:31  Functional status: mild impairment  Treatment plan: Medication management and supportive psychotherapy  Prognosis: good  Progress: stable  4w    02/20/2025: Stable, well, no med changes.     12/11/2024: Stable,  "well, no med changes.     09/26/2024: Stable, well, no med changes.     08/28/2024: Mild MDD, but maintenance insomnia. Increase abilify. [place] with [employer], prayer.    07/31/2024: Not depressed, persistent maintenance insomnia. Restarted her meds 10 days ago, the above improving. No changes, as her s/sx are related to stopping her meds for a time. Also has restarted light box.    07/02/2024: Improving mood, but persistent insomnia. Increase abilify. Constipation issues, but had 2 BM recently. Consider gabapentin as neuropathic pain impeding sleep.    06/04/2024: Insomnia, but also more activity, and possibly some depressed mood. Restart abilify; stopped previously thinking it wasn't covered by insurance, but this may have just been an early request that was denied. Watch swallowing issues. Consider lamictal, vraylar, rexulti, low dose seroquel.    4/30/24: Insomnia, and some procrastination. Increase doxepin. Procrastinating on painting a certain painting; processed why. Monitor.    4/3: Stable, but monitor if worsening depression creeps in.    3/4/24: Pt ist stable, but lost her 20 mg dose of prozac. Re-sent in. Insurance won't pay for abilify. DC abilify. Some unusual issues with swallowing. Processed dreams about her mother. Mom isn't happy, \"it's not good enough.\"    2/6: Stable, discussed dietary changes involving decreasing sugar. Sugar is comforting and helps her depression. Also discussed stevia. Now using light box and sleeping a little better.    1/5: Seasonal depression, start light box up again. May have to make further med changes.    12/14: Add melatonin for maintenance insomnia. Otherwise stable. Seeing a movie for [employer]. Got all her Xmas cards mailed out.    11/9: Doing well, isolated insomnia. Stop trazodone and start doxepin. Consider lunesta, belsomra, quiviviq. She stopped melatonin on her own.    8/11: Stable, well, no changes. Painting, having people over. Counseled to start light box in " September, reiterated instructions. Will be inducted into the [employer] [facility] tomorrow. She's been working on it for a year.    7/11: Short visit as patient sleepy. Unusual sleep pattern recently, but MH is fine. Pt will call her son tonight to ensure someone is around when she goes back to sleep. She will call PCP about this tomorrow. Close follow up with me in 4 wks.    4/27: Stable, well. Restart melatonin for insomnia.     3/2: Prozac and BLT helped. Situational stressor in son, no changes. Better. Watch insomnia.     1/20: Start light box, increase prozac for dep.     12/9: Some insomnia. Increase melatonin.     10/25: Doing well. Increase prozac back to baseline dose (inadvertently reduced, she has been stable on a higher dose, so we should go back to that). Some initial insomnia, increase trazodone to 75 mg nightly. She is enjoying the Fall.     9/8: Start light box. Close follow up in case this is worsening depression.     7/27: Well, stable.     6/14: Better, no changes.     5/3: Increase prozac and melatonin.    Visit Diagnoses:    ICD-10-CM ICD-9-CM   1. Post traumatic stress disorder (PTSD)  F43.10 309.81   2. Generalized anxiety disorder  F41.1 300.02   3. Insomnia due to mental condition  F51.05 300.9     327.02   4. Recurrent major depressive disorder in remission  F33.40 296.35       PLAN:  Risk Assessment: Risk of self-harm acutely is moderate. Risk factors include chronic depressive disorder, possible personality disorder, recent psychosocial stressors (pandemic, moving). Protective factors include no present SI, no history of suicide attempts or self-harm in the past, no access to weapons, minimal AODA, healthcare seeking, future orientation, willingness to engage in care. Risk of self-harm chronically is also moderate, but could be further elevated in the event of treatment noncompliance and/or AODA.  Safety: No acute safety concerns.  Medications:    CONTINUE light box 9/1 - 3/31.  CONTINUE melatonin 30 mg p.o. nightly.  Risks, benefits, side effects discussed with patient including sedation, dizziness/falls risk, GI upset.  Do not use before operating vehicle, vessel, or machine. After discussion of these risks and benefits, the patient voiced understanding and agreed to proceed.   CONTINUE doxepin 25 mg qhs. Risks, benefits, side effects discussed with patient including GI upset, sedation, dizziness/falls risk, grogginess the following day, prolongation of the QTc interval.  After discussion of these risks and benefits, the patient voiced understanding and agreed to proceed.    CONTINUE bupropion xl 300 mg daily. Risks, benefits, alternatives discussed with patient including nausea, GI upset, increased energy, exacerbation of irritability, insomnia, lowering of seizure threshold.  After discussion of these risks and benefits, the patient voiced understanding and agreed to proceed.  CONTINUE Prozac 30 mg a day (HIGHEST dose is 40 given that she is also on bupropion). Risks, benefits, alternatives discussed with patient including GI upset, nausea vomiting diarrhea, theoretical decrease of seizure threshold predisposing the patient to a slightly higher seizure risk, headaches, sexual dysfunction, serotonin syndrome, bleeding risk, increased suicidality in patients 24 years and younger.  After discussion of these risks and benefits, the patient voiced understanding and agreed to proceed.  CONTINUE Abilify 5 mg p.o. nightly. Previously stopped 2/2 cost, 3/24, but this may have been rejected by insurance simply because pt requested too soon (she states). Risks, benefits, alternatives discussed with patient including increased energy, exacerbation of irritability, akathisia, movement issues, GI upset, orthostatic hypotension, increased appetite, tardive dyskinesia.  Use care when operating vehicle, vessel, or machine. After discussion of these risks and benefits,  the patient voiced understanding and agreed to proceed.  S/P:  trazodone 100 mg PO QHS. No longer effective 11/23.  Mirtazapine 45 mg daily (RLS)  Ambien caused double vision, and possibly hallucinations  Was on lithium in the past: dizziness and falls, and hair curled.  Therapy: referred to [facility]7.  Labs/Studies: s/p TMS referral.  Follow Up: 6 weeks. (prefers 4 wks)      TREATMENT PLAN/GOALS: Continue supportive psychotherapy efforts and medications as indicated. Treatment and medication options discussed during today's visit. Patient acknowledged and verbally consented to continue with current treatment plan and was educated on the importance of compliance with treatment and follow-up appointments.    MEDICATION ISSUES:  SAPNA reviewed as expected.  Discussed medication options and treatment plan of prescribed medication as well as the risks, benefits, and side effects including potential falls, possible impaired driving and metabolic adversities among others. Patient is agreeable to call the office with any worsening of symptoms or onset of side effects. Patient is agreeable to call 911 or go to the nearest ER should he/she begin having SI/HI. No medication side effects or related complaints today.     MEDS ORDERED DURING VISIT:  No orders of the defined types were placed in this encounter.      Return in about 4 weeks (around 4/18/2025).         This document has been electronically signed by Vicky Barth MD  March 21, 2025 11:34 EDT      Part of this note may be an electronic transcription/translation of spoken language to printed text using the Dragon Dictation System.

## 2025-03-24 ENCOUNTER — HOSPITAL ENCOUNTER (OUTPATIENT)
Facility: HOSPITAL | Age: 75
Setting detail: THERAPIES SERIES
Discharge: HOME | End: 2025-03-24
Payer: MEDICARE

## 2025-03-24 DIAGNOSIS — R13.12 OROPHARYNGEAL DYSPHAGIA: Primary | ICD-10-CM

## 2025-03-24 PROCEDURE — 92526 ORAL FUNCTION THERAPY: CPT | Performed by: SPEECH-LANGUAGE PATHOLOGIST

## 2025-03-24 NOTE — THERAPY TREATMENT NOTE
Outpatient Adult Speech Language Therapy           Progress Note    Patient: Nivia Cagle                                                                                     Visit Date: 3/24/2025  :     1950    Referring practitioner:    Catalina Madsen PA-C  Date of Initial Visit:          Type: THERAPY  Episode: Aspiration into airway, irregular esophago-gastric mucosal j    Patient seen for 4 sessions    Visit Diagnoses:    ICD-10-CM ICD-9-CM   1. Oropharyngeal dysphagia  R13.12 787.22     SUBJECTIVE   Patient is here today to increase tongue base strength and swallow coordination to decrease risk of aspiration before and after the swallow reducing risk of aspiration.   Patient to bring in foods that are harder to masticate and are giving her problems when swallowing.      Education:Progress        OBJECTIVE   GOALS             GOALS ACTIVITY PERFORMED TRIALS COMPLETED LEVEL OF CUEING REQUIRED   LTG 1: 8 weeks:  The patient will demonstrate 0% swallow limitation by achieving a score of  7/7 on the Functional Communication Measures for swallowing to increase safety of swallow to highest levels of functioning using compensatory strategies to reduce risk of aspiration.             STG 1a: 8 weeks Patient will accept 80% trials of thin liquids on  2 consecutive trials with min to no clinical signs and symptoms of aspiration to facilitate a safe swallow. Cup drink water 10/10 Min assist   STG 1b: 8 weeks: Patient will complete a snack and/or meal  using compensatory strategies with min assist and minimal clinical signs and symptoms of aspiration  to  facilitate a safe swallow. Egg salad sandwich      [employer] bar 9/10 with one throat clearing      5/5     Min assist   STG 1c: 8 weeks:  Patient will be educated on signs and symptoms of aspiration and diet with patient verbalizing understanding with min cueing. POC  Signs and  symptoms of aspiration, GERD management, HEP   Mod assist to teach and answer questions    STG 2a: 8 weeks:  Patient will complete tongue base isometrics to increase tongue base strength to a 5/5 reducing  residual after the swallow, and increasing timeliness of swallow.  Tongue base elevation against resistance  3 sets of 10 repetitions    4+/5 Mod assist   STG2b: 8 weeks:Patient will complete coordination exercises with min assist to  reduce premature spillage/timely eppiglottic closure prior, during and/or after  the swallow decreasing the risk of aspiration Effortful swallows 3 sets of 10 repetitions    Patient triggering a quicker swallow Min to mod assist   STG 2c: 8 weeks:  Patient will be given a home exercise program and demonstrate understanding of the program with min assist. HEP 3-5 times per day, 3 sets of 10 each exercise     -effortful swallow  -Yawn  -Tongue base elevation against resistance    Mod assist            ASSESSMENT/PLAN        ASSESSMENT: Patient requires the skills of a therapist to provide reduced cueing to min to mod assist to increase timeliness of swallow and increase tongue base strength to develop a safer swallow reducing risk of aspiration.   Progress:  Patient able to eat a sandwich today with minimal complaints of residue in pharynx.    PLAN: Continue plan of care     Progress Note Due Date:    Recertification Due Date:4/22/25     SIGNATURE: Elvira Howard, SLP, [place] License #: [id number]  Electronically Signed on 3/24/2025            Adult Outpatient Rehabiliation

## 2025-03-27 ENCOUNTER — OFFICE VISIT (OUTPATIENT)
Dept: INTERNAL MEDICINE | Facility: CLINIC | Age: 75
End: 2025-03-27
Payer: MEDICARE

## 2025-03-27 ENCOUNTER — REFERRAL TRIAGE (OUTPATIENT)
Dept: CASE MANAGEMENT | Facility: OTHER | Age: 75
End: 2025-03-27
Payer: MEDICARE

## 2025-03-27 VITALS
BODY MASS INDEX: 32.67 KG/M2 | OXYGEN SATURATION: 96 % | DIASTOLIC BLOOD PRESSURE: 78 MMHG | HEIGHT: 63 IN | SYSTOLIC BLOOD PRESSURE: 130 MMHG | RESPIRATION RATE: 16 BRPM | TEMPERATURE: 98.3 F | WEIGHT: 184.4 LBS | HEART RATE: 97 BPM

## 2025-03-27 DIAGNOSIS — I10 ESSENTIAL HYPERTENSION: ICD-10-CM

## 2025-03-27 DIAGNOSIS — E11.65 TYPE 2 DIABETES MELLITUS WITH HYPERGLYCEMIA, WITHOUT LONG-TERM CURRENT USE OF INSULIN: ICD-10-CM

## 2025-03-27 DIAGNOSIS — M79.671 RIGHT FOOT PAIN: ICD-10-CM

## 2025-03-27 DIAGNOSIS — K22.719 BARRETT'S ESOPHAGUS WITH DYSPLASIA: Primary | ICD-10-CM

## 2025-03-27 DIAGNOSIS — F33.1 MAJOR DEPRESSIVE DISORDER, RECURRENT EPISODE, MODERATE DEGREE: ICD-10-CM

## 2025-03-27 NOTE — PROGRESS NOTES
Chief Complaint  Diabetes    Subjective          Nivia Cagle presents to [facility]  History of Present Illness  Dm: using 10 u of insulin at night  Bg avg 200  Denies low bg  Denies chest pain, palpitations, dizziness  Pt has a hard time remembering to eat    HTN: bp is doing well    Foot pain: has started PT.   Pressing on petal hurts foot when driving  Using cane for balance  She has seen ortho recently     Pt started noticing that tongue has a tremor 1 month ago  She has appt with neurology 4/29/25    Past Medical History:   Diagnosis Date    ADD (attention deficit disorder)     Allergic rhinitis     Anemia     Anxiety     Bursitis     bilateral hips    Cataracts, bilateral     Chronic pain disorder     Depression 0421/2021    MOODNOT WELL CONTROLLED.  GIVEN NUMBER FOR LOCAL COUNSELOR.  WILL INCREASE TRINTELIX FROM 10MG TO 20MG RTC WEEK ER ID S/HI    Diabetes mellitus, type 2     Diverticulitis     GERD (gastroesophageal reflux disease)     Head injury     High blood pressure     Hyperlipemia     Insomnia     Migraine     EARL (obstructive sleep apnea) 04/21/2021    Panic disorder     Phlebitis     PTSD (post-traumatic stress disorder)     RLS (restless legs syndrome)         Past Surgical History:   Procedure Laterality Date    ANKLE SURGERY Bilateral 2021    BILATERAL BREAST REDUCTION  2015    BREAST BIOPSY      CARPAL TUNNEL RELEASE Bilateral     CHOLECYSTECTOMY  2001    COLONOSCOPY      [facility]    COLONOSCOPY N/A 09/28/2022    Procedure: COLONOSCOPY with biopsy;  Surgeon: Ghislaine Kilgore MD;  Location: [facility] ENDOSCOPY;  Service: Gastroenterology;  Laterality: N/A;  colon polyp, diverticlosis, hemorrhoids    ENDOSCOPY N/A 1/23/2025    Procedure: ESOPHAGOGASTRODUODENOSCOPY WITH BIOPSIES, BALLOON DILATATION 12-15MM, DILATATION WITH 54FR MULLINS;  Surgeon: Ghislaine Kilgore MD;  Location: [facility] ENDOSCOPY;  Service: Gastroenterology;   Laterality: N/A;  SCHATZKI'S RING, HIATAL HERNIA    EYE SURGERY Right     scar tissue removal    HYSTERECTOMY      ULNAR NERVE TRANSPOSITION Right     WRIST SURGERY          Current Outpatient Medications on File Prior to Visit   Medication Sig Dispense Refill    Accu-Chek Madeline Plus test strip by Other route 4 (Four) Times a Day. use to test blood sugar      Accu-Chek Softclix Lancets lancets by Other route 4 (Four) Times a Day. use to test blood sugar      ARIPiprazole (Abilify) 5 MG tablet Take 1 tablet by mouth Daily. 90 tablet 3    Ascorbic Acid (VITAMIN C GUMMIE PO) Take  by mouth Daily.      Blood Glucose Monitoring Suppl (FreeStyle Lite) device 1 each by Other route 4 (Four) Times a Day.      buPROPion XL (WELLBUTRIN XL) 300 MG 24 hr tablet TAKE 1 TABLET BY MOUTH EVERY MORNING 90 tablet 3    cetirizine (zyrTEC) 10 MG tablet TAKE 1 TABLET BY MOUTH EVERY DAY 90 tablet 1    Cholecalciferol 25 MCG (1000 UT) tablet dispersible Take  by mouth 2 (Two) Times a Day.      cyclobenzaprine (FLEXERIL) 10 MG tablet Take 1 tablet by mouth Daily As Needed for Muscle Spasms. 90 tablet 1    Daily-Jelani Multivitamin tablet tablet Take 1 tablet by mouth every night at bedtime.      ezetimibe (ZETIA) 10 MG tablet TAKE 1 TABLET BY MOUTH EVERY NIGHT AT BEDTIME 90 tablet 1    famotidine (Pepcid) 20 MG tablet Take 1 tablet by mouth 2 (Two) Times a Day. 60 tablet 1    FLUoxetine (PROzac) 10 MG capsule Take 1 capsule by mouth Daily. 90 capsule 3    FLUoxetine (PROzac) 20 MG capsule Take 1 capsule by mouth Daily. 90 capsule 3    fluticasone (Flonase) 50 MCG/ACT nasal spray 2 sprays into the nostril(s) as directed by provider Daily. Administer 2 sprays in each nostril for each dose. 16 g 6    Inclisiran Sodium (LEQVIO SC) Inject  under the skin into the appropriate area as directed.      insulin degludec (TRESIBA FLEXTOUCH) 100 UNIT/ML solution pen-injector injection Inject 10 Units under the skin into the appropriate area as directed  "Daily. 10 mL 1    Insulin Pen Needle (BD Pen Needle Micro U/F) 32G X 6 MM misc Use 1 each Daily. 100 each 1    Januvia 100 MG tablet TAKE 1 TABLET BY MOUTH DAILY 90 tablet 1    losartan (COZAAR) 25 MG tablet TAKE 1 TABLET BY MOUTH DAILY 90 tablet 1    metFORMIN (GLUCOPHAGE) 500 MG tablet TAKE 1 TABLET BY MOUTH EVERY MORNING AND 2 TABLETS BY MOUTH EVERY EVENING WITH MEALS 270 tablet 1    montelukast (SINGULAIR) 10 MG tablet TAKE 1 TABLET BY MOUTH EVERY NIGHT 90 tablet 1    pantoprazole (PROTONIX) 20 MG EC tablet TAKE 1 TABLET BY MOUTH DAILY 90 tablet 0    Pramipexole Dihydrochloride ER (Mirapex ER) 1.5 MG tablet sustained-release 24 hour Take 1.5 mg by mouth Every Night. 30 tablet 5    prednisoLONE acetate (PRED FORTE) 1 % ophthalmic suspension SHAKE LIQUID AND INSTILL 1 DROP IN RIGHT EYE TWICE DAILY      Zinc 100 MG tablet Take 100 mg by mouth Daily.      aspirin 81 MG EC tablet Take 1 tablet by mouth Daily. (Patient not taking: Reported on 3/27/2025) 90 tablet 1    [DISCONTINUED] alendronate (FOSAMAX) 70 MG tablet Take 1 tablet by mouth Every 7 (Seven) Days. (Patient not taking: Reported on 3/27/2025)       No current facility-administered medications on file prior to visit.        Allergies   Allergen Reactions    Diclofenac Hives    New Skin [Benzethonium Chloride] Rash    Atorvastatin Myalgia    Adhesive Tape Rash and Other (See Comments)       Rash at area of bandaid    Niacin Rash       Social History     Tobacco Use   Smoking Status Former    Current packs/day: 0.00    Average packs/day: 0.3 packs/day for 13.0 years (3.3 ttl pk-yrs)    Types: Cigarettes    Start date:     Quit date:     Years since quittin.2   Smokeless Tobacco Never   Tobacco Comments    QUIT           Objective   Vital Signs:   /78 (BP Location: Left arm, Patient Position: Sitting, Cuff Size: Adult)   Pulse 97   Temp 98.3 °F (36.8 °C) (Temporal)   Resp 16   Ht 160 cm (63\")   Wt 83.6 kg (184 lb 6.4 oz)   SpO2 96% "   BMI 32.66 kg/m²     Physical Exam  Vitals reviewed.   Constitutional:       Appearance: Normal appearance.   HENT:      Head: Normocephalic and atraumatic.      Nose: Nose normal.      Mouth/Throat:      Mouth: Mucous membranes are moist.   Eyes:      Extraocular Movements: Extraocular movements intact.      Conjunctiva/sclera: Conjunctivae normal.      Pupils: Pupils are equal, round, and reactive to light.   Cardiovascular:      Rate and Rhythm: Normal rate and regular rhythm.   Pulmonary:      Effort: Pulmonary effort is normal.      Breath sounds: Normal breath sounds.   Abdominal:      General: Abdomen is flat. Bowel sounds are normal.      Palpations: Abdomen is soft.   Musculoskeletal:         General: Normal range of motion.   Neurological:      General: No focal deficit present.      Mental Status: She is alert and oriented to person, place, and time.   Psychiatric:         Mood and Affect: Mood normal.        Result Review :   The following data was reviewed by: Catalina Madsen PA-C on 03/27/2025:  Common labs          11/8/2024    10:29 1/21/2025    13:12 2/27/2025    12:15   Common Labs   Glucose  169  165    BUN  20  17    Creatinine 0.99  1.16  1.18    Sodium  136  138    Potassium  4.7  4.2    Chloride  96  102    Calcium 9.3  10.2  10.1    Albumin  4.8  4.4    Total Bilirubin  1.0  0.9    Alkaline Phosphatase  92  78    AST (SGOT)  34  27    ALT (SGPT)  44  34    WBC  8.57     Hemoglobin  14.9     Hematocrit  43.9     Platelets  278     Total Cholesterol   116    Triglycerides   186    HDL Cholesterol   43    LDL Cholesterol    43    Hemoglobin A1C   8.90    Microalbumin, Urine   1.7                   Assessment and Plan    Diagnoses and all orders for this visit:    1. Saenz's esophagus with dysplasia (Primary)  Assessment & Plan:  Discussed recent diagnosis with patient.  Discussed that she cannot be on Fosamax.  Shantanu Herrera is working on getting injectable medication for osteoporosis.   Patient states she is waiting to hear back from her insurance about the blood work.  She will keep follow-up with GI as directed.    Orders:  -     Ambulatory Referral to Chronic Care Management    2. Major depressive disorder, recurrent episode, moderate degree  Assessment & Plan:  Mood stable, continue follow-up with psych as directed.      Orders:  -     Ambulatory Referral to Chronic Care Management    3. Type 2 diabetes mellitus with hyperglycemia, without long-term current use of insulin  Assessment & Plan:  DM not at goal. Will increase to 15 units at night. Discussed titration of 3 units, every 3 days until am bg <140.   Will get CCM RN to help with insulin increase until pt f/u 1 month.    Orders:  -     Ambulatory Referral to Chronic Care Management    4. Essential hypertension  Assessment & Plan:  Hypertension is stable and controlled  Continue current treatment regimen.  Blood pressure will be reassessed in 3 months.    Orders:  -     Ambulatory Referral to Chronic Care Management    5. Right foot pain  Comments:  Will try and help pt find out where to get inserts requested by ortho  Orders:  -     Ambulatory Referral to Chronic Care Management        Follow Up   Return in about 1 month (around 4/27/2025).  Patient was given instructions and counseling regarding her condition or for health maintenance advice. Please see specific information pulled into the AVS if appropriate.          normal... Well appearing, well nourished, awake, alert, oriented to person, place, time/situation and in no apparent distress.

## 2025-03-27 NOTE — ASSESSMENT & PLAN NOTE
Discussed recent diagnosis with patient.  Discussed that she cannot be on Fosamax.  Shantanu Herrera is working on getting injectable medication for osteoporosis.  Patient states she is waiting to hear back from her insurance about the blood work.  She will keep follow-up with GI as directed.

## 2025-03-27 NOTE — ASSESSMENT & PLAN NOTE
DM not at goal. Will increase to 15 units at night. Discussed titration of 3 units, every 3 days until am bg <140.   Will get CCM RN to help with insulin increase until pt f/u 1 month.

## 2025-03-31 ENCOUNTER — TELEMEDICINE (OUTPATIENT)
Dept: PSYCHIATRY | Facility: CLINIC | Age: 75
End: 2025-03-31
Payer: MEDICARE

## 2025-03-31 ENCOUNTER — APPOINTMENT (OUTPATIENT)
Facility: HOSPITAL | Age: 75
End: 2025-03-31
Payer: MEDICARE

## 2025-03-31 DIAGNOSIS — F41.1 GENERALIZED ANXIETY DISORDER: Primary | ICD-10-CM

## 2025-03-31 PROCEDURE — 90834 PSYTX W PT 45 MINUTES: CPT | Performed by: COUNSELOR

## 2025-03-31 NOTE — PROGRESS NOTES
Date: March 31, 2025  Time In: 1300  Time Out: 1348  This provider is located at home address for [facility] (through [facility]), [address] [place] [address] using a secure Car reviews Video Visit through [employer].     Mode of Visit: Video  Location of patient: -HOME-  Location of provider: +HOME+  You have chosen to receive care through a telehealth visit.  The patient has signed the video visit consent form.  The visit included audio and video interaction. No technical issues occurred during this visit.    The patient's condition being diagnosed/treated is appropriate for telemedicine. The provider identified herself as well as her credentials. The patient, and/or patients guardian, consent to be seen remotely, and when consent is given they understand that the consent allows for patient identifiable information to be sent to a third party as needed. They may refuse to be seen remotely at any time. The electronic data is encrypted and password protected, and the patient and/or guardian has been advised of the potential risks to privacy not withstanding such measures.     You have chosen to receive care through a telehealth visit.  Do you consent to use a video/audio connection for your medical care today? Yes    Reviewed by provider on 03/31/2025     PROGRESS NOTE  Data:  Nivia Cagle is a 74 y.o. female who presents today for follow up    Chief Complaint: anxiety     History of Present Illness: Pt expressed increased anxiety due to health concerns pertaining to sugar levels. Pt discussed anxiety associated with previous and upcoming court hearing. Pt discussed mother's estate and trustee. Pt discussed feeling betrayed and hurt by family friend. Pt expressed the desire to distant self from the negativity of others and focus on hope and being mindful that God remains in control.       Clinical Maneuvering/Intervention:    (Scales based on 0 - 10 with 10  being the worst)  Depression:  Anxiety:        Assisted patient in processing above session content; acknowledged and normalized patient’s thoughts, feelings, and concerns.  Rationalized patient thought process regarding recent stressors and life events. Discussed triggers associated with patient's emotions. Also discussed coping skills for patient to implement. Therapist assisted with processing emotions and thoughts correlated to identified stressors. Discussed limitations associated with identified stressors. Therapist offered alternative perspectives, support, and validation as needed.     Reviewed treatment goals, progress regarding identified goals and readiness for change through motivational interviewing approach. Treatment progress towards goals remains on going at this time.     Allowed patient to freely discuss issues without interruption or judgment. Assisted with application of appropriate intervention such as: cognitive behavioral therapy techniques, acceptance and commitment therapy methods, solution-focused brief therapy practices, psychoeducation, mindfulness approaches, emotional regulation strategies, attachment based assessments, and interpersonal interventions. Provided safe, confidential environment to facilitate the development of positive therapeutic relationship and encourage open, honest communication. Assisted patient in identifying risk factors which would indicate the need for higher level of care including thoughts to harm self or others and/or self-harming behavior and encouraged patient to contact this office, call 911, or present to the nearest emergency room should any of these events occur. Discussed crisis intervention services and means to access. Patient adamantly and convincingly denies current suicidal or homicidal ideation or perceptual disturbance.    Assessment:   Assessment   Patient appears to maintain relative stability as compared to their baseline.  However, patient  continues to struggle with anxiety which continues to cause impairment in important areas of functioning.  A result, they can be reasonably expected to continue to benefit from treatment and would likely be at increased risk for decompensation otherwise.    Mental Status Exam:   Hygiene:   good; normal for Pt   Cooperation:  Cooperative  Eye Contact:  Good  Psychomotor Behavior:  Appropriate  Affect:  Appropriate  Mood: anxious  Speech:  Normal  Thought Process:  Linear  Thought Content:  Mood congruent  Suicidal:  None today  Homicidal:  None  Hallucinations:  None  Delusion:  None  Memory:  Intact  Orientation:  Person, Place, Time and Situation  Reliability:  fair  Insight:  Fair  Judgement:  Fair  Impulse Control:  Fair  Physical/Medical Issues:  No        Patient's Support Network Includes:  son    Functional Status: Mild impairment     Progress toward goal: Not at goal    Prognosis: Fair with Ongoing Treatment            Plan:    Patient will continue in individual outpatient therapy with focus on improved functioning and coping skills, maintaining stability, and avoiding decompensation and the need for higher level of care.    Patient will adhere to medication regimen as prescribed and report any side effects. Patient will contact this office, call 911 or present to the nearest emergency room should suicidal or homicidal ideations occur.     Return in about 6 weeks, or earlier if symptoms worsen or fail to improve.           VISIT DIAGNOSIS:     ICD-10-CM ICD-9-CM   1. Generalized anxiety disorder  F41.1 300.02        Diagnoses and all orders for this visit:    1. Generalized anxiety disorder (Primary)           [facility] No Show Policy:  We understand unexpected circumstances arise; however, anytime you miss your appointment we are unable to provide you appropriate care.  In addition, each appointment missed could have been used to provide care for others.  We ask that you call at  least 24 hours in advance to cancel or reschedule an appointment.  We would like to take this opportunity to remind you of our policy stating patients who miss THREE or more appointments without cancelling or rescheduling 24 hours in advance of the appointment may be subject to cancellation of any further visits with our clinic and recommendation to seek in-person services/visits.    Please call [phone] to reschedule your appointment. If there are reasons that make it difficult for you to keep the appointments, please call and let us know how we can help.Please understand that medication prescribing will not continue without seeing your provider.      Patient to email any documentation or needed paperwork to support staff at:   [email or web address]       This document has been electronically signed by Annita Knowles LCSW.  March 31, 2025 15:49 EDT      Part of this note may be an electronic transcription/translation of spoken language to printed text using the Dragon Dictation System.

## 2025-04-02 ENCOUNTER — TELEPHONE (OUTPATIENT)
Dept: CASE MANAGEMENT | Facility: OTHER | Age: 75
End: 2025-04-02
Payer: MEDICARE

## 2025-04-03 ENCOUNTER — PATIENT OUTREACH (OUTPATIENT)
Dept: CASE MANAGEMENT | Facility: OTHER | Age: 75
End: 2025-04-03
Payer: MEDICARE

## 2025-04-03 DIAGNOSIS — I10 ESSENTIAL HYPERTENSION: ICD-10-CM

## 2025-04-03 DIAGNOSIS — E11.65 TYPE 2 DIABETES MELLITUS WITH HYPERGLYCEMIA, WITHOUT LONG-TERM CURRENT USE OF INSULIN: Primary | ICD-10-CM

## 2025-04-03 NOTE — OUTREACH NOTE
AMBULATORY CASE MANAGEMENT NOTE    Names and Relationships of Patient/Support Persons: Contact: Nivia Cagle; Relationship: Self -     CCM Interim Update    Called and spoke with patient to discuss CCM program. Patient gives verbal informed consent for CCM program. She is aware that with the program we will set goals and care plans to target T2DM, medication management. She is aware that there may be a copay with the program and that she can withdrawal from the program at any time.    See Full Assessment Below:  Adult Patient Profile  Questions/Answers      Flowsheet Row Most Recent Value   Symptoms/Conditions Managed at Home behavioral health, cardiovascular, diabetes, type 2, gastrointestinal, HEENT (head, eyes, ears, nose, throat), musculoskeletal, neurological, obesity   Barriers to Managing Health stress of chronic illness, understanding health advice   Additional Documentation Behavioral Health Symptoms/Conditions Management (Group), Gastrointestinal Symptoms/Conditions Management (Group), HEENT Symptoms/Conditions Management (Group), Diabetes Symptoms/Conditions Management (Group)   Cardiovascular Symptoms/Conditions high blood cholesterol, hypertension   Cardiovascular Management Strategies medication therapy, routine screening   Cardiovascular Self-Management Outcome not well   Cardiovascular Comment Triglycerides remain elevated. Patient receives a subq injection for treatment of cholesterol (Leqvio), ordered by cardiology.   Diabetes Management Strategies insulin therapy, medication therapy, routine screenings, blood glucose testing   A1C Target below 7.0   Usual Blood Glucose -228   Last A1C Result 8.9 on 2/27/2025   Diabetes Self-Management Outcome not well   Diabetes Comment Elevated A1c noted. We are increasing Tresiba dose as indicated by fasting BG. Patient has been taking 15units. Tonight she will take 18units through the weekend then we will talk on Monday 4/7 about AM BG to determine  next step. Patient is agreeable to this and verbalizes understanding.   Gastrointestinal Symptoms/Conditions reflux/heartburn   Gastrointestinal Management Strategies medication therapy   Gastrointestinal Self-Management Outcome well   Gastrointestinal Comment No complaints at this time. While completing her med rec, she discovered she is prescribed pepcid twice a day and she has been taking it once a day. She also takes protonix daily. She will start taking pepcid twice a day now.   HEENT Symptoms/Conditions other (see comments)  [Saenz's esophagus]   HEENT Management Strategies routine screening, coping strategies, exercise   HEENT Self-Management Outcome well   HEENT Comment Patient is currently participating in swallow therapy weekly, although she has missed several appointments. She has an appt scheduled on 4/14 that she states she will have to miss then another on 4/21 that she states she can attend.   Musculoskeletal Symptoms/Conditions joint pain, mobility limited, other (see comments), fracture  [osteopenia, history of bilateral ankle fractures]   Musculoskeletal Management Strategies routine screening, medication therapy, coping strategies, exercise   Musculoskeletal Self-Management Outcome not well   Musculoskeletal Comment Not well but improving-Patient stated that her right foot had become so stiff that it was becoming increasingly harder to drive. She is in PT for this twice a week and she is seeing improvement. She also experiences muscle cramps throughout her body frequently. She is followed by Stefania Bray for this. She states she has osteopenia that is followed by JEANNIE Gillis with [facility] in [place]. She used to take Fosamax but it is now contraindicated. Serena Guardado has ordered labwork for patient to get done. I have encouraged her to have that done at the [facility] in [place] because she was concerned about insurance coding, etc. She is agreeable to this. She  "stated that the plan is to eventually start her on an infusion for osteopenia.   Neurological Symptoms/Conditions headache, other (see comments)  [generalized body muscle cramps]   Neurological Management Strategies routine screening, medication therapy, exercise   Neurological Self-Management Outcome not well   Neurological Comment Patient stated she had a headache yesterday. She also stated that she has all over body muscle cramps that is being followed by JEANNIE Hernandez. She treat these with flexerill, exercise and Mirapex.   Obesity Symptoms/Conditions BMI 30-34.9: obesity class I   Obesity Management Strategies routine screening   Obesity Self-Management Outcome not well   Obesity Comment Patient stated she is gaining weight without any clear reason. She states she is still watching what she eats and is actually more active than typical. She would like to try \"a weightloss shot\". I have encouraged her to discuss this with her PCP at the next visit.   Behavioral Health Symptoms/Conditions anxiety, depression, post traumatic stress, other (see comments)  [panic disorder]   Behavioral Health Self-Management Outcome well   Behavioral Health Comment Patient is followed by  for meds and talk therapy. She states her mental health has been worse in the past.   Taking Medications Not Prescribed yes, vitamins and/or minerals   Vitamins/Minerals --  [B complex and CoQ10]   Missed Doses of Prescribed Medications During Past Week yes   Taken Prescribed Medications at Different Time or Schedule During Past Week no   Taken More or Less Medication Than Prescribed yes   Barriers to Taking Medication as Prescribed --  [patient misunderstood the instructions on the prescription for pepcid. she has been taking once a day instead of twice daily]   Current Living Arrangements home        SDOH updated and reviewed with the patient during this program:  Financial Resource Strain: Low Risk  (4/3/2025)    Overall Financial " Resource Strain (CARDIA)     Difficulty of Paying Living Expenses: Not very hard      --     Food Insecurity: No Food Insecurity (4/3/2025)    Hunger Vital Sign     Worried About Running Out of Food in the Last Year: Never true     Ran Out of Food in the Last Year: Never true      --     Housing Stability: Unknown (4/3/2025)    Housing Stability Vital Sign     Unable to Pay for Housing in the Last Year: No     Homeless in the Last Year: No      --     Transportation Needs: No Transportation Needs (4/3/2025)    PRAPARE - Transportation     Lack of Transportation (Medical): No     Lack of Transportation (Non-Medical): No      --     Utilities: Not At Risk (4/3/2025)    [facility] Utilities     Threatened with loss of utilities: No       Care Coordination    I have communicated electronically with PCP to inform her of patient enrolling in CCM.    After reviewing the patients AM BG, I have instructed her to increase her Tresiba to 18 units nightlt, starting tonight through the weekend, based off of written instructions given to me by PCP. We will discuss her BG over the weekend and re-evaluate Tresiba dose on Monday 4/7. I have also communicated this information to PCP electronically.    Francisca SALAZAR  Ambulatory Case Management    4/3/2025, 16:01 EDT

## 2025-04-04 ENCOUNTER — TELEPHONE (OUTPATIENT)
Dept: CASE MANAGEMENT | Facility: OTHER | Age: 75
End: 2025-04-04
Payer: MEDICARE

## 2025-04-04 ENCOUNTER — APPOINTMENT (OUTPATIENT)
Facility: HOSPITAL | Age: 75
End: 2025-04-04
Payer: MEDICARE

## 2025-04-04 NOTE — TELEPHONE ENCOUNTER
FYI~~After reviewing the patients AM BG, I have instructed her to increase her Tresiba to 18 units nightly, starting tonight(4/4) through the weekend, based off of written instructions given to me by PCP. On Monday(4/7) we will discuss her BG over the weekend and re-evaluate Tresiba dose then.     Thank you,  Francisca

## 2025-04-07 ENCOUNTER — HOSPITAL ENCOUNTER (OUTPATIENT)
Facility: HOSPITAL | Age: 75
Setting detail: THERAPIES SERIES
Discharge: HOME | End: 2025-04-07
Payer: MEDICARE

## 2025-04-07 DIAGNOSIS — R13.12 OROPHARYNGEAL DYSPHAGIA: Primary | ICD-10-CM

## 2025-04-07 PROCEDURE — 92507 TX SP LANG VOICE COMM INDIV: CPT | Performed by: SPEECH-LANGUAGE PATHOLOGIST

## 2025-04-07 NOTE — PROGRESS NOTES
Outpatient Adult Speech Language Therapy           Treatment Note/DC    Patient: Nivia Cagle                                                                                     Visit Date: 2025  :     1950    Referring practitioner:    Catalina Madsen PA-C  Date of Initial Visit:          Type: THERAPY  Episode: Aspiration into airway, irregular esophago-gastric mucosal j    Patient seen for 5 sessions    Visit Diagnoses:    ICD-10-CM ICD-9-CM   1. Oropharyngeal dysphagia  R13.12 787.22     SUBJECTIVE   Patient is here today to increase tongue base strength and swallow coordination to decrease risk of aspiration before and after the swallow reducing risk of aspiration.   Patient brought in sandwich and crackers today.  She indicates she is no longer scared to eat meats.       Education:Discharge instructions        OBJECTIVE   GOALS             GOALS ACTIVITY PERFORMED TRIALS COMPLETED LEVEL OF CUEING REQUIRED   LTG 1: 8 weeks:  The patient will demonstrate 0% swallow limitation by achieving a score of  7/7 on the Functional Communication Measures for swallowing to increase safety of swallow to highest levels of functioning using compensatory strategies to reduce risk of aspiration.          goal met 25   STG 1a: 8 weeks Patient will accept 80% trials of thin liquids on  2 consecutive trials with min to no clinical signs and symptoms of aspiration to facilitate a safe swallow. Cup drink coffee and water No clinical signs and symptoms of aspiration Min assist    Goal met   25   STG 1b: 8 weeks: Patient will complete a snack and/or meal  using compensatory strategies with min assist and minimal clinical signs and symptoms of aspiration  to  facilitate a safe swallow. Ham and cheese sandwich and cheese [employer] 1/2 ham and cheese sandwich with no clinical signs and symptoms of aspiration      [employer]- two  "crackers Min assist    *goal met  4/7/25   STG 1c: 8 weeks:  Patient will be educated on signs and symptoms of aspiration and diet with patient verbalizing understanding with min cueing. education -acid reflux  -signs and symptoms of aspiration  -regular solids and thin liquids  -HEP   Min assist    Goal met  4/7/25    STG 2a: 8 weeks:  Patient will complete tongue base isometrics to increase tongue base strength to a 5/5 reducing  residual after the swallow, and increasing timeliness of swallow.  Tongue base elevation against resistance         yawn       Rated a 5/5 normal           3 sets of 10 repetitions Min assist    Goal met 4/7/25   STG2b: 8 weeks:Patient will complete coordination exercises with min assist to  reduce premature spillage/timely eppiglottic closure prior, during and/or after  the swallow decreasing the risk of aspiration Effortful swallows Effortful swallows when eating sandwich with no complaints of residual after the swallow and patient able to consume 1/2 sandwich by pushing the bolus through the pharynx and esophagus without liquids wash Min assist    Goal met  4/7/25   STG 2c: 8 weeks:  Patient will be given a home exercise program and demonstrate understanding of the program with min assist. HEP 3-5 times per day, 3 sets of 10 each exercise     -effortful swallow  -Yawn  -Tongue base elevation against resistance    Min assist    *goal met 4/7/25            ASSESSMENT/PLAN        ASSESSMENT: Patient requires the skills of a therapist to provide reduced cueing to min assist to increase timeliness of swallow and increase tongue base strength to develop a safer swallow reducing risk of aspiration.   Progress:  Patient able to eat a sandwich today with no complaints of residue in pharynx.  Patient indicates she \" feels safe eating meat now\". Patient met all goals.  PLAN: Discharge plan of care     Progress Note Due Date:     Recertification Due Date:4/22/25     SIGNATURE: Elvira Howard, " SLP, [place] License #: [id number]  Electronically Signed on 2025                Speech Discharge Statement        Outpatient Speech Language Pathology   Adult Dysphagia Discharge     Patient Name: Nivia Cagle  : 1950  MRN: [id number]  Today's Date: 2025         Visit Date: 2025        Visit Dx:    ICD-10-CM ICD-9-CM   1. Oropharyngeal dysphagia  R13.12 787.22       Patient's History: Dysphagia therapy    Reason for Discharge: Patient has met all goals.  Patient indicates she is completing HEP successfully.  Additionally, patient indicates she is no longer scared to eat meats.      Discharge Instructions:   Continue HEP 3 times per day, 3 sets of 10 repetitions.      Functional Communication Measures:  Patient is rated at discharge a level 7/7 on Functional Communication Measures for swallow with a 0% limitation.        Assessment:    See above      Speech Therapy Goals:  Problem:  1 Swallow limitation of 1-19% FCM 6/7  LTG 1: 8 weeks:  The patient will demonstrate 0% swallow limitation by achieving a score of  7/7 on the Functional Communication Measures for swallowing to increase safety of swallow to highest levels of functioning using compensatory strategies to reduce risk of aspiration.                          STATUS: Goal met              STG 1a: 8 weeks Patient will accept 80% trials of thin liquids on  2 consecutive trials with min to no clinical signs and symptoms of aspiration to facilitate a safe swallow.                          STATUS: Goal me              STG 1b: 8 weeks: Patient will complete a snack and/or meal  using compensatory strategies with min assist and minimal clinical signs and symptoms of aspiration  to  facilitate a safe swallow.                          STATUS: Goal me              STG 1c: 8 weeks:  Patient will be educated on signs and symptoms of aspiration and diet with patient verbalizing understanding with min cueing.                            STATUS: Goal me                TREATMENT:  Swallowing Therapy to facilitate a safe swallow for all p.o. intake reducing risk of aspiration leading to pneumonia.                 LTG 2: 8 weeks:  Patient will increase tongue base strength to increase coordination of swallow to highest levels of functioning  reducing the risk of aspiration that may lead to pneumonia.                            STATUS: Goal me                                          STG 2a: 8 weeks:  Patient will complete tongue base isometrics to increase tongue base strength to a 5/5 reducing  residual after the swallow, and increasing timeliness of swallow.                           STATUS: Goal me              STG2b: 8 weeks:Patient will complete coordination exercises with min assist to  reduce premature spillage/timely eppiglottic closure prior, during and/or after  the swallow decreasing the risk of aspiration.                          STATUS: Goal me              STG 2c: 8 weeks:  Patient will be given a home exercise program and demonstrate understanding of the program with min assist.                          STATUS: Goal me              TREATMENT: Swallow therapy to increase coordination and strength of swallow for safe p.o. intake reducing risk of aspiration leading to possible aspiration pneumonia.               Recommendations: Discharge             ST Plan,DC  PLAN    ST SIGNATURE: LEON Shahid    Electronically signed 4/7/2025    [place]  License:  [id number]                 Adult Outpatient Rehabiliation

## 2025-04-08 ENCOUNTER — TELEPHONE (OUTPATIENT)
Dept: CASE MANAGEMENT | Facility: OTHER | Age: 75
End: 2025-04-08
Payer: MEDICARE

## 2025-04-10 ENCOUNTER — TELEPHONE (OUTPATIENT)
Dept: INTERNAL MEDICINE | Facility: CLINIC | Age: 75
End: 2025-04-10
Payer: MEDICARE

## 2025-04-10 NOTE — TELEPHONE ENCOUNTER
Patient called office stating her fasting BG has been 262, yesterday 216, 4/8 199, 4/7 250, 4/6 197, 4/5 174, patient stated she has been having blurry vision, I ask patient if she has checked her BG after eating and taking medication and patient stated she does not, patient also mention that sometimes she forgets to eat something, she also mention that she has been feeling very tired, I ask patient if she ate something today and she stated she did, I ask if she could check her BG while on the phone with me and it was 268, patient stated she takes her insulin at night before going to bed and takes metformin morning and night and januvia in the morning, please advise

## 2025-04-11 ENCOUNTER — TRANSCRIBE ORDERS (OUTPATIENT)
Dept: ADMINISTRATIVE | Facility: HOSPITAL | Age: 75
End: 2025-04-11
Payer: MEDICARE

## 2025-04-11 ENCOUNTER — TELEPHONE (OUTPATIENT)
Dept: CASE MANAGEMENT | Facility: OTHER | Age: 75
End: 2025-04-11
Payer: MEDICARE

## 2025-04-11 DIAGNOSIS — Z79.899 ENCOUNTER FOR LONG-TERM CURRENT USE OF MEDICATION: ICD-10-CM

## 2025-04-11 DIAGNOSIS — M85.89 OSTEOPENIA OF MULTIPLE SITES: Primary | ICD-10-CM

## 2025-04-11 NOTE — TELEPHONE ENCOUNTER
Can you help this patient with insulin titration?  Can titrate night dose 3 units every 3 days until a.m. blood glucose at goal 140 or less.

## 2025-04-11 NOTE — TELEPHONE ENCOUNTER
Patient called and left a couple of messages regarding elevated BG. I have attempted to call her back but had to leave a VM. Patient had an appointment today with PCP but was cancelled by patient. UTR X 1

## 2025-04-14 ENCOUNTER — APPOINTMENT (OUTPATIENT)
Facility: HOSPITAL | Age: 75
End: 2025-04-14
Payer: MEDICARE

## 2025-04-15 ENCOUNTER — TELEPHONE (OUTPATIENT)
Dept: INTERNAL MEDICINE | Facility: CLINIC | Age: 75
End: 2025-04-15
Payer: MEDICARE

## 2025-04-15 ENCOUNTER — TELEPHONE (OUTPATIENT)
Dept: CASE MANAGEMENT | Facility: OTHER | Age: 75
End: 2025-04-15
Payer: MEDICARE

## 2025-04-15 NOTE — TELEPHONE ENCOUNTER
Caller: RANDALL FROM [employer]    Relationship to patient: Other    Best call back number: [phone]     Patient is needing: CALLER SENT FAX TO THE OFFICE ON 04.11.2025 REGARDING AN ORDER FOR A CONTINUOUS BLOOD GLUCOSE MONITORING SYSTEM. CALLER WANTING TO KNOW IF FAX HAS BEEN RECEIVED.

## 2025-04-18 ENCOUNTER — TELEPHONE (OUTPATIENT)
Dept: CASE MANAGEMENT | Facility: OTHER | Age: 75
End: 2025-04-18
Payer: MEDICARE

## 2025-04-18 NOTE — TELEPHONE ENCOUNTER
Caller: RANDALL FROM [employer]    Relationship to patient: Other    Best call back number: [phone]    KADE STATED MEDICATION PRESCRIPTION REQUEST IS MISSING LAST OFFICE NOTES AND PROVIDER SIGNATURE AND DATE.     FAX: [phone]

## 2025-04-18 NOTE — TELEPHONE ENCOUNTER
Attempted to call patient. Left voicemail. UTR X 4. I am now placing patient in monthly monitoring status due to unable to contact patient.

## 2025-04-21 ENCOUNTER — APPOINTMENT (OUTPATIENT)
Facility: HOSPITAL | Age: 75
End: 2025-04-21
Payer: MEDICARE

## 2025-04-21 NOTE — TELEPHONE ENCOUNTER
Spoke with [facility] to inform them that order has been faxed, was inform they are also needing the last office note with how many times a day patient is checking her bg, and that she is using a cgm, please advise

## 2025-04-21 NOTE — TELEPHONE ENCOUNTER
Caller: RANDALL FROM [employer]    Relationship: Other    Best call back number: [phone]     What is the best time to reach you: ANY    Who are you requesting to speak with (clinical staff, provider,  specific staff member): CLINICAL STAFF    What was the call regarding: RANDALL CALLED BACK WANTING AN UPDATE ON THE REQUESTED PAPERWORK

## 2025-04-22 ENCOUNTER — PATIENT OUTREACH (OUTPATIENT)
Dept: CASE MANAGEMENT | Facility: OTHER | Age: 75
End: 2025-04-22
Payer: MEDICARE

## 2025-04-22 ENCOUNTER — TELEMEDICINE (OUTPATIENT)
Dept: PSYCHIATRY | Facility: CLINIC | Age: 75
End: 2025-04-22
Payer: MEDICARE

## 2025-04-22 DIAGNOSIS — F51.05 INSOMNIA DUE TO MENTAL CONDITION: ICD-10-CM

## 2025-04-22 DIAGNOSIS — E11.65 TYPE 2 DIABETES MELLITUS WITH HYPERGLYCEMIA, WITHOUT LONG-TERM CURRENT USE OF INSULIN: Primary | ICD-10-CM

## 2025-04-22 DIAGNOSIS — F43.10 POST TRAUMATIC STRESS DISORDER (PTSD): ICD-10-CM

## 2025-04-22 DIAGNOSIS — F33.40 RECURRENT MAJOR DEPRESSIVE DISORDER IN REMISSION: ICD-10-CM

## 2025-04-22 DIAGNOSIS — F41.1 GENERALIZED ANXIETY DISORDER: Primary | ICD-10-CM

## 2025-04-22 NOTE — OUTREACH NOTE
"AMBULATORY CASE MANAGEMENT NOTE    Names and Relationships of Patient/Support Persons: Contact: Nivia Cagle; Relationship: Self -     CCM Interim Update    Patient called today wanting information on how to treat her blood glucose. I changed patient's status back to Engaged. Patient stated her FBG the last 3 days were 148, 160, 187. The 3 days prior to that they were 118, 183, 126. She is currently taking 18 units of Tresiba at night. This has not been increased since 4/8.  I have instructed the patient to increase her Tresiba tonight by 1 unit. Making it 19 units tonight then recheck FBS tomorrow morning and call me. This instruction is based off written instructions given to me by PCP on how to titrate patient's long acting insulin. Patient verbalized understanding.    Patient stated she is eating \"very little\" in an effort to lower her BG. We discussed healthy eating habits. I have offered to mail the patient written information about meal planning with diabetes. She is agreeable to this.    Care Coordination    [facility] Meal Planning and Carb Counting brochure mailed to patient.      Education Documentation  blood glucose monitoring, taught by Francisca Zhang, RN at 4/22/2025  2:51 PM.  Learner: Patient  Readiness: Acceptance  Method: Explanation  Response: Verbalizes Understanding, Needs Reinforcement    medication management, taught by Francisca Zhang, RN at 4/22/2025  2:51 PM.  Learner: Patient  Readiness: Acceptance  Method: Explanation  Response: Verbalizes Understanding, Needs Reinforcement    healthy eating, taught by Francisca Zhang, RN at 4/22/2025  2:51 PM.  Learner: Patient  Readiness: Acceptance  Method: Explanation  Response: Verbalizes Understanding, Needs Reinforcement          Francisca SALAZAR  Ambulatory Case Management    4/22/2025, 14:51 EDT  " CC: Annual Physical Exam    HPI:   Amari Cruz is a 30year old female who presents for a complete physical exam.    HCM  -Diet:  Well-balanced.   -Exercise active  -Mental Health: denies any depression or anxiety sx  -Skin care:  no concerning lesions/mole Chol 145 (H) <130 mg/dL    FASTING Yes    TSH W REFLEX TO FREE T4   Result Value Ref Range    TSH 1.270 0.358 - 3.740 mIU/mL       No current outpatient medications on file.         Penicillins             HIVES  Dilaudid [[employer]*    RASH, ITCHING, FACE is not in acute distress. Appearance: She is well-developed. HENT:      Head: Normocephalic.       Right Ear: External ear normal.      Left Ear: External ear normal.   Eyes:      Conjunctiva/sclera: Conjunctivae normal.      Pupils: Pupils are equal,

## 2025-04-22 NOTE — TREATMENT PLAN
Anxiety:  2/10 progressing    Goals:  Patient will develop and implement behavioral and cognitive strategies to reduce anxiety and irrational fears, monthly, using Rogerian psychotherapy and CBT where appropriate.  Help patient explore past emotional issues in relation to present anxiety, monthly, until remission of symptoms, using Rogerian psychotherapy and CBT where appropriate.  Help patient develop an awareness of their cognitive and physical responses to anxiety, monthly, until remission of symptoms, using Rogerian psychotherapy and CBT where appropriate.          Depression:  2/10 progressing    Goals:  Patient will demonstrate the ability to initiate new constructive life skills outside of sessions on a consistent basis, monthly, using Rogerian psychotherapy and CBT where appropriate.  Assist patient in setting attainable activities of daily living goals, monthly, using Rogerian psychotherapy and CBT where appropriate.  Provide education about depression, monthly, using Rogerian psychotherapy and CBT where appropriate.  Assist patient in developing healthy coping strategies, monthly, using Rogerian psychotherapy and CBT where appropriate.    Rogerian psychotherapy and CBT will be used to help accomplish the above goals and manage depression and anxiety related to family conflict, relationships, medical conditions, seasonal changes       I have discussed and reviewed this treatment plan with the patient.      Reviewed by Vicky Barth MD   04/22/2025

## 2025-04-22 NOTE — PROGRESS NOTES
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 7.9  6.0 - 8.5 g/dL Final    Albumin 01/21/2025 4.8  3.5 - 5.2 g/dL Final    ALT (SGPT) 01/21/2025 44 (H)  1 - 33 U/L Final    AST (SGOT) 01/21/2025 34 (H)  1 - 32 U/L Final    Alkaline Phosphatase 01/21/2025 92  39 - 117 U/L Final    Total Bilirubin 01/21/2025 1.0  0.0 - 1.2 mg/dL Final    Globulin 01/21/2025 3.1  gm/dL Final    A/G Ratio 01/21/2025 1.5  g/dL Final    BUN/Creatinine Ratio 01/21/2025 17.2  7.0 - 25.0 Final    Anion Gap 01/21/2025 14.0  5.0 - 15.0 mmol/L Final    eGFR 01/21/2025 50.2 (L)  >60.0 mL/min/1.73 Final    Magnesium 01/21/2025 1.8  1.6 - 2.4 mg/dL Final    Vitamin B-12 01/21/2025 702  211 - 946 pg/mL Final    Folate 01/21/2025 15.10  4.78 - 24.20 ng/mL Final    TSH 01/21/2025 3.120  0.270 - 4.200 uIU/mL Final    SARS Antigen 01/21/2025 Not Detected  Not Detected, Presumptive Negative Final    Influenza A Antigen ARCHIE 01/21/2025 Not Detected  Not Detected Final    Influenza B Antigen ARCHIE 01/21/2025 Not Detected  Not Detected Final    Internal Control 01/21/2025 Passed  Passed Final    Lot Number 01/21/2025 [id number]   Final    Expiration Date 01/21/2025 " 07/30/2025   Final    WBC 01/21/2025 8.57  3.40 - 10.80 10*3/mm3 Final    RBC 01/21/2025 4.85  3.77 - 5.28 10*6/mm3 Final    Hemoglobin 01/21/2025 14.9  12.0 - 15.9 g/dL Final    Hematocrit 01/21/2025 43.9  34.0 - 46.6 % Final    MCV 01/21/2025 90.5  79.0 - 97.0 fL Final    MCH 01/21/2025 30.7  26.6 - 33.0 pg Final    MCHC 01/21/2025 33.9  31.5 - 35.7 g/dL Final    RDW 01/21/2025 12.0 (L)  12.3 - 15.4 % Final    RDW-SD 01/21/2025 40.0  37.0 - 54.0 fl Final    MPV 01/21/2025 11.8  6.0 - 12.0 fL Final    Platelets 01/21/2025 278  140 - 450 10*3/mm3 Final    Neutrophil % 01/21/2025 64.2  42.7 - 76.0 % Final    Lymphocyte % 01/21/2025 21.9  19.6 - 45.3 % Final    Monocyte % 01/21/2025 10.3  5.0 - 12.0 % Final    Eosinophil % 01/21/2025 2.3  0.3 - 6.2 % Final    Basophil % 01/21/2025 0.7  0.0 - 1.5 % Final    Immature Grans % 01/21/2025 0.6 (H)  0.0 - 0.5 % Final    Neutrophils, Absolute 01/21/2025 5.50  1.70 - 7.00 10*3/mm3 Final    Lymphocytes, Absolute 01/21/2025 1.88  0.70 - 3.10 10*3/mm3 Final    Monocytes, Absolute 01/21/2025 0.88  0.10 - 0.90 10*3/mm3 Final    Eosinophils, Absolute 01/21/2025 0.20  0.00 - 0.40 10*3/mm3 Final    Basophils, Absolute 01/21/2025 0.06  0.00 - 0.20 10*3/mm3 Final    Immature Grans, Absolute 01/21/2025 0.05  0.00 - 0.05 10*3/mm3 Final    nRBC 01/21/2025 0.0  0.0 - 0.2 /100 WBC Final   Lab on 11/08/2024   Component Date Value Ref Range Status    Creatinine 11/08/2024 0.99  0.57 - 1.00 mg/dL Final    eGFR 11/08/2024 60.7  >60.0 mL/min/1.73 Final    25 Hydroxy, Vitamin D 11/08/2024 35.9  30.0 - 100.0 ng/ml Final    Calcium 11/08/2024 9.3  8.6 - 10.5 mg/dL Final   Orders Only on 10/23/2024   Component Date Value Ref Range Status    Lactoferrin, Qual 10/23/2024 Negative  Negative Final    Toxigenic C. difficile by PCR 10/23/2024 Negative  Negative Final    027 Toxin 10/23/2024 Presumptive Negative   Final    Salmonella 10/23/2024 Not Detected  Not Detected Final    Campylobacter 10/23/2024  "Not Detected  Not Detected Final    Shigella/Enteroinvasive E. coli (E* 10/23/2024 Not Detected  Not Detected Final    Shiga-like toxin-producing E. coli* 10/23/2024 Not Detected  Not Detected Final   Clinical Support on 10/23/2024   Component Date Value Ref Range Status    Hemoglobin A1C 10/23/2024 7.30 (H)  4.80 - 5.60 % Final       EKG Results:  No orders to display       Imaging Results:  No Images in the past 120 days found..      Assessment & Plan   Diagnoses and all orders for this visit:    1. Generalized anxiety disorder (Primary)    2. Post traumatic stress disorder (PTSD)    3. Insomnia due to mental condition    4. Recurrent major depressive disorder in remission      INITIAL presentation 2021 most consistent with major depressive disorder, recurrent, moderate to severe, with anxious distress.  PTSD.  Rule out personality disorder, cluster B specifically.  Rule out hypomania as patient was very difficult to interrupt today.    04/22/2025: Stable, well, no med changes. Appointment began earlier, over the phone, while helping pt log on. MH \"I've been fine.\"    Acknowledged and normalized patient's thoughts, feelings, and concerns. Allowed patient to freely discuss and process issues, such as:  Anxiety and depression regarding family conflict/relationships.  Anxiety and depression regarding medical conditions.  ... using Rogerian psychotherapeutic techniques including unconditional positive regard, reflective listening, and demonstrating clear empathy, with the goal of ameliorating symptoms and maintaining, restoring, or improving self-esteem, adaptive skills, and ego or psychological functions (Ara et al. 1991), the long-term goal of which is to develop a better, healthier perspective and help the patient bear their circumstances more easily.  Time (minutes) spent providing supportive psychotherapy: 20  (This time is exclusive to the therapy session and separate from the time spent on activities used " "to meet the criteria for the E/M service (history, exam, medical decision-making).)  Start: 11:00  Stop: 11:20  Functional status: mild impairment  Treatment plan: Medication management and supportive psychotherapy  Prognosis: good  Progress: stable  5w    03/21/2025: Stable, well, no med changes.     02/20/2025: Stable, well, no med changes.     12/11/2024: Stable, well, no med changes.     09/26/2024: Stable, well, no med changes.     08/28/2024: Mild MDD, but maintenance insomnia. Increase abilify. Catalina with Hoahaoism, prayer.    07/31/2024: Not depressed, persistent maintenance insomnia. Restarted her meds 10 days ago, the above improving. No changes, as her s/sx are related to stopping her meds for a time. Also has restarted light box.    07/02/2024: Improving mood, but persistent insomnia. Increase abilify. Constipation issues, but had 2 BM recently. Consider gabapentin as neuropathic pain impeding sleep.    06/04/2024: Insomnia, but also more activity, and possibly some depressed mood. Restart abilify; stopped previously thinking it wasn't covered by insurance, but this may have just been an early request that was denied. Watch swallowing issues. Consider lamictal, vraylar, rexulti, low dose seroquel.    4/30/24: Insomnia, and some procrastination. Increase doxepin. Procrastinating on painting a certain painting; processed why. Monitor.    4/3: Stable, but monitor if worsening depression creeps in.    3/4/24: Pt ist stable, but lost her 20 mg dose of prozac. Re-sent in. Insurance won't pay for abilify. DC abilify. Some unusual issues with swallowing. Processed dreams about her mother. Mom isn't happy, \"it's not good enough.\"    2/6: Stable, discussed dietary changes involving decreasing sugar. Sugar is comforting and helps her depression. Also discussed stevia. Now using light box and sleeping a little better.    1/5: Seasonal depression, start light box up again. May have to make further med changes.    12/14: " Add melatonin for maintenance insomnia. Otherwise stable. Seeing a movie for [employer]. Got all her Xmas cards mailed out.    11/9: Doing well, isolated insomnia. Stop trazodone and start doxepin. Consider lunesta, belsomra, juju. She stopped melatonin on her own.    8/11: Stable, well, no changes. Painting, having people over. Counseled to start light box in September, reiterated instructions. Will be inducted into the [employer]ity of [facility] and [facility] tomorrow. She's been working on it for a year.    7/11: Short visit as patient sleepy. Unusual sleep pattern recently, but MH is fine. Pt will call her son tonight to ensure someone is around when she goes back to sleep. She will call PCP about this tomorrow. Close follow up with me in 4 wks.    4/27: Stable, well. Restart melatonin for insomnia.     3/2: Prozac and BLT helped. Situational stressor in son, no changes. Better. Watch insomnia.     1/20: Start light box, increase prozac for dep.     12/9: Some insomnia. Increase melatonin.     10/25: Doing well. Increase prozac back to baseline dose (inadvertently reduced, she has been stable on a higher dose, so we should go back to that). Some initial insomnia, increase trazodone to 75 mg nightly. She is enjoying the Fall.     9/8: Start light box. Close follow up in case this is worsening depression.     7/27: Well, stable.     6/14: Better, no changes.     5/3: Increase prozac and melatonin.    Visit Diagnoses:    ICD-10-CM ICD-9-CM   1. Generalized anxiety disorder  F41.1 300.02   2. Post traumatic stress disorder (PTSD)  F43.10 309.81   3. Insomnia due to mental condition  F51.05 300.9     327.02   4. Recurrent major depressive disorder in remission  F33.40 296.35         PLAN:  Risk Assessment: Risk of self-harm acutely is moderate. Risk factors include chronic depressive disorder, possible personality disorder, recent psychosocial stressors (pandemic, moving). Protective factors include no  present SI, no history of suicide attempts or self-harm in the past, no access to weapons, minimal AODA, healthcare seeking, future orientation, willingness to engage in care. Risk of self-harm chronically is also moderate, but could be further elevated in the event of treatment noncompliance and/or AODA.  Safety: No acute safety concerns.  Medications:   CONTINUE light box 9/1 - 3/31.  CONTINUE melatonin 30 mg p.o. nightly.  Risks, benefits, side effects discussed with patient including sedation, dizziness/falls risk, GI upset.  Do not use before operating vehicle, vessel, or machine. After discussion of these risks and benefits, the patient voiced understanding and agreed to proceed.   CONTINUE doxepin 25 mg qhs. Risks, benefits, side effects discussed with patient including GI upset, sedation, dizziness/falls risk, grogginess the following day, prolongation of the QTc interval.  After discussion of these risks and benefits, the patient voiced understanding and agreed to proceed.    CONTINUE bupropion xl 300 mg daily. Risks, benefits, alternatives discussed with patient including nausea, GI upset, increased energy, exacerbation of irritability, insomnia, lowering of seizure threshold.  After discussion of these risks and benefits, the patient voiced understanding and agreed to proceed.  CONTINUE Prozac 30 mg a day (HIGHEST dose is 40 given that she is also on bupropion). Risks, benefits, alternatives discussed with patient including GI upset, nausea vomiting diarrhea, theoretical decrease of seizure threshold predisposing the patient to a slightly higher seizure risk, headaches, sexual dysfunction, serotonin syndrome, bleeding risk, increased suicidality in patients 24 years and younger.  After discussion of these risks and benefits, the patient voiced understanding and agreed to proceed.  CONTINUE Abilify 5 mg p.o. nightly. Previously stopped 2/2 cost, 3/24, but this may have been rejected by insurance simply  because pt requested too soon (she states). Risks, benefits, alternatives discussed with patient including increased energy, exacerbation of irritability, akathisia, movement issues, GI upset, orthostatic hypotension, increased appetite, tardive dyskinesia.  Use care when operating vehicle, vessel, or machine. After discussion of these risks and benefits, the patient voiced understanding and agreed to proceed.  S/P:  trazodone 100 mg PO QHS. No longer effective 11/23.  Mirtazapine 45 mg daily (RLS)  Ambien caused double vision, and possibly hallucinations  Was on lithium in the past: dizziness and falls, and hair curled.  Therapy: referred to [facility] 12[facility]7.  Labs/Studies: s/p TMS referral.  Follow Up: 6 weeks. (prefers 4 wks)      TREATMENT PLAN/GOALS: Continue supportive psychotherapy efforts and medications as indicated. Treatment and medication options discussed during today's visit. Patient acknowledged and verbally consented to continue with current treatment plan and was educated on the importance of compliance with treatment and follow-up appointments.    MEDICATION ISSUES:  SAPNA reviewed as expected.  Discussed medication options and treatment plan of prescribed medication as well as the risks, benefits, and side effects including potential falls, possible impaired driving and metabolic adversities among others. Patient is agreeable to call the office with any worsening of symptoms or onset of side effects. Patient is agreeable to call 911 or go to the nearest ER should he/she begin having SI/HI. No medication side effects or related complaints today.     MEDS ORDERED DURING VISIT:  No orders of the defined types were placed in this encounter.      Return in about 5 weeks (around 5/27/2025) for Video visit.         This document has been electronically signed by Vicky Barth MD  April 22, 2025 11:26 EDT      Part of this note may be an electronic transcription/translation of spoken language to printed  text using the Dragon Dictation System.

## 2025-04-23 ENCOUNTER — TELEPHONE (OUTPATIENT)
Dept: PSYCHIATRY | Facility: CLINIC | Age: 75
End: 2025-04-23
Payer: MEDICARE

## 2025-04-23 ENCOUNTER — PATIENT OUTREACH (OUTPATIENT)
Dept: CASE MANAGEMENT | Facility: OTHER | Age: 75
End: 2025-04-23
Payer: MEDICARE

## 2025-04-23 ENCOUNTER — TELEPHONE (OUTPATIENT)
Dept: INTERNAL MEDICINE | Facility: CLINIC | Age: 75
End: 2025-04-23
Payer: MEDICARE

## 2025-04-23 DIAGNOSIS — E11.65 TYPE 2 DIABETES MELLITUS WITH HYPERGLYCEMIA, WITHOUT LONG-TERM CURRENT USE OF INSULIN: Primary | ICD-10-CM

## 2025-04-23 NOTE — TELEPHONE ENCOUNTER
Caller: [facility]    Relationship to patient: Other    Best call back number: [phone]     Patient is needing: CALLER STATED THAT JOE SOUTH SIGNED AN ORDER AND SENT FOR A FREE STYLE [place]. THERE WERE NO CLINICAL NOTES THAT ARE NEEDED SIGNED AND DATED TO FAX:[phone]

## 2025-04-23 NOTE — OUTREACH NOTE
AMBULATORY CASE MANAGEMENT NOTE    Names and Relationships of Patient/Support Persons: Contact: Nivia Cagle; Relationship: Self -     CCM Interim Update    Patient reported that her FBG this morning was 143. I have instructed her to increase her Tresiba by 1 unit tonight, which means she should give herself 20 units tonight. She was also instructed to call me again tomorrow with her FBG to re evaluate. These instructions were based upon written protocol given to me by Catalina Madsen PA-C.    Francisca SALAZAR  Ambulatory Case Management    4/23/2025, 11:11 EDT

## 2025-04-24 ENCOUNTER — OFFICE VISIT (OUTPATIENT)
Dept: INTERNAL MEDICINE | Facility: CLINIC | Age: 75
End: 2025-04-24
Payer: MEDICARE

## 2025-04-24 ENCOUNTER — TELEPHONE (OUTPATIENT)
Dept: INTERNAL MEDICINE | Facility: CLINIC | Age: 75
End: 2025-04-24

## 2025-04-24 VITALS
HEART RATE: 93 BPM | WEIGHT: 179.6 LBS | HEIGHT: 63 IN | SYSTOLIC BLOOD PRESSURE: 148 MMHG | RESPIRATION RATE: 16 BRPM | BODY MASS INDEX: 31.82 KG/M2 | OXYGEN SATURATION: 97 % | DIASTOLIC BLOOD PRESSURE: 78 MMHG | TEMPERATURE: 98.9 F

## 2025-04-24 DIAGNOSIS — E11.65 TYPE 2 DIABETES MELLITUS WITH HYPERGLYCEMIA, WITHOUT LONG-TERM CURRENT USE OF INSULIN: Primary | ICD-10-CM

## 2025-04-24 DIAGNOSIS — I10 ESSENTIAL HYPERTENSION: ICD-10-CM

## 2025-04-24 RX ORDER — FAMOTIDINE 20 MG/1
20 TABLET, FILM COATED ORAL 2 TIMES DAILY
Qty: 180 TABLET | OUTPATIENT
Start: 2025-04-24

## 2025-04-24 RX ORDER — FLASH GLUCOSE SENSOR
1 KIT MISCELLANEOUS
Qty: 3 EACH | Refills: 3 | Status: SHIPPED | OUTPATIENT
Start: 2025-04-24

## 2025-04-24 RX ORDER — FAMOTIDINE 20 MG/1
20 TABLET, FILM COATED ORAL DAILY PRN
Qty: 90 TABLET | Refills: 1 | Status: SHIPPED | OUTPATIENT
Start: 2025-04-24

## 2025-04-24 RX ORDER — FLASH GLUCOSE SCANNING READER
1 EACH MISCELLANEOUS
Qty: 1 EACH | Refills: 3 | Status: SHIPPED | OUTPATIENT
Start: 2025-04-24

## 2025-04-24 RX ORDER — GLUCAGON INJECTION, SOLUTION 1 MG/.2ML
1 INJECTION, SOLUTION SUBCUTANEOUS ONCE AS NEEDED
Qty: 0.4 ML | Refills: 1 | Status: SHIPPED | OUTPATIENT
Start: 2025-04-24

## 2025-04-24 RX ORDER — PANTOPRAZOLE SODIUM 20 MG/1
20 TABLET, DELAYED RELEASE ORAL DAILY
Qty: 90 TABLET | Refills: 1 | Status: SHIPPED | OUTPATIENT
Start: 2025-04-24

## 2025-04-24 RX ORDER — BLOOD-GLUCOSE,RECEIVER,CONT
1 EACH MISCELLANEOUS
Qty: 1 EACH | Refills: 0 | Status: SHIPPED | OUTPATIENT
Start: 2025-04-24 | End: 2025-04-24

## 2025-04-24 NOTE — TELEPHONE ENCOUNTER
Pharmacy called office stating dexcom g5 is no longer available its only g6 and g7, she also stated the directions were for a sensor, she stated  is usually use 1 every 90 days, please advise

## 2025-04-24 NOTE — ASSESSMENT & PLAN NOTE
DM improved, will cont 20 units at night. Ordered CMG. Pt will return to clinic 2 months for f/u and labs.

## 2025-04-24 NOTE — PROGRESS NOTES
Chief Complaint  Diabetes follow up     Subjective          Nivia Cagle presents to [facility]  History of Present Illness  DM: bg running avg 156   Checks blood sugar 4 times per day  States it was 104 this am because she did not eat dinner  She would like a CGM so she does not have to poke finger as often  Pt using 20 units of insulin at night   Denies chest pain, palpitations, dizziness, sob     Past Medical History:   Diagnosis Date    ADD (attention deficit disorder)     Allergic rhinitis     Anemia     Anxiety     Bursitis     bilateral hips    Cataracts, bilateral     Chronic pain disorder     Depression 0421/2021    MOODNOT WELL CONTROLLED.  GIVEN NUMBER FOR LOCAL COUNSELOR.  WILL INCREASE TRINTELIX FROM 10MG TO 20MG RTC WEEK ER ID S/HI    Diabetes mellitus, type 2     Diverticulitis     GERD (gastroesophageal reflux disease)     Head injury     High blood pressure     Hyperlipemia     Insomnia     Migraine     EARL (obstructive sleep apnea) 04/21/2021    Panic disorder     Phlebitis     PTSD (post-traumatic stress disorder)     RLS (restless legs syndrome)         Past Surgical History:   Procedure Laterality Date    ANKLE SURGERY Bilateral 2021    BILATERAL BREAST REDUCTION  2015    BREAST BIOPSY      CARPAL TUNNEL RELEASE Bilateral     CHOLECYSTECTOMY  2001    COLONOSCOPY      [facility]    COLONOSCOPY N/A 09/28/2022    Procedure: COLONOSCOPY with biopsy;  Surgeon: Ghislaine Kilgore MD;  Location: [facility] ENDOSCOPY;  Service: Gastroenterology;  Laterality: N/A;  colon polyp, diverticlosis, hemorrhoids    ENDOSCOPY N/A 1/23/2025    Procedure: ESOPHAGOGASTRODUODENOSCOPY WITH BIOPSIES, BALLOON DILATATION 12-15MM, DILATATION WITH 54FR MULLINS;  Surgeon: Ghislaine Kilgore MD;  Location: [facility] ENDOSCOPY;  Service: Gastroenterology;  Laterality: N/A;  SCHATZKI'S RING, HIATAL HERNIA    EYE SURGERY Right     scar tissue removal    HYSTERECTOMY       ULNAR NERVE TRANSPOSITION Right     WRIST SURGERY          Current Outpatient Medications on File Prior to Visit   Medication Sig Dispense Refill    Accu-Chek Madeline Plus test strip by Other route 4 (Four) Times a Day. use to test blood sugar      Accu-Chek Softclix Lancets lancets by Other route 4 (Four) Times a Day. use to test blood sugar      ARIPiprazole (Abilify) 5 MG tablet Take 1 tablet by mouth Daily. 90 tablet 3    Ascorbic Acid (VITAMIN C GUMMIE PO) Take  by mouth Daily.      aspirin 81 MG EC tablet Take 1 tablet by mouth Daily. 90 tablet 1    Blood Glucose Monitoring Suppl (FreeStyle Lite) device 1 each by Other route 4 (Four) Times a Day.      buPROPion XL (WELLBUTRIN XL) 300 MG 24 hr tablet TAKE 1 TABLET BY MOUTH EVERY MORNING 90 tablet 3    cetirizine (zyrTEC) 10 MG tablet TAKE 1 TABLET BY MOUTH EVERY DAY 90 tablet 1    Cholecalciferol 25 MCG (1000 UT) tablet dispersible Take  by mouth 2 (Two) Times a Day.      cyclobenzaprine (FLEXERIL) 10 MG tablet Take 1 tablet by mouth Daily As Needed for Muscle Spasms. 90 tablet 1    Daily-Jelani Multivitamin tablet tablet Take 1 tablet by mouth every night at bedtime.      ezetimibe (ZETIA) 10 MG tablet TAKE 1 TABLET BY MOUTH EVERY NIGHT AT BEDTIME 90 tablet 1    FLUoxetine (PROzac) 10 MG capsule Take 1 capsule by mouth Daily. 90 capsule 3    FLUoxetine (PROzac) 20 MG capsule Take 1 capsule by mouth Daily. 90 capsule 3    fluticasone (Flonase) 50 MCG/ACT nasal spray 2 sprays into the nostril(s) as directed by provider Daily. Administer 2 sprays in each nostril for each dose. 16 g 6    Inclisiran Sodium (LEQVIO SC) Inject  under the skin into the appropriate area as directed.      insulin degludec (TRESIBA FLEXTOUCH) 100 UNIT/ML solution pen-injector injection Inject 10 Units under the skin into the appropriate area as directed Daily. 10 mL 1    Insulin Pen Needle (BD Pen Needle Micro U/F) 32G X 6 MM misc Use 1 each Daily. 100 each 1    Januvia 100 MG tablet TAKE  "1 TABLET BY MOUTH DAILY 90 tablet 1    losartan (COZAAR) 25 MG tablet TAKE 1 TABLET BY MOUTH DAILY 90 tablet 1    metFORMIN (GLUCOPHAGE) 500 MG tablet TAKE 1 TABLET BY MOUTH EVERY MORNING AND 2 TABLETS BY MOUTH EVERY EVENING WITH MEALS 270 tablet 1    montelukast (SINGULAIR) 10 MG tablet TAKE 1 TABLET BY MOUTH EVERY NIGHT 90 tablet 1    Pramipexole Dihydrochloride ER (Mirapex ER) 1.5 MG tablet sustained-release 24 hour Take 1.5 mg by mouth Every Night. 30 tablet 5    prednisoLONE acetate (PRED FORTE) 1 % ophthalmic suspension SHAKE LIQUID AND INSTILL 1 DROP IN RIGHT EYE TWICE DAILY      Zinc 100 MG tablet Take 100 mg by mouth Daily.      [DISCONTINUED] famotidine (Pepcid) 20 MG tablet Take 1 tablet by mouth 2 (Two) Times a Day. (Patient taking differently: Take 1 tablet by mouth 2 (Two) Times a Day. Taking it once a day) 60 tablet 1    [DISCONTINUED] pantoprazole (PROTONIX) 20 MG EC tablet TAKE 1 TABLET BY MOUTH DAILY 90 tablet 0     No current facility-administered medications on file prior to visit.        Allergies   Allergen Reactions    Diclofenac Hives    New Skin [Benzethonium Chloride] Rash    Atorvastatin Myalgia    Adhesive Tape Rash and Other (See Comments)       Rash at area of bandaid    Niacin Rash       Social History     Tobacco Use   Smoking Status Former    Current packs/day: 0.00    Average packs/day: 0.3 packs/day for 13.0 years (3.3 ttl pk-yrs)    Types: Cigarettes    Start date:     Quit date:     Years since quittin.3   Smokeless Tobacco Never   Tobacco Comments    QUIT           Objective   Vital Signs:   /78 (BP Location: Left arm, Patient Position: Sitting, Cuff Size: Adult)   Pulse 93   Temp 98.9 °F (37.2 °C) (Temporal)   Resp 16   Ht 160 cm (63\")   Wt 81.5 kg (179 lb 9.6 oz)   SpO2 97%   BMI 31.81 kg/m²     Physical Exam  Vitals reviewed.   Constitutional:       Appearance: Normal appearance.   HENT:      Head: Normocephalic and atraumatic.      Nose: Nose " normal.      Mouth/Throat:      Mouth: Mucous membranes are moist.   Eyes:      Extraocular Movements: Extraocular movements intact.      Conjunctiva/sclera: Conjunctivae normal.      Pupils: Pupils are equal, round, and reactive to light.   Cardiovascular:      Rate and Rhythm: Normal rate and regular rhythm.   Pulmonary:      Effort: Pulmonary effort is normal.      Breath sounds: Normal breath sounds.   Abdominal:      General: Abdomen is flat. Bowel sounds are normal.      Palpations: Abdomen is soft.   Musculoskeletal:         General: Normal range of motion.   Neurological:      General: No focal deficit present.      Mental Status: She is alert and oriented to person, place, and time.   Psychiatric:         Mood and Affect: Mood normal.        Result Review :            BMI is >= 30 and <35. (Class 1 Obesity). The following options were offered after discussion;: weight loss educational material (shared in after visit summary)            Assessment and Plan    Diagnoses and all orders for this visit:    1. Type 2 diabetes mellitus with hyperglycemia, without long-term current use of insulin (Primary)  Assessment & Plan:  DM improved, will cont 20 units at night. Ordered CMG. Pt will return to clinic 2 months for f/u and labs.      2. Essential hypertension  Assessment & Plan:  Discussed blood pressure elevation at today's visit. Risks of blood pressure elevation include death, heart attack, stroke, kidney disease, blindness. Low salt diet, increase exercise. Discussed importance of medication compliance and not missing doses. Continue current medications at this time. We will monitor at follow up and adjust medications if still elevated. To er if chest pain, palpitations, vision loss, unilateral weakness, altered mental status. Pt understands and agrees with plan.        Other orders  -     Glucagon (Gvoke HypoPen 2-Pack) 1 MG/0.2ML solution auto-injector; Inject 1 mg under the skin into the appropriate area  as directed 1 (One) Time As Needed (hypoglycemia) for up to 1 dose.  Dispense: 0.4 mL; Refill: 1  -     pantoprazole (PROTONIX) 20 MG EC tablet; Take 1 tablet by mouth Daily.  Dispense: 90 tablet; Refill: 1  -     famotidine (Pepcid) 20 MG tablet; Take 1 tablet by mouth Daily As Needed for Heartburn.  Dispense: 90 tablet; Refill: 1  -     Discontinue: Continuous Glucose  (Dexcom G5  Kit) device; Use 1 each Every 10 (Ten) Days.  Dispense: 1 each; Refill: 0  -     Continuous Glucose  (FreeStyle Abdulkadir 14 Day [place]) device; Use 1 each Every 3 (Three) Months.  Dispense: 1 each; Refill: 3  -     Continuous Glucose Sensor (FreeStyle Abdulkadir 14 Day Sensor) misc; Use 1 each Every 10 (Ten) Days.  Dispense: 3 each; Refill: 3        Follow Up   Return in about 2 months (around 6/24/2025).  Patient was given instructions and counseling regarding her condition or for health maintenance advice. Please see specific information pulled into the AVS if appropriate.

## 2025-04-25 ENCOUNTER — OFFICE VISIT (OUTPATIENT)
Dept: PODIATRY | Facility: CLINIC | Age: 75
End: 2025-04-25
Payer: MEDICARE

## 2025-04-25 ENCOUNTER — TELEPHONE (OUTPATIENT)
Dept: INTERNAL MEDICINE | Facility: CLINIC | Age: 75
End: 2025-04-25
Payer: MEDICARE

## 2025-04-25 VITALS
DIASTOLIC BLOOD PRESSURE: 79 MMHG | OXYGEN SATURATION: 95 % | HEIGHT: 63 IN | TEMPERATURE: 97.7 F | HEART RATE: 88 BPM | BODY MASS INDEX: 31.71 KG/M2 | RESPIRATION RATE: 18 BRPM | SYSTOLIC BLOOD PRESSURE: 140 MMHG | WEIGHT: 179 LBS

## 2025-04-25 DIAGNOSIS — E11.65 TYPE 2 DIABETES MELLITUS WITH HYPERGLYCEMIA, WITHOUT LONG-TERM CURRENT USE OF INSULIN: Primary | ICD-10-CM

## 2025-04-25 DIAGNOSIS — M79.672 FOOT PAIN, BILATERAL: ICD-10-CM

## 2025-04-25 DIAGNOSIS — Z79.4 TYPE 2 DIABETES MELLITUS WITH DIABETIC POLYNEUROPATHY, WITH LONG-TERM CURRENT USE OF INSULIN: ICD-10-CM

## 2025-04-25 DIAGNOSIS — B35.1 ONYCHOMYCOSIS: ICD-10-CM

## 2025-04-25 DIAGNOSIS — M79.671 FOOT PAIN, BILATERAL: ICD-10-CM

## 2025-04-25 DIAGNOSIS — E11.8 DM FEET: ICD-10-CM

## 2025-04-25 DIAGNOSIS — L60.0 ONYCHOCRYPTOSIS: Primary | ICD-10-CM

## 2025-04-25 DIAGNOSIS — E11.42 TYPE 2 DIABETES MELLITUS WITH DIABETIC POLYNEUROPATHY, WITH LONG-TERM CURRENT USE OF INSULIN: ICD-10-CM

## 2025-04-25 DIAGNOSIS — G62.9 NEUROPATHY: ICD-10-CM

## 2025-04-25 NOTE — PROGRESS NOTES
[facility] - PODIATRY    Today's Date: 04/25/25    Patient Name: Nivia Cagle  MRN: [id number]  CSN: [id number]  PCP: Catalina Madsen PA-C, Last PCP Visit:  4/25/2025  Referring Provider: Catalina Madsen PA-C    SUBJECTIVE     Chief Complaint   Patient presents with    Left Foot - Follow-up, Nail Problem    Right Foot - Follow-up, Nail Problem, Pain     HPI: Nivia Cagle, a 74 y.o.female, presents to clinic for painful toenail:    New, Established, New Problem: Established    Location:  Toenails    Duration:   Greater than one year    Onset:  Gradual    Nature:  sore with palpation.    Stable, worsening, improving:   Improving    Aggravating factors:  Pain with shoe gear and ambulation.    Previous Treatment:  Debridement    Patient controlling diabetes via:  NIDDM    Patient states their last blood glucose was: 114    Patient denies any fevers, chills, nausea, vomiting, shortness of breath, nor any other constitutional signs nor symptoms.    Medical changes: Currently going to physical therapy for her right foot    I have reviewed/confirmed previously documented HPI with no changes.       Past Medical History:   Diagnosis Date    ADD (attention deficit disorder)     Allergic rhinitis     Anemia     Anxiety     Bursitis     bilateral hips    Callus     Cataracts, bilateral     Chronic pain disorder     Depression 0421/2021    MOODNOT WELL CONTROLLED.  GIVEN NUMBER FOR LOCAL COUNSELOR.  WILL INCREASE TRINTELIX FROM 10MG TO 20MG RTC WEEK ER ID S/HI    Diabetes mellitus, type 2     Difficulty walking     Diverticulitis     GERD (gastroesophageal reflux disease)     Head injury     High blood pressure     Hyperlipemia     Infectious viral hepatitis 1960    Type A    Insomnia     Migraine     Neuropathy in diabetes 2020    EARL (obstructive sleep apnea) 04/21/2021    Panic disorder     Peripheral neuropathy     Phlebitis     Plantar fasciitis 2019    PTSD (post-traumatic stress disorder)      RLS (restless legs syndrome)      Past Surgical History:   Procedure Laterality Date    ANKLE OPEN REDUCTION INTERNAL FIXATION  Feb 2021    Both ankles    ANKLE SURGERY Bilateral 2021    BILATERAL BREAST REDUCTION  2015    BREAST BIOPSY      CARPAL TUNNEL RELEASE Bilateral     CHOLECYSTECTOMY  2001    COLONOSCOPY      [facility]    COLONOSCOPY N/A 09/28/2022    Procedure: COLONOSCOPY with biopsy;  Surgeon: Ghislaine Kilgore MD;  Location: [facility] ENDOSCOPY;  Service: Gastroenterology;  Laterality: N/A;  colon polyp, diverticlosis, hemorrhoids    ENDOSCOPY N/A 01/23/2025    Procedure: ESOPHAGOGASTRODUODENOSCOPY WITH BIOPSIES, BALLOON DILATATION 12-15MM, DILATATION WITH 54FR MULLINS;  Surgeon: Ghislaine Kilgore MD;  Location: [facility] ENDOSCOPY;  Service: Gastroenterology;  Laterality: N/A;  SCHATZKI'S RING, HIATAL HERNIA    EYE SURGERY Right     scar tissue removal    FOOT SURGERY      FRACTURE SURGERY      HYSTERECTOMY      SKIN BIOPSY      TOENAIL EXCISION  2022    ULNAR NERVE TRANSPOSITION Right     WRIST SURGERY       Family History   Problem Relation Age of Onset    Kidney cancer Mother     Hyperlipidemia Mother     Dementia Mother     Cancer Mother     Heart disease Father     Heart attack Father     Diabetes Father     ADD / ADHD Father     Hyperlipidemia Father     Sleep apnea Father     Restless legs syndrome Father     Hyperlipidemia Sister     Cancer Sister         Passing 2023    Brain cancer Sister     Lung cancer Sister     Hyperlipidemia Sister     Hyperlipidemia Brother     Diabetes Brother     Heart attack Brother     Hyperlipidemia Brother     No Known Problems Paternal Uncle     No Known Problems Cousin     Malig Hyperthermia Neg Hx      Social History     Socioeconomic History    Marital status: Single   Tobacco Use    Smoking status: Former     Current packs/day: 0.00     Average packs/day: 0.3 packs/day for 13.0 years (3.3 ttl pk-yrs)     Types: Cigarettes     Start date: 1975      Quit date:      Years since quittin.3    Smokeless tobacco: Never    Tobacco comments:     QUIT 1988   Vaping Use    Vaping status: Never Used   Substance and Sexual Activity    Alcohol use: Not Currently     Comment: rarely    Drug use: Never    Sexual activity: Defer     Allergies   Allergen Reactions    Diclofenac Hives    New Skin [Benzethonium Chloride] Rash    Atorvastatin Myalgia    Adhesive Tape Rash and Other (See Comments)       Rash at area of bandaid    Niacin Rash     Current Outpatient Medications   Medication Sig Dispense Refill    Accu-Chek Madeline Plus test strip by Other route 4 (Four) Times a Day. use to test blood sugar      Accu-Chek Softclix Lancets lancets by Other route 4 (Four) Times a Day. use to test blood sugar      ARIPiprazole (Abilify) 5 MG tablet Take 1 tablet by mouth Daily. 90 tablet 3    Ascorbic Acid (VITAMIN C GUMMIE PO) Take  by mouth Daily.      aspirin 81 MG EC tablet Take 1 tablet by mouth Daily. 90 tablet 1    Blood Glucose Monitoring Suppl (FreeStyle Lite) device 1 each by Other route 4 (Four) Times a Day.      buPROPion XL (WELLBUTRIN XL) 300 MG 24 hr tablet TAKE 1 TABLET BY MOUTH EVERY MORNING 90 tablet 3    cetirizine (zyrTEC) 10 MG tablet TAKE 1 TABLET BY MOUTH EVERY DAY 90 tablet 1    Cholecalciferol 25 MCG (1000 UT) tablet dispersible Take  by mouth 2 (Two) Times a Day.      Continuous Glucose  (FreeStyle Abdulkadir 14 Day Boelus) device Use 1 each Every 3 (Three) Months. 1 each 3    Continuous Glucose Sensor (FreeStyle Abdulkadir 14 Day Sensor) misc Use 1 each Every 10 (Ten) Days. 3 each 3    cyclobenzaprine (FLEXERIL) 10 MG tablet Take 1 tablet by mouth Daily As Needed for Muscle Spasms. 90 tablet 1    Daily-Jelani Multivitamin tablet tablet Take 1 tablet by mouth every night at bedtime.      ezetimibe (ZETIA) 10 MG tablet TAKE 1 TABLET BY MOUTH EVERY NIGHT AT BEDTIME 90 tablet 1    famotidine (Pepcid) 20 MG tablet Take 1 tablet by mouth Daily As Needed for  Heartburn. 90 tablet 1    FLUoxetine (PROzac) 10 MG capsule Take 1 capsule by mouth Daily. 90 capsule 3    FLUoxetine (PROzac) 20 MG capsule Take 1 capsule by mouth Daily. 90 capsule 3    fluticasone (Flonase) 50 MCG/ACT nasal spray 2 sprays into the nostril(s) as directed by provider Daily. Administer 2 sprays in each nostril for each dose. 16 g 6    Glucagon (Gvoke HypoPen 2-Pack) 1 MG/0.2ML solution auto-injector Inject 1 mg under the skin into the appropriate area as directed 1 (One) Time As Needed (hypoglycemia) for up to 1 dose. 0.4 mL 1    Inclisiran Sodium (LEQVIO SC) Inject  under the skin into the appropriate area as directed.      insulin degludec (TRESIBA FLEXTOUCH) 100 UNIT/ML solution pen-injector injection Inject 10 Units under the skin into the appropriate area as directed Daily. 10 mL 1    Insulin Pen Needle (BD Pen Needle Micro U/F) 32G X 6 MM misc Use 1 each Daily. 100 each 1    Januvia 100 MG tablet TAKE 1 TABLET BY MOUTH DAILY 90 tablet 1    losartan (COZAAR) 25 MG tablet TAKE 1 TABLET BY MOUTH DAILY 90 tablet 1    metFORMIN (GLUCOPHAGE) 500 MG tablet TAKE 1 TABLET BY MOUTH EVERY MORNING AND 2 TABLETS BY MOUTH EVERY EVENING WITH MEALS 270 tablet 1    montelukast (SINGULAIR) 10 MG tablet TAKE 1 TABLET BY MOUTH EVERY NIGHT 90 tablet 1    pantoprazole (PROTONIX) 20 MG EC tablet Take 1 tablet by mouth Daily. 90 tablet 1    Pramipexole Dihydrochloride ER (Mirapex ER) 1.5 MG tablet sustained-release 24 hour Take 1.5 mg by mouth Every Night. 30 tablet 5    prednisoLONE acetate (PRED FORTE) 1 % ophthalmic suspension SHAKE LIQUID AND INSTILL 1 DROP IN RIGHT EYE TWICE DAILY      Zinc 100 MG tablet Take 100 mg by mouth Daily.       No current facility-administered medications for this visit.     Review of Systems   Constitutional: Negative.    Skin:         Painful toenails.   All other systems reviewed and are negative.      OBJECTIVE     Vitals:    04/25/25 1042   BP: 140/79   Pulse: 88   Resp: 18    Temp: 97.7 °F (36.5 °C)   SpO2: 95%       Body mass index is 31.71 kg/m².    Lab Results   Component Value Date    HGBA1C 8.90 (H) 02/27/2025       Lab Results   Component Value Date    GLUCOSE 165 (H) 02/27/2025    CALCIUM 10.1 02/27/2025     02/27/2025    K 4.2 02/27/2025    CO2 25.0 02/27/2025     02/27/2025    BUN 17 02/27/2025    CREATININE 1.18 (H) 02/27/2025    EGFRIFNONA 68 02/15/2022    BCR 14.4 02/27/2025    ANIONGAP 11.0 02/27/2025       Patient seen in no apparent distress.      PHYSICAL EXAM:     Foot/Ankle Exam    GENERAL  Appearance:  chronically ill  Orientation:  AAOx3  Affect:  appropriate  Gait:  unimpaired  Assistance:  independent  Right shoe gear: casual shoe  Left shoe gear: casual shoe    VASCULAR     Right Foot Vascularity   Dorsalis pedis:  1+  Posterior tibial:  1+  Skin temperature:  warm  Edema grading:  None  CFT:  < 3 seconds  Pedal hair growth:  Absent  Varicosities:  mild varicosities     Left Foot Vascularity   Dorsalis pedis:  1+  Posterior tibial:  1+  Skin temperature:  warm  Edema grading:  None  CFT:  < 3 seconds  Pedal hair growth:  Absent  Varicosities:  mild varicosities     NEUROLOGIC     Right Foot Neurologic   Light touch sensation: diminished  Vibratory sensation: diminished  Hot/Cold sensation: diminished  Protective Sensation using [place]-Dar[place] Monofilament:   Sites intact: 3  Sites tested: 10     Left Foot Neurologic   Normal sensation    Light touch sensation: normal  Vibratory sensation: normal  Hot/Cold sensation:  normal  Protective Sensation using [place]-Dar[place] Monofilament:   Sites intact: 10  Sites tested: 10    MUSCLE STRENGTH     Right Foot Muscle Strength   Foot dorsiflexion:  4-  Foot plantar flexion:  4-  Foot inversion:  4-  Foot eversion:  4-     Left Foot Muscle Strength   Foot dorsiflexion:  4-  Foot plantar flexion:  4-  Foot inversion:  4-  Foot eversion:  4-    RANGE OF MOTION     Right Foot Range of Motion   Foot and ankle ROM  within normal limits       Left Foot Range of Motion   Foot and ankle ROM within normal limits      DERMATOLOGIC      Right Foot Dermatologic   Skin  Right foot skin is intact.   Nails  2.  Positive for elongated, onychomycosis, abnormal thickness, subungual debris and ingrown toenail.  3.  Positive for elongated, onychomycosis, abnormal thickness, subungual debris and ingrown toenail.  4.  Positive for elongated, onychomycosis, abnormal thickness, subungual debris and ingrown toenail.  5.  Positive for elongated, onychomycosis, abnormal thickness, subungual debris and ingrown toenail.  Nails comment:  Toenails 1, 2, 3, 4, and 5     Left Foot Dermatologic   Skin  Left foot skin is intact.   Nails comment:  Toenails 1, 2, 3, 4, and 5  Nails  1.  Positive for elongated, onychomycosis, abnormal thickness, subungual debris and ingrown toenail.  2.  Positive for elongated, onychomycosis, abnormal thickness, subungual debris and ingrown toenail.  3.  Positive for elongated, onychomycosis, abnormal thickness, subungual debris and ingrown toenail.  4.  Positive for elongated, onychomycosis, abnormally thick, subungual debris and ingrown toenail.  5.  Positive for elongated, onychomycosis, abnormally thick, subungual debris and ingrown toenail.    I have reexamined the patient the results are consistent with the previously documented exam.    ASSESSMENT/PLAN     Diagnoses and all orders for this visit:    1. Onychocryptosis (Primary)    2. Onychomycosis    3. DM feet    4. Foot pain, bilateral    5. Neuropathy    6. Type 2 diabetes mellitus with diabetic polyneuropathy, with long-term current use of insulin    Comprehensive lower extremity examination and evaluation was performed.    Discussed findings and treatment plan including risks, benefits, and treatment options with patient in detail. Patient agreed with treatment plan.    Medications and allergies reviewed.  Reviewed available blood glucose and HgB A1C lab values  along with other pertinent labs.  These were discussed with the patient as to their importance of diabetic maintenance.    Toenails 2, 3, 4, 5 on Right and 1, 2, 3, 4, 5 on Left were debrided with nail nippers then filed with a Dremel nail radha.  Patient tolerated procedure well without complications.    An After Visit Summary was printed and given to the patient at discharge, including (if requested) any available informative/educational handouts regarding diagnosis, treatment, or medications. All questions were answered to patient/family satisfaction. Should symptoms fail to improve or worsen they agree to call or return to clinic or to go to the Emergency Department. Discussed the importance of following up with any needed screening tests/labs/specialist appointments and any requested follow-up recommended by me today. Importance of maintaining follow-up discussed and patient accepts that missed appointments can delay diagnosis and potentially lead to worsening of conditions.    Return in about 9 weeks (around 6/27/2025) for Toenail Care., or sooner if acute issues arise.    I have reviewed the assessment and plan and verified the accuracy of it. No changes to assessment and plan since the information was documented. Adarsh Flores DPM 04/25/25     I have dictated this note utilizing Dragon Dictation.  Please note that portions of this note were completed with a voice recognition program.  Part of this note may be an electronic transcription/translation of spoken language to printed text using the Dragon Dictation System.      This document has been electronically signed by Adarsh Flores DPM on April 25, 2025 10:59 EDT

## 2025-04-25 NOTE — TELEPHONE ENCOUNTER
Caller: [id number] - [place], [place] - [address] AT [address] & [place] - [phone] [place] [phone] FX    Relationship: Pharmacy      Requested Prescriptions:   Requested Prescriptions     Pending Prescriptions Disp Refills    cyclobenzaprine (FLEXERIL) 10 MG tablet [Pharmacy Med Name: CYCLOBENZAPRINE 10MG TABLETS] 90 tablet 1     Sig: TAKE 1 TABLET BY MOUTH DAILY AS NEEDED FOR MUSCLE SPASMS        Pharmacy where request should be sent: [id number] - [place], [place] - [address] AT [address] & [place] - [phone] [facility] [phone] FX     Last office visit with prescribing clinician: 10/22/2024   Last telemedicine visit with prescribing clinician: Visit date not found   Next office visit with prescribing clinician: 4/29/2025     Additional details provided by patient: PHARMACY IS FOLLOWING UP ON THIS REQUEST, AS THEY HAVE NOT RECEIVED A RESPONSE YET    Does the patient have less than a 3 day supply:  [x] Yes  [] No    Would you like a call back once the refill request has been completed: [] Yes [x] No    If the office needs to give you a call back, can they leave a voicemail: [] Yes [x] No    David Santamaria Rep   04/25/25 09:49 EDT

## 2025-04-25 NOTE — TELEPHONE ENCOUNTER
Caller: [id number] - [place], [place] - [address] AT [facility] OF [address] [facility] & [place] - [phone]  - [phone] FX    Relationship: Pharmacy    Best call back number: [phone]     What is the best time to reach you: 9-9    Who are you requesting to speak with (clinical staff, provider,  specific staff member): CLINICAL    What was the call regarding: PHARMACIST STATES THAT THEY WOULD LIKE A CALL BACK TO DISCUSS THE Continuous Glucose Sensor (FreeStyle Abdulkadir 14 Day Sensor) misc        Assistance OOB with selected safe patient handling equipment if applicable/Assistance with ambulation/Communicate fall risk and risk factors to all staff, patient, and family/Orthostatic vital signs/Provide visual cue: yellow wristband, yellow gown, etc/Reinforce activity limits and safety measures with patient and family/Call bell, personal items and telephone in reach/Instruct patient to call for assistance before getting out of bed/chair/stretcher/Non-slip footwear applied when patient is off stretcher/[place] to call system/Physically safe environment - no spills, clutter or unnecessary equipment/Purposeful Proactive Rounding/Room/bathroom lighting operational, light cord in reach

## 2025-04-25 NOTE — TELEPHONE ENCOUNTER
Pharmacy Name: [facility] #[id number] - [place], [place] - [address] AT [facility] OF [address] [facility] & [place] - [phone] [facility] [phone] FX     Reference Number (if applicable): NO    Pharmacy representative name: LM    Pharmacy representative phone number: [phone]     What medication are you calling in regards to: DEXCOM G5  KIT     What question does the pharmacy have: CALLER STATED THAT THE G5  ARE NO LONGER BEING MADE AND THEY NEED TO KNOW FREQUENCY OF USE.  CALLER ALSO SATIATED THAT THE G6 SENSORS ARE USED ONCE EVERY 10 DAYS AND RECEIVERS ARE ONCE EVERY 90 DAYS     Who is the provider that prescribed the medication: JOE SOUTH

## 2025-04-28 ENCOUNTER — TELEPHONE (OUTPATIENT)
Dept: CASE MANAGEMENT | Facility: OTHER | Age: 75
End: 2025-04-28
Payer: MEDICARE

## 2025-04-28 ENCOUNTER — PATIENT OUTREACH (OUTPATIENT)
Dept: CASE MANAGEMENT | Facility: OTHER | Age: 75
End: 2025-04-28
Payer: MEDICARE

## 2025-04-28 DIAGNOSIS — M81.0 AGE RELATED OSTEOPOROSIS, UNSPECIFIED PATHOLOGICAL FRACTURE PRESENCE: Primary | ICD-10-CM

## 2025-04-28 DIAGNOSIS — E11.65 TYPE 2 DIABETES MELLITUS WITH HYPERGLYCEMIA, WITHOUT LONG-TERM CURRENT USE OF INSULIN: Primary | ICD-10-CM

## 2025-04-28 RX ORDER — ZOLEDRONIC ACID 0.05 MG/ML
5 INJECTION, SOLUTION INTRAVENOUS ONCE
Status: CANCELLED | OUTPATIENT
Start: 2025-05-05

## 2025-04-28 RX ORDER — HYDROCHLOROTHIAZIDE 12.5 MG/1
CAPSULE ORAL
Qty: 3 EACH | Refills: 2 | Status: SHIPPED | OUTPATIENT
Start: 2025-04-28

## 2025-04-28 NOTE — OUTREACH NOTE
AMBULATORY CASE MANAGEMENT NOTE    Names and Relationships of Patient/Support Persons: Contact: Nivia Cagle; Relationship: Self -     CCM Interim Update    I spoke with the patient on the phone today to discuss her FBG and insulin dosage at night. She stated that this mornings FBG was 166 but she ate more than she normally does last night. The days prior to that were 131, 132, 112, 109, 104. I have advised the patient to to continue on 20 units at this time and we will discuss again on Thursday. I have advised her to eat small portions more frequently, increase protein and fiber in her diet. She verbalized understanding.      Care Coordination    I have sent electronic communication to PCP to suggest diabetic nutrition education with Renetta Valles.      Education Documentation  blood glucose monitoring, taught by Francisca Zhang, RN at 4/28/2025  1:51 PM.  Learner: Patient  Readiness: Acceptance  Method: Explanation  Response: Verbalizes Understanding, Needs Reinforcement    healthy eating, taught by Francisca Zhang, RN at 4/28/2025  1:51 PM.  Learner: Patient  Readiness: Acceptance  Method: Explanation  Response: Verbalizes Understanding, Needs Reinforcement          Francisca SALAZAR  Ambulatory Case Management    4/28/2025, 13:51 EDT

## 2025-04-28 NOTE — TELEPHONE ENCOUNTER
My Patient Outreach for today with patient:    I spoke with the patient on the phone today to discuss her FBG and insulin dosage at night. She stated that this mornings FBG was 166 but she ate more than she normally does last night. The days prior to that were 131, 132, 112, 109, 104. I have advised the patient to to continue on 20 units at this time and we will discuss again on Thursday. I have advised her to eat small portions more frequently, increase protein and fiber in her diet. She verbalized understanding.      I think she could benefit from diabetic nutrition counseling with Marie Valles. I have pended an order for you to sign, if you are agreeable.    Thank you,  Francisca

## 2025-04-28 NOTE — TELEPHONE ENCOUNTER
Spoke with pharmacist and was inform insurance required Freestyle js 3 plus, pharmacy is needing a new prescription for this, she stated the freestyle js 14 day is going to be obsolete and insurance is requesting providers to send the freestyle js 3 plus, please advise

## 2025-04-29 ENCOUNTER — PATIENT OUTREACH (OUTPATIENT)
Dept: CASE MANAGEMENT | Facility: OTHER | Age: 75
End: 2025-04-29
Payer: MEDICARE

## 2025-04-29 ENCOUNTER — OFFICE VISIT (OUTPATIENT)
Dept: NEUROLOGY | Facility: CLINIC | Age: 75
End: 2025-04-29
Payer: MEDICARE

## 2025-04-29 VITALS
SYSTOLIC BLOOD PRESSURE: 143 MMHG | WEIGHT: 176.8 LBS | DIASTOLIC BLOOD PRESSURE: 63 MMHG | HEIGHT: 63 IN | HEART RATE: 86 BPM | BODY MASS INDEX: 31.33 KG/M2

## 2025-04-29 DIAGNOSIS — G25.81 RESTLESS LEGS SYNDROME (RLS): Primary | ICD-10-CM

## 2025-04-29 DIAGNOSIS — I10 ESSENTIAL HYPERTENSION: ICD-10-CM

## 2025-04-29 DIAGNOSIS — G24.01 TARDIVE DYSKINESIA: ICD-10-CM

## 2025-04-29 DIAGNOSIS — E11.65 TYPE 2 DIABETES MELLITUS WITH HYPERGLYCEMIA, WITHOUT LONG-TERM CURRENT USE OF INSULIN: Primary | ICD-10-CM

## 2025-04-29 DIAGNOSIS — M62.838 MUSCLE SPASM: ICD-10-CM

## 2025-04-29 RX ORDER — CYCLOBENZAPRINE HCL 10 MG
10 TABLET ORAL DAILY PRN
Qty: 90 TABLET | Refills: 1 | OUTPATIENT
Start: 2025-04-29

## 2025-04-29 NOTE — OUTREACH NOTE
[facility] End of Month Documentation    This Chronic Medical Management Care Plan for Nivia Cagle, 74 y.o. female, has been a new plan of care implemented; established and a new plan of care implemented for the month of April.  A cumulative time of 126  minutes was spent on this patient record this month, including phone call with patient; electronic communication with primary care provider; chart review.    Regarding the patient's problems: has Muscle twitching; Restless legs syndrome (RLS); Allergic rhinitis; Anemia; Bilateral posterior capsular opacification; Cardiac murmur; Diverticulitis; Endometriosis; Gastroesophageal reflux disease; Essential hypertension; Low back pain; Migraines; Scoliosis deformity of spine; Major depressive disorder, recurrent episode, moderate degree; Generalized anxiety disorder; Type 2 diabetes mellitus; Hyperlipidemia LDL goal <70; EARL (obstructive sleep apnea); Tremor; Bilateral pseudophakia; Dislocated intraocular lens; Traumatic injury of globe of right eye; Unspecified retinal detachment with retinal break, right eye; Osteoarthritis; Attention-deficit hyperactivity disorder, unspecified type; Epiretinal membrane (ERM) of right eye; Traction retinal detachment involving macula; Cystoid macular degeneration of right eye; Vitamin deficiency, unspecified; Dvtrcli of intest, part unsp, w/o perf or abscess w/o bleed; History of falling; Long term current use of insulin; Generalized muscle weakness; Statin intolerance; Diabetes mellitus type 2 without retinopathy; Dry eyes; Secondary cataract; After-cataract with vision obscured; Obesity (BMI 30-39.9); Dysphagia; Pain of upper abdomen; Nausea and vomiting; Abnormal esophagram; Abnormal finding of blood chemistry, unspecified; Muscle spasm; Acute non-recurrent frontal sinusitis; Saenz's esophagus with dysplasia; and Age related osteoporosis on their problem list., the following items were addressed: medical records; medications;  changes to medical care and any changes can be found within the plan section of the note.  A detailed listing of time spent for chronic care management is tracked within each outreach encounter.  Current medications include:  has a current medication list which includes the following prescription(s): accu-chek [place] plus, accu-chek softclix lancets, aripiprazole, ascorbic acid, aspirin, freestyle lite, bupropion xl, cetirizine, cholecalciferol, freestyle js 14 day reader, freestyle js 14 day sensor, freestyle js 3 plus sensor, cyclobenzaprine, daily-judith multivitamin, ezetimibe, famotidine, fluoxetine, fluoxetine, fluticasone, gvoke hypopen 2-pack, inclisiran sodium, insulin degludec, bd pen needle micro u/f, januvia, losartan, metformin, montelukast, pantoprazole, pramipexole dihydrochloride er, prednisolone acetate, and zinc. and the patient is reported to be patient is compliant with medication protocol,  Medications are reported to be effective, BG is starting to decrease.  Regarding these diagnoses, referrals were made to the following provider(s):  Diabetic Educator.  All notes on chart for PCP to review.    The patient was monitored remotely for weight; activity level; blood glucose; mood & behavior; pain; medications.    The patient's physical needs include:  help taking medications as prescribed; eye care; medication education; physical healthcare; physician referral.     The patient's mental support needs include:  continued support, followed by Fabricio Barth    The patient's cognitive support needs include:  medication; coordination of community providers; increased support; health care    The patient's psychosocial support needs include:  need for increased support; medication management or adherence; coordination of community providers    The patient's functional needs include: eye care; medication education; physical healthcare; physician referral, Needs Diabetic eye and foot exam, Diabetes  educator    The patient's environmental needs include:  not applicable    Care Plan overall comments:  Established    Refer to previous outreach notes for more information on the areas listed above.    Monthly Billing Diagnoses  (E11.65) Type 2 diabetes mellitus with hyperglycemia, without long-term current use of insulin    (I10) Essential hypertension    Medications   Medications have been reconciled    Care Plan progress this month:      Recently Modified Care Plans Updates made since 3/29/2025 12:00 AM      No recently modified care plans.             Diabetes Type 2       Protect Myself From Diabetes Complications       Start:  04/03/25    Expected End:  04/03/28         My Protect Myself From Diabetes Complications To Do List       Start:  04/03/25       Why is this important?Having diabetes puts you at risk for complications, such as kidney disease, heart disease, nerve damage and eye or dental problems.You can manage your risk by making healthy lifestyle choices and getting regular checkups.            Monitor My Blood Sugar       Start:  04/03/25    Expected End:  04/03/28         My Blood Sugar Management To Do List       Start:  04/03/25       Why is this important?Checking your blood sugar (glucose) at home helps to keep it from getting very high or very low.Writing the results in a diary or log, or using an bud, helps your diabetes care provider know how to care for you.Your blood sugar (glucose) log should have the time, date and results.Also write down the amount of insulin or other medicine that you take.            Learn About Diabetes       Start:  04/03/25    Expected End:  04/03/28         My Learn About Diabetes To Do List       Start:  04/03/25       Why is this important?Learning about diabetes can help you to manage it.            Perform Foot Care       Start:  04/03/25    Expected End:  04/03/28         My Foot Care To Do List       Start:  04/03/25       Why is this important?Good foot  care is very important when you have diabetes.There are many things you can do to keep your feet healthy and catch a problem early.            Manage My Stress       Start:  04/03/25    Expected End:  04/03/28         My Stress Management To Do List       Start:  04/03/25       Why is this important?When you are stressed down or upset your body reacts too.            Find Ways to Be Active       Start:  04/03/25    Expected End:  04/03/28         My Activity To Do List       Start:  04/03/25       Why is this important?Being more active can help you to manage your blood sugar (glucose) levels.Being active can also help to prevent diabetes complications.            Optimal Care Coordination of a Patient Experiencing Diabetes, Type 2       Start:  04/03/25    Expected End:  04/03/28         Alleviate Barriers to Glycemic Management       Start:  04/03/25            Monitor and Manage Follow-Up for Comorbidities       Start:  04/03/25            Optimize Functional Ability       Start:  04/03/25            Support Wellbeing and Self-Management Success       Start:  04/03/25                Current Specialty Plan of Care Status signed by both patient and provider    Instructions   Patient was provided an electronic copy of care plan  CCM services were explained and offered and patient has accepted these services.  Patient has given their written consent to receive CCM services and understands that this includes the authorization of electronic communication of medical information with the other treating providers.  Patient understands that they may stop CCM services at any time and these changes will be effective at the end of the calendar month and will effectively revocate the agreement of CCM services.  Patient understands that only one practitioner can furnish and be paid for CCM services during one calendar month.  Patient also understands that there may be co-payment or deductible fees in association with CCM  services.  Patient will continue with at least monthly follow-up calls with the Ambulatory .    Francisca SALAZAR  Ambulatory Case Management    4/29/2025, 09:43 EDT

## 2025-04-29 NOTE — PROGRESS NOTES
"Chief Complaint  Neurologic Problem (1 year follow up. ) and Med Refill (Mirapex, flexeril)    Subjective          Nivia Cagle presents to [facility]  History of Present Illness    History of Present Illness  The patient is a 74-year-old female who presents to the office for follow-up of restless legs and muscle spasms. At the last visit, pramipexole was transitioned to extended release.    Improvement in her condition is reported with the use of extended-release pramipexole, which is found to be more effective than the instant-release formulation. She remains on pramipexole extended release 1.5 mg nightly for restless legs and takes 10 mg of Flexeril as needed for muscle spasms.    A new symptom of tongue movement has been experienced, attributed to her Abilify medication. This has led to unintentional biting of the tongue and lips. She has been on Abilify since Dec to enhance the effects of her other medications, Wellbutrin and Prozac. A decline in balance is also reported, necessitating the use of a cane. Additionally, memory issues, particularly with short-term recall, are noted, often forgetting dates and appointments if not immediately recorded.    Sleep medication has been discontinued for several months, but snoring continues, as reported by family members.    INTERVAL: Since the last visit, pramipexole was transitioned to extended release.    SOCIAL HISTORY  Sleep: Snoring reported by family members.      MEDICATIONS  CURRENT MEDS:  Pramipexole Extended Release 1.5 mg Oral Nightly  Flexeril 10 mg Oral As needed  Abilify Oral  Start Date: 12/11/2024  Wellbutrin Oral  Prozac Oral  PREVIOUS MEDS:  Pramipexole Oral  Reason for Discontinuation: Transitioned to extended release  Sleep Medicine       Objective   Vital Signs:   /63 (BP Location: Right arm, Patient Position: Sitting, Cuff Size: Adult)   Pulse 86   Ht 160 cm (62.99\")   Wt 80.2 kg (176 lb 12.8 " oz)   BMI 31.33 kg/m²     Physical Exam  HENT:      Head: Normocephalic.   Pulmonary:      Effort: Pulmonary effort is normal.   Neurological:      Mental Status: She is alert and oriented to person, place, and time.      Sensory: Sensation is intact.      Motor: Motor function is intact.      Coordination: Coordination is intact.      Deep Tendon Reflexes: Reflexes are normal and symmetric.      Comments: Dyskinetic mouth movements        Neurological Exam  Mental Status  Alert. Oriented to person, place, and time.    Sensory  Normal sensation.    Reflexes  Deep tendon reflexes are 2+ and symmetric in all four extremities.    Coordination    Finger-to-nose, rapid alternating movements and heel-to-shin normal bilaterally without dysmetria.      Result Review :               Assessment and Plan    There are no diagnoses linked to this encounter.    Assessment & Plan  1. Restless legs syndrome.  She reports improvement with the transition to pramipexole extended release 1.5 mg nightly. The current regimen of extended-release pramipexole will be maintained. A prescription refill for pramipexole extended release 1.5 mg nightly has been provided.    2. Muscle spasms.  She uses Flexeril 10 mg as needed for muscle spasms, particularly when waking up with cramps. She is advised to continue using Flexeril as needed and can take it as often as every 8 hours if necessary. A prescription refill for Flexeril 10 mg has been provided.    3. Tardive dyskinesia.  She presents with symptoms consistent with tardive dyskinesia, likely secondary to Abilify. She is advised to consult with Dr. Barth regarding these abnormal mouth movements. If Dr. Barth decides not to alter her current therapy, the initiation of medications to manage tardive dyskinesia symptoms will be considered.    4. Short-term memory loss.  She reports significant short-term memory issues, which may be exacerbated by her current medications, including pramipexole.  She is advised to use compensatory strategies such as lists and calendars to manage memory lapses. Continued monitoring of her memory status is recommended.    Follow-up  The patient will follow up in 6 months or earlier if necessary.         Follow Up   No follow-ups on file.  Patient was given instructions and counseling regarding her condition or for health maintenance advice. Please see specific information pulled into the AVS if appropriate.       Patient or patient representative verbalized consent for the use of Ambient Listening during the visit with  JEANNIE Hernandez for chart documentation. 4/29/2025  09:58 EDT

## 2025-05-05 ENCOUNTER — HOSPITAL ENCOUNTER (OUTPATIENT)
Dept: INFUSION THERAPY | Facility: HOSPITAL | Age: 75
Discharge: HOME | End: 2025-05-05
Payer: MEDICARE

## 2025-05-05 VITALS
TEMPERATURE: 98.4 F | SYSTOLIC BLOOD PRESSURE: 145 MMHG | DIASTOLIC BLOOD PRESSURE: 72 MMHG | BODY MASS INDEX: 32.03 KG/M2 | WEIGHT: 180.78 LBS | OXYGEN SATURATION: 97 % | RESPIRATION RATE: 20 BRPM | HEIGHT: 63 IN | HEART RATE: 77 BPM

## 2025-05-05 DIAGNOSIS — M81.0 AGE RELATED OSTEOPOROSIS, UNSPECIFIED PATHOLOGICAL FRACTURE PRESENCE: Primary | ICD-10-CM

## 2025-05-05 PROCEDURE — 96365 THER/PROPH/DIAG IV INF INIT: CPT

## 2025-05-05 PROCEDURE — 25010000002 ZOLEDRONIC ACID 5 MG/100ML SOLUTION

## 2025-05-05 PROCEDURE — 96374 THER/PROPH/DIAG INJ IV PUSH: CPT

## 2025-05-05 RX ORDER — PRAMIPEXOLE 1.5 MG/1
1.5 TABLET, EXTENDED RELEASE ORAL NIGHTLY
Qty: 30 TABLET | Refills: 5 | Status: SHIPPED | OUTPATIENT
Start: 2025-05-05

## 2025-05-05 RX ORDER — CYCLOBENZAPRINE HCL 10 MG
10 TABLET ORAL DAILY PRN
Qty: 90 TABLET | Refills: 1 | Status: SHIPPED | OUTPATIENT
Start: 2025-05-05

## 2025-05-05 RX ORDER — ZOLEDRONIC ACID 0.05 MG/ML
5 INJECTION, SOLUTION INTRAVENOUS ONCE
Status: COMPLETED | OUTPATIENT
Start: 2025-05-05 | End: 2025-05-05

## 2025-05-05 RX ORDER — ZOLEDRONIC ACID 0.05 MG/ML
5 INJECTION, SOLUTION INTRAVENOUS ONCE
Status: CANCELLED | OUTPATIENT
Start: 2025-05-05

## 2025-05-05 RX ADMIN — ZOLEDRONIC ACID 5 MG: 0.05 INJECTION, SOLUTION INTRAVENOUS at 14:30

## 2025-05-06 ENCOUNTER — PATIENT OUTREACH (OUTPATIENT)
Dept: CASE MANAGEMENT | Facility: OTHER | Age: 75
End: 2025-05-06
Payer: MEDICARE

## 2025-05-06 DIAGNOSIS — E11.65 TYPE 2 DIABETES MELLITUS WITH HYPERGLYCEMIA, WITHOUT LONG-TERM CURRENT USE OF INSULIN: Primary | ICD-10-CM

## 2025-05-06 NOTE — PLAN OF CARE
Problem: Diabetes Type 2  Goal: Protect Myself From Diabetes Complications  Outcome: Progressing  Intervention: My Protect Myself From Diabetes Complications To Do List  Description: Why is this important?Having diabetes puts you at risk for complications, such as kidney disease, heart disease, nerve damage and eye or dental problems.You can manage your risk by making healthy lifestyle choices and getting regular checkups.  Flowsheets (Taken 5/6/2025 1320)  My Protect Myself From Diabetes Complications To Do List:   get a foot exam at least once per year   get an eye exam every year   manage my weight  Goal: Monitor My Blood Sugar  Outcome: Progressing  Intervention: My Blood Sugar Management To Do List  Description: Why is this important?Checking your blood sugar (glucose) at home helps to keep it from getting very high or very low.Writing the results in a diary or log, or using an bud, helps your diabetes care provider know how to care for you.Your blood sugar (glucose) log should have the time, date and results.Also write down the amount of insulin or other medicine that you take.  Flowsheets (Taken 5/6/2025 1320)  My Blood Sugar Management To Do List:   check blood sugar (glucose) at prescribed times   enter blood sugar (glucose) readings and medication or insulin into daily log   take the blood sugar (glucose) log to all provider visits   take the blood sugar (glucose) meter to all provider visits  Goal: Optimal Care Coordination of a Patient Experiencing Diabetes, Type 2  Outcome: Progressing  Intervention: Alleviate Barriers to Glycemic Management  Flowsheets (Taken 5/6/2025 1320)  Alleviate Barriers to Glycemic Management:   barriers to adherence to treatment plan identified   blood glucose monitoring encouraged   blood glucose readings reviewed   resources required to improve adherence to care identified   self-awareness of signs/symptoms of hypo or hyperglycemia encouraged   use of blood glucose  monitoring log promoted  Intervention: Monitor and Manage Follow-Up for Comorbidities  Flowsheets (Taken 5/6/2025 1320)  Monitor and Manage Follow-Up for Comorbidities:   healthy lifestyle promoted   barriers to lifestyle changes identified  Intervention: Support Wellbeing and Self-Management Success  Flowsheets (Taken 5/6/2025 1320)  Support Wellbeing and Self-Management Success:   autonomy in care promoted   barriers to treatment adherence identified   decision-making supported   difficulty of making lifelong changes acknowledged   emotional support provided   healthy lifestyle promoted   problem-solving facilitated   support and encouragement provided

## 2025-05-06 NOTE — OUTREACH NOTE
"AMBULATORY CASE MANAGEMENT NOTE    Names and Relationships of Patient/Support Persons: Contact: Nivia Cagle; Relationship: Self -     CCM Interim Update    I spoke with the patient on the phone regarding her T2DM. She is wearing a CGM and keeping a log of her FBG. She stated that last night she forgot \"to take my shot\" and her FBG this morning was 165. The previous mornings her FBG were 105, 134, 120, 158. Patient is administering Tresiba 20 units nightly. The patient's FBG are trending downward with the increase of Tresiba. I have instructed the patient to continue keeping a log of FBG and if she has 3 or more consecutive days that it is 150 or greater, to call me so we can discuss with PCP. She is agreeable to this.    We spent time discussing diet changes, portion sizes, increasing protein, limiting carbs(specifically pasta and bread). Patient verbalized understanding.    We discussed patient's upcoming appointments. She has a future appt with PCP on 6/25/2025.    Patient states she has all the medications and refills needed at this time. There are no other needs voiced.      Education Documentation  blood glucose monitoring, taught by Francisca Zhang, RN at 5/6/2025  1:26 PM.  Learner: Patient  Readiness: Acceptance  Method: Explanation  Response: Verbalizes Understanding, Needs Reinforcement    medication management, taught by Francisca Zhang, RN at 5/6/2025  1:26 PM.  Learner: Patient  Readiness: Acceptance  Method: Explanation  Response: Verbalizes Understanding, Needs Reinforcement    healthy eating, taught by Francisca Zhang, RN at 5/6/2025  1:26 PM.  Learner: Patient  Readiness: Acceptance  Method: Explanation  Response: Verbalizes Understanding, Needs Reinforcement          Francisca SALAZAR  Ambulatory Case Management    5/6/2025, 13:26 EDT  "

## 2025-05-10 DIAGNOSIS — F41.1 GENERALIZED ANXIETY DISORDER: ICD-10-CM

## 2025-05-10 DIAGNOSIS — F43.10 POST TRAUMATIC STRESS DISORDER (PTSD): ICD-10-CM

## 2025-05-10 DIAGNOSIS — F51.05 INSOMNIA DUE TO MENTAL CONDITION: ICD-10-CM

## 2025-05-10 DIAGNOSIS — F33.40 RECURRENT MAJOR DEPRESSIVE DISORDER IN REMISSION: ICD-10-CM

## 2025-05-12 NOTE — TELEPHONE ENCOUNTER
ATTEMPTED TO CONTACT PT(PATIENT)      NO ANSWER      LEFT VOICEMAIL WITH INSTRUCTIONS TO RETURN CALL TO OFFICE AT [phone]

## 2025-05-12 NOTE — TELEPHONE ENCOUNTER
NEXT VISIT WITH PROVIDER   NONE IN [facility]     LAST SEEN BY PROVIDER   Telemedicine with Vicky Barth MD (04/22/2025)     LAST MED REFILL  FLUoxetine (PROzac) 10 MG capsule (02/20/2025)  FLUoxetine (PROzac) 20 MG capsule (02/20/2025)    TOO SOON FOR REFILLS

## 2025-05-13 ENCOUNTER — OFFICE VISIT (OUTPATIENT)
Dept: GASTROENTEROLOGY | Facility: CLINIC | Age: 75
End: 2025-05-13
Payer: COMMERCIAL

## 2025-05-13 ENCOUNTER — TELEPHONE (OUTPATIENT)
Dept: GASTROENTEROLOGY | Facility: CLINIC | Age: 75
End: 2025-05-13

## 2025-05-13 VITALS
HEART RATE: 87 BPM | BODY MASS INDEX: 32.6 KG/M2 | OXYGEN SATURATION: 97 % | HEIGHT: 63 IN | DIASTOLIC BLOOD PRESSURE: 93 MMHG | SYSTOLIC BLOOD PRESSURE: 150 MMHG | WEIGHT: 184 LBS

## 2025-05-13 DIAGNOSIS — K22.70 BARRETT'S ESOPHAGUS WITHOUT DYSPLASIA: ICD-10-CM

## 2025-05-13 DIAGNOSIS — K21.00 GASTROESOPHAGEAL REFLUX DISEASE WITH ESOPHAGITIS WITHOUT HEMORRHAGE: Primary | ICD-10-CM

## 2025-05-13 DIAGNOSIS — R74.8 ELEVATED LIVER ENZYMES: ICD-10-CM

## 2025-05-13 DIAGNOSIS — Z86.39 HISTORY OF DIABETES MELLITUS, TYPE II: ICD-10-CM

## 2025-05-13 PROCEDURE — 99214 OFFICE O/P EST MOD 30 MIN: CPT | Performed by: NURSE PRACTITIONER

## 2025-05-13 RX ORDER — FAMOTIDINE 20 MG/1
20 TABLET, FILM COATED ORAL
Qty: 90 TABLET | Refills: 3 | Status: SHIPPED | OUTPATIENT
Start: 2025-05-13

## 2025-05-13 RX ORDER — FLUOXETINE 10 MG/1
CAPSULE ORAL
Qty: 90 CAPSULE | Refills: 3 | OUTPATIENT
Start: 2025-05-13

## 2025-05-13 RX ORDER — ESOMEPRAZOLE MAGNESIUM 40 MG/1
40 CAPSULE, DELAYED RELEASE ORAL
Qty: 30 CAPSULE | Refills: 1 | Status: SHIPPED | OUTPATIENT
Start: 2025-05-13

## 2025-05-13 NOTE — TELEPHONE ENCOUNTER
Attempted to submit PA on covermymeds but it said to either call or submit a form and fax it. Filling out [facility] [employer] form for Esomeprazole.

## 2025-05-13 NOTE — PROGRESS NOTES
"Chief Complaint   follow up after EGD, gastritis and reflux esophagitis with Lowry's esophagus, and bowel changes (Feels like a \"plug\" at the exit. Feels like she might need a suppository.)    History of Present Illness       Nivia Cagle is a 74 y.o. female who presents to [facility] for follow-up for GERD.  She was last seen in the office by me on 11/21/2024.    She does have hx GERD===she has trouble swallowing worse with breads and pasta. Bowels are irregular. She just started pepcid 20 mg BID yesterday.      Last colonoscopy was with Dr. Kilgore on 9/28/2022 for colon cancer screening.  Colonoscopy showed mild diverticulosis in the right colon.  Moderate diverticulosis in the left colon.  Nonbleeding internal and external hemorrhoids.  4 mm transverse colon polyp was removed, 3 mm sigmoid colon polyp was removed.  Path positive for tubular adenoma.  Repeat colonoscopy in 5 years.     She had a swallow study done on 11/15/2024 for trouble swallowing.  It showed take tracheal aspiration of thin liquid barium.  Prominent cricopharyngeus muscle.  Anterior mucosal irregularity at the C4-C6 cervical level of unknown significance.  Degenerative changes in the cervical spine.     She had previous EGD done in [place] more than 5 years ago.      GI FH---Sister with melanoma. Mother with kidney cancer.     Had EGD with Dr. Kilgore 1/23/25. + Gastritis, esophagitis LA GRADE A, Schatzki ring dilated. + lowry's esophagus without dysplasia. Having more reflux. Dilation helped. Still having small balls of stool then loose stools. Irregular bowels.     Having elevated LFTs. Never had Liver US. HgbA1c is worse. Starting insulin. Failed protonix, pepcid and omeprazole. Failed TUMS.  SHe has also been working with speech therapy and learning new exercises.  She feels like this helps.     Results       Result Review :       [facility]          11/8/2024    10:29 1/21/2025    13:12 2/27/2025    " "12:15   CMP   Glucose  169  165    BUN  20  17    Creatinine 0.99  1.16  1.18    EGFR 60.7  50.2  49.2    Sodium  136  138    Potassium  4.7  4.2    Chloride  96  102    Calcium 9.3  10.2  10.1    Total Protein  7.9  7.3    Albumin  4.8  4.4    Globulin  3.1  2.9    Total Bilirubin  1.0  0.9    Alkaline Phosphatase  92  78    AST (SGOT)  34  27    ALT (SGPT)  44  34    Albumin/Globulin Ratio  1.5  1.5    BUN/Creatinine Ratio  17.2  14.4    Anion Gap  14.0  11.0      CBC          7/23/2024    12:49 10/23/2024    10:12 1/21/2025    13:12   CBC   WBC 7.18  6.85  8.57    RBC 4.38  4.39  4.85    Hemoglobin 13.1  13.6  14.9    Hematocrit 39.7  40.3  43.9    MCV 90.6  91.8  90.5    MCH 29.9  31.0  30.7    MCHC 33.0  33.7  33.9    RDW 12.7  12.4  12.0    Platelets 263  228  278      CBC w/diff          7/23/2024    12:49 10/23/2024    10:12 1/21/2025    13:12   CBC w/Diff   WBC 7.18  6.85  8.57    RBC 4.38  4.39  4.85    Hemoglobin 13.1  13.6  14.9    Hematocrit 39.7  40.3  43.9    MCV 90.6  91.8  90.5    MCH 29.9  31.0  30.7    MCHC 33.0  33.7  33.9    RDW 12.7  12.4  12.0    Platelets 263  228  278    Neutrophil Rel % 65.3  65.9  64.2    Immature Granulocyte Rel % 0.1  0.3  0.6    Lymphocyte Rel % 20.5  20.1  21.9    Monocyte Rel % 11.0  9.9  10.3    Eosinophil Rel % 2.4  3.4  2.3    Basophil Rel % 0.7  0.4  0.7      Lipid Panel          7/23/2024    12:49 10/23/2024    10:12 2/27/2025    12:15   Lipid Panel   Total Cholesterol 168  181  116    Triglycerides 167  242  186    HDL Cholesterol 56  49  43    VLDL Cholesterol 28  41  30    LDL Cholesterol  84  91  43    LDL/HDL Ratio 1.40  1.71  0.83        Lipase No results found for: \"LIPASE\"  Amylase No results found for: \"AMYLASE\"  Iron Profile No results found for: \"IRON\", \"TIBC\", \"LABIRON\", \"TRANSFERRIN\"  Ferritin No results found for: \"FERRITIN\"  ESR (Sed Rate)   Sed Rate   Date Value Ref Range Status   02/15/2022 40 (H) 0 - 30 mm/hr Final     CRP (C-Reactive) " "  C-Reactive Protein   Date Value Ref Range Status   06/21/2021 0.86 (H) 0.00 - 0.50 mg/dL Final     Liver Workup No results found for: \"AFPTM\", \"DSDNA\", \"EXPANDEDENA\", \"SMOOTHMUSCAB\", \"CERULOPLSM\", \"FERRITIN\", \"LABIMMURE\", \"TOTIGGREF\", \"IGA\", \"IGM\", \"IRON\", \"TIBC\", \"LABIRON\", \"TRANSFERRIN\", \"MITOAB\", \"PROTIME\", \"INR\", \"AFP\"  Acute HEP Panel   Hepatitis C Ab   Date Value Ref Range Status   06/07/2023 Non-Reactive Non-Reactive Final               Past Medical History       Past Medical History:   Diagnosis Date    ADD (attention deficit disorder)     Allergic rhinitis     Anemia     Anxiety     Bursitis     bilateral hips    Callus     Cataracts, bilateral     Chronic pain disorder     Depression 0421/2021    MOODNOT WELL CONTROLLED.  GIVEN NUMBER FOR LOCAL COUNSELOR.  WILL INCREASE TRINTELIX FROM 10MG TO 20MG RTC WEEK ER ID S/HI    Diabetes mellitus, type 2     Difficulty walking     Diverticulitis     GERD (gastroesophageal reflux disease)     Head injury     High blood pressure     Hyperlipemia     Infectious viral hepatitis 1960    Type A    Insomnia     Migraine     Neuropathy in diabetes 2020    EARL (obstructive sleep apnea) 04/21/2021    Panic disorder     Peripheral neuropathy     Phlebitis     Plantar fasciitis 2019    PTSD (post-traumatic stress disorder)     RLS (restless legs syndrome)        Past Surgical History:   Procedure Laterality Date    ANKLE OPEN REDUCTION INTERNAL FIXATION  Feb 2021    Both ankles    ANKLE SURGERY Bilateral 2021    BILATERAL BREAST REDUCTION  2015    BREAST BIOPSY      CARPAL TUNNEL RELEASE Bilateral     CHOLECYSTECTOMY  2001    COLONOSCOPY      [facility]    COLONOSCOPY N/A 09/28/2022    Procedure: COLONOSCOPY with biopsy;  Surgeon: Ghislaine Kilgore MD;  Location: [facility] ENDOSCOPY;  Service: Gastroenterology;  Laterality: N/A;  colon polyp, diverticlosis, hemorrhoids    ENDOSCOPY N/A 01/23/2025    Procedure: ESOPHAGOGASTRODUODENOSCOPY WITH BIOPSIES, BALLOON " DILATATION 12-15MM, DILATATION WITH 54FR MULLINS;  Surgeon: Ghislaine Kilgore MD;  Location: [facility] ENDOSCOPY;  Service: Gastroenterology;  Laterality: N/A;  SCHATZKI'S RING, HIATAL HERNIA    EYE SURGERY Right     scar tissue removal    FOOT SURGERY      FRACTURE SURGERY      HYSTERECTOMY      SKIN BIOPSY      TOENAIL EXCISION  2022    ULNAR NERVE TRANSPOSITION Right     WRIST SURGERY           Current Outpatient Medications:     famotidine (Pepcid) 20 MG tablet, Take 1 tablet by mouth every night at bedtime., Disp: 90 tablet, Rfl: 3    Accu-Chek Madeline Plus test strip, by Other route 4 (Four) Times a Day. use to test blood sugar, Disp: , Rfl:     Accu-Chek Softclix Lancets lancets, by Other route 4 (Four) Times a Day. use to test blood sugar, Disp: , Rfl:     ARIPiprazole (Abilify) 5 MG tablet, Take 1 tablet by mouth Daily., Disp: 90 tablet, Rfl: 3    Ascorbic Acid (VITAMIN C GUMMIE PO), Take  by mouth Daily., Disp: , Rfl:     aspirin 81 MG EC tablet, Take 1 tablet by mouth Daily., Disp: 90 tablet, Rfl: 1    Blood Glucose Monitoring Suppl (FreeStyle Lite) device, 1 each by Other route 4 (Four) Times a Day., Disp: , Rfl:     buPROPion XL (WELLBUTRIN XL) 300 MG 24 hr tablet, TAKE 1 TABLET BY MOUTH EVERY MORNING, Disp: 90 tablet, Rfl: 3    cetirizine (zyrTEC) 10 MG tablet, TAKE 1 TABLET BY MOUTH EVERY DAY, Disp: 90 tablet, Rfl: 1    Cholecalciferol 25 MCG (1000 UT) tablet dispersible, Take  by mouth 2 (Two) Times a Day., Disp: , Rfl:     Continuous Glucose  (FreeStyle Abdulkadir 14 Day [place]) device, Use 1 each Every 3 (Three) Months., Disp: 1 each, Rfl: 3    Continuous Glucose Sensor (FreeStyle Abdulkadir 14 Day Sensor) misc, Use 1 each Every 10 (Ten) Days., Disp: 3 each, Rfl: 3    Continuous Glucose Sensor (FreeStyle Abdulkadir 3 Plus Sensor), Use Every 14 (Fourteen) Days., Disp: 3 each, Rfl: 2    cyclobenzaprine (FLEXERIL) 10 MG tablet, Take 1 tablet by mouth Daily As Needed for Muscle Spasms., Disp: 90 tablet, Rfl:  1    Daily-Jelani Multivitamin tablet tablet, Take 1 tablet by mouth every night at bedtime., Disp: , Rfl:     esomeprazole (nexIUM) 40 MG capsule, Take 1 capsule by mouth Every Morning Before Breakfast., Disp: 30 capsule, Rfl: 1    ezetimibe (ZETIA) 10 MG tablet, TAKE 1 TABLET BY MOUTH EVERY NIGHT AT BEDTIME, Disp: 90 tablet, Rfl: 1    FLUoxetine (PROzac) 10 MG capsule, Take 1 capsule by mouth Daily., Disp: 90 capsule, Rfl: 3    FLUoxetine (PROzac) 20 MG capsule, Take 1 capsule by mouth Daily., Disp: 90 capsule, Rfl: 3    fluticasone (Flonase) 50 MCG/ACT nasal spray, 2 sprays into the nostril(s) as directed by provider Daily. Administer 2 sprays in each nostril for each dose., Disp: 16 g, Rfl: 6    Glucagon (Gvoke HypoPen 2-Pack) 1 MG/0.2ML solution auto-injector, Inject 1 mg under the skin into the appropriate area as directed 1 (One) Time As Needed (hypoglycemia) for up to 1 dose., Disp: 0.4 mL, Rfl: 1    Inclisiran Sodium (LEQVIO SC), Inject  under the skin into the appropriate area as directed., Disp: , Rfl:     insulin degludec (TRESIBA FLEXTOUCH) 100 UNIT/ML solution pen-injector injection, Inject 10 Units under the skin into the appropriate area as directed Daily., Disp: 10 mL, Rfl: 1    Insulin Pen Needle (BD Pen Needle Micro U/F) 32G X 6 MM misc, Use 1 each Daily., Disp: 100 each, Rfl: 1    Januvia 100 MG tablet, TAKE 1 TABLET BY MOUTH DAILY, Disp: 90 tablet, Rfl: 1    losartan (COZAAR) 25 MG tablet, TAKE 1 TABLET BY MOUTH DAILY, Disp: 90 tablet, Rfl: 1    metFORMIN (GLUCOPHAGE) 500 MG tablet, TAKE 1 TABLET BY MOUTH EVERY MORNING AND 2 TABLETS BY MOUTH EVERY EVENING WITH MEALS, Disp: 270 tablet, Rfl: 1    montelukast (SINGULAIR) 10 MG tablet, TAKE 1 TABLET BY MOUTH EVERY NIGHT, Disp: 90 tablet, Rfl: 1    Pramipexole Dihydrochloride ER (Mirapex ER) 1.5 MG tablet sustained-release 24 hour, Take 1.5 mg by mouth Every Night., Disp: 30 tablet, Rfl: 5    prednisoLONE acetate (PRED FORTE) 1 % ophthalmic suspension,  "SHAKE LIQUID AND INSTILL 1 DROP IN RIGHT EYE TWICE DAILY, Disp: , Rfl:     Zinc 100 MG tablet, Take 100 mg by mouth Daily., Disp: , Rfl:      Allergies   Allergen Reactions    Diclofenac Hives    New Skin [Benzethonium Chloride] Rash    Atorvastatin Myalgia    Adhesive Tape Rash and Other (See Comments)       Rash at area of bandaid    Niacin Rash       Family History   Problem Relation Age of Onset    Kidney cancer Mother     Hyperlipidemia Mother     Dementia Mother     Cancer Mother     Heart disease Father     Heart attack Father     Diabetes Father     ADD / ADHD Father     Hyperlipidemia Father     Sleep apnea Father     Restless legs syndrome Father     Hyperlipidemia Sister     Cancer Sister         Passing 2023    Brain cancer Sister     Lung cancer Sister     Hyperlipidemia Sister     Hyperlipidemia Brother     Diabetes Brother     Heart attack Brother     Hyperlipidemia Brother     No Known Problems Paternal Uncle     No Known Problems Cousin     Malig Hyperthermia Neg Hx     Colon cancer Neg Hx         Social History     Social History Narrative    Not on file       Objective       Review of Systems   Constitutional:  Negative for appetite change, fatigue, fever, unexpected weight gain and unexpected weight loss.   HENT:  Negative for trouble swallowing.    Respiratory:  Negative for cough, choking, chest tightness, shortness of breath, wheezing and stridor.    Cardiovascular:  Negative for chest pain, palpitations and leg swelling.   Gastrointestinal:  Positive for constipation, GERD and indigestion. Negative for abdominal distention, abdominal pain, anal bleeding, blood in stool, diarrhea, nausea, rectal pain and vomiting.        Vital Signs:   /93 (BP Location: Left arm, Patient Position: Sitting)   Pulse 87   Ht 160 cm (63\")   Wt 83.5 kg (184 lb)   SpO2 97%   BMI 32.59 kg/m²       Physical Exam  Constitutional:       General: She is not in acute distress.     Appearance: She is " well-developed. She is not ill-appearing.   HENT:      Head: Normocephalic.   Eyes:      Pupils: Pupils are equal, round, and reactive to light.   Cardiovascular:      Rate and Rhythm: Normal rate and regular rhythm.      Heart sounds: Normal heart sounds.   Pulmonary:      Effort: Pulmonary effort is normal.      Breath sounds: Normal breath sounds.   Abdominal:      General: Bowel sounds are normal. There is no distension.      Palpations: Abdomen is soft. There is no mass.      Tenderness: There is no abdominal tenderness. There is no guarding or rebound.      Hernia: No hernia is present.   Musculoskeletal:         General: Normal range of motion.   Skin:     General: Skin is warm and dry.   Neurological:      Mental Status: She is alert and oriented to person, place, and time.   Psychiatric:         Speech: Speech normal.         Behavior: Behavior normal.         Judgment: Judgment normal.           Assessment & Plan          Assessment and Plan    Diagnoses and all orders for this visit:    1. Gastroesophageal reflux disease with esophagitis without hemorrhage (Primary)  -     esomeprazole (nexIUM) 40 MG capsule; Take 1 capsule by mouth Every Morning Before Breakfast.  Dispense: 30 capsule; Refill: 1  -     famotidine (Pepcid) 20 MG tablet; Take 1 tablet by mouth every night at bedtime.  Dispense: 90 tablet; Refill: 3    2. Elevated liver enzymes  -     US Liver; Future  -     Ambulatory Referral to Diabetic Education    3. History of diabetes mellitus, type II  -     US Liver; Future  -     Ambulatory Referral to Diabetic Education    4. Saenz's Esophagus without dysplasia      Reviewed EGD results with her today.  Swallowing better since dilation.  Working with speech therapy.  Still having reflux symptoms.  Given the new diagnosis of Saenz's esophagus without dysplasia it makes sense that we change her PPI.  Will change Protonix to Nexium 40 mg daily and see how she does.  Continue Pepcid at bedtime.   I will refill it.  Continue GERD precautions.  Struggling with constipation.  Hopefully the Nexium will help move her bowels as well.  If not we will start a nightly stool softener.  Recommend a high-fiber diet.  We had a long discussion about elevated liver enzymes and the possibility of her having fatty liver disease.  Especially being a type II diabetic.  Now on insulin.  Will also make referral to diabetes education.  Will give handouts on fatty liver disease.  Will check liver ultrasound for further evaluation.  We will repeat EGD in 1 year for surveillance of Saenz's.  Patient to give me an update in a couple of weeks.  Patient to follow-up with me in 3 months.  Patient is agreeable to the plan.          Follow Up       Follow Up   Return in about 3 months (around 8/13/2025) for GERD, BARRETTS.  Patient was given instructions and counseling regarding her condition or for health maintenance advice. Please see specific information pulled into the AVS if appropriate.

## 2025-05-13 NOTE — TELEPHONE ENCOUNTER
PT(PATIENT) VERIFIED     NEXT APPT WITH PROVIDER   Appointment with Vicky Barth MD (2025)     PLEASE ADVISE

## 2025-05-14 NOTE — TELEPHONE ENCOUNTER
Received fax back that [facility] does not handle Pas for this patients medication. Will call number on the back of patient's insurance card.

## 2025-05-14 NOTE — TELEPHONE ENCOUNTER
Spoke to the pharmacy first just to make sure they had the same insurance and that there was a PA actually needed and it is required. Pharmacist said that the insurance stated must use step therapy patient has already tried preferred Omeprazole and Pantoprazole and has new dx of Saenz's Esophagus. She recommended telling her insurance that and said to call [phone].

## 2025-05-16 NOTE — TELEPHONE ENCOUNTER
Called the phone number provided from the pharmacy. They said it needs to go under Medicare part D and they gave me the phone number [phone]. Spoke to Aj and he tried to connect me with a clinic pharmacist for review, but they were not available. He said they will reach out via phone or fax for additional info on this case # [id number].

## 2025-05-19 ENCOUNTER — TELEMEDICINE (OUTPATIENT)
Dept: PSYCHIATRY | Facility: CLINIC | Age: 75
End: 2025-05-19
Payer: MEDICARE

## 2025-05-19 DIAGNOSIS — F33.40 RECURRENT MAJOR DEPRESSIVE DISORDER IN REMISSION: Primary | ICD-10-CM

## 2025-05-19 PROCEDURE — 90832 PSYTX W PT 30 MINUTES: CPT | Performed by: COUNSELOR

## 2025-05-19 NOTE — PROGRESS NOTES
"Date: May 19, 2025  Time In: 1200  Time Out: 1225  This provider is located at home address for [facility] ([facility] [address] [place] [address] using a secure [employer] Video Visit through [employer].     Mode of Visit: Video  Location of patient: -HOME-  Location of provider: +HOME+  You have chosen to receive care through a telehealth visit.  The patient has signed the video visit consent form.  The visit included audio and video interaction. No technical issues occurred during this visit.    The patient's condition being diagnosed/treated is appropriate for telemedicine. The provider identified herself as well as her credentials. The patient, and/or patients guardian, consent to be seen remotely, and when consent is given they understand that the consent allows for patient identifiable information to be sent to a third party as needed. They may refuse to be seen remotely at any time. The electronic data is encrypted and password protected, and the patient and/or guardian has been advised of the potential risks to privacy not withstanding such measures.     You have chosen to receive care through a telehealth visit.  Do you consent to use a video/audio connection for your medical care today? Yes    Reviewed by provider on 05/19/2025     PROGRESS NOTE  Data:  Nivia Cagle is a 74 y.o. female who presents today for follow up    Chief Complaint: depression     History of Present Illness: Pt reports that within the past few weeks she has felt \"normal\". Pt reports that she has been productive and discussed completed tasks since previous session. Pt report that she has noticed energy to maintain her household and has been able to keep her home tidy. Pt reports that she is uncertain as to how long this mood will last but is trying to be present and enjoy every moment of it. Pt reports she would like to decrease the amount of medication she takes but " understands that this may not be a realistic plan for her and will further discuss this with providers.       Clinical Maneuvering/Intervention:    (Scales based on 0 - 10 with 10 being the worst)  Depression:  Anxiety:        Assisted patient in processing above session content; acknowledged and normalized patient’s thoughts, feelings, and concerns.  Rationalized patient thought process regarding recent stressors and life events. Discussed triggers associated with patient's emotions. Also discussed coping skills for patient to implement. Discussed improved mood and productivity. Discussed differences of circumstantial depression vs chemical imbalance.     Reviewed treatment goals, progress regarding identified goals and readiness for change through motivational interviewing approach. Treatment progress towards goals remains on going at this time.     Allowed patient to freely discuss issues without interruption or judgment. Assisted with application of appropriate intervention such as: cognitive behavioral therapy techniques, acceptance and commitment therapy methods, solution-focused brief therapy practices, psychoeducation, mindfulness approaches, emotional regulation strategies, attachment based assessments, and interpersonal interventions. Provided safe, confidential environment to facilitate the development of positive therapeutic relationship and encourage open, honest communication. Assisted patient in identifying risk factors which would indicate the need for higher level of care including thoughts to harm self or others and/or self-harming behavior and encouraged patient to contact this office, call 911, or present to the nearest emergency room should any of these events occur. Discussed crisis intervention services and means to access. Patient adamantly and convincingly denies current suicidal or homicidal ideation or perceptual disturbance.    Assessment:   Assessment   Patient appears to maintain  relative stability as compared to their baseline.  However, patient continues to struggle with depression which continues to cause impairment in important areas of functioning.  A result, they can be reasonably expected to continue to benefit from treatment and would likely be at increased risk for decompensation otherwise.    Mental Status Exam:   Hygiene:   good; normal for Pt   Cooperation:  Cooperative  Eye Contact:  Good  Psychomotor Behavior:  Appropriate  Affect:  Appropriate  Mood: anxious  Speech:  Normal  Thought Process:  Linear  Thought Content:  Mood congruent  Suicidal:  None today  Homicidal:  None  Hallucinations:  None  Delusion:  None  Memory:  Intact  Orientation:  Person, Place, Time and Situation  Reliability:  fair  Insight:  Fair  Judgement:  Fair  Impulse Control:  Fair  Physical/Medical Issues:  No        Patient's Support Network Includes:  son    Functional Status: Mild impairment     Progress toward goal: Not at goal    Prognosis: Fair with Ongoing Treatment            Plan:    Patient will continue in individual outpatient therapy with focus on improved functioning and coping skills, maintaining stability, and avoiding decompensation and the need for higher level of care.    Patient will adhere to medication regimen as prescribed and report any side effects. Patient will contact this office, call 911 or present to the nearest emergency room should suicidal or homicidal ideations occur.     Return in about 6 weeks, or earlier if symptoms worsen or fail to improve.           VISIT DIAGNOSIS:     ICD-10-CM ICD-9-CM   1. Recurrent major depressive disorder in remission  F33.40 296.35        Diagnoses and all orders for this visit:    1. Recurrent major depressive disorder in remission (Primary)           [facility] No Show Policy:  We understand unexpected circumstances arise; however, anytime you miss your appointment we are unable to provide you appropriate care.   In addition, each appointment missed could have been used to provide care for others.  We ask that you call at least 24 hours in advance to cancel or reschedule an appointment.  We would like to take this opportunity to remind you of our policy stating patients who miss THREE or more appointments without cancelling or rescheduling 24 hours in advance of the appointment may be subject to cancellation of any further visits with our clinic and recommendation to seek in-person services/visits.    Please call [phone] to reschedule your appointment. If there are reasons that make it difficult for you to keep the appointments, please call and let us know how we can help.Please understand that medication prescribing will not continue without seeing your provider.      Patient to email any documentation or needed paperwork to support staff at:   [email or web address]       This document has been electronically signed by Annita Knowles LCSW.  May 19, 2025 15:59 EDT      Part of this note may be an electronic transcription/translation of spoken language to printed text using the Dragon Dictation System.

## 2025-05-22 ENCOUNTER — TELEPHONE (OUTPATIENT)
Dept: CASE MANAGEMENT | Facility: OTHER | Age: 75
End: 2025-05-22
Payer: MEDICARE

## 2025-05-22 NOTE — TELEPHONE ENCOUNTER
Patient called and left me a VM concerned about her BG being in the low 200s 2 hours after eating today. Her diet today was muffins and water for breakfast and chick tamera a and water for lunch. I called her back and got her VM. I left her a VM explaining that I am mostly concerned with her FBG. I have asked her to keep track of her FBG so we can discuss.

## 2025-05-28 ENCOUNTER — LAB (OUTPATIENT)
Dept: LAB | Facility: HOSPITAL | Age: 75
End: 2025-05-28
Payer: MEDICARE

## 2025-05-28 ENCOUNTER — TRANSCRIBE ORDERS (OUTPATIENT)
Dept: ADMINISTRATIVE | Facility: HOSPITAL | Age: 75
End: 2025-05-28
Payer: MEDICARE

## 2025-05-28 ENCOUNTER — HOSPITAL ENCOUNTER (OUTPATIENT)
Dept: ULTRASOUND IMAGING | Facility: HOSPITAL | Age: 75
Discharge: HOME | End: 2025-05-28
Payer: MEDICARE

## 2025-05-28 DIAGNOSIS — R74.8 ELEVATED LIVER ENZYMES: ICD-10-CM

## 2025-05-28 DIAGNOSIS — M85.89 DISAPPEARING BONE DISEASE: Primary | ICD-10-CM

## 2025-05-28 DIAGNOSIS — M85.89 DISAPPEARING BONE DISEASE: ICD-10-CM

## 2025-05-28 DIAGNOSIS — Z79.899 MEDICATION MANAGEMENT: ICD-10-CM

## 2025-05-28 DIAGNOSIS — Z86.39 HISTORY OF DIABETES MELLITUS, TYPE II: ICD-10-CM

## 2025-05-28 LAB — MAGNESIUM SERPL-MCNC: 1.7 MG/DL (ref 1.6–2.4)

## 2025-05-28 PROCEDURE — 83735 ASSAY OF MAGNESIUM: CPT

## 2025-05-28 PROCEDURE — 36415 COLL VENOUS BLD VENIPUNCTURE: CPT

## 2025-05-28 PROCEDURE — 76705 ECHO EXAM OF ABDOMEN: CPT

## 2025-05-30 ENCOUNTER — PATIENT OUTREACH (OUTPATIENT)
Dept: CASE MANAGEMENT | Facility: OTHER | Age: 75
End: 2025-05-30
Payer: MEDICARE

## 2025-05-30 DIAGNOSIS — I10 ESSENTIAL HYPERTENSION: ICD-10-CM

## 2025-05-30 DIAGNOSIS — E11.65 TYPE 2 DIABETES MELLITUS WITH HYPERGLYCEMIA, WITHOUT LONG-TERM CURRENT USE OF INSULIN: Primary | ICD-10-CM

## 2025-05-30 NOTE — OUTREACH NOTE
[facility] End of Month Documentation    This Chronic Medical Management Care Plan for Nivia Cagle, 74 y.o. female, has been monitored and managed and a new plan of care implemented for the month of May.  A cumulative time of 20  minutes was spent on this patient record this month, including phone call with patient; electronic communication with primary care provider; chart review.    Regarding the patient's problems: has Muscle twitching; Restless legs syndrome (RLS); Allergic rhinitis; Anemia; Bilateral posterior capsular opacification; Cardiac murmur; Diverticulitis; Endometriosis; Gastroesophageal reflux disease; Essential hypertension; Low back pain; Migraines; Scoliosis deformity of spine; Major depressive disorder, recurrent episode, moderate degree; Generalized anxiety disorder; Type 2 diabetes mellitus; Hyperlipidemia LDL goal <70; EARL (obstructive sleep apnea); Tremor; Bilateral pseudophakia; Dislocated intraocular lens; Traumatic injury of globe of right eye; Unspecified retinal detachment with retinal break, right eye; Osteoarthritis; Attention-deficit hyperactivity disorder, unspecified type; Epiretinal membrane (ERM) of right eye; Traction retinal detachment involving macula; Cystoid macular degeneration of right eye; Vitamin deficiency, unspecified; Dvtrcli of intest, part unsp, w/o perf or abscess w/o bleed; History of falling; Long term current use of insulin; Generalized muscle weakness; Statin intolerance; Diabetes mellitus type 2 without retinopathy; Dry eyes; Secondary cataract; After-cataract with vision obscured; Obesity (BMI 30-39.9); Dysphagia; Pain of upper abdomen; Nausea and vomiting; Abnormal esophagram; Abnormal finding of blood chemistry, unspecified; Muscle spasm; Acute non-recurrent frontal sinusitis; Saenz's esophagus with dysplasia; and Age related osteoporosis on their problem list., the following items were addressed: medical records; medications; changes to medical care and any  changes can be found within the plan section of the note.  A detailed listing of time spent for chronic care management is tracked within each outreach encounter.  Current medications include:  has a current medication list which includes the following prescription(s): accu-chek [place] plus, accu-chek softclix lancets, aripiprazole, ascorbic acid, aspirin, freestyle lite, bupropion xl, cetirizine, cholecalciferol, freestyle js 14 day reader, freestyle js 14 day sensor, freestyle js 3 plus sensor, cyclobenzaprine, daily-judith multivitamin, esomeprazole, ezetimibe, famotidine, fluoxetine, fluoxetine, fluticasone, gvoke hypopen 2-pack, inclisiran sodium, insulin degludec, bd pen needle micro u/f, januvia, losartan, metformin, montelukast, pramipexole dihydrochloride er, prednisolone acetate, and zinc. and the patient is reported to be patient is compliant with medication protocol,  Medications are reported to be effective, BG is starting to decrease.  Regarding these diagnoses, referrals were made to the following provider(s):  Dietician.  All notes on chart for PCP to review.    The patient was monitored remotely for weight; activity level; blood glucose; mood & behavior; pain; medications.    The patient's physical needs include:  help taking medications as prescribed; eye care; medication education; physical healthcare; physician referral.     The patient's mental support needs include:  continued support, followed by Fabricio Barth    The patient's cognitive support needs include:  medication; coordination of community providers; increased support; health care    The patient's psychosocial support needs include:  need for increased support; medication management or adherence; continued support    The patient's functional needs include: eye care; medication education; physical healthcare; physician referral, Needs Diabetic eye and foot exam, Diabetes educator    The patient's environmental needs include:  not  applicable    Care Plan overall comments:  Ongoing    Refer to previous outreach notes for more information on the areas listed above.    Monthly Billing Diagnoses  (E11.65) Type 2 diabetes mellitus with hyperglycemia, without long-term current use of insulin    (I10) Essential hypertension    Medications   Medications have been reconciled    Care Plan progress this month:      Recently Modified Care Plans Updates made since 4/29/2025 12:00 AM      No recently modified care plans.             Diabetes Type 2       Protect Myself From Diabetes Complications (Progressing)       Start:  04/03/25    Expected End:  04/03/28         My Protect Myself From Diabetes Complications To Do List       Start:  04/03/25       Why is this important?Having diabetes puts you at risk for complications, such as kidney disease, heart disease, nerve damage and eye or dental problems.You can manage your risk by making healthy lifestyle choices and getting regular checkups.       Initial    My Protect Myself From Diabetes Complications To Do List: get a foot exam at least once per year;get an eye exam every year;manage my weight taken at 05/06/25            Monitor My Blood Sugar (Progressing)       Start:  04/03/25    Expected End:  04/03/28         My Blood Sugar Management To Do List       Start:  04/03/25       Why is this important?Checking your blood sugar (glucose) at home helps to keep it from getting very high or very low.Writing the results in a diary or log, or using an bud, helps your diabetes care provider know how to care for you.Your blood sugar (glucose) log should have the time, date and results.Also write down the amount of insulin or other medicine that you take.       Initial    My Blood Sugar Management To Do List: check blood sugar (glucose) at prescribed times;enter blood sugar (glucose) readings and medication or insulin into daily log;take the blood sugar (glucose) log to all provider visits;take the blood sugar  (glucose) meter to all provider visits taken at 05/06/25            Learn About Diabetes       Start:  04/03/25    Expected End:  04/03/28         My Learn About Diabetes To Do List       Start:  04/03/25       Why is this important?Learning about diabetes can help you to manage it.            Perform Foot Care       Start:  04/03/25    Expected End:  04/03/28         My Foot Care To Do List       Start:  04/03/25       Why is this important?Good foot care is very important when you have diabetes.There are many things you can do to keep your feet healthy and catch a problem early.            Manage My Stress       Start:  04/03/25    Expected End:  04/03/28         My Stress Management To Do List       Start:  04/03/25       Why is this important?When you are stressed down or upset your body reacts too.            Find Ways to Be Active       Start:  04/03/25    Expected End:  04/03/28         My Activity To Do List       Start:  04/03/25       Why is this important?Being more active can help you to manage your blood sugar (glucose) levels.Being active can also help to prevent diabetes complications.            Optimal Care Coordination of a Patient Experiencing Diabetes, Type 2 (Progressing)       Start:  04/03/25    Expected End:  04/03/28         Alleviate Barriers to Glycemic Management       Start:  04/03/25          Initial    Alleviate Barriers to Glycemic Management: barriers to adherence to treatment plan identified;blood glucose monitoring encouraged;blood glucose readings reviewed;resources required to improve adherence to care identified;self-awareness of signs/symptoms of hypo or hyperglycemia encouraged;use of blood glucose monitoring log promoted taken at 05/06/25         Monitor and Manage Follow-Up for Comorbidities       Start:  04/03/25          Initial    Monitor and Manage Follow-Up for Comorbidities: healthy lifestyle promoted;barriers to lifestyle changes identified taken at 05/06/25          Optimize Functional Ability       Start:  04/03/25            Support Wellbeing and Self-Management Success       Start:  04/03/25          Initial    Support Wellbeing and Self-Management Success: autonomy in care promoted;barriers to treatment adherence identified;decision-making supported;difficulty of making lifelong changes acknowledged;emotional support provided;healthy lifestyle promoted;problem-solving facilitated;support and encouragement provided taken at 05/06/25             Current Specialty Plan of Care Status signed by both patient and provider    Instructions   Patient was provided an electronic copy of care plan  CCM services were explained and offered and patient has accepted these services.  Patient has given their written consent to receive CCM services and understands that this includes the authorization of electronic communication of medical information with the other treating providers.  Patient understands that they may stop CCM services at any time and these changes will be effective at the end of the calendar month and will effectively revocate the agreement of CCM services.  Patient understands that only one practitioner can furnish and be paid for CCM services during one calendar month.  Patient also understands that there may be co-payment or deductible fees in association with CCM services.  Patient will continue with at least monthly follow-up calls with the Ambulatory .    Francisca SALAZAR  Ambulatory Case Management    5/30/2025, 11:58 EDT

## 2025-06-02 ENCOUNTER — RESULTS FOLLOW-UP (OUTPATIENT)
Dept: GASTROENTEROLOGY | Facility: CLINIC | Age: 75
End: 2025-06-02
Payer: MEDICARE

## 2025-06-04 NOTE — TELEPHONE ENCOUNTER
Result Note  Liver ultrasound shows fatty infiltration of the liver.  Mild right renal cortical thinning.  Postcholecystectomy.  Patient to follow-up with PCP about the kidney finding.    Attempted to contact pt with results. LVM requesting a return call.

## 2025-06-09 RX ORDER — CETIRIZINE HYDROCHLORIDE 10 MG/1
10 TABLET ORAL DAILY
Qty: 90 TABLET | Refills: 1 | Status: SHIPPED | OUTPATIENT
Start: 2025-06-09

## 2025-06-16 ENCOUNTER — NUTRITION (OUTPATIENT)
Dept: DIABETES SERVICES | Facility: HOSPITAL | Age: 75
End: 2025-06-16
Payer: MEDICARE

## 2025-06-16 DIAGNOSIS — Z79.4 TYPE 2 DIABETES MELLITUS WITH HYPERGLYCEMIA, WITH LONG-TERM CURRENT USE OF INSULIN: ICD-10-CM

## 2025-06-16 DIAGNOSIS — E11.65 TYPE 2 DIABETES MELLITUS WITH HYPERGLYCEMIA, WITH LONG-TERM CURRENT USE OF INSULIN: ICD-10-CM

## 2025-06-16 DIAGNOSIS — R74.8 ELEVATED LIVER ENZYMES: Primary | ICD-10-CM

## 2025-06-16 PROCEDURE — 97802 MEDICAL NUTRITION INDIV IN: CPT | Performed by: DIETITIAN, REGISTERED

## 2025-06-18 NOTE — PROGRESS NOTES
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                    Value:Microscopic examination performed.     Office Visit on 01/21/2025   Component Date Value Ref Range Status    Glucose 01/21/2025 169 (H)  65 - 99 mg/dL Final    BUN 01/21/2025 20  8 - 23 mg/dL Final    Creatinine 01/21/2025 1.16 (H)  0.57 - 1.00 mg/dL Final    Sodium 01/21/2025 136  136 - 145 mmol/L Final    Potassium 01/21/2025 4.7  3.5 - 5.2 mmol/L Final    Chloride 01/21/2025 96 (L)  98 - 107 mmol/L Final    CO2 01/21/2025 26.0  22.0 - 29.0 mmol/L Final    Calcium 01/21/2025 10.2  8.6 - 10.5 mg/dL Final    Total Protein 01/21/2025 7.9  6.0 - 8.5 g/dL Final    Albumin 01/21/2025 4.8  3.5 - 5.2 g/dL Final    ALT (SGPT) 01/21/2025 44 (H)  1 - 33 U/L Final    AST (SGOT) 01/21/2025 34 (H)  1 - 32 U/L Final    Alkaline Phosphatase 01/21/2025 92  39 - 117 U/L Final    Total Bilirubin 01/21/2025 1.0  0.0 - 1.2 mg/dL Final    Globulin 01/21/2025 3.1  gm/dL Final    A/G Ratio 01/21/2025 1.5  g/dL Final    BUN/Creatinine Ratio 01/21/2025 17.2  7.0 - 25.0 Final    Anion Gap 01/21/2025 14.0  5.0 - 15.0 mmol/L Final    eGFR 01/21/2025 50.2 (L)  >60.0 mL/min/1.73 Final    Magnesium 01/21/2025 1.8  1.6 - 2.4 mg/dL Final    Vitamin B-12 01/21/2025 702  211 - " 946 pg/mL Final    Folate 01/21/2025 15.10  4.78 - 24.20 ng/mL Final    TSH 01/21/2025 3.120  0.270 - 4.200 uIU/mL Final    SARS Antigen 01/21/2025 Not Detected  Not Detected, Presumptive Negative Final    Influenza A Antigen ARCHIE 01/21/2025 Not Detected  Not Detected Final    Influenza B Antigen ARCHIE 01/21/2025 Not Detected  Not Detected Final    Internal Control 01/21/2025 Passed  Passed Final    Lot Number 01/21/2025 [id number]   Final    Expiration Date 01/21/2025 07/30/2025   Final    WBC 01/21/2025 8.57  3.40 - 10.80 10*3/mm3 Final    RBC 01/21/2025 4.85  3.77 - 5.28 10*6/mm3 Final    Hemoglobin 01/21/2025 14.9  12.0 - 15.9 g/dL Final    Hematocrit 01/21/2025 43.9  34.0 - 46.6 % Final    MCV 01/21/2025 90.5  79.0 - 97.0 fL Final    MCH 01/21/2025 30.7  26.6 - 33.0 pg Final    MCHC 01/21/2025 33.9  31.5 - 35.7 g/dL Final    RDW 01/21/2025 12.0 (L)  12.3 - 15.4 % Final    RDW-SD 01/21/2025 40.0  37.0 - 54.0 fl Final    MPV 01/21/2025 11.8  6.0 - 12.0 fL Final    Platelets 01/21/2025 278  140 - 450 10*3/mm3 Final    Neutrophil % 01/21/2025 64.2  42.7 - 76.0 % Final    Lymphocyte % 01/21/2025 21.9  19.6 - 45.3 % Final    Monocyte % 01/21/2025 10.3  5.0 - 12.0 % Final    Eosinophil % 01/21/2025 2.3  0.3 - 6.2 % Final    Basophil % 01/21/2025 0.7  0.0 - 1.5 % Final    Immature Grans % 01/21/2025 0.6 (H)  0.0 - 0.5 % Final    Neutrophils, Absolute 01/21/2025 5.50  1.70 - 7.00 10*3/mm3 Final    Lymphocytes, Absolute 01/21/2025 1.88  0.70 - 3.10 10*3/mm3 Final    Monocytes, Absolute 01/21/2025 0.88  0.10 - 0.90 10*3/mm3 Final    Eosinophils, Absolute 01/21/2025 0.20  0.00 - 0.40 10*3/mm3 Final    Basophils, Absolute 01/21/2025 0.06  0.00 - 0.20 10*3/mm3 Final    Immature Grans, Absolute 01/21/2025 0.05  0.00 - 0.05 10*3/mm3 Final    nRBC 01/21/2025 0.0  0.0 - 0.2 /100 WBC Final       EKG Results:  No orders to display       Imaging Results:  No Images in the past 120 days found..      Assessment & Plan   Diagnoses and all  orders for this visit:    1. Recurrent major depressive disorder in remission (Primary)  -     ARIPiprazole (Abilify) 2 MG tablet; Take 1 tablet by mouth Daily.  Dispense: 90 tablet; Refill: 1    2. Post traumatic stress disorder (PTSD)  -     ARIPiprazole (Abilify) 2 MG tablet; Take 1 tablet by mouth Daily.  Dispense: 90 tablet; Refill: 1    3. Generalized anxiety disorder  -     ARIPiprazole (Abilify) 2 MG tablet; Take 1 tablet by mouth Daily.  Dispense: 90 tablet; Refill: 1    4. Insomnia due to mental condition  -     ARIPiprazole (Abilify) 2 MG tablet; Take 1 tablet by mouth Daily.  Dispense: 90 tablet; Refill: 1      INITIAL presentation 2021 most consistent with major depressive disorder, recurrent, moderate to severe, with anxious distress.  PTSD.  Rule out personality disorder, cluster B specifically.  Rule out hypomania as patient was very difficult to interrupt today.    06/19/2025: Depression, possibly beginning TD, noncompliance with meds (forgetting). Reduce abilify. Pt advised to trial an alarm bid to take meds; set up during visit.    Acknowledged and normalized patient's thoughts, feelings, and concerns. Allowed patient to freely discuss and process issues, such as:  Anxiety and depression regarding family conflict/relationships.  Anxiety and depression regarding medical conditions.  ... using Rogerian psychotherapeutic techniques including unconditional positive regard, reflective listening, and demonstrating clear empathy, with the goal of ameliorating symptoms and maintaining, restoring, or improving self-esteem, adaptive skills, and ego or psychological functions (Ara et al. 1991), the long-term goal of which is to develop a better, healthier perspective and help the patient bear their circumstances more easily.  Time (minutes) spent providing supportive psychotherapy: 17  (This time is exclusive to the therapy session and separate from the time spent on activities used to meet the criteria  "for the E/M service (history, exam, medical decision-making).)  Start: 11:39  Stop: 11:56  Functional status: mild impairment  Treatment plan: Medication management and supportive psychotherapy  Prognosis: good  Progress: MDD  4w    04/22/2025: Stable, well, no med changes. Appointment began earlier, over the phone, while helping pt log on.  \"I've been fine.\"    03/21/2025: Stable, well, no med changes.     02/20/2025: Stable, well, no med changes.     12/11/2024: Stable, well, no med changes.     09/26/2024: Stable, well, no med changes.     08/28/2024: Mild MDD, but maintenance insomnia. Increase abilify. [place] with [employer], prayer.    07/31/2024: Not depressed, persistent maintenance insomnia. Restarted her meds 10 days ago, the above improving. No changes, as her s/sx are related to stopping her meds for a time. Also has restarted light box.    07/02/2024: Improving mood, but persistent insomnia. Increase abilify. Constipation issues, but had 2 BM recently. Consider gabapentin as neuropathic pain impeding sleep.    06/04/2024: Insomnia, but also more activity, and possibly some depressed mood. Restart abilify; stopped previously thinking it wasn't covered by insurance, but this may have just been an early request that was denied. Watch swallowing issues. Consider lamictal, vraylar, rexulti, low dose seroquel.    4/30/24: Insomnia, and some procrastination. Increase doxepin. Procrastinating on painting a certain painting; processed why. Monitor.    4/3: Stable, but monitor if worsening depression creeps in.    3/4/24: Pt ist stable, but lost her 20 mg dose of prozac. Re-sent in. Insurance won't pay for abilify. DC abilify. Some unusual issues with swallowing. Processed dreams about her mother. Mom isn't happy, \"it's not good enough.\"    2/6: Stable, discussed dietary changes involving decreasing sugar. Sugar is comforting and helps her depression. Also discussed stevia. Now using light box and sleeping a little " better.    1/5: Seasonal depression, start light box up again. May have to make further med changes.    12/14: Add melatonin for maintenance insomnia. Otherwise stable. Seeing a movie for [employer]. Got all her Xmas cards mailed out.    11/9: Doing well, isolated insomnia. Stop trazodone and start doxepin. Consider lunesta, belsomra, quiviviq. She stopped melatonin on her own.    8/11: Stable, well, no changes. Painting, having people over. Counseled to start light box in September, reiterated instructions. Will be inducted into the [employer] [facility] tomorrow. She's been working on it for a year.    7/11: Short visit as patient sleepy. Unusual sleep pattern recently, but MH is fine. Pt will call her son tonight to ensure someone is around when she goes back to sleep. She will call PCP about this tomorrow. Close follow up with me in 4 wks.    4/27: Stable, well. Restart melatonin for insomnia.     3/2: Prozac and BLT helped. Situational stressor in son, no changes. Better. Watch insomnia.     1/20: Start light box, increase prozac for dep.     12/9: Some insomnia. Increase melatonin.     10/25: Doing well. Increase prozac back to baseline dose (inadvertently reduced, she has been stable on a higher dose, so we should go back to that). Some initial insomnia, increase trazodone to 75 mg nightly. She is enjoying the Fall.     9/8: Start light box. Close follow up in case this is worsening depression.     7/27: Well, stable.     6/14: Better, no changes.     5/3: Increase prozac and melatonin.    Visit Diagnoses:    ICD-10-CM ICD-9-CM   1. Recurrent major depressive disorder in remission  F33.40 296.35   2. Post traumatic stress disorder (PTSD)  F43.10 309.81   3. Generalized anxiety disorder  F41.1 300.02   4. Insomnia due to mental condition  F51.05 300.9     327.02         PLAN:  Risk Assessment: Risk of self-harm acutely is moderate. Risk factors include chronic depressive disorder,  possible personality disorder, recent psychosocial stressors (pandemic, moving). Protective factors include no present SI, no history of suicide attempts or self-harm in the past, no access to weapons, minimal AODA, healthcare seeking, future orientation, willingness to engage in care. Risk of self-harm chronically is also moderate, but could be further elevated in the event of treatment noncompliance and/or AODA.  Safety: No acute safety concerns.  Medications:   CONTINUE light box 9/1 - 3/31.  CONTINUE melatonin 30 mg p.o. nightly.  Risks, benefits, side effects discussed with patient including sedation, dizziness/falls risk, GI upset.  Do not use before operating vehicle, vessel, or machine. After discussion of these risks and benefits, the patient voiced understanding and agreed to proceed.   CONTINUE doxepin 25 mg qhs. Risks, benefits, side effects discussed with patient including GI upset, sedation, dizziness/falls risk, grogginess the following day, prolongation of the QTc interval.  After discussion of these risks and benefits, the patient voiced understanding and agreed to proceed.    CONTINUE bupropion xl 300 mg daily. Risks, benefits, alternatives discussed with patient including nausea, GI upset, increased energy, exacerbation of irritability, insomnia, lowering of seizure threshold.  After discussion of these risks and benefits, the patient voiced understanding and agreed to proceed.  CONTINUE Prozac 30 mg a day (HIGHEST dose is 40 given that she is also on bupropion). Risks, benefits, alternatives discussed with patient including GI upset, nausea vomiting diarrhea, theoretical decrease of seizure threshold predisposing the patient to a slightly higher seizure risk, headaches, sexual dysfunction, serotonin syndrome, bleeding risk, increased suicidality in patients 24 years and younger.  After discussion of these risks and benefits, the patient voiced understanding and agreed to proceed.  REDUCE Abilify  5 to 2 mg p.o. nightly. Previously stopped 2/2 cost, 3/24, but this may have been rejected by insurance simply because pt requested too soon (she states). Risks, benefits, alternatives discussed with patient including increased energy, exacerbation of irritability, akathisia, movement issues, GI upset, orthostatic hypotension, increased appetite, tardive dyskinesia.  Use care when operating vehicle, vessel, or machine. After discussion of these risks and benefits, the patient voiced understanding and agreed to proceed.  ALSO on pramipexole 1.5 mg qhs for RLS.  S/P:  trazodone 100 mg PO QHS. No longer effective 11/23.  Mirtazapine 45 mg daily (RLS)  Ambien caused double vision, and possibly hallucinations  Was on lithium in the past: dizziness and falls, and hair curled.  Therapy: referred to [facility].  Labs/Studies: s/p TMS referral.  Follow Up: 6 weeks. (prefers 4 wks)      TREATMENT PLAN/GOALS: Continue supportive psychotherapy efforts and medications as indicated. Treatment and medication options discussed during today's visit. Patient acknowledged and verbally consented to continue with current treatment plan and was educated on the importance of compliance with treatment and follow-up appointments.    MEDICATION ISSUES:  SAPNA reviewed as expected.  Discussed medication options and treatment plan of prescribed medication as well as the risks, benefits, and side effects including potential falls, possible impaired driving and metabolic adversities among others. Patient is agreeable to call the office with any worsening of symptoms or onset of side effects. Patient is agreeable to call 911 or go to the nearest ER should he/she begin having SI/HI. No medication side effects or related complaints today.     MEDS ORDERED DURING VISIT:  New Medications Ordered This Visit   Medications    ARIPiprazole (Abilify) 2 MG tablet     Sig: Take 1 tablet by mouth Daily.     Dispense:  90 tablet     Refill:  1      Reducing dose from 5 to 2 mg. Thank you for the help. Please call with questions: [phone].       Return in about 4 weeks (around 7/17/2025) for Video visit.         This document has been electronically signed by Vicky Barth MD  June 19, 2025 11:54 EDT      Part of this note may be an electronic transcription/translation of spoken language to printed text using the Dragon Dictation System.

## 2025-06-19 ENCOUNTER — TELEMEDICINE (OUTPATIENT)
Dept: PSYCHIATRY | Facility: CLINIC | Age: 75
End: 2025-06-19
Payer: MEDICARE

## 2025-06-19 DIAGNOSIS — F51.05 INSOMNIA DUE TO MENTAL CONDITION: ICD-10-CM

## 2025-06-19 DIAGNOSIS — F43.10 POST TRAUMATIC STRESS DISORDER (PTSD): ICD-10-CM

## 2025-06-19 DIAGNOSIS — F33.40 RECURRENT MAJOR DEPRESSIVE DISORDER IN REMISSION: Primary | ICD-10-CM

## 2025-06-19 DIAGNOSIS — F41.1 GENERALIZED ANXIETY DISORDER: ICD-10-CM

## 2025-06-19 RX ORDER — ARIPIPRAZOLE 2 MG/1
2 TABLET ORAL DAILY
Qty: 90 TABLET | Refills: 1 | Status: SHIPPED | OUTPATIENT
Start: 2025-06-19

## 2025-06-20 ENCOUNTER — TELEPHONE (OUTPATIENT)
Dept: CASE MANAGEMENT | Facility: OTHER | Age: 75
End: 2025-06-20
Payer: MEDICARE

## 2025-06-25 ENCOUNTER — OFFICE VISIT (OUTPATIENT)
Dept: INTERNAL MEDICINE | Facility: CLINIC | Age: 75
End: 2025-06-25
Payer: MEDICARE

## 2025-06-25 VITALS
WEIGHT: 176.6 LBS | RESPIRATION RATE: 16 BRPM | HEIGHT: 63 IN | DIASTOLIC BLOOD PRESSURE: 78 MMHG | SYSTOLIC BLOOD PRESSURE: 128 MMHG | TEMPERATURE: 98.7 F | OXYGEN SATURATION: 97 % | BODY MASS INDEX: 31.29 KG/M2 | HEART RATE: 94 BPM

## 2025-06-25 DIAGNOSIS — R09.81 NASAL CONGESTION: ICD-10-CM

## 2025-06-25 DIAGNOSIS — E11.65 TYPE 2 DIABETES MELLITUS WITH HYPERGLYCEMIA, WITHOUT LONG-TERM CURRENT USE OF INSULIN: Primary | ICD-10-CM

## 2025-06-25 DIAGNOSIS — I10 ESSENTIAL HYPERTENSION: ICD-10-CM

## 2025-06-25 DIAGNOSIS — F33.1 MAJOR DEPRESSIVE DISORDER, RECURRENT EPISODE, MODERATE DEGREE: ICD-10-CM

## 2025-06-25 DIAGNOSIS — E78.5 HYPERLIPIDEMIA LDL GOAL <70: ICD-10-CM

## 2025-06-25 LAB
ALBUMIN SERPL-MCNC: 4.4 G/DL (ref 3.5–5.2)
ALBUMIN/GLOB SERPL: 1.5 G/DL
ALP SERPL-CCNC: 70 U/L (ref 39–117)
ALT SERPL W P-5'-P-CCNC: 31 U/L (ref 1–33)
ANION GAP SERPL CALCULATED.3IONS-SCNC: 12.7 MMOL/L (ref 5–15)
AST SERPL-CCNC: 24 U/L (ref 1–32)
BASOPHILS # BLD AUTO: 0.05 10*3/MM3 (ref 0–0.2)
BASOPHILS NFR BLD AUTO: 0.6 % (ref 0–1.5)
BILIRUB SERPL-MCNC: 0.4 MG/DL (ref 0–1.2)
BUN SERPL-MCNC: 15 MG/DL (ref 8–23)
BUN/CREAT SERPL: 16.1 (ref 7–25)
CALCIUM SPEC-SCNC: 9.5 MG/DL (ref 8.6–10.5)
CHLORIDE SERPL-SCNC: 98 MMOL/L (ref 98–107)
CHOLEST SERPL-MCNC: 142 MG/DL (ref 0–200)
CO2 SERPL-SCNC: 26.3 MMOL/L (ref 22–29)
CREAT SERPL-MCNC: 0.93 MG/DL (ref 0.57–1)
DEPRECATED RDW RBC AUTO: 41.2 FL (ref 37–54)
EGFRCR SERPLBLD CKD-EPI 2021: 65.4 ML/MIN/1.73
EOSINOPHIL # BLD AUTO: 0.16 10*3/MM3 (ref 0–0.4)
EOSINOPHIL NFR BLD AUTO: 2 % (ref 0.3–6.2)
ERYTHROCYTE [DISTWIDTH] IN BLOOD BY AUTOMATED COUNT: 12.5 % (ref 12.3–15.4)
GLOBULIN UR ELPH-MCNC: 3 GM/DL
GLUCOSE SERPL-MCNC: 223 MG/DL (ref 65–99)
HBA1C MFR BLD: 7.3 % (ref 4.8–5.6)
HCT VFR BLD AUTO: 41.2 % (ref 34–46.6)
HDLC SERPL-MCNC: 54 MG/DL (ref 40–60)
HGB BLD-MCNC: 13.4 G/DL (ref 12–15.9)
IMM GRANULOCYTES # BLD AUTO: 0.03 10*3/MM3 (ref 0–0.05)
IMM GRANULOCYTES NFR BLD AUTO: 0.4 % (ref 0–0.5)
LDLC SERPL CALC-MCNC: 59 MG/DL (ref 0–100)
LDLC/HDLC SERPL: 0.97 {RATIO}
LYMPHOCYTES # BLD AUTO: 1.55 10*3/MM3 (ref 0.7–3.1)
LYMPHOCYTES NFR BLD AUTO: 19.5 % (ref 19.6–45.3)
MCH RBC QN AUTO: 29.5 PG (ref 26.6–33)
MCHC RBC AUTO-ENTMCNC: 32.5 G/DL (ref 31.5–35.7)
MCV RBC AUTO: 90.5 FL (ref 79–97)
MONOCYTES # BLD AUTO: 0.79 10*3/MM3 (ref 0.1–0.9)
MONOCYTES NFR BLD AUTO: 10 % (ref 5–12)
NEUTROPHILS NFR BLD AUTO: 5.35 10*3/MM3 (ref 1.7–7)
NEUTROPHILS NFR BLD AUTO: 67.5 % (ref 42.7–76)
NRBC BLD AUTO-RTO: 0 /100 WBC (ref 0–0.2)
PLATELET # BLD AUTO: 279 10*3/MM3 (ref 140–450)
PMV BLD AUTO: 11.7 FL (ref 6–12)
POTASSIUM SERPL-SCNC: 4 MMOL/L (ref 3.5–5.2)
PROT SERPL-MCNC: 7.4 G/DL (ref 6–8.5)
RBC # BLD AUTO: 4.55 10*6/MM3 (ref 3.77–5.28)
SODIUM SERPL-SCNC: 137 MMOL/L (ref 136–145)
TRIGL SERPL-MCNC: 177 MG/DL (ref 0–150)
TSH SERPL DL<=0.05 MIU/L-ACNC: 1.54 UIU/ML (ref 0.27–4.2)
VLDLC SERPL-MCNC: 29 MG/DL (ref 5–40)
WBC NRBC COR # BLD AUTO: 7.93 10*3/MM3 (ref 3.4–10.8)

## 2025-06-25 PROCEDURE — 1126F AMNT PAIN NOTED NONE PRSNT: CPT | Performed by: PHYSICIAN ASSISTANT

## 2025-06-25 PROCEDURE — 1160F RVW MEDS BY RX/DR IN RCRD: CPT | Performed by: PHYSICIAN ASSISTANT

## 2025-06-25 PROCEDURE — 3074F SYST BP LT 130 MM HG: CPT | Performed by: PHYSICIAN ASSISTANT

## 2025-06-25 PROCEDURE — 85025 COMPLETE CBC W/AUTO DIFF WBC: CPT | Performed by: PHYSICIAN ASSISTANT

## 2025-06-25 PROCEDURE — 99214 OFFICE O/P EST MOD 30 MIN: CPT | Performed by: PHYSICIAN ASSISTANT

## 2025-06-25 PROCEDURE — 3078F DIAST BP <80 MM HG: CPT | Performed by: PHYSICIAN ASSISTANT

## 2025-06-25 PROCEDURE — 80061 LIPID PANEL: CPT | Performed by: PHYSICIAN ASSISTANT

## 2025-06-25 PROCEDURE — 83036 HEMOGLOBIN GLYCOSYLATED A1C: CPT | Performed by: PHYSICIAN ASSISTANT

## 2025-06-25 PROCEDURE — 36415 COLL VENOUS BLD VENIPUNCTURE: CPT | Performed by: PHYSICIAN ASSISTANT

## 2025-06-25 PROCEDURE — 1159F MED LIST DOCD IN RCRD: CPT | Performed by: PHYSICIAN ASSISTANT

## 2025-06-25 PROCEDURE — 80053 COMPREHEN METABOLIC PANEL: CPT | Performed by: PHYSICIAN ASSISTANT

## 2025-06-25 PROCEDURE — 3052F HG A1C>EQUAL 8.0%<EQUAL 9.0%: CPT | Performed by: PHYSICIAN ASSISTANT

## 2025-06-25 PROCEDURE — 84443 ASSAY THYROID STIM HORMONE: CPT | Performed by: PHYSICIAN ASSISTANT

## 2025-06-25 RX ORDER — CETIRIZINE HYDROCHLORIDE 10 MG/1
10 TABLET ORAL DAILY
Qty: 90 TABLET | Refills: 1 | Status: SHIPPED | OUTPATIENT
Start: 2025-06-25

## 2025-06-25 RX ORDER — MONTELUKAST SODIUM 10 MG/1
10 TABLET ORAL NIGHTLY
Qty: 90 TABLET | Refills: 1 | Status: SHIPPED | OUTPATIENT
Start: 2025-06-25

## 2025-06-25 RX ORDER — SITAGLIPTIN 100 MG/1
100 TABLET, FILM COATED ORAL DAILY
Qty: 90 TABLET | Refills: 1 | Status: SHIPPED | OUTPATIENT
Start: 2025-06-25

## 2025-06-25 RX ORDER — EZETIMIBE 10 MG/1
10 TABLET ORAL
Qty: 90 TABLET | Refills: 1 | Status: SHIPPED | OUTPATIENT
Start: 2025-06-25

## 2025-06-25 RX ORDER — FLUTICASONE PROPIONATE 50 MCG
2 SPRAY, SUSPENSION (ML) NASAL DAILY
Qty: 16 G | Refills: 6 | Status: SHIPPED | OUTPATIENT
Start: 2025-06-25

## 2025-06-25 RX ORDER — ASPIRIN 81 MG/1
81 TABLET ORAL DAILY
Qty: 90 TABLET | Refills: 1 | Status: SHIPPED | OUTPATIENT
Start: 2025-06-25

## 2025-06-25 RX ORDER — LOSARTAN POTASSIUM 25 MG/1
25 TABLET ORAL DAILY
Qty: 90 TABLET | Refills: 1 | Status: SHIPPED | OUTPATIENT
Start: 2025-06-25

## 2025-06-25 NOTE — PROGRESS NOTES
Chief Complaint  Diabetes (Does not like the CGM she has. Would like to go back to checking her Bg herself.) and Hypertension    Subjective          Nivia Cagle presents to [facility]  History of Present Illness  DM: she has been using cgm  Watching carb intake   Using 10 units of tresiba at night  Denies low bg, dizziness, shakiness  Pt walking more for exercise  She saw [facility] eye specialist last month- Dr. Price  HTN: Denies chest pain, palpitations, ha, dizziness  Past Medical History:   Diagnosis Date    ADD (attention deficit disorder)     Allergic rhinitis     Anemia     Anxiety     Bursitis     bilateral hips    Callus     Cataracts, bilateral     Chronic pain disorder     Depression 0421/2021    MOODNOT WELL CONTROLLED.  GIVEN NUMBER FOR LOCAL COUNSELOR.  WILL INCREASE TRINTELIX FROM 10MG TO 20MG RTC WEEK ER ID S/HI    Diabetes mellitus, type 2     Difficulty walking     Diverticulitis     GERD (gastroesophageal reflux disease)     Head injury     High blood pressure     Hyperlipemia     Infectious viral hepatitis 1960    Type A    Insomnia     Migraine     Neuropathy in diabetes 2020    EARL (obstructive sleep apnea) 04/21/2021    Panic disorder     Peripheral neuropathy     Phlebitis     Plantar fasciitis 2019    PTSD (post-traumatic stress disorder)     RLS (restless legs syndrome)         Past Surgical History:   Procedure Laterality Date    ANKLE OPEN REDUCTION INTERNAL FIXATION  Feb 2021    Both ankles    ANKLE SURGERY Bilateral 2021    BILATERAL BREAST REDUCTION  2015    BREAST BIOPSY      CARPAL TUNNEL RELEASE Bilateral     CHOLECYSTECTOMY  2001    COLONOSCOPY      [facility]    COLONOSCOPY N/A 09/28/2022    Procedure: COLONOSCOPY with biopsy;  Surgeon: Ghislaine Kilgore MD;  Location: [facility] ENDOSCOPY;  Service: Gastroenterology;  Laterality: N/A;  colon polyp, diverticlosis, hemorrhoids    ENDOSCOPY N/A 01/23/2025    Procedure:  ESOPHAGOGASTRODUODENOSCOPY WITH BIOPSIES, BALLOON DILATATION 12-15MM, DILATATION WITH 54FR MULLINS;  Surgeon: Ghislaine Kilgore MD;  Location: [facility] ENDOSCOPY;  Service: Gastroenterology;  Laterality: N/A;  SCHATZKI'S RING, HIATAL HERNIA    EYE SURGERY Right     scar tissue removal    FOOT SURGERY      FRACTURE SURGERY      HYSTERECTOMY      SKIN BIOPSY      TOENAIL EXCISION  2022    ULNAR NERVE TRANSPOSITION Right     WRIST SURGERY          Current Outpatient Medications on File Prior to Visit   Medication Sig Dispense Refill    Accu-Chek Madeline Plus test strip by Other route 4 (Four) Times a Day. use to test blood sugar      Accu-Chek Softclix Lancets lancets by Other route 4 (Four) Times a Day. use to test blood sugar      ARIPiprazole (Abilify) 2 MG tablet Take 1 tablet by mouth Daily. 90 tablet 1    Ascorbic Acid (VITAMIN C GUMMIE PO) Take  by mouth Daily.      Blood Glucose Monitoring Suppl (FreeStyle Lite) device 1 each by Other route 4 (Four) Times a Day.      buPROPion XL (WELLBUTRIN XL) 300 MG 24 hr tablet TAKE 1 TABLET BY MOUTH EVERY MORNING 90 tablet 3    Cholecalciferol 25 MCG (1000 UT) tablet dispersible Take  by mouth 2 (Two) Times a Day.      Continuous Glucose  (FreeStyle Abdulkadir 14 Day [place]) device Use 1 each Every 3 (Three) Months. 1 each 3    Continuous Glucose Sensor (FreeStyle Abdulkadir 14 Day Sensor) misc Use 1 each Every 10 (Ten) Days. 3 each 3    Continuous Glucose Sensor (FreeStyle Abdulkadir 3 Plus Sensor) Use Every 14 (Fourteen) Days. 3 each 2    cyclobenzaprine (FLEXERIL) 10 MG tablet Take 1 tablet by mouth Daily As Needed for Muscle Spasms. 90 tablet 1    Daily-Jelani Multivitamin tablet tablet Take 1 tablet by mouth every night at bedtime.      esomeprazole (nexIUM) 40 MG capsule Take 1 capsule by mouth Every Morning Before Breakfast. 30 capsule 1    famotidine (Pepcid) 20 MG tablet Take 1 tablet by mouth every night at bedtime. 90 tablet 3    FLUoxetine (PROzac) 10 MG capsule Take  1 capsule by mouth Daily. 90 capsule 3    FLUoxetine (PROzac) 20 MG capsule Take 1 capsule by mouth Daily. 90 capsule 3    Glucagon (Gvoke HypoPen 2-Pack) 1 MG/0.2ML solution auto-injector Inject 1 mg under the skin into the appropriate area as directed 1 (One) Time As Needed (hypoglycemia) for up to 1 dose. 0.4 mL 1    Inclisiran Sodium (LEQVIO SC) Inject  under the skin into the appropriate area as directed.      Insulin Pen Needle (BD Pen Needle Micro U/F) 32G X 6 MM misc Use 1 each Daily. 100 each 1    Pramipexole Dihydrochloride ER (Mirapex ER) 1.5 MG tablet sustained-release 24 hour Take 1.5 mg by mouth Every Night. 30 tablet 5    prednisoLONE acetate (PRED FORTE) 1 % ophthalmic suspension SHAKE LIQUID AND INSTILL 1 DROP IN RIGHT EYE TWICE DAILY      Zinc 100 MG tablet Take 100 mg by mouth Daily.      [DISCONTINUED] aspirin 81 MG EC tablet Take 1 tablet by mouth Daily. 90 tablet 1    [DISCONTINUED] cetirizine (zyrTEC) 10 MG tablet TAKE 1 TABLET BY MOUTH EVERY DAY 90 tablet 1    [DISCONTINUED] ezetimibe (ZETIA) 10 MG tablet TAKE 1 TABLET BY MOUTH EVERY NIGHT AT BEDTIME 90 tablet 1    [DISCONTINUED] fluticasone (Flonase) 50 MCG/ACT nasal spray 2 sprays into the nostril(s) as directed by provider Daily. Administer 2 sprays in each nostril for each dose. 16 g 6    [DISCONTINUED] insulin degludec (TRESIBA FLEXTOUCH) 100 UNIT/ML solution pen-injector injection Inject 10 Units under the skin into the appropriate area as directed Daily. 10 mL 1    [DISCONTINUED] Januvia 100 MG tablet TAKE 1 TABLET BY MOUTH DAILY 90 tablet 1    [DISCONTINUED] losartan (COZAAR) 25 MG tablet TAKE 1 TABLET BY MOUTH DAILY 90 tablet 1    [DISCONTINUED] metFORMIN (GLUCOPHAGE) 500 MG tablet TAKE 1 TABLET BY MOUTH EVERY MORNING AND 2 TABLETS BY MOUTH EVERY EVENING WITH MEALS 270 tablet 1    [DISCONTINUED] montelukast (SINGULAIR) 10 MG tablet TAKE 1 TABLET BY MOUTH EVERY NIGHT 90 tablet 1     No current facility-administered medications on file  "prior to visit.        Allergies   Allergen Reactions    Diclofenac Hives    New Skin [Benzethonium Chloride] Rash    Atorvastatin Myalgia    Adhesive Tape Rash and Other (See Comments)       Rash at area of bandaid    Niacin Rash       Social History     Tobacco Use   Smoking Status Former    Current packs/day: 0.00    Average packs/day: 0.3 packs/day for 13.0 years (3.3 ttl pk-yrs)    Types: Cigarettes    Start date:     Quit date:     Years since quittin.5    Passive exposure: Never   Smokeless Tobacco Never   Tobacco Comments    QUIT           Objective   Vital Signs:   /78 (BP Location: Left arm, Patient Position: Sitting, Cuff Size: Adult)   Pulse 94   Temp 98.7 °F (37.1 °C) (Temporal)   Resp 16   Ht 160 cm (63\")   Wt 80.1 kg (176 lb 9.6 oz)   SpO2 97%   BMI 31.28 kg/m²     Physical Exam  Vitals reviewed.   Constitutional:       Appearance: Normal appearance.   HENT:      Head: Normocephalic and atraumatic.      Nose: Nose normal.      Mouth/Throat:      Mouth: Mucous membranes are moist.   Eyes:      Extraocular Movements: Extraocular movements intact.      Conjunctiva/sclera: Conjunctivae normal.      Pupils: Pupils are equal, round, and reactive to light.   Cardiovascular:      Rate and Rhythm: Normal rate and regular rhythm.   Pulmonary:      Effort: Pulmonary effort is normal.      Breath sounds: Normal breath sounds.   Abdominal:      General: Abdomen is flat. Bowel sounds are normal.      Palpations: Abdomen is soft.   Musculoskeletal:         General: Normal range of motion.      Right foot: No deformity.      Left foot: No deformity.   Feet:      Right foot:      Skin integrity: No ulcer, blister, skin breakdown, erythema, callus, dry skin or fissure.      Left foot:      Skin integrity: No ulcer, blister, skin breakdown, erythema, callus, dry skin or fissure.      Comments: Diabetic Foot Exam Performed      Neurological:      General: No focal deficit present.      " Mental Status: She is alert and oriented to person, place, and time.   Psychiatric:         Mood and Affect: Mood normal.        Result Review :                     Assessment and Plan    Diagnoses and all orders for this visit:    1. Type 2 diabetes mellitus with hyperglycemia, without long-term current use of insulin (Primary)  Assessment & Plan:  Reviewed CGM readings with patient, blood sugar significantly improved from previous will get A1c today to evaluate and adjust medications if indicated based on results     Orders:  -     Hemoglobin A1c    2. Essential hypertension  Assessment & Plan:  Hypertension is stable and controlled  Continue current treatment regimen.  Blood pressure will be reassessed in 3 months.    Orders:  -     Comprehensive Metabolic Panel  -     CBC & Differential  -     TSH    3. Major depressive disorder, recurrent episode, moderate degree  Comments:  Mood doing well, cont follow up with psych as directed    4. Hyperlipidemia LDL goal <70  -     Lipid Panel    5. Nasal congestion  -     fluticasone (Flonase) 50 MCG/ACT nasal spray; Administer 2 sprays into the nostril(s) as directed by provider Daily. Administer 2 sprays in each nostril for each dose.  Dispense: 16 g; Refill: 6    Other orders  -     aspirin 81 MG EC tablet; Take 1 tablet by mouth Daily.  Dispense: 90 tablet; Refill: 1  -     cetirizine (zyrTEC) 10 MG tablet; Take 1 tablet by mouth Daily.  Dispense: 90 tablet; Refill: 1  -     ezetimibe (ZETIA) 10 MG tablet; Take 1 tablet by mouth every night at bedtime.  Dispense: 90 tablet; Refill: 1  -     insulin degludec (TRESIBA FLEXTOUCH) 100 UNIT/ML solution pen-injector injection; Inject 10 Units under the skin into the appropriate area as directed Daily.  Dispense: 10 mL; Refill: 1  -     Januvia 100 MG tablet; Take 1 tablet by mouth Daily.  Dispense: 90 tablet; Refill: 1  -     losartan (COZAAR) 25 MG tablet; Take 1 tablet by mouth Daily.  Dispense: 90 tablet; Refill: 1  -      metFORMIN (GLUCOPHAGE) 500 MG tablet; TAKE 1 TABLET BY MOUTH EVERY MORNING AND 2 TABLETS BY MOUTH EVERY EVENING WITH MEALS  Dispense: 270 tablet; Refill: 1  -     montelukast (SINGULAIR) 10 MG tablet; Take 1 tablet by mouth Every Night.  Dispense: 90 tablet; Refill: 1        Follow Up   Return in about 3 months (around 9/25/2025).  Patient was given instructions and counseling regarding her condition or for health maintenance advice. Please see specific information pulled into the AVS if appropriate.

## 2025-06-25 NOTE — ASSESSMENT & PLAN NOTE
Reviewed CGM readings with patient, blood sugar significantly improved from previous will get A1c today to evaluate and adjust medications if indicated based on results

## 2025-07-03 ENCOUNTER — TELEPHONE (OUTPATIENT)
Dept: CASE MANAGEMENT | Facility: OTHER | Age: 75
End: 2025-07-03
Payer: MEDICARE

## 2025-07-03 RX ORDER — SITAGLIPTIN 100 MG/1
100 TABLET, FILM COATED ORAL DAILY
Qty: 90 TABLET | Refills: 1 | OUTPATIENT
Start: 2025-07-03

## 2025-07-09 ENCOUNTER — TELEPHONE (OUTPATIENT)
Dept: CASE MANAGEMENT | Facility: OTHER | Age: 75
End: 2025-07-09
Payer: MEDICARE

## 2025-07-09 NOTE — TELEPHONE ENCOUNTER
Attempted to call patient. Left voicemail. UTR X 3. I am placing the patient in monitoring for loss of contact.

## 2025-07-21 ENCOUNTER — TELEPHONE (OUTPATIENT)
Dept: CARDIOLOGY | Facility: CLINIC | Age: 75
End: 2025-07-21
Payer: MEDICARE

## 2025-07-21 ENCOUNTER — SPECIALTY PHARMACY (OUTPATIENT)
Dept: CARDIOLOGY | Facility: CLINIC | Age: 75
End: 2025-07-21
Payer: MEDICARE

## 2025-07-21 ENCOUNTER — OFFICE VISIT (OUTPATIENT)
Dept: CARDIOLOGY | Facility: CLINIC | Age: 75
End: 2025-07-21
Payer: MEDICARE

## 2025-07-21 VITALS
SYSTOLIC BLOOD PRESSURE: 115 MMHG | OXYGEN SATURATION: 97 % | WEIGHT: 181.2 LBS | HEIGHT: 63 IN | HEART RATE: 93 BPM | DIASTOLIC BLOOD PRESSURE: 70 MMHG | BODY MASS INDEX: 32.11 KG/M2

## 2025-07-21 DIAGNOSIS — I10 ESSENTIAL HYPERTENSION: Primary | ICD-10-CM

## 2025-07-21 DIAGNOSIS — Z78.9 STATIN INTOLERANCE: ICD-10-CM

## 2025-07-21 DIAGNOSIS — E78.2 MIXED HYPERLIPIDEMIA: ICD-10-CM

## 2025-07-21 PROCEDURE — 3078F DIAST BP <80 MM HG: CPT | Performed by: INTERNAL MEDICINE

## 2025-07-21 PROCEDURE — 1160F RVW MEDS BY RX/DR IN RCRD: CPT | Performed by: INTERNAL MEDICINE

## 2025-07-21 PROCEDURE — 99214 OFFICE O/P EST MOD 30 MIN: CPT | Performed by: INTERNAL MEDICINE

## 2025-07-21 PROCEDURE — 3074F SYST BP LT 130 MM HG: CPT | Performed by: INTERNAL MEDICINE

## 2025-07-21 PROCEDURE — 1159F MED LIST DOCD IN RCRD: CPT | Performed by: INTERNAL MEDICINE

## 2025-07-21 RX ORDER — PANTOPRAZOLE SODIUM 20 MG/1
1 TABLET, DELAYED RELEASE ORAL DAILY
COMMUNITY
Start: 2025-07-02

## 2025-07-21 NOTE — Clinical Note
This patient was initially started on Leqvio but she never got contacted by anyone to continue therapy.  This was before you were here.  But I don't know what happened.  Please eval.  Thanks.

## 2025-07-21 NOTE — PROGRESS NOTES
Chief Complaint  Hyperlipidemia, Hypertension, and Follow-up    Subjective            Nivia Cagle presents to [facility] CARDIOLOGY      Nina is here for follow-up evaluation management essential hypertension, mixed hyperlipidemia, and family history of coronary artery disease.  She denies cardiovascular complaints at this time.  She was started on Leqvio but only received 1 or 2 doses and then I cannot see what happened after that.    PMH  Past Medical History:   Diagnosis Date    ADD (attention deficit disorder)     Allergic rhinitis     Anemia     Anxiety     Saenz esophagus     Bursitis     bilateral hips    Callus     Cataracts, bilateral     Chronic pain disorder     Colon polyp     Depression 0421/2021    MOODNOT WELL CONTROLLED.  GIVEN NUMBER FOR LOCAL COUNSELOR.  WILL INCREASE TRINTELIX FROM 10MG TO 20MG RTC WEEK ER ID S/HI    Diabetes mellitus, type 2     Difficulty walking     Diverticulitis     Diverticulitis of colon     GERD (gastroesophageal reflux disease)     Head injury     High blood pressure     Hyperlipemia     Infectious viral hepatitis 1960    Type A    Insomnia     Migraine     Neuropathy in diabetes 2020    EARL (obstructive sleep apnea) 04/21/2021    Panic disorder     Peripheral neuropathy     Phlebitis     Plantar fasciitis 2019    PTSD (post-traumatic stress disorder)     RLS (restless legs syndrome)          SURGICALHX  Past Surgical History:   Procedure Laterality Date    ANKLE OPEN REDUCTION INTERNAL FIXATION  Feb 2021    Both ankles    ANKLE SURGERY Bilateral 2021    BILATERAL BREAST REDUCTION  2015    BREAST BIOPSY      CARPAL TUNNEL RELEASE Bilateral     CHOLECYSTECTOMY  2001    COLONOSCOPY      [facility]    COLONOSCOPY N/A 09/28/2022    Procedure: COLONOSCOPY with biopsy;  Surgeon: Ghislaine Kilgore MD;  Location: [facility] ENDOSCOPY;  Service: Gastroenterology;  Laterality: N/A;  colon polyp, diverticlosis, hemorrhoids    ENDOSCOPY N/A  2025    Procedure: ESOPHAGOGASTRODUODENOSCOPY WITH BIOPSIES, BALLOON DILATATION 12-15MM, DILATATION WITH 54FR MULLINS;  Surgeon: Ghislaine Kilgore MD;  Location: [facility] ENDOSCOPY;  Service: Gastroenterology;  Laterality: N/A;  SCHATZKI'S RING, HIATAL HERNIA    EYE SURGERY Right     scar tissue removal    FOOT SURGERY      FRACTURE SURGERY      HYSTERECTOMY      SKIN BIOPSY      TOENAIL EXCISION      ULNAR NERVE TRANSPOSITION Right     UPPER GASTROINTESTINAL ENDOSCOPY      WRIST SURGERY          SOC  Social History     Socioeconomic History    Marital status: Single   Tobacco Use    Smoking status: Former     Current packs/day: 0.00     Average packs/day: 0.3 packs/day for 13.0 years (3.3 ttl pk-yrs)     Types: Cigarettes     Start date:      Quit date:      Years since quittin.5     Passive exposure: Never    Smokeless tobacco: Never    Tobacco comments:     QUIT    Vaping Use    Vaping status: Never Used   Substance and Sexual Activity    Alcohol use: Not Currently     Comment: socially    Drug use: Never    Sexual activity: Defer         FAMHX  Family History   Problem Relation Age of Onset    Kidney cancer Mother     Hyperlipidemia Mother     Dementia Mother     Cancer Mother     Heart disease Father     Heart attack Father     Diabetes Father     ADD / ADHD Father     Hyperlipidemia Father     Sleep apnea Father     Restless legs syndrome Father     Hyperlipidemia Sister     Cancer Sister         Passing     Brain cancer Sister     Lung cancer Sister     Hyperlipidemia Sister     Hyperlipidemia Brother     Diabetes Brother     Heart attack Brother     Hyperlipidemia Brother     No Known Problems Paternal Uncle     No Known Problems Cousin     Malig Hyperthermia Neg Hx     Colon cancer Neg Hx           ALLERGY  Allergies   Allergen Reactions    Diclofenac Hives    New Skin [Benzethonium Chloride] Rash    Atorvastatin Myalgia    Adhesive Tape Rash and Other (See Comments)        Rash at area of bandaid    Niacin Rash        MEDSCURRENT    Current Outpatient Medications:     Accu-Chek Madeline Plus test strip, by Other route 4 (Four) Times a Day. use to test blood sugar, Disp: , Rfl:     Accu-Chek Softclix Lancets lancets, by Other route 4 (Four) Times a Day. use to test blood sugar, Disp: , Rfl:     ARIPiprazole (Abilify) 2 MG tablet, Take 1 tablet by mouth Daily., Disp: 90 tablet, Rfl: 1    Ascorbic Acid (VITAMIN C GUMMIE PO), Take  by mouth Daily., Disp: , Rfl:     aspirin 81 MG EC tablet, Take 1 tablet by mouth Daily., Disp: 90 tablet, Rfl: 1    Blood Glucose Monitoring Suppl (FreeStyle Lite) device, 1 each by Other route 4 (Four) Times a Day., Disp: , Rfl:     buPROPion XL (WELLBUTRIN XL) 300 MG 24 hr tablet, TAKE 1 TABLET BY MOUTH EVERY MORNING, Disp: 90 tablet, Rfl: 3    cetirizine (zyrTEC) 10 MG tablet, Take 1 tablet by mouth Daily., Disp: 90 tablet, Rfl: 1    Cholecalciferol 25 MCG (1000 UT) tablet dispersible, Take  by mouth 2 (Two) Times a Day., Disp: , Rfl:     Continuous Glucose  (FreeStyle Abdulkadir 14 Day [place]) device, Use 1 each Every 3 (Three) Months., Disp: 1 each, Rfl: 3    Continuous Glucose Sensor (FreeStyle Abdulkadir 14 Day Sensor) misc, Use 1 each Every 10 (Ten) Days., Disp: 3 each, Rfl: 3    Continuous Glucose Sensor (FreeStyle Abdulkadir 3 Plus Sensor), Use Every 14 (Fourteen) Days., Disp: 3 each, Rfl: 2    cyclobenzaprine (FLEXERIL) 10 MG tablet, Take 1 tablet by mouth Daily As Needed for Muscle Spasms., Disp: 90 tablet, Rfl: 1    Daily-Jelani Multivitamin tablet tablet, Take 1 tablet by mouth every night at bedtime., Disp: , Rfl:     esomeprazole (nexIUM) 40 MG capsule, Take 1 capsule by mouth Every Morning Before Breakfast., Disp: 30 capsule, Rfl: 1    ezetimibe (ZETIA) 10 MG tablet, Take 1 tablet by mouth every night at bedtime., Disp: 90 tablet, Rfl: 1    famotidine (Pepcid) 20 MG tablet, Take 1 tablet by mouth every night at bedtime., Disp: 90 tablet, Rfl: 3     FLUoxetine (PROzac) 10 MG capsule, Take 1 capsule by mouth Daily., Disp: 90 capsule, Rfl: 3    fluticasone (Flonase) 50 MCG/ACT nasal spray, Administer 2 sprays into the nostril(s) as directed by provider Daily. Administer 2 sprays in each nostril for each dose., Disp: 16 g, Rfl: 6    Glucagon (Gvoke HypoPen 2-Pack) 1 MG/0.2ML solution auto-injector, Inject 1 mg under the skin into the appropriate area as directed 1 (One) Time As Needed (hypoglycemia) for up to 1 dose., Disp: 0.4 mL, Rfl: 1    Inclisiran Sodium (LEQVIO SC), Inject  under the skin into the appropriate area as directed., Disp: , Rfl:     insulin degludec (TRESIBA FLEXTOUCH) 100 UNIT/ML solution pen-injector injection, Inject 10 Units under the skin into the appropriate area as directed Daily., Disp: 10 mL, Rfl: 1    Insulin Pen Needle (BD Pen Needle Micro U/F) 32G X 6 MM misc, Use 1 each Daily., Disp: 100 each, Rfl: 1    Januvia 100 MG tablet, Take 1 tablet by mouth Daily., Disp: 90 tablet, Rfl: 1    losartan (COZAAR) 25 MG tablet, Take 1 tablet by mouth Daily., Disp: 90 tablet, Rfl: 1    metFORMIN (GLUCOPHAGE) 500 MG tablet, TAKE 1 TABLET BY MOUTH EVERY MORNING AND 2 TABLETS BY MOUTH EVERY EVENING WITH MEALS, Disp: 270 tablet, Rfl: 1    montelukast (SINGULAIR) 10 MG tablet, Take 1 tablet by mouth Every Night., Disp: 90 tablet, Rfl: 1    pantoprazole (PROTONIX) 20 MG EC tablet, Take 1 tablet by mouth Daily., Disp: , Rfl:     Pramipexole Dihydrochloride ER (Mirapex ER) 1.5 MG tablet sustained-release 24 hour, Take 1.5 mg by mouth Every Night., Disp: 30 tablet, Rfl: 5    prednisoLONE acetate (PRED FORTE) 1 % ophthalmic suspension, SHAKE LIQUID AND INSTILL 1 DROP IN RIGHT EYE TWICE DAILY, Disp: , Rfl:     Zinc 100 MG tablet, Take 100 mg by mouth Daily., Disp: , Rfl:     FLUoxetine (PROzac) 20 MG capsule, Take 1 capsule by mouth Daily., Disp: 90 capsule, Rfl: 3      Review of Systems   Cardiovascular:  Negative for chest pain, dyspnea on exertion and  "palpitations.        Objective     /70 (BP Location: Left arm, Patient Position: Sitting, Cuff Size: Adult)   Pulse 93   Ht 160 cm (63\")   Wt 82.2 kg (181 lb 3.2 oz)   SpO2 97%   BMI 32.10 kg/m²       General Appearance:   well developed  well nourished  HENT:   oropharynx moist  lips not cyanotic  Neck:  thyroid not enlarged  supple  Respiratory:  no respiratory distress  normal breath sounds  no rales  Cardiovascular:  no jugular venous distention  regular rhythm  apical impulse normal  S1 normal, S2 normal  no S3, no S4   no murmur  no rub, no thrill  carotid pulses normal; no bruit  pedal pulses normal  lower extremity edema: none    Musculoskeletal:  no clubbing of fingers.   normocephalic, head atraumatic  Skin:   warm, dry  Psychiatric:  judgement and insight appropriate  normal mood and affect      Result Review :     The following data was reviewed by: Santhosh Ruiz MD on 07/23/2024:    CMP          1/21/2025    13:12 2/27/2025    12:15 6/25/2025    11:13   CMP   Glucose 169  165  223    BUN 20  17  15.0    Creatinine 1.16  1.18  0.93    EGFR 50.2  49.2  65.4    Sodium 136  138  137    Potassium 4.7  4.2  4.0    Chloride 96  102  98    Calcium 10.2  10.1  9.5    Total Protein 7.9  7.3  7.4    Albumin 4.8  4.4  4.4    Globulin 3.1  2.9  3.0    Total Bilirubin 1.0  0.9  0.4    Alkaline Phosphatase 92  78  70    AST (SGOT) 34  27  24    ALT (SGPT) 44  34  31    Albumin/Globulin Ratio 1.5  1.5  1.5    BUN/Creatinine Ratio 17.2  14.4  16.1    Anion Gap 14.0  11.0  12.7      CBC          10/23/2024    10:12 1/21/2025    13:12 6/25/2025    11:13   CBC   WBC 6.85  8.57  7.93    RBC 4.39  4.85  4.55    Hemoglobin 13.6  14.9  13.4    Hematocrit 40.3  43.9  41.2    MCV 91.8  90.5  90.5    MCH 31.0  30.7  29.5    MCHC 33.7  33.9  32.5    RDW 12.4  12.0  12.5    Platelets 228  278  279      Lipid Panel          10/23/2024    10:12 2/27/2025    12:15 6/25/2025    11:13   Lipid Panel   Total " Cholesterol 181  116  142    Triglycerides 242  186  177    HDL Cholesterol 49  43  54    VLDL Cholesterol 41  30  29    LDL Cholesterol  91  43  59    LDL/HDL Ratio 1.71  0.83  0.97      TSH          10/23/2024    10:12 1/21/2025    13:12 6/25/2025    11:13   TSH   TSH 1.130  3.120  1.540             Procedures                 Assessment and Plan        ASSESSMENT:  Encounter Diagnoses   Name Primary?    Essential hypertension Yes    Mixed hyperlipidemia     Statin intolerance          PLAN:    1.  Mixed hyperlipidemia with statin intolerance-PCSK9 inhibitor was cost prohibitive.  Will refer to specialty pharmacist to try and get her back on the Leqvio  2.  Essential hypertension-controlled, continue current medical therapy.  3.  Family history of early CAD-no symptoms of angina or anginal-like equivalents.  Continue with preventative strategies.    Follow-up annually unless problems arise.      Patient was given instructions and counseling regarding her condition or for health maintenance advice. Please see specific information pulled into the AVS if appropriate.           Santhsoh Ruiz MD   7/21/2025  11:14 EDT

## 2025-07-21 NOTE — PROGRESS NOTES
Specialty Pharmacy Patient Management Program  One-Time Clinical Outreach     Nivia Cagle is a 74 y.o. female seen by a Cardiology provider for Hyperlipidemia and enrolled in the Cardiology Patient Management program offered by [facility].      Enrolled externally; [facility] Leqvio due to insurance coverage, enrolling to follow orders and appointments.     Monique RodasD  Clinical Specialty Pharmacist, Cardiology  7/21/2025  16:14 EDT

## 2025-07-21 NOTE — TELEPHONE ENCOUNTER
Tried to call patient to discuss Leqvio. Told Dr. Ruiz that she had not received any communication about continuing Leqvio injections and he asked me to look into this for the patient. She has an appointment scheduled tomorrow at [facility] and everything is good to go, she just has to show up. Left my direct number for call back.    Genie Tsai, Pharm.D.

## 2025-07-22 ENCOUNTER — HOSPITAL ENCOUNTER (OUTPATIENT)
Dept: INFUSION THERAPY | Facility: HOSPITAL | Age: 75
Discharge: HOME | End: 2025-07-22
Admitting: NURSE PRACTITIONER
Payer: MEDICARE

## 2025-07-22 VITALS
TEMPERATURE: 97.8 F | BODY MASS INDEX: 31.84 KG/M2 | HEART RATE: 102 BPM | DIASTOLIC BLOOD PRESSURE: 80 MMHG | WEIGHT: 179.68 LBS | OXYGEN SATURATION: 98 % | HEIGHT: 63 IN | RESPIRATION RATE: 20 BRPM | SYSTOLIC BLOOD PRESSURE: 139 MMHG

## 2025-07-22 DIAGNOSIS — E78.5 HYPERLIPIDEMIA LDL GOAL <70: Primary | ICD-10-CM

## 2025-07-22 DIAGNOSIS — Z78.9 STATIN INTOLERANCE: ICD-10-CM

## 2025-07-22 PROCEDURE — 96372 THER/PROPH/DIAG INJ SC/IM: CPT

## 2025-07-22 PROCEDURE — 25010000002 INCLISIRAN SODIUM 284 MG/1.5ML SOLUTION PREFILLED SYRINGE: Performed by: NURSE PRACTITIONER

## 2025-07-22 RX ADMIN — INCLISIRAN 284 MG: 284 INJECTION, SOLUTION SUBCUTANEOUS at 14:18

## 2025-07-24 ENCOUNTER — TELEMEDICINE (OUTPATIENT)
Dept: PSYCHIATRY | Facility: CLINIC | Age: 75
End: 2025-07-24
Payer: COMMERCIAL

## 2025-07-24 DIAGNOSIS — F41.1 GENERALIZED ANXIETY DISORDER: Primary | ICD-10-CM

## 2025-07-24 NOTE — PROGRESS NOTES
Date: July 24, 2025  Time In: 1200  Time Out: 1233  Progress Note     Data:  This provider is located at home address for [facility] ([facility] [address] [place] [address] using a secure [employer] Video Visit through [employer]. The patient's condition being diagnosed/treated is appropriate for telemedicine. The provider identified herself as well as her credentials. The patient, and/or patients guardian, consent to be seen remotely, and when consent is given they understand that the consent allows for patient identifiable information to be sent to a third party as needed. They may refuse to be seen remotely at any time. The electronic data is encrypted and password protected, and the patient and/or guardian has been advised of the potential risks to privacy not withstanding such measures.     You have chosen to receive care through a telehealth visit.  Do you consent to use a video/audio connection for your medical care today? Yes    Reviewed by provider on 07/24/2025     Mode of Visit: Video  Location of patient: -HOME-  Location of provider: +HOME+  You have chosen to receive care through a telehealth visit.  The patient has signed the video visit consent form.  The visit included audio and video interaction. No technical issues occurred during this visit.    Chief Complaint : gildardo Cagle is a 74 y.o. female who presents today for follow up      Pt discussed recent court hearing procedures and upcoming court hearing in a few months. Pt reports that the main source of anxiety has been how she has been feeling. Pt discussed feeling as if motion is occurring around her while she is staying still, depth perception to be inaccurate, and has been noticing some forgetfulness. Pt also discussed that she has been focused on leisure activities rather than household tasks.     Therapist assisted Patient in processing session content;  acknowledged and normalized patient’s thoughts, feelings, and concerns.  Rationalized Patient thought process regarding recent stressors and life events. Discussed leisure activities and making a time slot for activities in efforts to stay on task within the home.  Discussed triggers associated with patient's emotions. Also discussed coping skills for patient to implement. Therapist reviewed treatment goals, progress regarding identified goals and readiness for change through motivational interviewing approach. Treatment progress towards goals remains on going at this time. Therapist assisted with processing emotions and thoughts correlated to identified stressors. Discussed limitations associated with identified stressors. Therapist offered alternative perspectives, support, and validation as needed. Therapist allowed Patient to freely discuss issues without interruption or judgment. Assisted with application of appropriate intervention such as: cognitive behavioral therapy techniques, acceptance and commitment therapy methods, solution-focused brief therapy practices, psychoeducation, mindfulness approaches, emotional regulation strategies, attachment based assessments, and interpersonal interventions. Provided safe, confidential environment to facilitate the development of positive therapeutic relationship and encourage open, honest communication. Assisted patient in identifying risk factors which would indicate the need for higher level of care including thoughts to harm self or others and/or self-harming behavior and encouraged patient to contact this office, call 911, or present to the nearest emergency room should any of these events occur. Discussed crisis intervention services and means to access. Patient adamantly and convincingly denies current suicidal or homicidal ideation or perceptual disturbance.    Assessment:   Assessment   Patient appears to maintain relative stability as compared to their  baseline.  However, patient continues to struggle with anxiety which continues to cause impairment in important areas of functioning.  A result, they can be reasonably expected to continue to benefit from treatment and would likely be at increased risk for decompensation otherwise.    Mental Status Exam:   Hygiene:   good; normal for Pt   Cooperation:  Cooperative  Eye Contact:  Good  Psychomotor Behavior:  Appropriate  Affect:  Appropriate  Mood: anxious  Speech:  Normal  Thought Process:  Linear  Thought Content:  Mood congruent  Suicidal:  None today  Homicidal:  None  Hallucinations:  None  Delusion:  None  Memory:  Intact  Orientation:  Person, Place, Time and Situation  Reliability:  fair  Insight:  Fair  Judgement:  Fair  Impulse Control:  Fair  Physical/Medical Issues:  No        Patient's Support Network Includes:  son    Functional Status: Mild impairment     Progress toward goal: Not at goal    Prognosis: Fair with Ongoing Treatment            Plan:    Patient will continue in individual outpatient therapy with focus on improved functioning and coping skills, maintaining stability, and avoiding decompensation and the need for higher level of care.    Patient will adhere to medication regimen as prescribed and report any side effects. Patient will contact this office, call 911 or present to the nearest emergency room should suicidal or homicidal ideations occur.     Return in about 6 weeks, or earlier if symptoms worsen or fail to improve.           VISIT DIAGNOSIS:     ICD-10-CM ICD-9-CM   1. Generalized anxiety disorder  F41.1 300.02        Diagnoses and all orders for this visit:    1. Generalized anxiety disorder (Primary)           [facility] No Show Policy:  We understand unexpected circumstances arise; however, anytime you miss your appointment we are unable to provide you appropriate care.  In addition, each appointment missed could have been used to provide care for others.   We ask that you call at least 24 hours in advance to cancel or reschedule an appointment.  We would like to take this opportunity to remind you of our policy stating patients who miss THREE or more appointments without cancelling or rescheduling 24 hours in advance of the appointment may be subject to cancellation of any further visits with our clinic and recommendation to seek in-person services/visits.    Please call [phone] to reschedule your appointment. If there are reasons that make it difficult for you to keep the appointments, please call and let us know how we can help.Please understand that medication prescribing will not continue without seeing your provider.      Patient to email any documentation or needed paperwork to support staff at:   [email or web address]       This document has been electronically signed by Annita Knowles LCSW.  July 24, 2025 12:55 EDT      Part of this note may be an electronic transcription/translation of spoken language to printed text using the Dragon Dictation System.

## 2025-07-25 VITALS — HEIGHT: 63 IN | BODY MASS INDEX: 32.6 KG/M2 | WEIGHT: 184 LBS

## 2025-07-25 NOTE — PROGRESS NOTES
"  Nivia Cagle is a 74 y.o. female who presents to [facility] for nutrition consult r/t diagnosis of elevated liver enzymes.  Pt also has T2DM.  Nivia Cagle is referred by JEANNIE Rivas.    Past Medical History:   Diagnosis Date    ADD (attention deficit disorder)     Allergic rhinitis     Anemia     Anxiety     Saenz esophagus     Bursitis     bilateral hips    Callus     Cataracts, bilateral     Chronic pain disorder     Colon polyp     Depression 0421/2021    MOODNOT WELL CONTROLLED.  GIVEN NUMBER FOR LOCAL COUNSELOR.  WILL INCREASE TRINTELIX FROM 10MG TO 20MG RTC WEEK ER ID S/HI    Diabetes mellitus, type 2     Difficulty walking     Diverticulitis     Diverticulitis of colon     GERD (gastroesophageal reflux disease)     Head injury     High blood pressure     Hyperlipemia     Infectious viral hepatitis 1960    Type A    Insomnia     Migraine     Neuropathy in diabetes 2020    EARL (obstructive sleep apnea) 04/21/2021    Panic disorder     Peripheral neuropathy     Phlebitis     Plantar fasciitis 2019    PTSD (post-traumatic stress disorder)     RLS (restless legs syndrome)        Anthropometrics    160 cm (63\")  83.5 kg (184 lb)  32.59 kg/m²    Diabetes History    Diabetes History  What type of diabetes do you have?: Type 2  Current DM knowledge: good  Have you had diabetes education/teaching in the past?: no  Do you test your blood sugar at home?: yes  Meter type: CGM  Typical readings: average 250s    Education Preferences    Education Preferences  What areas of diabetes would you like to learn about?: diet information    Nutrition Information    Nutrition Information  Enter everything you can remember eating in the last 24 hours (1 day): breakfast- boiled egg, [place] bread; lunch- cheese sandwich +/- salami or ham; dinner- chicken salad sandwich, vegetables; snacks- prunes, candy, ice cream, cake; beverages- coffee, water  What is the biggest challenge you " have with your diet?: Knowledge    Education Needs    DM Education Needs  Meter: Has own  Medication: Oral, Insulin  Healthy Eating: RD consult, Reviewed meal plan, Basic meal plan provided  Motivation: Engaged  Teaching Method: Explanation, Discussion, Handouts  Patient Response: Verbalized understanding    DM Goals    DM Goals  Healthy Eating - Goal: Today  Being Active - Goal: Today  Taking Medication - Goal: Today  Monitoring - Goal: Today      Medications    Current Outpatient Medications   Medication    Accu-Chek Madeline Plus    Accu-Chek Softclix Lancets    ARIPiprazole    Ascorbic Acid (VITAMIN C GUMMIE PO)    aspirin    FreeStyle Lite    buPROPion XL    cetirizine    Cholecalciferol    FreeStyle Abdulkadir 14 Day [place]    FreeStyle Abdulkadir 14 Day Sensor    FreeStyle Abdulkadir 3 Plus Sensor    cyclobenzaprine    Daily-Jelani Multivitamin    esomeprazole    ezetimibe    famotidine    FLUoxetine    FLUoxetine    fluticasone    Gvoke HypoPen 2-Pack    Inclisiran Sodium (LEQVIO SC)    insulin degludec    BD Pen Needle Micro U/F    Januvia    losartan    metFORMIN    montelukast    pantoprazole    Pramipexole Dihydrochloride ER    prednisoLONE acetate    Zinc     Labs       Lab Results   Component Value Date    CHOL 142 06/25/2025    TRIG 177 (H) 06/25/2025    HDL 54 06/25/2025    LDL 59 06/25/2025       Nutrition counseling provided on mediterranean nutrition plan, which is appropriate to manage liver issues as well as diabetes.  Discussed carbohydrate counting, portion control, measuring and reading labels.  Discussed eating out and gave suggestions on controlling carbohydrate intake and making healthier food choices.   Discussed appropriate food choices and foods to limit/avoid.  Discussed meal planning.    Meal Plan:     Total Carbohydrates per meal: 2-3 carb servings/meal, 3 meals/day  Lean protein with meals.  Limit added fats.  Snacks: 1 carbohydrate serving (</= 22 g) + 1 protein serving.     Daily exercise encouraged  (as recommended by healthcare provider). Discussed the benefits of exercise in lowering blood glucose, blood pressure, cholesterol, stress and controlling body weight.     Advised to continue daily blood glucose monitoring to assist with understanding of factors affecting blood glucose and assist with management of diabetes.  Discussed and provided with target BG ranges.     Literature provided: Diabetes Nutrition Placemat, Choose Your Foods Booklet    Dietitian contact number provided.  Patient encouraged to call with questions or concerns.     Time spent with patient: 60 minutes    Santa Antony RDN, LD  06/16/2025

## 2025-07-28 ENCOUNTER — TELEPHONE (OUTPATIENT)
Dept: CASE MANAGEMENT | Facility: OTHER | Age: 75
End: 2025-07-28
Payer: MEDICARE

## 2025-07-28 ENCOUNTER — PATIENT OUTREACH (OUTPATIENT)
Dept: CASE MANAGEMENT | Facility: OTHER | Age: 75
End: 2025-07-28
Payer: MEDICARE

## 2025-07-28 DIAGNOSIS — E11.65 TYPE 2 DIABETES MELLITUS WITH HYPERGLYCEMIA, WITHOUT LONG-TERM CURRENT USE OF INSULIN: Primary | ICD-10-CM

## 2025-07-28 NOTE — TELEPHONE ENCOUNTER
I spoke with the patient regarding her fasting blood glucose and her insulin dosing. Patient stated that she is recently consistently having fasting blood glucose in the 250s and she is currently taking Tresiba 10 units nights.     I received a verbal order from KINSEY Madsen PA-C to increase the patient's Tresiba to 15 units nightly then reassess fasting blood glucose in a few days. I called patient and discussed these orders with her. Patient verbalized understanding.

## 2025-07-28 NOTE — PROGRESS NOTES
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 Final    Sodium 02/27/2025 138  136 - 145 mmol/L Final    Potassium 02/27/2025 4.2  3.5 - 5.2 mmol/L Final    Chloride 02/27/2025 102  98 - 107 mmol/L Final    CO2 02/27/2025 25.0  22.0 - 29.0 mmol/L Final    Calcium 02/27/2025 10.1  8.6 - 10.5 mg/dL Final    Total Protein 02/27/2025 7.3  6.0 - 8.5 g/dL Final    Albumin 02/27/2025 4.4  3.5 - 5.2 g/dL Final    ALT (SGPT) 02/27/2025 34 (H)  1 - 33 U/L Final    AST (SGOT) 02/27/2025 27  1 - 32 U/L Final    Alkaline Phosphatase 02/27/2025 78  39 - 117 U/L Final    Total Bilirubin 02/27/2025 0.9  0.0 - 1.2 mg/dL Final    Globulin 02/27/2025 2.9  gm/dL Final    A/G Ratio 02/27/2025 1.5  g/dL Final    BUN/Creatinine Ratio 02/27/2025 14.4  7.0 - 25.0 Final    Anion Gap 02/27/2025 11.0  5.0 - 15.0 mmol/L Final    eGFR 02/27/2025 49.2 (L)  >60.0 mL/min/1.73 Final    Total Cholesterol 02/27/2025 116  0 - 200 mg/dL Final    Triglycerides 02/27/2025 186 (H)  0 - 150 mg/dL Final    HDL Cholesterol 02/27/2025 43  40 - 60 mg/dL Final    LDL Cholesterol  02/27/2025 43  0 - 100 mg/dL Final    VLDL Cholesterol 02/27/2025 30  5 - 40 mg/dL Final    LDL/HDL Ratio 02/27/2025 0.83   Final     Microalbumin/Creatinine Ratio 02/27/2025 10.6  0.0 - 29.0 mg/g Final    Creatinine, Urine 02/27/2025 160.1  mg/dL Final    Microalbumin, Urine 02/27/2025 1.7  mg/dL Final       EKG Results:  No orders to display       Imaging Results:  No Images in the past 120 days found..      Assessment & Plan   Diagnoses and all orders for this visit:    1. Generalized anxiety disorder (Primary)    2. Recurrent major depressive disorder in remission    3. Post traumatic stress disorder (PTSD)    4. Insomnia due to mental condition      INITIAL presentation 2021 most consistent with major depressive disorder, recurrent, moderate to severe, with anxious distress.  PTSD.  Rule out personality disorder, cluster B specifically.  Rule out hypomania as patient was very difficult to interrupt today.    07/29/2025: Improving, less jaw clenching, dc abilify, close follow up.    Acknowledged and normalized patient's thoughts, feelings, and concerns. Allowed patient to freely discuss and process issues, such as:  Anxiety and depression regarding family conflict/relationships.  Anxiety and depression regarding medical conditions.  ... using Rogerian psychotherapeutic techniques including unconditional positive regard, reflective listening, and demonstrating clear empathy, with the goal of ameliorating symptoms and maintaining, restoring, or improving self-esteem, adaptive skills, and ego or psychological functions (Ara et al. 1991), the long-term goal of which is to develop a better, healthier perspective and help the patient bear their circumstances more easily.  Time (minutes) spent providing supportive psychotherapy: 18  (This time is exclusive to the therapy session and separate from the time spent on activities used to meet the criteria for the E/M service (history, exam, medical decision-making).)  Start: 09:43  Stop: 10:01  Functional status: mild impairment  Treatment plan: Medication management and supportive  "psychotherapy  Prognosis: good  Progress: improving  4w    06/19/2025: Depression, possibly beginning TD, noncompliance with meds (forgetting). Reduce abilify. Pt advised to trial an alarm bid to take meds; set up during visit.    04/22/2025: Stable, well, no med changes. Appointment began earlier, over the phone, while helping pt log on. MH \"I've been fine.\"    03/21/2025: Stable, well, no med changes.     02/20/2025: Stable, well, no med changes.     12/11/2024: Stable, well, no med changes.     09/26/2024: Stable, well, no med changes.     08/28/2024: Mild MDD, but maintenance insomnia. Increase abilify. [place] with [employer], prayer.    07/31/2024: Not depressed, persistent maintenance insomnia. Restarted her meds 10 days ago, the above improving. No changes, as her s/sx are related to stopping her meds for a time. Also has restarted light box.    07/02/2024: Improving mood, but persistent insomnia. Increase abilify. Constipation issues, but had 2 BM recently. Consider gabapentin as neuropathic pain impeding sleep.    06/04/2024: Insomnia, but also more activity, and possibly some depressed mood. Restart abilify; stopped previously thinking it wasn't covered by insurance, but this may have just been an early request that was denied. Watch swallowing issues. Consider lamictal, vraylar, rexulti, low dose seroquel.    4/30/24: Insomnia, and some procrastination. Increase doxepin. Procrastinating on painting a certain painting; processed why. Monitor.    4/3: Stable, but monitor if worsening depression creeps in.    3/4/24: Pt ist stable, but lost her 20 mg dose of prozac. Re-sent in. Insurance won't pay for abilify. DC abilify. Some unusual issues with swallowing. Processed dreams about her mother. Mom isn't happy, \"it's not good enough.\"    2/6: Stable, discussed dietary changes involving decreasing sugar. Sugar is comforting and helps her depression. Also discussed stevia. Now using light box and sleeping a little " better.    1/5: Seasonal depression, start light box up again. May have to make further med changes.    12/14: Add melatonin for maintenance insomnia. Otherwise stable. Seeing a movie for [place]. Got all her Xmas cards mailed out.    11/9: Doing well, isolated insomnia. Stop trazodone and start doxepin. Consider lunesta, belsomra, quiviviq. She stopped melatonin on her own.    8/11: Stable, well, no changes. Painting, having people over. Counseled to start light box in September, reiterated instructions. Will be inducted into the [employer] [facility] and [facility] tomorrow. She's been working on it for a year.    7/11: Short visit as patient sleepy. Unusual sleep pattern recently, but MH is fine. Pt will call her son tonight to ensure someone is around when she goes back to sleep. She will call PCP about this tomorrow. Close follow up with me in 4 wks.    4/27: Stable, well. Restart melatonin for insomnia.     3/2: Prozac and BLT helped. Situational stressor in son, no changes. Better. Watch insomnia.     1/20: Start light box, increase prozac for dep.     12/9: Some insomnia. Increase melatonin.     10/25: Doing well. Increase prozac back to baseline dose (inadvertently reduced, she has been stable on a higher dose, so we should go back to that). Some initial insomnia, increase trazodone to 75 mg nightly. She is enjoying the Fall.     9/8: Start light box. Close follow up in case this is worsening depression.     7/27: Well, stable.     6/14: Better, no changes.     5/3: Increase prozac and melatonin.    Visit Diagnoses:    ICD-10-CM ICD-9-CM   1. Generalized anxiety disorder  F41.1 300.02   2. Recurrent major depressive disorder in remission  F33.40 296.35   3. Post traumatic stress disorder (PTSD)  F43.10 309.81   4. Insomnia due to mental condition  F51.05 300.9     327.02         PLAN:  Risk Assessment: Risk of self-harm acutely is moderate. Risk factors include chronic depressive disorder,  possible personality disorder, recent psychosocial stressors (pandemic, moving). Protective factors include no present SI, no history of suicide attempts or self-harm in the past, no access to weapons, minimal AODA, healthcare seeking, future orientation, willingness to engage in care. Risk of self-harm chronically is also moderate, but could be further elevated in the event of treatment noncompliance and/or AODA.  Safety: No acute safety concerns.  Medications:   CONTINUE light box 9/1 - 3/31.  CONTINUE melatonin 30 mg p.o. nightly.  Risks, benefits, side effects discussed with patient including sedation, dizziness/falls risk, GI upset.  Do not use before operating vehicle, vessel, or machine. After discussion of these risks and benefits, the patient voiced understanding and agreed to proceed.   CONTINUE doxepin 25 mg qhs. Risks, benefits, side effects discussed with patient including GI upset, sedation, dizziness/falls risk, grogginess the following day, prolongation of the QTc interval.  After discussion of these risks and benefits, the patient voiced understanding and agreed to proceed.    CONTINUE bupropion xl 300 mg daily. Risks, benefits, alternatives discussed with patient including nausea, GI upset, increased energy, exacerbation of irritability, insomnia, lowering of seizure threshold.  After discussion of these risks and benefits, the patient voiced understanding and agreed to proceed.  CONTINUE Prozac 30 mg a day (HIGHEST dose is 40 given that she is also on bupropion). Risks, benefits, alternatives discussed with patient including GI upset, nausea vomiting diarrhea, theoretical decrease of seizure threshold predisposing the patient to a slightly higher seizure risk, headaches, sexual dysfunction, serotonin syndrome, bleeding risk, increased suicidality in patients 24 years and younger.  After discussion of these risks and benefits, the patient voiced understanding and agreed to proceed.  STOP Abilify 2  mg p.o. nightly. Previously stopped 2/2 cost, 3/24, but this may have been rejected by insurance simply because pt requested too soon (she states). (7/25, jaw clenching) Risks, benefits, alternatives discussed with patient including increased energy, exacerbation of irritability, akathisia, movement issues, GI upset, orthostatic hypotension, increased appetite, tardive dyskinesia.  Use care when operating vehicle, vessel, or machine. After discussion of these risks and benefits, the patient voiced understanding and agreed to proceed.  ALSO on pramipexole 1.5 mg qhs for RLS.  S/P:  trazodone 100 mg PO QHS. No longer effective 11/23.  Mirtazapine 45 mg daily (RLS)  Ambien caused double vision, and possibly hallucinations  Was on lithium in the past: dizziness and falls, and hair curled.  Therapy: referred to [facility].  Labs/Studies: s/p TMS referral.  Follow Up: 6 weeks. (prefers 4 wks)      TREATMENT PLAN/GOALS: Continue supportive psychotherapy efforts and medications as indicated. Treatment and medication options discussed during today's visit. Patient acknowledged and verbally consented to continue with current treatment plan and was educated on the importance of compliance with treatment and follow-up appointments.    MEDICATION ISSUES:  SAPNA reviewed as expected.  Discussed medication options and treatment plan of prescribed medication as well as the risks, benefits, and side effects including potential falls, possible impaired driving and metabolic adversities among others. Patient is agreeable to call the office with any worsening of symptoms or onset of side effects. Patient is agreeable to call 911 or go to the nearest ER should he/she begin having SI/HI. No medication side effects or related complaints today.     MEDS ORDERED DURING VISIT:  No orders of the defined types were placed in this encounter.      Return in about 4 weeks (around 8/26/2025).         This document has been electronically  signed by Vicky Barth MD  July 29, 2025 10:01 EDT      Part of this note may be an electronic transcription/translation of spoken language to printed text using the Dragon Dictation System.

## 2025-07-28 NOTE — PLAN OF CARE
Problem: Diabetes Type 2  Goal: Protect Myself From Diabetes Complications  Outcome: Progressing  Intervention: My Protect Myself From Diabetes Complications To Do List  Description: Why is this important?Having diabetes puts you at risk for complications, such as kidney disease, heart disease, nerve damage and eye or dental problems.You can manage your risk by making healthy lifestyle choices and getting regular checkups.  Flowsheets (Taken 7/28/2025 0928)  My Protect Myself From Diabetes Complications To Do List:   get a foot exam at least once per year   get an eye exam every year   manage my weight  Goal: Monitor My Blood Sugar  Outcome: Progressing  Intervention: My Blood Sugar Management To Do List  Description: Why is this important?Checking your blood sugar (glucose) at home helps to keep it from getting very high or very low.Writing the results in a diary or log, or using an bud, helps your diabetes care provider know how to care for you.Your blood sugar (glucose) log should have the time, date and results.Also write down the amount of insulin or other medicine that you take.  Flowsheets (Taken 7/28/2025 0928)  My Blood Sugar Management To Do List:   check blood sugar (glucose) at prescribed times   take the blood sugar (glucose) meter to all provider visits  Goal: Learn About Diabetes  Outcome: Progressing  Intervention: My Learn About Diabetes To Do List  Description: Why is this important?Learning about diabetes can help you to manage it.  Flowsheets (Taken 7/28/2025 0928)  My Learn About Diabetes To Do List:   ask questions   tell someone when I don't understand  Goal: Perform Foot Care  Outcome: Progressing  Intervention: My Foot Care To Do List  Description: Why is this important?Good foot care is very important when you have diabetes.There are many things you can do to keep your feet healthy and catch a problem early.  Flowsheets (Taken 7/28/2025 0928)  My Foot Care To Do List:   wear comfortable,  well-fitting shoes   wear comfortable, cotton socks   wash and dry feet carefully every day   check feet daily for cuts, sores or redness  Goal: Manage My Stress  Outcome: Progressing  Intervention: My Stress Management To Do List  Description: Why is this important?When you are stressed down or upset your body reacts too.  Flowsheets (Taken 7/28/2025 0928)  My Stress Management To Do List: talk about feelings with a friend, family, counselor or   Goal: Find Ways to Be Active  Outcome: Not Progressing  Goal: Optimal Care Coordination of a Patient Experiencing Diabetes, Type 2  Outcome: Progressing  Intervention: Alleviate Barriers to Glycemic Management  Flowsheets (Taken 7/28/2025 0928)  Alleviate Barriers to Glycemic Management:   barriers to adherence to treatment plan identified   blood glucose readings reviewed   resources required to improve adherence to care identified   self-awareness of signs/symptoms of hypo or hyperglycemia encouraged   use of blood glucose monitoring log promoted  Intervention: Monitor and Manage Follow-Up for Comorbidities  Flowsheets (Taken 7/28/2025 0928)  Monitor and Manage Follow-Up for Comorbidities:   barriers to lifestyle changes identified   completion of annual dilated eye exam encouraged   completion of annual foot exam encouraged   healthy lifestyle promoted  Intervention: Optimize Functional Ability  Flowsheets (Taken 7/28/2025 0928)  Optimize Functional Ability: maximum independence promoted  Intervention: Support Wellbeing and Self-Management Success  Flowsheets (Taken 7/28/2025 0928)  Support Wellbeing and Self-Management Success:   autonomy in care promoted   barriers to treatment adherence identified   decision-making supported   healthy lifestyle promoted   patient response to treatment assessed   problem-solving facilitated   support and encouragement provided

## 2025-07-28 NOTE — OUTREACH NOTE
AMBULATORY CASE MANAGEMENT NOTE    Names and Relationships of Patient/Support Persons: Contact: Nivia Cagle; Relationship: Self  Contact: Nivia Cagle; Relationship: Self -     [facility] Interim Update    The patient called me this morning concerned about her fasting blood glucose of 250. She did state that she had an ice cream cone at 9pm last night. After reviewing her recent fasting blood glucose and her insulin dosing, it appears that her fasting blood glucose have been in the 250s many days in the last month. It was also determined that the patient is only taking 10 units nightly of Tresiba when she should have been taking 20 units. When I asked her why she decreased her insulin she stated that she forgot she was supposed to be taking 20 units.    Care Coordination    I spoke with the Primary Care Provider face to face regarding the above. She gave me verbal orders for patient to increase her Tresiba to 15 units nightly then reevaluate her fasting blood glucose.     I have an outreach scheduled to call patient Thursday morning.    CCM Interim Update    I called patient back and instructed her that the Primary Care Provider wants her to increase the Tresiba to 15 units nightly, continue checking her fasting blood glucose and I will contact her in 3 days to see how her fasting blood glucose are. I also instructed her to call me if she has any lows with the increase in insulin. She verbalized understanding.    Education Documentation  preventing complications, taught by Francisca Zhang, RN at 7/28/2025  9:28 AM.  Learner: Patient  Readiness: Acceptance  Method: Explanation  Response: Verbalizes Understanding, Needs Reinforcement    complications, taught by Francisca Zhang RN at 7/28/2025  9:28 AM.  Learner: Patient  Readiness: Acceptance  Method: Explanation  Response: Verbalizes Understanding, Needs Reinforcement    hypoglycemia identification and treatment, taught by Francisca Zhang, MEMO at 7/28/2025   9:28 AM.  Learner: Patient  Readiness: Acceptance  Method: Explanation  Response: Verbalizes Understanding, Needs Reinforcement    hyperglycemia identification and treatment, taught by Francisca Zhang, RN at 7/28/2025  9:28 AM.  Learner: Patient  Readiness: Acceptance  Method: Explanation  Response: Verbalizes Understanding, Needs Reinforcement    blood glucose monitoring, taught by Francisca Zhang, RN at 7/28/2025  9:28 AM.  Learner: Patient  Readiness: Acceptance  Method: Explanation  Response: Verbalizes Understanding, Needs Reinforcement    medication management, taught by Francisca Zhang, RN at 7/28/2025  9:28 AM.  Learner: Patient  Readiness: Acceptance  Method: Explanation  Response: Verbalizes Understanding, Needs Reinforcement    healthy eating, taught by Francisca Zhang, RN at 7/28/2025  9:28 AM.  Learner: Patient  Readiness: Acceptance  Method: Explanation  Response: Verbalizes Understanding, Needs Reinforcement          Francisca SALAZAR  Ambulatory Case Management    7/28/2025, 09:31 EDT

## 2025-07-29 ENCOUNTER — TELEMEDICINE (OUTPATIENT)
Dept: PSYCHIATRY | Facility: CLINIC | Age: 75
End: 2025-07-29
Payer: MEDICARE

## 2025-07-29 DIAGNOSIS — F41.1 GENERALIZED ANXIETY DISORDER: Primary | ICD-10-CM

## 2025-07-29 DIAGNOSIS — F43.10 POST TRAUMATIC STRESS DISORDER (PTSD): ICD-10-CM

## 2025-07-29 DIAGNOSIS — F33.40 RECURRENT MAJOR DEPRESSIVE DISORDER IN REMISSION: ICD-10-CM

## 2025-07-29 DIAGNOSIS — F51.05 INSOMNIA DUE TO MENTAL CONDITION: ICD-10-CM

## 2025-07-29 PROCEDURE — G2211 COMPLEX E/M VISIT ADD ON: HCPCS | Performed by: STUDENT IN AN ORGANIZED HEALTH CARE EDUCATION/TRAINING PROGRAM

## 2025-07-29 PROCEDURE — 99214 OFFICE O/P EST MOD 30 MIN: CPT | Performed by: STUDENT IN AN ORGANIZED HEALTH CARE EDUCATION/TRAINING PROGRAM

## 2025-07-29 PROCEDURE — 1159F MED LIST DOCD IN RCRD: CPT | Performed by: STUDENT IN AN ORGANIZED HEALTH CARE EDUCATION/TRAINING PROGRAM

## 2025-07-29 PROCEDURE — 90833 PSYTX W PT W E/M 30 MIN: CPT | Performed by: STUDENT IN AN ORGANIZED HEALTH CARE EDUCATION/TRAINING PROGRAM

## 2025-07-29 PROCEDURE — 1160F RVW MEDS BY RX/DR IN RCRD: CPT | Performed by: STUDENT IN AN ORGANIZED HEALTH CARE EDUCATION/TRAINING PROGRAM

## 2025-07-29 NOTE — TREATMENT PLAN
Anxiety:  2/10 progressing    Goals:  Patient will develop and implement behavioral and cognitive strategies to reduce anxiety and irrational fears, monthly, using Rogerian psychotherapy and CBT where appropriate.  Help patient explore past emotional issues in relation to present anxiety, monthly, until remission of symptoms, using Rogerian psychotherapy and CBT where appropriate.  Help patient develop an awareness of their cognitive and physical responses to anxiety, monthly, until remission of symptoms, using Rogerian psychotherapy and CBT where appropriate.          Depression:  1/10 progressing    Goals:  Patient will demonstrate the ability to initiate new constructive life skills outside of sessions on a consistent basis, monthly, using Rogerian psychotherapy and CBT where appropriate.  Assist patient in setting attainable activities of daily living goals, monthly, using Rogerian psychotherapy and CBT where appropriate.  Provide education about depression, monthly, using Rogerian psychotherapy and CBT where appropriate.  Assist patient in developing healthy coping strategies, monthly, using Rogerian psychotherapy and CBT where appropriate.    Rogerian psychotherapy and CBT will be used to help accomplish the above goals and manage depression and anxiety related to family conflict, relationships, medical conditions, seasonal changes       I have discussed and reviewed this treatment plan with the patient.      Reviewed by Vicky Barth MD   07/29/2025

## 2025-08-26 ENCOUNTER — TELEMEDICINE (OUTPATIENT)
Dept: PSYCHIATRY | Facility: CLINIC | Age: 75
End: 2025-08-26
Payer: MEDICARE

## 2025-08-26 DIAGNOSIS — F43.10 POST TRAUMATIC STRESS DISORDER (PTSD): ICD-10-CM

## 2025-08-26 DIAGNOSIS — F41.1 GENERALIZED ANXIETY DISORDER: Primary | ICD-10-CM

## 2025-08-26 DIAGNOSIS — F51.05 INSOMNIA DUE TO MENTAL CONDITION: ICD-10-CM

## 2025-08-26 DIAGNOSIS — F33.40 RECURRENT MAJOR DEPRESSIVE DISORDER IN REMISSION: ICD-10-CM

## 2025-08-26 RX ORDER — BUPROPION HYDROCHLORIDE 300 MG/1
300 TABLET ORAL EVERY MORNING
Qty: 90 TABLET | Refills: 3 | Status: SHIPPED | OUTPATIENT
Start: 2025-08-26

## 2025-08-28 ENCOUNTER — OFFICE VISIT (OUTPATIENT)
Dept: PODIATRY | Facility: CLINIC | Age: 75
End: 2025-08-28
Payer: MEDICARE

## 2025-08-28 VITALS
BODY MASS INDEX: 31.89 KG/M2 | SYSTOLIC BLOOD PRESSURE: 137 MMHG | OXYGEN SATURATION: 96 % | DIASTOLIC BLOOD PRESSURE: 79 MMHG | TEMPERATURE: 98 F | HEART RATE: 85 BPM | WEIGHT: 180 LBS

## 2025-08-28 DIAGNOSIS — E11.8 DM FEET: ICD-10-CM

## 2025-08-28 DIAGNOSIS — G62.9 NEUROPATHY: ICD-10-CM

## 2025-08-28 DIAGNOSIS — M79.672 FOOT PAIN, BILATERAL: ICD-10-CM

## 2025-08-28 DIAGNOSIS — B35.1 ONYCHOMYCOSIS: Primary | ICD-10-CM

## 2025-08-28 DIAGNOSIS — R26.2 DIFFICULTY WALKING: ICD-10-CM

## 2025-08-28 DIAGNOSIS — M79.671 FOOT PAIN, BILATERAL: ICD-10-CM

## 2025-08-28 DIAGNOSIS — Z79.4 TYPE 2 DIABETES MELLITUS WITH DIABETIC POLYNEUROPATHY, WITH LONG-TERM CURRENT USE OF INSULIN: ICD-10-CM

## 2025-08-28 DIAGNOSIS — E11.42 TYPE 2 DIABETES MELLITUS WITH DIABETIC POLYNEUROPATHY, WITH LONG-TERM CURRENT USE OF INSULIN: ICD-10-CM

## 2025-08-28 DIAGNOSIS — L60.0 ONYCHOCRYPTOSIS: ICD-10-CM

## 2025-08-29 ENCOUNTER — APPOINTMENT (OUTPATIENT)
Dept: CT IMAGING | Facility: HOSPITAL | Age: 75
End: 2025-08-29
Payer: MEDICARE

## 2025-08-29 ENCOUNTER — HOSPITAL ENCOUNTER (EMERGENCY)
Facility: HOSPITAL | Age: 75
Discharge: HOME | End: 2025-08-29
Attending: EMERGENCY MEDICINE
Payer: MEDICARE

## 2025-08-29 VITALS
OXYGEN SATURATION: 96 % | SYSTOLIC BLOOD PRESSURE: 126 MMHG | HEART RATE: 97 BPM | HEIGHT: 63 IN | TEMPERATURE: 98.7 F | WEIGHT: 174.38 LBS | RESPIRATION RATE: 19 BRPM | BODY MASS INDEX: 30.9 KG/M2 | DIASTOLIC BLOOD PRESSURE: 66 MMHG

## 2025-08-29 DIAGNOSIS — K57.92 DIVERTICULITIS: Primary | ICD-10-CM

## 2025-08-29 LAB
ALBUMIN SERPL-MCNC: 4.3 G/DL (ref 3.5–5.2)
ALBUMIN/GLOB SERPL: 1.3 G/DL
ALP SERPL-CCNC: 84 U/L (ref 39–117)
ALT SERPL W P-5'-P-CCNC: 21 U/L (ref 1–33)
ANION GAP SERPL CALCULATED.3IONS-SCNC: 14.7 MMOL/L (ref 5–15)
AST SERPL-CCNC: 20 U/L (ref 1–32)
BACTERIA UR QL AUTO: ABNORMAL /HPF
BASOPHILS # BLD AUTO: 0.05 10*3/MM3 (ref 0–0.2)
BASOPHILS NFR BLD AUTO: 0.4 % (ref 0–1.5)
BILIRUB SERPL-MCNC: 0.5 MG/DL (ref 0–1.2)
BILIRUB UR QL STRIP: NEGATIVE
BUN SERPL-MCNC: 11.7 MG/DL (ref 8–23)
BUN/CREAT SERPL: 12.7 (ref 7–25)
CALCIUM SPEC-SCNC: 9 MG/DL (ref 8.6–10.5)
CHLORIDE SERPL-SCNC: 103 MMOL/L (ref 98–107)
CLARITY UR: CLEAR
CO2 SERPL-SCNC: 22.3 MMOL/L (ref 22–29)
COLOR UR: YELLOW
CREAT SERPL-MCNC: 0.92 MG/DL (ref 0.57–1)
D-LACTATE SERPL-SCNC: 1.7 MMOL/L (ref 0.5–2)
DEPRECATED RDW RBC AUTO: 42.5 FL (ref 37–54)
EGFRCR SERPLBLD CKD-EPI 2021: 66.3 ML/MIN/1.73
EOSINOPHIL # BLD AUTO: 0.27 10*3/MM3 (ref 0–0.4)
EOSINOPHIL NFR BLD AUTO: 2 % (ref 0.3–6.2)
ERYTHROCYTE [DISTWIDTH] IN BLOOD BY AUTOMATED COUNT: 13.2 % (ref 12.3–15.4)
GLOBULIN UR ELPH-MCNC: 3.3 GM/DL
GLUCOSE SERPL-MCNC: 242 MG/DL (ref 65–99)
GLUCOSE UR STRIP-MCNC: NEGATIVE MG/DL
HCT VFR BLD AUTO: 36.6 % (ref 34–46.6)
HGB BLD-MCNC: 12.2 G/DL (ref 12–15.9)
HGB UR QL STRIP.AUTO: NEGATIVE
HOLD SPECIMEN: NORMAL
HOLD SPECIMEN: NORMAL
HYALINE CASTS UR QL AUTO: ABNORMAL /LPF
IMM GRANULOCYTES # BLD AUTO: 0.03 10*3/MM3 (ref 0–0.05)
IMM GRANULOCYTES NFR BLD AUTO: 0.2 % (ref 0–0.5)
KETONES UR QL STRIP: ABNORMAL
LEUKOCYTE ESTERASE UR QL STRIP.AUTO: ABNORMAL
LIPASE SERPL-CCNC: 28 U/L (ref 13–60)
LYMPHOCYTES # BLD AUTO: 1.43 10*3/MM3 (ref 0.7–3.1)
LYMPHOCYTES NFR BLD AUTO: 10.7 % (ref 19.6–45.3)
MCH RBC QN AUTO: 29.5 PG (ref 26.6–33)
MCHC RBC AUTO-ENTMCNC: 33.3 G/DL (ref 31.5–35.7)
MCV RBC AUTO: 88.4 FL (ref 79–97)
MONOCYTES # BLD AUTO: 1.18 10*3/MM3 (ref 0.1–0.9)
MONOCYTES NFR BLD AUTO: 8.8 % (ref 5–12)
NEUTROPHILS NFR BLD AUTO: 10.44 10*3/MM3 (ref 1.7–7)
NEUTROPHILS NFR BLD AUTO: 77.9 % (ref 42.7–76)
NITRITE UR QL STRIP: NEGATIVE
NRBC BLD AUTO-RTO: 0 /100 WBC (ref 0–0.2)
PH UR STRIP.AUTO: 5.5 [PH] (ref 5–8)
PLATELET # BLD AUTO: 306 10*3/MM3 (ref 140–450)
PMV BLD AUTO: 10.9 FL (ref 6–12)
POTASSIUM SERPL-SCNC: 3.4 MMOL/L (ref 3.5–5.2)
PROT SERPL-MCNC: 7.6 G/DL (ref 6–8.5)
PROT UR QL STRIP: ABNORMAL
RBC # BLD AUTO: 4.14 10*6/MM3 (ref 3.77–5.28)
RBC # UR STRIP: ABNORMAL /HPF
REF LAB TEST METHOD: ABNORMAL
SODIUM SERPL-SCNC: 140 MMOL/L (ref 136–145)
SP GR UR STRIP: 1.02 (ref 1–1.03)
SQUAMOUS #/AREA URNS HPF: ABNORMAL /HPF
UROBILINOGEN UR QL STRIP: ABNORMAL
WBC # UR STRIP: ABNORMAL /HPF
WBC NRBC COR # BLD AUTO: 13.4 10*3/MM3 (ref 3.4–10.8)
WHOLE BLOOD HOLD COAG: NORMAL
WHOLE BLOOD HOLD SPECIMEN: NORMAL

## 2025-08-29 PROCEDURE — 81001 URINALYSIS AUTO W/SCOPE: CPT | Performed by: EMERGENCY MEDICINE

## 2025-08-29 PROCEDURE — 83690 ASSAY OF LIPASE: CPT | Performed by: EMERGENCY MEDICINE

## 2025-08-29 PROCEDURE — 83605 ASSAY OF LACTIC ACID: CPT | Performed by: EMERGENCY MEDICINE

## 2025-08-29 PROCEDURE — 25510000001 IOPAMIDOL PER 1 ML: Performed by: EMERGENCY MEDICINE

## 2025-08-29 PROCEDURE — 25010000002 KETOROLAC TROMETHAMINE PER 15 MG

## 2025-08-29 PROCEDURE — 80053 COMPREHEN METABOLIC PANEL: CPT | Performed by: EMERGENCY MEDICINE

## 2025-08-29 PROCEDURE — 74177 CT ABD & PELVIS W/CONTRAST: CPT

## 2025-08-29 PROCEDURE — 85025 COMPLETE CBC W/AUTO DIFF WBC: CPT | Performed by: EMERGENCY MEDICINE

## 2025-08-29 RX ORDER — METRONIDAZOLE 500 MG/1
500 TABLET ORAL ONCE
Status: COMPLETED | OUTPATIENT
Start: 2025-08-29 | End: 2025-08-29

## 2025-08-29 RX ORDER — SODIUM CHLORIDE 0.9 % (FLUSH) 0.9 %
10 SYRINGE (ML) INJECTION AS NEEDED
Status: DISCONTINUED | OUTPATIENT
Start: 2025-08-29 | End: 2025-08-30 | Stop reason: HOSPADM

## 2025-08-29 RX ORDER — METRONIDAZOLE 500 MG/1
500 TABLET ORAL 2 TIMES DAILY
Qty: 10 TABLET | Refills: 0 | Status: SHIPPED | OUTPATIENT
Start: 2025-08-29 | End: 2025-09-03

## 2025-08-29 RX ORDER — KETOROLAC TROMETHAMINE 15 MG/ML
15 INJECTION, SOLUTION INTRAMUSCULAR; INTRAVENOUS ONCE
Status: COMPLETED | OUTPATIENT
Start: 2025-08-29 | End: 2025-08-29

## 2025-08-29 RX ORDER — HYDROCODONE BITARTRATE AND ACETAMINOPHEN 5; 325 MG/1; MG/1
1 TABLET ORAL EVERY 6 HOURS PRN
Refills: 0 | Status: CANCELLED | OUTPATIENT
Start: 2025-08-29

## 2025-08-29 RX ORDER — IOPAMIDOL 755 MG/ML
100 INJECTION, SOLUTION INTRAVASCULAR
Status: COMPLETED | OUTPATIENT
Start: 2025-08-29 | End: 2025-08-29

## 2025-08-29 RX ORDER — CIPROFLOXACIN 250 MG/1
500 TABLET, FILM COATED ORAL ONCE
Status: COMPLETED | OUTPATIENT
Start: 2025-08-29 | End: 2025-08-29

## 2025-08-29 RX ORDER — CIPROFLOXACIN 500 MG/1
500 TABLET, FILM COATED ORAL 2 TIMES DAILY
Qty: 10 TABLET | Refills: 0 | Status: SHIPPED | OUTPATIENT
Start: 2025-08-29 | End: 2025-09-03

## 2025-08-29 RX ADMIN — KETOROLAC TROMETHAMINE 15 MG: 15 INJECTION, SOLUTION INTRAMUSCULAR; INTRAVENOUS at 22:55

## 2025-08-29 RX ADMIN — METRONIDAZOLE 500 MG: 500 TABLET ORAL at 23:01

## 2025-08-29 RX ADMIN — IOPAMIDOL 100 ML: 755 INJECTION, SOLUTION INTRAVENOUS at 22:05

## 2025-08-29 RX ADMIN — CIPROFLOXACIN HYDROCHLORIDE 500 MG: 250 TABLET, FILM COATED ORAL at 23:01

## (undated) DEVICE — CONN JET HYDRA H20 AUXILIARY DISP

## (undated) DEVICE — ESOPHAGEAL BALLOON DILATATION CATHETER: Brand: CRE FIXED WIRE

## (undated) DEVICE — SINGLE-USE BIOPSY FORCEPS: Brand: RADIAL JAW 4

## (undated) DEVICE — Device: Brand: DEFENDO AIR/WATER/SUCTION AND BIOPSY VALVE

## (undated) DEVICE — SOL IRRG H2O PL/BG 1000ML STRL

## (undated) DEVICE — Device

## (undated) DEVICE — LINER SURG CANSTR SXN S/RIGD 1500CC

## (undated) DEVICE — BLCK/BITE BLOX WO/DENTL/RIM W/STRAP 54F

## (undated) DEVICE — SOLIDIFIER LIQLOC PLS 1500CC BT

## (undated) DEVICE — DEV INFL CRE STERIFLATE 60CC DISP

## (undated) DEVICE — THE STERILE LIGHT HANDLE COVER IS USED WITH STERIS SURGICAL LIGHTING AND VISUALIZATION SYSTEMS.